# Patient Record
Sex: MALE | Race: WHITE | Employment: UNEMPLOYED | URBAN - METROPOLITAN AREA
[De-identification: names, ages, dates, MRNs, and addresses within clinical notes are randomized per-mention and may not be internally consistent; named-entity substitution may affect disease eponyms.]

---

## 2024-03-29 ENCOUNTER — APPOINTMENT (OUTPATIENT)
Dept: CT IMAGING | Age: 49
End: 2024-03-29
Payer: COMMERCIAL

## 2024-03-29 ENCOUNTER — HOSPITAL ENCOUNTER (EMERGENCY)
Age: 49
Discharge: HOME | End: 2024-03-29
Payer: COMMERCIAL

## 2024-03-29 VITALS
SYSTOLIC BLOOD PRESSURE: 148 MMHG | RESPIRATION RATE: 19 BRPM | TEMPERATURE: 98.6 F | OXYGEN SATURATION: 99 % | HEIGHT: 75 IN | DIASTOLIC BLOOD PRESSURE: 87 MMHG | WEIGHT: 205 LBS | HEART RATE: 97 BPM | BODY MASS INDEX: 25.49 KG/M2

## 2024-03-29 DIAGNOSIS — R82.90 ABNORMAL URINE FINDINGS: ICD-10-CM

## 2024-03-29 DIAGNOSIS — K52.9 COLITIS: Primary | ICD-10-CM

## 2024-03-29 LAB
ALBUMIN SERPL-MCNC: 4 G/DL (ref 3.5–5)
ALBUMIN/GLOB SERPL: 1.1 (ref 0.4–1.6)
ALP SERPL-CCNC: 87 U/L (ref 45–117)
ALT SERPL-CCNC: 10 U/L (ref 13–61)
ANION GAP SERPL CALC-SCNC: 13 MMOL/L (ref 2–11)
APPEARANCE UR: CLEAR
AST SERPL-CCNC: 14 U/L (ref 15–37)
BACTERIA URNS QL MICRO: ABNORMAL /HPF
BASOPHILS # BLD: 0 K/UL (ref 0–0.2)
BASOPHILS NFR BLD: 0 % (ref 0–2)
BILIRUB SERPL-MCNC: 0.7 MG/DL (ref 0.2–1.1)
BILIRUB UR QL: ABNORMAL
BUN SERPL-MCNC: 10 MG/DL (ref 6–23)
CALCIUM SERPL-MCNC: 9.4 MG/DL (ref 8.3–10.4)
CASTS URNS QL MICRO: 0 /LPF
CHLORIDE SERPL-SCNC: 100 MMOL/L (ref 98–107)
CO2 SERPL-SCNC: 26 MMOL/L (ref 21–32)
COLOR UR: ABNORMAL
CREAT SERPL-MCNC: 1.12 MG/DL (ref 0.8–1.5)
CRYSTALS URNS QL MICRO: 0 /LPF
DIFFERENTIAL METHOD BLD: ABNORMAL
EOSINOPHIL # BLD: 0 K/UL (ref 0–0.8)
EOSINOPHIL NFR BLD: 0 % (ref 0.5–7.8)
EPI CELLS #/AREA URNS HPF: ABNORMAL /HPF
ERYTHROCYTE [DISTWIDTH] IN BLOOD BY AUTOMATED COUNT: 14.4 % (ref 11.9–14.6)
GLOBULIN SER CALC-MCNC: 3.6 G/DL (ref 2.8–4.5)
GLUCOSE SERPL-MCNC: 168 MG/DL (ref 65–100)
GLUCOSE UR STRIP.AUTO-MCNC: NEGATIVE MG/DL
HCT VFR BLD AUTO: 39.8 % (ref 41.1–50.3)
HGB BLD-MCNC: 12.6 G/DL (ref 13.6–17.2)
HGB UR QL STRIP: ABNORMAL
IMM GRANULOCYTES # BLD AUTO: 0 K/UL (ref 0–0.5)
IMM GRANULOCYTES NFR BLD AUTO: 0 % (ref 0–5)
KETONES UR QL STRIP.AUTO: ABNORMAL MG/DL
LEUKOCYTE ESTERASE UR QL STRIP.AUTO: NEGATIVE
LIPASE SERPL-CCNC: 26 U/L (ref 13–60)
LYMPHOCYTES # BLD: 0.8 K/UL (ref 0.5–4.6)
LYMPHOCYTES NFR BLD: 8 % (ref 13–44)
MCH RBC QN AUTO: 27.9 PG (ref 26.1–32.9)
MCHC RBC AUTO-ENTMCNC: 31.7 G/DL (ref 31.4–35)
MCV RBC AUTO: 88.1 FL (ref 82–102)
MONOCYTES # BLD: 0.5 K/UL (ref 0.1–1.3)
MONOCYTES NFR BLD: 5 % (ref 4–12)
MUCOUS THREADS URNS QL MICRO: ABNORMAL /LPF
NEUTS SEG # BLD: 8.4 K/UL (ref 1.7–8.2)
NEUTS SEG NFR BLD: 86 % (ref 43–78)
NITRITE UR QL STRIP.AUTO: NEGATIVE
NRBC # BLD: 0 K/UL (ref 0–0.2)
OTHER OBSERVATIONS: ABNORMAL
PH UR STRIP: 5.5 (ref 5–9)
PLATELET # BLD AUTO: 171 K/UL (ref 150–450)
PMV BLD AUTO: 10.3 FL (ref 9.4–12.3)
POTASSIUM SERPL-SCNC: 3.6 MMOL/L (ref 3.5–5.1)
PROT SERPL-MCNC: 7.6 G/DL (ref 6.4–8.2)
PROT UR STRIP-MCNC: 100 MG/DL
RBC # BLD AUTO: 4.52 M/UL (ref 4.23–5.6)
RBC #/AREA URNS HPF: ABNORMAL /HPF
SODIUM SERPL-SCNC: 139 MMOL/L (ref 133–143)
SP GR UR REFRACTOMETRY: 1.02 (ref 1–1.02)
UROBILINOGEN UR QL STRIP.AUTO: 0.2 EU/DL (ref 0.2–1)
WBC # BLD AUTO: 9.8 K/UL (ref 4.3–11.1)
WBC URNS QL MICRO: ABNORMAL /HPF

## 2024-03-29 PROCEDURE — 87086 URINE CULTURE/COLONY COUNT: CPT

## 2024-03-29 PROCEDURE — 85025 COMPLETE CBC W/AUTO DIFF WBC: CPT

## 2024-03-29 PROCEDURE — 74177 CT ABD & PELVIS W/CONTRAST: CPT

## 2024-03-29 PROCEDURE — 99285 EMERGENCY DEPT VISIT HI MDM: CPT

## 2024-03-29 PROCEDURE — 83690 ASSAY OF LIPASE: CPT

## 2024-03-29 PROCEDURE — 80053 COMPREHEN METABOLIC PANEL: CPT

## 2024-03-29 PROCEDURE — 81001 URINALYSIS AUTO W/SCOPE: CPT

## 2024-03-29 PROCEDURE — 6360000004 HC RX CONTRAST MEDICATION: Performed by: PHYSICIAN ASSISTANT

## 2024-03-29 RX ORDER — CIPROFLOXACIN 500 MG/1
500 TABLET, FILM COATED ORAL 2 TIMES DAILY
Qty: 14 TABLET | Refills: 0 | Status: SHIPPED | OUTPATIENT
Start: 2024-03-29 | End: 2024-04-05

## 2024-03-29 RX ORDER — HYOSCYAMINE SULFATE 0.12 MG/1
1 TABLET SUBLINGUAL 3 TIMES DAILY PRN
Qty: 120 EACH | Refills: 0 | Status: SHIPPED | OUTPATIENT
Start: 2024-03-29 | End: 2024-04-01

## 2024-03-29 RX ORDER — METRONIDAZOLE 500 MG/1
500 TABLET ORAL 3 TIMES DAILY
Qty: 21 TABLET | Refills: 0 | Status: SHIPPED | OUTPATIENT
Start: 2024-03-29 | End: 2024-04-05

## 2024-03-29 RX ADMIN — IOPAMIDOL 100 ML: 755 INJECTION, SOLUTION INTRAVENOUS at 16:25

## 2024-03-29 ASSESSMENT — PAIN - FUNCTIONAL ASSESSMENT: PAIN_FUNCTIONAL_ASSESSMENT: 0-10

## 2024-03-29 ASSESSMENT — PAIN DESCRIPTION - DESCRIPTORS: DESCRIPTORS: ACHING

## 2024-03-29 ASSESSMENT — PAIN SCALES - GENERAL: PAINLEVEL_OUTOF10: 7

## 2024-03-29 ASSESSMENT — PAIN DESCRIPTION - LOCATION: LOCATION: ABDOMEN

## 2024-03-29 NOTE — DISCHARGE INSTRUCTIONS
Take antibiotics as prescribed for full course of treatment, as discussed I recommend following a clear liquid diet and slowly advance as tolerated to a soft bland diet until symptoms have improved.  I have referred you to GI for follow-up as you will need a screening colonoscopy once your symptoms have resolved.  You should be receiving a phone call from them to schedule this appointment    Follow-up with your PCP in 1 to 2 days if no improvement.  Return to the ER for any new or worsening symptoms.

## 2024-03-29 NOTE — ED NOTES
I have reviewed discharge instructions with the patient.  The patient verbalized understanding.    Patient left ED via Discharge Method: ambulatory to Home with self.    Opportunity for questions and clarification provided.       Patient given 3 scripts.         To continue your aftercare when you leave the hospital, you may receive an automated call from our care team to check in on how you are doing.  This is a free service and part of our promise to provide the best care and service to meet your aftercare needs.” If you have questions, or wish to unsubscribe from this service please call [phone].  Thank you for Choosing our [facility] Emergency Department.

## 2024-03-29 NOTE — ED PROVIDER NOTES
Emergency Department Provider Note       PCP: No primary care provider on file.   Age: 48 y.o.   Sex: male     DISPOSITION Decision To Discharge 03/29/2024 05:30:05 PM       ICD-10-CM    1. Colitis  K52.9 [facility]      2. Abnormal urine findings  R82.90           Medical Decision Making     48-year-old male who presented to the emergency department with abdominal pain, right-sided for the last week.  No nausea, vomiting or diarrhea.  Patient afebrile here, well-appearing.  Some mild tenderness to the right lower quadrant on exam, no rebound or guarding noted.  His labs today are reassuring, his urine did show 2+ bacteria but he is not having any UTI symptoms.  I will send his urine for culture.  CT scan ordered for further evaluation shows ascending colitis without perforation or abscess.  Will start patient on antibiotics outpatient, clear liquid diet and then slowly advance as tolerated.  Referral to GI for follow-up colonoscopy return to the ER for any new or worsening of symptoms.  ED Course as of 03/29/24 1935   Fri Mar 29, 2024   1518 Bacteria, UA(!): 2+ [KE]      ED Course User Index  [KE] Veronica Conner PA     1 acute, uncomplicated illness or injury.  Prescription drug management performed.  Shared medical decision making was utilized in creating the patients health plan today.    I independently ordered and reviewed each unique test.  I reviewed external records: ED visit note from an outside group.  I reviewed external records: provider visit note from PCP.  I reviewed external records: provider visit note from outside specialist.     I interpreted the CT Scan I reviewed imaging and radiology report, no pneumoperitoneum.              History     48-year-old male presents emergency department today for evaluation of right-sided abdominal pain.  States that symptoms began 1 week ago, symptoms have been coming and going.  Triage note noted that patient has had

## 2024-03-31 LAB
BACTERIA SPEC CULT: NORMAL
SERVICE CMNT-IMP: NORMAL

## 2024-04-01 LAB
BACTERIA SPEC CULT: NORMAL
BACTERIA SPEC CULT: NORMAL
SERVICE CMNT-IMP: NORMAL

## 2024-05-06 ENCOUNTER — HOSPITAL ENCOUNTER (INPATIENT)
Age: 49
LOS: 165 days | Discharge: HOME | End: 2024-10-18
Attending: EMERGENCY MEDICINE | Admitting: HOSPITALIST
Payer: COMMERCIAL

## 2024-05-06 ENCOUNTER — APPOINTMENT (OUTPATIENT)
Dept: GENERAL RADIOLOGY | Age: 49
End: 2024-05-06
Payer: COMMERCIAL

## 2024-05-06 ENCOUNTER — APPOINTMENT (OUTPATIENT)
Dept: CT IMAGING | Age: 49
End: 2024-05-06
Payer: COMMERCIAL

## 2024-05-06 DIAGNOSIS — C18.9 ADENOCARCINOMA OF COLON (HCC): ICD-10-CM

## 2024-05-06 DIAGNOSIS — R22.42 LOCALIZED SWELLING OF LEFT LOWER LEG: ICD-10-CM

## 2024-05-06 DIAGNOSIS — R65.21 SEPTIC SHOCK (HCC): Primary | ICD-10-CM

## 2024-05-06 DIAGNOSIS — R78.81 BACTEREMIA: ICD-10-CM

## 2024-05-06 DIAGNOSIS — N17.9 ACUTE KIDNEY INJURY (HCC): ICD-10-CM

## 2024-05-06 DIAGNOSIS — E86.9 VOLUME DEPLETION: ICD-10-CM

## 2024-05-06 DIAGNOSIS — A41.9 SEPTIC SHOCK (HCC): Primary | ICD-10-CM

## 2024-05-06 DIAGNOSIS — E87.0 ACUTE HYPERNATREMIA: ICD-10-CM

## 2024-05-06 DIAGNOSIS — K63.89 COLONIC MASS: ICD-10-CM

## 2024-05-06 PROBLEM — G93.40 ACUTE ENCEPHALOPATHY: Status: ACTIVE | Noted: 2024-05-06

## 2024-05-06 LAB
ALBUMIN SERPL-MCNC: 2.9 G/DL (ref 3.5–5)
ALBUMIN/GLOB SERPL: 0.6 (ref 1–1.9)
ALP SERPL-CCNC: 123 U/L (ref 40–129)
ALT SERPL-CCNC: 35 U/L (ref 12–65)
ANION GAP SERPL CALC-SCNC: 16 MMOL/L (ref 9–18)
AST SERPL-CCNC: 45 U/L (ref 15–37)
BASOPHILS # BLD: 0 K/UL (ref 0–0.2)
BASOPHILS NFR BLD: 0 % (ref 0–2)
BILIRUB SERPL-MCNC: 1.1 MG/DL (ref 0–1.2)
BUN SERPL-MCNC: 49 MG/DL (ref 6–23)
CALCIUM SERPL-MCNC: 9.2 MG/DL (ref 8.8–10.2)
CHLORIDE SERPL-SCNC: 111 MMOL/L (ref 98–107)
CK SERPL-CCNC: 171 U/L (ref 21–215)
CO2 SERPL-SCNC: 29 MMOL/L (ref 20–28)
CREAT SERPL-MCNC: 1.75 MG/DL (ref 0.8–1.3)
DIFFERENTIAL METHOD BLD: ABNORMAL
EOSINOPHIL # BLD: 0 K/UL (ref 0–0.8)
EOSINOPHIL NFR BLD: 0 % (ref 0.5–7.8)
ERYTHROCYTE [DISTWIDTH] IN BLOOD BY AUTOMATED COUNT: 16.2 % (ref 11.9–14.6)
EST. AVERAGE GLUCOSE BLD GHB EST-MCNC: 136 MG/DL
GLOBULIN SER CALC-MCNC: 4.9 G/DL (ref 2.3–3.5)
GLUCOSE BLD STRIP.AUTO-MCNC: 92 MG/DL (ref 65–100)
GLUCOSE SERPL-MCNC: 162 MG/DL (ref 70–99)
HBA1C MFR BLD: 6.4 % (ref 0–5.6)
HCT VFR BLD AUTO: 38.8 % (ref 41.1–50.3)
HGB BLD-MCNC: 11.5 G/DL (ref 13.6–17.2)
IMM GRANULOCYTES # BLD AUTO: 0.1 K/UL (ref 0–0.5)
IMM GRANULOCYTES NFR BLD AUTO: 1 % (ref 0–5)
LACTATE SERPL-SCNC: 3.5 MMOL/L (ref 0.5–2)
LACTATE SERPL-SCNC: 4.1 MMOL/L (ref 0.5–2)
LACTATE SERPL-SCNC: 6 MMOL/L (ref 0.5–2)
LYMPHOCYTES # BLD: 1 K/UL (ref 0.5–4.6)
LYMPHOCYTES NFR BLD: 7 % (ref 13–44)
MCH RBC QN AUTO: 27.4 PG (ref 26.1–32.9)
MCHC RBC AUTO-ENTMCNC: 29.6 G/DL (ref 31.4–35)
MCV RBC AUTO: 92.4 FL (ref 82–102)
MONOCYTES # BLD: 0.8 K/UL (ref 0.1–1.3)
MONOCYTES NFR BLD: 5 % (ref 4–12)
NEUTS SEG # BLD: 12.6 K/UL (ref 1.7–8.2)
NEUTS SEG NFR BLD: 87 % (ref 43–78)
NRBC # BLD: 0 K/UL (ref 0–0.2)
PLATELET # BLD AUTO: 360 K/UL (ref 150–450)
PMV BLD AUTO: 10.1 FL (ref 9.4–12.3)
POTASSIUM SERPL-SCNC: 4.4 MMOL/L (ref 3.5–5.1)
PROCALCITONIN SERPL-MCNC: 0.14 NG/ML (ref 0–0.1)
PROT SERPL-MCNC: 7.8 G/DL (ref 6.3–8.2)
RBC # BLD AUTO: 4.2 M/UL (ref 4.23–5.6)
SERVICE CMNT-IMP: NORMAL
SODIUM SERPL-SCNC: 155 MMOL/L (ref 136–145)
TSH W FREE THYROID IF ABNORMAL: 1.7 UIU/ML (ref 0.27–4.2)
WBC # BLD AUTO: 14.4 K/UL (ref 4.3–11.1)

## 2024-05-06 PROCEDURE — 84145 PROCALCITONIN (PCT): CPT

## 2024-05-06 PROCEDURE — 71045 X-RAY EXAM CHEST 1 VIEW: CPT

## 2024-05-06 PROCEDURE — 87040 BLOOD CULTURE FOR BACTERIA: CPT

## 2024-05-06 PROCEDURE — 96365 THER/PROPH/DIAG IV INF INIT: CPT

## 2024-05-06 PROCEDURE — 70450 CT HEAD/BRAIN W/O DYE: CPT

## 2024-05-06 PROCEDURE — 96374 THER/PROPH/DIAG INJ IV PUSH: CPT

## 2024-05-06 PROCEDURE — 82550 ASSAY OF CK (CPK): CPT

## 2024-05-06 PROCEDURE — 6360000002 HC RX W HCPCS: Performed by: EMERGENCY MEDICINE

## 2024-05-06 PROCEDURE — 85025 COMPLETE CBC W/AUTO DIFF WBC: CPT

## 2024-05-06 PROCEDURE — 80053 COMPREHEN METABOLIC PANEL: CPT

## 2024-05-06 PROCEDURE — 82962 GLUCOSE BLOOD TEST: CPT

## 2024-05-06 PROCEDURE — 2580000003 HC RX 258: Performed by: EMERGENCY MEDICINE

## 2024-05-06 PROCEDURE — 1100000000 HC RM PRIVATE

## 2024-05-06 PROCEDURE — 87205 SMEAR GRAM STAIN: CPT

## 2024-05-06 PROCEDURE — 87154 CUL TYP ID BLD PTHGN 6+ TRGT: CPT

## 2024-05-06 PROCEDURE — 83605 ASSAY OF LACTIC ACID: CPT

## 2024-05-06 PROCEDURE — 93005 ELECTROCARDIOGRAM TRACING: CPT | Performed by: EMERGENCY MEDICINE

## 2024-05-06 PROCEDURE — 1100000003 HC PRIVATE W/ TELEMETRY

## 2024-05-06 PROCEDURE — 84443 ASSAY THYROID STIM HORMONE: CPT

## 2024-05-06 PROCEDURE — 83036 HEMOGLOBIN GLYCOSYLATED A1C: CPT

## 2024-05-06 PROCEDURE — 74176 CT ABD & PELVIS W/O CONTRAST: CPT

## 2024-05-06 PROCEDURE — 99285 EMERGENCY DEPT VISIT HI MDM: CPT

## 2024-05-06 RX ORDER — ACETAMINOPHEN 650 MG/1
650 SUPPOSITORY RECTAL EVERY 6 HOURS PRN
Status: DISCONTINUED | OUTPATIENT
Start: 2024-05-06 | End: 2024-05-19 | Stop reason: SDUPTHER

## 2024-05-06 RX ORDER — ONDANSETRON 4 MG/1
4 TABLET, ORALLY DISINTEGRATING ORAL EVERY 8 HOURS PRN
Status: DISCONTINUED | OUTPATIENT
Start: 2024-05-06 | End: 2024-05-19 | Stop reason: SDUPTHER

## 2024-05-06 RX ORDER — SODIUM CHLORIDE 0.9 % (FLUSH) 0.9 %
5-40 SYRINGE (ML) INJECTION PRN
Status: DISCONTINUED | OUTPATIENT
Start: 2024-05-06 | End: 2024-05-19 | Stop reason: SDUPTHER

## 2024-05-06 RX ORDER — SODIUM CHLORIDE 9 MG/ML
INJECTION, SOLUTION INTRAVENOUS PRN
Status: DISCONTINUED | OUTPATIENT
Start: 2024-05-06 | End: 2024-05-19 | Stop reason: SDUPTHER

## 2024-05-06 RX ORDER — POTASSIUM CHLORIDE 7.45 MG/ML
10 INJECTION INTRAVENOUS PRN
Status: DISCONTINUED | OUTPATIENT
Start: 2024-05-06 | End: 2024-05-19 | Stop reason: SDUPTHER

## 2024-05-06 RX ORDER — POTASSIUM CHLORIDE 1500 MG/1
40 TABLET, EXTENDED RELEASE ORAL PRN
Status: DISCONTINUED | OUTPATIENT
Start: 2024-05-06 | End: 2024-05-22

## 2024-05-06 RX ORDER — 0.9 % SODIUM CHLORIDE 0.9 %
30 INTRAVENOUS SOLUTION INTRAVENOUS ONCE
Status: COMPLETED | OUTPATIENT
Start: 2024-05-06 | End: 2024-05-06

## 2024-05-06 RX ORDER — ACETAMINOPHEN 325 MG/1
650 TABLET ORAL EVERY 6 HOURS PRN
Status: DISCONTINUED | OUTPATIENT
Start: 2024-05-06 | End: 2024-05-19 | Stop reason: SDUPTHER

## 2024-05-06 RX ORDER — SODIUM CHLORIDE 0.9 % (FLUSH) 0.9 %
5-40 SYRINGE (ML) INJECTION EVERY 12 HOURS SCHEDULED
Status: DISCONTINUED | OUTPATIENT
Start: 2024-05-06 | End: 2024-05-19 | Stop reason: SDUPTHER

## 2024-05-06 RX ORDER — INSULIN LISPRO 100 [IU]/ML
0-4 INJECTION, SOLUTION INTRAVENOUS; SUBCUTANEOUS NIGHTLY
Status: DISCONTINUED | OUTPATIENT
Start: 2024-05-06 | End: 2024-06-06

## 2024-05-06 RX ORDER — ENOXAPARIN SODIUM 100 MG/ML
40 INJECTION SUBCUTANEOUS DAILY
Status: DISCONTINUED | OUTPATIENT
Start: 2024-05-06 | End: 2024-05-06

## 2024-05-06 RX ORDER — INSULIN LISPRO 100 [IU]/ML
0-8 INJECTION, SOLUTION INTRAVENOUS; SUBCUTANEOUS
Status: DISCONTINUED | OUTPATIENT
Start: 2024-05-07 | End: 2024-06-06

## 2024-05-06 RX ORDER — ONDANSETRON 2 MG/ML
4 INJECTION INTRAMUSCULAR; INTRAVENOUS EVERY 6 HOURS PRN
Status: DISCONTINUED | OUTPATIENT
Start: 2024-05-06 | End: 2024-05-19 | Stop reason: SDUPTHER

## 2024-05-06 RX ORDER — DEXTROSE MONOHYDRATE 50 MG/ML
INJECTION, SOLUTION INTRAVENOUS CONTINUOUS
Status: ACTIVE | OUTPATIENT
Start: 2024-05-06 | End: 2024-05-07

## 2024-05-06 RX ORDER — MAGNESIUM SULFATE IN WATER 40 MG/ML
2000 INJECTION, SOLUTION INTRAVENOUS PRN
Status: DISCONTINUED | OUTPATIENT
Start: 2024-05-06 | End: 2024-05-18

## 2024-05-06 RX ORDER — POLYETHYLENE GLYCOL 3350 17 G/17G
17 POWDER, FOR SOLUTION ORAL DAILY PRN
Status: DISCONTINUED | OUTPATIENT
Start: 2024-05-06 | End: 2024-05-30

## 2024-05-06 RX ORDER — LEVETIRACETAM 500 MG/1
500 TABLET ORAL 2 TIMES DAILY
Status: DISCONTINUED | OUTPATIENT
Start: 2024-05-06 | End: 2024-05-14

## 2024-05-06 RX ADMIN — PIPERACILLIN AND TAZOBACTAM 4500 MG: 4; .5 INJECTION, POWDER, LYOPHILIZED, FOR SOLUTION INTRAVENOUS at 17:18

## 2024-05-06 RX ADMIN — SODIUM CHLORIDE 2535 ML: 9 INJECTION, SOLUTION INTRAVENOUS at 17:20

## 2024-05-06 RX ADMIN — Medication 2500 MG: at 19:14

## 2024-05-07 ENCOUNTER — PREP FOR PROCEDURE (OUTPATIENT)
Dept: SURGERY | Age: 49
End: 2024-05-07

## 2024-05-07 ENCOUNTER — ANESTHESIA EVENT (OUTPATIENT)
Dept: SURGERY | Age: 49
End: 2024-05-07
Payer: COMMERCIAL

## 2024-05-07 PROBLEM — S06.5XAA SUBDURAL HEMATOMA (HCC): Status: ACTIVE | Noted: 2024-01-31

## 2024-05-07 PROBLEM — E87.0 HYPERNATREMIA: Status: ACTIVE | Noted: 2024-05-07

## 2024-05-07 PROBLEM — R78.81 BACTEREMIA DUE TO STAPHYLOCOCCUS: Status: ACTIVE | Noted: 2024-05-07

## 2024-05-07 PROBLEM — B95.8 BACTEREMIA DUE TO STAPHYLOCOCCUS: Status: ACTIVE | Noted: 2024-05-07

## 2024-05-07 PROBLEM — N39.0 UTI (URINARY TRACT INFECTION): Status: ACTIVE | Noted: 2024-05-07

## 2024-05-07 PROBLEM — A41.9 SEPSIS (HCC): Status: ACTIVE | Noted: 2024-05-07

## 2024-05-07 PROBLEM — N17.9 AKI (ACUTE KIDNEY INJURY) (HCC): Status: ACTIVE | Noted: 2024-05-07

## 2024-05-07 PROBLEM — G91.0 COMMUNICATING HYDROCEPHALUS (HCC): Status: ACTIVE | Noted: 2021-01-27

## 2024-05-07 PROBLEM — Z98.2 S/P VP SHUNT: Status: ACTIVE | Noted: 2024-01-31

## 2024-05-07 PROBLEM — D64.9 ANEMIA: Status: ACTIVE | Noted: 2024-05-07

## 2024-05-07 PROBLEM — L89.90 DECUBITUS ULCER: Status: ACTIVE | Noted: 2024-05-07

## 2024-05-07 PROBLEM — S31.000A SACRAL WOUND, INITIAL ENCOUNTER: Status: ACTIVE | Noted: 2024-05-07

## 2024-05-07 LAB
ACCESSION NUMBER, LLC1M: ABNORMAL
ACINETOBACTER CALCOAC BAUMANNII COMPLEX BY PCR: NOT DETECTED
ANION GAP SERPL CALC-SCNC: 11 MMOL/L (ref 9–18)
ANION GAP SERPL CALC-SCNC: 11 MMOL/L (ref 9–18)
ANION GAP SERPL CALC-SCNC: 12 MMOL/L (ref 9–18)
APPEARANCE UR: ABNORMAL
B FRAGILIS DNA BLD POS QL NAA+NON-PROBE: NOT DETECTED
BACTERIA URNS QL MICRO: ABNORMAL /HPF
BILIRUB UR QL: NEGATIVE
BIOFIRE TEST COMMENT: ABNORMAL
BUN SERPL-MCNC: 31 MG/DL (ref 6–23)
BUN SERPL-MCNC: 33 MG/DL (ref 6–23)
BUN SERPL-MCNC: 41 MG/DL (ref 6–23)
C ALBICANS DNA BLD POS QL NAA+NON-PROBE: NOT DETECTED
C AURIS DNA BLD POS QL NAA+NON-PROBE: NOT DETECTED
C GATTII+NEOFOR DNA BLD POS QL NAA+N-PRB: NOT DETECTED
C GLABRATA DNA BLD POS QL NAA+NON-PROBE: NOT DETECTED
C KRUSEI DNA BLD POS QL NAA+NON-PROBE: NOT DETECTED
C PARAP DNA BLD POS QL NAA+NON-PROBE: NOT DETECTED
C TROPICLS DNA BLD POS QL NAA+NON-PROBE: NOT DETECTED
CALCIUM SERPL-MCNC: 8.4 MG/DL (ref 8.8–10.2)
CALCIUM SERPL-MCNC: 8.6 MG/DL (ref 8.8–10.2)
CALCIUM SERPL-MCNC: 8.6 MG/DL (ref 8.8–10.2)
CHLORIDE SERPL-SCNC: 117 MMOL/L (ref 98–107)
CHLORIDE SERPL-SCNC: 118 MMOL/L (ref 98–107)
CHLORIDE SERPL-SCNC: 118 MMOL/L (ref 98–107)
CO2 SERPL-SCNC: 27 MMOL/L (ref 20–28)
CO2 SERPL-SCNC: 27 MMOL/L (ref 20–28)
CO2 SERPL-SCNC: 28 MMOL/L (ref 20–28)
COLOR UR: ABNORMAL
CREAT SERPL-MCNC: 1.41 MG/DL (ref 0.8–1.3)
CREAT SERPL-MCNC: 1.53 MG/DL (ref 0.8–1.3)
CREAT SERPL-MCNC: 1.53 MG/DL (ref 0.8–1.3)
CRYSTALS URNS QL MICRO: ABNORMAL /LPF
E CLOAC COMP DNA BLD POS NAA+NON-PROBE: NOT DETECTED
E COLI DNA BLD POS QL NAA+NON-PROBE: NOT DETECTED
E FAECALIS DNA BLD POS QL NAA+NON-PROBE: NOT DETECTED
E FAECIUM DNA BLD POS QL NAA+NON-PROBE: NOT DETECTED
EKG ATRIAL RATE: 122 BPM
EKG DIAGNOSIS: NORMAL
EKG P AXIS: 77 DEGREES
EKG P-R INTERVAL: 142 MS
EKG Q-T INTERVAL: 337 MS
EKG QRS DURATION: 87 MS
EKG QTC CALCULATION (BAZETT): 481 MS
EKG R AXIS: 60 DEGREES
EKG T AXIS: -27 DEGREES
EKG VENTRICULAR RATE: 122 BPM
ENTEROBACTERALES DNA BLD POS NAA+N-PRB: NOT DETECTED
EPI CELLS #/AREA URNS HPF: ABNORMAL /HPF
ERYTHROCYTE [DISTWIDTH] IN BLOOD BY AUTOMATED COUNT: 16.5 % (ref 11.9–14.6)
GLUCOSE BLD STRIP.AUTO-MCNC: 112 MG/DL (ref 65–100)
GLUCOSE BLD STRIP.AUTO-MCNC: 115 MG/DL (ref 65–100)
GLUCOSE BLD STRIP.AUTO-MCNC: 115 MG/DL (ref 65–100)
GLUCOSE BLD STRIP.AUTO-MCNC: 134 MG/DL (ref 65–100)
GLUCOSE SERPL-MCNC: 114 MG/DL (ref 70–99)
GLUCOSE SERPL-MCNC: 116 MG/DL (ref 70–99)
GLUCOSE SERPL-MCNC: 138 MG/DL (ref 70–99)
GLUCOSE UR STRIP.AUTO-MCNC: NEGATIVE MG/DL
GP B STREP DNA BLD POS QL NAA+NON-PROBE: NOT DETECTED
HAEM INFLU DNA BLD POS QL NAA+NON-PROBE: NOT DETECTED
HCT VFR BLD AUTO: 36 % (ref 41.1–50.3)
HGB BLD-MCNC: 10.5 G/DL (ref 13.6–17.2)
HGB UR QL STRIP: ABNORMAL
K OXYTOCA DNA BLD POS QL NAA+NON-PROBE: NOT DETECTED
KETONES UR QL STRIP.AUTO: ABNORMAL MG/DL
KLEBSIELLA SP DNA BLD POS QL NAA+NON-PRB: NOT DETECTED
KLEBSIELLA SP DNA BLD POS QL NAA+NON-PRB: NOT DETECTED
L MONOCYTOG DNA BLD POS QL NAA+NON-PROBE: NOT DETECTED
LACTATE SERPL-SCNC: 1.6 MMOL/L (ref 0.5–2)
LACTATE SERPL-SCNC: 1.8 MMOL/L (ref 0.5–2)
LACTATE SERPL-SCNC: 1.9 MMOL/L (ref 0.5–2)
LACTATE SERPL-SCNC: 2 MMOL/L (ref 0.5–2)
LACTATE SERPL-SCNC: 2.1 MMOL/L (ref 0.5–2)
LACTATE SERPL-SCNC: 2.7 MMOL/L (ref 0.5–2)
LEUKOCYTE ESTERASE UR QL STRIP.AUTO: NEGATIVE
MCH RBC QN AUTO: 27.1 PG (ref 26.1–32.9)
MCHC RBC AUTO-ENTMCNC: 29.2 G/DL (ref 31.4–35)
MCV RBC AUTO: 92.8 FL (ref 82–102)
MRSA DNA SPEC QL NAA+PROBE: NOT DETECTED
N MEN DNA BLD POS QL NAA+NON-PROBE: NOT DETECTED
NITRITE UR QL STRIP.AUTO: NEGATIVE
NRBC # BLD: 0 K/UL (ref 0–0.2)
OTHER OBSERVATIONS: ABNORMAL
P AERUGINOSA DNA BLD POS NAA+NON-PROBE: NOT DETECTED
PH UR STRIP: 5.5 (ref 5–9)
PLATELET # BLD AUTO: 285 K/UL (ref 150–450)
PMV BLD AUTO: 10.1 FL (ref 9.4–12.3)
POTASSIUM SERPL-SCNC: 3.8 MMOL/L (ref 3.5–5.1)
POTASSIUM SERPL-SCNC: 4 MMOL/L (ref 3.5–5.1)
POTASSIUM SERPL-SCNC: 4.1 MMOL/L (ref 3.5–5.1)
PROT UR STRIP-MCNC: 30 MG/DL
PROTEUS SP DNA BLD POS QL NAA+NON-PROBE: NOT DETECTED
RBC # BLD AUTO: 3.88 M/UL (ref 4.23–5.6)
RBC #/AREA URNS HPF: ABNORMAL /HPF
RESISTANT GENE TARGETS: ABNORMAL
S AUREUS CPE NOSE QL NAA+PROBE: DETECTED
S AUREUS DNA BLD POS QL NAA+NON-PROBE: NOT DETECTED
S AUREUS+CONS DNA BLD POS NAA+NON-PROBE: DETECTED
S EPIDERMIDIS DNA BLD POS QL NAA+NON-PRB: NOT DETECTED
S LUGDUNENSIS DNA BLD POS QL NAA+NON-PRB: NOT DETECTED
S MALTOPHILIA DNA BLD POS QL NAA+NON-PRB: NOT DETECTED
S MARCESCENS DNA BLD POS NAA+NON-PROBE: NOT DETECTED
S PNEUM DNA BLD POS QL NAA+NON-PROBE: NOT DETECTED
S PYO DNA BLD POS QL NAA+NON-PROBE: NOT DETECTED
SALMONELLA DNA BLD POS QL NAA+NON-PROBE: NOT DETECTED
SERVICE CMNT-IMP: ABNORMAL
SODIUM SERPL-SCNC: 155 MMOL/L (ref 136–145)
SODIUM SERPL-SCNC: 155 MMOL/L (ref 136–145)
SODIUM SERPL-SCNC: 157 MMOL/L (ref 136–145)
SP GR UR REFRACTOMETRY: 1.03 (ref 1–1.02)
STREPTOCOCCUS DNA BLD POS NAA+NON-PROBE: NOT DETECTED
UROBILINOGEN UR QL STRIP.AUTO: 1 EU/DL (ref 0.2–1)
WBC # BLD AUTO: 14.6 K/UL (ref 4.3–11.1)
WBC URNS QL MICRO: ABNORMAL /HPF

## 2024-05-07 PROCEDURE — 6360000002 HC RX W HCPCS: Performed by: HOSPITALIST

## 2024-05-07 PROCEDURE — 6360000002 HC RX W HCPCS: Performed by: INTERNAL MEDICINE

## 2024-05-07 PROCEDURE — 82962 GLUCOSE BLOOD TEST: CPT

## 2024-05-07 PROCEDURE — 93010 ELECTROCARDIOGRAM REPORT: CPT | Performed by: INTERNAL MEDICINE

## 2024-05-07 PROCEDURE — 87086 URINE CULTURE/COLONY COUNT: CPT

## 2024-05-07 PROCEDURE — 97112 NEUROMUSCULAR REEDUCATION: CPT

## 2024-05-07 PROCEDURE — 92610 EVALUATE SWALLOWING FUNCTION: CPT

## 2024-05-07 PROCEDURE — 92523 SPEECH SOUND LANG COMPREHEN: CPT

## 2024-05-07 PROCEDURE — 6370000000 HC RX 637 (ALT 250 FOR IP): Performed by: HOSPITALIST

## 2024-05-07 PROCEDURE — 2580000003 HC RX 258: Performed by: INTERNAL MEDICINE

## 2024-05-07 PROCEDURE — 87641 MR-STAPH DNA AMP PROBE: CPT

## 2024-05-07 PROCEDURE — 83605 ASSAY OF LACTIC ACID: CPT

## 2024-05-07 PROCEDURE — 81001 URINALYSIS AUTO W/SCOPE: CPT

## 2024-05-07 PROCEDURE — 85027 COMPLETE CBC AUTOMATED: CPT

## 2024-05-07 PROCEDURE — 97161 PT EVAL LOW COMPLEX 20 MIN: CPT

## 2024-05-07 PROCEDURE — 1100000003 HC PRIVATE W/ TELEMETRY

## 2024-05-07 PROCEDURE — 97530 THERAPEUTIC ACTIVITIES: CPT

## 2024-05-07 PROCEDURE — 97535 SELF CARE MNGMENT TRAINING: CPT

## 2024-05-07 PROCEDURE — 36415 COLL VENOUS BLD VENIPUNCTURE: CPT

## 2024-05-07 PROCEDURE — 2580000003 HC RX 258: Performed by: HOSPITALIST

## 2024-05-07 PROCEDURE — 80048 BASIC METABOLIC PNL TOTAL CA: CPT

## 2024-05-07 PROCEDURE — 97166 OT EVAL MOD COMPLEX 45 MIN: CPT

## 2024-05-07 PROCEDURE — 2500000003 HC RX 250 WO HCPCS: Performed by: HOSPITALIST

## 2024-05-07 PROCEDURE — 99222 1ST HOSP IP/OBS MODERATE 55: CPT | Performed by: SURGERY

## 2024-05-07 RX ADMIN — PIPERACILLIN AND TAZOBACTAM 3375 MG: 3; .375 INJECTION, POWDER, LYOPHILIZED, FOR SOLUTION INTRAVENOUS at 15:22

## 2024-05-07 RX ADMIN — LEVETIRACETAM 500 MG: 500 TABLET, FILM COATED ORAL at 00:13

## 2024-05-07 RX ADMIN — SODIUM CHLORIDE, PRESERVATIVE FREE 10 ML: 5 INJECTION INTRAVENOUS at 21:18

## 2024-05-07 RX ADMIN — SODIUM CHLORIDE, PRESERVATIVE FREE 10 ML: 5 INJECTION INTRAVENOUS at 00:14

## 2024-05-07 RX ADMIN — TUBERCULIN PURIFIED PROTEIN DERIVATIVE 5 UNITS: 5 INJECTION, SOLUTION INTRADERMAL at 01:12

## 2024-05-07 RX ADMIN — SODIUM CHLORIDE: 900 INJECTION INTRAVENOUS at 21:16

## 2024-05-07 RX ADMIN — LEVETIRACETAM 500 MG: 500 TABLET, FILM COATED ORAL at 08:07

## 2024-05-07 RX ADMIN — LEVETIRACETAM 500 MG: 500 TABLET, FILM COATED ORAL at 21:18

## 2024-05-07 RX ADMIN — PIPERACILLIN AND TAZOBACTAM 3375 MG: 3; .375 INJECTION, POWDER, LYOPHILIZED, FOR SOLUTION INTRAVENOUS at 00:12

## 2024-05-07 RX ADMIN — DEXTROSE MONOHYDRATE: 50 INJECTION, SOLUTION INTRAVENOUS at 00:11

## 2024-05-07 RX ADMIN — VANCOMYCIN HYDROCHLORIDE 1250 MG: 10 INJECTION, POWDER, LYOPHILIZED, FOR SOLUTION INTRAVENOUS at 21:17

## 2024-05-07 RX ADMIN — PIPERACILLIN AND TAZOBACTAM 3375 MG: 3; .375 INJECTION, POWDER, LYOPHILIZED, FOR SOLUTION INTRAVENOUS at 08:12

## 2024-05-07 NOTE — ANESTHESIA PRE PROCEDURE
(36.5 °C) 99 °F (37.2 °C)   TempSrc:   Oral Axillary   SpO2: 100% 95% 95% 99%   Weight:   63.9 kg (140 lb 14.4 oz)    Height:                                                  BP Readings from Last 3 Encounters:   05/07/24 107/78   03/29/24 (!) 148/87       NPO Status:                                                                                 BMI:   Wt Readings from Last 3 Encounters:   05/06/24 63.9 kg (140 lb 14.4 oz)   03/29/24 93 kg (205 lb)     Body mass index is 17.61 kg/m².    CBC:   Lab Results   Component Value Date/Time    WBC 14.6 05/07/2024 02:39 AM    RBC 3.88 05/07/2024 02:39 AM    HGB 10.5 05/07/2024 02:39 AM    HCT 36.0 05/07/2024 02:39 AM    MCV 92.8 05/07/2024 02:39 AM    RDW 16.5 05/07/2024 02:39 AM     05/07/2024 02:39 AM       CMP:   Lab Results   Component Value Date/Time     05/07/2024 01:53 PM    K 3.8 05/07/2024 01:53 PM     05/07/2024 01:53 PM    CO2 28 05/07/2024 01:53 PM    BUN 33 05/07/2024 01:53 PM    CREATININE 1.53 05/07/2024 01:53 PM    LABGLOM 56 05/07/2024 01:53 PM    LABGLOM 81 03/29/2024 02:17 PM    GLUCOSE 138 05/07/2024 01:53 PM    CALCIUM 8.6 05/07/2024 01:53 PM    BILITOT 1.1 05/06/2024 05:21 PM    ALKPHOS 123 05/06/2024 05:21 PM    AST 45 05/06/2024 05:21 PM    ALT 35 05/06/2024 05:21 PM       POC Tests:   Recent Labs     05/07/24  1535   POCGLU 115*       Coags: No results found for: \"PROTIME\", \"INR\", \"APTT\"    HCG (If Applicable): No results found for: \"PREGTESTUR\", \"PREGSERUM\", \"HCG\", \"HCGQUANT\"     ABGs: No results found for: \"PHART\", \"PO2ART\", \"UVM3EGQ\", \"TJW7JVE\", \"BEART\", \"E6KSFXVS\"     Type & Screen (If Applicable):  No results found for: \"LABABO\"    Drug/Infectious Status (If Applicable):  No results found for: \"HIV\", \"HEPCAB\"    COVID-19 Screening (If Applicable): No results found for: \"COVID19\"        Anesthesia Evaluation    Airway: Mallampati: II  TM distance: >3 FB   Neck ROM: full  Mouth opening: > = 3 FB   Dental:    (+) poor

## 2024-05-08 ENCOUNTER — ANESTHESIA (OUTPATIENT)
Dept: SURGERY | Age: 49
End: 2024-05-08
Payer: COMMERCIAL

## 2024-05-08 ENCOUNTER — APPOINTMENT (OUTPATIENT)
Dept: NON INVASIVE DIAGNOSTICS | Age: 49
End: 2024-05-08
Attending: INTERNAL MEDICINE
Payer: COMMERCIAL

## 2024-05-08 LAB
ANION GAP SERPL CALC-SCNC: 10 MMOL/L (ref 9–18)
ANION GAP SERPL CALC-SCNC: 10 MMOL/L (ref 9–18)
ANION GAP SERPL CALC-SCNC: 11 MMOL/L (ref 9–18)
ANION GAP SERPL CALC-SCNC: 11 MMOL/L (ref 9–18)
BACTERIA SPEC CULT: ABNORMAL
BACTERIA SPEC CULT: ABNORMAL
BASOPHILS # BLD: 0 K/UL (ref 0–0.2)
BASOPHILS NFR BLD: 0 % (ref 0–2)
BUN SERPL-MCNC: 23 MG/DL (ref 6–23)
BUN SERPL-MCNC: 24 MG/DL (ref 6–23)
BUN SERPL-MCNC: 25 MG/DL (ref 6–23)
BUN SERPL-MCNC: 26 MG/DL (ref 6–23)
CALCIUM SERPL-MCNC: 8.3 MG/DL (ref 8.8–10.2)
CALCIUM SERPL-MCNC: 8.5 MG/DL (ref 8.8–10.2)
CALCIUM SERPL-MCNC: 8.5 MG/DL (ref 8.8–10.2)
CALCIUM SERPL-MCNC: 8.8 MG/DL (ref 8.8–10.2)
CHLORIDE SERPL-SCNC: 115 MMOL/L (ref 98–107)
CHLORIDE SERPL-SCNC: 116 MMOL/L (ref 98–107)
CHLORIDE SERPL-SCNC: 117 MMOL/L (ref 98–107)
CHLORIDE SERPL-SCNC: 118 MMOL/L (ref 98–107)
CO2 SERPL-SCNC: 26 MMOL/L (ref 20–28)
CO2 SERPL-SCNC: 27 MMOL/L (ref 20–28)
CO2 SERPL-SCNC: 28 MMOL/L (ref 20–28)
CO2 SERPL-SCNC: 29 MMOL/L (ref 20–28)
CREAT SERPL-MCNC: 1.18 MG/DL (ref 0.8–1.3)
CREAT SERPL-MCNC: 1.23 MG/DL (ref 0.8–1.3)
CREAT SERPL-MCNC: 1.25 MG/DL (ref 0.8–1.3)
CREAT SERPL-MCNC: 1.35 MG/DL (ref 0.8–1.3)
DIFFERENTIAL METHOD BLD: ABNORMAL
ECHO AO ASC DIAM: 3.6 CM
ECHO AO ASCENDING AORTA INDEX: 1.9 CM/M2
ECHO AO ROOT DIAM: 4 CM
ECHO AO ROOT INDEX: 2.12 CM/M2
ECHO AV AREA PEAK VELOCITY: 4.3 CM2
ECHO AV AREA VTI: 4.3 CM2
ECHO AV AREA/BSA PEAK VELOCITY: 2.3 CM2/M2
ECHO AV AREA/BSA VTI: 2.3 CM2/M2
ECHO AV MEAN GRADIENT: 2 MMHG
ECHO AV MEAN GRADIENT: 2 MMHG
ECHO AV MEAN VELOCITY: 0.7 M/S
ECHO AV PEAK GRADIENT: 4 MMHG
ECHO AV PEAK VELOCITY: 1 M/S
ECHO AV VELOCITY RATIO: 0.9
ECHO AV VTI: 15.4 CM
ECHO LA DIAMETER INDEX: 1.16 CM/M2
ECHO LA DIAMETER: 2.2 CM
ECHO LA TO AORTIC ROOT RATIO: 0.55
ECHO LV E' LATERAL VELOCITY: 11 CM/S
ECHO LV E' SEPTAL VELOCITY: 7 CM/S
ECHO LV EDV A4C: 112 ML
ECHO LV EDV INDEX A4C: 59 ML/M2
ECHO LV EJECTION FRACTION A4C: 53 %
ECHO LV ESV A4C: 53 ML
ECHO LV ESV INDEX A4C: 28 ML/M2
ECHO LV FRACTIONAL SHORTENING: 27 % (ref 28–44)
ECHO LV INTERNAL DIMENSION DIASTOLE INDEX: 1.96 CM/M2
ECHO LV INTERNAL DIMENSION DIASTOLIC: 3.7 CM (ref 4.2–5.9)
ECHO LV INTERNAL DIMENSION SYSTOLIC INDEX: 1.43 CM/M2
ECHO LV INTERNAL DIMENSION SYSTOLIC: 2.7 CM
ECHO LV IVSD: 1 CM (ref 0.6–1)
ECHO LV MASS 2D: 120.8 G (ref 88–224)
ECHO LV MASS INDEX 2D: 63.9 G/M2 (ref 49–115)
ECHO LV POSTERIOR WALL DIASTOLIC: 1.1 CM (ref 0.6–1)
ECHO LV RELATIVE WALL THICKNESS RATIO: 0.59
ECHO LVOT AREA: 4.9 CM2
ECHO LVOT AV VTI INDEX: 0.87
ECHO LVOT DIAM: 2.5 CM
ECHO LVOT MEAN GRADIENT: 2 MMHG
ECHO LVOT PEAK GRADIENT: 3 MMHG
ECHO LVOT PEAK VELOCITY: 0.9 M/S
ECHO LVOT STROKE VOLUME INDEX: 34.8 ML/M2
ECHO LVOT SV: 65.7 ML
ECHO LVOT VTI: 13.4 CM
ECHO MV A VELOCITY: 0.81 M/S
ECHO MV AREA VTI: 3.9 CM2
ECHO MV E VELOCITY: 0.69 M/S
ECHO MV E/A RATIO: 0.85
ECHO MV E/E' LATERAL: 6.27
ECHO MV E/E' RATIO (AVERAGED): 8.06
ECHO MV LVOT VTI INDEX: 1.27
ECHO MV MAX VELOCITY: 0.9 M/S
ECHO MV MEAN GRADIENT: 2 MMHG
ECHO MV MEAN VELOCITY: 0.7 M/S
ECHO MV PEAK GRADIENT: 3 MMHG
ECHO MV VTI: 17 CM
ECHO PV MAX VELOCITY: 0.7 M/S
ECHO PV PEAK GRADIENT: 2 MMHG
ECHO RV BASAL DIMENSION: 2.7 CM
ECHO RV FREE WALL PEAK S': 12 CM/S
ECHO RV INTERNAL DIMENSION: 2.9 CM
ECHO RV TAPSE: 1.7 CM (ref 1.7–?)
EOSINOPHIL # BLD: 0 K/UL (ref 0–0.8)
EOSINOPHIL NFR BLD: 0 % (ref 0.5–7.8)
ERYTHROCYTE [DISTWIDTH] IN BLOOD BY AUTOMATED COUNT: 16.4 % (ref 11.9–14.6)
GLUCOSE BLD STRIP.AUTO-MCNC: 137 MG/DL (ref 65–100)
GLUCOSE BLD STRIP.AUTO-MCNC: 83 MG/DL (ref 65–100)
GLUCOSE SERPL-MCNC: 89 MG/DL (ref 70–99)
GLUCOSE SERPL-MCNC: 95 MG/DL (ref 70–99)
GLUCOSE SERPL-MCNC: 97 MG/DL (ref 70–99)
GLUCOSE SERPL-MCNC: 98 MG/DL (ref 70–99)
GRAM STN SPEC: ABNORMAL
HCT VFR BLD AUTO: 33.5 % (ref 41.1–50.3)
HGB BLD-MCNC: 9.9 G/DL (ref 13.6–17.2)
IMM GRANULOCYTES # BLD AUTO: 0.1 K/UL (ref 0–0.5)
IMM GRANULOCYTES NFR BLD AUTO: 1 % (ref 0–5)
IRON SATN MFR SERPL: 46 % (ref 20–50)
IRON SERPL-MCNC: 77 UG/DL (ref 35–100)
LYMPHOCYTES # BLD: 1.3 K/UL (ref 0.5–4.6)
LYMPHOCYTES NFR BLD: 13 % (ref 13–44)
MCH RBC QN AUTO: 27.4 PG (ref 26.1–32.9)
MCHC RBC AUTO-ENTMCNC: 29.6 G/DL (ref 31.4–35)
MCV RBC AUTO: 92.8 FL (ref 82–102)
MM INDURATION, POC: 0 MM (ref 0–5)
MONOCYTES # BLD: 0.6 K/UL (ref 0.1–1.3)
MONOCYTES NFR BLD: 6 % (ref 4–12)
NEUTS SEG # BLD: 8.4 K/UL (ref 1.7–8.2)
NEUTS SEG NFR BLD: 81 % (ref 43–78)
NRBC # BLD: 0 K/UL (ref 0–0.2)
PLATELET # BLD AUTO: 231 K/UL (ref 150–450)
PMV BLD AUTO: 10.4 FL (ref 9.4–12.3)
POTASSIUM SERPL-SCNC: 3.8 MMOL/L (ref 3.5–5.1)
POTASSIUM SERPL-SCNC: 3.9 MMOL/L (ref 3.5–5.1)
POTASSIUM SERPL-SCNC: 3.9 MMOL/L (ref 3.5–5.1)
POTASSIUM SERPL-SCNC: 4.2 MMOL/L (ref 3.5–5.1)
POTASSIUM SERPL-SCNC: 4.3 MMOL/L (ref 3.5–5.1)
PPD, POC: NEGATIVE
RBC # BLD AUTO: 3.61 M/UL (ref 4.23–5.6)
SERVICE CMNT-IMP: ABNORMAL
SERVICE CMNT-IMP: ABNORMAL
SERVICE CMNT-IMP: NORMAL
SODIUM SERPL-SCNC: 154 MMOL/L (ref 136–145)
SODIUM SERPL-SCNC: 154 MMOL/L (ref 136–145)
SODIUM SERPL-SCNC: 155 MMOL/L (ref 136–145)
SODIUM SERPL-SCNC: 155 MMOL/L (ref 136–145)
TIBC SERPL-MCNC: 168 UG/DL (ref 240–450)
UIBC SERPL-MCNC: 90.7 UG/DL (ref 112–347)
WBC # BLD AUTO: 10.4 K/UL (ref 4.3–11.1)

## 2024-05-08 PROCEDURE — 6360000002 HC RX W HCPCS: Performed by: NURSE ANESTHETIST, CERTIFIED REGISTERED

## 2024-05-08 PROCEDURE — 6370000000 HC RX 637 (ALT 250 FOR IP): Performed by: HOSPITALIST

## 2024-05-08 PROCEDURE — 2580000003 HC RX 258: Performed by: ANESTHESIOLOGY

## 2024-05-08 PROCEDURE — 11046 DBRDMT MUSC&/FSCA EA ADDL: CPT | Performed by: SURGERY

## 2024-05-08 PROCEDURE — 3700000001 HC ADD 15 MINUTES (ANESTHESIA): Performed by: SURGERY

## 2024-05-08 PROCEDURE — 6360000004 HC RX CONTRAST MEDICATION: Performed by: INTERNAL MEDICINE

## 2024-05-08 PROCEDURE — 2709999900 HC NON-CHARGEABLE SUPPLY: Performed by: SURGERY

## 2024-05-08 PROCEDURE — C8929 TTE W OR WO FOL WCON,DOPPLER: HCPCS

## 2024-05-08 PROCEDURE — 2500000003 HC RX 250 WO HCPCS: Performed by: NURSE ANESTHETIST, CERTIFIED REGISTERED

## 2024-05-08 PROCEDURE — 0JB70ZZ EXCISION OF BACK SUBCUTANEOUS TISSUE AND FASCIA, OPEN APPROACH: ICD-10-PCS | Performed by: SURGERY

## 2024-05-08 PROCEDURE — 93306 TTE W/DOPPLER COMPLETE: CPT | Performed by: INTERNAL MEDICINE

## 2024-05-08 PROCEDURE — 84132 ASSAY OF SERUM POTASSIUM: CPT

## 2024-05-08 PROCEDURE — 7100000000 HC PACU RECOVERY - FIRST 15 MIN: Performed by: SURGERY

## 2024-05-08 PROCEDURE — 83550 IRON BINDING TEST: CPT

## 2024-05-08 PROCEDURE — 36415 COLL VENOUS BLD VENIPUNCTURE: CPT

## 2024-05-08 PROCEDURE — 80048 BASIC METABOLIC PNL TOTAL CA: CPT

## 2024-05-08 PROCEDURE — 6360000002 HC RX W HCPCS: Performed by: HOSPITALIST

## 2024-05-08 PROCEDURE — 1100000000 HC RM PRIVATE

## 2024-05-08 PROCEDURE — 85025 COMPLETE CBC W/AUTO DIFF WBC: CPT

## 2024-05-08 PROCEDURE — 3700000000 HC ANESTHESIA ATTENDED CARE: Performed by: SURGERY

## 2024-05-08 PROCEDURE — 11043 DBRDMT MUSC&/FSCA 1ST 20/<: CPT | Performed by: SURGERY

## 2024-05-08 PROCEDURE — 2580000003 HC RX 258: Performed by: PHYSICIAN ASSISTANT

## 2024-05-08 PROCEDURE — 3600000012 HC SURGERY LEVEL 2 ADDTL 15MIN: Performed by: SURGERY

## 2024-05-08 PROCEDURE — 2580000003 HC RX 258: Performed by: HOSPITALIST

## 2024-05-08 PROCEDURE — 2580000003 HC RX 258: Performed by: INTERNAL MEDICINE

## 2024-05-08 PROCEDURE — 6370000000 HC RX 637 (ALT 250 FOR IP): Performed by: PHYSICIAN ASSISTANT

## 2024-05-08 PROCEDURE — 82962 GLUCOSE BLOOD TEST: CPT

## 2024-05-08 PROCEDURE — 97530 THERAPEUTIC ACTIVITIES: CPT

## 2024-05-08 PROCEDURE — 3600000002 HC SURGERY LEVEL 2 BASE: Performed by: SURGERY

## 2024-05-08 PROCEDURE — 7100000001 HC PACU RECOVERY - ADDTL 15 MIN: Performed by: SURGERY

## 2024-05-08 PROCEDURE — 83540 ASSAY OF IRON: CPT

## 2024-05-08 PROCEDURE — 88304 TISSUE EXAM BY PATHOLOGIST: CPT

## 2024-05-08 RX ORDER — OXYCODONE HYDROCHLORIDE 5 MG/1
10 TABLET ORAL EVERY 4 HOURS PRN
Status: DISCONTINUED | OUTPATIENT
Start: 2024-05-08 | End: 2024-05-19 | Stop reason: SDUPTHER

## 2024-05-08 RX ORDER — ONDANSETRON 2 MG/ML
4 INJECTION INTRAMUSCULAR; INTRAVENOUS
Status: DISCONTINUED | OUTPATIENT
Start: 2024-05-08 | End: 2024-05-08 | Stop reason: HOSPADM

## 2024-05-08 RX ORDER — SODIUM CHLORIDE 0.9 % (FLUSH) 0.9 %
5-40 SYRINGE (ML) INJECTION PRN
Status: DISCONTINUED | OUTPATIENT
Start: 2024-05-08 | End: 2024-05-08 | Stop reason: HOSPADM

## 2024-05-08 RX ORDER — LIDOCAINE HYDROCHLORIDE 20 MG/ML
INJECTION, SOLUTION EPIDURAL; INFILTRATION; INTRACAUDAL; PERINEURAL PRN
Status: DISCONTINUED | OUTPATIENT
Start: 2024-05-08 | End: 2024-05-08 | Stop reason: SDUPTHER

## 2024-05-08 RX ORDER — LIDOCAINE HYDROCHLORIDE 10 MG/ML
1 INJECTION, SOLUTION INFILTRATION; PERINEURAL
Status: DISCONTINUED | OUTPATIENT
Start: 2024-05-08 | End: 2024-05-08 | Stop reason: HOSPADM

## 2024-05-08 RX ORDER — HYDRALAZINE HYDROCHLORIDE 20 MG/ML
10 INJECTION INTRAMUSCULAR; INTRAVENOUS
Status: DISCONTINUED | OUTPATIENT
Start: 2024-05-08 | End: 2024-05-08 | Stop reason: HOSPADM

## 2024-05-08 RX ORDER — MIDAZOLAM HYDROCHLORIDE 2 MG/2ML
2 INJECTION, SOLUTION INTRAMUSCULAR; INTRAVENOUS
Status: DISCONTINUED | OUTPATIENT
Start: 2024-05-08 | End: 2024-05-08 | Stop reason: HOSPADM

## 2024-05-08 RX ORDER — FENTANYL CITRATE 50 UG/ML
INJECTION, SOLUTION INTRAMUSCULAR; INTRAVENOUS PRN
Status: DISCONTINUED | OUTPATIENT
Start: 2024-05-08 | End: 2024-05-08 | Stop reason: SDUPTHER

## 2024-05-08 RX ORDER — PROCHLORPERAZINE EDISYLATE 5 MG/ML
5 INJECTION INTRAMUSCULAR; INTRAVENOUS
Status: DISCONTINUED | OUTPATIENT
Start: 2024-05-08 | End: 2024-05-08 | Stop reason: HOSPADM

## 2024-05-08 RX ORDER — SODIUM CHLORIDE 9 MG/ML
INJECTION, SOLUTION INTRAVENOUS PRN
Status: DISCONTINUED | OUTPATIENT
Start: 2024-05-08 | End: 2024-05-08 | Stop reason: HOSPADM

## 2024-05-08 RX ORDER — FENTANYL CITRATE 50 UG/ML
100 INJECTION, SOLUTION INTRAMUSCULAR; INTRAVENOUS
Status: DISCONTINUED | OUTPATIENT
Start: 2024-05-08 | End: 2024-05-08 | Stop reason: HOSPADM

## 2024-05-08 RX ORDER — ONDANSETRON 2 MG/ML
INJECTION INTRAMUSCULAR; INTRAVENOUS PRN
Status: DISCONTINUED | OUTPATIENT
Start: 2024-05-08 | End: 2024-05-08 | Stop reason: SDUPTHER

## 2024-05-08 RX ORDER — SODIUM CHLORIDE 0.9 % (FLUSH) 0.9 %
5-40 SYRINGE (ML) INJECTION EVERY 12 HOURS SCHEDULED
Status: DISCONTINUED | OUTPATIENT
Start: 2024-05-08 | End: 2024-05-08 | Stop reason: HOSPADM

## 2024-05-08 RX ORDER — SODIUM CHLORIDE, SODIUM LACTATE, POTASSIUM CHLORIDE, CALCIUM CHLORIDE 600; 310; 30; 20 MG/100ML; MG/100ML; MG/100ML; MG/100ML
INJECTION, SOLUTION INTRAVENOUS CONTINUOUS
Status: DISCONTINUED | OUTPATIENT
Start: 2024-05-08 | End: 2024-05-08 | Stop reason: HOSPADM

## 2024-05-08 RX ORDER — LABETALOL HYDROCHLORIDE 5 MG/ML
10 INJECTION, SOLUTION INTRAVENOUS
Status: DISCONTINUED | OUTPATIENT
Start: 2024-05-08 | End: 2024-05-08 | Stop reason: HOSPADM

## 2024-05-08 RX ORDER — DEXAMETHASONE SODIUM PHOSPHATE 4 MG/ML
INJECTION, SOLUTION INTRA-ARTICULAR; INTRALESIONAL; INTRAMUSCULAR; INTRAVENOUS; SOFT TISSUE PRN
Status: DISCONTINUED | OUTPATIENT
Start: 2024-05-08 | End: 2024-05-08 | Stop reason: SDUPTHER

## 2024-05-08 RX ORDER — NALOXONE HYDROCHLORIDE 0.4 MG/ML
INJECTION, SOLUTION INTRAMUSCULAR; INTRAVENOUS; SUBCUTANEOUS PRN
Status: DISCONTINUED | OUTPATIENT
Start: 2024-05-08 | End: 2024-05-08 | Stop reason: HOSPADM

## 2024-05-08 RX ORDER — OXYCODONE HYDROCHLORIDE 5 MG/1
5 TABLET ORAL EVERY 4 HOURS PRN
Status: DISCONTINUED | OUTPATIENT
Start: 2024-05-08 | End: 2024-05-19 | Stop reason: SDUPTHER

## 2024-05-08 RX ORDER — ROCURONIUM BROMIDE 10 MG/ML
INJECTION, SOLUTION INTRAVENOUS PRN
Status: DISCONTINUED | OUTPATIENT
Start: 2024-05-08 | End: 2024-05-08 | Stop reason: SDUPTHER

## 2024-05-08 RX ORDER — HYDROMORPHONE HYDROCHLORIDE 1 MG/ML
1 INJECTION, SOLUTION INTRAMUSCULAR; INTRAVENOUS; SUBCUTANEOUS EVERY 4 HOURS PRN
Status: DISCONTINUED | OUTPATIENT
Start: 2024-05-08 | End: 2024-05-19 | Stop reason: SDUPTHER

## 2024-05-08 RX ORDER — OXYCODONE HYDROCHLORIDE 5 MG/1
5 TABLET ORAL
Status: DISCONTINUED | OUTPATIENT
Start: 2024-05-08 | End: 2024-05-08 | Stop reason: HOSPADM

## 2024-05-08 RX ORDER — HYDROMORPHONE HYDROCHLORIDE 2 MG/ML
0.25 INJECTION, SOLUTION INTRAMUSCULAR; INTRAVENOUS; SUBCUTANEOUS EVERY 5 MIN PRN
Status: DISCONTINUED | OUTPATIENT
Start: 2024-05-08 | End: 2024-05-08 | Stop reason: HOSPADM

## 2024-05-08 RX ORDER — PROPOFOL 10 MG/ML
INJECTION, EMULSION INTRAVENOUS PRN
Status: DISCONTINUED | OUTPATIENT
Start: 2024-05-08 | End: 2024-05-08 | Stop reason: SDUPTHER

## 2024-05-08 RX ORDER — HYDROMORPHONE HYDROCHLORIDE 2 MG/ML
0.5 INJECTION, SOLUTION INTRAMUSCULAR; INTRAVENOUS; SUBCUTANEOUS EVERY 5 MIN PRN
Status: DISCONTINUED | OUTPATIENT
Start: 2024-05-08 | End: 2024-05-08 | Stop reason: HOSPADM

## 2024-05-08 RX ADMIN — SUGAMMADEX 200 MG: 100 INJECTION, SOLUTION INTRAVENOUS at 15:46

## 2024-05-08 RX ADMIN — SODIUM CHLORIDE, PRESERVATIVE FREE 0.3 ML: 5 INJECTION INTRAVENOUS at 10:55

## 2024-05-08 RX ADMIN — FENTANYL CITRATE 50 MCG: 50 INJECTION, SOLUTION INTRAMUSCULAR; INTRAVENOUS at 15:15

## 2024-05-08 RX ADMIN — FENTANYL CITRATE 50 MCG: 50 INJECTION, SOLUTION INTRAMUSCULAR; INTRAVENOUS at 15:33

## 2024-05-08 RX ADMIN — SODIUM HYPOCHLORITE: 2.5 SOLUTION TOPICAL at 22:11

## 2024-05-08 RX ADMIN — PIPERACILLIN AND TAZOBACTAM 3375 MG: 3; .375 INJECTION, POWDER, LYOPHILIZED, FOR SOLUTION INTRAVENOUS at 00:12

## 2024-05-08 RX ADMIN — ONDANSETRON 4 MG: 2 INJECTION INTRAMUSCULAR; INTRAVENOUS at 15:34

## 2024-05-08 RX ADMIN — ROCURONIUM BROMIDE 40 MG: 10 INJECTION, SOLUTION INTRAVENOUS at 15:15

## 2024-05-08 RX ADMIN — SODIUM CHLORIDE, SODIUM LACTATE, POTASSIUM CHLORIDE, AND CALCIUM CHLORIDE: 600; 310; 30; 20 INJECTION, SOLUTION INTRAVENOUS at 14:59

## 2024-05-08 RX ADMIN — DEXAMETHASONE SODIUM PHOSPHATE 8 MG: 4 INJECTION, SOLUTION INTRAMUSCULAR; INTRAVENOUS at 15:35

## 2024-05-08 RX ADMIN — SODIUM CHLORIDE, PRESERVATIVE FREE 10 ML: 5 INJECTION INTRAVENOUS at 22:14

## 2024-05-08 RX ADMIN — LEVETIRACETAM 500 MG: 500 TABLET, FILM COATED ORAL at 09:00

## 2024-05-08 RX ADMIN — LIDOCAINE HYDROCHLORIDE 80 MG: 20 INJECTION, SOLUTION EPIDURAL; INFILTRATION; INTRACAUDAL; PERINEURAL at 15:15

## 2024-05-08 RX ADMIN — LEVETIRACETAM 500 MG: 500 TABLET, FILM COATED ORAL at 22:11

## 2024-05-08 RX ADMIN — PROPOFOL 150 MG: 10 INJECTION, EMULSION INTRAVENOUS at 15:15

## 2024-05-08 RX ADMIN — PIPERACILLIN AND TAZOBACTAM 3375 MG: 3; .375 INJECTION, POWDER, LYOPHILIZED, FOR SOLUTION INTRAVENOUS at 10:29

## 2024-05-08 ASSESSMENT — PAIN SCALES - GENERAL: PAINLEVEL_OUTOF10: 0

## 2024-05-08 ASSESSMENT — PAIN - FUNCTIONAL ASSESSMENT
PAIN_FUNCTIONAL_ASSESSMENT: NONE - DENIES PAIN
PAIN_FUNCTIONAL_ASSESSMENT: 0-10

## 2024-05-08 NOTE — ANESTHESIA POSTPROCEDURE EVALUATION
Department of Anesthesiology  Postprocedure Note    Patient: Brendan Saleh  MRN: [id number]  YOB: 1975  Date of evaluation: 5/8/2024    Procedure Summary       Date: 05/08/24 Room / Location: [facility] MAIN OR 02 / [facility] MAIN OR    Anesthesia Start: 1505 Anesthesia Stop: 1555    Procedure: IRRIGATION AND DEBRIDEMENT OF SACRAL DECUBITIUS ULCER (Sacrum) Diagnosis:       Decubitus ulcer      (Decubitus ulcer [L89.90])    Providers: Lou Zapien MD Responsible Provider: Kade Kline DO    Anesthesia Type: general ASA Status: 3            Anesthesia Type: No value filed.    Adriana Phase I: Adriana Score: 8    Adriana Phase II:      Anesthesia Post Evaluation    Patient location during evaluation: PACU  Level of consciousness: awake and alert  Airway patency: patent  Nausea & Vomiting: no nausea  Cardiovascular status: hemodynamically stable  Respiratory status: acceptable  Hydration status: euvolemic  Pain management: satisfactory to patient    No notable events documented.

## 2024-05-09 LAB
ANION GAP SERPL CALC-SCNC: 10 MMOL/L (ref 9–18)
ANION GAP SERPL CALC-SCNC: 10 MMOL/L (ref 9–18)
ANION GAP SERPL CALC-SCNC: 13 MMOL/L (ref 9–18)
BACTERIA SPEC CULT: NORMAL
BASOPHILS # BLD: 0 K/UL (ref 0–0.2)
BASOPHILS NFR BLD: 0 % (ref 0–2)
BUN SERPL-MCNC: 22 MG/DL (ref 6–23)
BUN SERPL-MCNC: 24 MG/DL (ref 6–23)
BUN SERPL-MCNC: 27 MG/DL (ref 6–23)
CALCIUM SERPL-MCNC: 8.2 MG/DL (ref 8.8–10.2)
CALCIUM SERPL-MCNC: 8.3 MG/DL (ref 8.8–10.2)
CALCIUM SERPL-MCNC: 8.3 MG/DL (ref 8.8–10.2)
CHLORIDE SERPL-SCNC: 111 MMOL/L (ref 98–107)
CHLORIDE SERPL-SCNC: 113 MMOL/L (ref 98–107)
CHLORIDE SERPL-SCNC: 115 MMOL/L (ref 98–107)
CO2 SERPL-SCNC: 26 MMOL/L (ref 20–28)
CO2 SERPL-SCNC: 26 MMOL/L (ref 20–28)
CO2 SERPL-SCNC: 27 MMOL/L (ref 20–28)
CREAT SERPL-MCNC: 1.06 MG/DL (ref 0.8–1.3)
CREAT SERPL-MCNC: 1.1 MG/DL (ref 0.8–1.3)
CREAT SERPL-MCNC: 1.11 MG/DL (ref 0.8–1.3)
DIFFERENTIAL METHOD BLD: ABNORMAL
EOSINOPHIL # BLD: 0 K/UL (ref 0–0.8)
EOSINOPHIL NFR BLD: 0 % (ref 0.5–7.8)
ERYTHROCYTE [DISTWIDTH] IN BLOOD BY AUTOMATED COUNT: 15.9 % (ref 11.9–14.6)
FERRITIN SERPL-MCNC: 376 NG/ML (ref 8–388)
FOLATE SERPL-MCNC: 11.4 NG/ML (ref 3.1–17.5)
GLUCOSE BLD STRIP.AUTO-MCNC: 100 MG/DL (ref 65–100)
GLUCOSE BLD STRIP.AUTO-MCNC: 139 MG/DL (ref 65–100)
GLUCOSE BLD STRIP.AUTO-MCNC: 149 MG/DL (ref 65–100)
GLUCOSE SERPL-MCNC: 195 MG/DL (ref 70–99)
GLUCOSE SERPL-MCNC: 93 MG/DL (ref 70–99)
GLUCOSE SERPL-MCNC: 98 MG/DL (ref 70–99)
HCT VFR BLD AUTO: 31.3 % (ref 41.1–50.3)
HGB BLD-MCNC: 9.3 G/DL (ref 13.6–17.2)
IMM GRANULOCYTES # BLD AUTO: 0.1 K/UL (ref 0–0.5)
IMM GRANULOCYTES NFR BLD AUTO: 1 % (ref 0–5)
LYMPHOCYTES # BLD: 1.3 K/UL (ref 0.5–4.6)
LYMPHOCYTES NFR BLD: 17 % (ref 13–44)
MCH RBC QN AUTO: 27.6 PG (ref 26.1–32.9)
MCHC RBC AUTO-ENTMCNC: 29.7 G/DL (ref 31.4–35)
MCV RBC AUTO: 92.9 FL (ref 82–102)
MM INDURATION, POC: 0 MM (ref 0–5)
MONOCYTES # BLD: 0.4 K/UL (ref 0.1–1.3)
MONOCYTES NFR BLD: 6 % (ref 4–12)
NEUTS SEG # BLD: 5.5 K/UL (ref 1.7–8.2)
NEUTS SEG NFR BLD: 76 % (ref 43–78)
NRBC # BLD: 0 K/UL (ref 0–0.2)
PLATELET # BLD AUTO: 231 K/UL (ref 150–450)
PMV BLD AUTO: 11.1 FL (ref 9.4–12.3)
POTASSIUM SERPL-SCNC: 3.8 MMOL/L (ref 3.5–5.1)
POTASSIUM SERPL-SCNC: 3.8 MMOL/L (ref 3.5–5.1)
POTASSIUM SERPL-SCNC: 3.9 MMOL/L (ref 3.5–5.1)
PPD, POC: NEGATIVE
RBC # BLD AUTO: 3.37 M/UL (ref 4.23–5.6)
SERVICE CMNT-IMP: ABNORMAL
SERVICE CMNT-IMP: ABNORMAL
SERVICE CMNT-IMP: NORMAL
SERVICE CMNT-IMP: NORMAL
SODIUM SERPL-SCNC: 147 MMOL/L (ref 136–145)
SODIUM SERPL-SCNC: 151 MMOL/L (ref 136–145)
SODIUM SERPL-SCNC: 152 MMOL/L (ref 136–145)
VIT B12 SERPL-MCNC: 1067 PG/ML (ref 193–986)
WBC # BLD AUTO: 7.2 K/UL (ref 4.3–11.1)

## 2024-05-09 PROCEDURE — 2580000003 HC RX 258: Performed by: PHYSICIAN ASSISTANT

## 2024-05-09 PROCEDURE — 82607 VITAMIN B-12: CPT

## 2024-05-09 PROCEDURE — 83735 ASSAY OF MAGNESIUM: CPT

## 2024-05-09 PROCEDURE — 1100000000 HC RM PRIVATE

## 2024-05-09 PROCEDURE — 36415 COLL VENOUS BLD VENIPUNCTURE: CPT

## 2024-05-09 PROCEDURE — 80048 BASIC METABOLIC PNL TOTAL CA: CPT

## 2024-05-09 PROCEDURE — 92507 TX SP LANG VOICE COMM INDIV: CPT

## 2024-05-09 PROCEDURE — 82728 ASSAY OF FERRITIN: CPT

## 2024-05-09 PROCEDURE — 6370000000 HC RX 637 (ALT 250 FOR IP): Performed by: PHYSICIAN ASSISTANT

## 2024-05-09 PROCEDURE — 6370000000 HC RX 637 (ALT 250 FOR IP): Performed by: NURSE PRACTITIONER

## 2024-05-09 PROCEDURE — 97530 THERAPEUTIC ACTIVITIES: CPT

## 2024-05-09 PROCEDURE — 82962 GLUCOSE BLOOD TEST: CPT

## 2024-05-09 PROCEDURE — 6360000002 HC RX W HCPCS: Performed by: PHYSICIAN ASSISTANT

## 2024-05-09 PROCEDURE — 82746 ASSAY OF FOLIC ACID SERUM: CPT

## 2024-05-09 PROCEDURE — 97535 SELF CARE MNGMENT TRAINING: CPT

## 2024-05-09 PROCEDURE — 97112 NEUROMUSCULAR REEDUCATION: CPT

## 2024-05-09 PROCEDURE — 85025 COMPLETE CBC W/AUTO DIFF WBC: CPT

## 2024-05-09 PROCEDURE — 92526 ORAL FUNCTION THERAPY: CPT

## 2024-05-09 PROCEDURE — 2580000003 HC RX 258: Performed by: INTERNAL MEDICINE

## 2024-05-09 RX ORDER — DEXTROSE MONOHYDRATE 50 MG/ML
INJECTION, SOLUTION INTRAVENOUS EVERY 6 HOURS SCHEDULED
Status: DISCONTINUED | OUTPATIENT
Start: 2024-05-09 | End: 2024-05-10

## 2024-05-09 RX ORDER — METRONIDAZOLE 500 MG/1
500 TABLET ORAL EVERY 12 HOURS SCHEDULED
Status: DISCONTINUED | OUTPATIENT
Start: 2024-05-09 | End: 2024-05-13

## 2024-05-09 RX ORDER — CEPHALEXIN 500 MG/1
500 CAPSULE ORAL EVERY 6 HOURS SCHEDULED
Status: DISCONTINUED | OUTPATIENT
Start: 2024-05-09 | End: 2024-05-13

## 2024-05-09 RX ADMIN — CEPHALEXIN 500 MG: 250 CAPSULE ORAL at 18:08

## 2024-05-09 RX ADMIN — SODIUM HYPOCHLORITE: 2.5 SOLUTION TOPICAL at 12:53

## 2024-05-09 RX ADMIN — CEPHALEXIN 500 MG: 250 CAPSULE ORAL at 23:58

## 2024-05-09 RX ADMIN — DEXTROSE MONOHYDRATE: 50 INJECTION, SOLUTION INTRAVENOUS at 13:49

## 2024-05-09 RX ADMIN — DEXTROSE MONOHYDRATE: 50 INJECTION, SOLUTION INTRAVENOUS at 18:10

## 2024-05-09 RX ADMIN — METRONIDAZOLE 500 MG: 500 TABLET ORAL at 21:28

## 2024-05-09 RX ADMIN — SODIUM CHLORIDE: 900 INJECTION INTRAVENOUS at 00:12

## 2024-05-09 RX ADMIN — LEVETIRACETAM 500 MG: 500 TABLET, FILM COATED ORAL at 07:47

## 2024-05-09 RX ADMIN — LEVETIRACETAM 500 MG: 500 TABLET, FILM COATED ORAL at 21:28

## 2024-05-09 RX ADMIN — PIPERACILLIN AND TAZOBACTAM 3375 MG: 3; .375 INJECTION, POWDER, LYOPHILIZED, FOR SOLUTION INTRAVENOUS at 07:46

## 2024-05-09 RX ADMIN — PIPERACILLIN AND TAZOBACTAM 3375 MG: 3; .375 INJECTION, POWDER, LYOPHILIZED, FOR SOLUTION INTRAVENOUS at 00:12

## 2024-05-09 ASSESSMENT — PAIN SCALES - GENERAL: PAINLEVEL_OUTOF10: 0

## 2024-05-10 LAB
ANION GAP SERPL CALC-SCNC: 8 MMOL/L (ref 9–18)
ANION GAP SERPL CALC-SCNC: 9 MMOL/L (ref 9–18)
ANION GAP SERPL CALC-SCNC: 9 MMOL/L (ref 9–18)
BASOPHILS # BLD: 0 K/UL (ref 0–0.2)
BASOPHILS NFR BLD: 0 % (ref 0–2)
BUN SERPL-MCNC: 17 MG/DL (ref 6–23)
BUN SERPL-MCNC: 22 MG/DL (ref 6–23)
BUN SERPL-MCNC: 25 MG/DL (ref 6–23)
CALCIUM SERPL-MCNC: 7.9 MG/DL (ref 8.8–10.2)
CALCIUM SERPL-MCNC: 8.1 MG/DL (ref 8.8–10.2)
CALCIUM SERPL-MCNC: 8.1 MG/DL (ref 8.8–10.2)
CHLORIDE SERPL-SCNC: 108 MMOL/L (ref 98–107)
CHLORIDE SERPL-SCNC: 110 MMOL/L (ref 98–107)
CHLORIDE SERPL-SCNC: 111 MMOL/L (ref 98–107)
CO2 SERPL-SCNC: 24 MMOL/L (ref 20–28)
CO2 SERPL-SCNC: 24 MMOL/L (ref 20–28)
CO2 SERPL-SCNC: 26 MMOL/L (ref 20–28)
CREAT SERPL-MCNC: 0.77 MG/DL (ref 0.8–1.3)
CREAT SERPL-MCNC: 0.88 MG/DL (ref 0.8–1.3)
CREAT SERPL-MCNC: 0.92 MG/DL (ref 0.8–1.3)
DIFFERENTIAL METHOD BLD: ABNORMAL
EOSINOPHIL # BLD: 0 K/UL (ref 0–0.8)
EOSINOPHIL NFR BLD: 0 % (ref 0.5–7.8)
ERYTHROCYTE [DISTWIDTH] IN BLOOD BY AUTOMATED COUNT: 15.5 % (ref 11.9–14.6)
GLUCOSE BLD STRIP.AUTO-MCNC: 109 MG/DL (ref 65–100)
GLUCOSE BLD STRIP.AUTO-MCNC: 119 MG/DL (ref 65–100)
GLUCOSE BLD STRIP.AUTO-MCNC: 167 MG/DL (ref 65–100)
GLUCOSE BLD STRIP.AUTO-MCNC: 205 MG/DL (ref 65–100)
GLUCOSE SERPL-MCNC: 123 MG/DL (ref 70–99)
GLUCOSE SERPL-MCNC: 128 MG/DL (ref 70–99)
GLUCOSE SERPL-MCNC: 195 MG/DL (ref 70–99)
HCT VFR BLD AUTO: 31.4 % (ref 41.1–50.3)
HGB BLD-MCNC: 9.5 G/DL (ref 13.6–17.2)
IMM GRANULOCYTES # BLD AUTO: 0.1 K/UL (ref 0–0.5)
IMM GRANULOCYTES NFR BLD AUTO: 1 % (ref 0–5)
LYMPHOCYTES # BLD: 1.3 K/UL (ref 0.5–4.6)
LYMPHOCYTES NFR BLD: 22 % (ref 13–44)
MAGNESIUM SERPL-MCNC: 2.2 MG/DL (ref 1.8–2.4)
MCH RBC QN AUTO: 27.6 PG (ref 26.1–32.9)
MCHC RBC AUTO-ENTMCNC: 30.3 G/DL (ref 31.4–35)
MCV RBC AUTO: 91.3 FL (ref 82–102)
MONOCYTES # BLD: 0.4 K/UL (ref 0.1–1.3)
MONOCYTES NFR BLD: 6 % (ref 4–12)
NEUTS SEG # BLD: 4.1 K/UL (ref 1.7–8.2)
NEUTS SEG NFR BLD: 71 % (ref 43–78)
NRBC # BLD: 0 K/UL (ref 0–0.2)
PLATELET # BLD AUTO: 145 K/UL (ref 150–450)
PMV BLD AUTO: 11 FL (ref 9.4–12.3)
POTASSIUM SERPL-SCNC: 3.5 MMOL/L (ref 3.5–5.1)
POTASSIUM SERPL-SCNC: 3.5 MMOL/L (ref 3.5–5.1)
POTASSIUM SERPL-SCNC: 4.1 MMOL/L (ref 3.5–5.1)
RBC # BLD AUTO: 3.44 M/UL (ref 4.23–5.6)
SERVICE CMNT-IMP: ABNORMAL
SODIUM SERPL-SCNC: 143 MMOL/L (ref 136–145)
SODIUM SERPL-SCNC: 143 MMOL/L (ref 136–145)
SODIUM SERPL-SCNC: 144 MMOL/L (ref 136–145)
WBC # BLD AUTO: 5.8 K/UL (ref 4.3–11.1)

## 2024-05-10 PROCEDURE — 2580000003 HC RX 258: Performed by: INTERNAL MEDICINE

## 2024-05-10 PROCEDURE — 80048 BASIC METABOLIC PNL TOTAL CA: CPT

## 2024-05-10 PROCEDURE — 2580000003 HC RX 258: Performed by: PHYSICIAN ASSISTANT

## 2024-05-10 PROCEDURE — 6370000000 HC RX 637 (ALT 250 FOR IP): Performed by: PHYSICIAN ASSISTANT

## 2024-05-10 PROCEDURE — 92507 TX SP LANG VOICE COMM INDIV: CPT

## 2024-05-10 PROCEDURE — 1100000000 HC RM PRIVATE

## 2024-05-10 PROCEDURE — 85025 COMPLETE CBC W/AUTO DIFF WBC: CPT

## 2024-05-10 PROCEDURE — 82962 GLUCOSE BLOOD TEST: CPT

## 2024-05-10 PROCEDURE — 2500000003 HC RX 250 WO HCPCS: Performed by: PHYSICIAN ASSISTANT

## 2024-05-10 PROCEDURE — 92526 ORAL FUNCTION THERAPY: CPT

## 2024-05-10 PROCEDURE — 36415 COLL VENOUS BLD VENIPUNCTURE: CPT

## 2024-05-10 PROCEDURE — 6370000000 HC RX 637 (ALT 250 FOR IP): Performed by: NURSE PRACTITIONER

## 2024-05-10 RX ADMIN — CEPHALEXIN 500 MG: 250 CAPSULE ORAL at 23:51

## 2024-05-10 RX ADMIN — LEVETIRACETAM 500 MG: 500 TABLET, FILM COATED ORAL at 20:37

## 2024-05-10 RX ADMIN — LEVETIRACETAM 500 MG: 500 TABLET, FILM COATED ORAL at 08:12

## 2024-05-10 RX ADMIN — CEPHALEXIN 500 MG: 250 CAPSULE ORAL at 12:23

## 2024-05-10 RX ADMIN — HYDROMORPHONE HYDROCHLORIDE 1 MG: 1 INJECTION, SOLUTION INTRAMUSCULAR; INTRAVENOUS; SUBCUTANEOUS at 05:08

## 2024-05-10 RX ADMIN — METRONIDAZOLE 500 MG: 500 TABLET ORAL at 20:37

## 2024-05-10 RX ADMIN — SODIUM CHLORIDE, PRESERVATIVE FREE 5 ML: 5 INJECTION INTRAVENOUS at 00:18

## 2024-05-10 RX ADMIN — SODIUM HYPOCHLORITE: 2.5 SOLUTION TOPICAL at 20:42

## 2024-05-10 RX ADMIN — DEXTROSE MONOHYDRATE: 50 INJECTION, SOLUTION INTRAVENOUS at 06:40

## 2024-05-10 RX ADMIN — METRONIDAZOLE 500 MG: 500 TABLET ORAL at 08:12

## 2024-05-10 RX ADMIN — SODIUM HYPOCHLORITE: 2.5 SOLUTION TOPICAL at 08:13

## 2024-05-10 RX ADMIN — CEPHALEXIN 500 MG: 250 CAPSULE ORAL at 06:14

## 2024-05-10 RX ADMIN — SODIUM HYPOCHLORITE: 2.5 SOLUTION TOPICAL at 00:18

## 2024-05-10 RX ADMIN — DEXTROSE MONOHYDRATE: 50 INJECTION, SOLUTION INTRAVENOUS at 02:48

## 2024-05-10 RX ADMIN — SODIUM CHLORIDE, PRESERVATIVE FREE 10 ML: 5 INJECTION INTRAVENOUS at 20:37

## 2024-05-10 RX ADMIN — CEPHALEXIN 500 MG: 250 CAPSULE ORAL at 17:10

## 2024-05-10 ASSESSMENT — PAIN SCALES - GENERAL: PAINLEVEL_OUTOF10: 8

## 2024-05-11 LAB
ANION GAP SERPL CALC-SCNC: 10 MMOL/L (ref 9–18)
BACTERIA SPEC CULT: NORMAL
BUN SERPL-MCNC: 15 MG/DL (ref 6–23)
CALCIUM SERPL-MCNC: 8.1 MG/DL (ref 8.8–10.2)
CHLORIDE SERPL-SCNC: 107 MMOL/L (ref 98–107)
CO2 SERPL-SCNC: 24 MMOL/L (ref 20–28)
CREAT SERPL-MCNC: 0.64 MG/DL (ref 0.8–1.3)
GLUCOSE BLD STRIP.AUTO-MCNC: 117 MG/DL (ref 65–100)
GLUCOSE BLD STRIP.AUTO-MCNC: 154 MG/DL (ref 65–100)
GLUCOSE BLD STRIP.AUTO-MCNC: 95 MG/DL (ref 65–100)
GLUCOSE BLD STRIP.AUTO-MCNC: 97 MG/DL (ref 65–100)
GLUCOSE SERPL-MCNC: 108 MG/DL (ref 70–99)
MAGNESIUM SERPL-MCNC: 1.9 MG/DL (ref 1.8–2.4)
MM INDURATION, POC: 0 MM (ref 0–5)
POTASSIUM SERPL-SCNC: 3.2 MMOL/L (ref 3.5–5.1)
PPD, POC: NEGATIVE
SERVICE CMNT-IMP: ABNORMAL
SERVICE CMNT-IMP: ABNORMAL
SERVICE CMNT-IMP: NORMAL
SODIUM SERPL-SCNC: 140 MMOL/L (ref 136–145)

## 2024-05-11 PROCEDURE — 82962 GLUCOSE BLOOD TEST: CPT

## 2024-05-11 PROCEDURE — 1100000000 HC RM PRIVATE

## 2024-05-11 PROCEDURE — 36415 COLL VENOUS BLD VENIPUNCTURE: CPT

## 2024-05-11 PROCEDURE — 6370000000 HC RX 637 (ALT 250 FOR IP): Performed by: NURSE PRACTITIONER

## 2024-05-11 PROCEDURE — 2580000003 HC RX 258: Performed by: PHYSICIAN ASSISTANT

## 2024-05-11 PROCEDURE — 6370000000 HC RX 637 (ALT 250 FOR IP): Performed by: PHYSICIAN ASSISTANT

## 2024-05-11 PROCEDURE — 80048 BASIC METABOLIC PNL TOTAL CA: CPT

## 2024-05-11 PROCEDURE — 83735 ASSAY OF MAGNESIUM: CPT

## 2024-05-11 RX ADMIN — SODIUM CHLORIDE, PRESERVATIVE FREE 10 ML: 5 INJECTION INTRAVENOUS at 08:38

## 2024-05-11 RX ADMIN — SODIUM HYPOCHLORITE: 2.5 SOLUTION TOPICAL at 08:38

## 2024-05-11 RX ADMIN — LEVETIRACETAM 500 MG: 500 TABLET, FILM COATED ORAL at 08:38

## 2024-05-11 RX ADMIN — SODIUM CHLORIDE, PRESERVATIVE FREE 5 ML: 5 INJECTION INTRAVENOUS at 21:55

## 2024-05-11 RX ADMIN — POTASSIUM BICARBONATE 40 MEQ: 782 TABLET, EFFERVESCENT ORAL at 12:53

## 2024-05-11 RX ADMIN — CEPHALEXIN 500 MG: 250 CAPSULE ORAL at 05:18

## 2024-05-11 RX ADMIN — METRONIDAZOLE 500 MG: 500 TABLET ORAL at 08:38

## 2024-05-11 RX ADMIN — METRONIDAZOLE 500 MG: 500 TABLET ORAL at 21:01

## 2024-05-11 RX ADMIN — CEPHALEXIN 500 MG: 250 CAPSULE ORAL at 12:53

## 2024-05-11 RX ADMIN — LEVETIRACETAM 500 MG: 500 TABLET, FILM COATED ORAL at 21:01

## 2024-05-11 RX ADMIN — SODIUM HYPOCHLORITE: 2.5 SOLUTION TOPICAL at 21:06

## 2024-05-11 RX ADMIN — CEPHALEXIN 500 MG: 250 CAPSULE ORAL at 17:31

## 2024-05-12 ENCOUNTER — APPOINTMENT (OUTPATIENT)
Dept: GENERAL RADIOLOGY | Age: 49
End: 2024-05-12
Payer: COMMERCIAL

## 2024-05-12 LAB
ANION GAP SERPL CALC-SCNC: 10 MMOL/L (ref 9–18)
APPEARANCE UR: CLEAR
BACTERIA URNS QL MICRO: 0 /HPF
BASOPHILS # BLD: 0 K/UL (ref 0–0.2)
BASOPHILS NFR BLD: 0 % (ref 0–2)
BILIRUB UR QL: NEGATIVE
BUN SERPL-MCNC: 15 MG/DL (ref 6–23)
CALCIUM SERPL-MCNC: 8.1 MG/DL (ref 8.8–10.2)
CASTS URNS QL MICRO: 0 /LPF
CHLORIDE SERPL-SCNC: 105 MMOL/L (ref 98–107)
CO2 SERPL-SCNC: 25 MMOL/L (ref 20–28)
COLOR UR: ABNORMAL
CREAT SERPL-MCNC: 0.8 MG/DL (ref 0.8–1.3)
CRYSTALS URNS QL MICRO: 0 /LPF
DIFFERENTIAL METHOD BLD: ABNORMAL
EOSINOPHIL # BLD: 0 K/UL (ref 0–0.8)
EOSINOPHIL NFR BLD: 0 % (ref 0.5–7.8)
EPI CELLS #/AREA URNS HPF: NORMAL /HPF
ERYTHROCYTE [DISTWIDTH] IN BLOOD BY AUTOMATED COUNT: 15.1 % (ref 11.9–14.6)
GLUCOSE BLD STRIP.AUTO-MCNC: 110 MG/DL (ref 65–100)
GLUCOSE BLD STRIP.AUTO-MCNC: 120 MG/DL (ref 65–100)
GLUCOSE BLD STRIP.AUTO-MCNC: 121 MG/DL (ref 65–100)
GLUCOSE BLD STRIP.AUTO-MCNC: 133 MG/DL (ref 65–100)
GLUCOSE SERPL-MCNC: 132 MG/DL (ref 70–99)
GLUCOSE UR STRIP.AUTO-MCNC: NEGATIVE MG/DL
HCT VFR BLD AUTO: 31.7 % (ref 41.1–50.3)
HGB BLD-MCNC: 9.7 G/DL (ref 13.6–17.2)
HGB UR QL STRIP: NEGATIVE
IMM GRANULOCYTES # BLD AUTO: 0.1 K/UL (ref 0–0.5)
IMM GRANULOCYTES NFR BLD AUTO: 1 % (ref 0–5)
KETONES UR QL STRIP.AUTO: NEGATIVE MG/DL
LEUKOCYTE ESTERASE UR QL STRIP.AUTO: ABNORMAL
LYMPHOCYTES # BLD: 1.3 K/UL (ref 0.5–4.6)
LYMPHOCYTES NFR BLD: 12 % (ref 13–44)
MAGNESIUM SERPL-MCNC: 1.9 MG/DL (ref 1.8–2.4)
MCH RBC QN AUTO: 27 PG (ref 26.1–32.9)
MCHC RBC AUTO-ENTMCNC: 30.6 G/DL (ref 31.4–35)
MCV RBC AUTO: 88.3 FL (ref 82–102)
MONOCYTES # BLD: 0.4 K/UL (ref 0.1–1.3)
MONOCYTES NFR BLD: 4 % (ref 4–12)
MUCOUS THREADS URNS QL MICRO: 0 /LPF
NEUTS SEG # BLD: 8.8 K/UL (ref 1.7–8.2)
NEUTS SEG NFR BLD: 83 % (ref 43–78)
NITRITE UR QL STRIP.AUTO: NEGATIVE
NRBC # BLD: 0 K/UL (ref 0–0.2)
PH UR STRIP: 7 (ref 5–9)
PLATELET # BLD AUTO: 156 K/UL (ref 150–450)
PMV BLD AUTO: 11.3 FL (ref 9.4–12.3)
POTASSIUM SERPL-SCNC: 3.2 MMOL/L (ref 3.5–5.1)
PROT UR STRIP-MCNC: 100 MG/DL
RBC # BLD AUTO: 3.59 M/UL (ref 4.23–5.6)
RBC #/AREA URNS HPF: NORMAL /HPF
SERVICE CMNT-IMP: ABNORMAL
SODIUM SERPL-SCNC: 139 MMOL/L (ref 136–145)
SP GR UR REFRACTOMETRY: 1.03 (ref 1–1.02)
UROBILINOGEN UR QL STRIP.AUTO: 0.2 EU/DL (ref 0.2–1)
WBC # BLD AUTO: 10.5 K/UL (ref 4.3–11.1)
WBC URNS QL MICRO: NORMAL /HPF

## 2024-05-12 PROCEDURE — 80048 BASIC METABOLIC PNL TOTAL CA: CPT

## 2024-05-12 PROCEDURE — 83735 ASSAY OF MAGNESIUM: CPT

## 2024-05-12 PROCEDURE — 6370000000 HC RX 637 (ALT 250 FOR IP): Performed by: NURSE PRACTITIONER

## 2024-05-12 PROCEDURE — 87040 BLOOD CULTURE FOR BACTERIA: CPT

## 2024-05-12 PROCEDURE — 82962 GLUCOSE BLOOD TEST: CPT

## 2024-05-12 PROCEDURE — 87086 URINE CULTURE/COLONY COUNT: CPT

## 2024-05-12 PROCEDURE — 6370000000 HC RX 637 (ALT 250 FOR IP): Performed by: PHYSICIAN ASSISTANT

## 2024-05-12 PROCEDURE — 36415 COLL VENOUS BLD VENIPUNCTURE: CPT

## 2024-05-12 PROCEDURE — 71045 X-RAY EXAM CHEST 1 VIEW: CPT

## 2024-05-12 PROCEDURE — 1100000000 HC RM PRIVATE

## 2024-05-12 PROCEDURE — 87088 URINE BACTERIA CULTURE: CPT

## 2024-05-12 PROCEDURE — 81001 URINALYSIS AUTO W/SCOPE: CPT

## 2024-05-12 PROCEDURE — 81015 MICROSCOPIC EXAM OF URINE: CPT

## 2024-05-12 PROCEDURE — 85025 COMPLETE CBC W/AUTO DIFF WBC: CPT

## 2024-05-12 PROCEDURE — 2580000003 HC RX 258: Performed by: PHYSICIAN ASSISTANT

## 2024-05-12 RX ORDER — GUAIFENESIN/DEXTROMETHORPHAN 100-10MG/5
5 SYRUP ORAL EVERY 4 HOURS PRN
Status: DISCONTINUED | OUTPATIENT
Start: 2024-05-12 | End: 2024-08-22

## 2024-05-12 RX ORDER — BENZONATATE 100 MG/1
100 CAPSULE ORAL 3 TIMES DAILY PRN
Status: DISCONTINUED | OUTPATIENT
Start: 2024-05-12 | End: 2024-05-12 | Stop reason: SDUPTHER

## 2024-05-12 RX ADMIN — LEVETIRACETAM 500 MG: 500 TABLET, FILM COATED ORAL at 10:13

## 2024-05-12 RX ADMIN — SODIUM HYPOCHLORITE: 2.5 SOLUTION TOPICAL at 21:36

## 2024-05-12 RX ADMIN — SODIUM CHLORIDE, PRESERVATIVE FREE 5 ML: 5 INJECTION INTRAVENOUS at 21:32

## 2024-05-12 RX ADMIN — SODIUM CHLORIDE, PRESERVATIVE FREE 10 ML: 5 INJECTION INTRAVENOUS at 10:13

## 2024-05-12 RX ADMIN — CEPHALEXIN 500 MG: 250 CAPSULE ORAL at 17:55

## 2024-05-12 RX ADMIN — LEVETIRACETAM 500 MG: 500 TABLET, FILM COATED ORAL at 21:32

## 2024-05-12 RX ADMIN — CEPHALEXIN 500 MG: 250 CAPSULE ORAL at 00:03

## 2024-05-12 RX ADMIN — METRONIDAZOLE 500 MG: 500 TABLET ORAL at 10:13

## 2024-05-12 RX ADMIN — METRONIDAZOLE 500 MG: 500 TABLET ORAL at 21:32

## 2024-05-12 RX ADMIN — POTASSIUM CHLORIDE 40 MEQ: 1500 TABLET, EXTENDED RELEASE ORAL at 05:38

## 2024-05-12 RX ADMIN — CEPHALEXIN 500 MG: 250 CAPSULE ORAL at 14:06

## 2024-05-12 RX ADMIN — SODIUM HYPOCHLORITE: 2.5 SOLUTION TOPICAL at 14:07

## 2024-05-12 RX ADMIN — CEPHALEXIN 500 MG: 250 CAPSULE ORAL at 05:39

## 2024-05-12 RX ADMIN — ACETAMINOPHEN 650 MG: 325 TABLET ORAL at 00:39

## 2024-05-12 ASSESSMENT — PAIN SCALES - GENERAL: PAINLEVEL_OUTOF10: 0

## 2024-05-13 LAB
ALBUMIN SERPL-MCNC: 2.5 G/DL (ref 3.5–5)
ALBUMIN/GLOB SERPL: 0.5 (ref 1–1.9)
ALP SERPL-CCNC: 69 U/L (ref 40–129)
ALT SERPL-CCNC: 12 U/L (ref 12–65)
ANION GAP SERPL CALC-SCNC: 11 MMOL/L (ref 9–18)
AST SERPL-CCNC: 26 U/L (ref 15–37)
BILIRUB SERPL-MCNC: 0.6 MG/DL (ref 0–1.2)
BUN SERPL-MCNC: 21 MG/DL (ref 6–23)
CALCIUM SERPL-MCNC: 8.8 MG/DL (ref 8.8–10.2)
CHLORIDE SERPL-SCNC: 106 MMOL/L (ref 98–107)
CO2 SERPL-SCNC: 24 MMOL/L (ref 20–28)
CREAT SERPL-MCNC: 0.96 MG/DL (ref 0.8–1.3)
ERYTHROCYTE [DISTWIDTH] IN BLOOD BY AUTOMATED COUNT: 15.7 % (ref 11.9–14.6)
GLOBULIN SER CALC-MCNC: 4.6 G/DL (ref 2.3–3.5)
GLUCOSE BLD STRIP.AUTO-MCNC: 108 MG/DL (ref 65–100)
GLUCOSE BLD STRIP.AUTO-MCNC: 122 MG/DL (ref 65–100)
GLUCOSE BLD STRIP.AUTO-MCNC: 123 MG/DL (ref 65–100)
GLUCOSE BLD STRIP.AUTO-MCNC: 156 MG/DL (ref 65–100)
GLUCOSE SERPL-MCNC: 132 MG/DL (ref 70–99)
HCT VFR BLD AUTO: 35.1 % (ref 41.1–50.3)
HGB BLD-MCNC: 10.9 G/DL (ref 13.6–17.2)
MCH RBC QN AUTO: 27.5 PG (ref 26.1–32.9)
MCHC RBC AUTO-ENTMCNC: 31.1 G/DL (ref 31.4–35)
MCV RBC AUTO: 88.4 FL (ref 82–102)
NRBC # BLD: 0 K/UL (ref 0–0.2)
PLATELET # BLD AUTO: 200 K/UL (ref 150–450)
PMV BLD AUTO: 11.8 FL (ref 9.4–12.3)
POTASSIUM SERPL-SCNC: 4.4 MMOL/L (ref 3.5–5.1)
PROT SERPL-MCNC: 7.1 G/DL (ref 6.3–8.2)
RBC # BLD AUTO: 3.97 M/UL (ref 4.23–5.6)
SERVICE CMNT-IMP: ABNORMAL
SODIUM SERPL-SCNC: 141 MMOL/L (ref 136–145)
WBC # BLD AUTO: 16.1 K/UL (ref 4.3–11.1)

## 2024-05-13 PROCEDURE — 6360000002 HC RX W HCPCS: Performed by: INTERNAL MEDICINE

## 2024-05-13 PROCEDURE — 6370000000 HC RX 637 (ALT 250 FOR IP): Performed by: PHYSICIAN ASSISTANT

## 2024-05-13 PROCEDURE — 97535 SELF CARE MNGMENT TRAINING: CPT

## 2024-05-13 PROCEDURE — 97112 NEUROMUSCULAR REEDUCATION: CPT

## 2024-05-13 PROCEDURE — 82962 GLUCOSE BLOOD TEST: CPT

## 2024-05-13 PROCEDURE — 2580000003 HC RX 258: Performed by: INTERNAL MEDICINE

## 2024-05-13 PROCEDURE — 2580000003 HC RX 258: Performed by: PHYSICIAN ASSISTANT

## 2024-05-13 PROCEDURE — 6370000000 HC RX 637 (ALT 250 FOR IP): Performed by: NURSE PRACTITIONER

## 2024-05-13 PROCEDURE — 80053 COMPREHEN METABOLIC PANEL: CPT

## 2024-05-13 PROCEDURE — 6360000002 HC RX W HCPCS: Performed by: PHYSICIAN ASSISTANT

## 2024-05-13 PROCEDURE — 97530 THERAPEUTIC ACTIVITIES: CPT

## 2024-05-13 PROCEDURE — 36415 COLL VENOUS BLD VENIPUNCTURE: CPT

## 2024-05-13 PROCEDURE — 85027 COMPLETE CBC AUTOMATED: CPT

## 2024-05-13 PROCEDURE — 1100000000 HC RM PRIVATE

## 2024-05-13 RX ORDER — SODIUM CHLORIDE 9 MG/ML
INJECTION, SOLUTION INTRAVENOUS CONTINUOUS
Status: DISCONTINUED | OUTPATIENT
Start: 2024-05-13 | End: 2024-05-17

## 2024-05-13 RX ADMIN — METRONIDAZOLE 500 MG: 500 TABLET ORAL at 08:21

## 2024-05-13 RX ADMIN — HYDROMORPHONE HYDROCHLORIDE 1 MG: 1 INJECTION, SOLUTION INTRAMUSCULAR; INTRAVENOUS; SUBCUTANEOUS at 11:50

## 2024-05-13 RX ADMIN — SODIUM CHLORIDE: 9 INJECTION, SOLUTION INTRAVENOUS at 13:28

## 2024-05-13 RX ADMIN — SODIUM HYPOCHLORITE: 2.5 SOLUTION TOPICAL at 08:36

## 2024-05-13 RX ADMIN — CEPHALEXIN 500 MG: 250 CAPSULE ORAL at 08:21

## 2024-05-13 RX ADMIN — PIPERACILLIN AND TAZOBACTAM 4500 MG: 4; .5 INJECTION, POWDER, LYOPHILIZED, FOR SOLUTION INTRAVENOUS at 13:30

## 2024-05-13 RX ADMIN — SODIUM HYPOCHLORITE: 2.5 SOLUTION TOPICAL at 21:20

## 2024-05-13 RX ADMIN — LEVETIRACETAM 500 MG: 500 TABLET, FILM COATED ORAL at 08:21

## 2024-05-13 RX ADMIN — LEVETIRACETAM 500 MG: 500 TABLET, FILM COATED ORAL at 21:19

## 2024-05-13 RX ADMIN — CEPHALEXIN 500 MG: 250 CAPSULE ORAL at 02:04

## 2024-05-13 RX ADMIN — PIPERACILLIN AND TAZOBACTAM 3375 MG: 3; .375 INJECTION, POWDER, LYOPHILIZED, FOR SOLUTION INTRAVENOUS at 21:18

## 2024-05-13 RX ADMIN — SODIUM CHLORIDE, PRESERVATIVE FREE 10 ML: 5 INJECTION INTRAVENOUS at 08:21

## 2024-05-14 LAB
ANION GAP SERPL CALC-SCNC: 9 MMOL/L (ref 9–18)
BUN SERPL-MCNC: 20 MG/DL (ref 6–23)
CALCIUM SERPL-MCNC: 8.4 MG/DL (ref 8.8–10.2)
CHLORIDE SERPL-SCNC: 107 MMOL/L (ref 98–107)
CO2 SERPL-SCNC: 27 MMOL/L (ref 20–28)
CREAT SERPL-MCNC: 1.18 MG/DL (ref 0.8–1.3)
ERYTHROCYTE [DISTWIDTH] IN BLOOD BY AUTOMATED COUNT: 16.4 % (ref 11.9–14.6)
GLUCOSE BLD STRIP.AUTO-MCNC: 130 MG/DL (ref 65–100)
GLUCOSE BLD STRIP.AUTO-MCNC: 131 MG/DL (ref 65–100)
GLUCOSE BLD STRIP.AUTO-MCNC: 133 MG/DL (ref 65–100)
GLUCOSE BLD STRIP.AUTO-MCNC: 87 MG/DL (ref 65–100)
GLUCOSE SERPL-MCNC: 112 MG/DL (ref 70–99)
HCT VFR BLD AUTO: 31.4 % (ref 41.1–50.3)
HGB BLD-MCNC: 9.8 G/DL (ref 13.6–17.2)
MCH RBC QN AUTO: 27.9 PG (ref 26.1–32.9)
MCHC RBC AUTO-ENTMCNC: 31.2 G/DL (ref 31.4–35)
MCV RBC AUTO: 89.5 FL (ref 82–102)
NRBC # BLD: 0 K/UL (ref 0–0.2)
PLATELET # BLD AUTO: 259 K/UL (ref 150–450)
PMV BLD AUTO: 11.6 FL (ref 9.4–12.3)
POTASSIUM SERPL-SCNC: 4.3 MMOL/L (ref 3.5–5.1)
RBC # BLD AUTO: 3.51 M/UL (ref 4.23–5.6)
SERVICE CMNT-IMP: ABNORMAL
SERVICE CMNT-IMP: NORMAL
SODIUM SERPL-SCNC: 143 MMOL/L (ref 136–145)
WBC # BLD AUTO: 10.6 K/UL (ref 4.3–11.1)

## 2024-05-14 PROCEDURE — 92507 TX SP LANG VOICE COMM INDIV: CPT

## 2024-05-14 PROCEDURE — 36415 COLL VENOUS BLD VENIPUNCTURE: CPT

## 2024-05-14 PROCEDURE — 85027 COMPLETE CBC AUTOMATED: CPT

## 2024-05-14 PROCEDURE — 6370000000 HC RX 637 (ALT 250 FOR IP): Performed by: INTERNAL MEDICINE

## 2024-05-14 PROCEDURE — 2580000003 HC RX 258: Performed by: PHYSICIAN ASSISTANT

## 2024-05-14 PROCEDURE — 97116 GAIT TRAINING THERAPY: CPT

## 2024-05-14 PROCEDURE — 2580000003 HC RX 258: Performed by: INTERNAL MEDICINE

## 2024-05-14 PROCEDURE — 82962 GLUCOSE BLOOD TEST: CPT

## 2024-05-14 PROCEDURE — 97530 THERAPEUTIC ACTIVITIES: CPT

## 2024-05-14 PROCEDURE — 6360000002 HC RX W HCPCS: Performed by: INTERNAL MEDICINE

## 2024-05-14 PROCEDURE — 6370000000 HC RX 637 (ALT 250 FOR IP): Performed by: PHYSICIAN ASSISTANT

## 2024-05-14 PROCEDURE — 92526 ORAL FUNCTION THERAPY: CPT

## 2024-05-14 PROCEDURE — 1100000000 HC RM PRIVATE

## 2024-05-14 PROCEDURE — 97112 NEUROMUSCULAR REEDUCATION: CPT

## 2024-05-14 PROCEDURE — 80048 BASIC METABOLIC PNL TOTAL CA: CPT

## 2024-05-14 PROCEDURE — 97535 SELF CARE MNGMENT TRAINING: CPT

## 2024-05-14 RX ORDER — LEVETIRACETAM 100 MG/ML
500 SOLUTION ORAL 2 TIMES DAILY
Status: DISCONTINUED | OUTPATIENT
Start: 2024-05-14 | End: 2024-05-28

## 2024-05-14 RX ORDER — CALCIUM CARBONATE 500 MG/1
500 TABLET, CHEWABLE ORAL 3 TIMES DAILY PRN
Status: DISCONTINUED | OUTPATIENT
Start: 2024-05-14 | End: 2024-10-18 | Stop reason: HOSPADM

## 2024-05-14 RX ADMIN — SODIUM CHLORIDE, PRESERVATIVE FREE 10 ML: 5 INJECTION INTRAVENOUS at 08:31

## 2024-05-14 RX ADMIN — SODIUM CHLORIDE: 9 INJECTION, SOLUTION INTRAVENOUS at 04:58

## 2024-05-14 RX ADMIN — SODIUM CHLORIDE, PRESERVATIVE FREE 5 ML: 5 INJECTION INTRAVENOUS at 21:26

## 2024-05-14 RX ADMIN — ACETAMINOPHEN 650 MG: 325 TABLET ORAL at 00:16

## 2024-05-14 RX ADMIN — PIPERACILLIN AND TAZOBACTAM 3375 MG: 3; .375 INJECTION, POWDER, LYOPHILIZED, FOR SOLUTION INTRAVENOUS at 13:20

## 2024-05-14 RX ADMIN — LEVETIRACETAM 500 MG: 500 TABLET, FILM COATED ORAL at 08:30

## 2024-05-14 RX ADMIN — LEVETIRACETAM 500 MG: 100 SOLUTION ORAL at 21:25

## 2024-05-14 RX ADMIN — SODIUM HYPOCHLORITE: 2.5 SOLUTION TOPICAL at 08:31

## 2024-05-14 RX ADMIN — PIPERACILLIN AND TAZOBACTAM 3375 MG: 3; .375 INJECTION, POWDER, LYOPHILIZED, FOR SOLUTION INTRAVENOUS at 05:16

## 2024-05-14 RX ADMIN — PIPERACILLIN AND TAZOBACTAM 3375 MG: 3; .375 INJECTION, POWDER, LYOPHILIZED, FOR SOLUTION INTRAVENOUS at 22:32

## 2024-05-14 RX ADMIN — SODIUM HYPOCHLORITE: 2.5 SOLUTION TOPICAL at 22:33

## 2024-05-15 LAB
ANION GAP SERPL CALC-SCNC: 8 MMOL/L (ref 9–18)
BACTERIA SPEC CULT: NORMAL
BUN SERPL-MCNC: 19 MG/DL (ref 6–23)
CALCIUM SERPL-MCNC: 8 MG/DL (ref 8.8–10.2)
CHLORIDE SERPL-SCNC: 109 MMOL/L (ref 98–107)
CO2 SERPL-SCNC: 25 MMOL/L (ref 20–28)
CREAT SERPL-MCNC: 0.87 MG/DL (ref 0.8–1.3)
ERYTHROCYTE [DISTWIDTH] IN BLOOD BY AUTOMATED COUNT: 16.2 % (ref 11.9–14.6)
GLUCOSE BLD STRIP.AUTO-MCNC: 116 MG/DL (ref 65–100)
GLUCOSE BLD STRIP.AUTO-MCNC: 123 MG/DL (ref 65–100)
GLUCOSE BLD STRIP.AUTO-MCNC: 172 MG/DL (ref 65–100)
GLUCOSE BLD STRIP.AUTO-MCNC: 99 MG/DL (ref 65–100)
GLUCOSE SERPL-MCNC: 96 MG/DL (ref 70–99)
HCT VFR BLD AUTO: 29.5 % (ref 41.1–50.3)
HGB BLD-MCNC: 9.3 G/DL (ref 13.6–17.2)
MAGNESIUM SERPL-MCNC: 1.9 MG/DL (ref 1.8–2.4)
MCH RBC QN AUTO: 27.8 PG (ref 26.1–32.9)
MCHC RBC AUTO-ENTMCNC: 31.5 G/DL (ref 31.4–35)
MCV RBC AUTO: 88.1 FL (ref 82–102)
NRBC # BLD: 0 K/UL (ref 0–0.2)
PLATELET # BLD AUTO: 204 K/UL (ref 150–450)
PMV BLD AUTO: 10.7 FL (ref 9.4–12.3)
POTASSIUM SERPL-SCNC: 3.2 MMOL/L (ref 3.5–5.1)
RBC # BLD AUTO: 3.35 M/UL (ref 4.23–5.6)
SERVICE CMNT-IMP: ABNORMAL
SERVICE CMNT-IMP: NORMAL
SERVICE CMNT-IMP: NORMAL
SODIUM SERPL-SCNC: 142 MMOL/L (ref 136–145)
WBC # BLD AUTO: 7.6 K/UL (ref 4.3–11.1)

## 2024-05-15 PROCEDURE — 2580000003 HC RX 258: Performed by: INTERNAL MEDICINE

## 2024-05-15 PROCEDURE — 36415 COLL VENOUS BLD VENIPUNCTURE: CPT

## 2024-05-15 PROCEDURE — 2580000003 HC RX 258: Performed by: PHYSICIAN ASSISTANT

## 2024-05-15 PROCEDURE — 83735 ASSAY OF MAGNESIUM: CPT

## 2024-05-15 PROCEDURE — 97530 THERAPEUTIC ACTIVITIES: CPT

## 2024-05-15 PROCEDURE — 1100000000 HC RM PRIVATE

## 2024-05-15 PROCEDURE — 97112 NEUROMUSCULAR REEDUCATION: CPT

## 2024-05-15 PROCEDURE — 97535 SELF CARE MNGMENT TRAINING: CPT

## 2024-05-15 PROCEDURE — 6360000002 HC RX W HCPCS: Performed by: INTERNAL MEDICINE

## 2024-05-15 PROCEDURE — 85027 COMPLETE CBC AUTOMATED: CPT

## 2024-05-15 PROCEDURE — 6370000000 HC RX 637 (ALT 250 FOR IP): Performed by: PHYSICIAN ASSISTANT

## 2024-05-15 PROCEDURE — 6370000000 HC RX 637 (ALT 250 FOR IP): Performed by: INTERNAL MEDICINE

## 2024-05-15 PROCEDURE — 82962 GLUCOSE BLOOD TEST: CPT

## 2024-05-15 PROCEDURE — 80048 BASIC METABOLIC PNL TOTAL CA: CPT

## 2024-05-15 RX ORDER — LEVETIRACETAM 500 MG/1
500 TABLET ORAL 2 TIMES DAILY
Status: ON HOLD | COMMUNITY
Start: 2024-02-09 | End: 2024-10-18 | Stop reason: HOSPADM

## 2024-05-15 RX ADMIN — SODIUM HYPOCHLORITE: 2.5 SOLUTION TOPICAL at 21:59

## 2024-05-15 RX ADMIN — PIPERACILLIN AND TAZOBACTAM 3375 MG: 3; .375 INJECTION, POWDER, LYOPHILIZED, FOR SOLUTION INTRAVENOUS at 05:07

## 2024-05-15 RX ADMIN — PIPERACILLIN AND TAZOBACTAM 3375 MG: 3; .375 INJECTION, POWDER, LYOPHILIZED, FOR SOLUTION INTRAVENOUS at 14:04

## 2024-05-15 RX ADMIN — LEVETIRACETAM 500 MG: 100 SOLUTION ORAL at 09:15

## 2024-05-15 RX ADMIN — SODIUM CHLORIDE, PRESERVATIVE FREE 10 ML: 5 INJECTION INTRAVENOUS at 09:15

## 2024-05-15 RX ADMIN — SODIUM HYPOCHLORITE: 2.5 SOLUTION TOPICAL at 09:19

## 2024-05-15 RX ADMIN — PIPERACILLIN AND TAZOBACTAM 3375 MG: 3; .375 INJECTION, POWDER, LYOPHILIZED, FOR SOLUTION INTRAVENOUS at 22:05

## 2024-05-15 RX ADMIN — LEVETIRACETAM 500 MG: 100 SOLUTION ORAL at 21:58

## 2024-05-15 ASSESSMENT — PAIN SCALES - GENERAL
PAINLEVEL_OUTOF10: 0
PAINLEVEL_OUTOF10: 0

## 2024-05-16 ENCOUNTER — APPOINTMENT (OUTPATIENT)
Dept: CT IMAGING | Age: 49
End: 2024-05-16
Payer: COMMERCIAL

## 2024-05-16 ENCOUNTER — APPOINTMENT (OUTPATIENT)
Dept: GENERAL RADIOLOGY | Age: 49
End: 2024-05-16
Payer: COMMERCIAL

## 2024-05-16 LAB
ALBUMIN SERPL-MCNC: 2.3 G/DL (ref 3.5–5)
ALBUMIN/GLOB SERPL: 0.6 (ref 1–1.9)
ALP SERPL-CCNC: 57 U/L (ref 40–129)
ALT SERPL-CCNC: 15 U/L (ref 12–65)
AST SERPL-CCNC: 50 U/L (ref 15–37)
BASOPHILS # BLD: 0 K/UL (ref 0–0.2)
BASOPHILS NFR BLD: 0 % (ref 0–2)
BILIRUB DIRECT SERPL-MCNC: <0.2 MG/DL (ref 0–0.4)
BILIRUB SERPL-MCNC: 0.4 MG/DL (ref 0–1.2)
DIFFERENTIAL METHOD BLD: ABNORMAL
EOSINOPHIL # BLD: 0 K/UL (ref 0–0.8)
EOSINOPHIL NFR BLD: 0 % (ref 0.5–7.8)
ERYTHROCYTE [DISTWIDTH] IN BLOOD BY AUTOMATED COUNT: 16.6 % (ref 11.9–14.6)
GLOBULIN SER CALC-MCNC: 4 G/DL (ref 2.3–3.5)
GLUCOSE BLD STRIP.AUTO-MCNC: 110 MG/DL (ref 65–100)
GLUCOSE BLD STRIP.AUTO-MCNC: 112 MG/DL (ref 65–100)
GLUCOSE BLD STRIP.AUTO-MCNC: 124 MG/DL (ref 65–100)
GLUCOSE BLD STRIP.AUTO-MCNC: 96 MG/DL (ref 65–100)
HCT VFR BLD AUTO: 33.9 % (ref 41.1–50.3)
HGB BLD-MCNC: 10.1 G/DL (ref 13.6–17.2)
IMM GRANULOCYTES # BLD AUTO: 0.1 K/UL (ref 0–0.5)
IMM GRANULOCYTES NFR BLD AUTO: 1 % (ref 0–5)
LYMPHOCYTES # BLD: 1.2 K/UL (ref 0.5–4.6)
LYMPHOCYTES NFR BLD: 12 % (ref 13–44)
MAGNESIUM SERPL-MCNC: 1.8 MG/DL (ref 1.8–2.4)
MCH RBC QN AUTO: 27.4 PG (ref 26.1–32.9)
MCHC RBC AUTO-ENTMCNC: 29.8 G/DL (ref 31.4–35)
MCV RBC AUTO: 91.9 FL (ref 82–102)
MONOCYTES # BLD: 0.6 K/UL (ref 0.1–1.3)
MONOCYTES NFR BLD: 6 % (ref 4–12)
NEUTS SEG # BLD: 8.3 K/UL (ref 1.7–8.2)
NEUTS SEG NFR BLD: 81 % (ref 43–78)
NRBC # BLD: 0 K/UL (ref 0–0.2)
PLATELET # BLD AUTO: 260 K/UL (ref 150–450)
PMV BLD AUTO: 10.4 FL (ref 9.4–12.3)
PROT SERPL-MCNC: 6.3 G/DL (ref 6.3–8.2)
RBC # BLD AUTO: 3.69 M/UL (ref 4.23–5.6)
SERVICE CMNT-IMP: ABNORMAL
SERVICE CMNT-IMP: NORMAL
WBC # BLD AUTO: 10.2 K/UL (ref 4.3–11.1)

## 2024-05-16 PROCEDURE — 92526 ORAL FUNCTION THERAPY: CPT

## 2024-05-16 PROCEDURE — 2580000003 HC RX 258: Performed by: INTERNAL MEDICINE

## 2024-05-16 PROCEDURE — 97530 THERAPEUTIC ACTIVITIES: CPT

## 2024-05-16 PROCEDURE — 74018 RADEX ABDOMEN 1 VIEW: CPT

## 2024-05-16 PROCEDURE — 82962 GLUCOSE BLOOD TEST: CPT

## 2024-05-16 PROCEDURE — 80076 HEPATIC FUNCTION PANEL: CPT

## 2024-05-16 PROCEDURE — 83735 ASSAY OF MAGNESIUM: CPT

## 2024-05-16 PROCEDURE — 6370000000 HC RX 637 (ALT 250 FOR IP): Performed by: INTERNAL MEDICINE

## 2024-05-16 PROCEDURE — 6360000002 HC RX W HCPCS: Performed by: INTERNAL MEDICINE

## 2024-05-16 PROCEDURE — 70450 CT HEAD/BRAIN W/O DYE: CPT

## 2024-05-16 PROCEDURE — 1100000000 HC RM PRIVATE

## 2024-05-16 PROCEDURE — 97164 PT RE-EVAL EST PLAN CARE: CPT

## 2024-05-16 PROCEDURE — 99222 1ST HOSP IP/OBS MODERATE 55: CPT | Performed by: NURSE PRACTITIONER

## 2024-05-16 PROCEDURE — 2580000003 HC RX 258: Performed by: PHYSICIAN ASSISTANT

## 2024-05-16 PROCEDURE — 92507 TX SP LANG VOICE COMM INDIV: CPT

## 2024-05-16 PROCEDURE — 36415 COLL VENOUS BLD VENIPUNCTURE: CPT

## 2024-05-16 PROCEDURE — 85025 COMPLETE CBC W/AUTO DIFF WBC: CPT

## 2024-05-16 RX ADMIN — LEVETIRACETAM 500 MG: 100 SOLUTION ORAL at 08:50

## 2024-05-16 RX ADMIN — LEVETIRACETAM 500 MG: 100 SOLUTION ORAL at 21:38

## 2024-05-16 RX ADMIN — PIPERACILLIN AND TAZOBACTAM 3375 MG: 3; .375 INJECTION, POWDER, LYOPHILIZED, FOR SOLUTION INTRAVENOUS at 05:03

## 2024-05-16 RX ADMIN — PIPERACILLIN AND TAZOBACTAM 3375 MG: 3; .375 INJECTION, POWDER, LYOPHILIZED, FOR SOLUTION INTRAVENOUS at 22:19

## 2024-05-16 RX ADMIN — SODIUM CHLORIDE: 900 INJECTION INTRAVENOUS at 14:26

## 2024-05-16 RX ADMIN — PIPERACILLIN AND TAZOBACTAM 3375 MG: 3; .375 INJECTION, POWDER, LYOPHILIZED, FOR SOLUTION INTRAVENOUS at 14:27

## 2024-05-16 RX ADMIN — SODIUM CHLORIDE, PRESERVATIVE FREE 5 ML: 5 INJECTION INTRAVENOUS at 21:39

## 2024-05-16 RX ADMIN — SODIUM CHLORIDE, PRESERVATIVE FREE 10 ML: 5 INJECTION INTRAVENOUS at 08:50

## 2024-05-16 RX ADMIN — SODIUM HYPOCHLORITE: 2.5 SOLUTION TOPICAL at 14:09

## 2024-05-16 ASSESSMENT — PAIN SCALES - GENERAL: PAINLEVEL_OUTOF10: 0

## 2024-05-17 ENCOUNTER — ANESTHESIA EVENT (OUTPATIENT)
Dept: SURGERY | Age: 49
End: 2024-05-17
Payer: COMMERCIAL

## 2024-05-17 ENCOUNTER — ANESTHESIA (OUTPATIENT)
Dept: SURGERY | Age: 49
End: 2024-05-17
Payer: COMMERCIAL

## 2024-05-17 PROBLEM — T85.618A SHUNT MALFUNCTION: Status: ACTIVE | Noted: 2024-05-17

## 2024-05-17 PROBLEM — T81.30XA WOUND DISRUPTION: Status: ACTIVE | Noted: 2024-05-17

## 2024-05-17 PROBLEM — R00.0 SINUS TACHYCARDIA: Status: ACTIVE | Noted: 2024-05-17

## 2024-05-17 PROBLEM — G91.2 NPH (NORMAL PRESSURE HYDROCEPHALUS) (HCC): Status: ACTIVE | Noted: 2024-05-17

## 2024-05-17 LAB
ANION GAP SERPL CALC-SCNC: 10 MMOL/L (ref 9–18)
APPEARANCE FLD: NORMAL
BACTERIA SPEC CULT: NORMAL
BACTERIA SPEC CULT: NORMAL
BASOPHILS # BLD: 0 K/UL (ref 0–0.2)
BASOPHILS NFR BLD: 0 % (ref 0–2)
BUN SERPL-MCNC: 13 MG/DL (ref 6–23)
CALCIUM SERPL-MCNC: 8.4 MG/DL (ref 8.8–10.2)
CHLORIDE SERPL-SCNC: 103 MMOL/L (ref 98–107)
CO2 SERPL-SCNC: 27 MMOL/L (ref 20–28)
COLOR FLD: COLORLESS
CREAT SERPL-MCNC: 0.69 MG/DL (ref 0.8–1.3)
DIFFERENTIAL METHOD BLD: ABNORMAL
EOSINOPHIL # BLD: 0 K/UL (ref 0–0.8)
EOSINOPHIL NFR BLD: 0 % (ref 0.5–7.8)
ERYTHROCYTE [DISTWIDTH] IN BLOOD BY AUTOMATED COUNT: 17 % (ref 11.9–14.6)
GLUCOSE BLD STRIP.AUTO-MCNC: 108 MG/DL (ref 65–100)
GLUCOSE BLD STRIP.AUTO-MCNC: 118 MG/DL (ref 65–100)
GLUCOSE BLD STRIP.AUTO-MCNC: 150 MG/DL (ref 65–100)
GLUCOSE BLD STRIP.AUTO-MCNC: 85 MG/DL (ref 65–100)
GLUCOSE CSF-MCNC: 35 MG/DL (ref 50–70)
GLUCOSE SERPL-MCNC: 95 MG/DL (ref 70–99)
HCT VFR BLD AUTO: 34 % (ref 41.1–50.3)
HGB BLD-MCNC: 10.4 G/DL (ref 13.6–17.2)
IMM GRANULOCYTES # BLD AUTO: 0.1 K/UL (ref 0–0.5)
IMM GRANULOCYTES NFR BLD AUTO: 1 % (ref 0–5)
LYMPHOCYTES # BLD: 1.3 K/UL (ref 0.5–4.6)
LYMPHOCYTES NFR BLD: 13 % (ref 13–44)
LYMPHOCYTES NFR BRONCH MANUAL: 78 %
MACROPHAGES NFR BRONCH MANUAL: 11 %
MAGNESIUM SERPL-MCNC: 1.9 MG/DL (ref 1.8–2.4)
MCH RBC QN AUTO: 28 PG (ref 26.1–32.9)
MCHC RBC AUTO-ENTMCNC: 30.6 G/DL (ref 31.4–35)
MCV RBC AUTO: 91.4 FL (ref 82–102)
MONOCYTES # BLD: 0.5 K/UL (ref 0.1–1.3)
MONOCYTES NFR BLD: 5 % (ref 4–12)
NEUTROPHILS NFR BRONCH MANUAL: 11 %
NEUTS SEG # BLD: 7.8 K/UL (ref 1.7–8.2)
NEUTS SEG NFR BLD: 81 % (ref 43–78)
NRBC # BLD: 0 K/UL (ref 0–0.2)
NUC CELL # FLD: 14 /CU MM
PLATELET # BLD AUTO: 278 K/UL (ref 150–450)
PMV BLD AUTO: 10.4 FL (ref 9.4–12.3)
POTASSIUM SERPL-SCNC: 3.3 MMOL/L (ref 3.5–5.1)
PROT CSF-MCNC: 40 MG/DL (ref 15–45)
RBC # BLD AUTO: 3.72 M/UL (ref 4.23–5.6)
RBC # FLD: 220 /CU MM
SERVICE CMNT-IMP: ABNORMAL
SERVICE CMNT-IMP: NORMAL
SODIUM SERPL-SCNC: 140 MMOL/L (ref 136–145)
SPECIMEN SOURCE FLD: NORMAL
TUBE # CSF: ABNORMAL
TUBE # CSF: NORMAL
WBC # BLD AUTO: 9.7 K/UL (ref 4.3–11.1)

## 2024-05-17 PROCEDURE — 80048 BASIC METABOLIC PNL TOTAL CA: CPT

## 2024-05-17 PROCEDURE — 6360000002 HC RX W HCPCS: Performed by: NEUROLOGICAL SURGERY

## 2024-05-17 PROCEDURE — 00P60JZ REMOVAL OF SYNTHETIC SUBSTITUTE FROM CEREBRAL VENTRICLE, OPEN APPROACH: ICD-10-PCS | Performed by: NEUROLOGICAL SURGERY

## 2024-05-17 PROCEDURE — 87077 CULTURE AEROBIC IDENTIFY: CPT

## 2024-05-17 PROCEDURE — 2500000003 HC RX 250 WO HCPCS: Performed by: NEUROLOGICAL SURGERY

## 2024-05-17 PROCEDURE — 36415 COLL VENOUS BLD VENIPUNCTURE: CPT

## 2024-05-17 PROCEDURE — 6360000002 HC RX W HCPCS: Performed by: INTERNAL MEDICINE

## 2024-05-17 PROCEDURE — 1100000000 HC RM PRIVATE

## 2024-05-17 PROCEDURE — 87102 FUNGUS ISOLATION CULTURE: CPT

## 2024-05-17 PROCEDURE — 6370000000 HC RX 637 (ALT 250 FOR IP): Performed by: INTERNAL MEDICINE

## 2024-05-17 PROCEDURE — 84157 ASSAY OF PROTEIN OTHER: CPT

## 2024-05-17 PROCEDURE — 87186 SC STD MICRODIL/AGAR DIL: CPT

## 2024-05-17 PROCEDURE — 3700000000 HC ANESTHESIA ATTENDED CARE: Performed by: NEUROLOGICAL SURGERY

## 2024-05-17 PROCEDURE — 82962 GLUCOSE BLOOD TEST: CPT

## 2024-05-17 PROCEDURE — A4217 STERILE WATER/SALINE, 500 ML: HCPCS | Performed by: NEUROLOGICAL SURGERY

## 2024-05-17 PROCEDURE — 2709999900 HC NON-CHARGEABLE SUPPLY: Performed by: NEUROLOGICAL SURGERY

## 2024-05-17 PROCEDURE — 2580000003 HC RX 258: Performed by: PHYSICIAN ASSISTANT

## 2024-05-17 PROCEDURE — 3600000004 HC SURGERY LEVEL 4 BASE: Performed by: NEUROLOGICAL SURGERY

## 2024-05-17 PROCEDURE — 7100000001 HC PACU RECOVERY - ADDTL 15 MIN: Performed by: NEUROLOGICAL SURGERY

## 2024-05-17 PROCEDURE — 2580000003 HC RX 258

## 2024-05-17 PROCEDURE — 89050 BODY FLUID CELL COUNT: CPT

## 2024-05-17 PROCEDURE — 3700000001 HC ADD 15 MINUTES (ANESTHESIA): Performed by: NEUROLOGICAL SURGERY

## 2024-05-17 PROCEDURE — 87206 SMEAR FLUORESCENT/ACID STAI: CPT

## 2024-05-17 PROCEDURE — 2580000003 HC RX 258: Performed by: INTERNAL MEDICINE

## 2024-05-17 PROCEDURE — 2720000010 HC SURG SUPPLY STERILE: Performed by: NEUROLOGICAL SURGERY

## 2024-05-17 PROCEDURE — 62256 REMOVE BRAIN CAVITY SHUNT: CPT | Performed by: NEUROLOGICAL SURGERY

## 2024-05-17 PROCEDURE — 6360000002 HC RX W HCPCS

## 2024-05-17 PROCEDURE — 3600000014 HC SURGERY LEVEL 4 ADDTL 15MIN: Performed by: NEUROLOGICAL SURGERY

## 2024-05-17 PROCEDURE — 7100000000 HC PACU RECOVERY - FIRST 15 MIN: Performed by: NEUROLOGICAL SURGERY

## 2024-05-17 PROCEDURE — 6370000000 HC RX 637 (ALT 250 FOR IP): Performed by: PHYSICIAN ASSISTANT

## 2024-05-17 PROCEDURE — 2580000003 HC RX 258: Performed by: NEUROLOGICAL SURGERY

## 2024-05-17 PROCEDURE — 82945 GLUCOSE OTHER FLUID: CPT

## 2024-05-17 PROCEDURE — 99232 SBSQ HOSP IP/OBS MODERATE 35: CPT | Performed by: NURSE PRACTITIONER

## 2024-05-17 PROCEDURE — 85025 COMPLETE CBC W/AUTO DIFF WBC: CPT

## 2024-05-17 PROCEDURE — 87205 SMEAR GRAM STAIN: CPT

## 2024-05-17 PROCEDURE — 87070 CULTURE OTHR SPECIMN AEROBIC: CPT

## 2024-05-17 PROCEDURE — 83735 ASSAY OF MAGNESIUM: CPT

## 2024-05-17 PROCEDURE — 2580000003 HC RX 258: Performed by: FAMILY MEDICINE

## 2024-05-17 RX ORDER — PROPOFOL 10 MG/ML
INJECTION, EMULSION INTRAVENOUS PRN
Status: DISCONTINUED | OUTPATIENT
Start: 2024-05-17 | End: 2024-05-17 | Stop reason: SDUPTHER

## 2024-05-17 RX ORDER — SODIUM CHLORIDE, SODIUM LACTATE, POTASSIUM CHLORIDE, CALCIUM CHLORIDE 600; 310; 30; 20 MG/100ML; MG/100ML; MG/100ML; MG/100ML
INJECTION, SOLUTION INTRAVENOUS CONTINUOUS PRN
Status: DISCONTINUED | OUTPATIENT
Start: 2024-05-17 | End: 2024-05-17 | Stop reason: SDUPTHER

## 2024-05-17 RX ORDER — SODIUM CHLORIDE 9 MG/ML
INJECTION, SOLUTION INTRAVENOUS CONTINUOUS
Status: ACTIVE | OUTPATIENT
Start: 2024-05-17 | End: 2024-05-18

## 2024-05-17 RX ORDER — FENTANYL CITRATE 50 UG/ML
INJECTION, SOLUTION INTRAMUSCULAR; INTRAVENOUS PRN
Status: DISCONTINUED | OUTPATIENT
Start: 2024-05-17 | End: 2024-05-17 | Stop reason: SDUPTHER

## 2024-05-17 RX ADMIN — SODIUM CHLORIDE: 900 INJECTION INTRAVENOUS at 13:12

## 2024-05-17 RX ADMIN — SODIUM CHLORIDE: 9 INJECTION, SOLUTION INTRAVENOUS at 18:58

## 2024-05-17 RX ADMIN — SODIUM HYPOCHLORITE: 2.5 SOLUTION TOPICAL at 21:05

## 2024-05-17 RX ADMIN — POTASSIUM BICARBONATE 40 MEQ: 782 TABLET, EFFERVESCENT ORAL at 09:36

## 2024-05-17 RX ADMIN — SODIUM CHLORIDE, PRESERVATIVE FREE 10 ML: 5 INJECTION INTRAVENOUS at 09:37

## 2024-05-17 RX ADMIN — PIPERACILLIN AND TAZOBACTAM 3375 MG: 3; .375 INJECTION, POWDER, LYOPHILIZED, FOR SOLUTION INTRAVENOUS at 13:13

## 2024-05-17 RX ADMIN — PROPOFOL 100 MCG/KG/MIN: 10 INJECTION, EMULSION INTRAVENOUS at 14:50

## 2024-05-17 RX ADMIN — LEVETIRACETAM 500 MG: 100 SOLUTION ORAL at 21:05

## 2024-05-17 RX ADMIN — PIPERACILLIN AND TAZOBACTAM 3375 MG: 3; .375 INJECTION, POWDER, LYOPHILIZED, FOR SOLUTION INTRAVENOUS at 05:02

## 2024-05-17 RX ADMIN — FENTANYL CITRATE 50 MCG: 50 INJECTION, SOLUTION INTRAMUSCULAR; INTRAVENOUS at 14:30

## 2024-05-17 RX ADMIN — LEVETIRACETAM 500 MG: 100 SOLUTION ORAL at 09:36

## 2024-05-17 RX ADMIN — PROPOFOL 30 MG: 10 INJECTION, EMULSION INTRAVENOUS at 14:36

## 2024-05-17 RX ADMIN — PIPERACILLIN AND TAZOBACTAM 3375 MG: 3; .375 INJECTION, POWDER, LYOPHILIZED, FOR SOLUTION INTRAVENOUS at 21:09

## 2024-05-17 RX ADMIN — SODIUM CHLORIDE, SODIUM LACTATE, POTASSIUM CHLORIDE, AND CALCIUM CHLORIDE: 600; 310; 30; 20 INJECTION, SOLUTION INTRAVENOUS at 14:20

## 2024-05-17 ASSESSMENT — PAIN - FUNCTIONAL ASSESSMENT
PAIN_FUNCTIONAL_ASSESSMENT: NONE - DENIES PAIN
PAIN_FUNCTIONAL_ASSESSMENT: NONE - DENIES PAIN
PAIN_FUNCTIONAL_ASSESSMENT: 0-10
PAIN_FUNCTIONAL_ASSESSMENT: NONE - DENIES PAIN

## 2024-05-17 ASSESSMENT — PAIN SCALES - GENERAL
PAINLEVEL_OUTOF10: 0
PAINLEVEL_OUTOF10: 0

## 2024-05-17 NOTE — ANESTHESIA PRE PROCEDURE
Department of Anesthesiology  Preprocedure Note       Name:  Brendan Saleh   Age:  48 y.o.  :  1975                                          MRN:  [id number]         Date:  2024      Surgeon: Surgeon(s):  Bin Ortiz MD    Procedure: Procedure(s):  RIGHT VENTRICULAR PERITONEAL SHUNT REMOVAL    Medications prior to admission:   Prior to Admission medications    Medication Sig Start Date End Date Taking? Authorizing Provider   levETIRAcetam (KEPPRA) 500 MG tablet Take 1 tablet by mouth 2 times daily 24 Yes Provider, MD Arianna   Hyoscyamine Sulfate SL (LEVSIN/SL) 0.125 MG SUBL Place 1 tablet under the tongue 3 times daily as needed (cramping) 3/29/24 4/1/24  Veronica Conner PA       Current medications:    Current Facility-Administered Medications   Medication Dose Route Frequency Provider Last Rate Last Admin   • lidocaine-EPINEPHrine 1 percent-1:[id number] injection    PRN Bin Ortiz MD   1.5 mL at 24 1508   • ceFAZolin (ANCEF) 1,000 mg in sod chloride IRR soln 0.9 % 1,000 mL    PRN Bin Ortiz MD   1,000 mL at 24 1515   • levETIRAcetam (KEPPRA) 100 MG/ML oral solution 500 mg  500 mg Oral BID Jonathan Naranjo MD   500 mg at 24 0936   • calcium carbonate (TUMS) chewable tablet 500 mg  500 mg Oral TID PRN Jonathan Naranjo MD       • 0.9 % sodium chloride infusion   IntraVENous Continuous Jonathan Naranjo  mL/hr at 05/15/24 0354 Rate Verify at 05/15/24 0354   • piperacillin-tazobactam (ZOSYN) 3,375 mg in sodium chloride 0.9 % 50 mL IVPB (mini-bag)  3,375 mg IntraVENous Q8H Jonathan Naranjo MD 12.5 mL/hr at 24 1313 3,375 mg at 24 1313   • guaiFENesin-dextromethorphan (ROBITUSSIN DM) 100-10 MG/5ML syrup 5 mL  5 mL Oral Q4H PRN Jonathan Naranjo MD       • HYDROmorphone HCl PF (DILAUDID) injection 1 mg  1 mg IntraVENous Q4H PRN Nacho Tinsley PA-C   1 mg at 24 1150   • oxyCODONE

## 2024-05-17 NOTE — ANESTHESIA POSTPROCEDURE EVALUATION
Department of Anesthesiology  Postprocedure Note    Patient: Brendan Saleh  MRN: [id number]  YOB: 1975  Date of evaluation: 5/17/2024    Procedure Summary       Date: 05/17/24 Room / Location: [facility] MAIN OR  / [facility] MAIN OR    Anesthesia Start: 1416 Anesthesia Stop: 1546    Procedure: RIGHT VENTRICULAR PERITONEAL SHUNT REMOVAL (Right: Head) Diagnosis:       Pressure in head      (Pressure in head [R51.9])    Providers: Bin Ortiz MD Responsible Provider: Dhaval Blandon MD    Anesthesia Type: TIVA ASA Status: 3            Anesthesia Type: No value filed.    Adriana Phase I: Adriana Score: 9    Adriana Phase II:      Anesthesia Post Evaluation    Patient location during evaluation: PACU  Patient participation: complete - patient participated  Level of consciousness: awake and alert  Airway patency: patent  Nausea & Vomiting: no nausea and no vomiting  Cardiovascular status: hemodynamically stable  Respiratory status: acceptable, nonlabored ventilation and spontaneous ventilation  Hydration status: euvolemic  Comments: /78   Pulse 97   Temp 97.9 °F (36.6 °C) (Temporal)   Resp 16   Ht 1.905 m (6' 3\")   Wt 66.2 kg (146 lb)   SpO2 98%   BMI 18.25 kg/m²     Multimodal analgesia pain management approach  Pain management: adequate and satisfactory to patient    No notable events documented.

## 2024-05-18 LAB
ALBUMIN SERPL-MCNC: 2.3 G/DL (ref 3.5–5)
ALBUMIN/GLOB SERPL: 0.6 (ref 1–1.9)
ALP SERPL-CCNC: 58 U/L (ref 40–129)
ALT SERPL-CCNC: 13 U/L (ref 12–65)
ANION GAP SERPL CALC-SCNC: 10 MMOL/L (ref 9–18)
APPEARANCE UR: CLEAR
AST SERPL-CCNC: 33 U/L (ref 15–37)
BACTERIA URNS QL MICRO: NEGATIVE /HPF
BASOPHILS # BLD: 0 K/UL (ref 0–0.2)
BASOPHILS NFR BLD: 0 % (ref 0–2)
BILIRUB DIRECT SERPL-MCNC: <0.2 MG/DL (ref 0–0.4)
BILIRUB SERPL-MCNC: 0.5 MG/DL (ref 0–1.2)
BILIRUB UR QL: NEGATIVE
BUN SERPL-MCNC: 10 MG/DL (ref 6–23)
CALCIUM SERPL-MCNC: 8 MG/DL (ref 8.8–10.2)
CASTS URNS QL MICRO: ABNORMAL /LPF
CHLORIDE SERPL-SCNC: 103 MMOL/L (ref 98–107)
CO2 SERPL-SCNC: 25 MMOL/L (ref 20–28)
COLOR UR: ABNORMAL
CREAT SERPL-MCNC: 0.79 MG/DL (ref 0.8–1.3)
DIFFERENTIAL METHOD BLD: ABNORMAL
EOSINOPHIL # BLD: 0 K/UL (ref 0–0.8)
EOSINOPHIL NFR BLD: 0 % (ref 0.5–7.8)
EPI CELLS #/AREA URNS HPF: ABNORMAL /HPF
ERYTHROCYTE [DISTWIDTH] IN BLOOD BY AUTOMATED COUNT: 17.9 % (ref 11.9–14.6)
GLOBULIN SER CALC-MCNC: 3.7 G/DL (ref 2.3–3.5)
GLUCOSE BLD STRIP.AUTO-MCNC: 108 MG/DL (ref 65–100)
GLUCOSE BLD STRIP.AUTO-MCNC: 119 MG/DL (ref 65–100)
GLUCOSE BLD STRIP.AUTO-MCNC: 124 MG/DL (ref 65–100)
GLUCOSE BLD STRIP.AUTO-MCNC: 86 MG/DL (ref 65–100)
GLUCOSE BLD STRIP.AUTO-MCNC: 88 MG/DL (ref 65–100)
GLUCOSE SERPL-MCNC: 94 MG/DL (ref 70–99)
GLUCOSE UR STRIP.AUTO-MCNC: NEGATIVE MG/DL
HCT VFR BLD AUTO: 31.1 % (ref 41.1–50.3)
HGB BLD-MCNC: 9.9 G/DL (ref 13.6–17.2)
HGB UR QL STRIP: NEGATIVE
IMM GRANULOCYTES # BLD AUTO: 0.1 K/UL (ref 0–0.5)
IMM GRANULOCYTES NFR BLD AUTO: 1 % (ref 0–5)
KETONES UR QL STRIP.AUTO: ABNORMAL MG/DL
LEUKOCYTE ESTERASE UR QL STRIP.AUTO: NEGATIVE
LYMPHOCYTES # BLD: 1 K/UL (ref 0.5–4.6)
LYMPHOCYTES NFR BLD: 7 % (ref 13–44)
MAGNESIUM SERPL-MCNC: 1.7 MG/DL (ref 1.8–2.4)
MCH RBC QN AUTO: 28.4 PG (ref 26.1–32.9)
MCHC RBC AUTO-ENTMCNC: 31.8 G/DL (ref 31.4–35)
MCV RBC AUTO: 89.4 FL (ref 82–102)
MONOCYTES # BLD: 0.6 K/UL (ref 0.1–1.3)
MONOCYTES NFR BLD: 4 % (ref 4–12)
MUCOUS THREADS URNS QL MICRO: 0 /LPF
NEUTS SEG # BLD: 12.6 K/UL (ref 1.7–8.2)
NEUTS SEG NFR BLD: 88 % (ref 43–78)
NITRITE UR QL STRIP.AUTO: NEGATIVE
NRBC # BLD: 0 K/UL (ref 0–0.2)
PH UR STRIP: 5.5 (ref 5–9)
PLATELET # BLD AUTO: 322 K/UL (ref 150–450)
PMV BLD AUTO: 9.8 FL (ref 9.4–12.3)
POTASSIUM SERPL-SCNC: 3.6 MMOL/L (ref 3.5–5.1)
PROT SERPL-MCNC: 6 G/DL (ref 6.3–8.2)
PROT UR STRIP-MCNC: 30 MG/DL
RBC # BLD AUTO: 3.48 M/UL (ref 4.23–5.6)
RBC #/AREA URNS HPF: ABNORMAL /HPF
SERVICE CMNT-IMP: ABNORMAL
SERVICE CMNT-IMP: NORMAL
SERVICE CMNT-IMP: NORMAL
SODIUM SERPL-SCNC: 138 MMOL/L (ref 136–145)
SP GR UR REFRACTOMETRY: 1.03 (ref 1–1.02)
URINE CULTURE IF INDICATED: ABNORMAL
UROBILINOGEN UR QL STRIP.AUTO: 0.2 EU/DL (ref 0.2–1)
WBC # BLD AUTO: 14.2 K/UL (ref 4.3–11.1)
WBC URNS QL MICRO: ABNORMAL /HPF

## 2024-05-18 PROCEDURE — 80048 BASIC METABOLIC PNL TOTAL CA: CPT

## 2024-05-18 PROCEDURE — 6360000002 HC RX W HCPCS: Performed by: INTERNAL MEDICINE

## 2024-05-18 PROCEDURE — 99024 POSTOP FOLLOW-UP VISIT: CPT | Performed by: NEUROLOGICAL SURGERY

## 2024-05-18 PROCEDURE — 36415 COLL VENOUS BLD VENIPUNCTURE: CPT

## 2024-05-18 PROCEDURE — 85025 COMPLETE CBC W/AUTO DIFF WBC: CPT

## 2024-05-18 PROCEDURE — 2580000003 HC RX 258: Performed by: PHYSICIAN ASSISTANT

## 2024-05-18 PROCEDURE — 82962 GLUCOSE BLOOD TEST: CPT

## 2024-05-18 PROCEDURE — 2580000003 HC RX 258: Performed by: INTERNAL MEDICINE

## 2024-05-18 PROCEDURE — 6360000002 HC RX W HCPCS: Performed by: NURSE PRACTITIONER

## 2024-05-18 PROCEDURE — 83735 ASSAY OF MAGNESIUM: CPT

## 2024-05-18 PROCEDURE — 81001 URINALYSIS AUTO W/SCOPE: CPT

## 2024-05-18 PROCEDURE — 6370000000 HC RX 637 (ALT 250 FOR IP): Performed by: INTERNAL MEDICINE

## 2024-05-18 PROCEDURE — 6370000000 HC RX 637 (ALT 250 FOR IP): Performed by: PHYSICIAN ASSISTANT

## 2024-05-18 PROCEDURE — 80076 HEPATIC FUNCTION PANEL: CPT

## 2024-05-18 PROCEDURE — 1100000000 HC RM PRIVATE

## 2024-05-18 PROCEDURE — 2580000003 HC RX 258: Performed by: FAMILY MEDICINE

## 2024-05-18 RX ORDER — MAGNESIUM SULFATE 1 G/100ML
1000 INJECTION INTRAVENOUS ONCE
Status: COMPLETED | OUTPATIENT
Start: 2024-05-18 | End: 2024-05-18

## 2024-05-18 RX ADMIN — ACETAMINOPHEN 650 MG: 325 TABLET ORAL at 08:43

## 2024-05-18 RX ADMIN — MAGNESIUM SULFATE HEPTAHYDRATE 1000 MG: 1 INJECTION, SOLUTION INTRAVENOUS at 17:50

## 2024-05-18 RX ADMIN — POLYETHYLENE GLYCOL 3350 17 G: 17 POWDER, FOR SOLUTION ORAL at 08:46

## 2024-05-18 RX ADMIN — ACETAMINOPHEN 650 MG: 325 TABLET ORAL at 18:28

## 2024-05-18 RX ADMIN — PIPERACILLIN AND TAZOBACTAM 3375 MG: 3; .375 INJECTION, POWDER, LYOPHILIZED, FOR SOLUTION INTRAVENOUS at 20:57

## 2024-05-18 RX ADMIN — SODIUM CHLORIDE, PRESERVATIVE FREE 5 ML: 5 INJECTION INTRAVENOUS at 21:00

## 2024-05-18 RX ADMIN — SODIUM CHLORIDE: 9 INJECTION, SOLUTION INTRAVENOUS at 13:27

## 2024-05-18 RX ADMIN — LEVETIRACETAM 500 MG: 100 SOLUTION ORAL at 20:59

## 2024-05-18 RX ADMIN — PIPERACILLIN AND TAZOBACTAM 3375 MG: 3; .375 INJECTION, POWDER, LYOPHILIZED, FOR SOLUTION INTRAVENOUS at 05:30

## 2024-05-18 RX ADMIN — SODIUM CHLORIDE, PRESERVATIVE FREE 10 ML: 5 INJECTION INTRAVENOUS at 08:47

## 2024-05-18 RX ADMIN — PIPERACILLIN AND TAZOBACTAM 3375 MG: 3; .375 INJECTION, POWDER, LYOPHILIZED, FOR SOLUTION INTRAVENOUS at 13:29

## 2024-05-18 RX ADMIN — LEVETIRACETAM 500 MG: 100 SOLUTION ORAL at 08:43

## 2024-05-18 ASSESSMENT — PAIN SCALES - GENERAL: PAINLEVEL_OUTOF10: 0

## 2024-05-19 ENCOUNTER — ANESTHESIA (OUTPATIENT)
Dept: SURGERY | Age: 49
End: 2024-05-19
Payer: COMMERCIAL

## 2024-05-19 ENCOUNTER — APPOINTMENT (OUTPATIENT)
Dept: GENERAL RADIOLOGY | Age: 49
End: 2024-05-19
Payer: COMMERCIAL

## 2024-05-19 ENCOUNTER — APPOINTMENT (OUTPATIENT)
Dept: CT IMAGING | Age: 49
End: 2024-05-19
Payer: COMMERCIAL

## 2024-05-19 ENCOUNTER — ANESTHESIA EVENT (OUTPATIENT)
Dept: SURGERY | Age: 49
End: 2024-05-19
Payer: COMMERCIAL

## 2024-05-19 PROBLEM — K56.609 COLON OBSTRUCTION (HCC): Status: ACTIVE | Noted: 2024-05-19

## 2024-05-19 LAB
ANION GAP SERPL CALC-SCNC: 11 MMOL/L (ref 9–18)
BASOPHILS # BLD: 0 K/UL (ref 0–0.2)
BASOPHILS NFR BLD: 0 % (ref 0–2)
BUN SERPL-MCNC: 8 MG/DL (ref 6–23)
CALCIUM SERPL-MCNC: 7.9 MG/DL (ref 8.8–10.2)
CHLORIDE SERPL-SCNC: 104 MMOL/L (ref 98–107)
CO2 SERPL-SCNC: 26 MMOL/L (ref 20–28)
CREAT SERPL-MCNC: 0.76 MG/DL (ref 0.8–1.3)
DIFFERENTIAL METHOD BLD: ABNORMAL
EOSINOPHIL # BLD: 0 K/UL (ref 0–0.8)
EOSINOPHIL NFR BLD: 0 % (ref 0.5–7.8)
ERYTHROCYTE [DISTWIDTH] IN BLOOD BY AUTOMATED COUNT: 18.5 % (ref 11.9–14.6)
GLUCOSE BLD STRIP.AUTO-MCNC: 102 MG/DL (ref 65–100)
GLUCOSE BLD STRIP.AUTO-MCNC: 110 MG/DL (ref 65–100)
GLUCOSE BLD STRIP.AUTO-MCNC: 111 MG/DL (ref 65–100)
GLUCOSE BLD STRIP.AUTO-MCNC: 126 MG/DL (ref 65–100)
GLUCOSE BLD STRIP.AUTO-MCNC: 144 MG/DL (ref 65–100)
GLUCOSE SERPL-MCNC: 119 MG/DL (ref 70–99)
HCT VFR BLD AUTO: 30.6 % (ref 41.1–50.3)
HGB BLD-MCNC: 9.5 G/DL (ref 13.6–17.2)
IMM GRANULOCYTES # BLD AUTO: 0.1 K/UL (ref 0–0.5)
IMM GRANULOCYTES NFR BLD AUTO: 1 % (ref 0–5)
LYMPHOCYTES # BLD: 1 K/UL (ref 0.5–4.6)
LYMPHOCYTES NFR BLD: 10 % (ref 13–44)
MCH RBC QN AUTO: 28 PG (ref 26.1–32.9)
MCHC RBC AUTO-ENTMCNC: 31 G/DL (ref 31.4–35)
MCV RBC AUTO: 90.3 FL (ref 82–102)
MONOCYTES # BLD: 0.6 K/UL (ref 0.1–1.3)
MONOCYTES NFR BLD: 6 % (ref 4–12)
NEUTS SEG # BLD: 8.5 K/UL (ref 1.7–8.2)
NEUTS SEG NFR BLD: 83 % (ref 43–78)
NRBC # BLD: 0 K/UL (ref 0–0.2)
PLATELET # BLD AUTO: 330 K/UL (ref 150–450)
PMV BLD AUTO: 9.5 FL (ref 9.4–12.3)
POTASSIUM SERPL-SCNC: 3.7 MMOL/L (ref 3.5–5.1)
RBC # BLD AUTO: 3.39 M/UL (ref 4.23–5.6)
SERVICE CMNT-IMP: ABNORMAL
SODIUM SERPL-SCNC: 140 MMOL/L (ref 136–145)
WBC # BLD AUTO: 10.3 K/UL (ref 4.3–11.1)

## 2024-05-19 PROCEDURE — 2580000003 HC RX 258: Performed by: INTERNAL MEDICINE

## 2024-05-19 PROCEDURE — 3700000001 HC ADD 15 MINUTES (ANESTHESIA): Performed by: SURGERY

## 2024-05-19 PROCEDURE — 99223 1ST HOSP IP/OBS HIGH 75: CPT | Performed by: SURGERY

## 2024-05-19 PROCEDURE — 7100000000 HC PACU RECOVERY - FIRST 15 MIN: Performed by: SURGERY

## 2024-05-19 PROCEDURE — 2700000000 HC OXYGEN THERAPY PER DAY

## 2024-05-19 PROCEDURE — 6370000000 HC RX 637 (ALT 250 FOR IP): Performed by: INTERNAL MEDICINE

## 2024-05-19 PROCEDURE — 6360000002 HC RX W HCPCS: Performed by: INTERNAL MEDICINE

## 2024-05-19 PROCEDURE — 2580000003 HC RX 258: Performed by: SURGERY

## 2024-05-19 PROCEDURE — 36415 COLL VENOUS BLD VENIPUNCTURE: CPT

## 2024-05-19 PROCEDURE — 6360000002 HC RX W HCPCS: Performed by: PHYSICIAN ASSISTANT

## 2024-05-19 PROCEDURE — 44160 REMOVAL OF COLON: CPT | Performed by: SURGERY

## 2024-05-19 PROCEDURE — 6370000000 HC RX 637 (ALT 250 FOR IP): Performed by: PHYSICIAN ASSISTANT

## 2024-05-19 PROCEDURE — 85025 COMPLETE CBC W/AUTO DIFF WBC: CPT

## 2024-05-19 PROCEDURE — 6360000002 HC RX W HCPCS: Performed by: NURSE ANESTHETIST, CERTIFIED REGISTERED

## 2024-05-19 PROCEDURE — 6360000002 HC RX W HCPCS: Performed by: FAMILY MEDICINE

## 2024-05-19 PROCEDURE — 6360000004 HC RX CONTRAST MEDICATION: Performed by: FAMILY MEDICINE

## 2024-05-19 PROCEDURE — 80048 BASIC METABOLIC PNL TOTAL CA: CPT

## 2024-05-19 PROCEDURE — 6360000002 HC RX W HCPCS: Performed by: NEUROLOGICAL SURGERY

## 2024-05-19 PROCEDURE — 88309 TISSUE EXAM BY PATHOLOGIST: CPT

## 2024-05-19 PROCEDURE — 2720000010 HC SURG SUPPLY STERILE: Performed by: SURGERY

## 2024-05-19 PROCEDURE — 2580000003 HC RX 258: Performed by: NURSE ANESTHETIST, CERTIFIED REGISTERED

## 2024-05-19 PROCEDURE — 6360000002 HC RX W HCPCS: Performed by: ANESTHESIOLOGY

## 2024-05-19 PROCEDURE — 3600000014 HC SURGERY LEVEL 4 ADDTL 15MIN: Performed by: SURGERY

## 2024-05-19 PROCEDURE — 3700000000 HC ANESTHESIA ATTENDED CARE: Performed by: SURGERY

## 2024-05-19 PROCEDURE — 2580000003 HC RX 258: Performed by: PHYSICIAN ASSISTANT

## 2024-05-19 PROCEDURE — 2500000003 HC RX 250 WO HCPCS: Performed by: NURSE PRACTITIONER

## 2024-05-19 PROCEDURE — 0DTF0ZZ RESECTION OF RIGHT LARGE INTESTINE, OPEN APPROACH: ICD-10-PCS | Performed by: SURGERY

## 2024-05-19 PROCEDURE — 82962 GLUCOSE BLOOD TEST: CPT

## 2024-05-19 PROCEDURE — 74018 RADEX ABDOMEN 1 VIEW: CPT

## 2024-05-19 PROCEDURE — 88307 TISSUE EXAM BY PATHOLOGIST: CPT

## 2024-05-19 PROCEDURE — 2709999900 HC NON-CHARGEABLE SUPPLY: Performed by: SURGERY

## 2024-05-19 PROCEDURE — 1100000000 HC RM PRIVATE

## 2024-05-19 PROCEDURE — 7100000001 HC PACU RECOVERY - ADDTL 15 MIN: Performed by: SURGERY

## 2024-05-19 PROCEDURE — 3600000004 HC SURGERY LEVEL 4 BASE: Performed by: SURGERY

## 2024-05-19 PROCEDURE — 74177 CT ABD & PELVIS W/CONTRAST: CPT

## 2024-05-19 PROCEDURE — 2580000003 HC RX 258: Performed by: NEUROLOGICAL SURGERY

## 2024-05-19 PROCEDURE — 2500000003 HC RX 250 WO HCPCS: Performed by: NURSE ANESTHETIST, CERTIFIED REGISTERED

## 2024-05-19 RX ORDER — ONDANSETRON 2 MG/ML
4 INJECTION INTRAMUSCULAR; INTRAVENOUS
Status: DISCONTINUED | OUTPATIENT
Start: 2024-05-19 | End: 2024-05-19 | Stop reason: HOSPADM

## 2024-05-19 RX ORDER — SUCCINYLCHOLINE CHLORIDE 20 MG/ML
INJECTION INTRAMUSCULAR; INTRAVENOUS PRN
Status: DISCONTINUED | OUTPATIENT
Start: 2024-05-19 | End: 2024-05-19 | Stop reason: SDUPTHER

## 2024-05-19 RX ORDER — SODIUM CHLORIDE 0.9 % (FLUSH) 0.9 %
5-40 SYRINGE (ML) INJECTION PRN
Status: DISCONTINUED | OUTPATIENT
Start: 2024-05-19 | End: 2024-05-28

## 2024-05-19 RX ORDER — LEVETIRACETAM 500 MG/5ML
500 INJECTION, SOLUTION, CONCENTRATE INTRAVENOUS EVERY 12 HOURS
Status: DISCONTINUED | OUTPATIENT
Start: 2024-05-19 | End: 2024-06-11

## 2024-05-19 RX ORDER — FENTANYL CITRATE 50 UG/ML
INJECTION, SOLUTION INTRAMUSCULAR; INTRAVENOUS PRN
Status: DISCONTINUED | OUTPATIENT
Start: 2024-05-19 | End: 2024-05-19 | Stop reason: SDUPTHER

## 2024-05-19 RX ORDER — POTASSIUM CHLORIDE 7.45 MG/ML
10 INJECTION INTRAVENOUS PRN
Status: DISCONTINUED | OUTPATIENT
Start: 2024-05-19 | End: 2024-05-22

## 2024-05-19 RX ORDER — OXYCODONE HYDROCHLORIDE 5 MG/1
5 TABLET ORAL
Status: DISCONTINUED | OUTPATIENT
Start: 2024-05-19 | End: 2024-05-19 | Stop reason: HOSPADM

## 2024-05-19 RX ORDER — ONDANSETRON 2 MG/ML
4 INJECTION INTRAMUSCULAR; INTRAVENOUS EVERY 6 HOURS PRN
Status: DISCONTINUED | OUTPATIENT
Start: 2024-05-19 | End: 2024-10-18 | Stop reason: HOSPADM

## 2024-05-19 RX ORDER — OXYCODONE HYDROCHLORIDE 5 MG/1
5 TABLET ORAL EVERY 4 HOURS PRN
Status: DISCONTINUED | OUTPATIENT
Start: 2024-05-19 | End: 2024-06-11

## 2024-05-19 RX ORDER — SODIUM CHLORIDE 0.9 % (FLUSH) 0.9 %
5-40 SYRINGE (ML) INJECTION EVERY 12 HOURS SCHEDULED
Status: DISCONTINUED | OUTPATIENT
Start: 2024-05-19 | End: 2024-05-28

## 2024-05-19 RX ORDER — LIDOCAINE HYDROCHLORIDE 20 MG/ML
INJECTION, SOLUTION EPIDURAL; INFILTRATION; INTRACAUDAL; PERINEURAL PRN
Status: DISCONTINUED | OUTPATIENT
Start: 2024-05-19 | End: 2024-05-19 | Stop reason: SDUPTHER

## 2024-05-19 RX ORDER — IOPAMIDOL 755 MG/ML
100 INJECTION, SOLUTION INTRAVASCULAR
Status: COMPLETED | OUTPATIENT
Start: 2024-05-19 | End: 2024-05-19

## 2024-05-19 RX ORDER — HYDROMORPHONE HYDROCHLORIDE 1 MG/ML
0.5 INJECTION, SOLUTION INTRAMUSCULAR; INTRAVENOUS; SUBCUTANEOUS
Status: DISCONTINUED | OUTPATIENT
Start: 2024-05-19 | End: 2024-06-11

## 2024-05-19 RX ORDER — PROPOFOL 10 MG/ML
INJECTION, EMULSION INTRAVENOUS PRN
Status: DISCONTINUED | OUTPATIENT
Start: 2024-05-19 | End: 2024-05-19 | Stop reason: SDUPTHER

## 2024-05-19 RX ORDER — HYDROMORPHONE HYDROCHLORIDE 2 MG/ML
0.25 INJECTION, SOLUTION INTRAMUSCULAR; INTRAVENOUS; SUBCUTANEOUS EVERY 5 MIN PRN
Status: DISCONTINUED | OUTPATIENT
Start: 2024-05-19 | End: 2024-05-19 | Stop reason: HOSPADM

## 2024-05-19 RX ORDER — SODIUM CHLORIDE, SODIUM LACTATE, POTASSIUM CHLORIDE, CALCIUM CHLORIDE 600; 310; 30; 20 MG/100ML; MG/100ML; MG/100ML; MG/100ML
INJECTION, SOLUTION INTRAVENOUS CONTINUOUS
Status: DISCONTINUED | OUTPATIENT
Start: 2024-05-19 | End: 2024-05-24

## 2024-05-19 RX ORDER — NEOSTIGMINE METHYLSULFATE 1 MG/ML
INJECTION, SOLUTION INTRAVENOUS PRN
Status: DISCONTINUED | OUTPATIENT
Start: 2024-05-19 | End: 2024-05-19 | Stop reason: SDUPTHER

## 2024-05-19 RX ORDER — SODIUM CHLORIDE 9 MG/ML
INJECTION, SOLUTION INTRAVENOUS PRN
Status: DISCONTINUED | OUTPATIENT
Start: 2024-05-19 | End: 2024-05-28

## 2024-05-19 RX ORDER — ONDANSETRON 4 MG/1
4 TABLET, ORALLY DISINTEGRATING ORAL EVERY 8 HOURS PRN
Status: DISCONTINUED | OUTPATIENT
Start: 2024-05-19 | End: 2024-10-18 | Stop reason: HOSPADM

## 2024-05-19 RX ORDER — GLYCOPYRROLATE 0.2 MG/ML
INJECTION INTRAMUSCULAR; INTRAVENOUS PRN
Status: DISCONTINUED | OUTPATIENT
Start: 2024-05-19 | End: 2024-05-19 | Stop reason: SDUPTHER

## 2024-05-19 RX ORDER — ROCURONIUM BROMIDE 10 MG/ML
INJECTION, SOLUTION INTRAVENOUS PRN
Status: DISCONTINUED | OUTPATIENT
Start: 2024-05-19 | End: 2024-05-19 | Stop reason: SDUPTHER

## 2024-05-19 RX ORDER — NALOXONE HYDROCHLORIDE 0.4 MG/ML
INJECTION, SOLUTION INTRAMUSCULAR; INTRAVENOUS; SUBCUTANEOUS PRN
Status: DISCONTINUED | OUTPATIENT
Start: 2024-05-19 | End: 2024-05-19 | Stop reason: HOSPADM

## 2024-05-19 RX ORDER — DIATRIZOATE MEGLUMINE AND DIATRIZOATE SODIUM 660; 100 MG/ML; MG/ML
15 SOLUTION ORAL; RECTAL
Status: DISCONTINUED | OUTPATIENT
Start: 2024-05-19 | End: 2024-05-28

## 2024-05-19 RX ORDER — SODIUM CHLORIDE, SODIUM LACTATE, POTASSIUM CHLORIDE, CALCIUM CHLORIDE 600; 310; 30; 20 MG/100ML; MG/100ML; MG/100ML; MG/100ML
INJECTION, SOLUTION INTRAVENOUS CONTINUOUS PRN
Status: DISCONTINUED | OUTPATIENT
Start: 2024-05-19 | End: 2024-05-19 | Stop reason: SDUPTHER

## 2024-05-19 RX ORDER — ACETAMINOPHEN 325 MG/1
650 TABLET ORAL EVERY 6 HOURS
Status: DISCONTINUED | OUTPATIENT
Start: 2024-05-20 | End: 2024-06-11

## 2024-05-19 RX ORDER — DEXTROSE MONOHYDRATE, SODIUM CHLORIDE, AND POTASSIUM CHLORIDE 50; 1.49; 4.5 G/1000ML; G/1000ML; G/1000ML
INJECTION, SOLUTION INTRAVENOUS CONTINUOUS
Status: DISCONTINUED | OUTPATIENT
Start: 2024-05-19 | End: 2024-05-19 | Stop reason: SDUPTHER

## 2024-05-19 RX ORDER — ONDANSETRON 2 MG/ML
INJECTION INTRAMUSCULAR; INTRAVENOUS PRN
Status: DISCONTINUED | OUTPATIENT
Start: 2024-05-19 | End: 2024-05-19 | Stop reason: SDUPTHER

## 2024-05-19 RX ORDER — ENOXAPARIN SODIUM 100 MG/ML
40 INJECTION SUBCUTANEOUS DAILY
Status: DISCONTINUED | OUTPATIENT
Start: 2024-05-20 | End: 2024-10-18 | Stop reason: HOSPADM

## 2024-05-19 RX ADMIN — HYDROMORPHONE HYDROCHLORIDE 0.25 MG: 2 INJECTION INTRAMUSCULAR; INTRAVENOUS; SUBCUTANEOUS at 20:09

## 2024-05-19 RX ADMIN — ONDANSETRON 4 MG: 2 INJECTION INTRAMUSCULAR; INTRAVENOUS at 19:29

## 2024-05-19 RX ADMIN — FENTANYL CITRATE 50 MCG: 50 INJECTION, SOLUTION INTRAMUSCULAR; INTRAVENOUS at 18:05

## 2024-05-19 RX ADMIN — SODIUM CHLORIDE, SODIUM LACTATE, POTASSIUM CHLORIDE, AND CALCIUM CHLORIDE: 600; 310; 30; 20 INJECTION, SOLUTION INTRAVENOUS at 17:47

## 2024-05-19 RX ADMIN — PIPERACILLIN AND TAZOBACTAM 3375 MG: 3; .375 INJECTION, POWDER, LYOPHILIZED, FOR SOLUTION INTRAVENOUS at 22:07

## 2024-05-19 RX ADMIN — PHENYLEPHRINE HYDROCHLORIDE 100 MCG: 0.1 INJECTION, SOLUTION INTRAVENOUS at 18:48

## 2024-05-19 RX ADMIN — Medication 3 MG: at 19:32

## 2024-05-19 RX ADMIN — ROCURONIUM BROMIDE 10 MG: 10 INJECTION, SOLUTION INTRAVENOUS at 19:02

## 2024-05-19 RX ADMIN — SODIUM HYPOCHLORITE: 2.5 SOLUTION TOPICAL at 09:08

## 2024-05-19 RX ADMIN — PROPOFOL 70 MG: 10 INJECTION, EMULSION INTRAVENOUS at 17:58

## 2024-05-19 RX ADMIN — PHENYLEPHRINE HYDROCHLORIDE 100 MCG: 0.1 INJECTION, SOLUTION INTRAVENOUS at 17:58

## 2024-05-19 RX ADMIN — ONDANSETRON 4 MG: 2 INJECTION INTRAMUSCULAR; INTRAVENOUS at 04:49

## 2024-05-19 RX ADMIN — SODIUM CHLORIDE: 900 INJECTION INTRAVENOUS at 14:13

## 2024-05-19 RX ADMIN — GLYCOPYRROLATE 0.4 MG: 0.2 INJECTION INTRAMUSCULAR; INTRAVENOUS at 19:32

## 2024-05-19 RX ADMIN — PHENYLEPHRINE HYDROCHLORIDE 100 MCG: 0.1 INJECTION, SOLUTION INTRAVENOUS at 18:11

## 2024-05-19 RX ADMIN — PHENYLEPHRINE HYDROCHLORIDE 100 MCG: 0.1 INJECTION, SOLUTION INTRAVENOUS at 18:38

## 2024-05-19 RX ADMIN — LEVETIRACETAM 500 MG: 100 SOLUTION ORAL at 09:10

## 2024-05-19 RX ADMIN — IOPAMIDOL 100 ML: 755 INJECTION, SOLUTION INTRAVENOUS at 11:21

## 2024-05-19 RX ADMIN — SODIUM CHLORIDE, PRESERVATIVE FREE 10 ML: 5 INJECTION INTRAVENOUS at 21:54

## 2024-05-19 RX ADMIN — ROCURONIUM BROMIDE 35 MG: 10 INJECTION, SOLUTION INTRAVENOUS at 18:09

## 2024-05-19 RX ADMIN — SODIUM CHLORIDE, POTASSIUM CHLORIDE, SODIUM LACTATE AND CALCIUM CHLORIDE: 600; 310; 30; 20 INJECTION, SOLUTION INTRAVENOUS at 22:06

## 2024-05-19 RX ADMIN — DIATRIZOATE MEGLUMINE AND DIATRIZOATE SODIUM 15 ML: 660; 100 LIQUID ORAL; RECTAL at 09:09

## 2024-05-19 RX ADMIN — ROCURONIUM BROMIDE 5 MG: 10 INJECTION, SOLUTION INTRAVENOUS at 17:58

## 2024-05-19 RX ADMIN — DEXTROSE MONOHYDRATE, SODIUM CHLORIDE, AND POTASSIUM CHLORIDE: 50; 4.5; 1.49 INJECTION, SOLUTION INTRAVENOUS at 11:50

## 2024-05-19 RX ADMIN — Medication 140 MG: at 17:58

## 2024-05-19 RX ADMIN — LIDOCAINE HYDROCHLORIDE 40 MG: 20 INJECTION, SOLUTION EPIDURAL; INFILTRATION; INTRACAUDAL; PERINEURAL at 17:58

## 2024-05-19 RX ADMIN — PIPERACILLIN AND TAZOBACTAM 3375 MG: 3; .375 INJECTION, POWDER, LYOPHILIZED, FOR SOLUTION INTRAVENOUS at 07:16

## 2024-05-19 RX ADMIN — PHENYLEPHRINE HYDROCHLORIDE 100 MCG: 0.1 INJECTION, SOLUTION INTRAVENOUS at 18:46

## 2024-05-19 RX ADMIN — LEVETIRACETAM 500 MG: 100 INJECTION, SOLUTION INTRAVENOUS at 21:54

## 2024-05-19 RX ADMIN — SODIUM CHLORIDE: 900 INJECTION INTRAVENOUS at 07:15

## 2024-05-19 RX ADMIN — SODIUM CHLORIDE, PRESERVATIVE FREE 10 ML: 5 INJECTION INTRAVENOUS at 09:10

## 2024-05-19 RX ADMIN — PIPERACILLIN AND TAZOBACTAM 3375 MG: 3; .375 INJECTION, POWDER, LYOPHILIZED, FOR SOLUTION INTRAVENOUS at 14:14

## 2024-05-19 RX ADMIN — FENTANYL CITRATE 50 MCG: 50 INJECTION, SOLUTION INTRAMUSCULAR; INTRAVENOUS at 17:58

## 2024-05-19 RX ADMIN — SODIUM HYPOCHLORITE: 2.5 SOLUTION TOPICAL at 21:54

## 2024-05-19 RX ADMIN — SODIUM CHLORIDE, SODIUM LACTATE, POTASSIUM CHLORIDE, AND CALCIUM CHLORIDE: 600; 310; 30; 20 INJECTION, SOLUTION INTRAVENOUS at 19:05

## 2024-05-19 RX ADMIN — PHENYLEPHRINE HYDROCHLORIDE 100 MCG: 0.1 INJECTION, SOLUTION INTRAVENOUS at 18:33

## 2024-05-19 ASSESSMENT — PAIN SCALES - GENERAL
PAINLEVEL_OUTOF10: 0
PAINLEVEL_OUTOF10: 0
PAINLEVEL_OUTOF10: 6

## 2024-05-19 ASSESSMENT — PAIN - FUNCTIONAL ASSESSMENT
PAIN_FUNCTIONAL_ASSESSMENT: 0-10
PAIN_FUNCTIONAL_ASSESSMENT: 0-10
PAIN_FUNCTIONAL_ASSESSMENT: NONE - DENIES PAIN

## 2024-05-19 ASSESSMENT — PAIN DESCRIPTION - LOCATION: LOCATION: ABDOMEN

## 2024-05-19 ASSESSMENT — PAIN DESCRIPTION - ORIENTATION: ORIENTATION: ANTERIOR;MID

## 2024-05-19 NOTE — ANESTHESIA PRE PROCEDURE
Department of Anesthesiology  Preprocedure Note       Name:  Brendan Saleh   Age:  48 y.o.  :  1975                                          MRN:  [id number]         Date:  2024      Surgeon: Surgeon(s):  Delroy Wright MD    Procedure: Procedure(s):  BOWEL RESECTION, OPEN RIGHT COLECTOMY    Medications prior to admission:   Prior to Admission medications    Medication Sig Start Date End Date Taking? Authorizing Provider   levETIRAcetam (KEPPRA) 500 MG tablet Take 1 tablet by mouth 2 times daily 24 Yes Provider, MD Arianna   Hyoscyamine Sulfate SL (LEVSIN/SL) 0.125 MG SUBL Place 1 tablet under the tongue 3 times daily as needed (cramping) 3/29/24 4/1/24  Veronica Conner PA       Current medications:    Current Facility-Administered Medications   Medication Dose Route Frequency Provider Last Rate Last Admin   • diatrizoate meglumine-sodium (GASTROGRAFIN) 66-10 % solution 15 mL  15 mL Oral ONCE PRN Mu Philippe DO   15 mL at 24 0909   • dextrose 5 % and 0.45 % NaCl with KCl 20 mEq infusion   IntraVENous Continuous Blair, YOSELIN Marr - CNP 75 mL/hr at 24 1150 New Bag at 24 1150   • LORazepam (ATIVAN) 2 mg in sodium chloride (PF) 0.9 % 10 mL injection  2 mg IntraVENous Q4H PRN Sarai Barnett PA       • levETIRAcetam (KEPPRA) 100 MG/ML oral solution 500 mg  500 mg Oral BID Jonathan Naranjo MD   500 mg at 24 0910   • calcium carbonate (TUMS) chewable tablet 500 mg  500 mg Oral TID PRN Jonathan Naranjo MD       • piperacillin-tazobactam (ZOSYN) 3,375 mg in sodium chloride 0.9 % 50 mL IVPB (mini-bag)  3,375 mg IntraVENous Q8H Jonathan Naranjo MD 12.5 mL/hr at 24 1414 3,375 mg at 24 1414   • guaiFENesin-dextromethorphan (ROBITUSSIN DM) 100-10 MG/5ML syrup 5 mL  5 mL Oral Q4H PRN Jonathan Naranjo MD       • HYDROmorphone HCl PF (DILAUDID) injection 1 mg  1 mg IntraVENous Q4H PRN Nacho Tinsley PA-C   1 mg at 24

## 2024-05-20 DIAGNOSIS — K56.609 COLON OBSTRUCTION (HCC): Primary | ICD-10-CM

## 2024-05-20 PROBLEM — T85.730A INFECTION OF VP (VENTRICULOPERITONEAL) SHUNT (HCC): Status: ACTIVE | Noted: 2024-05-20

## 2024-05-20 PROBLEM — R65.21 SEPTIC SHOCK (HCC): Status: ACTIVE | Noted: 2024-05-07

## 2024-05-20 PROBLEM — K63.89 COLONIC MASS: Status: ACTIVE | Noted: 2024-05-20

## 2024-05-20 LAB
ANION GAP SERPL CALC-SCNC: 10 MMOL/L (ref 9–18)
BASOPHILS # BLD: 0 K/UL (ref 0–0.2)
BASOPHILS NFR BLD: 0 % (ref 0–2)
BUN SERPL-MCNC: 14 MG/DL (ref 6–23)
CALCIUM SERPL-MCNC: 7.5 MG/DL (ref 8.8–10.2)
CEA SERPL-MCNC: 1.2 NG/ML (ref 0–3.8)
CHLORIDE SERPL-SCNC: 108 MMOL/L (ref 98–107)
CO2 SERPL-SCNC: 23 MMOL/L (ref 20–28)
CREAT SERPL-MCNC: 1 MG/DL (ref 0.8–1.3)
DIFFERENTIAL METHOD BLD: ABNORMAL
EOSINOPHIL # BLD: 0 K/UL (ref 0–0.8)
EOSINOPHIL NFR BLD: 0 % (ref 0.5–7.8)
ERYTHROCYTE [DISTWIDTH] IN BLOOD BY AUTOMATED COUNT: 19.6 % (ref 11.9–14.6)
GLUCOSE BLD STRIP.AUTO-MCNC: 143 MG/DL (ref 65–100)
GLUCOSE BLD STRIP.AUTO-MCNC: 148 MG/DL (ref 65–100)
GLUCOSE BLD STRIP.AUTO-MCNC: 152 MG/DL (ref 65–100)
GLUCOSE BLD STRIP.AUTO-MCNC: 166 MG/DL (ref 65–100)
GLUCOSE SERPL-MCNC: 159 MG/DL (ref 70–99)
HCT VFR BLD AUTO: 33.8 % (ref 41.1–50.3)
HGB BLD-MCNC: 10.3 G/DL (ref 13.6–17.2)
IMM GRANULOCYTES # BLD AUTO: 0.1 K/UL (ref 0–0.5)
IMM GRANULOCYTES NFR BLD AUTO: 1 % (ref 0–5)
LYMPHOCYTES # BLD: 1 K/UL (ref 0.5–4.6)
LYMPHOCYTES NFR BLD: 7 % (ref 13–44)
MCH RBC QN AUTO: 28.1 PG (ref 26.1–32.9)
MCHC RBC AUTO-ENTMCNC: 30.5 G/DL (ref 31.4–35)
MCV RBC AUTO: 92.1 FL (ref 82–102)
MONOCYTES # BLD: 0.8 K/UL (ref 0.1–1.3)
MONOCYTES NFR BLD: 5 % (ref 4–12)
NEUTS SEG # BLD: 12.5 K/UL (ref 1.7–8.2)
NEUTS SEG NFR BLD: 87 % (ref 43–78)
NRBC # BLD: 0 K/UL (ref 0–0.2)
PLATELET # BLD AUTO: 383 K/UL (ref 150–450)
PMV BLD AUTO: 9.4 FL (ref 9.4–12.3)
POTASSIUM SERPL-SCNC: 3.8 MMOL/L (ref 3.5–5.1)
RBC # BLD AUTO: 3.67 M/UL (ref 4.23–5.6)
SERVICE CMNT-IMP: ABNORMAL
SODIUM SERPL-SCNC: 141 MMOL/L (ref 136–145)
WBC # BLD AUTO: 14.3 K/UL (ref 4.3–11.1)

## 2024-05-20 PROCEDURE — 51702 INSERT TEMP BLADDER CATH: CPT

## 2024-05-20 PROCEDURE — 6360000002 HC RX W HCPCS: Performed by: HOSPITALIST

## 2024-05-20 PROCEDURE — 97168 OT RE-EVAL EST PLAN CARE: CPT

## 2024-05-20 PROCEDURE — 6360000002 HC RX W HCPCS: Performed by: SURGERY

## 2024-05-20 PROCEDURE — 6360000002 HC RX W HCPCS: Performed by: FAMILY MEDICINE

## 2024-05-20 PROCEDURE — 97164 PT RE-EVAL EST PLAN CARE: CPT

## 2024-05-20 PROCEDURE — 2580000003 HC RX 258: Performed by: NEUROLOGICAL SURGERY

## 2024-05-20 PROCEDURE — 97112 NEUROMUSCULAR REEDUCATION: CPT

## 2024-05-20 PROCEDURE — 92526 ORAL FUNCTION THERAPY: CPT

## 2024-05-20 PROCEDURE — 1100000000 HC RM PRIVATE

## 2024-05-20 PROCEDURE — 36415 COLL VENOUS BLD VENIPUNCTURE: CPT

## 2024-05-20 PROCEDURE — 85025 COMPLETE CBC W/AUTO DIFF WBC: CPT

## 2024-05-20 PROCEDURE — 2580000003 HC RX 258

## 2024-05-20 PROCEDURE — 2580000003 HC RX 258: Performed by: SURGERY

## 2024-05-20 PROCEDURE — 80048 BASIC METABOLIC PNL TOTAL CA: CPT

## 2024-05-20 PROCEDURE — 2580000003 HC RX 258: Performed by: PHYSICIAN ASSISTANT

## 2024-05-20 PROCEDURE — APPSS45 APP SPLIT SHARED TIME 31-45 MINUTES: Performed by: NURSE PRACTITIONER

## 2024-05-20 PROCEDURE — 97530 THERAPEUTIC ACTIVITIES: CPT

## 2024-05-20 PROCEDURE — 6370000000 HC RX 637 (ALT 250 FOR IP): Performed by: SURGERY

## 2024-05-20 PROCEDURE — 99223 1ST HOSP IP/OBS HIGH 75: CPT | Performed by: INTERNAL MEDICINE

## 2024-05-20 PROCEDURE — 6360000002 HC RX W HCPCS: Performed by: NEUROLOGICAL SURGERY

## 2024-05-20 PROCEDURE — 82378 CARCINOEMBRYONIC ANTIGEN: CPT

## 2024-05-20 PROCEDURE — 82962 GLUCOSE BLOOD TEST: CPT

## 2024-05-20 RX ORDER — CIPROFLOXACIN 2 MG/ML
400 INJECTION, SOLUTION INTRAVENOUS EVERY 12 HOURS
Status: DISCONTINUED | OUTPATIENT
Start: 2024-05-20 | End: 2024-05-22

## 2024-05-20 RX ORDER — SODIUM CHLORIDE, SODIUM LACTATE, POTASSIUM CHLORIDE, AND CALCIUM CHLORIDE .6; .31; .03; .02 G/100ML; G/100ML; G/100ML; G/100ML
1000 INJECTION, SOLUTION INTRAVENOUS ONCE
Status: COMPLETED | OUTPATIENT
Start: 2024-05-20 | End: 2024-05-20

## 2024-05-20 RX ADMIN — ACETAMINOPHEN 650 MG: 325 TABLET ORAL at 11:12

## 2024-05-20 RX ADMIN — PIPERACILLIN AND TAZOBACTAM 3375 MG: 3; .375 INJECTION, POWDER, LYOPHILIZED, FOR SOLUTION INTRAVENOUS at 05:06

## 2024-05-20 RX ADMIN — CIPROFLOXACIN 400 MG: 2 INJECTION, SOLUTION INTRAVENOUS at 22:27

## 2024-05-20 RX ADMIN — SODIUM CHLORIDE, PRESERVATIVE FREE 10 ML: 5 INJECTION INTRAVENOUS at 21:10

## 2024-05-20 RX ADMIN — HYDROMORPHONE HYDROCHLORIDE 0.5 MG: 1 INJECTION, SOLUTION INTRAMUSCULAR; INTRAVENOUS; SUBCUTANEOUS at 18:08

## 2024-05-20 RX ADMIN — ENOXAPARIN SODIUM 40 MG: 100 INJECTION SUBCUTANEOUS at 09:11

## 2024-05-20 RX ADMIN — SODIUM HYPOCHLORITE: 2.5 SOLUTION TOPICAL at 09:12

## 2024-05-20 RX ADMIN — SODIUM HYPOCHLORITE: 2.5 SOLUTION TOPICAL at 21:10

## 2024-05-20 RX ADMIN — SODIUM CHLORIDE, POTASSIUM CHLORIDE, SODIUM LACTATE AND CALCIUM CHLORIDE 1000 ML: 600; 310; 30; 20 INJECTION, SOLUTION INTRAVENOUS at 11:23

## 2024-05-20 RX ADMIN — CIPROFLOXACIN 400 MG: 2 INJECTION, SOLUTION INTRAVENOUS at 11:21

## 2024-05-20 RX ADMIN — LEVETIRACETAM 500 MG: 100 INJECTION, SOLUTION INTRAVENOUS at 09:11

## 2024-05-20 RX ADMIN — LEVETIRACETAM 500 MG: 100 INJECTION, SOLUTION INTRAVENOUS at 21:10

## 2024-05-20 RX ADMIN — SODIUM CHLORIDE, PRESERVATIVE FREE 10 ML: 5 INJECTION INTRAVENOUS at 09:12

## 2024-05-20 RX ADMIN — HYDROMORPHONE HYDROCHLORIDE 0.5 MG: 1 INJECTION, SOLUTION INTRAMUSCULAR; INTRAVENOUS; SUBCUTANEOUS at 05:21

## 2024-05-20 RX ADMIN — SODIUM CHLORIDE, POTASSIUM CHLORIDE, SODIUM LACTATE AND CALCIUM CHLORIDE: 600; 310; 30; 20 INJECTION, SOLUTION INTRAVENOUS at 22:24

## 2024-05-20 ASSESSMENT — PAIN SCALES - GENERAL
PAINLEVEL_OUTOF10: 0
PAINLEVEL_OUTOF10: 0
PAINLEVEL_OUTOF10: 8
PAINLEVEL_OUTOF10: 10
PAINLEVEL_OUTOF10: 8
PAINLEVEL_OUTOF10: 0

## 2024-05-20 ASSESSMENT — PAIN DESCRIPTION - DESCRIPTORS
DESCRIPTORS: DISCOMFORT
DESCRIPTORS: ACHING
DESCRIPTORS: ACHING

## 2024-05-20 ASSESSMENT — PAIN DESCRIPTION - FREQUENCY
FREQUENCY: INTERMITTENT
FREQUENCY: INTERMITTENT

## 2024-05-20 ASSESSMENT — PAIN DESCRIPTION - ONSET
ONSET: GRADUAL
ONSET: GRADUAL

## 2024-05-20 ASSESSMENT — PAIN DESCRIPTION - LOCATION
LOCATION: ABDOMEN

## 2024-05-20 ASSESSMENT — PAIN DESCRIPTION - ORIENTATION
ORIENTATION: LOWER
ORIENTATION: LOWER
ORIENTATION: MID

## 2024-05-20 ASSESSMENT — PAIN - FUNCTIONAL ASSESSMENT: PAIN_FUNCTIONAL_ASSESSMENT: ACTIVITIES ARE NOT PREVENTED

## 2024-05-20 ASSESSMENT — PAIN DESCRIPTION - PAIN TYPE
TYPE: SURGICAL PAIN
TYPE: SURGICAL PAIN

## 2024-05-20 NOTE — ANESTHESIA POSTPROCEDURE EVALUATION
Department of Anesthesiology  Postprocedure Note    Patient: Brendan Saleh  MRN: [id number]  YOB: 1975  Date of evaluation: 5/20/2024    Procedure Summary       Date: 05/19/24 Room / Location: [facility] MAIN OR  / [facility] MAIN OR    Anesthesia Start: 1747 Anesthesia Stop: 1958    Procedure: BOWEL RESECTION, OPEN RIGHT COLECTOMY, POSSIBLE OSTOMY (Right) Diagnosis:       Small bowel obstruction (HCC)      (Small bowel obstruction (HCC) [K56.609])    Providers: Delroy Wright MD Responsible Provider: Delroy Hernandez MD    Anesthesia Type: general ASA Status: 4 - Emergent            Anesthesia Type: No value filed.    Adriana Phase I: Adriana Score: 9    Adriana Phase II:      Anesthesia Post Evaluation    Patient location during evaluation: PACU  Patient participation: complete - patient participated  Level of consciousness: awake  Airway patency: patent  Nausea & Vomiting: no nausea and no vomiting  Cardiovascular status: tachycardic  Respiratory status: acceptable, nonlabored ventilation and spontaneous ventilation  Hydration status: hypovolemic  Comments: BP (!) 104/92   Pulse (!) 133   Temp 97.5 °F (36.4 °C) (Oral)   Resp 18   Ht 1.905 m (6' 3\")   Wt 71.5 kg (157 lb 11.2 oz)   SpO2 98%   BMI 19.71 kg/m²     Multimodal analgesia pain management approach  Pain management: adequate and satisfactory to patient    No notable events documented.

## 2024-05-21 ENCOUNTER — APPOINTMENT (OUTPATIENT)
Dept: CT IMAGING | Age: 49
End: 2024-05-21
Payer: COMMERCIAL

## 2024-05-21 LAB
ANION GAP SERPL CALC-SCNC: 6 MMOL/L (ref 9–18)
BASOPHILS # BLD: 0 K/UL (ref 0–0.2)
BASOPHILS NFR BLD: 0 % (ref 0–2)
BUN SERPL-MCNC: 16 MG/DL (ref 6–23)
CALCIUM SERPL-MCNC: 7.5 MG/DL (ref 8.8–10.2)
CHLORIDE SERPL-SCNC: 109 MMOL/L (ref 98–107)
CO2 SERPL-SCNC: 26 MMOL/L (ref 20–28)
CREAT SERPL-MCNC: 0.82 MG/DL (ref 0.8–1.3)
DIFFERENTIAL METHOD BLD: ABNORMAL
EOSINOPHIL # BLD: 0 K/UL (ref 0–0.8)
EOSINOPHIL NFR BLD: 0 % (ref 0.5–7.8)
ERYTHROCYTE [DISTWIDTH] IN BLOOD BY AUTOMATED COUNT: 20 % (ref 11.9–14.6)
GLUCOSE BLD STRIP.AUTO-MCNC: 136 MG/DL (ref 65–100)
GLUCOSE BLD STRIP.AUTO-MCNC: 141 MG/DL (ref 65–100)
GLUCOSE BLD STRIP.AUTO-MCNC: 84 MG/DL (ref 65–100)
GLUCOSE BLD STRIP.AUTO-MCNC: 92 MG/DL (ref 65–100)
GLUCOSE SERPL-MCNC: 125 MG/DL (ref 70–99)
HCT VFR BLD AUTO: 22.3 % (ref 41.1–50.3)
HCT VFR BLD AUTO: 23.3 % (ref 41.1–50.3)
HGB BLD-MCNC: 6.8 G/DL (ref 13.6–17.2)
HGB BLD-MCNC: 7.3 G/DL (ref 13.6–17.2)
HISTORY CHECK: NORMAL
IMM GRANULOCYTES # BLD AUTO: 0.1 K/UL (ref 0–0.5)
IMM GRANULOCYTES NFR BLD AUTO: 1 % (ref 0–5)
LYMPHOCYTES # BLD: 0.9 K/UL (ref 0.5–4.6)
LYMPHOCYTES NFR BLD: 9 % (ref 13–44)
MCH RBC QN AUTO: 28.6 PG (ref 26.1–32.9)
MCHC RBC AUTO-ENTMCNC: 30.5 G/DL (ref 31.4–35)
MCV RBC AUTO: 93.7 FL (ref 82–102)
MONOCYTES # BLD: 0.6 K/UL (ref 0.1–1.3)
MONOCYTES NFR BLD: 6 % (ref 4–12)
NEUTS SEG # BLD: 8.5 K/UL (ref 1.7–8.2)
NEUTS SEG NFR BLD: 85 % (ref 43–78)
NRBC # BLD: 0 K/UL (ref 0–0.2)
PLATELET # BLD AUTO: 245 K/UL (ref 150–450)
PMV BLD AUTO: 9.4 FL (ref 9.4–12.3)
POTASSIUM SERPL-SCNC: 3.6 MMOL/L (ref 3.5–5.1)
RBC # BLD AUTO: 2.38 M/UL (ref 4.23–5.6)
SERVICE CMNT-IMP: ABNORMAL
SERVICE CMNT-IMP: ABNORMAL
SERVICE CMNT-IMP: NORMAL
SERVICE CMNT-IMP: NORMAL
SODIUM SERPL-SCNC: 142 MMOL/L (ref 136–145)
WBC # BLD AUTO: 10 K/UL (ref 4.3–11.1)

## 2024-05-21 PROCEDURE — 85025 COMPLETE CBC W/AUTO DIFF WBC: CPT

## 2024-05-21 PROCEDURE — 51702 INSERT TEMP BLADDER CATH: CPT

## 2024-05-21 PROCEDURE — 30233N1 TRANSFUSION OF NONAUTOLOGOUS RED BLOOD CELLS INTO PERIPHERAL VEIN, PERCUTANEOUS APPROACH: ICD-10-PCS | Performed by: INTERNAL MEDICINE

## 2024-05-21 PROCEDURE — 80048 BASIC METABOLIC PNL TOTAL CA: CPT

## 2024-05-21 PROCEDURE — 71260 CT THORAX DX C+: CPT

## 2024-05-21 PROCEDURE — 6360000002 HC RX W HCPCS: Performed by: HOSPITALIST

## 2024-05-21 PROCEDURE — 6360000002 HC RX W HCPCS: Performed by: SURGERY

## 2024-05-21 PROCEDURE — 6360000002 HC RX W HCPCS: Performed by: FAMILY MEDICINE

## 2024-05-21 PROCEDURE — 85014 HEMATOCRIT: CPT

## 2024-05-21 PROCEDURE — 2580000003 HC RX 258: Performed by: PHYSICIAN ASSISTANT

## 2024-05-21 PROCEDURE — 86923 COMPATIBILITY TEST ELECTRIC: CPT

## 2024-05-21 PROCEDURE — 86901 BLOOD TYPING SEROLOGIC RH(D): CPT

## 2024-05-21 PROCEDURE — 36430 TRANSFUSION BLD/BLD COMPNT: CPT

## 2024-05-21 PROCEDURE — 2580000003 HC RX 258: Performed by: SURGERY

## 2024-05-21 PROCEDURE — 36415 COLL VENOUS BLD VENIPUNCTURE: CPT

## 2024-05-21 PROCEDURE — 6360000004 HC RX CONTRAST MEDICATION: Performed by: FAMILY MEDICINE

## 2024-05-21 PROCEDURE — 82962 GLUCOSE BLOOD TEST: CPT

## 2024-05-21 PROCEDURE — 85018 HEMOGLOBIN: CPT

## 2024-05-21 PROCEDURE — P9016 RBC LEUKOCYTES REDUCED: HCPCS

## 2024-05-21 PROCEDURE — 1100000000 HC RM PRIVATE

## 2024-05-21 PROCEDURE — 86850 RBC ANTIBODY SCREEN: CPT

## 2024-05-21 PROCEDURE — 86900 BLOOD TYPING SEROLOGIC ABO: CPT

## 2024-05-21 RX ORDER — SODIUM CHLORIDE 9 MG/ML
INJECTION, SOLUTION INTRAVENOUS PRN
Status: DISCONTINUED | OUTPATIENT
Start: 2024-05-21 | End: 2024-06-29

## 2024-05-21 RX ORDER — IOPAMIDOL 755 MG/ML
80 INJECTION, SOLUTION INTRAVASCULAR
Status: COMPLETED | OUTPATIENT
Start: 2024-05-21 | End: 2024-05-21

## 2024-05-21 RX ADMIN — CIPROFLOXACIN 400 MG: 2 INJECTION, SOLUTION INTRAVENOUS at 10:16

## 2024-05-21 RX ADMIN — LEVETIRACETAM 500 MG: 100 INJECTION, SOLUTION INTRAVENOUS at 10:06

## 2024-05-21 RX ADMIN — HYDROMORPHONE HYDROCHLORIDE 0.5 MG: 1 INJECTION, SOLUTION INTRAMUSCULAR; INTRAVENOUS; SUBCUTANEOUS at 03:16

## 2024-05-21 RX ADMIN — SODIUM CHLORIDE, PRESERVATIVE FREE 10 ML: 5 INJECTION INTRAVENOUS at 21:26

## 2024-05-21 RX ADMIN — SODIUM CHLORIDE, POTASSIUM CHLORIDE, SODIUM LACTATE AND CALCIUM CHLORIDE: 600; 310; 30; 20 INJECTION, SOLUTION INTRAVENOUS at 21:31

## 2024-05-21 RX ADMIN — SODIUM CHLORIDE, PRESERVATIVE FREE 10 ML: 5 INJECTION INTRAVENOUS at 10:10

## 2024-05-21 RX ADMIN — IOPAMIDOL 80 ML: 755 INJECTION, SOLUTION INTRAVENOUS at 08:36

## 2024-05-21 RX ADMIN — CIPROFLOXACIN 400 MG: 2 INJECTION, SOLUTION INTRAVENOUS at 23:21

## 2024-05-21 RX ADMIN — SODIUM HYPOCHLORITE: 2.5 SOLUTION TOPICAL at 22:24

## 2024-05-21 RX ADMIN — LEVETIRACETAM 500 MG: 100 INJECTION, SOLUTION INTRAVENOUS at 21:24

## 2024-05-21 RX ADMIN — SODIUM HYPOCHLORITE: 2.5 SOLUTION TOPICAL at 10:10

## 2024-05-21 RX ADMIN — ENOXAPARIN SODIUM 40 MG: 100 INJECTION SUBCUTANEOUS at 10:05

## 2024-05-21 ASSESSMENT — PAIN SCALES - GENERAL
PAINLEVEL_OUTOF10: 0
PAINLEVEL_OUTOF10: 9
PAINLEVEL_OUTOF10: 0
PAINLEVEL_OUTOF10: 0

## 2024-05-21 ASSESSMENT — PAIN DESCRIPTION - LOCATION: LOCATION: ABDOMEN

## 2024-05-21 ASSESSMENT — PAIN DESCRIPTION - DESCRIPTORS: DESCRIPTORS: ACHING

## 2024-05-21 ASSESSMENT — PAIN DESCRIPTION - ORIENTATION: ORIENTATION: LOWER

## 2024-05-21 ASSESSMENT — PAIN - FUNCTIONAL ASSESSMENT: PAIN_FUNCTIONAL_ASSESSMENT: ACTIVITIES ARE NOT PREVENTED

## 2024-05-22 DIAGNOSIS — C18.9 ADENOCARCINOMA OF COLON (HCC): ICD-10-CM

## 2024-05-22 DIAGNOSIS — K56.609 COLON OBSTRUCTION (HCC): Primary | ICD-10-CM

## 2024-05-22 LAB
ABO + RH BLD: NORMAL
ABO + RH BLD: NORMAL
ANION GAP SERPL CALC-SCNC: 6 MMOL/L (ref 9–18)
BASOPHILS # BLD: 0 K/UL (ref 0–0.2)
BASOPHILS NFR BLD: 0 % (ref 0–2)
BLD PROD TYP BPU: NORMAL
BLOOD BANK BLOOD PRODUCT EXPIRATION DATE: NORMAL
BLOOD BANK DISPENSE STATUS: NORMAL
BLOOD BANK ISBT PRODUCT BLOOD TYPE: 6200
BLOOD BANK PRODUCT CODE: NORMAL
BLOOD BANK UNIT TYPE AND RH: NORMAL
BLOOD GROUP ANTIBODIES SERPL: NORMAL
BLOOD GROUP ANTIBODIES SERPL: NORMAL
BPU ID: NORMAL
BUN SERPL-MCNC: 10 MG/DL (ref 6–23)
CALCIUM SERPL-MCNC: 7.5 MG/DL (ref 8.8–10.2)
CHLORIDE SERPL-SCNC: 110 MMOL/L (ref 98–107)
CO2 SERPL-SCNC: 28 MMOL/L (ref 20–28)
CREAT SERPL-MCNC: 0.56 MG/DL (ref 0.8–1.3)
CROSSMATCH RESULT: NORMAL
DIFFERENTIAL METHOD BLD: ABNORMAL
EOSINOPHIL # BLD: 0 K/UL (ref 0–0.8)
EOSINOPHIL NFR BLD: 0 % (ref 0.5–7.8)
ERYTHROCYTE [DISTWIDTH] IN BLOOD BY AUTOMATED COUNT: 19 % (ref 11.9–14.6)
GLUCOSE BLD STRIP.AUTO-MCNC: 75 MG/DL (ref 65–100)
GLUCOSE BLD STRIP.AUTO-MCNC: 80 MG/DL (ref 65–100)
GLUCOSE BLD STRIP.AUTO-MCNC: 91 MG/DL (ref 65–100)
GLUCOSE BLD STRIP.AUTO-MCNC: 98 MG/DL (ref 65–100)
GLUCOSE SERPL-MCNC: 85 MG/DL (ref 70–99)
HCT VFR BLD AUTO: 24.6 % (ref 41.1–50.3)
HCT VFR BLD AUTO: 24.6 % (ref 41.1–50.3)
HCT VFR BLD AUTO: 27.2 % (ref 41.1–50.3)
HGB BLD-MCNC: 7.6 G/DL (ref 13.6–17.2)
HGB BLD-MCNC: 7.7 G/DL (ref 13.6–17.2)
HGB BLD-MCNC: 8.5 G/DL (ref 13.6–17.2)
IMM GRANULOCYTES # BLD AUTO: 0.1 K/UL (ref 0–0.5)
IMM GRANULOCYTES NFR BLD AUTO: 1 % (ref 0–5)
LYMPHOCYTES # BLD: 0.6 K/UL (ref 0.5–4.6)
LYMPHOCYTES NFR BLD: 9 % (ref 13–44)
MAGNESIUM SERPL-MCNC: 2 MG/DL (ref 1.8–2.4)
MCH RBC QN AUTO: 29.5 PG (ref 26.1–32.9)
MCHC RBC AUTO-ENTMCNC: 31.3 G/DL (ref 31.4–35)
MCV RBC AUTO: 94.3 FL (ref 82–102)
MONOCYTES # BLD: 0.5 K/UL (ref 0.1–1.3)
MONOCYTES NFR BLD: 6 % (ref 4–12)
NEUTS SEG # BLD: 5.9 K/UL (ref 1.7–8.2)
NEUTS SEG NFR BLD: 84 % (ref 43–78)
NRBC # BLD: 0 K/UL (ref 0–0.2)
PLATELET # BLD AUTO: 214 K/UL (ref 150–450)
PMV BLD AUTO: 9.6 FL (ref 9.4–12.3)
POTASSIUM SERPL-SCNC: 3 MMOL/L (ref 3.5–5.1)
RBC # BLD AUTO: 2.61 M/UL (ref 4.23–5.6)
SERVICE CMNT-IMP: NORMAL
SODIUM SERPL-SCNC: 143 MMOL/L (ref 136–145)
SPECIMEN EXP DATE BLD: NORMAL
SPECIMEN EXP DATE BLD: NORMAL
UNIT DIVISION: 0
UNIT ISSUE DATE/TIME: NORMAL
WBC # BLD AUTO: 7 K/UL (ref 4.3–11.1)

## 2024-05-22 PROCEDURE — 97530 THERAPEUTIC ACTIVITIES: CPT

## 2024-05-22 PROCEDURE — 86850 RBC ANTIBODY SCREEN: CPT

## 2024-05-22 PROCEDURE — 2580000003 HC RX 258: Performed by: PHYSICIAN ASSISTANT

## 2024-05-22 PROCEDURE — APPSS30 APP SPLIT SHARED TIME 16-30 MINUTES: Performed by: NURSE PRACTITIONER

## 2024-05-22 PROCEDURE — 99232 SBSQ HOSP IP/OBS MODERATE 35: CPT | Performed by: INTERNAL MEDICINE

## 2024-05-22 PROCEDURE — 86900 BLOOD TYPING SEROLOGIC ABO: CPT

## 2024-05-22 PROCEDURE — 76937 US GUIDE VASCULAR ACCESS: CPT

## 2024-05-22 PROCEDURE — 1100000003 HC PRIVATE W/ TELEMETRY

## 2024-05-22 PROCEDURE — 92507 TX SP LANG VOICE COMM INDIV: CPT

## 2024-05-22 PROCEDURE — 86901 BLOOD TYPING SEROLOGIC RH(D): CPT

## 2024-05-22 PROCEDURE — A4216 STERILE WATER/SALINE, 10 ML: HCPCS | Performed by: PHYSICIAN ASSISTANT

## 2024-05-22 PROCEDURE — 97535 SELF CARE MNGMENT TRAINING: CPT

## 2024-05-22 PROCEDURE — 6360000002 HC RX W HCPCS: Performed by: HOSPITALIST

## 2024-05-22 PROCEDURE — 51702 INSERT TEMP BLADDER CATH: CPT

## 2024-05-22 PROCEDURE — 2580000003 HC RX 258: Performed by: SURGERY

## 2024-05-22 PROCEDURE — C9113 INJ PANTOPRAZOLE SODIUM, VIA: HCPCS | Performed by: PHYSICIAN ASSISTANT

## 2024-05-22 PROCEDURE — 36415 COLL VENOUS BLD VENIPUNCTURE: CPT

## 2024-05-22 PROCEDURE — 85018 HEMOGLOBIN: CPT

## 2024-05-22 PROCEDURE — 85025 COMPLETE CBC W/AUTO DIFF WBC: CPT

## 2024-05-22 PROCEDURE — 6360000002 HC RX W HCPCS: Performed by: FAMILY MEDICINE

## 2024-05-22 PROCEDURE — 85014 HEMATOCRIT: CPT

## 2024-05-22 PROCEDURE — 6360000002 HC RX W HCPCS: Performed by: SURGERY

## 2024-05-22 PROCEDURE — 83735 ASSAY OF MAGNESIUM: CPT

## 2024-05-22 PROCEDURE — 82962 GLUCOSE BLOOD TEST: CPT

## 2024-05-22 PROCEDURE — 6360000002 HC RX W HCPCS: Performed by: PHYSICIAN ASSISTANT

## 2024-05-22 PROCEDURE — 80048 BASIC METABOLIC PNL TOTAL CA: CPT

## 2024-05-22 RX ORDER — CIPROFLOXACIN 500 MG/1
750 TABLET, FILM COATED ORAL EVERY 12 HOURS SCHEDULED
Status: DISCONTINUED | OUTPATIENT
Start: 2024-05-22 | End: 2024-05-23

## 2024-05-22 RX ORDER — POTASSIUM CHLORIDE 7.45 MG/ML
10 INJECTION INTRAVENOUS ONCE
Status: COMPLETED | OUTPATIENT
Start: 2024-05-22 | End: 2024-05-22

## 2024-05-22 RX ORDER — POTASSIUM CHLORIDE 7.45 MG/ML
10 INJECTION INTRAVENOUS
Status: DISPENSED | OUTPATIENT
Start: 2024-05-22 | End: 2024-05-22

## 2024-05-22 RX ORDER — CIPROFLOXACIN 2 MG/ML
400 INJECTION, SOLUTION INTRAVENOUS ONCE
Status: COMPLETED | OUTPATIENT
Start: 2024-05-22 | End: 2024-05-22

## 2024-05-22 RX ADMIN — POTASSIUM CHLORIDE 10 MEQ: 7.46 INJECTION, SOLUTION INTRAVENOUS at 14:54

## 2024-05-22 RX ADMIN — CIPROFLOXACIN 400 MG: 2 INJECTION, SOLUTION INTRAVENOUS at 10:46

## 2024-05-22 RX ADMIN — CIPROFLOXACIN 400 MG: 2 INJECTION, SOLUTION INTRAVENOUS at 21:48

## 2024-05-22 RX ADMIN — SODIUM HYPOCHLORITE: 2.5 SOLUTION TOPICAL at 21:48

## 2024-05-22 RX ADMIN — SODIUM CHLORIDE: 9 INJECTION, SOLUTION INTRAVENOUS at 21:47

## 2024-05-22 RX ADMIN — POTASSIUM CHLORIDE 10 MEQ: 7.46 INJECTION, SOLUTION INTRAVENOUS at 10:45

## 2024-05-22 RX ADMIN — POTASSIUM CHLORIDE 10 MEQ: 7.46 INJECTION, SOLUTION INTRAVENOUS at 08:41

## 2024-05-22 RX ADMIN — PANTOPRAZOLE SODIUM 40 MG: 40 INJECTION, POWDER, FOR SOLUTION INTRAVENOUS at 14:36

## 2024-05-22 RX ADMIN — POTASSIUM CHLORIDE 10 MEQ: 7.46 INJECTION, SOLUTION INTRAVENOUS at 09:35

## 2024-05-22 RX ADMIN — SODIUM HYPOCHLORITE: 2.5 SOLUTION TOPICAL at 08:21

## 2024-05-22 RX ADMIN — ENOXAPARIN SODIUM 40 MG: 100 INJECTION SUBCUTANEOUS at 08:20

## 2024-05-22 RX ADMIN — SODIUM CHLORIDE, PRESERVATIVE FREE 10 ML: 5 INJECTION INTRAVENOUS at 08:21

## 2024-05-22 RX ADMIN — LEVETIRACETAM 500 MG: 100 INJECTION, SOLUTION INTRAVENOUS at 21:30

## 2024-05-22 RX ADMIN — SODIUM CHLORIDE, PRESERVATIVE FREE 10 ML: 5 INJECTION INTRAVENOUS at 21:44

## 2024-05-22 RX ADMIN — POTASSIUM CHLORIDE 10 MEQ: 7.46 INJECTION, SOLUTION INTRAVENOUS at 13:28

## 2024-05-22 RX ADMIN — LEVETIRACETAM 500 MG: 100 INJECTION, SOLUTION INTRAVENOUS at 08:21

## 2024-05-22 RX ADMIN — POTASSIUM CHLORIDE 10 MEQ: 7.46 INJECTION, SOLUTION INTRAVENOUS at 12:22

## 2024-05-22 ASSESSMENT — PAIN SCALES - GENERAL: PAINLEVEL_OUTOF10: 0

## 2024-05-23 PROBLEM — E44.0 MODERATE PROTEIN-CALORIE MALNUTRITION (HCC): Status: ACTIVE | Noted: 2024-05-23

## 2024-05-23 LAB
ALBUMIN SERPL-MCNC: 1.7 G/DL (ref 3.5–5)
ALBUMIN/GLOB SERPL: 0.5 (ref 1–1.9)
ALP SERPL-CCNC: 46 U/L (ref 40–129)
ALT SERPL-CCNC: 20 U/L (ref 12–65)
ANION GAP SERPL CALC-SCNC: 8 MMOL/L (ref 9–18)
AST SERPL-CCNC: 49 U/L (ref 15–37)
BASOPHILS # BLD: 0 K/UL (ref 0–0.2)
BASOPHILS NFR BLD: 0 % (ref 0–2)
BILIRUB DIRECT SERPL-MCNC: <0.2 MG/DL (ref 0–0.4)
BILIRUB SERPL-MCNC: 0.2 MG/DL (ref 0–1.2)
BUN SERPL-MCNC: 7 MG/DL (ref 6–23)
CALCIUM SERPL-MCNC: 7.3 MG/DL (ref 8.8–10.2)
CHLORIDE SERPL-SCNC: 109 MMOL/L (ref 98–107)
CO2 SERPL-SCNC: 25 MMOL/L (ref 20–28)
CREAT SERPL-MCNC: 0.47 MG/DL (ref 0.8–1.3)
DIFFERENTIAL METHOD BLD: ABNORMAL
EOSINOPHIL # BLD: 0 K/UL (ref 0–0.8)
EOSINOPHIL NFR BLD: 0 % (ref 0.5–7.8)
ERYTHROCYTE [DISTWIDTH] IN BLOOD BY AUTOMATED COUNT: 19.1 % (ref 11.9–14.6)
GLOBULIN SER CALC-MCNC: 3.5 G/DL (ref 2.3–3.5)
GLUCOSE BLD STRIP.AUTO-MCNC: 138 MG/DL (ref 65–100)
GLUCOSE BLD STRIP.AUTO-MCNC: 80 MG/DL (ref 65–100)
GLUCOSE BLD STRIP.AUTO-MCNC: 80 MG/DL (ref 65–100)
GLUCOSE BLD STRIP.AUTO-MCNC: 86 MG/DL (ref 65–100)
GLUCOSE SERPL-MCNC: 75 MG/DL (ref 70–99)
HCT VFR BLD AUTO: 23.6 % (ref 41.1–50.3)
HCT VFR BLD AUTO: 25.2 % (ref 41.1–50.3)
HCT VFR BLD AUTO: 26.7 % (ref 41.1–50.3)
HCT VFR BLD AUTO: 27.2 % (ref 41.1–50.3)
HGB BLD-MCNC: 7.4 G/DL (ref 13.6–17.2)
HGB BLD-MCNC: 7.6 G/DL (ref 13.6–17.2)
HGB BLD-MCNC: 7.8 G/DL (ref 13.6–17.2)
HGB BLD-MCNC: 8.3 G/DL (ref 13.6–17.2)
IMM GRANULOCYTES # BLD AUTO: 0 K/UL (ref 0–0.5)
IMM GRANULOCYTES NFR BLD AUTO: 1 % (ref 0–5)
LYMPHOCYTES # BLD: 0.9 K/UL (ref 0.5–4.6)
LYMPHOCYTES NFR BLD: 17 % (ref 13–44)
MAGNESIUM SERPL-MCNC: 1.8 MG/DL (ref 1.8–2.4)
MCH RBC QN AUTO: 29.6 PG (ref 26.1–32.9)
MCHC RBC AUTO-ENTMCNC: 31.4 G/DL (ref 31.4–35)
MCV RBC AUTO: 94.4 FL (ref 82–102)
MONOCYTES # BLD: 0.3 K/UL (ref 0.1–1.3)
MONOCYTES NFR BLD: 6 % (ref 4–12)
NEUTS SEG # BLD: 4.1 K/UL (ref 1.7–8.2)
NEUTS SEG NFR BLD: 76 % (ref 43–78)
NRBC # BLD: 0 K/UL (ref 0–0.2)
PLATELET # BLD AUTO: 205 K/UL (ref 150–450)
PMV BLD AUTO: 9.4 FL (ref 9.4–12.3)
POTASSIUM SERPL-SCNC: 2.9 MMOL/L (ref 3.5–5.1)
POTASSIUM SERPL-SCNC: 4 MMOL/L (ref 3.5–5.1)
PROT SERPL-MCNC: 5.1 G/DL (ref 6.3–8.2)
RBC # BLD AUTO: 2.5 M/UL (ref 4.23–5.6)
SERVICE CMNT-IMP: ABNORMAL
SERVICE CMNT-IMP: NORMAL
SODIUM SERPL-SCNC: 142 MMOL/L (ref 136–145)
WBC # BLD AUTO: 5.4 K/UL (ref 4.3–11.1)

## 2024-05-23 PROCEDURE — 6370000000 HC RX 637 (ALT 250 FOR IP): Performed by: STUDENT IN AN ORGANIZED HEALTH CARE EDUCATION/TRAINING PROGRAM

## 2024-05-23 PROCEDURE — 85025 COMPLETE CBC W/AUTO DIFF WBC: CPT

## 2024-05-23 PROCEDURE — 99254 IP/OBS CNSLTJ NEW/EST MOD 60: CPT | Performed by: STUDENT IN AN ORGANIZED HEALTH CARE EDUCATION/TRAINING PROGRAM

## 2024-05-23 PROCEDURE — 82962 GLUCOSE BLOOD TEST: CPT

## 2024-05-23 PROCEDURE — 2580000003 HC RX 258: Performed by: PHYSICIAN ASSISTANT

## 2024-05-23 PROCEDURE — 6360000002 HC RX W HCPCS: Performed by: INTERNAL MEDICINE

## 2024-05-23 PROCEDURE — 84132 ASSAY OF SERUM POTASSIUM: CPT

## 2024-05-23 PROCEDURE — 85014 HEMATOCRIT: CPT

## 2024-05-23 PROCEDURE — 6360000002 HC RX W HCPCS: Performed by: STUDENT IN AN ORGANIZED HEALTH CARE EDUCATION/TRAINING PROGRAM

## 2024-05-23 PROCEDURE — 6360000002 HC RX W HCPCS: Performed by: FAMILY MEDICINE

## 2024-05-23 PROCEDURE — A4216 STERILE WATER/SALINE, 10 ML: HCPCS | Performed by: FAMILY MEDICINE

## 2024-05-23 PROCEDURE — 80076 HEPATIC FUNCTION PANEL: CPT

## 2024-05-23 PROCEDURE — 51702 INSERT TEMP BLADDER CATH: CPT

## 2024-05-23 PROCEDURE — C9113 INJ PANTOPRAZOLE SODIUM, VIA: HCPCS | Performed by: FAMILY MEDICINE

## 2024-05-23 PROCEDURE — 2580000003 HC RX 258: Performed by: SURGERY

## 2024-05-23 PROCEDURE — 2580000003 HC RX 258: Performed by: FAMILY MEDICINE

## 2024-05-23 PROCEDURE — 1100000003 HC PRIVATE W/ TELEMETRY

## 2024-05-23 PROCEDURE — 83735 ASSAY OF MAGNESIUM: CPT

## 2024-05-23 PROCEDURE — 6370000000 HC RX 637 (ALT 250 FOR IP): Performed by: SURGERY

## 2024-05-23 PROCEDURE — 80048 BASIC METABOLIC PNL TOTAL CA: CPT

## 2024-05-23 PROCEDURE — 85018 HEMOGLOBIN: CPT

## 2024-05-23 PROCEDURE — 36415 COLL VENOUS BLD VENIPUNCTURE: CPT

## 2024-05-23 RX ORDER — CIPROFLOXACIN 500 MG/1
750 TABLET, FILM COATED ORAL EVERY 12 HOURS SCHEDULED
Status: DISCONTINUED | OUTPATIENT
Start: 2024-05-23 | End: 2024-06-02

## 2024-05-23 RX ORDER — CIPROFLOXACIN 2 MG/ML
400 INJECTION, SOLUTION INTRAVENOUS ONCE
Status: COMPLETED | OUTPATIENT
Start: 2024-05-23 | End: 2024-05-23

## 2024-05-23 RX ORDER — POTASSIUM CHLORIDE 7.45 MG/ML
10 INJECTION INTRAVENOUS
Status: COMPLETED | OUTPATIENT
Start: 2024-05-23 | End: 2024-05-23

## 2024-05-23 RX ADMIN — SODIUM CHLORIDE, POTASSIUM CHLORIDE, SODIUM LACTATE AND CALCIUM CHLORIDE: 600; 310; 30; 20 INJECTION, SOLUTION INTRAVENOUS at 01:38

## 2024-05-23 RX ADMIN — SODIUM CHLORIDE, PRESERVATIVE FREE 10 ML: 5 INJECTION INTRAVENOUS at 21:30

## 2024-05-23 RX ADMIN — PANTOPRAZOLE SODIUM 40 MG: 40 INJECTION, POWDER, FOR SOLUTION INTRAVENOUS at 17:05

## 2024-05-23 RX ADMIN — POTASSIUM CHLORIDE 10 MEQ: 7.46 INJECTION, SOLUTION INTRAVENOUS at 06:04

## 2024-05-23 RX ADMIN — POTASSIUM CHLORIDE 10 MEQ: 7.46 INJECTION, SOLUTION INTRAVENOUS at 04:04

## 2024-05-23 RX ADMIN — SODIUM CHLORIDE, PRESERVATIVE FREE 10 ML: 5 INJECTION INTRAVENOUS at 10:01

## 2024-05-23 RX ADMIN — SODIUM HYPOCHLORITE: 2.5 SOLUTION TOPICAL at 21:33

## 2024-05-23 RX ADMIN — ACETAMINOPHEN 650 MG: 325 TABLET ORAL at 17:05

## 2024-05-23 RX ADMIN — SODIUM CHLORIDE, POTASSIUM CHLORIDE, SODIUM LACTATE AND CALCIUM CHLORIDE: 600; 310; 30; 20 INJECTION, SOLUTION INTRAVENOUS at 10:00

## 2024-05-23 RX ADMIN — SODIUM HYPOCHLORITE: 2.5 SOLUTION TOPICAL at 09:00

## 2024-05-23 RX ADMIN — LEVETIRACETAM 500 MG: 100 INJECTION, SOLUTION INTRAVENOUS at 10:01

## 2024-05-23 RX ADMIN — PANTOPRAZOLE SODIUM 40 MG: 40 INJECTION, POWDER, FOR SOLUTION INTRAVENOUS at 01:36

## 2024-05-23 RX ADMIN — POTASSIUM CHLORIDE 10 MEQ: 7.46 INJECTION, SOLUTION INTRAVENOUS at 05:01

## 2024-05-23 RX ADMIN — CIPROFLOXACIN 400 MG: 2 INJECTION, SOLUTION INTRAVENOUS at 10:01

## 2024-05-23 RX ADMIN — POTASSIUM CHLORIDE 10 MEQ: 7.46 INJECTION, SOLUTION INTRAVENOUS at 07:27

## 2024-05-23 RX ADMIN — LEVETIRACETAM 500 MG: 100 INJECTION, SOLUTION INTRAVENOUS at 21:26

## 2024-05-23 RX ADMIN — CIPROFLOXACIN 750 MG: 500 TABLET, FILM COATED ORAL at 21:26

## 2024-05-23 RX ADMIN — SODIUM CHLORIDE, POTASSIUM CHLORIDE, SODIUM LACTATE AND CALCIUM CHLORIDE: 600; 310; 30; 20 INJECTION, SOLUTION INTRAVENOUS at 21:35

## 2024-05-23 ASSESSMENT — PAIN SCALES - GENERAL
PAINLEVEL_OUTOF10: 4
PAINLEVEL_OUTOF10: 0

## 2024-05-23 ASSESSMENT — PAIN DESCRIPTION - LOCATION: LOCATION: HEAD

## 2024-05-24 ENCOUNTER — APPOINTMENT (OUTPATIENT)
Dept: GENERAL RADIOLOGY | Age: 49
End: 2024-05-24
Payer: COMMERCIAL

## 2024-05-24 PROBLEM — Z16.12 ESBL (EXTENDED SPECTRUM BETA-LACTAMASE) PRODUCING BACTERIA INFECTION: Status: ACTIVE | Noted: 2024-05-24

## 2024-05-24 PROBLEM — D62 ACUTE BLOOD LOSS AS CAUSE OF POSTOPERATIVE ANEMIA: Status: ACTIVE | Noted: 2024-05-24

## 2024-05-24 PROBLEM — A41.9 SEPTIC SHOCK (HCC): Status: ACTIVE | Noted: 2024-05-24

## 2024-05-24 PROBLEM — L89.154 SACRAL DECUBITUS ULCER, STAGE IV (HCC): Status: ACTIVE | Noted: 2024-05-24

## 2024-05-24 PROBLEM — R65.20 SEVERE SEPSIS WITH ACUTE ORGAN DYSFUNCTION (HCC): Status: ACTIVE | Noted: 2024-05-07

## 2024-05-24 PROBLEM — R65.21 SEPTIC SHOCK (HCC): Status: ACTIVE | Noted: 2024-05-24

## 2024-05-24 PROBLEM — A49.9 ESBL (EXTENDED SPECTRUM BETA-LACTAMASE) PRODUCING BACTERIA INFECTION: Status: ACTIVE | Noted: 2024-05-24

## 2024-05-24 LAB
ALBUMIN SERPL-MCNC: 1.5 G/DL (ref 3.5–5)
ALBUMIN/GLOB SERPL: 0.5 (ref 1–1.9)
ALP SERPL-CCNC: 51 U/L (ref 40–129)
ALT SERPL-CCNC: 26 U/L (ref 12–65)
ANION GAP SERPL CALC-SCNC: 7 MMOL/L (ref 9–18)
AST SERPL-CCNC: 53 U/L (ref 15–37)
BILIRUB SERPL-MCNC: 0.3 MG/DL (ref 0–1.2)
BUN SERPL-MCNC: 5 MG/DL (ref 6–23)
CALCIUM SERPL-MCNC: 7.3 MG/DL (ref 8.8–10.2)
CHLORIDE SERPL-SCNC: 107 MMOL/L (ref 98–107)
CO2 SERPL-SCNC: 25 MMOL/L (ref 20–28)
CREAT SERPL-MCNC: 0.43 MG/DL (ref 0.8–1.3)
CRYPTOCOCCAL ANTIGEN CSF: NEGATIVE
GLOBULIN SER CALC-MCNC: 3.2 G/DL (ref 2.3–3.5)
GLUCOSE BLD STRIP.AUTO-MCNC: 115 MG/DL (ref 65–100)
GLUCOSE BLD STRIP.AUTO-MCNC: 128 MG/DL (ref 65–100)
GLUCOSE BLD STRIP.AUTO-MCNC: 156 MG/DL (ref 65–100)
GLUCOSE BLD STRIP.AUTO-MCNC: 90 MG/DL (ref 65–100)
GLUCOSE SERPL-MCNC: 87 MG/DL (ref 70–99)
HCT VFR BLD AUTO: 23.9 % (ref 41.1–50.3)
HGB BLD-MCNC: 7.5 G/DL (ref 13.6–17.2)
MAGNESIUM SERPL-MCNC: 1.6 MG/DL (ref 1.8–2.4)
POTASSIUM SERPL-SCNC: 2.6 MMOL/L (ref 3.5–5.1)
POTASSIUM SERPL-SCNC: 3.6 MMOL/L (ref 3.5–5.1)
PROT SERPL-MCNC: 4.7 G/DL (ref 6.3–8.2)
SERVICE CMNT-IMP: ABNORMAL
SERVICE CMNT-IMP: NORMAL
SODIUM SERPL-SCNC: 139 MMOL/L (ref 136–145)
SPECIMEN SOURCE: NORMAL

## 2024-05-24 PROCEDURE — 83735 ASSAY OF MAGNESIUM: CPT

## 2024-05-24 PROCEDURE — 2580000003 HC RX 258: Performed by: PHYSICIAN ASSISTANT

## 2024-05-24 PROCEDURE — 80053 COMPREHEN METABOLIC PANEL: CPT

## 2024-05-24 PROCEDURE — C9113 INJ PANTOPRAZOLE SODIUM, VIA: HCPCS | Performed by: FAMILY MEDICINE

## 2024-05-24 PROCEDURE — 2580000003 HC RX 258: Performed by: SURGERY

## 2024-05-24 PROCEDURE — 74018 RADEX ABDOMEN 1 VIEW: CPT

## 2024-05-24 PROCEDURE — 85018 HEMOGLOBIN: CPT

## 2024-05-24 PROCEDURE — 6370000000 HC RX 637 (ALT 250 FOR IP): Performed by: SURGERY

## 2024-05-24 PROCEDURE — A4216 STERILE WATER/SALINE, 10 ML: HCPCS | Performed by: FAMILY MEDICINE

## 2024-05-24 PROCEDURE — 99232 SBSQ HOSP IP/OBS MODERATE 35: CPT | Performed by: STUDENT IN AN ORGANIZED HEALTH CARE EDUCATION/TRAINING PROGRAM

## 2024-05-24 PROCEDURE — 97530 THERAPEUTIC ACTIVITIES: CPT

## 2024-05-24 PROCEDURE — 92526 ORAL FUNCTION THERAPY: CPT

## 2024-05-24 PROCEDURE — 51702 INSERT TEMP BLADDER CATH: CPT

## 2024-05-24 PROCEDURE — 82962 GLUCOSE BLOOD TEST: CPT

## 2024-05-24 PROCEDURE — 36415 COLL VENOUS BLD VENIPUNCTURE: CPT

## 2024-05-24 PROCEDURE — 6370000000 HC RX 637 (ALT 250 FOR IP): Performed by: STUDENT IN AN ORGANIZED HEALTH CARE EDUCATION/TRAINING PROGRAM

## 2024-05-24 PROCEDURE — 84132 ASSAY OF SERUM POTASSIUM: CPT

## 2024-05-24 PROCEDURE — 2500000003 HC RX 250 WO HCPCS: Performed by: SURGERY

## 2024-05-24 PROCEDURE — 85014 HEMATOCRIT: CPT

## 2024-05-24 PROCEDURE — 6360000002 HC RX W HCPCS: Performed by: FAMILY MEDICINE

## 2024-05-24 PROCEDURE — 92507 TX SP LANG VOICE COMM INDIV: CPT

## 2024-05-24 PROCEDURE — 2580000003 HC RX 258: Performed by: FAMILY MEDICINE

## 2024-05-24 PROCEDURE — 2500000003 HC RX 250 WO HCPCS: Performed by: PHYSICIAN ASSISTANT

## 2024-05-24 PROCEDURE — 1100000003 HC PRIVATE W/ TELEMETRY

## 2024-05-24 PROCEDURE — 6370000000 HC RX 637 (ALT 250 FOR IP): Performed by: INTERNAL MEDICINE

## 2024-05-24 RX ORDER — SODIUM CHLORIDE, SODIUM LACTATE, POTASSIUM CHLORIDE, CALCIUM CHLORIDE 600; 310; 30; 20 MG/100ML; MG/100ML; MG/100ML; MG/100ML
INJECTION, SOLUTION INTRAVENOUS CONTINUOUS
Status: DISCONTINUED | OUTPATIENT
Start: 2024-05-24 | End: 2024-05-26

## 2024-05-24 RX ORDER — POTASSIUM CHLORIDE 1500 MG/1
40 TABLET, EXTENDED RELEASE ORAL ONCE
Status: COMPLETED | OUTPATIENT
Start: 2024-05-24 | End: 2024-05-24

## 2024-05-24 RX ORDER — METOPROLOL TARTRATE 1 MG/ML
5 INJECTION, SOLUTION INTRAVENOUS ONCE
Status: COMPLETED | OUTPATIENT
Start: 2024-05-24 | End: 2024-05-24

## 2024-05-24 RX ORDER — POTASSIUM CHLORIDE 1500 MG/1
40 TABLET, EXTENDED RELEASE ORAL PRN
Status: DISCONTINUED | OUTPATIENT
Start: 2024-05-24 | End: 2024-05-30

## 2024-05-24 RX ORDER — POTASSIUM CHLORIDE 7.45 MG/ML
10 INJECTION INTRAVENOUS PRN
Status: DISCONTINUED | OUTPATIENT
Start: 2024-05-24 | End: 2024-05-30

## 2024-05-24 RX ORDER — LANOLIN ALCOHOL/MO/W.PET/CERES
400 CREAM (GRAM) TOPICAL 2 TIMES DAILY
Status: COMPLETED | OUTPATIENT
Start: 2024-05-24 | End: 2024-05-26

## 2024-05-24 RX ORDER — MAGNESIUM SULFATE IN WATER 40 MG/ML
2000 INJECTION, SOLUTION INTRAVENOUS PRN
Status: DISCONTINUED | OUTPATIENT
Start: 2024-05-24 | End: 2024-05-30

## 2024-05-24 RX ADMIN — MAGNESIUM GLUCONATE 500 MG ORAL TABLET 400 MG: 500 TABLET ORAL at 09:57

## 2024-05-24 RX ADMIN — SODIUM CHLORIDE, PRESERVATIVE FREE 10 ML: 5 INJECTION INTRAVENOUS at 09:58

## 2024-05-24 RX ADMIN — POTASSIUM CHLORIDE 40 MEQ: 1500 TABLET, EXTENDED RELEASE ORAL at 06:14

## 2024-05-24 RX ADMIN — POTASSIUM CHLORIDE 40 MEQ: 1500 TABLET, EXTENDED RELEASE ORAL at 09:58

## 2024-05-24 RX ADMIN — CIPROFLOXACIN 750 MG: 500 TABLET, FILM COATED ORAL at 21:45

## 2024-05-24 RX ADMIN — PANTOPRAZOLE SODIUM 40 MG: 40 INJECTION, POWDER, FOR SOLUTION INTRAVENOUS at 14:34

## 2024-05-24 RX ADMIN — SODIUM CHLORIDE, POTASSIUM CHLORIDE, SODIUM LACTATE AND CALCIUM CHLORIDE 1000 ML: 600; 310; 30; 20 INJECTION, SOLUTION INTRAVENOUS at 18:09

## 2024-05-24 RX ADMIN — PANTOPRAZOLE SODIUM 40 MG: 40 INJECTION, POWDER, FOR SOLUTION INTRAVENOUS at 02:30

## 2024-05-24 RX ADMIN — LEVETIRACETAM 500 MG: 100 INJECTION, SOLUTION INTRAVENOUS at 09:56

## 2024-05-24 RX ADMIN — SODIUM CHLORIDE, POTASSIUM CHLORIDE, SODIUM LACTATE AND CALCIUM CHLORIDE 1000 ML: 600; 310; 30; 20 INJECTION, SOLUTION INTRAVENOUS at 21:57

## 2024-05-24 RX ADMIN — HYDROMORPHONE HYDROCHLORIDE 0.5 MG: 1 INJECTION, SOLUTION INTRAMUSCULAR; INTRAVENOUS; SUBCUTANEOUS at 18:01

## 2024-05-24 RX ADMIN — LEVETIRACETAM 500 MG: 100 INJECTION, SOLUTION INTRAVENOUS at 21:45

## 2024-05-24 RX ADMIN — SODIUM CHLORIDE, PRESERVATIVE FREE 5 ML: 5 INJECTION INTRAVENOUS at 22:17

## 2024-05-24 RX ADMIN — METOROPROLOL TARTRATE 5 MG: 5 INJECTION, SOLUTION INTRAVENOUS at 18:04

## 2024-05-24 RX ADMIN — MAGNESIUM GLUCONATE 500 MG ORAL TABLET 400 MG: 500 TABLET ORAL at 21:58

## 2024-05-24 RX ADMIN — ACETAMINOPHEN 650 MG: 325 TABLET ORAL at 21:58

## 2024-05-24 RX ADMIN — ACETAMINOPHEN 650 MG: 325 TABLET ORAL at 14:32

## 2024-05-24 RX ADMIN — CIPROFLOXACIN 750 MG: 500 TABLET, FILM COATED ORAL at 09:55

## 2024-05-24 RX ADMIN — SODIUM CHLORIDE, POTASSIUM CHLORIDE, SODIUM LACTATE AND CALCIUM CHLORIDE: 600; 310; 30; 20 INJECTION, SOLUTION INTRAVENOUS at 06:22

## 2024-05-24 ASSESSMENT — PAIN DESCRIPTION - FREQUENCY: FREQUENCY: INTERMITTENT

## 2024-05-24 ASSESSMENT — PAIN DESCRIPTION - DESCRIPTORS: DESCRIPTORS: DISCOMFORT

## 2024-05-24 ASSESSMENT — PAIN SCALES - GENERAL
PAINLEVEL_OUTOF10: 0
PAINLEVEL_OUTOF10: 0
PAINLEVEL_OUTOF10: 7
PAINLEVEL_OUTOF10: 0
PAINLEVEL_OUTOF10: 0

## 2024-05-24 ASSESSMENT — PAIN DESCRIPTION - LOCATION: LOCATION: BUTTOCKS;ABDOMEN

## 2024-05-24 ASSESSMENT — PAIN DESCRIPTION - ORIENTATION: ORIENTATION: ANTERIOR;POSTERIOR

## 2024-05-24 ASSESSMENT — PAIN DESCRIPTION - ONSET: ONSET: GRADUAL

## 2024-05-24 ASSESSMENT — PAIN - FUNCTIONAL ASSESSMENT: PAIN_FUNCTIONAL_ASSESSMENT: PREVENTS OR INTERFERES SOME ACTIVE ACTIVITIES AND ADLS

## 2024-05-24 ASSESSMENT — PAIN DESCRIPTION - PAIN TYPE: TYPE: SURGICAL PAIN

## 2024-05-25 LAB
ALBUMIN SERPL-MCNC: 1.6 G/DL (ref 3.5–5)
ALBUMIN/GLOB SERPL: 0.4 (ref 1–1.9)
ALP SERPL-CCNC: 59 U/L (ref 40–129)
ALT SERPL-CCNC: 19 U/L (ref 12–65)
ANION GAP SERPL CALC-SCNC: 9 MMOL/L (ref 9–18)
ANTIMICROBIAL SUSCEPTIBILITY: ABNORMAL
AST SERPL-CCNC: 29 U/L (ref 15–37)
BACTERIA ISLT: ABNORMAL
BACTERIA SPEC CULT: ABNORMAL
BILIRUB SERPL-MCNC: 0.4 MG/DL (ref 0–1.2)
BUN SERPL-MCNC: 16 MG/DL (ref 6–23)
CALCIUM SERPL-MCNC: 7.8 MG/DL (ref 8.8–10.2)
CHLORIDE SERPL-SCNC: 104 MMOL/L (ref 98–107)
CO2 SERPL-SCNC: 25 MMOL/L (ref 20–28)
CREAT SERPL-MCNC: 0.76 MG/DL (ref 0.8–1.3)
ERYTHROCYTE [DISTWIDTH] IN BLOOD BY AUTOMATED COUNT: 20.8 % (ref 11.9–14.6)
GLOBULIN SER CALC-MCNC: 3.6 G/DL (ref 2.3–3.5)
GLUCOSE BLD STRIP.AUTO-MCNC: 108 MG/DL (ref 65–100)
GLUCOSE BLD STRIP.AUTO-MCNC: 133 MG/DL (ref 65–100)
GLUCOSE BLD STRIP.AUTO-MCNC: 145 MG/DL (ref 65–100)
GLUCOSE BLD STRIP.AUTO-MCNC: 99 MG/DL (ref 65–100)
GLUCOSE SERPL-MCNC: 133 MG/DL (ref 70–99)
GRAM STN SPEC: ABNORMAL
HCT VFR BLD AUTO: 33.3 % (ref 41.1–50.3)
HGB BLD-MCNC: 10.2 G/DL (ref 13.6–17.2)
MAGNESIUM SERPL-MCNC: 1.7 MG/DL (ref 1.8–2.4)
MCH RBC QN AUTO: 29 PG (ref 26.1–32.9)
MCHC RBC AUTO-ENTMCNC: 30.6 G/DL (ref 31.4–35)
MCV RBC AUTO: 94.6 FL (ref 82–102)
NRBC # BLD: 0 K/UL (ref 0–0.2)
PHOSPHATE SERPL-MCNC: 2.8 MG/DL (ref 2.5–4.5)
PLATELET # BLD AUTO: 261 K/UL (ref 150–450)
PMV BLD AUTO: 9.6 FL (ref 9.4–12.3)
POTASSIUM SERPL-SCNC: 3.6 MMOL/L (ref 3.5–5.1)
PROT SERPL-MCNC: 5.2 G/DL (ref 6.3–8.2)
RBC # BLD AUTO: 3.52 M/UL (ref 4.23–5.6)
SERVICE CMNT-IMP: ABNORMAL
SERVICE CMNT-IMP: NORMAL
SODIUM SERPL-SCNC: 138 MMOL/L (ref 136–145)
SPECIMEN SOURCE: ABNORMAL
WBC # BLD AUTO: 16.7 K/UL (ref 4.3–11.1)

## 2024-05-25 PROCEDURE — 6370000000 HC RX 637 (ALT 250 FOR IP): Performed by: SURGERY

## 2024-05-25 PROCEDURE — 2580000003 HC RX 258: Performed by: PHYSICIAN ASSISTANT

## 2024-05-25 PROCEDURE — 6360000002 HC RX W HCPCS: Performed by: FAMILY MEDICINE

## 2024-05-25 PROCEDURE — C9113 INJ PANTOPRAZOLE SODIUM, VIA: HCPCS | Performed by: FAMILY MEDICINE

## 2024-05-25 PROCEDURE — 6370000000 HC RX 637 (ALT 250 FOR IP): Performed by: INTERNAL MEDICINE

## 2024-05-25 PROCEDURE — 1100000003 HC PRIVATE W/ TELEMETRY

## 2024-05-25 PROCEDURE — A4216 STERILE WATER/SALINE, 10 ML: HCPCS | Performed by: FAMILY MEDICINE

## 2024-05-25 PROCEDURE — 2580000003 HC RX 258: Performed by: FAMILY MEDICINE

## 2024-05-25 PROCEDURE — 2580000003 HC RX 258: Performed by: NURSE PRACTITIONER

## 2024-05-25 PROCEDURE — 85027 COMPLETE CBC AUTOMATED: CPT

## 2024-05-25 PROCEDURE — 51702 INSERT TEMP BLADDER CATH: CPT

## 2024-05-25 PROCEDURE — 82962 GLUCOSE BLOOD TEST: CPT

## 2024-05-25 PROCEDURE — 80053 COMPREHEN METABOLIC PANEL: CPT

## 2024-05-25 PROCEDURE — 83735 ASSAY OF MAGNESIUM: CPT

## 2024-05-25 PROCEDURE — 6370000000 HC RX 637 (ALT 250 FOR IP): Performed by: STUDENT IN AN ORGANIZED HEALTH CARE EDUCATION/TRAINING PROGRAM

## 2024-05-25 PROCEDURE — 2580000003 HC RX 258: Performed by: SURGERY

## 2024-05-25 PROCEDURE — 84100 ASSAY OF PHOSPHORUS: CPT

## 2024-05-25 RX ORDER — METOPROLOL TARTRATE 1 MG/ML
5 INJECTION, SOLUTION INTRAVENOUS EVERY 6 HOURS PRN
Status: DISCONTINUED | OUTPATIENT
Start: 2024-05-25 | End: 2024-06-11

## 2024-05-25 RX ADMIN — LEVETIRACETAM 500 MG: 100 INJECTION, SOLUTION INTRAVENOUS at 08:33

## 2024-05-25 RX ADMIN — CIPROFLOXACIN 750 MG: 500 TABLET, FILM COATED ORAL at 20:35

## 2024-05-25 RX ADMIN — MAGNESIUM GLUCONATE 500 MG ORAL TABLET 400 MG: 500 TABLET ORAL at 20:37

## 2024-05-25 RX ADMIN — SODIUM CHLORIDE, POTASSIUM CHLORIDE, SODIUM LACTATE AND CALCIUM CHLORIDE: 600; 310; 30; 20 INJECTION, SOLUTION INTRAVENOUS at 15:55

## 2024-05-25 RX ADMIN — ACETAMINOPHEN 650 MG: 325 TABLET ORAL at 20:36

## 2024-05-25 RX ADMIN — CIPROFLOXACIN 750 MG: 500 TABLET, FILM COATED ORAL at 08:32

## 2024-05-25 RX ADMIN — MAGNESIUM GLUCONATE 500 MG ORAL TABLET 400 MG: 500 TABLET ORAL at 08:34

## 2024-05-25 RX ADMIN — ACETAMINOPHEN 650 MG: 325 TABLET ORAL at 02:45

## 2024-05-25 RX ADMIN — SODIUM CHLORIDE, PRESERVATIVE FREE 10 ML: 5 INJECTION INTRAVENOUS at 08:34

## 2024-05-25 RX ADMIN — PANTOPRAZOLE SODIUM 40 MG: 40 INJECTION, POWDER, FOR SOLUTION INTRAVENOUS at 14:31

## 2024-05-25 RX ADMIN — LEVETIRACETAM 500 MG: 100 INJECTION, SOLUTION INTRAVENOUS at 20:37

## 2024-05-25 RX ADMIN — ACETAMINOPHEN 650 MG: 325 TABLET ORAL at 08:32

## 2024-05-25 RX ADMIN — SODIUM CHLORIDE, POTASSIUM CHLORIDE, SODIUM LACTATE AND CALCIUM CHLORIDE 1000 ML: 600; 310; 30; 20 INJECTION, SOLUTION INTRAVENOUS at 06:08

## 2024-05-25 RX ADMIN — PANTOPRAZOLE SODIUM 40 MG: 40 INJECTION, POWDER, FOR SOLUTION INTRAVENOUS at 02:44

## 2024-05-25 RX ADMIN — SODIUM CHLORIDE, PRESERVATIVE FREE 10 ML: 5 INJECTION INTRAVENOUS at 20:37

## 2024-05-25 ASSESSMENT — PAIN SCALES - GENERAL
PAINLEVEL_OUTOF10: 0
PAINLEVEL_OUTOF10: 0

## 2024-05-26 ENCOUNTER — APPOINTMENT (OUTPATIENT)
Dept: GENERAL RADIOLOGY | Age: 49
End: 2024-05-26
Payer: COMMERCIAL

## 2024-05-26 PROBLEM — G93.40 ACUTE ENCEPHALOPATHY: Status: RESOLVED | Noted: 2024-05-06 | Resolved: 2024-05-26

## 2024-05-26 PROBLEM — E87.0 HYPERNATREMIA: Status: RESOLVED | Noted: 2024-05-07 | Resolved: 2024-05-26

## 2024-05-26 PROBLEM — R65.20 SEVERE SEPSIS WITH ACUTE ORGAN DYSFUNCTION (HCC): Status: RESOLVED | Noted: 2024-05-07 | Resolved: 2024-05-26

## 2024-05-26 PROBLEM — N39.0 UTI (URINARY TRACT INFECTION): Status: RESOLVED | Noted: 2024-05-07 | Resolved: 2024-05-26

## 2024-05-26 PROBLEM — R65.21 SEPTIC SHOCK (HCC): Status: RESOLVED | Noted: 2024-05-24 | Resolved: 2024-05-26

## 2024-05-26 PROBLEM — A41.9 SEPTIC SHOCK (HCC): Status: RESOLVED | Noted: 2024-05-24 | Resolved: 2024-05-26

## 2024-05-26 PROBLEM — A41.9 SEVERE SEPSIS WITH ACUTE ORGAN DYSFUNCTION (HCC): Status: RESOLVED | Noted: 2024-05-07 | Resolved: 2024-05-26

## 2024-05-26 PROBLEM — N17.9 AKI (ACUTE KIDNEY INJURY) (HCC): Status: RESOLVED | Noted: 2024-05-07 | Resolved: 2024-05-26

## 2024-05-26 LAB
GLUCOSE BLD STRIP.AUTO-MCNC: 116 MG/DL (ref 65–100)
GLUCOSE BLD STRIP.AUTO-MCNC: 86 MG/DL (ref 65–100)
GLUCOSE BLD STRIP.AUTO-MCNC: 94 MG/DL (ref 65–100)
GLUCOSE BLD STRIP.AUTO-MCNC: 97 MG/DL (ref 65–100)
HCT VFR BLD AUTO: 28.2 % (ref 41.1–50.3)
HGB BLD-MCNC: 9 G/DL (ref 13.6–17.2)
SERVICE CMNT-IMP: ABNORMAL
SERVICE CMNT-IMP: NORMAL

## 2024-05-26 PROCEDURE — 85014 HEMATOCRIT: CPT

## 2024-05-26 PROCEDURE — 2580000003 HC RX 258: Performed by: NURSE PRACTITIONER

## 2024-05-26 PROCEDURE — C9113 INJ PANTOPRAZOLE SODIUM, VIA: HCPCS | Performed by: FAMILY MEDICINE

## 2024-05-26 PROCEDURE — 6370000000 HC RX 637 (ALT 250 FOR IP): Performed by: STUDENT IN AN ORGANIZED HEALTH CARE EDUCATION/TRAINING PROGRAM

## 2024-05-26 PROCEDURE — 2580000003 HC RX 258: Performed by: PHYSICIAN ASSISTANT

## 2024-05-26 PROCEDURE — 1100000003 HC PRIVATE W/ TELEMETRY

## 2024-05-26 PROCEDURE — 6370000000 HC RX 637 (ALT 250 FOR IP): Performed by: SURGERY

## 2024-05-26 PROCEDURE — A4216 STERILE WATER/SALINE, 10 ML: HCPCS | Performed by: FAMILY MEDICINE

## 2024-05-26 PROCEDURE — 2580000003 HC RX 258: Performed by: FAMILY MEDICINE

## 2024-05-26 PROCEDURE — 36415 COLL VENOUS BLD VENIPUNCTURE: CPT

## 2024-05-26 PROCEDURE — 85018 HEMOGLOBIN: CPT

## 2024-05-26 PROCEDURE — 6360000002 HC RX W HCPCS: Performed by: PHYSICIAN ASSISTANT

## 2024-05-26 PROCEDURE — 2580000003 HC RX 258: Performed by: SURGERY

## 2024-05-26 PROCEDURE — 74018 RADEX ABDOMEN 1 VIEW: CPT

## 2024-05-26 PROCEDURE — 6360000002 HC RX W HCPCS: Performed by: INTERNAL MEDICINE

## 2024-05-26 PROCEDURE — 82962 GLUCOSE BLOOD TEST: CPT

## 2024-05-26 PROCEDURE — 51702 INSERT TEMP BLADDER CATH: CPT

## 2024-05-26 PROCEDURE — 6370000000 HC RX 637 (ALT 250 FOR IP): Performed by: INTERNAL MEDICINE

## 2024-05-26 PROCEDURE — A4216 STERILE WATER/SALINE, 10 ML: HCPCS | Performed by: PHYSICIAN ASSISTANT

## 2024-05-26 PROCEDURE — 6360000002 HC RX W HCPCS: Performed by: FAMILY MEDICINE

## 2024-05-26 RX ORDER — DEXTROSE MONOHYDRATE AND SODIUM CHLORIDE 5; .45 G/100ML; G/100ML
INJECTION, SOLUTION INTRAVENOUS CONTINUOUS
Status: DISCONTINUED | OUTPATIENT
Start: 2024-05-26 | End: 2024-05-29

## 2024-05-26 RX ORDER — HALOPERIDOL 5 MG/ML
1 INJECTION INTRAMUSCULAR EVERY 6 HOURS PRN
Status: DISCONTINUED | OUTPATIENT
Start: 2024-05-26 | End: 2024-07-23

## 2024-05-26 RX ADMIN — LEVETIRACETAM 500 MG: 100 INJECTION, SOLUTION INTRAVENOUS at 09:05

## 2024-05-26 RX ADMIN — ACETAMINOPHEN 650 MG: 325 TABLET ORAL at 21:26

## 2024-05-26 RX ADMIN — SODIUM CHLORIDE 0.5 MG: 9 INJECTION INTRAMUSCULAR; INTRAVENOUS; SUBCUTANEOUS at 17:11

## 2024-05-26 RX ADMIN — HALOPERIDOL LACTATE 1 MG: 5 INJECTION, SOLUTION INTRAMUSCULAR at 23:50

## 2024-05-26 RX ADMIN — ACETAMINOPHEN 650 MG: 325 TABLET ORAL at 02:32

## 2024-05-26 RX ADMIN — ACETAMINOPHEN 650 MG: 325 TABLET ORAL at 15:00

## 2024-05-26 RX ADMIN — SODIUM CHLORIDE, POTASSIUM CHLORIDE, SODIUM LACTATE AND CALCIUM CHLORIDE: 600; 310; 30; 20 INJECTION, SOLUTION INTRAVENOUS at 01:10

## 2024-05-26 RX ADMIN — ACETAMINOPHEN 650 MG: 325 TABLET ORAL at 08:59

## 2024-05-26 RX ADMIN — DEXTROSE AND SODIUM CHLORIDE: 5; 450 INJECTION, SOLUTION INTRAVENOUS at 12:52

## 2024-05-26 RX ADMIN — MAGNESIUM GLUCONATE 500 MG ORAL TABLET 400 MG: 500 TABLET ORAL at 21:26

## 2024-05-26 RX ADMIN — MAGNESIUM GLUCONATE 500 MG ORAL TABLET 400 MG: 500 TABLET ORAL at 09:01

## 2024-05-26 RX ADMIN — SODIUM CHLORIDE, PRESERVATIVE FREE 10 ML: 5 INJECTION INTRAVENOUS at 21:22

## 2024-05-26 RX ADMIN — LEVETIRACETAM 500 MG: 100 INJECTION, SOLUTION INTRAVENOUS at 21:21

## 2024-05-26 RX ADMIN — CIPROFLOXACIN 750 MG: 500 TABLET, FILM COATED ORAL at 09:00

## 2024-05-26 RX ADMIN — PANTOPRAZOLE SODIUM 40 MG: 40 INJECTION, POWDER, FOR SOLUTION INTRAVENOUS at 15:01

## 2024-05-26 RX ADMIN — PANTOPRAZOLE SODIUM 40 MG: 40 INJECTION, POWDER, FOR SOLUTION INTRAVENOUS at 02:32

## 2024-05-26 RX ADMIN — SODIUM CHLORIDE, PRESERVATIVE FREE 10 ML: 5 INJECTION INTRAVENOUS at 09:02

## 2024-05-26 RX ADMIN — CIPROFLOXACIN 750 MG: 500 TABLET, FILM COATED ORAL at 21:26

## 2024-05-26 ASSESSMENT — PAIN SCALES - GENERAL
PAINLEVEL_OUTOF10: 0
PAINLEVEL_OUTOF10: 0

## 2024-05-27 ENCOUNTER — APPOINTMENT (OUTPATIENT)
Dept: GENERAL RADIOLOGY | Age: 49
End: 2024-05-27
Payer: COMMERCIAL

## 2024-05-27 LAB
ALBUMIN SERPL-MCNC: 1.3 G/DL (ref 3.5–5)
ALBUMIN/GLOB SERPL: 0.4 (ref 1–1.9)
ALP SERPL-CCNC: 68 U/L (ref 40–129)
ALT SERPL-CCNC: 12 U/L (ref 12–65)
ANION GAP SERPL CALC-SCNC: 10 MMOL/L (ref 9–18)
AST SERPL-CCNC: 27 U/L (ref 15–37)
BILIRUB SERPL-MCNC: 0.3 MG/DL (ref 0–1.2)
BUN SERPL-MCNC: 8 MG/DL (ref 6–23)
CALCIUM SERPL-MCNC: 7.3 MG/DL (ref 8.8–10.2)
CHLORIDE SERPL-SCNC: 106 MMOL/L (ref 98–107)
CO2 SERPL-SCNC: 23 MMOL/L (ref 20–28)
CREAT SERPL-MCNC: 0.53 MG/DL (ref 0.8–1.3)
ERYTHROCYTE [DISTWIDTH] IN BLOOD BY AUTOMATED COUNT: 20 % (ref 11.9–14.6)
GLOBULIN SER CALC-MCNC: 3.3 G/DL (ref 2.3–3.5)
GLUCOSE BLD STRIP.AUTO-MCNC: 110 MG/DL (ref 65–100)
GLUCOSE BLD STRIP.AUTO-MCNC: 131 MG/DL (ref 65–100)
GLUCOSE BLD STRIP.AUTO-MCNC: 58 MG/DL (ref 65–100)
GLUCOSE BLD STRIP.AUTO-MCNC: 84 MG/DL (ref 65–100)
GLUCOSE BLD STRIP.AUTO-MCNC: 84 MG/DL (ref 65–100)
GLUCOSE SERPL-MCNC: 76 MG/DL (ref 70–99)
HCT VFR BLD AUTO: 30.2 % (ref 41.1–50.3)
HGB BLD-MCNC: 9.2 G/DL (ref 13.6–17.2)
MCH RBC QN AUTO: 29.4 PG (ref 26.1–32.9)
MCHC RBC AUTO-ENTMCNC: 30.5 G/DL (ref 31.4–35)
MCV RBC AUTO: 96.5 FL (ref 82–102)
NRBC # BLD: 0.02 K/UL (ref 0–0.2)
PLATELET # BLD AUTO: 261 K/UL (ref 150–450)
PMV BLD AUTO: 10.1 FL (ref 9.4–12.3)
POTASSIUM SERPL-SCNC: 3.8 MMOL/L (ref 3.5–5.1)
PROT SERPL-MCNC: 4.6 G/DL (ref 6.3–8.2)
RBC # BLD AUTO: 3.13 M/UL (ref 4.23–5.6)
SERVICE CMNT-IMP: ABNORMAL
SERVICE CMNT-IMP: NORMAL
SERVICE CMNT-IMP: NORMAL
SODIUM SERPL-SCNC: 138 MMOL/L (ref 136–145)
WBC # BLD AUTO: 13.8 K/UL (ref 4.3–11.1)

## 2024-05-27 PROCEDURE — 6370000000 HC RX 637 (ALT 250 FOR IP): Performed by: SURGERY

## 2024-05-27 PROCEDURE — 6370000000 HC RX 637 (ALT 250 FOR IP): Performed by: STUDENT IN AN ORGANIZED HEALTH CARE EDUCATION/TRAINING PROGRAM

## 2024-05-27 PROCEDURE — A4216 STERILE WATER/SALINE, 10 ML: HCPCS | Performed by: FAMILY MEDICINE

## 2024-05-27 PROCEDURE — 85027 COMPLETE CBC AUTOMATED: CPT

## 2024-05-27 PROCEDURE — 2580000003 HC RX 258: Performed by: SURGERY

## 2024-05-27 PROCEDURE — 2580000003 HC RX 258: Performed by: INTERNAL MEDICINE

## 2024-05-27 PROCEDURE — 6360000002 HC RX W HCPCS: Performed by: FAMILY MEDICINE

## 2024-05-27 PROCEDURE — 82962 GLUCOSE BLOOD TEST: CPT

## 2024-05-27 PROCEDURE — 80053 COMPREHEN METABOLIC PANEL: CPT

## 2024-05-27 PROCEDURE — A4216 STERILE WATER/SALINE, 10 ML: HCPCS | Performed by: INTERNAL MEDICINE

## 2024-05-27 PROCEDURE — 6360000002 HC RX W HCPCS: Performed by: INTERNAL MEDICINE

## 2024-05-27 PROCEDURE — 36415 COLL VENOUS BLD VENIPUNCTURE: CPT

## 2024-05-27 PROCEDURE — 74018 RADEX ABDOMEN 1 VIEW: CPT

## 2024-05-27 PROCEDURE — C9113 INJ PANTOPRAZOLE SODIUM, VIA: HCPCS | Performed by: FAMILY MEDICINE

## 2024-05-27 PROCEDURE — 2580000003 HC RX 258: Performed by: PHYSICIAN ASSISTANT

## 2024-05-27 PROCEDURE — 2580000003 HC RX 258: Performed by: FAMILY MEDICINE

## 2024-05-27 PROCEDURE — 51702 INSERT TEMP BLADDER CATH: CPT

## 2024-05-27 PROCEDURE — 1100000003 HC PRIVATE W/ TELEMETRY

## 2024-05-27 RX ORDER — IBUPROFEN 600 MG/1
1 TABLET ORAL PRN
Status: DISCONTINUED | OUTPATIENT
Start: 2024-05-27 | End: 2024-10-18 | Stop reason: HOSPADM

## 2024-05-27 RX ORDER — DEXTROSE MONOHYDRATE 100 MG/ML
INJECTION, SOLUTION INTRAVENOUS CONTINUOUS PRN
Status: DISCONTINUED | OUTPATIENT
Start: 2024-05-27 | End: 2024-10-18 | Stop reason: HOSPADM

## 2024-05-27 RX ADMIN — ACETAMINOPHEN 650 MG: 325 TABLET ORAL at 16:00

## 2024-05-27 RX ADMIN — SODIUM CHLORIDE, PRESERVATIVE FREE 10 ML: 5 INJECTION INTRAVENOUS at 09:00

## 2024-05-27 RX ADMIN — PANTOPRAZOLE SODIUM 40 MG: 40 INJECTION, POWDER, FOR SOLUTION INTRAVENOUS at 13:34

## 2024-05-27 RX ADMIN — CIPROFLOXACIN 750 MG: 500 TABLET, FILM COATED ORAL at 09:00

## 2024-05-27 RX ADMIN — Medication: at 21:22

## 2024-05-27 RX ADMIN — SODIUM CHLORIDE 1 MG: 9 INJECTION INTRAMUSCULAR; INTRAVENOUS; SUBCUTANEOUS at 04:06

## 2024-05-27 RX ADMIN — LEVETIRACETAM 500 MG: 100 INJECTION, SOLUTION INTRAVENOUS at 09:00

## 2024-05-27 RX ADMIN — ACETAMINOPHEN 650 MG: 325 TABLET ORAL at 21:18

## 2024-05-27 RX ADMIN — DEXTROSE AND SODIUM CHLORIDE 1000 ML: 5; 450 INJECTION, SOLUTION INTRAVENOUS at 00:01

## 2024-05-27 RX ADMIN — PANTOPRAZOLE SODIUM 40 MG: 40 INJECTION, POWDER, FOR SOLUTION INTRAVENOUS at 02:24

## 2024-05-27 RX ADMIN — SODIUM CHLORIDE, PRESERVATIVE FREE 10 ML: 5 INJECTION INTRAVENOUS at 21:22

## 2024-05-27 RX ADMIN — DEXTROSE AND SODIUM CHLORIDE: 5; 450 INJECTION, SOLUTION INTRAVENOUS at 21:25

## 2024-05-27 RX ADMIN — CIPROFLOXACIN 750 MG: 500 TABLET, FILM COATED ORAL at 21:17

## 2024-05-27 RX ADMIN — LEVETIRACETAM 500 MG: 100 INJECTION, SOLUTION INTRAVENOUS at 21:15

## 2024-05-27 RX ADMIN — ACETAMINOPHEN 650 MG: 325 TABLET ORAL at 08:00

## 2024-05-27 ASSESSMENT — PAIN SCALES - GENERAL
PAINLEVEL_OUTOF10: 0
PAINLEVEL_OUTOF10: 0

## 2024-05-28 ENCOUNTER — APPOINTMENT (OUTPATIENT)
Dept: GENERAL RADIOLOGY | Age: 49
End: 2024-05-28
Payer: COMMERCIAL

## 2024-05-28 LAB
GLUCOSE BLD STRIP.AUTO-MCNC: 102 MG/DL (ref 65–100)
GLUCOSE BLD STRIP.AUTO-MCNC: 108 MG/DL (ref 65–100)
GLUCOSE BLD STRIP.AUTO-MCNC: 117 MG/DL (ref 65–100)
GLUCOSE BLD STRIP.AUTO-MCNC: 131 MG/DL (ref 65–100)
GLUCOSE BLD STRIP.AUTO-MCNC: 92 MG/DL (ref 65–100)
SERVICE CMNT-IMP: ABNORMAL
SERVICE CMNT-IMP: NORMAL

## 2024-05-28 PROCEDURE — 2580000003 HC RX 258: Performed by: FAMILY MEDICINE

## 2024-05-28 PROCEDURE — 05HY33Z INSERTION OF INFUSION DEVICE INTO UPPER VEIN, PERCUTANEOUS APPROACH: ICD-10-PCS | Performed by: INTERNAL MEDICINE

## 2024-05-28 PROCEDURE — 6360000002 HC RX W HCPCS: Performed by: FAMILY MEDICINE

## 2024-05-28 PROCEDURE — 74018 RADEX ABDOMEN 1 VIEW: CPT

## 2024-05-28 PROCEDURE — A4216 STERILE WATER/SALINE, 10 ML: HCPCS | Performed by: FAMILY MEDICINE

## 2024-05-28 PROCEDURE — 2580000003 HC RX 258: Performed by: STUDENT IN AN ORGANIZED HEALTH CARE EDUCATION/TRAINING PROGRAM

## 2024-05-28 PROCEDURE — C1751 CATH, INF, PER/CENT/MIDLINE: HCPCS

## 2024-05-28 PROCEDURE — 1100000003 HC PRIVATE W/ TELEMETRY

## 2024-05-28 PROCEDURE — 6360000002 HC RX W HCPCS: Performed by: INTERNAL MEDICINE

## 2024-05-28 PROCEDURE — 6370000000 HC RX 637 (ALT 250 FOR IP): Performed by: SURGERY

## 2024-05-28 PROCEDURE — 92526 ORAL FUNCTION THERAPY: CPT

## 2024-05-28 PROCEDURE — 82962 GLUCOSE BLOOD TEST: CPT

## 2024-05-28 PROCEDURE — C9113 INJ PANTOPRAZOLE SODIUM, VIA: HCPCS | Performed by: FAMILY MEDICINE

## 2024-05-28 PROCEDURE — 36410 VNPNXR 3YR/> PHY/QHP DX/THER: CPT

## 2024-05-28 PROCEDURE — 6370000000 HC RX 637 (ALT 250 FOR IP): Performed by: STUDENT IN AN ORGANIZED HEALTH CARE EDUCATION/TRAINING PROGRAM

## 2024-05-28 PROCEDURE — 2580000003 HC RX 258: Performed by: SURGERY

## 2024-05-28 PROCEDURE — 92507 TX SP LANG VOICE COMM INDIV: CPT

## 2024-05-28 RX ORDER — SODIUM HYPOCHLORITE 1.25 MG/ML
SOLUTION TOPICAL DAILY
Status: DISPENSED | OUTPATIENT
Start: 2024-05-28 | End: 2024-06-04

## 2024-05-28 RX ORDER — SODIUM CHLORIDE 0.9 % (FLUSH) 0.9 %
5-40 SYRINGE (ML) INJECTION EVERY 12 HOURS SCHEDULED
Status: DISCONTINUED | OUTPATIENT
Start: 2024-05-28 | End: 2024-08-06

## 2024-05-28 RX ORDER — SODIUM CHLORIDE 0.9 % (FLUSH) 0.9 %
5-40 SYRINGE (ML) INJECTION PRN
Status: DISCONTINUED | OUTPATIENT
Start: 2024-05-28 | End: 2024-10-18 | Stop reason: HOSPADM

## 2024-05-28 RX ORDER — SODIUM CHLORIDE 9 MG/ML
INJECTION, SOLUTION INTRAVENOUS PRN
Status: DISCONTINUED | OUTPATIENT
Start: 2024-05-28 | End: 2024-10-18 | Stop reason: HOSPADM

## 2024-05-28 RX ORDER — SODIUM HYPOCHLORITE 1.25 MG/ML
SOLUTION TOPICAL DAILY
Status: DISCONTINUED | OUTPATIENT
Start: 2024-05-28 | End: 2024-05-28

## 2024-05-28 RX ADMIN — ACETAMINOPHEN 650 MG: 325 TABLET ORAL at 09:36

## 2024-05-28 RX ADMIN — LEVETIRACETAM 500 MG: 100 INJECTION, SOLUTION INTRAVENOUS at 21:35

## 2024-05-28 RX ADMIN — CIPROFLOXACIN 750 MG: 500 TABLET, FILM COATED ORAL at 09:36

## 2024-05-28 RX ADMIN — ACETAMINOPHEN 650 MG: 325 TABLET ORAL at 21:35

## 2024-05-28 RX ADMIN — CIPROFLOXACIN 750 MG: 500 TABLET, FILM COATED ORAL at 21:35

## 2024-05-28 RX ADMIN — SODIUM CHLORIDE, PRESERVATIVE FREE 5 ML: 5 INJECTION INTRAVENOUS at 08:00

## 2024-05-28 RX ADMIN — HALOPERIDOL LACTATE 1 MG: 5 INJECTION, SOLUTION INTRAMUSCULAR at 02:04

## 2024-05-28 RX ADMIN — SODIUM CHLORIDE, PRESERVATIVE FREE 10 ML: 5 INJECTION INTRAVENOUS at 21:36

## 2024-05-28 RX ADMIN — SODIUM CHLORIDE, PRESERVATIVE FREE 10 ML: 5 INJECTION INTRAVENOUS at 11:02

## 2024-05-28 RX ADMIN — LEVETIRACETAM 500 MG: 100 INJECTION, SOLUTION INTRAVENOUS at 09:35

## 2024-05-28 RX ADMIN — PANTOPRAZOLE SODIUM 40 MG: 40 INJECTION, POWDER, FOR SOLUTION INTRAVENOUS at 01:57

## 2024-05-28 RX ADMIN — PANTOPRAZOLE SODIUM 40 MG: 40 INJECTION, POWDER, FOR SOLUTION INTRAVENOUS at 16:00

## 2024-05-28 ASSESSMENT — PAIN SCALES - GENERAL: PAINLEVEL_OUTOF10: 0

## 2024-05-29 ENCOUNTER — APPOINTMENT (OUTPATIENT)
Dept: GENERAL RADIOLOGY | Age: 49
End: 2024-05-29
Payer: COMMERCIAL

## 2024-05-29 LAB
ANION GAP SERPL CALC-SCNC: 8 MMOL/L (ref 9–18)
BUN SERPL-MCNC: 3 MG/DL (ref 6–23)
CALCIUM SERPL-MCNC: 7.2 MG/DL (ref 8.8–10.2)
CHLORIDE SERPL-SCNC: 102 MMOL/L (ref 98–107)
CO2 SERPL-SCNC: 25 MMOL/L (ref 20–28)
CREAT SERPL-MCNC: 0.38 MG/DL (ref 0.8–1.3)
ERYTHROCYTE [DISTWIDTH] IN BLOOD BY AUTOMATED COUNT: 19.5 % (ref 11.9–14.6)
GLUCOSE BLD STRIP.AUTO-MCNC: 102 MG/DL (ref 65–100)
GLUCOSE BLD STRIP.AUTO-MCNC: 114 MG/DL (ref 65–100)
GLUCOSE BLD STRIP.AUTO-MCNC: 121 MG/DL (ref 65–100)
GLUCOSE BLD STRIP.AUTO-MCNC: 98 MG/DL (ref 65–100)
GLUCOSE SERPL-MCNC: 103 MG/DL (ref 70–99)
HCT VFR BLD AUTO: 25.4 % (ref 41.1–50.3)
HGB BLD-MCNC: 7.8 G/DL (ref 13.6–17.2)
MCH RBC QN AUTO: 28.9 PG (ref 26.1–32.9)
MCHC RBC AUTO-ENTMCNC: 30.7 G/DL (ref 31.4–35)
MCV RBC AUTO: 94.1 FL (ref 82–102)
NRBC # BLD: 0 K/UL (ref 0–0.2)
PLATELET # BLD AUTO: 260 K/UL (ref 150–450)
PMV BLD AUTO: 9.4 FL (ref 9.4–12.3)
POTASSIUM SERPL-SCNC: 2.8 MMOL/L (ref 3.5–5.1)
RBC # BLD AUTO: 2.7 M/UL (ref 4.23–5.6)
SERVICE CMNT-IMP: ABNORMAL
SERVICE CMNT-IMP: NORMAL
SODIUM SERPL-SCNC: 136 MMOL/L (ref 136–145)
WBC # BLD AUTO: 11.5 K/UL (ref 4.3–11.1)

## 2024-05-29 PROCEDURE — 1100000003 HC PRIVATE W/ TELEMETRY

## 2024-05-29 PROCEDURE — C9113 INJ PANTOPRAZOLE SODIUM, VIA: HCPCS | Performed by: FAMILY MEDICINE

## 2024-05-29 PROCEDURE — 6370000000 HC RX 637 (ALT 250 FOR IP): Performed by: SURGERY

## 2024-05-29 PROCEDURE — 74018 RADEX ABDOMEN 1 VIEW: CPT

## 2024-05-29 PROCEDURE — A4216 STERILE WATER/SALINE, 10 ML: HCPCS | Performed by: FAMILY MEDICINE

## 2024-05-29 PROCEDURE — 36415 COLL VENOUS BLD VENIPUNCTURE: CPT

## 2024-05-29 PROCEDURE — 80048 BASIC METABOLIC PNL TOTAL CA: CPT

## 2024-05-29 PROCEDURE — 6370000000 HC RX 637 (ALT 250 FOR IP): Performed by: STUDENT IN AN ORGANIZED HEALTH CARE EDUCATION/TRAINING PROGRAM

## 2024-05-29 PROCEDURE — 6360000002 HC RX W HCPCS: Performed by: FAMILY MEDICINE

## 2024-05-29 PROCEDURE — 82962 GLUCOSE BLOOD TEST: CPT

## 2024-05-29 PROCEDURE — 6360000002 HC RX W HCPCS: Performed by: STUDENT IN AN ORGANIZED HEALTH CARE EDUCATION/TRAINING PROGRAM

## 2024-05-29 PROCEDURE — 92526 ORAL FUNCTION THERAPY: CPT

## 2024-05-29 PROCEDURE — 97530 THERAPEUTIC ACTIVITIES: CPT

## 2024-05-29 PROCEDURE — 2580000003 HC RX 258: Performed by: STUDENT IN AN ORGANIZED HEALTH CARE EDUCATION/TRAINING PROGRAM

## 2024-05-29 PROCEDURE — 2580000003 HC RX 258: Performed by: FAMILY MEDICINE

## 2024-05-29 PROCEDURE — 2500000003 HC RX 250 WO HCPCS: Performed by: NURSE PRACTITIONER

## 2024-05-29 PROCEDURE — 85027 COMPLETE CBC AUTOMATED: CPT

## 2024-05-29 PROCEDURE — 92507 TX SP LANG VOICE COMM INDIV: CPT

## 2024-05-29 PROCEDURE — 97112 NEUROMUSCULAR REEDUCATION: CPT

## 2024-05-29 RX ORDER — DEXTROSE MONOHYDRATE, SODIUM CHLORIDE, AND POTASSIUM CHLORIDE 50; 1.49; 4.5 G/1000ML; G/1000ML; G/1000ML
INJECTION, SOLUTION INTRAVENOUS CONTINUOUS
Status: DISCONTINUED | OUTPATIENT
Start: 2024-05-29 | End: 2024-06-02

## 2024-05-29 RX ADMIN — POTASSIUM CHLORIDE 10 MEQ: 7.46 INJECTION, SOLUTION INTRAVENOUS at 13:18

## 2024-05-29 RX ADMIN — ACETAMINOPHEN 650 MG: 325 TABLET ORAL at 20:54

## 2024-05-29 RX ADMIN — LEVETIRACETAM 500 MG: 100 INJECTION, SOLUTION INTRAVENOUS at 09:38

## 2024-05-29 RX ADMIN — SODIUM CHLORIDE, PRESERVATIVE FREE 10 ML: 5 INJECTION INTRAVENOUS at 09:00

## 2024-05-29 RX ADMIN — SODIUM CHLORIDE, PRESERVATIVE FREE 10 ML: 5 INJECTION INTRAVENOUS at 20:55

## 2024-05-29 RX ADMIN — POTASSIUM CHLORIDE 10 MEQ: 7.46 INJECTION, SOLUTION INTRAVENOUS at 11:00

## 2024-05-29 RX ADMIN — POTASSIUM CHLORIDE 10 MEQ: 7.46 INJECTION, SOLUTION INTRAVENOUS at 10:05

## 2024-05-29 RX ADMIN — SODIUM CHLORIDE: 9 INJECTION, SOLUTION INTRAVENOUS at 06:28

## 2024-05-29 RX ADMIN — LEVETIRACETAM 500 MG: 100 INJECTION, SOLUTION INTRAVENOUS at 20:55

## 2024-05-29 RX ADMIN — PANTOPRAZOLE SODIUM 40 MG: 40 INJECTION, POWDER, FOR SOLUTION INTRAVENOUS at 01:59

## 2024-05-29 RX ADMIN — PANTOPRAZOLE SODIUM 40 MG: 40 INJECTION, POWDER, FOR SOLUTION INTRAVENOUS at 13:18

## 2024-05-29 RX ADMIN — CIPROFLOXACIN 750 MG: 500 TABLET, FILM COATED ORAL at 20:55

## 2024-05-29 RX ADMIN — ACETAMINOPHEN 650 MG: 325 TABLET ORAL at 14:00

## 2024-05-29 RX ADMIN — POTASSIUM CHLORIDE 10 MEQ: 7.46 INJECTION, SOLUTION INTRAVENOUS at 06:29

## 2024-05-29 RX ADMIN — ACETAMINOPHEN 650 MG: 325 TABLET ORAL at 09:37

## 2024-05-29 RX ADMIN — CIPROFLOXACIN 750 MG: 500 TABLET, FILM COATED ORAL at 09:37

## 2024-05-29 RX ADMIN — ACETAMINOPHEN 650 MG: 325 TABLET ORAL at 01:59

## 2024-05-29 RX ADMIN — DEXTROSE MONOHYDRATE, SODIUM CHLORIDE, AND POTASSIUM CHLORIDE: 50; 4.5; 1.49 INJECTION, SOLUTION INTRAVENOUS at 18:32

## 2024-05-29 ASSESSMENT — PAIN SCALES - GENERAL
PAINLEVEL_OUTOF10: 0

## 2024-05-30 ENCOUNTER — APPOINTMENT (OUTPATIENT)
Dept: GENERAL RADIOLOGY | Age: 49
End: 2024-05-30
Payer: COMMERCIAL

## 2024-05-30 LAB
GLUCOSE BLD STRIP.AUTO-MCNC: 112 MG/DL (ref 65–100)
GLUCOSE BLD STRIP.AUTO-MCNC: 89 MG/DL (ref 65–100)
GLUCOSE BLD STRIP.AUTO-MCNC: 90 MG/DL (ref 65–100)
GLUCOSE BLD STRIP.AUTO-MCNC: 92 MG/DL (ref 65–100)
MAGNESIUM SERPL-MCNC: 1.6 MG/DL (ref 1.8–2.4)
POTASSIUM SERPL-SCNC: 3.1 MMOL/L (ref 3.5–5.1)
SERVICE CMNT-IMP: ABNORMAL
SERVICE CMNT-IMP: NORMAL

## 2024-05-30 PROCEDURE — 6360000004 HC RX CONTRAST MEDICATION: Performed by: NURSE PRACTITIONER

## 2024-05-30 PROCEDURE — 6370000000 HC RX 637 (ALT 250 FOR IP): Performed by: STUDENT IN AN ORGANIZED HEALTH CARE EDUCATION/TRAINING PROGRAM

## 2024-05-30 PROCEDURE — 1100000003 HC PRIVATE W/ TELEMETRY

## 2024-05-30 PROCEDURE — 2500000003 HC RX 250 WO HCPCS: Performed by: NURSE PRACTITIONER

## 2024-05-30 PROCEDURE — 84132 ASSAY OF SERUM POTASSIUM: CPT

## 2024-05-30 PROCEDURE — 6370000000 HC RX 637 (ALT 250 FOR IP): Performed by: NURSE PRACTITIONER

## 2024-05-30 PROCEDURE — A4216 STERILE WATER/SALINE, 10 ML: HCPCS | Performed by: FAMILY MEDICINE

## 2024-05-30 PROCEDURE — 83735 ASSAY OF MAGNESIUM: CPT

## 2024-05-30 PROCEDURE — 6360000002 HC RX W HCPCS: Performed by: FAMILY MEDICINE

## 2024-05-30 PROCEDURE — 6360000002 HC RX W HCPCS: Performed by: NURSE PRACTITIONER

## 2024-05-30 PROCEDURE — 74250 X-RAY XM SM INT 1CNTRST STD: CPT

## 2024-05-30 PROCEDURE — 92507 TX SP LANG VOICE COMM INDIV: CPT

## 2024-05-30 PROCEDURE — 6370000000 HC RX 637 (ALT 250 FOR IP): Performed by: SURGERY

## 2024-05-30 PROCEDURE — 82962 GLUCOSE BLOOD TEST: CPT

## 2024-05-30 PROCEDURE — C9113 INJ PANTOPRAZOLE SODIUM, VIA: HCPCS | Performed by: FAMILY MEDICINE

## 2024-05-30 PROCEDURE — 2580000003 HC RX 258: Performed by: FAMILY MEDICINE

## 2024-05-30 PROCEDURE — 36415 COLL VENOUS BLD VENIPUNCTURE: CPT

## 2024-05-30 PROCEDURE — 2580000003 HC RX 258: Performed by: STUDENT IN AN ORGANIZED HEALTH CARE EDUCATION/TRAINING PROGRAM

## 2024-05-30 RX ORDER — METOCLOPRAMIDE HYDROCHLORIDE 5 MG/ML
10 INJECTION INTRAMUSCULAR; INTRAVENOUS EVERY 6 HOURS
Status: DISCONTINUED | OUTPATIENT
Start: 2024-05-30 | End: 2024-05-30

## 2024-05-30 RX ORDER — POTASSIUM CHLORIDE 7.45 MG/ML
10 INJECTION INTRAVENOUS
Status: DISPENSED | OUTPATIENT
Start: 2024-05-30 | End: 2024-05-30

## 2024-05-30 RX ORDER — MAGNESIUM SULFATE IN WATER 40 MG/ML
2000 INJECTION, SOLUTION INTRAVENOUS ONCE
Status: COMPLETED | OUTPATIENT
Start: 2024-05-30 | End: 2024-05-30

## 2024-05-30 RX ORDER — POTASSIUM CHLORIDE 7.45 MG/ML
10 INJECTION INTRAVENOUS
Status: COMPLETED | OUTPATIENT
Start: 2024-05-30 | End: 2024-05-30

## 2024-05-30 RX ORDER — DIATRIZOATE MEGLUMINE AND DIATRIZOATE SODIUM 660; 100 MG/ML; MG/ML
180 SOLUTION ORAL; RECTAL
Status: DISCONTINUED | OUTPATIENT
Start: 2024-05-30 | End: 2024-06-11

## 2024-05-30 RX ADMIN — LEVETIRACETAM 500 MG: 100 INJECTION, SOLUTION INTRAVENOUS at 10:12

## 2024-05-30 RX ADMIN — SODIUM CHLORIDE, PRESERVATIVE FREE 5 ML: 5 INJECTION INTRAVENOUS at 12:20

## 2024-05-30 RX ADMIN — DIATRIZOATE MEGLUMINE AND DIATRIZOATE SODIUM 180 ML: 660; 100 LIQUID ORAL; RECTAL at 10:20

## 2024-05-30 RX ADMIN — MAGNESIUM SULFATE HEPTAHYDRATE 2000 MG: 40 INJECTION, SOLUTION INTRAVENOUS at 10:11

## 2024-05-30 RX ADMIN — ACETAMINOPHEN 650 MG: 325 TABLET ORAL at 01:03

## 2024-05-30 RX ADMIN — POTASSIUM CHLORIDE 10 MEQ: 7.46 INJECTION, SOLUTION INTRAVENOUS at 12:39

## 2024-05-30 RX ADMIN — PANTOPRAZOLE SODIUM 40 MG: 40 INJECTION, POWDER, FOR SOLUTION INTRAVENOUS at 01:03

## 2024-05-30 RX ADMIN — ACETAMINOPHEN 650 MG: 325 TABLET ORAL at 13:56

## 2024-05-30 RX ADMIN — LEVETIRACETAM 500 MG: 100 INJECTION, SOLUTION INTRAVENOUS at 21:23

## 2024-05-30 RX ADMIN — CIPROFLOXACIN 750 MG: 500 TABLET, FILM COATED ORAL at 10:13

## 2024-05-30 RX ADMIN — PANTOPRAZOLE SODIUM 40 MG: 40 INJECTION, POWDER, FOR SOLUTION INTRAVENOUS at 13:57

## 2024-05-30 RX ADMIN — POTASSIUM CHLORIDE 10 MEQ: 7.46 INJECTION, SOLUTION INTRAVENOUS at 14:01

## 2024-05-30 RX ADMIN — POTASSIUM CHLORIDE 10 MEQ: 7.46 INJECTION, SOLUTION INTRAVENOUS at 10:12

## 2024-05-30 RX ADMIN — SODIUM CHLORIDE, PRESERVATIVE FREE 5 ML: 5 INJECTION INTRAVENOUS at 21:24

## 2024-05-30 RX ADMIN — ACETAMINOPHEN 650 MG: 325 TABLET ORAL at 21:23

## 2024-05-30 RX ADMIN — ACETAMINOPHEN 650 MG: 325 TABLET ORAL at 10:13

## 2024-05-30 RX ADMIN — DAKIN'S SOLUTION 0.125% (QUARTER STRENGTH): 0.12 SOLUTION at 05:02

## 2024-05-30 RX ADMIN — DEXTROSE MONOHYDRATE, SODIUM CHLORIDE, AND POTASSIUM CHLORIDE: 50; 4.5; 1.49 INJECTION, SOLUTION INTRAVENOUS at 10:06

## 2024-05-30 RX ADMIN — POTASSIUM CHLORIDE 10 MEQ: 7.46 INJECTION, SOLUTION INTRAVENOUS at 11:14

## 2024-05-30 RX ADMIN — CIPROFLOXACIN 750 MG: 500 TABLET, FILM COATED ORAL at 21:23

## 2024-05-31 LAB
ANION GAP SERPL CALC-SCNC: 9 MMOL/L (ref 9–18)
BUN SERPL-MCNC: 3 MG/DL (ref 6–23)
CALCIUM SERPL-MCNC: 7.3 MG/DL (ref 8.8–10.2)
CHLORIDE SERPL-SCNC: 102 MMOL/L (ref 98–107)
CO2 SERPL-SCNC: 24 MMOL/L (ref 20–28)
CREAT SERPL-MCNC: 0.42 MG/DL (ref 0.8–1.3)
ERYTHROCYTE [DISTWIDTH] IN BLOOD BY AUTOMATED COUNT: 19.7 % (ref 11.9–14.6)
GLUCOSE BLD STRIP.AUTO-MCNC: 128 MG/DL (ref 65–100)
GLUCOSE BLD STRIP.AUTO-MCNC: 134 MG/DL (ref 65–100)
GLUCOSE BLD STRIP.AUTO-MCNC: 138 MG/DL (ref 65–100)
GLUCOSE BLD STRIP.AUTO-MCNC: 97 MG/DL (ref 65–100)
GLUCOSE SERPL-MCNC: 99 MG/DL (ref 70–99)
HCT VFR BLD AUTO: 34.2 % (ref 41.1–50.3)
HGB BLD-MCNC: 10.5 G/DL (ref 13.6–17.2)
MAGNESIUM SERPL-MCNC: 1.9 MG/DL (ref 1.8–2.4)
MCH RBC QN AUTO: 29.5 PG (ref 26.1–32.9)
MCHC RBC AUTO-ENTMCNC: 30.7 G/DL (ref 31.4–35)
MCV RBC AUTO: 96.1 FL (ref 82–102)
NRBC # BLD: 0.02 K/UL (ref 0–0.2)
PLATELET # BLD AUTO: 415 K/UL (ref 150–450)
PMV BLD AUTO: 9.7 FL (ref 9.4–12.3)
POTASSIUM SERPL-SCNC: 4.1 MMOL/L (ref 3.5–5.1)
RBC # BLD AUTO: 3.56 M/UL (ref 4.23–5.6)
SERVICE CMNT-IMP: ABNORMAL
SERVICE CMNT-IMP: NORMAL
SODIUM SERPL-SCNC: 134 MMOL/L (ref 136–145)
WBC # BLD AUTO: 16.6 K/UL (ref 4.3–11.1)

## 2024-05-31 PROCEDURE — 83735 ASSAY OF MAGNESIUM: CPT

## 2024-05-31 PROCEDURE — A4216 STERILE WATER/SALINE, 10 ML: HCPCS | Performed by: FAMILY MEDICINE

## 2024-05-31 PROCEDURE — 85027 COMPLETE CBC AUTOMATED: CPT

## 2024-05-31 PROCEDURE — 80048 BASIC METABOLIC PNL TOTAL CA: CPT

## 2024-05-31 PROCEDURE — 6370000000 HC RX 637 (ALT 250 FOR IP): Performed by: SURGERY

## 2024-05-31 PROCEDURE — 2400000000

## 2024-05-31 PROCEDURE — 6360000002 HC RX W HCPCS: Performed by: FAMILY MEDICINE

## 2024-05-31 PROCEDURE — 6370000000 HC RX 637 (ALT 250 FOR IP): Performed by: STUDENT IN AN ORGANIZED HEALTH CARE EDUCATION/TRAINING PROGRAM

## 2024-05-31 PROCEDURE — 2580000003 HC RX 258: Performed by: STUDENT IN AN ORGANIZED HEALTH CARE EDUCATION/TRAINING PROGRAM

## 2024-05-31 PROCEDURE — 2500000003 HC RX 250 WO HCPCS: Performed by: NURSE PRACTITIONER

## 2024-05-31 PROCEDURE — 82962 GLUCOSE BLOOD TEST: CPT

## 2024-05-31 PROCEDURE — 36415 COLL VENOUS BLD VENIPUNCTURE: CPT

## 2024-05-31 PROCEDURE — 2580000003 HC RX 258: Performed by: FAMILY MEDICINE

## 2024-05-31 PROCEDURE — C9113 INJ PANTOPRAZOLE SODIUM, VIA: HCPCS | Performed by: FAMILY MEDICINE

## 2024-05-31 PROCEDURE — 1100000003 HC PRIVATE W/ TELEMETRY

## 2024-05-31 PROCEDURE — 92526 ORAL FUNCTION THERAPY: CPT

## 2024-05-31 RX ORDER — ERYTHROMYCIN ETHYLSUCCINATE 400 MG/1
400 TABLET ORAL
Status: DISCONTINUED | OUTPATIENT
Start: 2024-05-31 | End: 2024-06-17

## 2024-05-31 RX ADMIN — ERYTHROMYCIN ETHYLSUCCINATE 400 MG: 400 TABLET ORAL at 15:59

## 2024-05-31 RX ADMIN — DEXTROSE MONOHYDRATE, SODIUM CHLORIDE, AND POTASSIUM CHLORIDE: 50; 4.5; 1.49 INJECTION, SOLUTION INTRAVENOUS at 00:01

## 2024-05-31 RX ADMIN — PANTOPRAZOLE SODIUM 40 MG: 40 INJECTION, POWDER, FOR SOLUTION INTRAVENOUS at 14:04

## 2024-05-31 RX ADMIN — ACETAMINOPHEN 650 MG: 325 TABLET ORAL at 14:02

## 2024-05-31 RX ADMIN — DAKIN'S SOLUTION 0.125% (QUARTER STRENGTH): 0.12 SOLUTION at 08:38

## 2024-05-31 RX ADMIN — ACETAMINOPHEN 650 MG: 325 TABLET ORAL at 20:22

## 2024-05-31 RX ADMIN — ERYTHROMYCIN ETHYLSUCCINATE 400 MG: 400 TABLET ORAL at 11:58

## 2024-05-31 RX ADMIN — PANTOPRAZOLE SODIUM 40 MG: 40 INJECTION, POWDER, FOR SOLUTION INTRAVENOUS at 02:14

## 2024-05-31 RX ADMIN — DEXTROSE MONOHYDRATE, SODIUM CHLORIDE, AND POTASSIUM CHLORIDE: 50; 4.5; 1.49 INJECTION, SOLUTION INTRAVENOUS at 15:56

## 2024-05-31 RX ADMIN — ACETAMINOPHEN 650 MG: 325 TABLET ORAL at 08:38

## 2024-05-31 RX ADMIN — OXYCODONE 5 MG: 5 TABLET ORAL at 08:37

## 2024-05-31 RX ADMIN — CIPROFLOXACIN 750 MG: 500 TABLET, FILM COATED ORAL at 20:22

## 2024-05-31 RX ADMIN — LEVETIRACETAM 500 MG: 100 INJECTION, SOLUTION INTRAVENOUS at 08:38

## 2024-05-31 RX ADMIN — SODIUM CHLORIDE, PRESERVATIVE FREE 10 ML: 5 INJECTION INTRAVENOUS at 08:38

## 2024-05-31 RX ADMIN — ACETAMINOPHEN 650 MG: 325 TABLET ORAL at 02:14

## 2024-05-31 RX ADMIN — LEVETIRACETAM 500 MG: 100 INJECTION, SOLUTION INTRAVENOUS at 20:23

## 2024-05-31 RX ADMIN — CIPROFLOXACIN 750 MG: 500 TABLET, FILM COATED ORAL at 08:38

## 2024-05-31 ASSESSMENT — PAIN DESCRIPTION - FREQUENCY: FREQUENCY: INTERMITTENT

## 2024-05-31 ASSESSMENT — PAIN DESCRIPTION - LOCATION: LOCATION: ABDOMEN

## 2024-05-31 ASSESSMENT — PAIN DESCRIPTION - ONSET: ONSET: GRADUAL

## 2024-05-31 ASSESSMENT — PAIN SCALES - GENERAL
PAINLEVEL_OUTOF10: 0
PAINLEVEL_OUTOF10: 6

## 2024-05-31 ASSESSMENT — PAIN DESCRIPTION - PAIN TYPE: TYPE: ACUTE PAIN

## 2024-05-31 ASSESSMENT — PAIN DESCRIPTION - ORIENTATION: ORIENTATION: MID;ANTERIOR

## 2024-05-31 ASSESSMENT — PAIN DESCRIPTION - DESCRIPTORS: DESCRIPTORS: ACHING;DISCOMFORT

## 2024-06-01 ENCOUNTER — APPOINTMENT (OUTPATIENT)
Dept: CT IMAGING | Age: 49
End: 2024-06-01
Payer: COMMERCIAL

## 2024-06-01 ENCOUNTER — APPOINTMENT (OUTPATIENT)
Dept: GENERAL RADIOLOGY | Age: 49
End: 2024-06-01
Payer: COMMERCIAL

## 2024-06-01 ENCOUNTER — ANESTHESIA (OUTPATIENT)
Dept: SURGERY | Age: 49
End: 2024-06-01
Payer: COMMERCIAL

## 2024-06-01 ENCOUNTER — ANESTHESIA EVENT (OUTPATIENT)
Dept: SURGERY | Age: 49
End: 2024-06-01
Payer: COMMERCIAL

## 2024-06-01 LAB
GLUCOSE BLD STRIP.AUTO-MCNC: 121 MG/DL (ref 65–100)
GLUCOSE BLD STRIP.AUTO-MCNC: 123 MG/DL (ref 65–100)
GLUCOSE BLD STRIP.AUTO-MCNC: 146 MG/DL (ref 65–100)
GLUCOSE BLD STRIP.AUTO-MCNC: 167 MG/DL (ref 65–100)
SERVICE CMNT-IMP: ABNORMAL

## 2024-06-01 PROCEDURE — 6370000000 HC RX 637 (ALT 250 FOR IP): Performed by: SURGERY

## 2024-06-01 PROCEDURE — 2720000010 HC SURG SUPPLY STERILE: Performed by: SURGERY

## 2024-06-01 PROCEDURE — 3600000012 HC SURGERY LEVEL 2 ADDTL 15MIN: Performed by: SURGERY

## 2024-06-01 PROCEDURE — 93005 ELECTROCARDIOGRAM TRACING: CPT | Performed by: PHYSICIAN ASSISTANT

## 2024-06-01 PROCEDURE — 6360000002 HC RX W HCPCS: Performed by: INTERNAL MEDICINE

## 2024-06-01 PROCEDURE — 74176 CT ABD & PELVIS W/O CONTRAST: CPT

## 2024-06-01 PROCEDURE — 99222 1ST HOSP IP/OBS MODERATE 55: CPT | Performed by: SURGERY

## 2024-06-01 PROCEDURE — 7100000000 HC PACU RECOVERY - FIRST 15 MIN: Performed by: SURGERY

## 2024-06-01 PROCEDURE — A4216 STERILE WATER/SALINE, 10 ML: HCPCS | Performed by: FAMILY MEDICINE

## 2024-06-01 PROCEDURE — 2709999900 HC NON-CHARGEABLE SUPPLY: Performed by: SURGERY

## 2024-06-01 PROCEDURE — 74018 RADEX ABDOMEN 1 VIEW: CPT

## 2024-06-01 PROCEDURE — 3600000002 HC SURGERY LEVEL 2 BASE: Performed by: SURGERY

## 2024-06-01 PROCEDURE — 6370000000 HC RX 637 (ALT 250 FOR IP): Performed by: STUDENT IN AN ORGANIZED HEALTH CARE EDUCATION/TRAINING PROGRAM

## 2024-06-01 PROCEDURE — 0DQ80ZZ REPAIR SMALL INTESTINE, OPEN APPROACH: ICD-10-PCS | Performed by: SURGERY

## 2024-06-01 PROCEDURE — 6360000002 HC RX W HCPCS: Performed by: FAMILY MEDICINE

## 2024-06-01 PROCEDURE — 2580000003 HC RX 258: Performed by: FAMILY MEDICINE

## 2024-06-01 PROCEDURE — 2580000003 HC RX 258: Performed by: STUDENT IN AN ORGANIZED HEALTH CARE EDUCATION/TRAINING PROGRAM

## 2024-06-01 PROCEDURE — C9113 INJ PANTOPRAZOLE SODIUM, VIA: HCPCS | Performed by: FAMILY MEDICINE

## 2024-06-01 PROCEDURE — 2500000003 HC RX 250 WO HCPCS: Performed by: STUDENT IN AN ORGANIZED HEALTH CARE EDUCATION/TRAINING PROGRAM

## 2024-06-01 PROCEDURE — 3700000000 HC ANESTHESIA ATTENDED CARE: Performed by: SURGERY

## 2024-06-01 PROCEDURE — 82962 GLUCOSE BLOOD TEST: CPT

## 2024-06-01 PROCEDURE — 2500000003 HC RX 250 WO HCPCS: Performed by: ANESTHESIOLOGY

## 2024-06-01 PROCEDURE — 6360000002 HC RX W HCPCS: Performed by: ANESTHESIOLOGY

## 2024-06-01 PROCEDURE — 7100000001 HC PACU RECOVERY - ADDTL 15 MIN: Performed by: SURGERY

## 2024-06-01 PROCEDURE — 1100000003 HC PRIVATE W/ TELEMETRY

## 2024-06-01 PROCEDURE — 2580000003 HC RX 258: Performed by: ANESTHESIOLOGY

## 2024-06-01 PROCEDURE — 3700000001 HC ADD 15 MINUTES (ANESTHESIA): Performed by: SURGERY

## 2024-06-01 PROCEDURE — 2500000003 HC RX 250 WO HCPCS: Performed by: NURSE PRACTITIONER

## 2024-06-01 RX ORDER — SODIUM CHLORIDE 0.9 % (FLUSH) 0.9 %
5-40 SYRINGE (ML) INJECTION EVERY 12 HOURS SCHEDULED
Status: CANCELLED | OUTPATIENT
Start: 2024-06-01

## 2024-06-01 RX ORDER — SODIUM CHLORIDE, SODIUM LACTATE, POTASSIUM CHLORIDE, CALCIUM CHLORIDE 600; 310; 30; 20 MG/100ML; MG/100ML; MG/100ML; MG/100ML
INJECTION, SOLUTION INTRAVENOUS CONTINUOUS
Status: CANCELLED | OUTPATIENT
Start: 2024-06-01

## 2024-06-01 RX ORDER — ROCURONIUM BROMIDE 10 MG/ML
INJECTION, SOLUTION INTRAVENOUS PRN
Status: DISCONTINUED | OUTPATIENT
Start: 2024-06-01 | End: 2024-06-02 | Stop reason: SDUPTHER

## 2024-06-01 RX ORDER — SODIUM CHLORIDE, SODIUM LACTATE, POTASSIUM CHLORIDE, CALCIUM CHLORIDE 600; 310; 30; 20 MG/100ML; MG/100ML; MG/100ML; MG/100ML
INJECTION, SOLUTION INTRAVENOUS CONTINUOUS PRN
Status: DISCONTINUED | OUTPATIENT
Start: 2024-06-01 | End: 2024-06-02 | Stop reason: SDUPTHER

## 2024-06-01 RX ORDER — MIDAZOLAM HYDROCHLORIDE 2 MG/2ML
2 INJECTION, SOLUTION INTRAMUSCULAR; INTRAVENOUS
Status: CANCELLED | OUTPATIENT
Start: 2024-06-01 | End: 2024-06-02

## 2024-06-01 RX ORDER — HYDROMORPHONE HYDROCHLORIDE 2 MG/ML
INJECTION, SOLUTION INTRAMUSCULAR; INTRAVENOUS; SUBCUTANEOUS PRN
Status: DISCONTINUED | OUTPATIENT
Start: 2024-06-01 | End: 2024-06-02 | Stop reason: SDUPTHER

## 2024-06-01 RX ORDER — DEXAMETHASONE SODIUM PHOSPHATE 4 MG/ML
INJECTION, SOLUTION INTRA-ARTICULAR; INTRALESIONAL; INTRAMUSCULAR; INTRAVENOUS; SOFT TISSUE PRN
Status: DISCONTINUED | OUTPATIENT
Start: 2024-06-01 | End: 2024-06-02 | Stop reason: SDUPTHER

## 2024-06-01 RX ORDER — ONDANSETRON 2 MG/ML
INJECTION INTRAMUSCULAR; INTRAVENOUS PRN
Status: DISCONTINUED | OUTPATIENT
Start: 2024-06-01 | End: 2024-06-02 | Stop reason: SDUPTHER

## 2024-06-01 RX ORDER — CEFOXITIN 2 G/1
INJECTION, POWDER, FOR SOLUTION INTRAVENOUS PRN
Status: DISCONTINUED | OUTPATIENT
Start: 2024-06-01 | End: 2024-06-02 | Stop reason: SDUPTHER

## 2024-06-01 RX ORDER — FENTANYL CITRATE 50 UG/ML
100 INJECTION, SOLUTION INTRAMUSCULAR; INTRAVENOUS
Status: CANCELLED | OUTPATIENT
Start: 2024-06-01 | End: 2024-06-02

## 2024-06-01 RX ORDER — SCOLOPAMINE TRANSDERMAL SYSTEM 1 MG/1
1 PATCH, EXTENDED RELEASE TRANSDERMAL
Status: CANCELLED | OUTPATIENT
Start: 2024-06-01 | End: 2024-06-04

## 2024-06-01 RX ORDER — PROPOFOL 10 MG/ML
INJECTION, EMULSION INTRAVENOUS PRN
Status: DISCONTINUED | OUTPATIENT
Start: 2024-06-01 | End: 2024-06-02 | Stop reason: SDUPTHER

## 2024-06-01 RX ADMIN — CIPROFLOXACIN 750 MG: 500 TABLET, FILM COATED ORAL at 10:29

## 2024-06-01 RX ADMIN — HALOPERIDOL LACTATE 1 MG: 5 INJECTION, SOLUTION INTRAMUSCULAR at 15:08

## 2024-06-01 RX ADMIN — METOPROLOL TARTRATE 5 MG: 5 INJECTION INTRAVENOUS at 20:36

## 2024-06-01 RX ADMIN — ERYTHROMYCIN ETHYLSUCCINATE 400 MG: 400 TABLET ORAL at 10:29

## 2024-06-01 RX ADMIN — ROCURONIUM BROMIDE 5 MG: 10 INJECTION, SOLUTION INTRAVENOUS at 23:20

## 2024-06-01 RX ADMIN — CEFOXITIN 2000 MG: 2 INJECTION, POWDER, FOR SOLUTION INTRAVENOUS at 23:39

## 2024-06-01 RX ADMIN — ERYTHROMYCIN ETHYLSUCCINATE 400 MG: 400 TABLET ORAL at 06:39

## 2024-06-01 RX ADMIN — METOPROLOL TARTRATE 5 MG: 5 INJECTION INTRAVENOUS at 15:54

## 2024-06-01 RX ADMIN — SODIUM CHLORIDE, PRESERVATIVE FREE 5 ML: 5 INJECTION INTRAVENOUS at 10:35

## 2024-06-01 RX ADMIN — ONDANSETRON 4 MG: 2 INJECTION INTRAMUSCULAR; INTRAVENOUS at 23:47

## 2024-06-01 RX ADMIN — DEXAMETHASONE SODIUM PHOSPHATE 4 MG: 4 INJECTION, SOLUTION INTRAMUSCULAR; INTRAVENOUS at 23:47

## 2024-06-01 RX ADMIN — ACETAMINOPHEN 650 MG: 325 TABLET ORAL at 02:29

## 2024-06-01 RX ADMIN — DEXTROSE MONOHYDRATE, SODIUM CHLORIDE, AND POTASSIUM CHLORIDE: 50; 4.5; 1.49 INJECTION, SOLUTION INTRAVENOUS at 19:53

## 2024-06-01 RX ADMIN — ERYTHROMYCIN ETHYLSUCCINATE 400 MG: 400 TABLET ORAL at 15:08

## 2024-06-01 RX ADMIN — ACETAMINOPHEN 650 MG: 325 TABLET ORAL at 13:04

## 2024-06-01 RX ADMIN — DEXTROSE MONOHYDRATE, SODIUM CHLORIDE, AND POTASSIUM CHLORIDE: 50; 4.5; 1.49 INJECTION, SOLUTION INTRAVENOUS at 05:48

## 2024-06-01 RX ADMIN — PANTOPRAZOLE SODIUM 40 MG: 40 INJECTION, POWDER, FOR SOLUTION INTRAVENOUS at 13:04

## 2024-06-01 RX ADMIN — PANTOPRAZOLE SODIUM 40 MG: 40 INJECTION, POWDER, FOR SOLUTION INTRAVENOUS at 02:29

## 2024-06-01 RX ADMIN — SODIUM CHLORIDE, SODIUM LACTATE, POTASSIUM CHLORIDE, AND CALCIUM CHLORIDE: 600; 310; 30; 20 INJECTION, SOLUTION INTRAVENOUS at 23:17

## 2024-06-01 RX ADMIN — PROPOFOL 100 MG: 10 INJECTION, EMULSION INTRAVENOUS at 23:20

## 2024-06-01 RX ADMIN — DAKIN'S SOLUTION 0.125% (QUARTER STRENGTH): 0.12 SOLUTION at 10:35

## 2024-06-01 RX ADMIN — SODIUM CHLORIDE, PRESERVATIVE FREE 5 ML: 5 INJECTION INTRAVENOUS at 20:40

## 2024-06-01 RX ADMIN — LEVETIRACETAM 500 MG: 100 INJECTION, SOLUTION INTRAVENOUS at 20:40

## 2024-06-01 RX ADMIN — ROCURONIUM BROMIDE 20 MG: 10 INJECTION, SOLUTION INTRAVENOUS at 23:27

## 2024-06-01 RX ADMIN — HYDROMORPHONE HYDROCHLORIDE 1 MG: 2 INJECTION INTRAMUSCULAR; INTRAVENOUS; SUBCUTANEOUS at 23:41

## 2024-06-01 RX ADMIN — HYDROMORPHONE HYDROCHLORIDE 0.5 MG: 2 INJECTION INTRAMUSCULAR; INTRAVENOUS; SUBCUTANEOUS at 23:20

## 2024-06-01 RX ADMIN — ACETAMINOPHEN 650 MG: 325 TABLET ORAL at 10:29

## 2024-06-01 RX ADMIN — HYDROMORPHONE HYDROCHLORIDE 0.5 MG: 2 INJECTION INTRAMUSCULAR; INTRAVENOUS; SUBCUTANEOUS at 23:29

## 2024-06-01 RX ADMIN — LEVETIRACETAM 500 MG: 100 INJECTION, SOLUTION INTRAVENOUS at 10:36

## 2024-06-01 ASSESSMENT — PAIN SCALES - GENERAL: PAINLEVEL_OUTOF10: 0

## 2024-06-02 ENCOUNTER — APPOINTMENT (OUTPATIENT)
Dept: GENERAL RADIOLOGY | Age: 49
End: 2024-06-02
Payer: COMMERCIAL

## 2024-06-02 PROBLEM — E43 SEVERE PROTEIN-CALORIE MALNUTRITION (HCC): Status: ACTIVE | Noted: 2024-05-23

## 2024-06-02 LAB
ALBUMIN SERPL-MCNC: 1.2 G/DL (ref 3.5–5)
ALBUMIN/GLOB SERPL: 0.3 (ref 1–1.9)
ALP SERPL-CCNC: 112 U/L (ref 40–129)
ALT SERPL-CCNC: 10 U/L (ref 12–65)
ANION GAP SERPL CALC-SCNC: 12 MMOL/L (ref 9–18)
ANION GAP SERPL CALC-SCNC: 13 MMOL/L (ref 9–18)
AST SERPL-CCNC: 20 U/L (ref 15–37)
BASOPHILS # BLD: 0.1 K/UL (ref 0–0.2)
BASOPHILS NFR BLD: 0 % (ref 0–2)
BILIRUB SERPL-MCNC: 0.5 MG/DL (ref 0–1.2)
BUN SERPL-MCNC: 11 MG/DL (ref 6–23)
BUN SERPL-MCNC: 11 MG/DL (ref 6–23)
CALCIUM SERPL-MCNC: 7.7 MG/DL (ref 8.8–10.2)
CALCIUM SERPL-MCNC: 7.7 MG/DL (ref 8.8–10.2)
CHLORIDE SERPL-SCNC: 98 MMOL/L (ref 98–107)
CHLORIDE SERPL-SCNC: 99 MMOL/L (ref 98–107)
CO2 SERPL-SCNC: 20 MMOL/L (ref 20–28)
CO2 SERPL-SCNC: 21 MMOL/L (ref 20–28)
CREAT SERPL-MCNC: 0.66 MG/DL (ref 0.8–1.3)
CREAT SERPL-MCNC: 0.7 MG/DL (ref 0.8–1.3)
DIFFERENTIAL METHOD BLD: ABNORMAL
EOSINOPHIL # BLD: 0 K/UL (ref 0–0.8)
EOSINOPHIL NFR BLD: 0 % (ref 0.5–7.8)
ERYTHROCYTE [DISTWIDTH] IN BLOOD BY AUTOMATED COUNT: 19.8 % (ref 11.9–14.6)
GLOBULIN SER CALC-MCNC: 3.8 G/DL (ref 2.3–3.5)
GLUCOSE BLD STRIP.AUTO-MCNC: 126 MG/DL (ref 65–100)
GLUCOSE BLD STRIP.AUTO-MCNC: 128 MG/DL (ref 65–100)
GLUCOSE BLD STRIP.AUTO-MCNC: 147 MG/DL (ref 65–100)
GLUCOSE BLD STRIP.AUTO-MCNC: 157 MG/DL (ref 65–100)
GLUCOSE SERPL-MCNC: 153 MG/DL (ref 70–99)
GLUCOSE SERPL-MCNC: 158 MG/DL (ref 70–99)
HCT VFR BLD AUTO: 31.9 % (ref 41.1–50.3)
HGB BLD-MCNC: 9.8 G/DL (ref 13.6–17.2)
IMM GRANULOCYTES # BLD AUTO: 0.4 K/UL (ref 0–0.5)
IMM GRANULOCYTES NFR BLD AUTO: 1 % (ref 0–5)
LYMPHOCYTES # BLD: 1.5 K/UL (ref 0.5–4.6)
LYMPHOCYTES NFR BLD: 5 % (ref 13–44)
MAGNESIUM SERPL-MCNC: 1.8 MG/DL (ref 1.8–2.4)
MCH RBC QN AUTO: 29.2 PG (ref 26.1–32.9)
MCHC RBC AUTO-ENTMCNC: 30.7 G/DL (ref 31.4–35)
MCV RBC AUTO: 94.9 FL (ref 82–102)
MONOCYTES # BLD: 1.1 K/UL (ref 0.1–1.3)
MONOCYTES NFR BLD: 4 % (ref 4–12)
NEUTS SEG # BLD: 27.2 K/UL (ref 1.7–8.2)
NEUTS SEG NFR BLD: 90 % (ref 43–78)
NRBC # BLD: 0.03 K/UL (ref 0–0.2)
PHOSPHATE SERPL-MCNC: 2.9 MG/DL (ref 2.5–4.5)
PLATELET # BLD AUTO: 459 K/UL (ref 150–450)
PMV BLD AUTO: 9.6 FL (ref 9.4–12.3)
POTASSIUM SERPL-SCNC: 5.5 MMOL/L (ref 3.5–5.1)
POTASSIUM SERPL-SCNC: 5.9 MMOL/L (ref 3.5–5.1)
PROT SERPL-MCNC: 5 G/DL (ref 6.3–8.2)
RBC # BLD AUTO: 3.36 M/UL (ref 4.23–5.6)
SERVICE CMNT-IMP: ABNORMAL
SODIUM SERPL-SCNC: 131 MMOL/L (ref 136–145)
SODIUM SERPL-SCNC: 132 MMOL/L (ref 136–145)
WBC # BLD AUTO: 30.2 K/UL (ref 4.3–11.1)

## 2024-06-02 PROCEDURE — 83735 ASSAY OF MAGNESIUM: CPT

## 2024-06-02 PROCEDURE — 85025 COMPLETE CBC W/AUTO DIFF WBC: CPT

## 2024-06-02 PROCEDURE — 2700000000 HC OXYGEN THERAPY PER DAY

## 2024-06-02 PROCEDURE — 2580000003 HC RX 258: Performed by: FAMILY MEDICINE

## 2024-06-02 PROCEDURE — 2500000003 HC RX 250 WO HCPCS: Performed by: PHYSICIAN ASSISTANT

## 2024-06-02 PROCEDURE — 36415 COLL VENOUS BLD VENIPUNCTURE: CPT

## 2024-06-02 PROCEDURE — C9113 INJ PANTOPRAZOLE SODIUM, VIA: HCPCS | Performed by: FAMILY MEDICINE

## 2024-06-02 PROCEDURE — 3E0336Z INTRODUCTION OF NUTRITIONAL SUBSTANCE INTO PERIPHERAL VEIN, PERCUTANEOUS APPROACH: ICD-10-PCS | Performed by: INTERNAL MEDICINE

## 2024-06-02 PROCEDURE — 74018 RADEX ABDOMEN 1 VIEW: CPT

## 2024-06-02 PROCEDURE — 84100 ASSAY OF PHOSPHORUS: CPT

## 2024-06-02 PROCEDURE — 6370000000 HC RX 637 (ALT 250 FOR IP): Performed by: PHYSICIAN ASSISTANT

## 2024-06-02 PROCEDURE — 2500000003 HC RX 250 WO HCPCS: Performed by: NURSE PRACTITIONER

## 2024-06-02 PROCEDURE — 6360000002 HC RX W HCPCS: Performed by: FAMILY MEDICINE

## 2024-06-02 PROCEDURE — 2580000003 HC RX 258: Performed by: PHYSICIAN ASSISTANT

## 2024-06-02 PROCEDURE — 82962 GLUCOSE BLOOD TEST: CPT

## 2024-06-02 PROCEDURE — 1100000003 HC PRIVATE W/ TELEMETRY

## 2024-06-02 PROCEDURE — A4216 STERILE WATER/SALINE, 10 ML: HCPCS | Performed by: FAMILY MEDICINE

## 2024-06-02 PROCEDURE — 6370000000 HC RX 637 (ALT 250 FOR IP): Performed by: SURGERY

## 2024-06-02 PROCEDURE — 2580000003 HC RX 258: Performed by: STUDENT IN AN ORGANIZED HEALTH CARE EDUCATION/TRAINING PROGRAM

## 2024-06-02 PROCEDURE — 6360000002 HC RX W HCPCS: Performed by: PHYSICIAN ASSISTANT

## 2024-06-02 PROCEDURE — 2500000003 HC RX 250 WO HCPCS: Performed by: STUDENT IN AN ORGANIZED HEALTH CARE EDUCATION/TRAINING PROGRAM

## 2024-06-02 PROCEDURE — 0DH67UZ INSERTION OF FEEDING DEVICE INTO STOMACH, VIA NATURAL OR ARTIFICIAL OPENING: ICD-10-PCS | Performed by: INTERNAL MEDICINE

## 2024-06-02 PROCEDURE — 2580000003 HC RX 258: Performed by: ANESTHESIOLOGY

## 2024-06-02 PROCEDURE — 51702 INSERT TEMP BLADDER CATH: CPT

## 2024-06-02 PROCEDURE — 80053 COMPREHEN METABOLIC PANEL: CPT

## 2024-06-02 RX ORDER — OXYCODONE HYDROCHLORIDE 5 MG/1
5 TABLET ORAL
Status: DISCONTINUED | OUTPATIENT
Start: 2024-06-02 | End: 2024-06-02 | Stop reason: HOSPADM

## 2024-06-02 RX ORDER — FENTANYL CITRATE 50 UG/ML
25 INJECTION, SOLUTION INTRAMUSCULAR; INTRAVENOUS EVERY 5 MIN PRN
Status: DISCONTINUED | OUTPATIENT
Start: 2024-06-02 | End: 2024-06-02 | Stop reason: HOSPADM

## 2024-06-02 RX ORDER — POTASSIUM CHLORIDE 29.8 MG/ML
20 INJECTION INTRAVENOUS PRN
Status: DISCONTINUED | OUTPATIENT
Start: 2024-06-02 | End: 2024-06-19

## 2024-06-02 RX ORDER — NALOXONE HYDROCHLORIDE 0.4 MG/ML
INJECTION, SOLUTION INTRAMUSCULAR; INTRAVENOUS; SUBCUTANEOUS PRN
Status: DISCONTINUED | OUTPATIENT
Start: 2024-06-02 | End: 2024-06-02 | Stop reason: HOSPADM

## 2024-06-02 RX ORDER — POTASSIUM CHLORIDE 7.45 MG/ML
10 INJECTION INTRAVENOUS PRN
Status: DISCONTINUED | OUTPATIENT
Start: 2024-06-02 | End: 2024-07-19

## 2024-06-02 RX ORDER — 0.9 % SODIUM CHLORIDE 0.9 %
500 INTRAVENOUS SOLUTION INTRAVENOUS ONCE
Status: COMPLETED | OUTPATIENT
Start: 2024-06-02 | End: 2024-06-02

## 2024-06-02 RX ORDER — METOCLOPRAMIDE HYDROCHLORIDE 5 MG/ML
10 INJECTION INTRAMUSCULAR; INTRAVENOUS
Status: DISCONTINUED | OUTPATIENT
Start: 2024-06-02 | End: 2024-06-02 | Stop reason: HOSPADM

## 2024-06-02 RX ORDER — DEXTROSE MONOHYDRATE AND SODIUM CHLORIDE 5; .9 G/100ML; G/100ML
INJECTION, SOLUTION INTRAVENOUS CONTINUOUS
Status: ACTIVE | OUTPATIENT
Start: 2024-06-02 | End: 2024-06-02

## 2024-06-02 RX ORDER — ONDANSETRON 2 MG/ML
4 INJECTION INTRAMUSCULAR; INTRAVENOUS
Status: DISCONTINUED | OUTPATIENT
Start: 2024-06-02 | End: 2024-06-02 | Stop reason: HOSPADM

## 2024-06-02 RX ORDER — SODIUM CHLORIDE, SODIUM LACTATE, POTASSIUM CHLORIDE, CALCIUM CHLORIDE 600; 310; 30; 20 MG/100ML; MG/100ML; MG/100ML; MG/100ML
INJECTION, SOLUTION INTRAVENOUS CONTINUOUS
Status: DISCONTINUED | OUTPATIENT
Start: 2024-06-02 | End: 2024-06-02 | Stop reason: HOSPADM

## 2024-06-02 RX ORDER — METRONIDAZOLE 500 MG/100ML
500 INJECTION, SOLUTION INTRAVENOUS EVERY 8 HOURS
Status: DISCONTINUED | OUTPATIENT
Start: 2024-06-02 | End: 2024-06-05

## 2024-06-02 RX ORDER — HYDROMORPHONE HYDROCHLORIDE 2 MG/ML
0.5 INJECTION, SOLUTION INTRAMUSCULAR; INTRAVENOUS; SUBCUTANEOUS EVERY 10 MIN PRN
Status: DISCONTINUED | OUTPATIENT
Start: 2024-06-02 | End: 2024-06-02 | Stop reason: HOSPADM

## 2024-06-02 RX ORDER — DIPHENHYDRAMINE HYDROCHLORIDE 50 MG/ML
12.5 INJECTION INTRAMUSCULAR; INTRAVENOUS
Status: DISCONTINUED | OUTPATIENT
Start: 2024-06-02 | End: 2024-06-02 | Stop reason: HOSPADM

## 2024-06-02 RX ORDER — MAGNESIUM SULFATE IN WATER 40 MG/ML
2000 INJECTION, SOLUTION INTRAVENOUS PRN
Status: DISCONTINUED | OUTPATIENT
Start: 2024-06-02 | End: 2024-07-19

## 2024-06-02 RX ORDER — CIPROFLOXACIN 2 MG/ML
400 INJECTION, SOLUTION INTRAVENOUS EVERY 12 HOURS
Status: DISCONTINUED | OUTPATIENT
Start: 2024-06-02 | End: 2024-06-05

## 2024-06-02 RX ADMIN — SODIUM CHLORIDE, POTASSIUM CHLORIDE, SODIUM LACTATE AND CALCIUM CHLORIDE: 600; 310; 30; 20 INJECTION, SOLUTION INTRAVENOUS at 01:54

## 2024-06-02 RX ADMIN — SODIUM CHLORIDE: 9 INJECTION, SOLUTION INTRAVENOUS at 10:05

## 2024-06-02 RX ADMIN — ACETAMINOPHEN 650 MG: 325 TABLET ORAL at 02:42

## 2024-06-02 RX ADMIN — I.V. FAT EMULSION 250 ML: 20 EMULSION INTRAVENOUS at 18:12

## 2024-06-02 RX ADMIN — SODIUM CHLORIDE 500 ML: 9 INJECTION, SOLUTION INTRAVENOUS at 12:30

## 2024-06-02 RX ADMIN — ERYTHROMYCIN ETHYLSUCCINATE 400 MG: 400 TABLET ORAL at 06:28

## 2024-06-02 RX ADMIN — SODIUM CHLORIDE, PRESERVATIVE FREE 5 ML: 5 INJECTION INTRAVENOUS at 21:15

## 2024-06-02 RX ADMIN — OLANZAPINE 5 MG: 10 INJECTION, POWDER, FOR SOLUTION INTRAMUSCULAR at 04:03

## 2024-06-02 RX ADMIN — DEXTROSE AND SODIUM CHLORIDE: 5; 900 INJECTION, SOLUTION INTRAVENOUS at 09:56

## 2024-06-02 RX ADMIN — CIPROFLOXACIN 400 MG: 2 INJECTION, SOLUTION INTRAVENOUS at 10:05

## 2024-06-02 RX ADMIN — METOPROLOL TARTRATE 5 MG: 5 INJECTION INTRAVENOUS at 05:00

## 2024-06-02 RX ADMIN — DEXTROSE MONOHYDRATE, SODIUM CHLORIDE, AND POTASSIUM CHLORIDE: 50; 4.5; 1.49 INJECTION, SOLUTION INTRAVENOUS at 02:47

## 2024-06-02 RX ADMIN — METRONIDAZOLE 500 MG: 500 INJECTION, SOLUTION INTRAVENOUS at 21:05

## 2024-06-02 RX ADMIN — SODIUM CHLORIDE, PRESERVATIVE FREE 10 ML: 5 INJECTION INTRAVENOUS at 09:13

## 2024-06-02 RX ADMIN — CIPROFLOXACIN 400 MG: 2 INJECTION, SOLUTION INTRAVENOUS at 22:26

## 2024-06-02 RX ADMIN — LEVETIRACETAM 500 MG: 100 INJECTION, SOLUTION INTRAVENOUS at 21:15

## 2024-06-02 RX ADMIN — LEVETIRACETAM 500 MG: 100 INJECTION, SOLUTION INTRAVENOUS at 09:10

## 2024-06-02 RX ADMIN — PANTOPRAZOLE SODIUM 40 MG: 40 INJECTION, POWDER, FOR SOLUTION INTRAVENOUS at 14:45

## 2024-06-02 RX ADMIN — DEXTROSE AND SODIUM CHLORIDE: 5; 900 INJECTION, SOLUTION INTRAVENOUS at 17:58

## 2024-06-02 RX ADMIN — CALCIUM GLUCONATE: 98 INJECTION, SOLUTION INTRAVENOUS at 18:05

## 2024-06-02 RX ADMIN — PANTOPRAZOLE SODIUM 40 MG: 40 INJECTION, POWDER, FOR SOLUTION INTRAVENOUS at 02:32

## 2024-06-02 RX ADMIN — METRONIDAZOLE 500 MG: 500 INJECTION, SOLUTION INTRAVENOUS at 12:39

## 2024-06-02 NOTE — ANESTHESIA POSTPROCEDURE EVALUATION
Department of Anesthesiology  Postprocedure Note    Patient: Brendan Saleh  MRN: [id number]  YOB: 1975  Date of evaluation: 6/2/2024    Procedure Summary       Date: 06/01/24 Room / Location: [facility] MAIN OR [address] [place] [address] MAIN OR    Anesthesia Start: 2309 Anesthesia Stop:     Procedure: LAPAROTOMY EXPLORATORY, PRIMARY CLOSURE SMALL BOWEL PERFORATION (Abdomen) Diagnosis:       Duodenal ulcer perforation (HCC)      (Duodenal ulcer perforation (HCC) [K26.5])    Providers: Misael Carcamo MD Responsible Provider: Garry Morrow MD    Anesthesia Type: General ASA Status: 4 - Emergent            Anesthesia Type: General    Adriana Phase I: Adriana Score: 8    Adriana Phase II:      Anesthesia Post Evaluation    Patient location during evaluation: PACU  Patient participation: complete - patient participated  Level of consciousness: awake and awake and alert  Airway patency: patent  Nausea & Vomiting: no nausea  Cardiovascular status: hemodynamically stable  Respiratory status: acceptable  Hydration status: euvolemic  Multimodal analgesia pain management approach  Pain management: adequate    No notable events documented.

## 2024-06-02 NOTE — ANESTHESIA PRE PROCEDURE
Department of Anesthesiology  Preprocedure Note       Name:  Brendan Saleh   Age:  48 y.o.  :  1975                                          MRN:  [id number]         Date:  2024      Surgeon: Surgeon(s):  Misael Carcamo MD    Procedure: Procedure(s):  LAPAROTOMY EXPLORATORY, Perf. Viscous    Medications prior to admission:   Prior to Admission medications    Medication Sig Start Date End Date Taking? Authorizing Provider   levETIRAcetam (KEPPRA) 500 MG tablet Take 1 tablet by mouth 2 times daily 24 Yes Provider, MD Arianna   Hyoscyamine Sulfate SL (LEVSIN/SL) 0.125 MG SUBL Place 1 tablet under the tongue 3 times daily as needed (cramping) 3/29/24 4/1/24  Veronica Conner PA       Current medications:    Current Facility-Administered Medications   Medication Dose Route Frequency Provider Last Rate Last Admin    erythromycin ethylsuccinate (EES) tablet 400 mg  400 mg Oral TID AC Delroy Wright MD   400 mg at 24 1508    diatrizoate meglumine-sodium (GASTROGRAFIN) 66-10 % solution 180 mL  180 mL Oral ONCE PRN Vannesa Balderas APRN - CNP   180 mL at 24 1020    dextrose 5 % and 0.45 % NaCl with KCl 20 mEq infusion   IntraVENous Continuous Tejinder Pinto APRN - CNP 75 mL/hr at 24 New Bag at 24    sodium chloride flush 0.9 % injection 5-40 mL  5-40 mL IntraVENous 2 times per day Giovanni Acosta MD   5 mL at 24    sodium chloride flush 0.9 % injection 5-40 mL  5-40 mL IntraVENous PRN Giovanni Acosta MD        0.9 % sodium chloride infusion   IntraVENous PRN Giovanni Acosta MD 5 mL/hr at 24 New Bag at 24 0628    sodium hypochlorite (DAKINS) 0.125 % external solution   Irrigation Daily Tejinder Pinto APRN - CNP   Given at 24 1035    glucose chewable tablet 16 g  4 tablet Oral PRN Blair, Tejinder, APRN - CNP        dextrose bolus 10% 125 mL  125 mL IntraVENous PRN Tejinder Pinto, YOSELIN - AUDREY        Or

## 2024-06-03 LAB
ALBUMIN SERPL-MCNC: 1.1 G/DL (ref 3.5–5)
ALBUMIN/GLOB SERPL: 0.3 (ref 1–1.9)
ALP SERPL-CCNC: 104 U/L (ref 40–129)
ALT SERPL-CCNC: 8 U/L (ref 12–65)
ANION GAP SERPL CALC-SCNC: 10 MMOL/L (ref 9–18)
AST SERPL-CCNC: 26 U/L (ref 15–37)
BASOPHILS # BLD: 0 K/UL (ref 0–0.2)
BASOPHILS NFR BLD: 0 % (ref 0–2)
BILIRUB DIRECT SERPL-MCNC: <0.2 MG/DL (ref 0–0.4)
BILIRUB SERPL-MCNC: 0.3 MG/DL (ref 0–1.2)
BUN SERPL-MCNC: 10 MG/DL (ref 6–23)
CALCIUM SERPL-MCNC: 7.5 MG/DL (ref 8.8–10.2)
CHLORIDE SERPL-SCNC: 100 MMOL/L (ref 98–107)
CO2 SERPL-SCNC: 24 MMOL/L (ref 20–28)
CREAT SERPL-MCNC: 0.37 MG/DL (ref 0.8–1.3)
DIFFERENTIAL METHOD BLD: ABNORMAL
EOSINOPHIL # BLD: 0 K/UL (ref 0–0.8)
EOSINOPHIL NFR BLD: 0 % (ref 0.5–7.8)
ERYTHROCYTE [DISTWIDTH] IN BLOOD BY AUTOMATED COUNT: 19.7 % (ref 11.9–14.6)
GLOBULIN SER CALC-MCNC: 4 G/DL (ref 2.3–3.5)
GLUCOSE BLD STRIP.AUTO-MCNC: 104 MG/DL (ref 65–100)
GLUCOSE BLD STRIP.AUTO-MCNC: 107 MG/DL (ref 65–100)
GLUCOSE BLD STRIP.AUTO-MCNC: 126 MG/DL (ref 65–100)
GLUCOSE BLD STRIP.AUTO-MCNC: 93 MG/DL (ref 65–100)
GLUCOSE SERPL-MCNC: 104 MG/DL (ref 70–99)
HCT VFR BLD AUTO: 26.4 % (ref 41.1–50.3)
HGB BLD-MCNC: 8 G/DL (ref 13.6–17.2)
IMM GRANULOCYTES # BLD AUTO: 0.3 K/UL (ref 0–0.5)
IMM GRANULOCYTES NFR BLD AUTO: 1 % (ref 0–5)
LYMPHOCYTES # BLD: 1.2 K/UL (ref 0.5–4.6)
LYMPHOCYTES NFR BLD: 5 % (ref 13–44)
MAGNESIUM SERPL-MCNC: 1.8 MG/DL (ref 1.8–2.4)
MCH RBC QN AUTO: 29.3 PG (ref 26.1–32.9)
MCHC RBC AUTO-ENTMCNC: 30.3 G/DL (ref 31.4–35)
MCV RBC AUTO: 96.7 FL (ref 82–102)
MONOCYTES # BLD: 1 K/UL (ref 0.1–1.3)
MONOCYTES NFR BLD: 4 % (ref 4–12)
NEUTS SEG # BLD: 20.4 K/UL (ref 1.7–8.2)
NEUTS SEG NFR BLD: 90 % (ref 43–78)
NRBC # BLD: 0.04 K/UL (ref 0–0.2)
PHOSPHATE SERPL-MCNC: 2.3 MG/DL (ref 2.5–4.5)
PLATELET # BLD AUTO: 363 K/UL (ref 150–450)
PMV BLD AUTO: 10 FL (ref 9.4–12.3)
POTASSIUM SERPL-SCNC: 4.3 MMOL/L (ref 3.5–5.1)
PROT SERPL-MCNC: 5 G/DL (ref 6.3–8.2)
RBC # BLD AUTO: 2.73 M/UL (ref 4.23–5.6)
SERVICE CMNT-IMP: ABNORMAL
SERVICE CMNT-IMP: NORMAL
SODIUM SERPL-SCNC: 133 MMOL/L (ref 136–145)
TRIGL SERPL-MCNC: 190 MG/DL (ref 0–150)
WBC # BLD AUTO: 22.9 K/UL (ref 4.3–11.1)

## 2024-06-03 PROCEDURE — 6360000002 HC RX W HCPCS: Performed by: PHYSICIAN ASSISTANT

## 2024-06-03 PROCEDURE — 2500000003 HC RX 250 WO HCPCS: Performed by: PHYSICIAN ASSISTANT

## 2024-06-03 PROCEDURE — 83735 ASSAY OF MAGNESIUM: CPT

## 2024-06-03 PROCEDURE — 80076 HEPATIC FUNCTION PANEL: CPT

## 2024-06-03 PROCEDURE — 6370000000 HC RX 637 (ALT 250 FOR IP): Performed by: SURGERY

## 2024-06-03 PROCEDURE — 2700000000 HC OXYGEN THERAPY PER DAY

## 2024-06-03 PROCEDURE — 84478 ASSAY OF TRIGLYCERIDES: CPT

## 2024-06-03 PROCEDURE — 6360000002 HC RX W HCPCS: Performed by: FAMILY MEDICINE

## 2024-06-03 PROCEDURE — 99223 1ST HOSP IP/OBS HIGH 75: CPT | Performed by: NURSE PRACTITIONER

## 2024-06-03 PROCEDURE — 2580000003 HC RX 258: Performed by: FAMILY MEDICINE

## 2024-06-03 PROCEDURE — 2580000003 HC RX 258: Performed by: PHYSICIAN ASSISTANT

## 2024-06-03 PROCEDURE — C9113 INJ PANTOPRAZOLE SODIUM, VIA: HCPCS | Performed by: FAMILY MEDICINE

## 2024-06-03 PROCEDURE — 51702 INSERT TEMP BLADDER CATH: CPT

## 2024-06-03 PROCEDURE — 36415 COLL VENOUS BLD VENIPUNCTURE: CPT

## 2024-06-03 PROCEDURE — 92507 TX SP LANG VOICE COMM INDIV: CPT

## 2024-06-03 PROCEDURE — 80048 BASIC METABOLIC PNL TOTAL CA: CPT

## 2024-06-03 PROCEDURE — 2580000003 HC RX 258: Performed by: STUDENT IN AN ORGANIZED HEALTH CARE EDUCATION/TRAINING PROGRAM

## 2024-06-03 PROCEDURE — 82962 GLUCOSE BLOOD TEST: CPT

## 2024-06-03 PROCEDURE — 85025 COMPLETE CBC W/AUTO DIFF WBC: CPT

## 2024-06-03 PROCEDURE — 84100 ASSAY OF PHOSPHORUS: CPT

## 2024-06-03 PROCEDURE — 1100000003 HC PRIVATE W/ TELEMETRY

## 2024-06-03 PROCEDURE — A4216 STERILE WATER/SALINE, 10 ML: HCPCS | Performed by: FAMILY MEDICINE

## 2024-06-03 RX ADMIN — ACETAMINOPHEN 650 MG: 325 TABLET ORAL at 09:17

## 2024-06-03 RX ADMIN — METRONIDAZOLE 500 MG: 500 INJECTION, SOLUTION INTRAVENOUS at 12:15

## 2024-06-03 RX ADMIN — PANTOPRAZOLE SODIUM 40 MG: 40 INJECTION, POWDER, FOR SOLUTION INTRAVENOUS at 14:32

## 2024-06-03 RX ADMIN — CALCIUM GLUCONATE: 98 INJECTION, SOLUTION INTRAVENOUS at 18:36

## 2024-06-03 RX ADMIN — ACETAMINOPHEN 650 MG: 325 TABLET ORAL at 14:39

## 2024-06-03 RX ADMIN — PANTOPRAZOLE SODIUM 40 MG: 40 INJECTION, POWDER, FOR SOLUTION INTRAVENOUS at 02:08

## 2024-06-03 RX ADMIN — SODIUM PHOSPHATE, MONOBASIC, MONOHYDRATE AND SODIUM PHOSPHATE, DIBASIC, ANHYDROUS 15 MMOL: 276; 142 INJECTION, SOLUTION INTRAVENOUS at 12:41

## 2024-06-03 RX ADMIN — CIPROFLOXACIN 400 MG: 2 INJECTION, SOLUTION INTRAVENOUS at 09:31

## 2024-06-03 RX ADMIN — CIPROFLOXACIN 400 MG: 2 INJECTION, SOLUTION INTRAVENOUS at 23:22

## 2024-06-03 RX ADMIN — METRONIDAZOLE 500 MG: 500 INJECTION, SOLUTION INTRAVENOUS at 04:52

## 2024-06-03 RX ADMIN — OXYCODONE 5 MG: 5 TABLET ORAL at 06:03

## 2024-06-03 RX ADMIN — LEVETIRACETAM 500 MG: 100 INJECTION, SOLUTION INTRAVENOUS at 09:17

## 2024-06-03 RX ADMIN — SODIUM CHLORIDE, PRESERVATIVE FREE 10 ML: 5 INJECTION INTRAVENOUS at 09:18

## 2024-06-03 RX ADMIN — SODIUM CHLORIDE, PRESERVATIVE FREE 10 ML: 5 INJECTION INTRAVENOUS at 21:07

## 2024-06-03 RX ADMIN — I.V. FAT EMULSION 250 ML: 20 EMULSION INTRAVENOUS at 18:27

## 2024-06-03 RX ADMIN — METRONIDAZOLE 500 MG: 500 INJECTION, SOLUTION INTRAVENOUS at 21:06

## 2024-06-03 RX ADMIN — LEVETIRACETAM 500 MG: 100 INJECTION, SOLUTION INTRAVENOUS at 21:06

## 2024-06-03 ASSESSMENT — PAIN DESCRIPTION - LOCATION: LOCATION: ABDOMEN

## 2024-06-03 ASSESSMENT — PAIN SCALES - GENERAL
PAINLEVEL_OUTOF10: 0
PAINLEVEL_OUTOF10: 6
PAINLEVEL_OUTOF10: 0

## 2024-06-03 ASSESSMENT — PAIN DESCRIPTION - DESCRIPTORS: DESCRIPTORS: ACHING;DISCOMFORT

## 2024-06-03 ASSESSMENT — PAIN DESCRIPTION - ORIENTATION: ORIENTATION: UPPER

## 2024-06-04 ENCOUNTER — APPOINTMENT (OUTPATIENT)
Dept: GENERAL RADIOLOGY | Age: 49
End: 2024-06-04
Payer: COMMERCIAL

## 2024-06-04 PROBLEM — G72.81 CRITICAL ILLNESS MYOPATHY: Status: ACTIVE | Noted: 2024-06-04

## 2024-06-04 PROBLEM — T81.44XA SEPSIS FOLLOWING INTRA-ABDOMINAL SURGERY (HCC): Status: ACTIVE | Noted: 2024-05-07

## 2024-06-04 PROBLEM — R63.4 UNINTENTIONAL WEIGHT LOSS: Status: ACTIVE | Noted: 2024-06-04

## 2024-06-04 PROBLEM — G04.90 ENCEPHALITIS: Status: ACTIVE | Noted: 2024-05-06

## 2024-06-04 PROBLEM — R64 CACHEXIA (HCC): Status: ACTIVE | Noted: 2024-06-04

## 2024-06-04 PROBLEM — Z51.5 ENCOUNTER FOR PALLIATIVE CARE: Status: ACTIVE | Noted: 2024-06-04

## 2024-06-04 PROBLEM — K63.1 BOWEL PERFORATION (HCC): Status: ACTIVE | Noted: 2024-06-04

## 2024-06-04 PROBLEM — R62.7 ADULT FAILURE TO THRIVE: Status: ACTIVE | Noted: 2024-06-04

## 2024-06-04 LAB
ANION GAP SERPL CALC-SCNC: 8 MMOL/L (ref 9–18)
BASOPHILS # BLD: 0 K/UL (ref 0–0.2)
BASOPHILS NFR BLD: 0 % (ref 0–2)
BUN SERPL-MCNC: 8 MG/DL (ref 6–23)
CALCIUM SERPL-MCNC: 7.3 MG/DL (ref 8.8–10.2)
CHLORIDE SERPL-SCNC: 102 MMOL/L (ref 98–107)
CO2 SERPL-SCNC: 23 MMOL/L (ref 20–28)
CREAT SERPL-MCNC: 0.28 MG/DL (ref 0.8–1.3)
DIFFERENTIAL METHOD BLD: ABNORMAL
EKG ATRIAL RATE: 140 BPM
EKG DIAGNOSIS: NORMAL
EKG P AXIS: 41 DEGREES
EKG P-R INTERVAL: 120 MS
EKG Q-T INTERVAL: 288 MS
EKG QRS DURATION: 72 MS
EKG QTC CALCULATION (BAZETT): 439 MS
EKG R AXIS: -71 DEGREES
EKG T AXIS: 53 DEGREES
EKG VENTRICULAR RATE: 140 BPM
EOSINOPHIL # BLD: 0 K/UL (ref 0–0.8)
EOSINOPHIL NFR BLD: 0 % (ref 0.5–7.8)
ERYTHROCYTE [DISTWIDTH] IN BLOOD BY AUTOMATED COUNT: 19.7 % (ref 11.9–14.6)
GLUCOSE BLD STRIP.AUTO-MCNC: 100 MG/DL (ref 65–100)
GLUCOSE BLD STRIP.AUTO-MCNC: 100 MG/DL (ref 65–100)
GLUCOSE BLD STRIP.AUTO-MCNC: 108 MG/DL (ref 65–100)
GLUCOSE BLD STRIP.AUTO-MCNC: 89 MG/DL (ref 65–100)
GLUCOSE SERPL-MCNC: 99 MG/DL (ref 70–99)
HCT VFR BLD AUTO: 25.2 % (ref 41.1–50.3)
HGB BLD-MCNC: 7.6 G/DL (ref 13.6–17.2)
IMM GRANULOCYTES # BLD AUTO: 0.2 K/UL (ref 0–0.5)
IMM GRANULOCYTES NFR BLD AUTO: 1 % (ref 0–5)
LYMPHOCYTES # BLD: 1 K/UL (ref 0.5–4.6)
LYMPHOCYTES NFR BLD: 6 % (ref 13–44)
MAGNESIUM SERPL-MCNC: 1.7 MG/DL (ref 1.8–2.4)
MCH RBC QN AUTO: 29.6 PG (ref 26.1–32.9)
MCHC RBC AUTO-ENTMCNC: 30.2 G/DL (ref 31.4–35)
MCV RBC AUTO: 98.1 FL (ref 82–102)
MONOCYTES # BLD: 0.7 K/UL (ref 0.1–1.3)
MONOCYTES NFR BLD: 4 % (ref 4–12)
NEUTS SEG # BLD: 15.5 K/UL (ref 1.7–8.2)
NEUTS SEG NFR BLD: 89 % (ref 43–78)
NRBC # BLD: 0.02 K/UL (ref 0–0.2)
PHOSPHATE SERPL-MCNC: 3 MG/DL (ref 2.5–4.5)
PLATELET # BLD AUTO: 309 K/UL (ref 150–450)
PMV BLD AUTO: 10.1 FL (ref 9.4–12.3)
POTASSIUM SERPL-SCNC: 3.7 MMOL/L (ref 3.5–5.1)
RBC # BLD AUTO: 2.57 M/UL (ref 4.23–5.6)
SERVICE CMNT-IMP: ABNORMAL
SERVICE CMNT-IMP: NORMAL
SODIUM SERPL-SCNC: 133 MMOL/L (ref 136–145)
WBC # BLD AUTO: 17.5 K/UL (ref 4.3–11.1)

## 2024-06-04 PROCEDURE — 6360000002 HC RX W HCPCS: Performed by: FAMILY MEDICINE

## 2024-06-04 PROCEDURE — 2580000003 HC RX 258: Performed by: PHYSICIAN ASSISTANT

## 2024-06-04 PROCEDURE — 36415 COLL VENOUS BLD VENIPUNCTURE: CPT

## 2024-06-04 PROCEDURE — 6360000002 HC RX W HCPCS: Performed by: PHYSICIAN ASSISTANT

## 2024-06-04 PROCEDURE — 99231 SBSQ HOSP IP/OBS SF/LOW 25: CPT | Performed by: NURSE PRACTITIONER

## 2024-06-04 PROCEDURE — 2500000003 HC RX 250 WO HCPCS: Performed by: PHYSICIAN ASSISTANT

## 2024-06-04 PROCEDURE — 97530 THERAPEUTIC ACTIVITIES: CPT

## 2024-06-04 PROCEDURE — C9113 INJ PANTOPRAZOLE SODIUM, VIA: HCPCS | Performed by: FAMILY MEDICINE

## 2024-06-04 PROCEDURE — 97112 NEUROMUSCULAR REEDUCATION: CPT

## 2024-06-04 PROCEDURE — 93010 ELECTROCARDIOGRAM REPORT: CPT | Performed by: INTERNAL MEDICINE

## 2024-06-04 PROCEDURE — 82962 GLUCOSE BLOOD TEST: CPT

## 2024-06-04 PROCEDURE — A4216 STERILE WATER/SALINE, 10 ML: HCPCS | Performed by: FAMILY MEDICINE

## 2024-06-04 PROCEDURE — 1100000003 HC PRIVATE W/ TELEMETRY

## 2024-06-04 PROCEDURE — 6370000000 HC RX 637 (ALT 250 FOR IP): Performed by: SURGERY

## 2024-06-04 PROCEDURE — 2580000003 HC RX 258: Performed by: STUDENT IN AN ORGANIZED HEALTH CARE EDUCATION/TRAINING PROGRAM

## 2024-06-04 PROCEDURE — 74018 RADEX ABDOMEN 1 VIEW: CPT

## 2024-06-04 PROCEDURE — 84100 ASSAY OF PHOSPHORUS: CPT

## 2024-06-04 PROCEDURE — 94760 N-INVAS EAR/PLS OXIMETRY 1: CPT

## 2024-06-04 PROCEDURE — 85025 COMPLETE CBC W/AUTO DIFF WBC: CPT

## 2024-06-04 PROCEDURE — 2580000003 HC RX 258: Performed by: FAMILY MEDICINE

## 2024-06-04 PROCEDURE — 2700000000 HC OXYGEN THERAPY PER DAY

## 2024-06-04 PROCEDURE — 80048 BASIC METABOLIC PNL TOTAL CA: CPT

## 2024-06-04 PROCEDURE — 83735 ASSAY OF MAGNESIUM: CPT

## 2024-06-04 RX ADMIN — METRONIDAZOLE 500 MG: 500 INJECTION, SOLUTION INTRAVENOUS at 21:46

## 2024-06-04 RX ADMIN — METRONIDAZOLE 500 MG: 500 INJECTION, SOLUTION INTRAVENOUS at 04:58

## 2024-06-04 RX ADMIN — CIPROFLOXACIN 400 MG: 2 INJECTION, SOLUTION INTRAVENOUS at 21:55

## 2024-06-04 RX ADMIN — CALCIUM GLUCONATE: 98 INJECTION, SOLUTION INTRAVENOUS at 18:16

## 2024-06-04 RX ADMIN — ACETAMINOPHEN 650 MG: 325 TABLET ORAL at 08:47

## 2024-06-04 RX ADMIN — ACETAMINOPHEN 650 MG: 325 TABLET ORAL at 21:38

## 2024-06-04 RX ADMIN — CIPROFLOXACIN 400 MG: 2 INJECTION, SOLUTION INTRAVENOUS at 08:56

## 2024-06-04 RX ADMIN — PANTOPRAZOLE SODIUM 40 MG: 40 INJECTION, POWDER, FOR SOLUTION INTRAVENOUS at 14:18

## 2024-06-04 RX ADMIN — LEVETIRACETAM 500 MG: 100 INJECTION, SOLUTION INTRAVENOUS at 21:38

## 2024-06-04 RX ADMIN — ACETAMINOPHEN 650 MG: 325 TABLET ORAL at 14:24

## 2024-06-04 RX ADMIN — I.V. FAT EMULSION 250 ML: 20 EMULSION INTRAVENOUS at 18:17

## 2024-06-04 RX ADMIN — SODIUM CHLORIDE: 9 INJECTION, SOLUTION INTRAVENOUS at 21:54

## 2024-06-04 RX ADMIN — LEVETIRACETAM 500 MG: 100 INJECTION, SOLUTION INTRAVENOUS at 08:47

## 2024-06-04 RX ADMIN — SODIUM CHLORIDE, PRESERVATIVE FREE 10 ML: 5 INJECTION INTRAVENOUS at 08:47

## 2024-06-04 RX ADMIN — PANTOPRAZOLE SODIUM 40 MG: 40 INJECTION, POWDER, FOR SOLUTION INTRAVENOUS at 02:22

## 2024-06-04 RX ADMIN — METRONIDAZOLE 500 MG: 500 INJECTION, SOLUTION INTRAVENOUS at 12:19

## 2024-06-04 ASSESSMENT — PAIN SCALES - GENERAL: PAINLEVEL_OUTOF10: 0

## 2024-06-05 LAB
ANION GAP SERPL CALC-SCNC: 8 MMOL/L (ref 9–18)
BUN SERPL-MCNC: 8 MG/DL (ref 6–23)
CA-I BLD-MCNC: 4.25 MG/DL (ref 4–5.2)
CALCIUM SERPL-MCNC: 7.1 MG/DL (ref 8.8–10.2)
CHLORIDE SERPL-SCNC: 101 MMOL/L (ref 98–107)
CO2 SERPL-SCNC: 25 MMOL/L (ref 20–28)
CREAT SERPL-MCNC: 0.24 MG/DL (ref 0.8–1.3)
ERYTHROCYTE [DISTWIDTH] IN BLOOD BY AUTOMATED COUNT: 19.8 % (ref 11.9–14.6)
GLUCOSE BLD STRIP.AUTO-MCNC: 103 MG/DL (ref 65–100)
GLUCOSE BLD STRIP.AUTO-MCNC: 103 MG/DL (ref 65–100)
GLUCOSE SERPL-MCNC: 101 MG/DL (ref 70–99)
HCT VFR BLD AUTO: 24.3 % (ref 41.1–50.3)
HGB BLD-MCNC: 7.3 G/DL (ref 13.6–17.2)
MAGNESIUM SERPL-MCNC: 1.8 MG/DL (ref 1.8–2.4)
MCH RBC QN AUTO: 29.2 PG (ref 26.1–32.9)
MCHC RBC AUTO-ENTMCNC: 30 G/DL (ref 31.4–35)
MCV RBC AUTO: 97.2 FL (ref 82–102)
NRBC # BLD: 0.02 K/UL (ref 0–0.2)
PHOSPHATE SERPL-MCNC: 2.9 MG/DL (ref 2.5–4.5)
PLATELET # BLD AUTO: 322 K/UL (ref 150–450)
PMV BLD AUTO: 9.9 FL (ref 9.4–12.3)
POTASSIUM SERPL-SCNC: 3.7 MMOL/L (ref 3.5–5.1)
RBC # BLD AUTO: 2.5 M/UL (ref 4.23–5.6)
SERVICE CMNT-IMP: ABNORMAL
SERVICE CMNT-IMP: ABNORMAL
SODIUM SERPL-SCNC: 134 MMOL/L (ref 136–145)
WBC # BLD AUTO: 16.5 K/UL (ref 4.3–11.1)

## 2024-06-05 PROCEDURE — 36573 INSJ PICC RS&I 5 YR+: CPT

## 2024-06-05 PROCEDURE — 2500000003 HC RX 250 WO HCPCS: Performed by: PHYSICIAN ASSISTANT

## 2024-06-05 PROCEDURE — C1751 CATH, INF, PER/CENT/MIDLINE: HCPCS

## 2024-06-05 PROCEDURE — 6360000002 HC RX W HCPCS: Performed by: PHYSICIAN ASSISTANT

## 2024-06-05 PROCEDURE — 83735 ASSAY OF MAGNESIUM: CPT

## 2024-06-05 PROCEDURE — 6360000002 HC RX W HCPCS: Performed by: FAMILY MEDICINE

## 2024-06-05 PROCEDURE — 36415 COLL VENOUS BLD VENIPUNCTURE: CPT

## 2024-06-05 PROCEDURE — C9113 INJ PANTOPRAZOLE SODIUM, VIA: HCPCS | Performed by: FAMILY MEDICINE

## 2024-06-05 PROCEDURE — 80048 BASIC METABOLIC PNL TOTAL CA: CPT

## 2024-06-05 PROCEDURE — 02HV33Z INSERTION OF INFUSION DEVICE INTO SUPERIOR VENA CAVA, PERCUTANEOUS APPROACH: ICD-10-PCS | Performed by: INTERNAL MEDICINE

## 2024-06-05 PROCEDURE — 85027 COMPLETE CBC AUTOMATED: CPT

## 2024-06-05 PROCEDURE — A4216 STERILE WATER/SALINE, 10 ML: HCPCS | Performed by: FAMILY MEDICINE

## 2024-06-05 PROCEDURE — 6370000000 HC RX 637 (ALT 250 FOR IP): Performed by: SURGERY

## 2024-06-05 PROCEDURE — 2580000003 HC RX 258: Performed by: NURSE PRACTITIONER

## 2024-06-05 PROCEDURE — 2580000003 HC RX 258: Performed by: STUDENT IN AN ORGANIZED HEALTH CARE EDUCATION/TRAINING PROGRAM

## 2024-06-05 PROCEDURE — 2580000003 HC RX 258: Performed by: PHYSICIAN ASSISTANT

## 2024-06-05 PROCEDURE — 97530 THERAPEUTIC ACTIVITIES: CPT

## 2024-06-05 PROCEDURE — 94760 N-INVAS EAR/PLS OXIMETRY 1: CPT

## 2024-06-05 PROCEDURE — 97535 SELF CARE MNGMENT TRAINING: CPT

## 2024-06-05 PROCEDURE — 2580000003 HC RX 258: Performed by: FAMILY MEDICINE

## 2024-06-05 PROCEDURE — 84100 ASSAY OF PHOSPHORUS: CPT

## 2024-06-05 PROCEDURE — 2700000000 HC OXYGEN THERAPY PER DAY

## 2024-06-05 PROCEDURE — 82330 ASSAY OF CALCIUM: CPT

## 2024-06-05 PROCEDURE — 92507 TX SP LANG VOICE COMM INDIV: CPT

## 2024-06-05 PROCEDURE — 6370000000 HC RX 637 (ALT 250 FOR IP): Performed by: PHYSICIAN ASSISTANT

## 2024-06-05 PROCEDURE — 82962 GLUCOSE BLOOD TEST: CPT

## 2024-06-05 PROCEDURE — 1100000003 HC PRIVATE W/ TELEMETRY

## 2024-06-05 RX ORDER — LIDOCAINE HYDROCHLORIDE 10 MG/ML
5 INJECTION, SOLUTION EPIDURAL; INFILTRATION; INTRACAUDAL; PERINEURAL ONCE
Status: DISCONTINUED | OUTPATIENT
Start: 2024-06-05 | End: 2024-06-09

## 2024-06-05 RX ORDER — SODIUM CHLORIDE 0.9 % (FLUSH) 0.9 %
5-40 SYRINGE (ML) INJECTION EVERY 12 HOURS SCHEDULED
Status: DISCONTINUED | OUTPATIENT
Start: 2024-06-05 | End: 2024-06-11

## 2024-06-05 RX ORDER — CETIRIZINE HYDROCHLORIDE 10 MG/1
10 TABLET ORAL DAILY
Status: DISCONTINUED | OUTPATIENT
Start: 2024-06-05 | End: 2024-07-26

## 2024-06-05 RX ORDER — SODIUM CHLORIDE 0.9 % (FLUSH) 0.9 %
5-40 SYRINGE (ML) INJECTION PRN
Status: DISCONTINUED | OUTPATIENT
Start: 2024-06-05 | End: 2024-06-11

## 2024-06-05 RX ORDER — SODIUM CHLORIDE 9 MG/ML
25 INJECTION, SOLUTION INTRAVENOUS PRN
Status: DISCONTINUED | OUTPATIENT
Start: 2024-06-05 | End: 2024-10-18 | Stop reason: HOSPADM

## 2024-06-05 RX ORDER — TRIAMCINOLONE ACETONIDE 1 MG/G
CREAM TOPICAL 2 TIMES DAILY
Status: DISCONTINUED | OUTPATIENT
Start: 2024-06-05 | End: 2024-07-22

## 2024-06-05 RX ORDER — DIPHENHYDRAMINE HYDROCHLORIDE 50 MG/ML
25 INJECTION INTRAMUSCULAR; INTRAVENOUS ONCE
Status: COMPLETED | OUTPATIENT
Start: 2024-06-05 | End: 2024-06-05

## 2024-06-05 RX ORDER — DIPHENHYDRAMINE HYDROCHLORIDE 50 MG/ML
25 INJECTION INTRAMUSCULAR; INTRAVENOUS EVERY 6 HOURS PRN
Status: DISCONTINUED | OUTPATIENT
Start: 2024-06-05 | End: 2024-07-23

## 2024-06-05 RX ADMIN — DIPHENHYDRAMINE HYDROCHLORIDE 25 MG: 50 INJECTION INTRAMUSCULAR; INTRAVENOUS at 15:30

## 2024-06-05 RX ADMIN — ACETAMINOPHEN 650 MG: 325 TABLET ORAL at 21:28

## 2024-06-05 RX ADMIN — METRONIDAZOLE 500 MG: 500 INJECTION, SOLUTION INTRAVENOUS at 04:51

## 2024-06-05 RX ADMIN — CALCIUM GLUCONATE: 98 INJECTION, SOLUTION INTRAVENOUS at 18:33

## 2024-06-05 RX ADMIN — ACETAMINOPHEN 650 MG: 325 TABLET ORAL at 14:22

## 2024-06-05 RX ADMIN — ACETAMINOPHEN 650 MG: 325 TABLET ORAL at 02:02

## 2024-06-05 RX ADMIN — PIPERACILLIN AND TAZOBACTAM 3375 MG: 3; .375 INJECTION, POWDER, LYOPHILIZED, FOR SOLUTION INTRAVENOUS at 21:26

## 2024-06-05 RX ADMIN — SODIUM CHLORIDE, PRESERVATIVE FREE 5 ML: 5 INJECTION INTRAVENOUS at 21:30

## 2024-06-05 RX ADMIN — LEVETIRACETAM 500 MG: 100 INJECTION, SOLUTION INTRAVENOUS at 09:39

## 2024-06-05 RX ADMIN — CIPROFLOXACIN 400 MG: 2 INJECTION, SOLUTION INTRAVENOUS at 09:47

## 2024-06-05 RX ADMIN — LEVETIRACETAM 500 MG: 100 INJECTION, SOLUTION INTRAVENOUS at 21:29

## 2024-06-05 RX ADMIN — CETIRIZINE HYDROCHLORIDE 10 MG: 10 TABLET ORAL at 15:30

## 2024-06-05 RX ADMIN — SODIUM CHLORIDE: 9 INJECTION, SOLUTION INTRAVENOUS at 12:37

## 2024-06-05 RX ADMIN — PANTOPRAZOLE SODIUM 40 MG: 40 INJECTION, POWDER, FOR SOLUTION INTRAVENOUS at 14:22

## 2024-06-05 RX ADMIN — PIPERACILLIN AND TAZOBACTAM 4500 MG: 4; .5 INJECTION, POWDER, LYOPHILIZED, FOR SOLUTION INTRAVENOUS at 15:06

## 2024-06-05 RX ADMIN — TRIAMCINOLONE ACETONIDE: 1 CREAM TOPICAL at 15:30

## 2024-06-05 RX ADMIN — ACETAMINOPHEN 650 MG: 325 TABLET ORAL at 09:39

## 2024-06-05 RX ADMIN — I.V. FAT EMULSION 250 ML: 20 EMULSION INTRAVENOUS at 18:35

## 2024-06-05 RX ADMIN — TRIAMCINOLONE ACETONIDE: 1 CREAM TOPICAL at 21:28

## 2024-06-05 RX ADMIN — PANTOPRAZOLE SODIUM 40 MG: 40 INJECTION, POWDER, FOR SOLUTION INTRAVENOUS at 02:02

## 2024-06-05 RX ADMIN — METRONIDAZOLE 500 MG: 500 INJECTION, SOLUTION INTRAVENOUS at 12:37

## 2024-06-05 ASSESSMENT — PAIN SCALES - GENERAL: PAINLEVEL_OUTOF10: 0

## 2024-06-06 LAB
ALBUMIN SERPL-MCNC: 1 G/DL (ref 3.5–5)
ALBUMIN/GLOB SERPL: 0.3 (ref 1–1.9)
ALP SERPL-CCNC: 117 U/L (ref 40–129)
ALT SERPL-CCNC: 8 U/L (ref 12–65)
ANION GAP SERPL CALC-SCNC: 6 MMOL/L (ref 9–18)
AST SERPL-CCNC: 26 U/L (ref 15–37)
BASOPHILS # BLD: 0 K/UL (ref 0–0.2)
BASOPHILS NFR BLD: 0 % (ref 0–2)
BILIRUB DIRECT SERPL-MCNC: <0.2 MG/DL (ref 0–0.4)
BILIRUB SERPL-MCNC: <0.2 MG/DL (ref 0–1.2)
BUN SERPL-MCNC: 8 MG/DL (ref 6–23)
CALCIUM SERPL-MCNC: 7 MG/DL (ref 8.8–10.2)
CHLORIDE SERPL-SCNC: 103 MMOL/L (ref 98–107)
CO2 SERPL-SCNC: 27 MMOL/L (ref 20–28)
CREAT SERPL-MCNC: 0.28 MG/DL (ref 0.8–1.3)
DIFFERENTIAL METHOD BLD: ABNORMAL
EOSINOPHIL # BLD: 0 K/UL (ref 0–0.8)
EOSINOPHIL NFR BLD: 0 % (ref 0.5–7.8)
ERYTHROCYTE [DISTWIDTH] IN BLOOD BY AUTOMATED COUNT: 19.6 % (ref 11.9–14.6)
GLOBULIN SER CALC-MCNC: 3.3 G/DL (ref 2.3–3.5)
GLUCOSE BLD STRIP.AUTO-MCNC: 116 MG/DL (ref 65–100)
GLUCOSE BLD STRIP.AUTO-MCNC: 122 MG/DL (ref 65–100)
GLUCOSE SERPL-MCNC: 104 MG/DL (ref 70–99)
HCT VFR BLD AUTO: 21.9 % (ref 41.1–50.3)
HCT VFR BLD AUTO: 26.4 % (ref 41.1–50.3)
HGB BLD-MCNC: 6.9 G/DL (ref 13.6–17.2)
HGB BLD-MCNC: 8.1 G/DL (ref 13.6–17.2)
HISTORY CHECK: NORMAL
IMM GRANULOCYTES # BLD AUTO: 0.2 K/UL (ref 0–0.5)
IMM GRANULOCYTES NFR BLD AUTO: 1 % (ref 0–5)
LYMPHOCYTES # BLD: 1.3 K/UL (ref 0.5–4.6)
LYMPHOCYTES NFR BLD: 9 % (ref 13–44)
MAGNESIUM SERPL-MCNC: 1.9 MG/DL (ref 1.8–2.4)
MCH RBC QN AUTO: 29.6 PG (ref 26.1–32.9)
MCHC RBC AUTO-ENTMCNC: 31.5 G/DL (ref 31.4–35)
MCV RBC AUTO: 94 FL (ref 82–102)
MONOCYTES # BLD: 0.7 K/UL (ref 0.1–1.3)
MONOCYTES NFR BLD: 5 % (ref 4–12)
NEUTS SEG # BLD: 12.3 K/UL (ref 1.7–8.2)
NEUTS SEG NFR BLD: 85 % (ref 43–78)
NRBC # BLD: 0.03 K/UL (ref 0–0.2)
PHOSPHATE SERPL-MCNC: 2.8 MG/DL (ref 2.5–4.5)
PLATELET # BLD AUTO: 304 K/UL (ref 150–450)
PMV BLD AUTO: 9.9 FL (ref 9.4–12.3)
POTASSIUM SERPL-SCNC: 3.8 MMOL/L (ref 3.5–5.1)
PROT SERPL-MCNC: 4.3 G/DL (ref 6.3–8.2)
RBC # BLD AUTO: 2.33 M/UL (ref 4.23–5.6)
SERVICE CMNT-IMP: ABNORMAL
SERVICE CMNT-IMP: ABNORMAL
SODIUM SERPL-SCNC: 136 MMOL/L (ref 136–145)
TRIGL SERPL-MCNC: 169 MG/DL (ref 0–150)
WBC # BLD AUTO: 14.4 K/UL (ref 4.3–11.1)

## 2024-06-06 PROCEDURE — 6370000000 HC RX 637 (ALT 250 FOR IP): Performed by: PHYSICIAN ASSISTANT

## 2024-06-06 PROCEDURE — 2500000003 HC RX 250 WO HCPCS: Performed by: PHYSICIAN ASSISTANT

## 2024-06-06 PROCEDURE — 2580000003 HC RX 258: Performed by: STUDENT IN AN ORGANIZED HEALTH CARE EDUCATION/TRAINING PROGRAM

## 2024-06-06 PROCEDURE — C9113 INJ PANTOPRAZOLE SODIUM, VIA: HCPCS | Performed by: FAMILY MEDICINE

## 2024-06-06 PROCEDURE — 84478 ASSAY OF TRIGLYCERIDES: CPT

## 2024-06-06 PROCEDURE — 6360000002 HC RX W HCPCS: Performed by: FAMILY MEDICINE

## 2024-06-06 PROCEDURE — 2700000000 HC OXYGEN THERAPY PER DAY

## 2024-06-06 PROCEDURE — 94760 N-INVAS EAR/PLS OXIMETRY 1: CPT

## 2024-06-06 PROCEDURE — 84100 ASSAY OF PHOSPHORUS: CPT

## 2024-06-06 PROCEDURE — 86923 COMPATIBILITY TEST ELECTRIC: CPT

## 2024-06-06 PROCEDURE — 2580000003 HC RX 258: Performed by: FAMILY MEDICINE

## 2024-06-06 PROCEDURE — A4216 STERILE WATER/SALINE, 10 ML: HCPCS | Performed by: FAMILY MEDICINE

## 2024-06-06 PROCEDURE — 6370000000 HC RX 637 (ALT 250 FOR IP): Performed by: SURGERY

## 2024-06-06 PROCEDURE — 1100000003 HC PRIVATE W/ TELEMETRY

## 2024-06-06 PROCEDURE — 2580000003 HC RX 258: Performed by: PHYSICIAN ASSISTANT

## 2024-06-06 PROCEDURE — 85025 COMPLETE CBC W/AUTO DIFF WBC: CPT

## 2024-06-06 PROCEDURE — 6360000002 HC RX W HCPCS: Performed by: PHYSICIAN ASSISTANT

## 2024-06-06 PROCEDURE — 85018 HEMOGLOBIN: CPT

## 2024-06-06 PROCEDURE — 80048 BASIC METABOLIC PNL TOTAL CA: CPT

## 2024-06-06 PROCEDURE — 86901 BLOOD TYPING SEROLOGIC RH(D): CPT

## 2024-06-06 PROCEDURE — 36415 COLL VENOUS BLD VENIPUNCTURE: CPT

## 2024-06-06 PROCEDURE — 86850 RBC ANTIBODY SCREEN: CPT

## 2024-06-06 PROCEDURE — 82962 GLUCOSE BLOOD TEST: CPT

## 2024-06-06 PROCEDURE — 2580000003 HC RX 258: Performed by: NURSE PRACTITIONER

## 2024-06-06 PROCEDURE — 51702 INSERT TEMP BLADDER CATH: CPT

## 2024-06-06 PROCEDURE — 83735 ASSAY OF MAGNESIUM: CPT

## 2024-06-06 PROCEDURE — 80076 HEPATIC FUNCTION PANEL: CPT

## 2024-06-06 PROCEDURE — 6360000002 HC RX W HCPCS: Performed by: SURGERY

## 2024-06-06 PROCEDURE — 86900 BLOOD TYPING SEROLOGIC ABO: CPT

## 2024-06-06 PROCEDURE — 85014 HEMATOCRIT: CPT

## 2024-06-06 PROCEDURE — P9016 RBC LEUKOCYTES REDUCED: HCPCS

## 2024-06-06 PROCEDURE — 36430 TRANSFUSION BLD/BLD COMPNT: CPT

## 2024-06-06 RX ORDER — SODIUM CHLORIDE 9 MG/ML
INJECTION, SOLUTION INTRAVENOUS PRN
Status: DISCONTINUED | OUTPATIENT
Start: 2024-06-06 | End: 2024-06-29

## 2024-06-06 RX ORDER — METOPROLOL TARTRATE 25 MG/1
12.5 TABLET, FILM COATED ORAL 2 TIMES DAILY
Status: DISCONTINUED | OUTPATIENT
Start: 2024-06-06 | End: 2024-06-08

## 2024-06-06 RX ADMIN — METOPROLOL TARTRATE 12.5 MG: 25 TABLET, FILM COATED ORAL at 12:43

## 2024-06-06 RX ADMIN — ENOXAPARIN SODIUM 40 MG: 100 INJECTION SUBCUTANEOUS at 08:29

## 2024-06-06 RX ADMIN — PANTOPRAZOLE SODIUM 40 MG: 40 INJECTION, POWDER, FOR SOLUTION INTRAVENOUS at 18:05

## 2024-06-06 RX ADMIN — SODIUM CHLORIDE, PRESERVATIVE FREE 10 ML: 5 INJECTION INTRAVENOUS at 20:47

## 2024-06-06 RX ADMIN — SODIUM CHLORIDE, PRESERVATIVE FREE 5 ML: 5 INJECTION INTRAVENOUS at 08:28

## 2024-06-06 RX ADMIN — ACETAMINOPHEN 650 MG: 325 TABLET ORAL at 20:47

## 2024-06-06 RX ADMIN — ACETAMINOPHEN 650 MG: 325 TABLET ORAL at 02:05

## 2024-06-06 RX ADMIN — LEVETIRACETAM 500 MG: 100 INJECTION, SOLUTION INTRAVENOUS at 08:30

## 2024-06-06 RX ADMIN — POTASSIUM CHLORIDE: 2 INJECTION, SOLUTION, CONCENTRATE INTRAVENOUS at 18:03

## 2024-06-06 RX ADMIN — TRIAMCINOLONE ACETONIDE: 1 CREAM TOPICAL at 08:34

## 2024-06-06 RX ADMIN — LEVETIRACETAM 500 MG: 100 INJECTION, SOLUTION INTRAVENOUS at 20:43

## 2024-06-06 RX ADMIN — PANTOPRAZOLE SODIUM 40 MG: 40 INJECTION, POWDER, FOR SOLUTION INTRAVENOUS at 02:05

## 2024-06-06 RX ADMIN — PIPERACILLIN AND TAZOBACTAM 3375 MG: 3; .375 INJECTION, POWDER, LYOPHILIZED, FOR SOLUTION INTRAVENOUS at 20:57

## 2024-06-06 RX ADMIN — ACETAMINOPHEN 650 MG: 325 TABLET ORAL at 08:30

## 2024-06-06 RX ADMIN — ACETAMINOPHEN 650 MG: 325 TABLET ORAL at 12:43

## 2024-06-06 RX ADMIN — PIPERACILLIN AND TAZOBACTAM 3375 MG: 3; .375 INJECTION, POWDER, LYOPHILIZED, FOR SOLUTION INTRAVENOUS at 04:39

## 2024-06-06 RX ADMIN — I.V. FAT EMULSION 250 ML: 20 EMULSION INTRAVENOUS at 18:02

## 2024-06-06 RX ADMIN — SODIUM CHLORIDE, PRESERVATIVE FREE 5 ML: 5 INJECTION INTRAVENOUS at 08:29

## 2024-06-06 RX ADMIN — PIPERACILLIN AND TAZOBACTAM 3375 MG: 3; .375 INJECTION, POWDER, LYOPHILIZED, FOR SOLUTION INTRAVENOUS at 12:29

## 2024-06-06 RX ADMIN — METOPROLOL TARTRATE 12.5 MG: 25 TABLET, FILM COATED ORAL at 20:47

## 2024-06-06 RX ADMIN — TRIAMCINOLONE ACETONIDE: 1 CREAM TOPICAL at 20:58

## 2024-06-06 RX ADMIN — CETIRIZINE HYDROCHLORIDE 10 MG: 10 TABLET ORAL at 08:30

## 2024-06-06 ASSESSMENT — PAIN SCALES - GENERAL
PAINLEVEL_OUTOF10: 0
PAINLEVEL_OUTOF10: 0

## 2024-06-07 LAB
ABO + RH BLD: NORMAL
ANION GAP SERPL CALC-SCNC: 6 MMOL/L (ref 9–18)
BLD PROD TYP BPU: NORMAL
BLOOD BANK BLOOD PRODUCT EXPIRATION DATE: NORMAL
BLOOD BANK DISPENSE STATUS: NORMAL
BLOOD BANK ISBT PRODUCT BLOOD TYPE: 6200
BLOOD BANK PRODUCT CODE: NORMAL
BLOOD BANK UNIT TYPE AND RH: NORMAL
BLOOD GROUP ANTIBODIES SERPL: NORMAL
BPU ID: NORMAL
BUN SERPL-MCNC: 8 MG/DL (ref 6–23)
CALCIUM SERPL-MCNC: 7.2 MG/DL (ref 8.8–10.2)
CHLORIDE SERPL-SCNC: 104 MMOL/L (ref 98–107)
CO2 SERPL-SCNC: 27 MMOL/L (ref 20–28)
CREAT SERPL-MCNC: 0.26 MG/DL (ref 0.8–1.3)
CROSSMATCH RESULT: NORMAL
ERYTHROCYTE [DISTWIDTH] IN BLOOD BY AUTOMATED COUNT: 18.8 % (ref 11.9–14.6)
GLUCOSE SERPL-MCNC: 128 MG/DL (ref 70–99)
HCT VFR BLD AUTO: 27.8 % (ref 41.1–50.3)
HGB BLD-MCNC: 8.5 G/DL (ref 13.6–17.2)
MAGNESIUM SERPL-MCNC: 2 MG/DL (ref 1.8–2.4)
MCH RBC QN AUTO: 29.2 PG (ref 26.1–32.9)
MCHC RBC AUTO-ENTMCNC: 30.6 G/DL (ref 31.4–35)
MCV RBC AUTO: 95.5 FL (ref 82–102)
NRBC # BLD: 0.02 K/UL (ref 0–0.2)
PHOSPHATE SERPL-MCNC: 2.7 MG/DL (ref 2.5–4.5)
PLATELET # BLD AUTO: 271 K/UL (ref 150–450)
PMV BLD AUTO: 10.1 FL (ref 9.4–12.3)
POTASSIUM SERPL-SCNC: 3.6 MMOL/L (ref 3.5–5.1)
RBC # BLD AUTO: 2.91 M/UL (ref 4.23–5.6)
SODIUM SERPL-SCNC: 137 MMOL/L (ref 136–145)
SPECIMEN EXP DATE BLD: NORMAL
UNIT DIVISION: 0
UNIT ISSUE DATE/TIME: NORMAL
WBC # BLD AUTO: 12.2 K/UL (ref 4.3–11.1)

## 2024-06-07 PROCEDURE — 6360000002 HC RX W HCPCS: Performed by: FAMILY MEDICINE

## 2024-06-07 PROCEDURE — 6370000000 HC RX 637 (ALT 250 FOR IP): Performed by: SURGERY

## 2024-06-07 PROCEDURE — 2700000000 HC OXYGEN THERAPY PER DAY

## 2024-06-07 PROCEDURE — 97112 NEUROMUSCULAR REEDUCATION: CPT

## 2024-06-07 PROCEDURE — 83735 ASSAY OF MAGNESIUM: CPT

## 2024-06-07 PROCEDURE — 2580000003 HC RX 258: Performed by: STUDENT IN AN ORGANIZED HEALTH CARE EDUCATION/TRAINING PROGRAM

## 2024-06-07 PROCEDURE — A4216 STERILE WATER/SALINE, 10 ML: HCPCS | Performed by: FAMILY MEDICINE

## 2024-06-07 PROCEDURE — C9113 INJ PANTOPRAZOLE SODIUM, VIA: HCPCS | Performed by: FAMILY MEDICINE

## 2024-06-07 PROCEDURE — 2500000003 HC RX 250 WO HCPCS: Performed by: PHYSICIAN ASSISTANT

## 2024-06-07 PROCEDURE — 2580000003 HC RX 258: Performed by: PHYSICIAN ASSISTANT

## 2024-06-07 PROCEDURE — 80048 BASIC METABOLIC PNL TOTAL CA: CPT

## 2024-06-07 PROCEDURE — 97530 THERAPEUTIC ACTIVITIES: CPT

## 2024-06-07 PROCEDURE — 84100 ASSAY OF PHOSPHORUS: CPT

## 2024-06-07 PROCEDURE — 6370000000 HC RX 637 (ALT 250 FOR IP): Performed by: PHYSICIAN ASSISTANT

## 2024-06-07 PROCEDURE — 6360000002 HC RX W HCPCS: Performed by: PHYSICIAN ASSISTANT

## 2024-06-07 PROCEDURE — 6360000002 HC RX W HCPCS: Performed by: SURGERY

## 2024-06-07 PROCEDURE — 2580000003 HC RX 258: Performed by: FAMILY MEDICINE

## 2024-06-07 PROCEDURE — 36415 COLL VENOUS BLD VENIPUNCTURE: CPT

## 2024-06-07 PROCEDURE — 51702 INSERT TEMP BLADDER CATH: CPT

## 2024-06-07 PROCEDURE — 85027 COMPLETE CBC AUTOMATED: CPT

## 2024-06-07 PROCEDURE — 1100000003 HC PRIVATE W/ TELEMETRY

## 2024-06-07 PROCEDURE — 2580000003 HC RX 258: Performed by: NURSE PRACTITIONER

## 2024-06-07 RX ADMIN — PANTOPRAZOLE SODIUM 40 MG: 40 INJECTION, POWDER, FOR SOLUTION INTRAVENOUS at 01:43

## 2024-06-07 RX ADMIN — METOPROLOL TARTRATE 12.5 MG: 25 TABLET, FILM COATED ORAL at 08:13

## 2024-06-07 RX ADMIN — CETIRIZINE HYDROCHLORIDE 10 MG: 10 TABLET ORAL at 08:13

## 2024-06-07 RX ADMIN — PIPERACILLIN AND TAZOBACTAM 3375 MG: 3; .375 INJECTION, POWDER, LYOPHILIZED, FOR SOLUTION INTRAVENOUS at 04:47

## 2024-06-07 RX ADMIN — ACETAMINOPHEN 650 MG: 325 TABLET ORAL at 20:43

## 2024-06-07 RX ADMIN — SODIUM CHLORIDE, PRESERVATIVE FREE 5 ML: 5 INJECTION INTRAVENOUS at 08:12

## 2024-06-07 RX ADMIN — POTASSIUM CHLORIDE: 2 INJECTION, SOLUTION, CONCENTRATE INTRAVENOUS at 18:01

## 2024-06-07 RX ADMIN — ACETAMINOPHEN 650 MG: 325 TABLET ORAL at 08:13

## 2024-06-07 RX ADMIN — SODIUM CHLORIDE, PRESERVATIVE FREE 10 ML: 5 INJECTION INTRAVENOUS at 20:43

## 2024-06-07 RX ADMIN — TRIAMCINOLONE ACETONIDE: 1 CREAM TOPICAL at 08:12

## 2024-06-07 RX ADMIN — PIPERACILLIN AND TAZOBACTAM 3375 MG: 3; .375 INJECTION, POWDER, LYOPHILIZED, FOR SOLUTION INTRAVENOUS at 12:07

## 2024-06-07 RX ADMIN — SODIUM CHLORIDE: 9 INJECTION, SOLUTION INTRAVENOUS at 04:44

## 2024-06-07 RX ADMIN — METOPROLOL TARTRATE 12.5 MG: 25 TABLET, FILM COATED ORAL at 20:44

## 2024-06-07 RX ADMIN — PIPERACILLIN AND TAZOBACTAM 3375 MG: 3; .375 INJECTION, POWDER, LYOPHILIZED, FOR SOLUTION INTRAVENOUS at 20:51

## 2024-06-07 RX ADMIN — PANTOPRAZOLE SODIUM 40 MG: 40 INJECTION, POWDER, FOR SOLUTION INTRAVENOUS at 17:54

## 2024-06-07 RX ADMIN — ENOXAPARIN SODIUM 40 MG: 100 INJECTION SUBCUTANEOUS at 08:12

## 2024-06-07 RX ADMIN — SODIUM CHLORIDE, PRESERVATIVE FREE 10 ML: 5 INJECTION INTRAVENOUS at 20:42

## 2024-06-07 RX ADMIN — LEVETIRACETAM 500 MG: 100 INJECTION, SOLUTION INTRAVENOUS at 08:13

## 2024-06-07 RX ADMIN — TRIAMCINOLONE ACETONIDE: 1 CREAM TOPICAL at 20:58

## 2024-06-07 RX ADMIN — ACETAMINOPHEN 650 MG: 325 TABLET ORAL at 12:03

## 2024-06-07 RX ADMIN — I.V. FAT EMULSION 250 ML: 20 EMULSION INTRAVENOUS at 18:02

## 2024-06-07 RX ADMIN — LEVETIRACETAM 500 MG: 100 INJECTION, SOLUTION INTRAVENOUS at 20:43

## 2024-06-07 ASSESSMENT — PAIN SCALES - GENERAL
PAINLEVEL_OUTOF10: 0
PAINLEVEL_OUTOF10: 0

## 2024-06-08 LAB
ANION GAP SERPL CALC-SCNC: 5 MMOL/L (ref 9–18)
BUN SERPL-MCNC: 8 MG/DL (ref 6–23)
CALCIUM SERPL-MCNC: 7.3 MG/DL (ref 8.8–10.2)
CHLORIDE SERPL-SCNC: 104 MMOL/L (ref 98–107)
CO2 SERPL-SCNC: 27 MMOL/L (ref 20–28)
CREAT SERPL-MCNC: 0.23 MG/DL (ref 0.8–1.3)
ERYTHROCYTE [DISTWIDTH] IN BLOOD BY AUTOMATED COUNT: 18.9 % (ref 11.9–14.6)
GLUCOSE SERPL-MCNC: 120 MG/DL (ref 70–99)
HCT VFR BLD AUTO: 29.1 % (ref 41.1–50.3)
HGB BLD-MCNC: 8.8 G/DL (ref 13.6–17.2)
MAGNESIUM SERPL-MCNC: 1.9 MG/DL (ref 1.8–2.4)
MCH RBC QN AUTO: 28.9 PG (ref 26.1–32.9)
MCHC RBC AUTO-ENTMCNC: 30.2 G/DL (ref 31.4–35)
MCV RBC AUTO: 95.7 FL (ref 82–102)
NRBC # BLD: 0.02 K/UL (ref 0–0.2)
PHOSPHATE SERPL-MCNC: 2.3 MG/DL (ref 2.5–4.5)
PLATELET # BLD AUTO: 291 K/UL (ref 150–450)
PMV BLD AUTO: 10.2 FL (ref 9.4–12.3)
POTASSIUM SERPL-SCNC: 4 MMOL/L (ref 3.5–5.1)
RBC # BLD AUTO: 3.04 M/UL (ref 4.23–5.6)
SODIUM SERPL-SCNC: 136 MMOL/L (ref 136–145)
WBC # BLD AUTO: 11.5 K/UL (ref 4.3–11.1)

## 2024-06-08 PROCEDURE — 51702 INSERT TEMP BLADDER CATH: CPT

## 2024-06-08 PROCEDURE — 2580000003 HC RX 258: Performed by: PHYSICIAN ASSISTANT

## 2024-06-08 PROCEDURE — 6370000000 HC RX 637 (ALT 250 FOR IP): Performed by: SURGERY

## 2024-06-08 PROCEDURE — 6370000000 HC RX 637 (ALT 250 FOR IP): Performed by: PHYSICIAN ASSISTANT

## 2024-06-08 PROCEDURE — 84100 ASSAY OF PHOSPHORUS: CPT

## 2024-06-08 PROCEDURE — 83735 ASSAY OF MAGNESIUM: CPT

## 2024-06-08 PROCEDURE — 36415 COLL VENOUS BLD VENIPUNCTURE: CPT

## 2024-06-08 PROCEDURE — 1100000003 HC PRIVATE W/ TELEMETRY

## 2024-06-08 PROCEDURE — 6360000002 HC RX W HCPCS: Performed by: PHYSICIAN ASSISTANT

## 2024-06-08 PROCEDURE — A4216 STERILE WATER/SALINE, 10 ML: HCPCS | Performed by: FAMILY MEDICINE

## 2024-06-08 PROCEDURE — 6360000002 HC RX W HCPCS: Performed by: SURGERY

## 2024-06-08 PROCEDURE — C9113 INJ PANTOPRAZOLE SODIUM, VIA: HCPCS | Performed by: FAMILY MEDICINE

## 2024-06-08 PROCEDURE — 94760 N-INVAS EAR/PLS OXIMETRY 1: CPT

## 2024-06-08 PROCEDURE — 6360000002 HC RX W HCPCS: Performed by: FAMILY MEDICINE

## 2024-06-08 PROCEDURE — 2500000003 HC RX 250 WO HCPCS: Performed by: PHYSICIAN ASSISTANT

## 2024-06-08 PROCEDURE — 2580000003 HC RX 258: Performed by: STUDENT IN AN ORGANIZED HEALTH CARE EDUCATION/TRAINING PROGRAM

## 2024-06-08 PROCEDURE — 80048 BASIC METABOLIC PNL TOTAL CA: CPT

## 2024-06-08 PROCEDURE — 2700000000 HC OXYGEN THERAPY PER DAY

## 2024-06-08 PROCEDURE — 2580000003 HC RX 258: Performed by: FAMILY MEDICINE

## 2024-06-08 PROCEDURE — 85027 COMPLETE CBC AUTOMATED: CPT

## 2024-06-08 PROCEDURE — 2580000003 HC RX 258: Performed by: NURSE PRACTITIONER

## 2024-06-08 RX ORDER — METOPROLOL TARTRATE 25 MG/1
25 TABLET, FILM COATED ORAL 2 TIMES DAILY
Status: DISCONTINUED | OUTPATIENT
Start: 2024-06-08 | End: 2024-09-26

## 2024-06-08 RX ADMIN — PIPERACILLIN AND TAZOBACTAM 3375 MG: 3; .375 INJECTION, POWDER, LYOPHILIZED, FOR SOLUTION INTRAVENOUS at 04:40

## 2024-06-08 RX ADMIN — METOPROLOL TARTRATE 12.5 MG: 25 TABLET, FILM COATED ORAL at 09:21

## 2024-06-08 RX ADMIN — TRIAMCINOLONE ACETONIDE: 1 CREAM TOPICAL at 21:22

## 2024-06-08 RX ADMIN — PANTOPRAZOLE SODIUM 40 MG: 40 INJECTION, POWDER, FOR SOLUTION INTRAVENOUS at 14:43

## 2024-06-08 RX ADMIN — SODIUM CHLORIDE, PRESERVATIVE FREE 5 ML: 5 INJECTION INTRAVENOUS at 21:17

## 2024-06-08 RX ADMIN — METOPROLOL TARTRATE 25 MG: 25 TABLET, FILM COATED ORAL at 21:16

## 2024-06-08 RX ADMIN — SODIUM CHLORIDE, PRESERVATIVE FREE 10 ML: 5 INJECTION INTRAVENOUS at 09:24

## 2024-06-08 RX ADMIN — LEVETIRACETAM 500 MG: 100 INJECTION, SOLUTION INTRAVENOUS at 09:20

## 2024-06-08 RX ADMIN — SODIUM PHOSPHATE, MONOBASIC, MONOHYDRATE AND SODIUM PHOSPHATE, DIBASIC, ANHYDROUS 15 MMOL: 276; 142 INJECTION, SOLUTION INTRAVENOUS at 20:07

## 2024-06-08 RX ADMIN — SODIUM CHLORIDE, PRESERVATIVE FREE 5 ML: 5 INJECTION INTRAVENOUS at 21:18

## 2024-06-08 RX ADMIN — TRIAMCINOLONE ACETONIDE: 1 CREAM TOPICAL at 09:25

## 2024-06-08 RX ADMIN — ACETAMINOPHEN 650 MG: 325 TABLET ORAL at 21:17

## 2024-06-08 RX ADMIN — CETIRIZINE HYDROCHLORIDE 10 MG: 10 TABLET ORAL at 09:19

## 2024-06-08 RX ADMIN — I.V. FAT EMULSION 250 ML: 20 EMULSION INTRAVENOUS at 18:11

## 2024-06-08 RX ADMIN — ACETAMINOPHEN 650 MG: 325 TABLET ORAL at 14:42

## 2024-06-08 RX ADMIN — PANTOPRAZOLE SODIUM 40 MG: 40 INJECTION, POWDER, FOR SOLUTION INTRAVENOUS at 02:51

## 2024-06-08 RX ADMIN — ENOXAPARIN SODIUM 40 MG: 100 INJECTION SUBCUTANEOUS at 09:24

## 2024-06-08 RX ADMIN — ACETAMINOPHEN 650 MG: 325 TABLET ORAL at 09:19

## 2024-06-08 RX ADMIN — POTASSIUM CHLORIDE: 2 INJECTION, SOLUTION, CONCENTRATE INTRAVENOUS at 18:11

## 2024-06-08 RX ADMIN — LEVETIRACETAM 500 MG: 100 INJECTION, SOLUTION INTRAVENOUS at 21:16

## 2024-06-08 RX ADMIN — ACETAMINOPHEN 650 MG: 325 TABLET ORAL at 02:50

## 2024-06-08 ASSESSMENT — PAIN SCALES - GENERAL
PAINLEVEL_OUTOF10: 0
PAINLEVEL_OUTOF10: 0

## 2024-06-09 LAB
ANION GAP SERPL CALC-SCNC: 6 MMOL/L (ref 9–18)
BUN SERPL-MCNC: 10 MG/DL (ref 6–23)
CALCIUM SERPL-MCNC: 7.2 MG/DL (ref 8.8–10.2)
CHLORIDE SERPL-SCNC: 103 MMOL/L (ref 98–107)
CO2 SERPL-SCNC: 25 MMOL/L (ref 20–28)
CREAT SERPL-MCNC: 0.26 MG/DL (ref 0.8–1.3)
ERYTHROCYTE [DISTWIDTH] IN BLOOD BY AUTOMATED COUNT: 18.6 % (ref 11.9–14.6)
GLUCOSE SERPL-MCNC: 116 MG/DL (ref 70–99)
HCT VFR BLD AUTO: 27.4 % (ref 41.1–50.3)
HGB BLD-MCNC: 8.3 G/DL (ref 13.6–17.2)
MAGNESIUM SERPL-MCNC: 1.9 MG/DL (ref 1.8–2.4)
MCH RBC QN AUTO: 29.2 PG (ref 26.1–32.9)
MCHC RBC AUTO-ENTMCNC: 30.3 G/DL (ref 31.4–35)
MCV RBC AUTO: 96.5 FL (ref 82–102)
NRBC # BLD: 0.03 K/UL (ref 0–0.2)
PHOSPHATE SERPL-MCNC: 2.8 MG/DL (ref 2.5–4.5)
PLATELET # BLD AUTO: 264 K/UL (ref 150–450)
PMV BLD AUTO: 9.8 FL (ref 9.4–12.3)
POTASSIUM SERPL-SCNC: 4.1 MMOL/L (ref 3.5–5.1)
RBC # BLD AUTO: 2.84 M/UL (ref 4.23–5.6)
SODIUM SERPL-SCNC: 135 MMOL/L (ref 136–145)
WBC # BLD AUTO: 11.9 K/UL (ref 4.3–11.1)

## 2024-06-09 PROCEDURE — 6370000000 HC RX 637 (ALT 250 FOR IP): Performed by: PHYSICIAN ASSISTANT

## 2024-06-09 PROCEDURE — 2580000003 HC RX 258: Performed by: NURSE PRACTITIONER

## 2024-06-09 PROCEDURE — 36415 COLL VENOUS BLD VENIPUNCTURE: CPT

## 2024-06-09 PROCEDURE — 80048 BASIC METABOLIC PNL TOTAL CA: CPT

## 2024-06-09 PROCEDURE — 2580000003 HC RX 258: Performed by: FAMILY MEDICINE

## 2024-06-09 PROCEDURE — 1100000003 HC PRIVATE W/ TELEMETRY

## 2024-06-09 PROCEDURE — 2580000003 HC RX 258: Performed by: STUDENT IN AN ORGANIZED HEALTH CARE EDUCATION/TRAINING PROGRAM

## 2024-06-09 PROCEDURE — A4216 STERILE WATER/SALINE, 10 ML: HCPCS | Performed by: FAMILY MEDICINE

## 2024-06-09 PROCEDURE — 6360000002 HC RX W HCPCS: Performed by: FAMILY MEDICINE

## 2024-06-09 PROCEDURE — 83735 ASSAY OF MAGNESIUM: CPT

## 2024-06-09 PROCEDURE — C9113 INJ PANTOPRAZOLE SODIUM, VIA: HCPCS | Performed by: FAMILY MEDICINE

## 2024-06-09 PROCEDURE — 2500000003 HC RX 250 WO HCPCS: Performed by: NURSE PRACTITIONER

## 2024-06-09 PROCEDURE — 6360000002 HC RX W HCPCS: Performed by: NURSE PRACTITIONER

## 2024-06-09 PROCEDURE — 6370000000 HC RX 637 (ALT 250 FOR IP): Performed by: SURGERY

## 2024-06-09 PROCEDURE — 6360000002 HC RX W HCPCS: Performed by: SURGERY

## 2024-06-09 PROCEDURE — 6370000000 HC RX 637 (ALT 250 FOR IP): Performed by: NURSE PRACTITIONER

## 2024-06-09 PROCEDURE — 85027 COMPLETE CBC AUTOMATED: CPT

## 2024-06-09 PROCEDURE — 51702 INSERT TEMP BLADDER CATH: CPT

## 2024-06-09 PROCEDURE — 2700000000 HC OXYGEN THERAPY PER DAY

## 2024-06-09 PROCEDURE — 84100 ASSAY OF PHOSPHORUS: CPT

## 2024-06-09 PROCEDURE — 2500000003 HC RX 250 WO HCPCS: Performed by: PHYSICIAN ASSISTANT

## 2024-06-09 PROCEDURE — 94760 N-INVAS EAR/PLS OXIMETRY 1: CPT

## 2024-06-09 RX ORDER — PANTOPRAZOLE SODIUM 40 MG/1
40 TABLET, DELAYED RELEASE ORAL
Status: DISCONTINUED | OUTPATIENT
Start: 2024-06-09 | End: 2024-07-20

## 2024-06-09 RX ADMIN — PANTOPRAZOLE SODIUM 40 MG: 40 INJECTION, POWDER, FOR SOLUTION INTRAVENOUS at 01:48

## 2024-06-09 RX ADMIN — TRIAMCINOLONE ACETONIDE: 1 CREAM TOPICAL at 09:33

## 2024-06-09 RX ADMIN — ACETAMINOPHEN 650 MG: 325 TABLET ORAL at 09:28

## 2024-06-09 RX ADMIN — METOPROLOL TARTRATE 25 MG: 25 TABLET, FILM COATED ORAL at 20:56

## 2024-06-09 RX ADMIN — ACETAMINOPHEN 650 MG: 325 TABLET ORAL at 20:56

## 2024-06-09 RX ADMIN — I.V. FAT EMULSION 250 ML: 20 EMULSION INTRAVENOUS at 18:11

## 2024-06-09 RX ADMIN — ENOXAPARIN SODIUM 40 MG: 100 INJECTION SUBCUTANEOUS at 09:29

## 2024-06-09 RX ADMIN — ACETAMINOPHEN 650 MG: 325 TABLET ORAL at 01:48

## 2024-06-09 RX ADMIN — TRIAMCINOLONE ACETONIDE: 1 CREAM TOPICAL at 20:57

## 2024-06-09 RX ADMIN — LEVETIRACETAM 500 MG: 100 INJECTION, SOLUTION INTRAVENOUS at 09:30

## 2024-06-09 RX ADMIN — CETIRIZINE HYDROCHLORIDE 10 MG: 10 TABLET ORAL at 09:29

## 2024-06-09 RX ADMIN — POTASSIUM CHLORIDE: 2 INJECTION, SOLUTION, CONCENTRATE INTRAVENOUS at 18:10

## 2024-06-09 RX ADMIN — SODIUM CHLORIDE, PRESERVATIVE FREE 10 ML: 5 INJECTION INTRAVENOUS at 09:43

## 2024-06-09 RX ADMIN — PANTOPRAZOLE SODIUM 40 MG: 40 TABLET, DELAYED RELEASE ORAL at 15:42

## 2024-06-09 RX ADMIN — LEVETIRACETAM 500 MG: 100 INJECTION, SOLUTION INTRAVENOUS at 20:56

## 2024-06-09 RX ADMIN — SODIUM CHLORIDE, PRESERVATIVE FREE 10 ML: 5 INJECTION INTRAVENOUS at 20:56

## 2024-06-09 RX ADMIN — METOPROLOL TARTRATE 25 MG: 25 TABLET, FILM COATED ORAL at 09:30

## 2024-06-09 RX ADMIN — ACETAMINOPHEN 650 MG: 325 TABLET ORAL at 14:36

## 2024-06-09 ASSESSMENT — PAIN SCALES - GENERAL
PAINLEVEL_OUTOF10: 0
PAINLEVEL_OUTOF10: 0

## 2024-06-10 LAB
ANION GAP SERPL CALC-SCNC: 6 MMOL/L (ref 9–18)
BUN SERPL-MCNC: 10 MG/DL (ref 6–23)
CALCIUM SERPL-MCNC: 7.5 MG/DL (ref 8.8–10.2)
CHLORIDE SERPL-SCNC: 103 MMOL/L (ref 98–107)
CO2 SERPL-SCNC: 26 MMOL/L (ref 20–28)
CREAT SERPL-MCNC: 0.24 MG/DL (ref 0.8–1.3)
GLUCOSE SERPL-MCNC: 117 MG/DL (ref 70–99)
MAGNESIUM SERPL-MCNC: 1.9 MG/DL (ref 1.8–2.4)
PHOSPHATE SERPL-MCNC: 3 MG/DL (ref 2.5–4.5)
POTASSIUM SERPL-SCNC: 4 MMOL/L (ref 3.5–5.1)
SODIUM SERPL-SCNC: 136 MMOL/L (ref 136–145)

## 2024-06-10 PROCEDURE — 6370000000 HC RX 637 (ALT 250 FOR IP): Performed by: PHYSICIAN ASSISTANT

## 2024-06-10 PROCEDURE — 6360000002 HC RX W HCPCS: Performed by: FAMILY MEDICINE

## 2024-06-10 PROCEDURE — 2580000003 HC RX 258: Performed by: STUDENT IN AN ORGANIZED HEALTH CARE EDUCATION/TRAINING PROGRAM

## 2024-06-10 PROCEDURE — 36415 COLL VENOUS BLD VENIPUNCTURE: CPT

## 2024-06-10 PROCEDURE — 2580000003 HC RX 258: Performed by: NURSE PRACTITIONER

## 2024-06-10 PROCEDURE — 92526 ORAL FUNCTION THERAPY: CPT

## 2024-06-10 PROCEDURE — 97112 NEUROMUSCULAR REEDUCATION: CPT

## 2024-06-10 PROCEDURE — 97168 OT RE-EVAL EST PLAN CARE: CPT

## 2024-06-10 PROCEDURE — 6360000002 HC RX W HCPCS: Performed by: SURGERY

## 2024-06-10 PROCEDURE — 1100000000 HC RM PRIVATE

## 2024-06-10 PROCEDURE — 97535 SELF CARE MNGMENT TRAINING: CPT

## 2024-06-10 PROCEDURE — 83735 ASSAY OF MAGNESIUM: CPT

## 2024-06-10 PROCEDURE — 6370000000 HC RX 637 (ALT 250 FOR IP): Performed by: SURGERY

## 2024-06-10 PROCEDURE — 92507 TX SP LANG VOICE COMM INDIV: CPT

## 2024-06-10 PROCEDURE — 2500000003 HC RX 250 WO HCPCS: Performed by: NURSE PRACTITIONER

## 2024-06-10 PROCEDURE — 80048 BASIC METABOLIC PNL TOTAL CA: CPT

## 2024-06-10 PROCEDURE — 51702 INSERT TEMP BLADDER CATH: CPT

## 2024-06-10 PROCEDURE — 6360000002 HC RX W HCPCS: Performed by: NURSE PRACTITIONER

## 2024-06-10 PROCEDURE — 6370000000 HC RX 637 (ALT 250 FOR IP): Performed by: NURSE PRACTITIONER

## 2024-06-10 PROCEDURE — 84100 ASSAY OF PHOSPHORUS: CPT

## 2024-06-10 RX ORDER — SENNA AND DOCUSATE SODIUM 50; 8.6 MG/1; MG/1
2 TABLET, FILM COATED ORAL DAILY
Status: DISCONTINUED | OUTPATIENT
Start: 2024-06-10 | End: 2024-08-28

## 2024-06-10 RX ADMIN — ACETAMINOPHEN 650 MG: 325 TABLET ORAL at 16:55

## 2024-06-10 RX ADMIN — TRIAMCINOLONE ACETONIDE: 1 CREAM TOPICAL at 09:27

## 2024-06-10 RX ADMIN — METOPROLOL TARTRATE 25 MG: 25 TABLET, FILM COATED ORAL at 21:27

## 2024-06-10 RX ADMIN — SODIUM CHLORIDE, PRESERVATIVE FREE 5 ML: 5 INJECTION INTRAVENOUS at 21:35

## 2024-06-10 RX ADMIN — METOPROLOL TARTRATE 25 MG: 25 TABLET, FILM COATED ORAL at 09:25

## 2024-06-10 RX ADMIN — LEVETIRACETAM 500 MG: 100 INJECTION, SOLUTION INTRAVENOUS at 09:25

## 2024-06-10 RX ADMIN — ACETAMINOPHEN 650 MG: 325 TABLET ORAL at 09:25

## 2024-06-10 RX ADMIN — TRIAMCINOLONE ACETONIDE: 1 CREAM TOPICAL at 21:31

## 2024-06-10 RX ADMIN — PANTOPRAZOLE SODIUM 40 MG: 40 TABLET, DELAYED RELEASE ORAL at 05:29

## 2024-06-10 RX ADMIN — POTASSIUM PHOSPHATE, MONOBASIC POTASSIUM PHOSPHATE, DIBASIC: 224; 236 INJECTION, SOLUTION, CONCENTRATE INTRAVENOUS at 18:45

## 2024-06-10 RX ADMIN — LEVETIRACETAM 500 MG: 100 INJECTION, SOLUTION INTRAVENOUS at 21:28

## 2024-06-10 RX ADMIN — CETIRIZINE HYDROCHLORIDE 10 MG: 10 TABLET ORAL at 09:25

## 2024-06-10 RX ADMIN — SODIUM CHLORIDE, PRESERVATIVE FREE 5 ML: 5 INJECTION INTRAVENOUS at 09:26

## 2024-06-10 RX ADMIN — ACETAMINOPHEN 650 MG: 325 TABLET ORAL at 21:27

## 2024-06-10 RX ADMIN — ACETAMINOPHEN 650 MG: 325 TABLET ORAL at 02:07

## 2024-06-10 RX ADMIN — ENOXAPARIN SODIUM 40 MG: 100 INJECTION SUBCUTANEOUS at 09:25

## 2024-06-10 RX ADMIN — PANTOPRAZOLE SODIUM 40 MG: 40 TABLET, DELAYED RELEASE ORAL at 16:55

## 2024-06-10 ASSESSMENT — PAIN SCALES - GENERAL
PAINLEVEL_OUTOF10: 0
PAINLEVEL_OUTOF10: 0

## 2024-06-11 LAB
ANION GAP SERPL CALC-SCNC: 6 MMOL/L (ref 9–18)
BUN SERPL-MCNC: 12 MG/DL (ref 6–23)
CALCIUM SERPL-MCNC: 7.5 MG/DL (ref 8.8–10.2)
CHLORIDE SERPL-SCNC: 104 MMOL/L (ref 98–107)
CO2 SERPL-SCNC: 26 MMOL/L (ref 20–28)
CREAT SERPL-MCNC: 0.25 MG/DL (ref 0.8–1.3)
GLUCOSE SERPL-MCNC: 117 MG/DL (ref 70–99)
POTASSIUM SERPL-SCNC: 4.2 MMOL/L (ref 3.5–5.1)
SODIUM SERPL-SCNC: 136 MMOL/L (ref 136–145)

## 2024-06-11 PROCEDURE — 6370000000 HC RX 637 (ALT 250 FOR IP): Performed by: PHYSICIAN ASSISTANT

## 2024-06-11 PROCEDURE — 1100000000 HC RM PRIVATE

## 2024-06-11 PROCEDURE — 6360000002 HC RX W HCPCS: Performed by: SURGERY

## 2024-06-11 PROCEDURE — 6360000002 HC RX W HCPCS: Performed by: FAMILY MEDICINE

## 2024-06-11 PROCEDURE — 2580000003 HC RX 258: Performed by: NURSE PRACTITIONER

## 2024-06-11 PROCEDURE — 6370000000 HC RX 637 (ALT 250 FOR IP): Performed by: NURSE PRACTITIONER

## 2024-06-11 PROCEDURE — 2500000003 HC RX 250 WO HCPCS: Performed by: NURSE PRACTITIONER

## 2024-06-11 PROCEDURE — 6370000000 HC RX 637 (ALT 250 FOR IP)

## 2024-06-11 PROCEDURE — 97110 THERAPEUTIC EXERCISES: CPT

## 2024-06-11 PROCEDURE — 6360000002 HC RX W HCPCS: Performed by: NURSE PRACTITIONER

## 2024-06-11 PROCEDURE — 80048 BASIC METABOLIC PNL TOTAL CA: CPT

## 2024-06-11 PROCEDURE — 2580000003 HC RX 258: Performed by: STUDENT IN AN ORGANIZED HEALTH CARE EDUCATION/TRAINING PROGRAM

## 2024-06-11 PROCEDURE — 36415 COLL VENOUS BLD VENIPUNCTURE: CPT

## 2024-06-11 PROCEDURE — 97530 THERAPEUTIC ACTIVITIES: CPT

## 2024-06-11 PROCEDURE — 6370000000 HC RX 637 (ALT 250 FOR IP): Performed by: SURGERY

## 2024-06-11 RX ORDER — ACETAMINOPHEN 325 MG/1
650 TABLET ORAL EVERY 4 HOURS PRN
Status: DISCONTINUED | OUTPATIENT
Start: 2024-06-11 | End: 2024-10-18 | Stop reason: HOSPADM

## 2024-06-11 RX ORDER — LEVETIRACETAM 500 MG/1
500 TABLET ORAL 2 TIMES DAILY
Status: DISCONTINUED | OUTPATIENT
Start: 2024-06-11 | End: 2024-07-26

## 2024-06-11 RX ORDER — OXYCODONE HYDROCHLORIDE 5 MG/1
5 TABLET ORAL EVERY 6 HOURS PRN
Status: DISCONTINUED | OUTPATIENT
Start: 2024-06-11 | End: 2024-07-03

## 2024-06-11 RX ADMIN — METOPROLOL TARTRATE 25 MG: 25 TABLET, FILM COATED ORAL at 10:12

## 2024-06-11 RX ADMIN — METOPROLOL TARTRATE 25 MG: 25 TABLET, FILM COATED ORAL at 21:16

## 2024-06-11 RX ADMIN — ACETAMINOPHEN 650 MG: 325 TABLET ORAL at 10:12

## 2024-06-11 RX ADMIN — TRIAMCINOLONE ACETONIDE: 1 CREAM TOPICAL at 21:18

## 2024-06-11 RX ADMIN — DOCUSATE SODIUM 50 MG AND SENNOSIDES 8.6 MG 2 TABLET: 8.6; 5 TABLET, FILM COATED ORAL at 10:12

## 2024-06-11 RX ADMIN — LEVETIRACETAM 500 MG: 500 TABLET, FILM COATED ORAL at 14:30

## 2024-06-11 RX ADMIN — SODIUM CHLORIDE, PRESERVATIVE FREE 10 ML: 5 INJECTION INTRAVENOUS at 21:20

## 2024-06-11 RX ADMIN — CALCIUM GLUCONATE: 98 INJECTION, SOLUTION INTRAVENOUS at 17:49

## 2024-06-11 RX ADMIN — SODIUM CHLORIDE, PRESERVATIVE FREE 5 ML: 5 INJECTION INTRAVENOUS at 10:13

## 2024-06-11 RX ADMIN — ACETAMINOPHEN 650 MG: 325 TABLET ORAL at 03:49

## 2024-06-11 RX ADMIN — LEVETIRACETAM 500 MG: 500 TABLET, FILM COATED ORAL at 21:16

## 2024-06-11 RX ADMIN — PANTOPRAZOLE SODIUM 40 MG: 40 TABLET, DELAYED RELEASE ORAL at 17:42

## 2024-06-11 RX ADMIN — TRIAMCINOLONE ACETONIDE: 1 CREAM TOPICAL at 10:14

## 2024-06-11 RX ADMIN — OXYCODONE 5 MG: 5 TABLET ORAL at 10:12

## 2024-06-11 RX ADMIN — CETIRIZINE HYDROCHLORIDE 10 MG: 10 TABLET ORAL at 10:12

## 2024-06-11 RX ADMIN — LEVETIRACETAM 500 MG: 100 INJECTION, SOLUTION INTRAVENOUS at 10:11

## 2024-06-11 RX ADMIN — PANTOPRAZOLE SODIUM 40 MG: 40 TABLET, DELAYED RELEASE ORAL at 05:29

## 2024-06-11 RX ADMIN — ENOXAPARIN SODIUM 40 MG: 100 INJECTION SUBCUTANEOUS at 10:12

## 2024-06-11 ASSESSMENT — PAIN SCALES - GENERAL
PAINLEVEL_OUTOF10: 6
PAINLEVEL_OUTOF10: 0
PAINLEVEL_OUTOF10: 0

## 2024-06-11 ASSESSMENT — PAIN DESCRIPTION - LOCATION: LOCATION: SACRUM

## 2024-06-11 ASSESSMENT — PAIN DESCRIPTION - DESCRIPTORS: DESCRIPTORS: ACHING

## 2024-06-11 ASSESSMENT — PAIN DESCRIPTION - ORIENTATION: ORIENTATION: MID

## 2024-06-12 LAB
ANION GAP SERPL CALC-SCNC: 7 MMOL/L (ref 9–18)
BUN SERPL-MCNC: 11 MG/DL (ref 6–23)
CALCIUM SERPL-MCNC: 7.4 MG/DL (ref 8.8–10.2)
CHLORIDE SERPL-SCNC: 102 MMOL/L (ref 98–107)
CO2 SERPL-SCNC: 27 MMOL/L (ref 20–28)
CREAT SERPL-MCNC: 0.29 MG/DL (ref 0.8–1.3)
GLUCOSE BLD STRIP.AUTO-MCNC: 189 MG/DL (ref 65–100)
GLUCOSE SERPL-MCNC: 96 MG/DL (ref 70–99)
MAGNESIUM SERPL-MCNC: 1.8 MG/DL (ref 1.8–2.4)
PHOSPHATE SERPL-MCNC: 3.1 MG/DL (ref 2.5–4.5)
POTASSIUM SERPL-SCNC: 4.2 MMOL/L (ref 3.5–5.1)
SERVICE CMNT-IMP: ABNORMAL
SODIUM SERPL-SCNC: 135 MMOL/L (ref 136–145)

## 2024-06-12 PROCEDURE — 6360000002 HC RX W HCPCS: Performed by: SURGERY

## 2024-06-12 PROCEDURE — 6370000000 HC RX 637 (ALT 250 FOR IP): Performed by: PHYSICIAN ASSISTANT

## 2024-06-12 PROCEDURE — 1100000000 HC RM PRIVATE

## 2024-06-12 PROCEDURE — 6370000000 HC RX 637 (ALT 250 FOR IP)

## 2024-06-12 PROCEDURE — 92526 ORAL FUNCTION THERAPY: CPT

## 2024-06-12 PROCEDURE — 2580000003 HC RX 258: Performed by: STUDENT IN AN ORGANIZED HEALTH CARE EDUCATION/TRAINING PROGRAM

## 2024-06-12 PROCEDURE — 2700000000 HC OXYGEN THERAPY PER DAY

## 2024-06-12 PROCEDURE — 84100 ASSAY OF PHOSPHORUS: CPT

## 2024-06-12 PROCEDURE — 82962 GLUCOSE BLOOD TEST: CPT

## 2024-06-12 PROCEDURE — 94760 N-INVAS EAR/PLS OXIMETRY 1: CPT

## 2024-06-12 PROCEDURE — 80048 BASIC METABOLIC PNL TOTAL CA: CPT

## 2024-06-12 PROCEDURE — 6370000000 HC RX 637 (ALT 250 FOR IP): Performed by: NURSE PRACTITIONER

## 2024-06-12 PROCEDURE — 83735 ASSAY OF MAGNESIUM: CPT

## 2024-06-12 PROCEDURE — 36415 COLL VENOUS BLD VENIPUNCTURE: CPT

## 2024-06-12 RX ADMIN — TRIAMCINOLONE ACETONIDE: 1 CREAM TOPICAL at 20:31

## 2024-06-12 RX ADMIN — LEVETIRACETAM 500 MG: 500 TABLET, FILM COATED ORAL at 20:28

## 2024-06-12 RX ADMIN — METOPROLOL TARTRATE 25 MG: 25 TABLET, FILM COATED ORAL at 08:41

## 2024-06-12 RX ADMIN — TRIAMCINOLONE ACETONIDE: 1 CREAM TOPICAL at 08:42

## 2024-06-12 RX ADMIN — SODIUM CHLORIDE, PRESERVATIVE FREE 10 ML: 5 INJECTION INTRAVENOUS at 20:35

## 2024-06-12 RX ADMIN — DOCUSATE SODIUM 50 MG AND SENNOSIDES 8.6 MG 2 TABLET: 8.6; 5 TABLET, FILM COATED ORAL at 08:42

## 2024-06-12 RX ADMIN — ENOXAPARIN SODIUM 40 MG: 100 INJECTION SUBCUTANEOUS at 08:41

## 2024-06-12 RX ADMIN — PANTOPRAZOLE SODIUM 40 MG: 40 TABLET, DELAYED RELEASE ORAL at 05:55

## 2024-06-12 RX ADMIN — PANTOPRAZOLE SODIUM 40 MG: 40 TABLET, DELAYED RELEASE ORAL at 16:42

## 2024-06-12 RX ADMIN — METOPROLOL TARTRATE 25 MG: 25 TABLET, FILM COATED ORAL at 20:28

## 2024-06-12 RX ADMIN — LEVETIRACETAM 500 MG: 500 TABLET, FILM COATED ORAL at 08:42

## 2024-06-12 RX ADMIN — SODIUM CHLORIDE, PRESERVATIVE FREE 5 ML: 5 INJECTION INTRAVENOUS at 08:42

## 2024-06-12 RX ADMIN — CETIRIZINE HYDROCHLORIDE 10 MG: 10 TABLET ORAL at 08:42

## 2024-06-13 LAB
ANION GAP SERPL CALC-SCNC: 7 MMOL/L (ref 9–18)
BUN SERPL-MCNC: 10 MG/DL (ref 6–23)
CALCIUM SERPL-MCNC: 7.7 MG/DL (ref 8.8–10.2)
CHLORIDE SERPL-SCNC: 101 MMOL/L (ref 98–107)
CO2 SERPL-SCNC: 28 MMOL/L (ref 20–28)
CREAT SERPL-MCNC: 0.28 MG/DL (ref 0.8–1.3)
ERYTHROCYTE [DISTWIDTH] IN BLOOD BY AUTOMATED COUNT: 18.6 % (ref 11.9–14.6)
GLUCOSE SERPL-MCNC: 95 MG/DL (ref 70–99)
HCT VFR BLD AUTO: 28.1 % (ref 41.1–50.3)
HGB BLD-MCNC: 8.5 G/DL (ref 13.6–17.2)
MCH RBC QN AUTO: 29.9 PG (ref 26.1–32.9)
MCHC RBC AUTO-ENTMCNC: 30.2 G/DL (ref 31.4–35)
MCV RBC AUTO: 98.9 FL (ref 82–102)
NRBC # BLD: 0.03 K/UL (ref 0–0.2)
PLATELET # BLD AUTO: 317 K/UL (ref 150–450)
PMV BLD AUTO: 9.9 FL (ref 9.4–12.3)
POTASSIUM SERPL-SCNC: 4.2 MMOL/L (ref 3.5–5.1)
RBC # BLD AUTO: 2.84 M/UL (ref 4.23–5.6)
SODIUM SERPL-SCNC: 135 MMOL/L (ref 136–145)
WBC # BLD AUTO: 13 K/UL (ref 4.3–11.1)

## 2024-06-13 PROCEDURE — 6370000000 HC RX 637 (ALT 250 FOR IP)

## 2024-06-13 PROCEDURE — 94760 N-INVAS EAR/PLS OXIMETRY 1: CPT

## 2024-06-13 PROCEDURE — 6370000000 HC RX 637 (ALT 250 FOR IP): Performed by: PHYSICIAN ASSISTANT

## 2024-06-13 PROCEDURE — 97530 THERAPEUTIC ACTIVITIES: CPT

## 2024-06-13 PROCEDURE — 1100000000 HC RM PRIVATE

## 2024-06-13 PROCEDURE — 80048 BASIC METABOLIC PNL TOTAL CA: CPT

## 2024-06-13 PROCEDURE — 97535 SELF CARE MNGMENT TRAINING: CPT

## 2024-06-13 PROCEDURE — 2700000000 HC OXYGEN THERAPY PER DAY

## 2024-06-13 PROCEDURE — 6370000000 HC RX 637 (ALT 250 FOR IP): Performed by: NURSE PRACTITIONER

## 2024-06-13 PROCEDURE — 85027 COMPLETE CBC AUTOMATED: CPT

## 2024-06-13 PROCEDURE — 2580000003 HC RX 258: Performed by: STUDENT IN AN ORGANIZED HEALTH CARE EDUCATION/TRAINING PROGRAM

## 2024-06-13 PROCEDURE — 6360000002 HC RX W HCPCS: Performed by: SURGERY

## 2024-06-13 PROCEDURE — 97112 NEUROMUSCULAR REEDUCATION: CPT

## 2024-06-13 PROCEDURE — 36415 COLL VENOUS BLD VENIPUNCTURE: CPT

## 2024-06-13 RX ADMIN — PANTOPRAZOLE SODIUM 40 MG: 40 TABLET, DELAYED RELEASE ORAL at 17:08

## 2024-06-13 RX ADMIN — PANTOPRAZOLE SODIUM 40 MG: 40 TABLET, DELAYED RELEASE ORAL at 05:38

## 2024-06-13 RX ADMIN — TRIAMCINOLONE ACETONIDE: 1 CREAM TOPICAL at 21:22

## 2024-06-13 RX ADMIN — LEVETIRACETAM 500 MG: 500 TABLET, FILM COATED ORAL at 09:28

## 2024-06-13 RX ADMIN — METOPROLOL TARTRATE 25 MG: 25 TABLET, FILM COATED ORAL at 09:28

## 2024-06-13 RX ADMIN — TRIAMCINOLONE ACETONIDE: 1 CREAM TOPICAL at 09:31

## 2024-06-13 RX ADMIN — DOCUSATE SODIUM 50 MG AND SENNOSIDES 8.6 MG 2 TABLET: 8.6; 5 TABLET, FILM COATED ORAL at 09:28

## 2024-06-13 RX ADMIN — METOPROLOL TARTRATE 25 MG: 25 TABLET, FILM COATED ORAL at 21:23

## 2024-06-13 RX ADMIN — CETIRIZINE HYDROCHLORIDE 10 MG: 10 TABLET ORAL at 09:28

## 2024-06-13 RX ADMIN — SODIUM CHLORIDE, PRESERVATIVE FREE 10 ML: 5 INJECTION INTRAVENOUS at 21:23

## 2024-06-13 RX ADMIN — ENOXAPARIN SODIUM 40 MG: 100 INJECTION SUBCUTANEOUS at 09:28

## 2024-06-13 RX ADMIN — SODIUM CHLORIDE, PRESERVATIVE FREE 5 ML: 5 INJECTION INTRAVENOUS at 09:30

## 2024-06-13 RX ADMIN — LEVETIRACETAM 500 MG: 500 TABLET, FILM COATED ORAL at 21:23

## 2024-06-14 ENCOUNTER — APPOINTMENT (OUTPATIENT)
Dept: GENERAL RADIOLOGY | Age: 49
End: 2024-06-14
Payer: COMMERCIAL

## 2024-06-14 LAB
AMORPH CRY URNS QL MICRO: ABNORMAL
ANION GAP SERPL CALC-SCNC: 10 MMOL/L (ref 9–18)
APPEARANCE UR: ABNORMAL
BACTERIA URNS QL MICRO: ABNORMAL /HPF
BILIRUB UR QL: NEGATIVE
BUN SERPL-MCNC: 12 MG/DL (ref 6–23)
CALCIUM SERPL-MCNC: 8.1 MG/DL (ref 8.8–10.2)
CASTS URNS QL MICRO: 0 /LPF
CHLORIDE SERPL-SCNC: 98 MMOL/L (ref 98–107)
CO2 SERPL-SCNC: 27 MMOL/L (ref 20–28)
COLOR UR: ABNORMAL
CREAT SERPL-MCNC: 0.38 MG/DL (ref 0.8–1.3)
CRYSTALS URNS QL MICRO: 0 /LPF
EPI CELLS #/AREA URNS HPF: ABNORMAL /HPF
GLUCOSE BLD STRIP.AUTO-MCNC: 100 MG/DL (ref 65–100)
GLUCOSE SERPL-MCNC: 100 MG/DL (ref 70–99)
GLUCOSE UR STRIP.AUTO-MCNC: NEGATIVE MG/DL
HGB UR QL STRIP: ABNORMAL
KETONES UR QL STRIP.AUTO: NEGATIVE MG/DL
LEUKOCYTE ESTERASE UR QL STRIP.AUTO: ABNORMAL
MUCOUS THREADS URNS QL MICRO: 0 /LPF
NITRITE UR QL STRIP.AUTO: NEGATIVE
OTHER OBSERVATIONS: ABNORMAL
PH UR STRIP: 7 (ref 5–9)
POTASSIUM SERPL-SCNC: 3.9 MMOL/L (ref 3.5–5.1)
PROT UR STRIP-MCNC: 30 MG/DL
RBC #/AREA URNS HPF: ABNORMAL /HPF
SERVICE CMNT-IMP: NORMAL
SODIUM SERPL-SCNC: 134 MMOL/L (ref 136–145)
SP GR UR REFRACTOMETRY: 1.02 (ref 1–1.02)
UROBILINOGEN UR QL STRIP.AUTO: 1 EU/DL (ref 0.2–1)
WBC URNS QL MICRO: ABNORMAL /HPF
YEAST URNS QL MICRO: ABNORMAL

## 2024-06-14 PROCEDURE — 6370000000 HC RX 637 (ALT 250 FOR IP): Performed by: PHYSICIAN ASSISTANT

## 2024-06-14 PROCEDURE — 6370000000 HC RX 637 (ALT 250 FOR IP): Performed by: NURSE PRACTITIONER

## 2024-06-14 PROCEDURE — 71045 X-RAY EXAM CHEST 1 VIEW: CPT

## 2024-06-14 PROCEDURE — 1100000000 HC RM PRIVATE

## 2024-06-14 PROCEDURE — 2580000003 HC RX 258: Performed by: STUDENT IN AN ORGANIZED HEALTH CARE EDUCATION/TRAINING PROGRAM

## 2024-06-14 PROCEDURE — 80048 BASIC METABOLIC PNL TOTAL CA: CPT

## 2024-06-14 PROCEDURE — 81001 URINALYSIS AUTO W/SCOPE: CPT

## 2024-06-14 PROCEDURE — 6360000002 HC RX W HCPCS: Performed by: SURGERY

## 2024-06-14 PROCEDURE — 92507 TX SP LANG VOICE COMM INDIV: CPT

## 2024-06-14 PROCEDURE — 81015 MICROSCOPIC EXAM OF URINE: CPT

## 2024-06-14 PROCEDURE — 97530 THERAPEUTIC ACTIVITIES: CPT

## 2024-06-14 PROCEDURE — 97110 THERAPEUTIC EXERCISES: CPT

## 2024-06-14 PROCEDURE — 36415 COLL VENOUS BLD VENIPUNCTURE: CPT

## 2024-06-14 PROCEDURE — 82962 GLUCOSE BLOOD TEST: CPT

## 2024-06-14 PROCEDURE — 6370000000 HC RX 637 (ALT 250 FOR IP)

## 2024-06-14 RX ADMIN — TRIAMCINOLONE ACETONIDE: 1 CREAM TOPICAL at 21:31

## 2024-06-14 RX ADMIN — METOPROLOL TARTRATE 25 MG: 25 TABLET, FILM COATED ORAL at 08:01

## 2024-06-14 RX ADMIN — SODIUM CHLORIDE, PRESERVATIVE FREE 10 ML: 5 INJECTION INTRAVENOUS at 21:37

## 2024-06-14 RX ADMIN — TRIAMCINOLONE ACETONIDE: 1 CREAM TOPICAL at 07:55

## 2024-06-14 RX ADMIN — PANTOPRAZOLE SODIUM 40 MG: 40 TABLET, DELAYED RELEASE ORAL at 14:24

## 2024-06-14 RX ADMIN — DOCUSATE SODIUM 50 MG AND SENNOSIDES 8.6 MG 2 TABLET: 8.6; 5 TABLET, FILM COATED ORAL at 08:01

## 2024-06-14 RX ADMIN — CETIRIZINE HYDROCHLORIDE 10 MG: 10 TABLET ORAL at 08:02

## 2024-06-14 RX ADMIN — METOPROLOL TARTRATE 25 MG: 25 TABLET, FILM COATED ORAL at 21:28

## 2024-06-14 RX ADMIN — PANTOPRAZOLE SODIUM 40 MG: 40 TABLET, DELAYED RELEASE ORAL at 05:40

## 2024-06-14 RX ADMIN — LEVETIRACETAM 500 MG: 500 TABLET, FILM COATED ORAL at 21:28

## 2024-06-14 RX ADMIN — ENOXAPARIN SODIUM 40 MG: 100 INJECTION SUBCUTANEOUS at 08:01

## 2024-06-14 RX ADMIN — SODIUM CHLORIDE, PRESERVATIVE FREE 5 ML: 5 INJECTION INTRAVENOUS at 08:01

## 2024-06-14 RX ADMIN — LEVETIRACETAM 500 MG: 500 TABLET, FILM COATED ORAL at 08:02

## 2024-06-14 ASSESSMENT — PAIN SCALES - GENERAL
PAINLEVEL_OUTOF10: 0
PAINLEVEL_OUTOF10: 0

## 2024-06-15 LAB
ANION GAP SERPL CALC-SCNC: 11 MMOL/L (ref 9–18)
BUN SERPL-MCNC: 12 MG/DL (ref 6–23)
CALCIUM SERPL-MCNC: 7.9 MG/DL (ref 8.8–10.2)
CHLORIDE SERPL-SCNC: 98 MMOL/L (ref 98–107)
CO2 SERPL-SCNC: 25 MMOL/L (ref 20–28)
CREAT SERPL-MCNC: 0.35 MG/DL (ref 0.8–1.3)
ERYTHROCYTE [DISTWIDTH] IN BLOOD BY AUTOMATED COUNT: 18.4 % (ref 11.9–14.6)
GLUCOSE SERPL-MCNC: 103 MG/DL (ref 70–99)
HCT VFR BLD AUTO: 28.1 % (ref 41.1–50.3)
HCT VFR BLD AUTO: 30.6 % (ref 41.1–50.3)
HGB BLD-MCNC: 8.6 G/DL (ref 13.6–17.2)
HGB BLD-MCNC: 9.4 G/DL (ref 13.6–17.2)
LACTATE SERPL-SCNC: 1.4 MMOL/L (ref 0.5–2)
MCH RBC QN AUTO: 29.5 PG (ref 26.1–32.9)
MCHC RBC AUTO-ENTMCNC: 30.6 G/DL (ref 31.4–35)
MCV RBC AUTO: 96.2 FL (ref 82–102)
NRBC # BLD: 0 K/UL (ref 0–0.2)
PLATELET # BLD AUTO: 379 K/UL (ref 150–450)
PMV BLD AUTO: 9.6 FL (ref 9.4–12.3)
POTASSIUM SERPL-SCNC: 3.9 MMOL/L (ref 3.5–5.1)
PROCALCITONIN SERPL-MCNC: 0.36 NG/ML (ref 0–0.1)
RBC # BLD AUTO: 2.92 M/UL (ref 4.23–5.6)
SODIUM SERPL-SCNC: 134 MMOL/L (ref 136–145)
WBC # BLD AUTO: 18.6 K/UL (ref 4.3–11.1)

## 2024-06-15 PROCEDURE — 6360000002 HC RX W HCPCS: Performed by: PHYSICIAN ASSISTANT

## 2024-06-15 PROCEDURE — 2700000000 HC OXYGEN THERAPY PER DAY

## 2024-06-15 PROCEDURE — 36415 COLL VENOUS BLD VENIPUNCTURE: CPT

## 2024-06-15 PROCEDURE — 87040 BLOOD CULTURE FOR BACTERIA: CPT

## 2024-06-15 PROCEDURE — 84145 PROCALCITONIN (PCT): CPT

## 2024-06-15 PROCEDURE — 85018 HEMOGLOBIN: CPT

## 2024-06-15 PROCEDURE — 6370000000 HC RX 637 (ALT 250 FOR IP): Performed by: NURSE PRACTITIONER

## 2024-06-15 PROCEDURE — 83605 ASSAY OF LACTIC ACID: CPT

## 2024-06-15 PROCEDURE — 85014 HEMATOCRIT: CPT

## 2024-06-15 PROCEDURE — 80048 BASIC METABOLIC PNL TOTAL CA: CPT

## 2024-06-15 PROCEDURE — 6370000000 HC RX 637 (ALT 250 FOR IP): Performed by: PHYSICIAN ASSISTANT

## 2024-06-15 PROCEDURE — 2580000003 HC RX 258: Performed by: FAMILY MEDICINE

## 2024-06-15 PROCEDURE — 6370000000 HC RX 637 (ALT 250 FOR IP)

## 2024-06-15 PROCEDURE — 87641 MR-STAPH DNA AMP PROBE: CPT

## 2024-06-15 PROCEDURE — 2580000003 HC RX 258: Performed by: PHYSICIAN ASSISTANT

## 2024-06-15 PROCEDURE — 87086 URINE CULTURE/COLONY COUNT: CPT

## 2024-06-15 PROCEDURE — 87088 URINE BACTERIA CULTURE: CPT

## 2024-06-15 PROCEDURE — 6360000002 HC RX W HCPCS: Performed by: SURGERY

## 2024-06-15 PROCEDURE — 94760 N-INVAS EAR/PLS OXIMETRY 1: CPT

## 2024-06-15 PROCEDURE — 85027 COMPLETE CBC AUTOMATED: CPT

## 2024-06-15 PROCEDURE — 2580000003 HC RX 258: Performed by: STUDENT IN AN ORGANIZED HEALTH CARE EDUCATION/TRAINING PROGRAM

## 2024-06-15 PROCEDURE — 1100000000 HC RM PRIVATE

## 2024-06-15 RX ORDER — SODIUM CHLORIDE 9 MG/ML
INJECTION, SOLUTION INTRAVENOUS CONTINUOUS
Status: ACTIVE | OUTPATIENT
Start: 2024-06-15 | End: 2024-06-15

## 2024-06-15 RX ADMIN — VANCOMYCIN HYDROCHLORIDE 2000 MG: 10 INJECTION, POWDER, LYOPHILIZED, FOR SOLUTION INTRAVENOUS at 13:20

## 2024-06-15 RX ADMIN — SODIUM CHLORIDE, PRESERVATIVE FREE 10 ML: 5 INJECTION INTRAVENOUS at 08:31

## 2024-06-15 RX ADMIN — TRIAMCINOLONE ACETONIDE: 1 CREAM TOPICAL at 20:47

## 2024-06-15 RX ADMIN — CETIRIZINE HYDROCHLORIDE 10 MG: 10 TABLET ORAL at 08:31

## 2024-06-15 RX ADMIN — SODIUM CHLORIDE: 9 INJECTION, SOLUTION INTRAVENOUS at 01:54

## 2024-06-15 RX ADMIN — ENOXAPARIN SODIUM 40 MG: 100 INJECTION SUBCUTANEOUS at 08:31

## 2024-06-15 RX ADMIN — METOPROLOL TARTRATE 25 MG: 25 TABLET, FILM COATED ORAL at 20:46

## 2024-06-15 RX ADMIN — TRIAMCINOLONE ACETONIDE: 1 CREAM TOPICAL at 08:32

## 2024-06-15 RX ADMIN — METOPROLOL TARTRATE 25 MG: 25 TABLET, FILM COATED ORAL at 08:30

## 2024-06-15 RX ADMIN — DOCUSATE SODIUM 50 MG AND SENNOSIDES 8.6 MG 2 TABLET: 8.6; 5 TABLET, FILM COATED ORAL at 08:31

## 2024-06-15 RX ADMIN — SODIUM CHLORIDE: 9 INJECTION, SOLUTION INTRAVENOUS at 22:04

## 2024-06-15 RX ADMIN — LEVETIRACETAM 500 MG: 500 TABLET, FILM COATED ORAL at 20:46

## 2024-06-15 RX ADMIN — SODIUM CHLORIDE, PRESERVATIVE FREE 5 ML: 5 INJECTION INTRAVENOUS at 20:47

## 2024-06-15 RX ADMIN — VANCOMYCIN HYDROCHLORIDE 1500 MG: 10 INJECTION, POWDER, LYOPHILIZED, FOR SOLUTION INTRAVENOUS at 22:05

## 2024-06-15 RX ADMIN — PIPERACILLIN AND TAZOBACTAM 3375 MG: 3; .375 INJECTION, POWDER, LYOPHILIZED, FOR SOLUTION INTRAVENOUS at 17:29

## 2024-06-15 RX ADMIN — LEVETIRACETAM 500 MG: 500 TABLET, FILM COATED ORAL at 08:30

## 2024-06-15 RX ADMIN — PANTOPRAZOLE SODIUM 40 MG: 40 TABLET, DELAYED RELEASE ORAL at 17:27

## 2024-06-15 RX ADMIN — PANTOPRAZOLE SODIUM 40 MG: 40 TABLET, DELAYED RELEASE ORAL at 07:09

## 2024-06-15 RX ADMIN — PIPERACILLIN AND TAZOBACTAM 4500 MG: 4; .5 INJECTION, POWDER, LYOPHILIZED, FOR SOLUTION INTRAVENOUS at 13:21

## 2024-06-15 ASSESSMENT — PAIN SCALES - GENERAL
PAINLEVEL_OUTOF10: 0
PAINLEVEL_OUTOF10: 0

## 2024-06-16 LAB
ANION GAP SERPL CALC-SCNC: 8 MMOL/L (ref 9–18)
BUN SERPL-MCNC: 7 MG/DL (ref 6–23)
CALCIUM SERPL-MCNC: 8 MG/DL (ref 8.8–10.2)
CHLORIDE SERPL-SCNC: 101 MMOL/L (ref 98–107)
CO2 SERPL-SCNC: 28 MMOL/L (ref 20–28)
CREAT SERPL-MCNC: 0.32 MG/DL (ref 0.8–1.3)
ERYTHROCYTE [DISTWIDTH] IN BLOOD BY AUTOMATED COUNT: 17.9 % (ref 11.9–14.6)
GLUCOSE SERPL-MCNC: 87 MG/DL (ref 70–99)
HCT VFR BLD AUTO: 24.2 % (ref 41.1–50.3)
HGB BLD-MCNC: 7.4 G/DL (ref 13.6–17.2)
MCH RBC QN AUTO: 29.6 PG (ref 26.1–32.9)
MCHC RBC AUTO-ENTMCNC: 30.6 G/DL (ref 31.4–35)
MCV RBC AUTO: 96.8 FL (ref 82–102)
MRSA DNA SPEC QL NAA+PROBE: NOT DETECTED
NRBC # BLD: 0 K/UL (ref 0–0.2)
PLATELET # BLD AUTO: 286 K/UL (ref 150–450)
PMV BLD AUTO: 9.6 FL (ref 9.4–12.3)
POTASSIUM SERPL-SCNC: 3.3 MMOL/L (ref 3.5–5.1)
RBC # BLD AUTO: 2.5 M/UL (ref 4.23–5.6)
S AUREUS CPE NOSE QL NAA+PROBE: NOT DETECTED
SODIUM SERPL-SCNC: 137 MMOL/L (ref 136–145)
WBC # BLD AUTO: 9.5 K/UL (ref 4.3–11.1)

## 2024-06-16 PROCEDURE — 6370000000 HC RX 637 (ALT 250 FOR IP): Performed by: PHYSICIAN ASSISTANT

## 2024-06-16 PROCEDURE — 94760 N-INVAS EAR/PLS OXIMETRY 1: CPT

## 2024-06-16 PROCEDURE — 6370000000 HC RX 637 (ALT 250 FOR IP): Performed by: NURSE PRACTITIONER

## 2024-06-16 PROCEDURE — 36415 COLL VENOUS BLD VENIPUNCTURE: CPT

## 2024-06-16 PROCEDURE — 6360000002 HC RX W HCPCS: Performed by: PHYSICIAN ASSISTANT

## 2024-06-16 PROCEDURE — 2580000003 HC RX 258: Performed by: NURSE PRACTITIONER

## 2024-06-16 PROCEDURE — 2700000000 HC OXYGEN THERAPY PER DAY

## 2024-06-16 PROCEDURE — 85027 COMPLETE CBC AUTOMATED: CPT

## 2024-06-16 PROCEDURE — 2580000003 HC RX 258: Performed by: PHYSICIAN ASSISTANT

## 2024-06-16 PROCEDURE — 80048 BASIC METABOLIC PNL TOTAL CA: CPT

## 2024-06-16 PROCEDURE — 1100000000 HC RM PRIVATE

## 2024-06-16 PROCEDURE — 2580000003 HC RX 258: Performed by: STUDENT IN AN ORGANIZED HEALTH CARE EDUCATION/TRAINING PROGRAM

## 2024-06-16 PROCEDURE — 6360000002 HC RX W HCPCS: Performed by: SURGERY

## 2024-06-16 RX ORDER — FLUCONAZOLE 100 MG/1
200 TABLET ORAL DAILY
Status: COMPLETED | OUTPATIENT
Start: 2024-06-16 | End: 2024-06-18

## 2024-06-16 RX ORDER — MAGNESIUM SULFATE 1 G/100ML
1000 INJECTION INTRAVENOUS ONCE
Status: COMPLETED | OUTPATIENT
Start: 2024-06-16 | End: 2024-06-16

## 2024-06-16 RX ORDER — POTASSIUM CHLORIDE 1500 MG/1
40 TABLET, EXTENDED RELEASE ORAL ONCE
Status: DISCONTINUED | OUTPATIENT
Start: 2024-06-16 | End: 2024-06-16

## 2024-06-16 RX ADMIN — LEVETIRACETAM 500 MG: 500 TABLET, FILM COATED ORAL at 09:29

## 2024-06-16 RX ADMIN — SODIUM CHLORIDE, PRESERVATIVE FREE 10 ML: 5 INJECTION INTRAVENOUS at 09:29

## 2024-06-16 RX ADMIN — PANTOPRAZOLE SODIUM 40 MG: 40 TABLET, DELAYED RELEASE ORAL at 06:25

## 2024-06-16 RX ADMIN — POTASSIUM CHLORIDE 20 MEQ: 400 INJECTION, SOLUTION INTRAVENOUS at 10:44

## 2024-06-16 RX ADMIN — SODIUM CHLORIDE, PRESERVATIVE FREE 5 ML: 5 INJECTION INTRAVENOUS at 20:15

## 2024-06-16 RX ADMIN — OXYCODONE 5 MG: 5 TABLET ORAL at 20:14

## 2024-06-16 RX ADMIN — PIPERACILLIN AND TAZOBACTAM 3375 MG: 3; .375 INJECTION, POWDER, LYOPHILIZED, FOR SOLUTION INTRAVENOUS at 02:25

## 2024-06-16 RX ADMIN — PIPERACILLIN AND TAZOBACTAM 3375 MG: 3; .375 INJECTION, POWDER, LYOPHILIZED, FOR SOLUTION INTRAVENOUS at 10:41

## 2024-06-16 RX ADMIN — ENOXAPARIN SODIUM 40 MG: 100 INJECTION SUBCUTANEOUS at 09:29

## 2024-06-16 RX ADMIN — METOPROLOL TARTRATE 25 MG: 25 TABLET, FILM COATED ORAL at 09:29

## 2024-06-16 RX ADMIN — VANCOMYCIN HYDROCHLORIDE 1500 MG: 10 INJECTION, POWDER, LYOPHILIZED, FOR SOLUTION INTRAVENOUS at 09:39

## 2024-06-16 RX ADMIN — POTASSIUM CHLORIDE 20 MEQ: 400 INJECTION, SOLUTION INTRAVENOUS at 09:47

## 2024-06-16 RX ADMIN — SODIUM CHLORIDE: 9 INJECTION, SOLUTION INTRAVENOUS at 09:46

## 2024-06-16 RX ADMIN — LEVETIRACETAM 500 MG: 500 TABLET, FILM COATED ORAL at 20:13

## 2024-06-16 RX ADMIN — MAGNESIUM SULFATE HEPTAHYDRATE 1000 MG: 1 INJECTION, SOLUTION INTRAVENOUS at 12:22

## 2024-06-16 RX ADMIN — FLUCONAZOLE 200 MG: 100 TABLET ORAL at 14:08

## 2024-06-16 RX ADMIN — VANCOMYCIN HYDROCHLORIDE 1500 MG: 10 INJECTION, POWDER, LYOPHILIZED, FOR SOLUTION INTRAVENOUS at 23:08

## 2024-06-16 RX ADMIN — TRIAMCINOLONE ACETONIDE: 1 CREAM TOPICAL at 20:16

## 2024-06-16 RX ADMIN — METOPROLOL TARTRATE 25 MG: 25 TABLET, FILM COATED ORAL at 20:13

## 2024-06-16 RX ADMIN — CETIRIZINE HYDROCHLORIDE 10 MG: 10 TABLET ORAL at 09:29

## 2024-06-16 RX ADMIN — PIPERACILLIN AND TAZOBACTAM 3375 MG: 3; .375 INJECTION, POWDER, LYOPHILIZED, FOR SOLUTION INTRAVENOUS at 17:41

## 2024-06-16 RX ADMIN — PANTOPRAZOLE SODIUM 40 MG: 40 TABLET, DELAYED RELEASE ORAL at 17:29

## 2024-06-16 ASSESSMENT — PAIN SCALES - GENERAL
PAINLEVEL_OUTOF10: 0
PAINLEVEL_OUTOF10: 5

## 2024-06-17 LAB
ANION GAP SERPL CALC-SCNC: 7 MMOL/L (ref 9–18)
BUN SERPL-MCNC: 6 MG/DL (ref 6–23)
CALCIUM SERPL-MCNC: 7.8 MG/DL (ref 8.8–10.2)
CHLORIDE SERPL-SCNC: 103 MMOL/L (ref 98–107)
CO2 SERPL-SCNC: 30 MMOL/L (ref 20–28)
CREAT SERPL-MCNC: 0.48 MG/DL (ref 0.8–1.3)
ERYTHROCYTE [DISTWIDTH] IN BLOOD BY AUTOMATED COUNT: 17.7 % (ref 11.9–14.6)
GLUCOSE SERPL-MCNC: 87 MG/DL (ref 70–99)
HCT VFR BLD AUTO: 26.4 % (ref 41.1–50.3)
HGB BLD-MCNC: 7.8 G/DL (ref 13.6–17.2)
MCH RBC QN AUTO: 29.1 PG (ref 26.1–32.9)
MCHC RBC AUTO-ENTMCNC: 29.5 G/DL (ref 31.4–35)
MCV RBC AUTO: 98.5 FL (ref 82–102)
NRBC # BLD: 0 K/UL (ref 0–0.2)
PLATELET # BLD AUTO: 274 K/UL (ref 150–450)
PMV BLD AUTO: 9.7 FL (ref 9.4–12.3)
POTASSIUM SERPL-SCNC: 3.5 MMOL/L (ref 3.5–5.1)
RBC # BLD AUTO: 2.68 M/UL (ref 4.23–5.6)
SODIUM SERPL-SCNC: 139 MMOL/L (ref 136–145)
VANCOMYCIN SERPL-MCNC: 21.4 UG/ML
WBC # BLD AUTO: 7.7 K/UL (ref 4.3–11.1)

## 2024-06-17 PROCEDURE — 80048 BASIC METABOLIC PNL TOTAL CA: CPT

## 2024-06-17 PROCEDURE — 97530 THERAPEUTIC ACTIVITIES: CPT

## 2024-06-17 PROCEDURE — 6370000000 HC RX 637 (ALT 250 FOR IP): Performed by: PHYSICIAN ASSISTANT

## 2024-06-17 PROCEDURE — 6360000002 HC RX W HCPCS: Performed by: PHYSICIAN ASSISTANT

## 2024-06-17 PROCEDURE — 6370000000 HC RX 637 (ALT 250 FOR IP): Performed by: NURSE PRACTITIONER

## 2024-06-17 PROCEDURE — 36415 COLL VENOUS BLD VENIPUNCTURE: CPT

## 2024-06-17 PROCEDURE — 2580000003 HC RX 258: Performed by: STUDENT IN AN ORGANIZED HEALTH CARE EDUCATION/TRAINING PROGRAM

## 2024-06-17 PROCEDURE — 2580000003 HC RX 258: Performed by: PHYSICIAN ASSISTANT

## 2024-06-17 PROCEDURE — 80202 ASSAY OF VANCOMYCIN: CPT

## 2024-06-17 PROCEDURE — 85027 COMPLETE CBC AUTOMATED: CPT

## 2024-06-17 PROCEDURE — 1100000000 HC RM PRIVATE

## 2024-06-17 PROCEDURE — 6370000000 HC RX 637 (ALT 250 FOR IP)

## 2024-06-17 PROCEDURE — 97112 NEUROMUSCULAR REEDUCATION: CPT

## 2024-06-17 PROCEDURE — 97535 SELF CARE MNGMENT TRAINING: CPT

## 2024-06-17 PROCEDURE — 6360000002 HC RX W HCPCS: Performed by: SURGERY

## 2024-06-17 RX ADMIN — TRIAMCINOLONE ACETONIDE: 1 CREAM TOPICAL at 08:00

## 2024-06-17 RX ADMIN — LEVETIRACETAM 500 MG: 500 TABLET, FILM COATED ORAL at 07:50

## 2024-06-17 RX ADMIN — METOPROLOL TARTRATE 25 MG: 25 TABLET, FILM COATED ORAL at 20:39

## 2024-06-17 RX ADMIN — TRIAMCINOLONE ACETONIDE: 1 CREAM TOPICAL at 20:45

## 2024-06-17 RX ADMIN — CETIRIZINE HYDROCHLORIDE 10 MG: 10 TABLET ORAL at 07:51

## 2024-06-17 RX ADMIN — ENOXAPARIN SODIUM 40 MG: 100 INJECTION SUBCUTANEOUS at 07:51

## 2024-06-17 RX ADMIN — PANTOPRAZOLE SODIUM 40 MG: 40 TABLET, DELAYED RELEASE ORAL at 15:31

## 2024-06-17 RX ADMIN — LEVETIRACETAM 500 MG: 500 TABLET, FILM COATED ORAL at 20:39

## 2024-06-17 RX ADMIN — FLUCONAZOLE 200 MG: 100 TABLET ORAL at 07:50

## 2024-06-17 RX ADMIN — PANTOPRAZOLE SODIUM 40 MG: 40 TABLET, DELAYED RELEASE ORAL at 06:21

## 2024-06-17 RX ADMIN — SODIUM CHLORIDE, PRESERVATIVE FREE 10 ML: 5 INJECTION INTRAVENOUS at 20:39

## 2024-06-17 RX ADMIN — VANCOMYCIN HYDROCHLORIDE 1500 MG: 10 INJECTION, POWDER, LYOPHILIZED, FOR SOLUTION INTRAVENOUS at 23:21

## 2024-06-17 RX ADMIN — PIPERACILLIN AND TAZOBACTAM 3375 MG: 3; .375 INJECTION, POWDER, LYOPHILIZED, FOR SOLUTION INTRAVENOUS at 03:36

## 2024-06-17 RX ADMIN — PIPERACILLIN AND TAZOBACTAM 3375 MG: 3; .375 INJECTION, POWDER, LYOPHILIZED, FOR SOLUTION INTRAVENOUS at 09:51

## 2024-06-17 RX ADMIN — SODIUM CHLORIDE, PRESERVATIVE FREE 10 ML: 5 INJECTION INTRAVENOUS at 07:52

## 2024-06-17 RX ADMIN — METOPROLOL TARTRATE 25 MG: 25 TABLET, FILM COATED ORAL at 07:51

## 2024-06-17 RX ADMIN — DOCUSATE SODIUM 50 MG AND SENNOSIDES 8.6 MG 2 TABLET: 8.6; 5 TABLET, FILM COATED ORAL at 07:51

## 2024-06-17 RX ADMIN — VANCOMYCIN HYDROCHLORIDE 1500 MG: 10 INJECTION, POWDER, LYOPHILIZED, FOR SOLUTION INTRAVENOUS at 09:52

## 2024-06-17 RX ADMIN — PIPERACILLIN AND TAZOBACTAM 3375 MG: 3; .375 INJECTION, POWDER, LYOPHILIZED, FOR SOLUTION INTRAVENOUS at 17:52

## 2024-06-17 ASSESSMENT — PAIN SCALES - GENERAL: PAINLEVEL_OUTOF10: 0

## 2024-06-18 ENCOUNTER — APPOINTMENT (OUTPATIENT)
Dept: GENERAL RADIOLOGY | Age: 49
End: 2024-06-18
Payer: COMMERCIAL

## 2024-06-18 LAB
BACTERIA SPEC CULT: ABNORMAL
FUNGAL CULT/SMEAR: NORMAL
FUNGUS (MYCOLOGY) CULTURE: NORMAL
FUNGUS SMEAR: NORMAL
REFLEX TO ID: NORMAL
SERVICE CMNT-IMP: ABNORMAL
SPECIMEN PROCESSING: NORMAL
SPECIMEN SOURCE: NORMAL
SPECIMEN SOURCE: NORMAL

## 2024-06-18 PROCEDURE — 2580000003 HC RX 258: Performed by: PHYSICIAN ASSISTANT

## 2024-06-18 PROCEDURE — 6370000000 HC RX 637 (ALT 250 FOR IP): Performed by: PHYSICIAN ASSISTANT

## 2024-06-18 PROCEDURE — 76937 US GUIDE VASCULAR ACCESS: CPT

## 2024-06-18 PROCEDURE — 6360000002 HC RX W HCPCS: Performed by: PHYSICIAN ASSISTANT

## 2024-06-18 PROCEDURE — 6360000002 HC RX W HCPCS: Performed by: SURGERY

## 2024-06-18 PROCEDURE — 1100000000 HC RM PRIVATE

## 2024-06-18 PROCEDURE — 6370000000 HC RX 637 (ALT 250 FOR IP): Performed by: NURSE PRACTITIONER

## 2024-06-18 PROCEDURE — 2580000003 HC RX 258: Performed by: STUDENT IN AN ORGANIZED HEALTH CARE EDUCATION/TRAINING PROGRAM

## 2024-06-18 PROCEDURE — 6370000000 HC RX 637 (ALT 250 FOR IP)

## 2024-06-18 PROCEDURE — 71045 X-RAY EXAM CHEST 1 VIEW: CPT

## 2024-06-18 PROCEDURE — 51702 INSERT TEMP BLADDER CATH: CPT

## 2024-06-18 RX ADMIN — FLUCONAZOLE 200 MG: 100 TABLET ORAL at 08:54

## 2024-06-18 RX ADMIN — VANCOMYCIN HYDROCHLORIDE 1500 MG: 10 INJECTION, POWDER, LYOPHILIZED, FOR SOLUTION INTRAVENOUS at 09:54

## 2024-06-18 RX ADMIN — PANTOPRAZOLE SODIUM 40 MG: 40 TABLET, DELAYED RELEASE ORAL at 16:25

## 2024-06-18 RX ADMIN — SODIUM CHLORIDE 25 ML: 9 INJECTION, SOLUTION INTRAVENOUS at 09:53

## 2024-06-18 RX ADMIN — SODIUM CHLORIDE: 9 INJECTION, SOLUTION INTRAVENOUS at 01:26

## 2024-06-18 RX ADMIN — TRIAMCINOLONE ACETONIDE: 1 CREAM TOPICAL at 23:31

## 2024-06-18 RX ADMIN — PIPERACILLIN AND TAZOBACTAM 3375 MG: 3; .375 INJECTION, POWDER, LYOPHILIZED, FOR SOLUTION INTRAVENOUS at 17:39

## 2024-06-18 RX ADMIN — CETIRIZINE HYDROCHLORIDE 10 MG: 10 TABLET ORAL at 08:54

## 2024-06-18 RX ADMIN — LEVETIRACETAM 500 MG: 500 TABLET, FILM COATED ORAL at 23:29

## 2024-06-18 RX ADMIN — LEVETIRACETAM 500 MG: 500 TABLET, FILM COATED ORAL at 08:54

## 2024-06-18 RX ADMIN — METOPROLOL TARTRATE 25 MG: 25 TABLET, FILM COATED ORAL at 08:54

## 2024-06-18 RX ADMIN — PANTOPRAZOLE SODIUM 40 MG: 40 TABLET, DELAYED RELEASE ORAL at 05:42

## 2024-06-18 RX ADMIN — OXYCODONE 5 MG: 5 TABLET ORAL at 05:42

## 2024-06-18 RX ADMIN — ENOXAPARIN SODIUM 40 MG: 100 INJECTION SUBCUTANEOUS at 08:54

## 2024-06-18 RX ADMIN — METOPROLOL TARTRATE 25 MG: 25 TABLET, FILM COATED ORAL at 23:29

## 2024-06-18 RX ADMIN — PIPERACILLIN AND TAZOBACTAM 3375 MG: 3; .375 INJECTION, POWDER, LYOPHILIZED, FOR SOLUTION INTRAVENOUS at 09:53

## 2024-06-18 RX ADMIN — SODIUM CHLORIDE, PRESERVATIVE FREE 10 ML: 5 INJECTION INTRAVENOUS at 23:11

## 2024-06-18 RX ADMIN — SODIUM CHLORIDE: 9 INJECTION, SOLUTION INTRAVENOUS at 17:39

## 2024-06-18 RX ADMIN — PIPERACILLIN AND TAZOBACTAM 3375 MG: 3; .375 INJECTION, POWDER, LYOPHILIZED, FOR SOLUTION INTRAVENOUS at 01:26

## 2024-06-18 RX ADMIN — TRIAMCINOLONE ACETONIDE: 1 CREAM TOPICAL at 08:56

## 2024-06-18 ASSESSMENT — PAIN SCALES - GENERAL
PAINLEVEL_OUTOF10: 7
PAINLEVEL_OUTOF10: 2

## 2024-06-18 ASSESSMENT — PAIN SCALES - WONG BAKER: WONGBAKER_NUMERICALRESPONSE: HURTS A LITTLE BIT

## 2024-06-18 ASSESSMENT — PAIN DESCRIPTION - LOCATION: LOCATION: SACRUM

## 2024-06-18 ASSESSMENT — PAIN DESCRIPTION - DESCRIPTORS: DESCRIPTORS: ACHING;DISCOMFORT;SORE

## 2024-06-18 ASSESSMENT — PAIN - FUNCTIONAL ASSESSMENT: PAIN_FUNCTIONAL_ASSESSMENT: PREVENTS OR INTERFERES WITH MANY ACTIVE NOT PASSIVE ACTIVITIES

## 2024-06-19 ENCOUNTER — APPOINTMENT (OUTPATIENT)
Dept: MRI IMAGING | Age: 49
End: 2024-06-19
Payer: COMMERCIAL

## 2024-06-19 LAB
ANION GAP SERPL CALC-SCNC: 9 MMOL/L (ref 9–18)
BASOPHILS # BLD: 0 K/UL (ref 0–0.2)
BASOPHILS NFR BLD: 0 % (ref 0–2)
BUN SERPL-MCNC: 7 MG/DL (ref 6–23)
CALCIUM SERPL-MCNC: 7.9 MG/DL (ref 8.8–10.2)
CHLORIDE SERPL-SCNC: 103 MMOL/L (ref 98–107)
CO2 SERPL-SCNC: 30 MMOL/L (ref 20–28)
CREAT SERPL-MCNC: 0.64 MG/DL (ref 0.8–1.3)
DIFFERENTIAL METHOD BLD: ABNORMAL
EOSINOPHIL # BLD: 0 K/UL (ref 0–0.8)
EOSINOPHIL NFR BLD: 0 % (ref 0.5–7.8)
ERYTHROCYTE [DISTWIDTH] IN BLOOD BY AUTOMATED COUNT: 17.2 % (ref 11.9–14.6)
FUNGAL CULT/SMEAR: NORMAL
GLUCOSE SERPL-MCNC: 110 MG/DL (ref 70–99)
HCT VFR BLD AUTO: 27 % (ref 41.1–50.3)
HGB BLD-MCNC: 8.1 G/DL (ref 13.6–17.2)
IMM GRANULOCYTES # BLD AUTO: 0.1 K/UL (ref 0–0.5)
IMM GRANULOCYTES NFR BLD AUTO: 1 % (ref 0–5)
LYMPHOCYTES # BLD: 1.2 K/UL (ref 0.5–4.6)
LYMPHOCYTES NFR BLD: 11 % (ref 13–44)
MAGNESIUM SERPL-MCNC: 1.6 MG/DL (ref 1.8–2.4)
MCH RBC QN AUTO: 29.3 PG (ref 26.1–32.9)
MCHC RBC AUTO-ENTMCNC: 30 G/DL (ref 31.4–35)
MCV RBC AUTO: 97.8 FL (ref 82–102)
MONOCYTES # BLD: 0.8 K/UL (ref 0.1–1.3)
MONOCYTES NFR BLD: 8 % (ref 4–12)
NEUTS SEG # BLD: 8.4 K/UL (ref 1.7–8.2)
NEUTS SEG NFR BLD: 80 % (ref 43–78)
NRBC # BLD: 0 K/UL (ref 0–0.2)
PLATELET # BLD AUTO: 270 K/UL (ref 150–450)
PMV BLD AUTO: 9.1 FL (ref 9.4–12.3)
POTASSIUM SERPL-SCNC: 3 MMOL/L (ref 3.5–5.1)
RBC # BLD AUTO: 2.76 M/UL (ref 4.23–5.6)
SODIUM SERPL-SCNC: 143 MMOL/L (ref 136–145)
SPECIMEN PROCESSING: NORMAL
SPECIMEN SOURCE: NORMAL
WBC # BLD AUTO: 10.6 K/UL (ref 4.3–11.1)

## 2024-06-19 PROCEDURE — 6360000002 HC RX W HCPCS: Performed by: INTERNAL MEDICINE

## 2024-06-19 PROCEDURE — 2580000003 HC RX 258: Performed by: PHYSICIAN ASSISTANT

## 2024-06-19 PROCEDURE — 94760 N-INVAS EAR/PLS OXIMETRY 1: CPT

## 2024-06-19 PROCEDURE — 2700000000 HC OXYGEN THERAPY PER DAY

## 2024-06-19 PROCEDURE — 6360000002 HC RX W HCPCS: Performed by: SURGERY

## 2024-06-19 PROCEDURE — 2580000003 HC RX 258: Performed by: STUDENT IN AN ORGANIZED HEALTH CARE EDUCATION/TRAINING PROGRAM

## 2024-06-19 PROCEDURE — 2580000003 HC RX 258: Performed by: INTERNAL MEDICINE

## 2024-06-19 PROCEDURE — 36415 COLL VENOUS BLD VENIPUNCTURE: CPT

## 2024-06-19 PROCEDURE — 6370000000 HC RX 637 (ALT 250 FOR IP): Performed by: PHYSICIAN ASSISTANT

## 2024-06-19 PROCEDURE — 85025 COMPLETE CBC W/AUTO DIFF WBC: CPT

## 2024-06-19 PROCEDURE — 80048 BASIC METABOLIC PNL TOTAL CA: CPT

## 2024-06-19 PROCEDURE — 6360000002 HC RX W HCPCS: Performed by: PHYSICIAN ASSISTANT

## 2024-06-19 PROCEDURE — 97530 THERAPEUTIC ACTIVITIES: CPT

## 2024-06-19 PROCEDURE — A9579 GAD-BASE MR CONTRAST NOS,1ML: HCPCS | Performed by: INTERNAL MEDICINE

## 2024-06-19 PROCEDURE — 70553 MRI BRAIN STEM W/O & W/DYE: CPT

## 2024-06-19 PROCEDURE — 6370000000 HC RX 637 (ALT 250 FOR IP): Performed by: NURSE PRACTITIONER

## 2024-06-19 PROCEDURE — 97112 NEUROMUSCULAR REEDUCATION: CPT

## 2024-06-19 PROCEDURE — 1100000000 HC RM PRIVATE

## 2024-06-19 PROCEDURE — 6360000004 HC RX CONTRAST MEDICATION: Performed by: INTERNAL MEDICINE

## 2024-06-19 PROCEDURE — 83735 ASSAY OF MAGNESIUM: CPT

## 2024-06-19 PROCEDURE — 51702 INSERT TEMP BLADDER CATH: CPT

## 2024-06-19 PROCEDURE — 6370000000 HC RX 637 (ALT 250 FOR IP): Performed by: INTERNAL MEDICINE

## 2024-06-19 PROCEDURE — 97535 SELF CARE MNGMENT TRAINING: CPT

## 2024-06-19 RX ORDER — MAGNESIUM SULFATE IN WATER 40 MG/ML
2000 INJECTION, SOLUTION INTRAVENOUS ONCE
Status: COMPLETED | OUTPATIENT
Start: 2024-06-19 | End: 2024-06-19

## 2024-06-19 RX ORDER — POTASSIUM CHLORIDE 1500 MG/1
40 TABLET, EXTENDED RELEASE ORAL EVERY 4 HOURS
Status: COMPLETED | OUTPATIENT
Start: 2024-06-19 | End: 2024-06-19

## 2024-06-19 RX ADMIN — PANTOPRAZOLE SODIUM 40 MG: 40 TABLET, DELAYED RELEASE ORAL at 15:56

## 2024-06-19 RX ADMIN — PIPERACILLIN AND TAZOBACTAM 3375 MG: 3; .375 INJECTION, POWDER, LYOPHILIZED, FOR SOLUTION INTRAVENOUS at 02:19

## 2024-06-19 RX ADMIN — MAGNESIUM SULFATE HEPTAHYDRATE 2000 MG: 40 INJECTION, SOLUTION INTRAVENOUS at 08:22

## 2024-06-19 RX ADMIN — METOPROLOL TARTRATE 25 MG: 25 TABLET, FILM COATED ORAL at 20:30

## 2024-06-19 RX ADMIN — LEVETIRACETAM 500 MG: 500 TABLET, FILM COATED ORAL at 08:22

## 2024-06-19 RX ADMIN — CETIRIZINE HYDROCHLORIDE 10 MG: 10 TABLET ORAL at 08:23

## 2024-06-19 RX ADMIN — PIPERACILLIN AND TAZOBACTAM 3375 MG: 3; .375 INJECTION, POWDER, LYOPHILIZED, FOR SOLUTION INTRAVENOUS at 10:31

## 2024-06-19 RX ADMIN — GADOTERIDOL 15 ML: 279.3 INJECTION, SOLUTION INTRAVENOUS at 11:22

## 2024-06-19 RX ADMIN — OXYCODONE 5 MG: 5 TABLET ORAL at 20:30

## 2024-06-19 RX ADMIN — POTASSIUM CHLORIDE 40 MEQ: 1500 TABLET, EXTENDED RELEASE ORAL at 11:46

## 2024-06-19 RX ADMIN — PIPERACILLIN AND TAZOBACTAM 3375 MG: 3; .375 INJECTION, POWDER, LYOPHILIZED, FOR SOLUTION INTRAVENOUS at 17:30

## 2024-06-19 RX ADMIN — TRIAMCINOLONE ACETONIDE: 1 CREAM TOPICAL at 08:26

## 2024-06-19 RX ADMIN — METOPROLOL TARTRATE 25 MG: 25 TABLET, FILM COATED ORAL at 08:23

## 2024-06-19 RX ADMIN — POTASSIUM CHLORIDE 10 MEQ: 7.46 INJECTION, SOLUTION INTRAVENOUS at 06:27

## 2024-06-19 RX ADMIN — LEVETIRACETAM 500 MG: 500 TABLET, FILM COATED ORAL at 20:30

## 2024-06-19 RX ADMIN — SODIUM CHLORIDE, PRESERVATIVE FREE 10 ML: 5 INJECTION INTRAVENOUS at 20:37

## 2024-06-19 RX ADMIN — ENOXAPARIN SODIUM 40 MG: 100 INJECTION SUBCUTANEOUS at 08:24

## 2024-06-19 RX ADMIN — PANTOPRAZOLE SODIUM 40 MG: 40 TABLET, DELAYED RELEASE ORAL at 06:21

## 2024-06-19 RX ADMIN — SODIUM CHLORIDE 25 ML: 9 INJECTION, SOLUTION INTRAVENOUS at 17:25

## 2024-06-19 RX ADMIN — SODIUM CHLORIDE: 9 INJECTION, SOLUTION INTRAVENOUS at 06:27

## 2024-06-19 RX ADMIN — SODIUM CHLORIDE 25 ML: 9 INJECTION, SOLUTION INTRAVENOUS at 10:31

## 2024-06-19 RX ADMIN — POTASSIUM CHLORIDE 40 MEQ: 1500 TABLET, EXTENDED RELEASE ORAL at 08:22

## 2024-06-19 RX ADMIN — TRIAMCINOLONE ACETONIDE: 1 CREAM TOPICAL at 20:38

## 2024-06-19 RX ADMIN — OXYCODONE 5 MG: 5 TABLET ORAL at 06:21

## 2024-06-19 ASSESSMENT — PAIN SCALES - GENERAL
PAINLEVEL_OUTOF10: 6
PAINLEVEL_OUTOF10: 7

## 2024-06-19 ASSESSMENT — PAIN DESCRIPTION - DESCRIPTORS
DESCRIPTORS: ACHING;DISCOMFORT;SHARP;SORE
DESCRIPTORS: ACHING;DISCOMFORT

## 2024-06-19 ASSESSMENT — PAIN - FUNCTIONAL ASSESSMENT
PAIN_FUNCTIONAL_ASSESSMENT: PREVENTS OR INTERFERES WITH ALL ACTIVE AND SOME PASSIVE ACTIVITIES
PAIN_FUNCTIONAL_ASSESSMENT: PREVENTS OR INTERFERES WITH ALL ACTIVE AND SOME PASSIVE ACTIVITIES

## 2024-06-19 ASSESSMENT — PAIN DESCRIPTION - LOCATION
LOCATION: BACK;SACRUM
LOCATION: SACRUM

## 2024-06-20 LAB
ANION GAP SERPL CALC-SCNC: 10 MMOL/L (ref 9–18)
BACTERIA SPEC CULT: NORMAL
BACTERIA SPEC CULT: NORMAL
BASOPHILS # BLD: 0 K/UL (ref 0–0.2)
BASOPHILS NFR BLD: 0 % (ref 0–2)
BUN SERPL-MCNC: 5 MG/DL (ref 6–23)
CALCIUM SERPL-MCNC: 7.9 MG/DL (ref 8.8–10.2)
CHLORIDE SERPL-SCNC: 101 MMOL/L (ref 98–107)
CO2 SERPL-SCNC: 27 MMOL/L (ref 20–28)
CREAT SERPL-MCNC: 0.67 MG/DL (ref 0.8–1.3)
DIFFERENTIAL METHOD BLD: ABNORMAL
EOSINOPHIL # BLD: 0.1 K/UL (ref 0–0.8)
EOSINOPHIL NFR BLD: 1 % (ref 0.5–7.8)
ERYTHROCYTE [DISTWIDTH] IN BLOOD BY AUTOMATED COUNT: 17.5 % (ref 11.9–14.6)
GLUCOSE SERPL-MCNC: 185 MG/DL (ref 70–99)
HCT VFR BLD AUTO: 33.1 % (ref 41.1–50.3)
HGB BLD-MCNC: 10.1 G/DL (ref 13.6–17.2)
IMM GRANULOCYTES # BLD AUTO: 0.1 K/UL (ref 0–0.5)
IMM GRANULOCYTES NFR BLD AUTO: 1 % (ref 0–5)
LYMPHOCYTES # BLD: 1.4 K/UL (ref 0.5–4.6)
LYMPHOCYTES NFR BLD: 11 % (ref 13–44)
MCH RBC QN AUTO: 29.9 PG (ref 26.1–32.9)
MCHC RBC AUTO-ENTMCNC: 30.5 G/DL (ref 31.4–35)
MCV RBC AUTO: 97.9 FL (ref 82–102)
MONOCYTES # BLD: 0.9 K/UL (ref 0.1–1.3)
MONOCYTES NFR BLD: 7 % (ref 4–12)
NEUTS SEG # BLD: 10.3 K/UL (ref 1.7–8.2)
NEUTS SEG NFR BLD: 80 % (ref 43–78)
NRBC # BLD: 0 K/UL (ref 0–0.2)
PLATELET # BLD AUTO: 219 K/UL (ref 150–450)
PMV BLD AUTO: 10.1 FL (ref 9.4–12.3)
POTASSIUM SERPL-SCNC: 4.2 MMOL/L (ref 3.5–5.1)
RBC # BLD AUTO: 3.38 M/UL (ref 4.23–5.6)
SERVICE CMNT-IMP: NORMAL
SERVICE CMNT-IMP: NORMAL
SODIUM SERPL-SCNC: 138 MMOL/L (ref 136–145)
WBC # BLD AUTO: 12.8 K/UL (ref 4.3–11.1)

## 2024-06-20 PROCEDURE — 6370000000 HC RX 637 (ALT 250 FOR IP): Performed by: NURSE PRACTITIONER

## 2024-06-20 PROCEDURE — 2580000003 HC RX 258: Performed by: INTERNAL MEDICINE

## 2024-06-20 PROCEDURE — 85025 COMPLETE CBC W/AUTO DIFF WBC: CPT

## 2024-06-20 PROCEDURE — 36415 COLL VENOUS BLD VENIPUNCTURE: CPT

## 2024-06-20 PROCEDURE — 80048 BASIC METABOLIC PNL TOTAL CA: CPT

## 2024-06-20 PROCEDURE — 6370000000 HC RX 637 (ALT 250 FOR IP)

## 2024-06-20 PROCEDURE — 2580000003 HC RX 258: Performed by: STUDENT IN AN ORGANIZED HEALTH CARE EDUCATION/TRAINING PROGRAM

## 2024-06-20 PROCEDURE — 6370000000 HC RX 637 (ALT 250 FOR IP): Performed by: PHYSICIAN ASSISTANT

## 2024-06-20 PROCEDURE — 6360000002 HC RX W HCPCS: Performed by: INTERNAL MEDICINE

## 2024-06-20 PROCEDURE — 1100000000 HC RM PRIVATE

## 2024-06-20 PROCEDURE — 6360000002 HC RX W HCPCS: Performed by: SURGERY

## 2024-06-20 RX ADMIN — SODIUM CHLORIDE, PRESERVATIVE FREE 10 ML: 5 INJECTION INTRAVENOUS at 21:57

## 2024-06-20 RX ADMIN — PIPERACILLIN AND TAZOBACTAM 3375 MG: 3; .375 INJECTION, POWDER, LYOPHILIZED, FOR SOLUTION INTRAVENOUS at 04:19

## 2024-06-20 RX ADMIN — SODIUM CHLORIDE: 9 INJECTION, SOLUTION INTRAVENOUS at 04:19

## 2024-06-20 RX ADMIN — ENOXAPARIN SODIUM 40 MG: 100 INJECTION SUBCUTANEOUS at 07:41

## 2024-06-20 RX ADMIN — PANTOPRAZOLE SODIUM 40 MG: 40 TABLET, DELAYED RELEASE ORAL at 04:52

## 2024-06-20 RX ADMIN — CETIRIZINE HYDROCHLORIDE 10 MG: 10 TABLET ORAL at 07:41

## 2024-06-20 RX ADMIN — DOCUSATE SODIUM 50 MG AND SENNOSIDES 8.6 MG 2 TABLET: 8.6; 5 TABLET, FILM COATED ORAL at 07:41

## 2024-06-20 RX ADMIN — OXYCODONE 5 MG: 5 TABLET ORAL at 04:52

## 2024-06-20 RX ADMIN — METOPROLOL TARTRATE 25 MG: 25 TABLET, FILM COATED ORAL at 21:56

## 2024-06-20 RX ADMIN — LEVETIRACETAM 500 MG: 500 TABLET, FILM COATED ORAL at 21:56

## 2024-06-20 RX ADMIN — METOPROLOL TARTRATE 25 MG: 25 TABLET, FILM COATED ORAL at 07:42

## 2024-06-20 RX ADMIN — LEVETIRACETAM 500 MG: 500 TABLET, FILM COATED ORAL at 07:42

## 2024-06-20 ASSESSMENT — PAIN - FUNCTIONAL ASSESSMENT: PAIN_FUNCTIONAL_ASSESSMENT: PREVENTS OR INTERFERES SOME ACTIVE ACTIVITIES AND ADLS

## 2024-06-20 ASSESSMENT — PAIN SCALES - GENERAL
PAINLEVEL_OUTOF10: 3
PAINLEVEL_OUTOF10: 7

## 2024-06-20 ASSESSMENT — PAIN DESCRIPTION - ORIENTATION: ORIENTATION: LEFT;MID

## 2024-06-20 ASSESSMENT — PAIN DESCRIPTION - DESCRIPTORS: DESCRIPTORS: ACHING;DISCOMFORT;SORE

## 2024-06-20 ASSESSMENT — PAIN DESCRIPTION - LOCATION: LOCATION: SHOULDER;SACRUM

## 2024-06-21 LAB
ANION GAP SERPL CALC-SCNC: 8 MMOL/L (ref 9–18)
BASOPHILS # BLD: 0 K/UL (ref 0–0.2)
BASOPHILS NFR BLD: 0 % (ref 0–2)
BUN SERPL-MCNC: 5 MG/DL (ref 6–23)
CALCIUM SERPL-MCNC: 7.9 MG/DL (ref 8.8–10.2)
CHLORIDE SERPL-SCNC: 103 MMOL/L (ref 98–107)
CO2 SERPL-SCNC: 31 MMOL/L (ref 20–28)
CREAT SERPL-MCNC: 0.64 MG/DL (ref 0.8–1.3)
DIFFERENTIAL METHOD BLD: ABNORMAL
EOSINOPHIL # BLD: 0.1 K/UL (ref 0–0.8)
EOSINOPHIL NFR BLD: 1 % (ref 0.5–7.8)
ERYTHROCYTE [DISTWIDTH] IN BLOOD BY AUTOMATED COUNT: 17.2 % (ref 11.9–14.6)
GLUCOSE SERPL-MCNC: 91 MG/DL (ref 70–99)
HCT VFR BLD AUTO: 27.8 % (ref 41.1–50.3)
HGB BLD-MCNC: 8.4 G/DL (ref 13.6–17.2)
IMM GRANULOCYTES # BLD AUTO: 0.1 K/UL (ref 0–0.5)
IMM GRANULOCYTES NFR BLD AUTO: 1 % (ref 0–5)
LYMPHOCYTES # BLD: 1.6 K/UL (ref 0.5–4.6)
LYMPHOCYTES NFR BLD: 15 % (ref 13–44)
MCH RBC QN AUTO: 30.3 PG (ref 26.1–32.9)
MCHC RBC AUTO-ENTMCNC: 30.2 G/DL (ref 31.4–35)
MCV RBC AUTO: 100.4 FL (ref 82–102)
MONOCYTES # BLD: 0.9 K/UL (ref 0.1–1.3)
MONOCYTES NFR BLD: 9 % (ref 4–12)
NEUTS SEG # BLD: 8 K/UL (ref 1.7–8.2)
NEUTS SEG NFR BLD: 74 % (ref 43–78)
NRBC # BLD: 0 K/UL (ref 0–0.2)
PLATELET # BLD AUTO: 268 K/UL (ref 150–450)
PMV BLD AUTO: 9.9 FL (ref 9.4–12.3)
POTASSIUM SERPL-SCNC: 3.6 MMOL/L (ref 3.5–5.1)
RBC # BLD AUTO: 2.77 M/UL (ref 4.23–5.6)
SODIUM SERPL-SCNC: 142 MMOL/L (ref 136–145)
WBC # BLD AUTO: 10.6 K/UL (ref 4.3–11.1)

## 2024-06-21 PROCEDURE — 36415 COLL VENOUS BLD VENIPUNCTURE: CPT

## 2024-06-21 PROCEDURE — 2580000003 HC RX 258: Performed by: STUDENT IN AN ORGANIZED HEALTH CARE EDUCATION/TRAINING PROGRAM

## 2024-06-21 PROCEDURE — 6370000000 HC RX 637 (ALT 250 FOR IP): Performed by: PHYSICIAN ASSISTANT

## 2024-06-21 PROCEDURE — 6370000000 HC RX 637 (ALT 250 FOR IP)

## 2024-06-21 PROCEDURE — 6360000002 HC RX W HCPCS: Performed by: SURGERY

## 2024-06-21 PROCEDURE — 80048 BASIC METABOLIC PNL TOTAL CA: CPT

## 2024-06-21 PROCEDURE — 97530 THERAPEUTIC ACTIVITIES: CPT

## 2024-06-21 PROCEDURE — 6370000000 HC RX 637 (ALT 250 FOR IP): Performed by: NURSE PRACTITIONER

## 2024-06-21 PROCEDURE — 97164 PT RE-EVAL EST PLAN CARE: CPT

## 2024-06-21 PROCEDURE — 97112 NEUROMUSCULAR REEDUCATION: CPT

## 2024-06-21 PROCEDURE — 1100000000 HC RM PRIVATE

## 2024-06-21 PROCEDURE — 85025 COMPLETE CBC W/AUTO DIFF WBC: CPT

## 2024-06-21 RX ADMIN — SODIUM CHLORIDE, PRESERVATIVE FREE 10 ML: 5 INJECTION INTRAVENOUS at 10:01

## 2024-06-21 RX ADMIN — CETIRIZINE HYDROCHLORIDE 10 MG: 10 TABLET ORAL at 10:15

## 2024-06-21 RX ADMIN — ACETAMINOPHEN 650 MG: 325 TABLET ORAL at 10:41

## 2024-06-21 RX ADMIN — LEVETIRACETAM 500 MG: 500 TABLET, FILM COATED ORAL at 21:05

## 2024-06-21 RX ADMIN — LEVETIRACETAM 500 MG: 500 TABLET, FILM COATED ORAL at 10:08

## 2024-06-21 RX ADMIN — OXYCODONE 5 MG: 5 TABLET ORAL at 14:51

## 2024-06-21 RX ADMIN — PANTOPRAZOLE SODIUM 40 MG: 40 TABLET, DELAYED RELEASE ORAL at 05:32

## 2024-06-21 RX ADMIN — TRIAMCINOLONE ACETONIDE: 1 CREAM TOPICAL at 21:04

## 2024-06-21 RX ADMIN — METOPROLOL TARTRATE 25 MG: 25 TABLET, FILM COATED ORAL at 10:08

## 2024-06-21 RX ADMIN — METOPROLOL TARTRATE 25 MG: 25 TABLET, FILM COATED ORAL at 21:05

## 2024-06-21 RX ADMIN — SODIUM CHLORIDE, PRESERVATIVE FREE 10 ML: 5 INJECTION INTRAVENOUS at 21:05

## 2024-06-21 RX ADMIN — ENOXAPARIN SODIUM 40 MG: 100 INJECTION SUBCUTANEOUS at 10:08

## 2024-06-21 RX ADMIN — TRIAMCINOLONE ACETONIDE: 1 CREAM TOPICAL at 10:09

## 2024-06-21 RX ADMIN — DOCUSATE SODIUM 50 MG AND SENNOSIDES 8.6 MG 2 TABLET: 8.6; 5 TABLET, FILM COATED ORAL at 10:08

## 2024-06-21 RX ADMIN — PANTOPRAZOLE SODIUM 40 MG: 40 TABLET, DELAYED RELEASE ORAL at 14:51

## 2024-06-21 ASSESSMENT — PAIN SCALES - GENERAL
PAINLEVEL_OUTOF10: 3
PAINLEVEL_OUTOF10: 4
PAINLEVEL_OUTOF10: 8
PAINLEVEL_OUTOF10: 4

## 2024-06-21 ASSESSMENT — PAIN DESCRIPTION - LOCATION: LOCATION: BUTTOCKS;SACRUM

## 2024-06-22 LAB
ANION GAP SERPL CALC-SCNC: 9 MMOL/L (ref 9–18)
BASOPHILS # BLD: 0 K/UL (ref 0–0.2)
BASOPHILS NFR BLD: 0 % (ref 0–2)
BUN SERPL-MCNC: 6 MG/DL (ref 6–23)
CALCIUM SERPL-MCNC: 8.3 MG/DL (ref 8.8–10.2)
CHLORIDE SERPL-SCNC: 102 MMOL/L (ref 98–107)
CO2 SERPL-SCNC: 30 MMOL/L (ref 20–28)
CREAT SERPL-MCNC: 0.67 MG/DL (ref 0.8–1.3)
DIFFERENTIAL METHOD BLD: ABNORMAL
EOSINOPHIL # BLD: 0.1 K/UL (ref 0–0.8)
EOSINOPHIL NFR BLD: 1 % (ref 0.5–7.8)
ERYTHROCYTE [DISTWIDTH] IN BLOOD BY AUTOMATED COUNT: 16.8 % (ref 11.9–14.6)
GLUCOSE SERPL-MCNC: 83 MG/DL (ref 70–99)
HCT VFR BLD AUTO: 29.2 % (ref 41.1–50.3)
HGB BLD-MCNC: 8.7 G/DL (ref 13.6–17.2)
IMM GRANULOCYTES # BLD AUTO: 0.1 K/UL (ref 0–0.5)
IMM GRANULOCYTES NFR BLD AUTO: 1 % (ref 0–5)
LYMPHOCYTES # BLD: 1.8 K/UL (ref 0.5–4.6)
LYMPHOCYTES NFR BLD: 19 % (ref 13–44)
MCH RBC QN AUTO: 29.5 PG (ref 26.1–32.9)
MCHC RBC AUTO-ENTMCNC: 29.8 G/DL (ref 31.4–35)
MCV RBC AUTO: 99 FL (ref 82–102)
MONOCYTES # BLD: 0.8 K/UL (ref 0.1–1.3)
MONOCYTES NFR BLD: 8 % (ref 4–12)
NEUTS SEG # BLD: 6.8 K/UL (ref 1.7–8.2)
NEUTS SEG NFR BLD: 72 % (ref 43–78)
NRBC # BLD: 0 K/UL (ref 0–0.2)
PLATELET # BLD AUTO: 290 K/UL (ref 150–450)
PMV BLD AUTO: 9.9 FL (ref 9.4–12.3)
POTASSIUM SERPL-SCNC: 3.8 MMOL/L (ref 3.5–5.1)
RBC # BLD AUTO: 2.95 M/UL (ref 4.23–5.6)
SODIUM SERPL-SCNC: 141 MMOL/L (ref 136–145)
WBC # BLD AUTO: 9.5 K/UL (ref 4.3–11.1)

## 2024-06-22 PROCEDURE — 80048 BASIC METABOLIC PNL TOTAL CA: CPT

## 2024-06-22 PROCEDURE — 36415 COLL VENOUS BLD VENIPUNCTURE: CPT

## 2024-06-22 PROCEDURE — 94760 N-INVAS EAR/PLS OXIMETRY 1: CPT

## 2024-06-22 PROCEDURE — 6370000000 HC RX 637 (ALT 250 FOR IP): Performed by: PHYSICIAN ASSISTANT

## 2024-06-22 PROCEDURE — 85025 COMPLETE CBC W/AUTO DIFF WBC: CPT

## 2024-06-22 PROCEDURE — 2700000000 HC OXYGEN THERAPY PER DAY

## 2024-06-22 PROCEDURE — 1100000000 HC RM PRIVATE

## 2024-06-22 PROCEDURE — 2580000003 HC RX 258: Performed by: STUDENT IN AN ORGANIZED HEALTH CARE EDUCATION/TRAINING PROGRAM

## 2024-06-22 PROCEDURE — 6370000000 HC RX 637 (ALT 250 FOR IP)

## 2024-06-22 PROCEDURE — 6360000002 HC RX W HCPCS: Performed by: SURGERY

## 2024-06-22 PROCEDURE — 6370000000 HC RX 637 (ALT 250 FOR IP): Performed by: NURSE PRACTITIONER

## 2024-06-22 RX ADMIN — TRIAMCINOLONE ACETONIDE: 1 CREAM TOPICAL at 20:59

## 2024-06-22 RX ADMIN — PANTOPRAZOLE SODIUM 40 MG: 40 TABLET, DELAYED RELEASE ORAL at 16:26

## 2024-06-22 RX ADMIN — METOPROLOL TARTRATE 25 MG: 25 TABLET, FILM COATED ORAL at 08:38

## 2024-06-22 RX ADMIN — LEVETIRACETAM 500 MG: 500 TABLET, FILM COATED ORAL at 08:38

## 2024-06-22 RX ADMIN — PANTOPRAZOLE SODIUM 40 MG: 40 TABLET, DELAYED RELEASE ORAL at 05:32

## 2024-06-22 RX ADMIN — ACETAMINOPHEN 650 MG: 325 TABLET ORAL at 16:30

## 2024-06-22 RX ADMIN — LEVETIRACETAM 500 MG: 500 TABLET, FILM COATED ORAL at 20:55

## 2024-06-22 RX ADMIN — CETIRIZINE HYDROCHLORIDE 10 MG: 10 TABLET ORAL at 08:38

## 2024-06-22 RX ADMIN — ENOXAPARIN SODIUM 40 MG: 100 INJECTION SUBCUTANEOUS at 08:38

## 2024-06-22 RX ADMIN — DOCUSATE SODIUM 50 MG AND SENNOSIDES 8.6 MG 2 TABLET: 8.6; 5 TABLET, FILM COATED ORAL at 08:38

## 2024-06-22 RX ADMIN — TRIAMCINOLONE ACETONIDE: 1 CREAM TOPICAL at 08:39

## 2024-06-22 RX ADMIN — OXYCODONE 5 MG: 5 TABLET ORAL at 16:29

## 2024-06-22 RX ADMIN — OXYCODONE 5 MG: 5 TABLET ORAL at 05:13

## 2024-06-22 RX ADMIN — METOPROLOL TARTRATE 25 MG: 25 TABLET, FILM COATED ORAL at 20:55

## 2024-06-22 RX ADMIN — SODIUM CHLORIDE, PRESERVATIVE FREE 10 ML: 5 INJECTION INTRAVENOUS at 08:39

## 2024-06-22 ASSESSMENT — PAIN SCALES - WONG BAKER
WONGBAKER_NUMERICALRESPONSE: NO HURT

## 2024-06-22 ASSESSMENT — PAIN DESCRIPTION - LOCATION
LOCATION: LEG
LOCATION: BACK;GENERALIZED

## 2024-06-22 ASSESSMENT — PAIN SCALES - GENERAL
PAINLEVEL_OUTOF10: 6
PAINLEVEL_OUTOF10: 8
PAINLEVEL_OUTOF10: 0
PAINLEVEL_OUTOF10: 0

## 2024-06-22 ASSESSMENT — PAIN - FUNCTIONAL ASSESSMENT: PAIN_FUNCTIONAL_ASSESSMENT: PREVENTS OR INTERFERES SOME ACTIVE ACTIVITIES AND ADLS

## 2024-06-22 ASSESSMENT — PAIN DESCRIPTION - ORIENTATION
ORIENTATION: RIGHT;LEFT
ORIENTATION: MID

## 2024-06-22 ASSESSMENT — PAIN DESCRIPTION - DESCRIPTORS
DESCRIPTORS: ACHING
DESCRIPTORS: ACHING;DISCOMFORT

## 2024-06-23 PROCEDURE — 1100000000 HC RM PRIVATE

## 2024-06-23 PROCEDURE — 6370000000 HC RX 637 (ALT 250 FOR IP): Performed by: NURSE PRACTITIONER

## 2024-06-23 PROCEDURE — 6360000002 HC RX W HCPCS: Performed by: SURGERY

## 2024-06-23 PROCEDURE — 2580000003 HC RX 258: Performed by: STUDENT IN AN ORGANIZED HEALTH CARE EDUCATION/TRAINING PROGRAM

## 2024-06-23 PROCEDURE — 6370000000 HC RX 637 (ALT 250 FOR IP): Performed by: INTERNAL MEDICINE

## 2024-06-23 PROCEDURE — 6370000000 HC RX 637 (ALT 250 FOR IP): Performed by: PHYSICIAN ASSISTANT

## 2024-06-23 RX ORDER — OXYCODONE HYDROCHLORIDE 5 MG/1
5 TABLET ORAL ONCE
Status: COMPLETED | OUTPATIENT
Start: 2024-06-23 | End: 2024-06-23

## 2024-06-23 RX ADMIN — METOPROLOL TARTRATE 25 MG: 25 TABLET, FILM COATED ORAL at 09:22

## 2024-06-23 RX ADMIN — PANTOPRAZOLE SODIUM 40 MG: 40 TABLET, DELAYED RELEASE ORAL at 06:05

## 2024-06-23 RX ADMIN — LEVETIRACETAM 500 MG: 500 TABLET, FILM COATED ORAL at 20:58

## 2024-06-23 RX ADMIN — LEVETIRACETAM 500 MG: 500 TABLET, FILM COATED ORAL at 09:22

## 2024-06-23 RX ADMIN — CETIRIZINE HYDROCHLORIDE 10 MG: 10 TABLET ORAL at 09:22

## 2024-06-23 RX ADMIN — SODIUM CHLORIDE, PRESERVATIVE FREE 10 ML: 5 INJECTION INTRAVENOUS at 20:59

## 2024-06-23 RX ADMIN — OXYCODONE 5 MG: 5 TABLET ORAL at 09:23

## 2024-06-23 RX ADMIN — METOPROLOL TARTRATE 25 MG: 25 TABLET, FILM COATED ORAL at 20:58

## 2024-06-23 RX ADMIN — OXYCODONE 5 MG: 5 TABLET ORAL at 15:23

## 2024-06-23 RX ADMIN — SODIUM CHLORIDE, PRESERVATIVE FREE 10 ML: 5 INJECTION INTRAVENOUS at 09:33

## 2024-06-23 RX ADMIN — PANTOPRAZOLE SODIUM 40 MG: 40 TABLET, DELAYED RELEASE ORAL at 15:23

## 2024-06-23 RX ADMIN — TRIAMCINOLONE ACETONIDE: 1 CREAM TOPICAL at 20:58

## 2024-06-23 RX ADMIN — TRIAMCINOLONE ACETONIDE: 1 CREAM TOPICAL at 09:32

## 2024-06-23 RX ADMIN — ENOXAPARIN SODIUM 40 MG: 100 INJECTION SUBCUTANEOUS at 09:22

## 2024-06-23 ASSESSMENT — PAIN - FUNCTIONAL ASSESSMENT: PAIN_FUNCTIONAL_ASSESSMENT: PREVENTS OR INTERFERES SOME ACTIVE ACTIVITIES AND ADLS

## 2024-06-23 ASSESSMENT — PAIN DESCRIPTION - ORIENTATION: ORIENTATION: MID

## 2024-06-23 ASSESSMENT — PAIN DESCRIPTION - DESCRIPTORS: DESCRIPTORS: ACHING;DISCOMFORT

## 2024-06-23 ASSESSMENT — PAIN SCALES - GENERAL
PAINLEVEL_OUTOF10: 0
PAINLEVEL_OUTOF10: 8

## 2024-06-23 ASSESSMENT — PAIN DESCRIPTION - LOCATION: LOCATION: COCCYX

## 2024-06-23 NOTE — PROGRESS NOTES
Hospitalist Progress Note   Admit Date:  2024  4:30 PM   Name:  Brendan Saleh   Age:  49 y.o.  Sex:  male  :  1975   MRN:  [id number]   Room:  [facility]/    Presenting/Chief Complaint: Altered Mental Status     Reason(s) for Admission: Acute hypernatremia [E87.0]  Volume depletion [E86.9]  Acute kidney injury (HCC) [N17.9]  Acute encephalopathy [G93.40]  Septic shock (HCC) [A41.9, R65.21]     Hospital Course:   Brendan Saleh is a 48 y.o. male with a PMH of SDH s/p poonam hole 2024, hydrocephalus s/p  shunt, sacral decubitus stage IV, who originally presented to the ER  with chief complaint of increased confusion.     He was diagnosed with UTI with hematuria and started on course of vancomycin, Zosyn.  Urine culture finalized negative.   blood cultures with CoNS, determined contaminant.  However, he met sepsis criteria with WBC 14.6, HR 120s.  ID consulted to assist.  ECHO showed preserved EF.  Underwent sacral wound debridement on  with General Surgery, Dr. Zapien.     CT head with ongoing hydrocephalus and a  shunt.  NSGY consulted.  Patient underwent right ventricular peritoneal shunt removal on  due to non-functioning shunt.   Per Dr. Ortiz, patient needs a stepwise approach, and stated shunt replacement may be in his future though would need to wait at least 4 weeks.  He may be able to do temporizing CSF removal however he stated this would only be temporary until more definitive treatment as above.  Patient will require continued neurosurgical care outpatient.     ID returned to the case for management of CSF infection as CSF culture and  shunt tip culture grew ESBL Klebsiella and Serratia marcescens.  Antibiotics changed to IV cipro and furthermore to oral cipro for 14 days post shunt removal EOT 24.  ID signed off on .     His hospital stay was further complicated by a bowel obstruction on May 19, noted on CT after patient vomited fecal material.  He

## 2024-06-23 NOTE — PROGRESS NOTES
All of pt's wound care done this shift per orders. He had pain this shift see MAR for tx. Pt is resting in bed without any complaints. Hourly rounds completed Q2 turns done and all needs met. Bed is low, locked, bed alarm on, call light is in reach and pt is encouraged to call for assistance.

## 2024-06-24 PROCEDURE — 97112 NEUROMUSCULAR REEDUCATION: CPT

## 2024-06-24 PROCEDURE — 97535 SELF CARE MNGMENT TRAINING: CPT

## 2024-06-24 PROCEDURE — 6370000000 HC RX 637 (ALT 250 FOR IP): Performed by: PHYSICIAN ASSISTANT

## 2024-06-24 PROCEDURE — 1100000000 HC RM PRIVATE

## 2024-06-24 PROCEDURE — 97530 THERAPEUTIC ACTIVITIES: CPT

## 2024-06-24 PROCEDURE — 2700000000 HC OXYGEN THERAPY PER DAY

## 2024-06-24 PROCEDURE — 6360000002 HC RX W HCPCS: Performed by: SURGERY

## 2024-06-24 PROCEDURE — 2580000003 HC RX 258: Performed by: STUDENT IN AN ORGANIZED HEALTH CARE EDUCATION/TRAINING PROGRAM

## 2024-06-24 PROCEDURE — 94760 N-INVAS EAR/PLS OXIMETRY 1: CPT

## 2024-06-24 PROCEDURE — 6370000000 HC RX 637 (ALT 250 FOR IP): Performed by: NURSE PRACTITIONER

## 2024-06-24 PROCEDURE — 6370000000 HC RX 637 (ALT 250 FOR IP)

## 2024-06-24 RX ORDER — TRAZODONE HYDROCHLORIDE 50 MG/1
50 TABLET, FILM COATED ORAL NIGHTLY PRN
Status: DISCONTINUED | OUTPATIENT
Start: 2024-06-24 | End: 2024-10-18 | Stop reason: HOSPADM

## 2024-06-24 RX ADMIN — OXYCODONE 5 MG: 5 TABLET ORAL at 21:04

## 2024-06-24 RX ADMIN — PANTOPRAZOLE SODIUM 40 MG: 40 TABLET, DELAYED RELEASE ORAL at 04:51

## 2024-06-24 RX ADMIN — SODIUM CHLORIDE, PRESERVATIVE FREE 10 ML: 5 INJECTION INTRAVENOUS at 21:09

## 2024-06-24 RX ADMIN — LEVETIRACETAM 500 MG: 500 TABLET, FILM COATED ORAL at 09:22

## 2024-06-24 RX ADMIN — DOCUSATE SODIUM 50 MG AND SENNOSIDES 8.6 MG 2 TABLET: 8.6; 5 TABLET, FILM COATED ORAL at 09:22

## 2024-06-24 RX ADMIN — SODIUM CHLORIDE, PRESERVATIVE FREE 10 ML: 5 INJECTION INTRAVENOUS at 09:23

## 2024-06-24 RX ADMIN — LEVETIRACETAM 500 MG: 500 TABLET, FILM COATED ORAL at 21:04

## 2024-06-24 RX ADMIN — METOPROLOL TARTRATE 25 MG: 25 TABLET, FILM COATED ORAL at 09:24

## 2024-06-24 RX ADMIN — CETIRIZINE HYDROCHLORIDE 10 MG: 10 TABLET ORAL at 09:22

## 2024-06-24 RX ADMIN — PANTOPRAZOLE SODIUM 40 MG: 40 TABLET, DELAYED RELEASE ORAL at 15:04

## 2024-06-24 RX ADMIN — ENOXAPARIN SODIUM 40 MG: 100 INJECTION SUBCUTANEOUS at 09:23

## 2024-06-24 RX ADMIN — METOPROLOL TARTRATE 25 MG: 25 TABLET, FILM COATED ORAL at 21:04

## 2024-06-24 ASSESSMENT — PAIN SCALES - GENERAL: PAINLEVEL_OUTOF10: 6

## 2024-06-24 ASSESSMENT — PAIN DESCRIPTION - DESCRIPTORS: DESCRIPTORS: ACHING

## 2024-06-24 NOTE — PROGRESS NOTES
[facility]  [address], [address]  [place], [place] [id number] [phone]      Brendan Saleh is follow-up but still inpatient after Right hemicolectomy and primary anastamosis (obstructing colon cancer - Pathology: \"RIGHT COLON\":  MODERATELY DIFFERENTIATED ADENOCARCINOMA WITH   MUCINOUS FEATURES ARISING FROM A TUBULOVILLOUS ADENOMA, 11.4 CM IN   GREATEST DIMENSION, EXTENDING THROUGH THE MUSCULARIS PROPRIA INTO   PERICOLONIC ADIPOSE, MARGINS UNINVOLVED, METASTATIC CARCINOMA INVOLVING 3   OF 33 LYMPH NODES WITHOUT EXTRACAPSULAR EXTENSION   by Dr Wright 5/19/24 and LAPAROTOMY EXPLORATORY, PRIMARY CLOSURE SMALL BOWEL PERFORATION  6/1/24 Dr Carcamo   The Hospitalist team are Primary care management and asked us to remove staples from ML incision if appropriate.    He would have had a post op visit by this date but unfortunately he remains in patient.      EXAM:  He  is in no distress  There is mild residual tenderness in the abdomen   Incision: healing well, no seroma, no cellulitis. Staples removed with steri strips placed.  Some crusting along incision line.     Pathology: as above          ASSESSMENT/PLAN:  He does not require additional follow up with General Surgery unless acute issues with abdominal incision arises.  IM notes state:  \"Per Dr. Wong (Oncology) last note on 5/22/2024, pt's insurance is not accepted by their office and will require external referral to oncology. External referral placed and will fax over referral to [facility]. Plan is outpatient follow-up in 4-6 weeks to discuss treatment plan allowing for recovery of active medical issues.\"  Diet as tolerated         YARELY TSANG, APRN - CNP

## 2024-06-24 NOTE — PROGRESS NOTES
ACUTE PHYSICAL THERAPY GOALS:   (Developed with and agreed upon by patient and/or caregiver.)  STG: (Goals added 6/21/24)  (1.)Mr. Saleh will move from supine to sit and sit to supine  in bed with MODERATE ASSIST within 3 treatment day(s).    (2.)Mr. Saleh will scoot up and down in bed with MODERATE ASSIST within 3 treatment day(s).    (3.)Mr. Saleh will roll side to side in bed with MINIMAL ASSIST within 3 treatment day(s).    (4.)Mr. Saleh will tolerate sitting up in recliner chair for 2 hours with stable vital signs to increase upright tolerance within 3 treatment day(s).    (5.)Mr. Saleh will maintain static/dynamic sitting x 25 minutes with MINIMAL ASSIST for improved balance within 3 treatment days.  (6.)Mr. Saleh will follow 75% of commands for increased participation in therapeutic activity/exercise within 3 treatment days.     LTG:  (1.) Brendan Saleh  will move from supine to sit and sit to supine and scoot up and down with MINIMAL ASSIST within 7 treatment day(s).    (2.)Mr. Saleh will roll side to side in bed with CONTACT GUARD ASSIST within 7 treatment day(s).    (3.) Brendan Saleh will transfer from bed to chair and chair to bed with MODERATE ASSIST using the least restrictive device within 7 treatment day(s).      Goal DC 6/21/24   Goal DC 6/21/24  (6.) Brendan Saleh will perform therapeutic exercises x 20 min for HEP with CONTACT GUARD ASSIST to improve strength, endurance, and functional mobility within 7 treatment day(s).   (7.) Brendan Saleh will perform seated static and dynamic balance activities x25 minutes with CONTACT GUARD ASSIST to improve safety within 7 treatment day(s).  (8.) Brendan Saleh will perform standing static and dynamic balance activities x 5 minutes with MODERATE ASSIST to improve safety and mobility within 7 treatment day(s).     PHYSICAL THERAPY: Daily Note AM   (Link to Caseload Tracking: PT Visit Days : 2  Time In/Out PT Charge Capture

## 2024-06-24 NOTE — PROGRESS NOTES
Hospitalist Progress Note   Admit Date:  2024  4:30 PM   Name:  Brendan Saleh   Age:  49 y.o.  Sex:  male  :  1975   MRN:  [id number]   Room:  [facility]/    Presenting/Chief Complaint: Altered Mental Status     Reason(s) for Admission: Acute hypernatremia [E87.0]  Volume depletion [E86.9]  Acute kidney injury (HCC) [N17.9]  Acute encephalopathy [G93.40]  Septic shock (HCC) [A41.9, R65.21]     Hospital Course:   Brendan Saleh is a 48 y.o. male with a PMH of SDH s/p poonam hole 2024, hydrocephalus s/p  shunt, sacral decubitus stage IV, who originally presented to the ER  with chief complaint of increased confusion.     He was diagnosed with UTI with hematuria and started on course of vancomycin, Zosyn.  Urine culture finalized negative.   blood cultures with CoNS, determined contaminant.  However, he met sepsis criteria with WBC 14.6, HR 120s.  ID consulted to assist.  ECHO showed preserved EF.  Underwent sacral wound debridement on  with General Surgery, Dr. Zapien.     CT head with ongoing hydrocephalus and a  shunt.  NSGY consulted.  Patient underwent right ventricular peritoneal shunt removal on  due to non-functioning shunt.   Per Dr. Ortiz, patient needs a stepwise approach, and stated shunt replacement may be in his future though would need to wait at least 4 weeks.  He may be able to do temporizing CSF removal however he stated this would only be temporary until more definitive treatment as above.  Patient will require continued neurosurgical care outpatient.     ID returned to the case for management of CSF infection as CSF culture and  shunt tip culture grew ESBL Klebsiella and Serratia marcescens.  Antibiotics changed to IV cipro and furthermore to oral cipro for 14 days post shunt removal EOT 24.  ID signed off on .     His hospital stay was further complicated by a bowel obstruction on May 19, noted on CT after patient vomited fecal material.  He

## 2024-06-24 NOTE — CARE COORDINATION
RICARDO contacted Adele again with [employer] for [place] Medicaid application status, whether it has been completed or not. She will check on it and follow up with RICARDO.

## 2024-06-24 NOTE — PROGRESS NOTES
Comprehensive Nutrition Assessment    Type and Reason for Visit: Reassess  Malnutrition Screening Tool: Malnutrition Screen  Have you recently lost weight without trying?: 2 to 13 pounds (1 point)  Have you been eating poorly because of a decreased appetite?: Yes (1 point)  Malnutrition Screening Tool Score: 2 and Best Practice Alert for BMI <18.5  5/18: Poor intake/appetite (Hospitalist)  5/24: General nutrition management (Hospitalist)  Parenteral Nutrition Management (Hospitalists) 6/2    Nutrition Recommendations/Plan:   Meals and Snacks:  Diet: Continue current order Give double portions to assist with meeting needs.    Nutrition Supplement Therapy:  Medical food supplement therapy:  Discontinue pt refusing all offers     Malnutrition Assessment:6/2  Malnutrition Status: Severe malnutrition  Context: Acute Illness  Findings of clinical characteristics of malnutrition:   Energy Intake:  50% or less of estimated energy requirements for 5 or more days  Weight Loss:  Unable to assess (wide weight vrainces, [facility] c/w severe weight loss at time of admission)     Body Fat Loss:  Moderate body fat loss (visual) Fat Overlying Ribs   Muscle Mass Loss:  Moderate muscle mass loss (visual) Temples (temporalis), Clavicles (pectoralis & deltoids)         Nutrition Assessment:  Nutrition History: 5/7: Pt reports decreased intake for \"far too long\". He is unable to provide any other details.         Weight History: Pt reports wt loss however he is unsure how much. Per EMR wt hx review of neurosurgery office visits 11/7 180lb, 1/26 198lb, 3/27 190lb.   Unable to validate wt loss given wide weight variances with overall trend of weight gain since admission.     Nutrition Background:       PMH significant for hydrocephalus s/p  shunt 2/2024, CHF, seizures, and decubitus ulcer. Pt admitted with acute encephalopathy, sepsis, MIRNA, and hypernatremia from hypovolemia. New diagnosis of colon cancer. Pt with a wound, see wound care

## 2024-06-24 NOTE — PLAN OF CARE
Problem: Discharge Planning  Goal: Discharge to home or other facility with appropriate resources  6/23/2024 2138 by John Oglesby RN  Outcome: Progressing  6/23/2024 1144 by Paemla Harkins RN  Outcome: Progressing     Problem: Safety - Adult  Goal: Free from fall injury  6/23/2024 2138 by John Oglesby RN  Outcome: Progressing  6/23/2024 1144 by Pamela Harkins RN  Outcome: Progressing     Problem: Neurosensory - Adult  Goal: Achieves stable or improved neurological status  6/23/2024 2138 by John Oglesby RN  Outcome: Progressing  6/23/2024 1144 by Pamela Harkins RN  Outcome: Progressing     Problem: Musculoskeletal - Adult  Goal: Return mobility to safest level of function  6/23/2024 2138 by John Oglesby RN  Outcome: Progressing  6/23/2024 1144 by Pamela Harkins RN  Outcome: Progressing     Problem: Skin/Tissue Integrity  Goal: Absence of new skin breakdown  Description: 1.  Monitor for areas of redness and/or skin breakdown  2.  Assess vascular access sites hourly  3.  Every 4-6 hours minimum:  Change oxygen saturation probe site  4.  Every 4-6 hours:  If on nasal continuous positive airway pressure, respiratory therapy assess nares and determine need for appliance change or resting period.  6/23/2024 2138 by John Oglesby RN  Outcome: Progressing  6/23/2024 1144 by Pamela Harkins RN  Outcome: Progressing     Problem: Pain  Goal: Verbalizes/displays adequate comfort level or baseline comfort level  6/23/2024 2138 by John Oglesby RN  Outcome: Progressing  6/23/2024 1144 by Pamela Harkins RN  Outcome: Progressing     Problem: Respiratory - Adult  Goal: Achieves optimal ventilation and oxygenation  6/23/2024 2138 by John Oglesby RN  Outcome: Progressing  6/23/2024 1144 by Pamela Harkins RN  Outcome: Progressing     Problem: Skin/Tissue Integrity - Adult  Goal: Skin integrity remains intact  6/23/2024 2138 by John Oglesby RN  Outcome: Progressing  6/23/2024 1144 by Pamela Harkins

## 2024-06-24 NOTE — PROGRESS NOTES
ACUTE OCCUPATIONAL THERAPY GOALS:   (Developed with and agreed upon by patient and/or caregiver.)  1. Patient will complete lower body bathing and dressing with MODERATE ASSIST and adaptive equipment as needed.     2. Patient will complete upper body bathing and dressing with MINIMAL ASSIST and adaptive equipment as needed.   3. Patient will complete grooming ADL in unsupported sitting with MINIMAL ASSIST.  4. Patient will tolerate 25 minutes of OT treatment with  3-5 rest breaks to increase activity tolerance for ADLs.   5. Patient will complete functional transfers with MODERATE ASSIST and adaptive equipment as needed.   6. Patient will tolerate 10 minutes BUE exercises to increase strength for safe, functional transfers.   7. Patient will perform self feeding with STAND BY ASSIST and adaptive equipment as needed. GOAL MET 6/19/24    Timeframe: 7 visits     OCCUPATIONAL THERAPY: Daily Note PM   OT Visit Days: 6   Time In/Out  OT Charge Capture  Rehab Caseload Tracker  OT Orders    Brendan Saleh is a 49 y.o. male   PRIMARY DIAGNOSIS: Encephalitis  Acute hypernatremia [E87.0]  Volume depletion [E86.9]  Acute kidney injury (HCC) [N17.9]  Acute encephalopathy [G93.40]  Septic shock (HCC) [A41.9, R65.21]  Procedure(s) (LRB):  LAPAROTOMY EXPLORATORY, PRIMARY CLOSURE SMALL BOWEL PERFORATION (N/A)  23 Days Post-Op  Inpatient: Payor: [employer] / Plan: [employer] / Product Type: *No Product type* /     ASSESSMENT:     REHAB RECOMMENDATIONS:   Recommendation to date pending progress:  Setting:  [facility] Facility  vs [facility]    Equipment:    To Be Determined     ASSESSMENT:  Mr. Saleh continues to present with decreased activity tolerance, balance, strength, mobility, coordination and cognition impacting ADLs. Pt required max a x 2 with supine to sit. Once sitting edge of bed, his sitting balance was fair to fair+. He was able to self feed entire meal while seated edge of bed with CGA/min A for sitting balance.

## 2024-06-25 LAB
ANION GAP SERPL CALC-SCNC: 9 MMOL/L (ref 9–18)
BASOPHILS # BLD: 0 K/UL (ref 0–0.2)
BASOPHILS NFR BLD: 0 % (ref 0–2)
BUN SERPL-MCNC: 7 MG/DL (ref 6–23)
CALCIUM SERPL-MCNC: 8.2 MG/DL (ref 8.8–10.2)
CHLORIDE SERPL-SCNC: 101 MMOL/L (ref 98–107)
CO2 SERPL-SCNC: 29 MMOL/L (ref 20–28)
CREAT SERPL-MCNC: 0.73 MG/DL (ref 0.8–1.3)
DIFFERENTIAL METHOD BLD: ABNORMAL
EOSINOPHIL # BLD: 0.1 K/UL (ref 0–0.8)
EOSINOPHIL NFR BLD: 1 % (ref 0.5–7.8)
ERYTHROCYTE [DISTWIDTH] IN BLOOD BY AUTOMATED COUNT: 15.9 % (ref 11.9–14.6)
GLUCOSE SERPL-MCNC: 104 MG/DL (ref 70–99)
HCT VFR BLD AUTO: 28 % (ref 41.1–50.3)
HGB BLD-MCNC: 8.4 G/DL (ref 13.6–17.2)
IMM GRANULOCYTES # BLD AUTO: 0 K/UL (ref 0–0.5)
IMM GRANULOCYTES NFR BLD AUTO: 0 % (ref 0–5)
LYMPHOCYTES # BLD: 1.5 K/UL (ref 0.5–4.6)
LYMPHOCYTES NFR BLD: 22 % (ref 13–44)
MCH RBC QN AUTO: 29.2 PG (ref 26.1–32.9)
MCHC RBC AUTO-ENTMCNC: 30 G/DL (ref 31.4–35)
MCV RBC AUTO: 97.2 FL (ref 82–102)
MONOCYTES # BLD: 0.7 K/UL (ref 0.1–1.3)
MONOCYTES NFR BLD: 9 % (ref 4–12)
NEUTS SEG # BLD: 4.7 K/UL (ref 1.7–8.2)
NEUTS SEG NFR BLD: 67 % (ref 43–78)
NRBC # BLD: 0 K/UL (ref 0–0.2)
PLATELET # BLD AUTO: 309 K/UL (ref 150–450)
PMV BLD AUTO: 10.3 FL (ref 9.4–12.3)
POTASSIUM SERPL-SCNC: 3.6 MMOL/L (ref 3.5–5.1)
RBC # BLD AUTO: 2.88 M/UL (ref 4.23–5.6)
SODIUM SERPL-SCNC: 139 MMOL/L (ref 136–145)
WBC # BLD AUTO: 7 K/UL (ref 4.3–11.1)

## 2024-06-25 PROCEDURE — 6370000000 HC RX 637 (ALT 250 FOR IP): Performed by: PHYSICIAN ASSISTANT

## 2024-06-25 PROCEDURE — 2580000003 HC RX 258: Performed by: STUDENT IN AN ORGANIZED HEALTH CARE EDUCATION/TRAINING PROGRAM

## 2024-06-25 PROCEDURE — 80048 BASIC METABOLIC PNL TOTAL CA: CPT

## 2024-06-25 PROCEDURE — 6370000000 HC RX 637 (ALT 250 FOR IP): Performed by: NURSE PRACTITIONER

## 2024-06-25 PROCEDURE — 1100000000 HC RM PRIVATE

## 2024-06-25 PROCEDURE — 85025 COMPLETE CBC W/AUTO DIFF WBC: CPT

## 2024-06-25 PROCEDURE — 36415 COLL VENOUS BLD VENIPUNCTURE: CPT

## 2024-06-25 PROCEDURE — 6360000002 HC RX W HCPCS: Performed by: SURGERY

## 2024-06-25 RX ADMIN — ENOXAPARIN SODIUM 40 MG: 100 INJECTION SUBCUTANEOUS at 09:54

## 2024-06-25 RX ADMIN — SODIUM CHLORIDE, PRESERVATIVE FREE 10 ML: 5 INJECTION INTRAVENOUS at 20:30

## 2024-06-25 RX ADMIN — LEVETIRACETAM 500 MG: 500 TABLET, FILM COATED ORAL at 20:29

## 2024-06-25 RX ADMIN — PANTOPRAZOLE SODIUM 40 MG: 40 TABLET, DELAYED RELEASE ORAL at 16:50

## 2024-06-25 RX ADMIN — CETIRIZINE HYDROCHLORIDE 10 MG: 10 TABLET ORAL at 09:54

## 2024-06-25 RX ADMIN — PANTOPRAZOLE SODIUM 40 MG: 40 TABLET, DELAYED RELEASE ORAL at 06:30

## 2024-06-25 RX ADMIN — METOPROLOL TARTRATE 25 MG: 25 TABLET, FILM COATED ORAL at 09:54

## 2024-06-25 RX ADMIN — LEVETIRACETAM 500 MG: 500 TABLET, FILM COATED ORAL at 09:54

## 2024-06-25 RX ADMIN — SODIUM CHLORIDE, PRESERVATIVE FREE 10 ML: 5 INJECTION INTRAVENOUS at 09:55

## 2024-06-25 RX ADMIN — METOPROLOL TARTRATE 25 MG: 25 TABLET, FILM COATED ORAL at 20:29

## 2024-06-25 RX ADMIN — OXYCODONE 5 MG: 5 TABLET ORAL at 20:29

## 2024-06-25 ASSESSMENT — PAIN SCALES - GENERAL: PAINLEVEL_OUTOF10: 8

## 2024-06-25 ASSESSMENT — PAIN - FUNCTIONAL ASSESSMENT: PAIN_FUNCTIONAL_ASSESSMENT: PREVENTS OR INTERFERES SOME ACTIVE ACTIVITIES AND ADLS

## 2024-06-25 ASSESSMENT — PAIN DESCRIPTION - LOCATION: LOCATION: NECK

## 2024-06-25 ASSESSMENT — PAIN DESCRIPTION - DESCRIPTORS: DESCRIPTORS: ACHING;DISCOMFORT;CRAMPING

## 2024-06-25 NOTE — PROGRESS NOTES
Hourly rounds complete this shift, no new complaints at this time, patient c/o: bed in low, locked position, call light and bedside table within reach,  all needs met. Report to day shift nurse.

## 2024-06-25 NOTE — PROGRESS NOTES
Pt suture removed & wound care per orders. Pt is resting in bed without any complaints. Hourly rounds completed, Q2 turns done and all needs met. Bed is low, locked,call light is in reach and pt is encouraged to call for assistance.

## 2024-06-25 NOTE — WOUND CARE
Reconsulted to re-eval wounds.    Sacrum 7x4x0.3cm, full thickness, pink/red, granular, small sanguinous drainage, no odor. Continue NS wet-dry daily, ABD.    (Previous 5/28...)      New of 6/11 L hip pressure injurys stg2 1.5x1.5x0.2cm, indurated/raised 0.5cm, pink/red, slough vs dry eschar, skin hyperpigmented/blanchable, undefined/flat edges, small serosanguinous drainage, no odor. Recommend large pink silicone foam border every other day/PRN.      R posterior head sutures present s/p 5/17 R ventricular peritoneal shunt removal d/t shunt being exposed by NeuroSurg MD Ortiz. Incision has healed. Wound team unaware sutures were present until today. MD Strong aware, will order for sutures to be removed by Primary RN.

## 2024-06-25 NOTE — PROGRESS NOTES
Hospitalist Progress Note   Admit Date:  2024  4:30 PM   Name:  Brendan Saleh   Age:  49 y.o.  Sex:  male  :  1975   MRN:  [id number]   Room:  [facility]/    Presenting/Chief Complaint: Altered Mental Status     Reason(s) for Admission: Acute hypernatremia [E87.0]  Volume depletion [E86.9]  Acute kidney injury (HCC) [N17.9]  Acute encephalopathy [G93.40]  Septic shock (HCC) [A41.9, R65.21]     Hospital Course:   Brendan Saleh is a 48 y.o. male with a PMH of SDH s/p poonam hole 2024, hydrocephalus s/p  shunt, sacral decubitus stage IV, who originally presented to the ER  with chief complaint of increased confusion.     He was diagnosed with UTI with hematuria and started on course of vancomycin, Zosyn.  Urine culture finalized negative.   blood cultures with CoNS, determined contaminant.  However, he met sepsis criteria with WBC 14.6, HR 120s.  ID consulted to assist.  ECHO showed preserved EF.  Underwent sacral wound debridement on  with General Surgery, Dr. Zapien.     CT head with ongoing hydrocephalus and a  shunt.  NSGY consulted.  Patient underwent right ventricular peritoneal shunt removal on  due to non-functioning shunt.   Per Dr. Ortiz, patient needs a stepwise approach, and stated shunt replacement may be in his future though would need to wait at least 4 weeks.  He may be able to do temporizing CSF removal however he stated this would only be temporary until more definitive treatment as above.  Patient will require continued neurosurgical care outpatient.     ID returned to the case for management of CSF infection as CSF culture and  shunt tip culture grew ESBL Klebsiella and Serratia marcescens.  Antibiotics changed to IV cipro and furthermore to oral cipro for 14 days post shunt removal EOT 24.  ID signed off on .     His hospital stay was further complicated by a bowel obstruction on May 19, noted on CT after patient vomited fecal material.  He

## 2024-06-26 PROCEDURE — 6370000000 HC RX 637 (ALT 250 FOR IP): Performed by: PHYSICIAN ASSISTANT

## 2024-06-26 PROCEDURE — 2580000003 HC RX 258: Performed by: STUDENT IN AN ORGANIZED HEALTH CARE EDUCATION/TRAINING PROGRAM

## 2024-06-26 PROCEDURE — 6370000000 HC RX 637 (ALT 250 FOR IP)

## 2024-06-26 PROCEDURE — 1100000000 HC RM PRIVATE

## 2024-06-26 PROCEDURE — 6360000002 HC RX W HCPCS: Performed by: SURGERY

## 2024-06-26 PROCEDURE — 2500000003 HC RX 250 WO HCPCS: Performed by: NURSE PRACTITIONER

## 2024-06-26 PROCEDURE — 97168 OT RE-EVAL EST PLAN CARE: CPT

## 2024-06-26 PROCEDURE — 97112 NEUROMUSCULAR REEDUCATION: CPT

## 2024-06-26 PROCEDURE — 6370000000 HC RX 637 (ALT 250 FOR IP): Performed by: NURSE PRACTITIONER

## 2024-06-26 RX ORDER — MORPHINE SULFATE 2 MG/ML
2 INJECTION, SOLUTION INTRAMUSCULAR; INTRAVENOUS ONCE
Status: COMPLETED | OUTPATIENT
Start: 2024-06-26 | End: 2024-06-26

## 2024-06-26 RX ADMIN — SODIUM CHLORIDE, PRESERVATIVE FREE 10 ML: 5 INJECTION INTRAVENOUS at 09:58

## 2024-06-26 RX ADMIN — METOPROLOL TARTRATE 25 MG: 25 TABLET, FILM COATED ORAL at 20:36

## 2024-06-26 RX ADMIN — OXYCODONE 5 MG: 5 TABLET ORAL at 10:52

## 2024-06-26 RX ADMIN — PANTOPRAZOLE SODIUM 40 MG: 40 TABLET, DELAYED RELEASE ORAL at 15:32

## 2024-06-26 RX ADMIN — METOPROLOL TARTRATE 25 MG: 25 TABLET, FILM COATED ORAL at 09:57

## 2024-06-26 RX ADMIN — PANTOPRAZOLE SODIUM 40 MG: 40 TABLET, DELAYED RELEASE ORAL at 05:23

## 2024-06-26 RX ADMIN — OXYCODONE 5 MG: 5 TABLET ORAL at 22:18

## 2024-06-26 RX ADMIN — DOCUSATE SODIUM 50 MG AND SENNOSIDES 8.6 MG 2 TABLET: 8.6; 5 TABLET, FILM COATED ORAL at 09:58

## 2024-06-26 RX ADMIN — LEVETIRACETAM 500 MG: 500 TABLET, FILM COATED ORAL at 20:36

## 2024-06-26 RX ADMIN — MORPHINE SULFATE 2 MG: 2 INJECTION, SOLUTION INTRAMUSCULAR; INTRAVENOUS at 01:22

## 2024-06-26 RX ADMIN — TRIAMCINOLONE ACETONIDE: 1 CREAM TOPICAL at 10:01

## 2024-06-26 RX ADMIN — CETIRIZINE HYDROCHLORIDE 10 MG: 10 TABLET ORAL at 09:57

## 2024-06-26 RX ADMIN — SODIUM CHLORIDE, PRESERVATIVE FREE 10 ML: 5 INJECTION INTRAVENOUS at 20:37

## 2024-06-26 RX ADMIN — ENOXAPARIN SODIUM 40 MG: 100 INJECTION SUBCUTANEOUS at 09:57

## 2024-06-26 RX ADMIN — LEVETIRACETAM 500 MG: 500 TABLET, FILM COATED ORAL at 09:57

## 2024-06-26 ASSESSMENT — PAIN DESCRIPTION - ORIENTATION
ORIENTATION: MID;LOWER
ORIENTATION: LEFT;RIGHT
ORIENTATION: LEFT

## 2024-06-26 ASSESSMENT — PAIN DESCRIPTION - LOCATION
LOCATION: BACK
LOCATION: LEG
LOCATION: LEG

## 2024-06-26 ASSESSMENT — PAIN - FUNCTIONAL ASSESSMENT
PAIN_FUNCTIONAL_ASSESSMENT: PREVENTS OR INTERFERES SOME ACTIVE ACTIVITIES AND ADLS
PAIN_FUNCTIONAL_ASSESSMENT: PREVENTS OR INTERFERES SOME ACTIVE ACTIVITIES AND ADLS

## 2024-06-26 ASSESSMENT — PAIN SCALES - GENERAL
PAINLEVEL_OUTOF10: 7
PAINLEVEL_OUTOF10: 10
PAINLEVEL_OUTOF10: 2
PAINLEVEL_OUTOF10: 10

## 2024-06-26 ASSESSMENT — PAIN DESCRIPTION - DESCRIPTORS
DESCRIPTORS: ACHING;DISCOMFORT
DESCRIPTORS: SHARP
DESCRIPTORS: ACHING;DISCOMFORT

## 2024-06-26 NOTE — PROGRESS NOTES
Pt is in bed without complaints. Hourly rounds completed and all needs met. Wound care completed. Pt turned q2. Pain managed per MAR. Bed is low, locked, and call light is in reach. Pt encouraged to call for assistance.

## 2024-06-26 NOTE — PROGRESS NOTES
ACUTE OCCUPATIONAL THERAPY GOALS: GOALS REVIEWED AND UPDATED AT RE-EVALUATION ON 6/26/24  (Developed with and agreed upon by patient and/or caregiver.)  1. Patient will complete lower body bathing and dressing with MODERATE ASSIST and adaptive equipment as needed.     3. Patient will complete grooming ADL in unsupported sitting with MINIMAL ASSIST.  4. Patient will tolerate 25 minutes of OT treatment with 1-2 rest breaks to increase activity tolerance for ADLs.   5. Patient will complete functional transfers with MODERATE ASSIST and adaptive equipment as needed.   6. Patient will tolerate 10 minutes BUE exercises to increase strength for safe, functional transfers.   2. Patient will complete upper body bathing and dressing with MINIMAL ASSIST and adaptive equipment as needed.  7. Patient will perform self feeding with STAND BY ASSIST and adaptive equipment as needed.     Timeframe: 7 visits     NEW GOALS ADDED AT RE-EVALUATION 6/26/24  Patient will tolerate 10 minutes unsupported sitting balance edge of bed with CONTACT GUARD ASSIST in preparation for ADL performance.   Patient will complete functional activity while seated edge of bed with CONTACT GUARD ASSIST and adaptive equipment as needed.       OCCUPATIONAL THERAPY Daily Note and Re-evaluation       OT Visit Days: 1  Acknowledge Orders  Time  OT Charge Capture  Rehab Caseload Tracker      Brendan Saleh is a 49 y.o. male   PRIMARY DIAGNOSIS: Encephalitis  Acute hypernatremia [E87.0]  Volume depletion [E86.9]  Acute kidney injury (HCC) [N17.9]  Acute encephalopathy [G93.40]  Septic shock (HCC) [A41.9, R65.21]  Procedure(s) (LRB):  LAPAROTOMY EXPLORATORY, PRIMARY CLOSURE SMALL BOWEL PERFORATION (N/A)  25 Days Post-Op  Reason for Referral: Generalized Muscle Weakness (M62.81)  Difficulty in walking, Not elsewhere classified (R26.2)  Other abnormalities of gait and mobility (R26.89)  Inpatient: Payor: [employer] / Plan: [employer]TER / Product Type: *No Product

## 2024-06-26 NOTE — PROGRESS NOTES
tablet 50 mg  50 mg Oral Nightly PRN    oxyCODONE (ROXICODONE) immediate release tablet 5 mg  5 mg Oral Q6H PRN    acetaminophen (TYLENOL) tablet 650 mg  650 mg Oral Q4H PRN    levETIRAcetam (KEPPRA) tablet 500 mg  500 mg Oral BID    sennosides-docusate sodium (SENOKOT-S) 8.6-50 MG tablet 2 tablet  2 tablet Oral Daily    pantoprazole (PROTONIX) tablet 40 mg  40 mg Oral BID AC    metoprolol tartrate (LOPRESSOR) tablet 25 mg  25 mg Oral BID    0.9 % sodium chloride infusion   IntraVENous PRN    triamcinolone (KENALOG) 0.1 % cream   Topical BID    diphenhydrAMINE (BENADRYL) injection 25 mg  25 mg IntraVENous Q6H PRN    cetirizine (ZYRTEC) tablet 10 mg  10 mg Oral Daily    0.9 % sodium chloride infusion  25 mL IntraVENous PRN    potassium chloride 10 mEq/100 mL IVPB (Peripheral Line)  10 mEq IntraVENous PRN    magnesium sulfate 2000 mg in 50 mL IVPB premix  2,000 mg IntraVENous PRN    sodium phosphate 15 mmol in sodium chloride 0.9 % 250 mL IVPB  15 mmol IntraVENous PRN    sodium chloride flush 0.9 % injection 5-40 mL  5-40 mL IntraVENous 2 times per day    sodium chloride flush 0.9 % injection 5-40 mL  5-40 mL IntraVENous PRN    0.9 % sodium chloride infusion   IntraVENous PRN    glucose chewable tablet 16 g  4 tablet Oral PRN    dextrose bolus 10% 125 mL  125 mL IntraVENous PRN    Or    dextrose bolus 10% 250 mL  250 mL IntraVENous PRN    Glucagon Emergency KIT 1 mg  1 mg SubCUTAneous PRN    dextrose 10 % infusion   IntraVENous Continuous PRN    haloperidol lactate (HALDOL) injection 1 mg  1 mg IntraVENous Q6H PRN    medicated lip ointment (BLISTEX)   Topical PRN    0.9 % sodium chloride infusion   IntraVENous PRN    ondansetron (ZOFRAN-ODT) disintegrating tablet 4 mg  4 mg Oral Q8H PRN    Or    ondansetron (ZOFRAN) injection 4 mg  4 mg IntraVENous Q6H PRN    enoxaparin (LOVENOX) injection 40 mg  40 mg SubCUTAneous Daily    LORazepam (ATIVAN) 2 mg in sodium chloride (PF) 0.9 % 10 mL injection  2 mg IntraVENous Q4H PRN

## 2024-06-26 NOTE — CARE COORDINATION
[facility] re-evaluated pt and declined. CM will continue to follow. CM has still not received update regarding Medicaid application status from Adele Muhammad with [employer].

## 2024-06-26 NOTE — PROGRESS NOTES
Physical Therapy Note:    Attempted to see patient this PM for physical therapy treatment  session. Patient just finishing up treatment with OT. Will follow and re-attempt as schedule permits/patient available. Thank you,    Fe Aburto PTA     Rehab Caseload Tracker

## 2024-06-27 PROCEDURE — 6360000002 HC RX W HCPCS: Performed by: SURGERY

## 2024-06-27 PROCEDURE — 6370000000 HC RX 637 (ALT 250 FOR IP)

## 2024-06-27 PROCEDURE — 2580000003 HC RX 258: Performed by: STUDENT IN AN ORGANIZED HEALTH CARE EDUCATION/TRAINING PROGRAM

## 2024-06-27 PROCEDURE — 6370000000 HC RX 637 (ALT 250 FOR IP): Performed by: PHYSICIAN ASSISTANT

## 2024-06-27 PROCEDURE — 6370000000 HC RX 637 (ALT 250 FOR IP): Performed by: NURSE PRACTITIONER

## 2024-06-27 PROCEDURE — 1100000000 HC RM PRIVATE

## 2024-06-27 RX ADMIN — PANTOPRAZOLE SODIUM 40 MG: 40 TABLET, DELAYED RELEASE ORAL at 05:06

## 2024-06-27 RX ADMIN — SODIUM CHLORIDE, PRESERVATIVE FREE 10 ML: 5 INJECTION INTRAVENOUS at 08:42

## 2024-06-27 RX ADMIN — METOPROLOL TARTRATE 25 MG: 25 TABLET, FILM COATED ORAL at 20:39

## 2024-06-27 RX ADMIN — ENOXAPARIN SODIUM 40 MG: 100 INJECTION SUBCUTANEOUS at 08:41

## 2024-06-27 RX ADMIN — DOCUSATE SODIUM 50 MG AND SENNOSIDES 8.6 MG 2 TABLET: 8.6; 5 TABLET, FILM COATED ORAL at 08:41

## 2024-06-27 RX ADMIN — TRIAMCINOLONE ACETONIDE: 1 CREAM TOPICAL at 08:42

## 2024-06-27 RX ADMIN — SODIUM CHLORIDE, PRESERVATIVE FREE 10 ML: 5 INJECTION INTRAVENOUS at 20:39

## 2024-06-27 RX ADMIN — METOPROLOL TARTRATE 25 MG: 25 TABLET, FILM COATED ORAL at 08:41

## 2024-06-27 RX ADMIN — LEVETIRACETAM 500 MG: 500 TABLET, FILM COATED ORAL at 20:39

## 2024-06-27 RX ADMIN — LEVETIRACETAM 500 MG: 500 TABLET, FILM COATED ORAL at 08:41

## 2024-06-27 RX ADMIN — OXYCODONE 5 MG: 5 TABLET ORAL at 14:57

## 2024-06-27 RX ADMIN — CETIRIZINE HYDROCHLORIDE 10 MG: 10 TABLET ORAL at 08:41

## 2024-06-27 RX ADMIN — PANTOPRAZOLE SODIUM 40 MG: 40 TABLET, DELAYED RELEASE ORAL at 14:57

## 2024-06-27 RX ADMIN — TRIAMCINOLONE ACETONIDE: 1 CREAM TOPICAL at 20:40

## 2024-06-27 ASSESSMENT — PAIN SCALES - GENERAL
PAINLEVEL_OUTOF10: 2
PAINLEVEL_OUTOF10: 5
PAINLEVEL_OUTOF10: 0

## 2024-06-27 ASSESSMENT — PAIN DESCRIPTION - DESCRIPTORS: DESCRIPTORS: CRAMPING

## 2024-06-27 ASSESSMENT — PAIN DESCRIPTION - ORIENTATION: ORIENTATION: LEFT;RIGHT

## 2024-06-27 ASSESSMENT — PAIN DESCRIPTION - LOCATION: LOCATION: LEG

## 2024-06-27 NOTE — PROGRESS NOTES
Hourly rounds performed this shift. Bed rails up x2, bed set in lowest position, locked, and call bell within reach.  Pain managed per MAR.  All needs met at this time.

## 2024-06-27 NOTE — PROGRESS NOTES
Pt is in bed without complaints. Hourly rounds completed and all needs met. Wound care completed and pt turned q2. Pain managed per MAR. Wound care completed. Bed is low, locked, and call light is in reach. Pt encouraged to call for assistance.

## 2024-06-27 NOTE — PROGRESS NOTES
Hospitalist Progress Note   Admit Date:  2024  4:30 PM   Name:  Brendan Saleh   Age:  49 y.o.  Sex:  male  :  1975   MRN:  [id number]   Room:  [facility]/    Presenting/Chief Complaint: Altered Mental Status     Reason(s) for Admission: Acute hypernatremia [E87.0]  Volume depletion [E86.9]  Acute kidney injury (HCC) [N17.9]  Acute encephalopathy [G93.40]  Septic shock (HCC) [A41.9, R65.21]     Hospital Course:   Brendan Saleh is a 48 y.o. male with a PMH of SDH s/p poonam hole 2024, hydrocephalus s/p  shunt, sacral decubitus stage IV, who originally presented to the ER  with chief complaint of increased confusion.     He was diagnosed with UTI with hematuria and started on course of vancomycin, Zosyn.  Urine culture finalized negative.   blood cultures with CoNS, determined contaminant.  However, he met sepsis criteria with WBC 14.6, HR 120s.  ID consulted to assist.  ECHO showed preserved EF.  Underwent sacral wound debridement on  with General Surgery, Dr. Zapien.     CT head with ongoing hydrocephalus and a  shunt.  NSGY consulted.  Patient underwent right ventricular peritoneal shunt removal on  due to non-functioning shunt.   Per Dr. Ortiz, patient needs a stepwise approach, and stated shunt replacement may be in his future though would need to wait at least 4 weeks.  He may be able to do temporizing CSF removal however he stated this would only be temporary until more definitive treatment as above.  Patient will require continued neurosurgical care outpatient.     ID returned to the case for management of CSF infection as CSF culture and  shunt tip culture grew ESBL Klebsiella and Serratia marcescens.  Antibiotics changed to IV cipro and furthermore to oral cipro for 14 days post shunt removal EOT 24.  ID signed off on .     His hospital stay was further complicated by a bowel obstruction on May 19, noted on CT after patient vomited fecal material.  He

## 2024-06-27 NOTE — PROGRESS NOTES
Comprehensive Nutrition Assessment    Type and Reason for Visit: Reassess  Malnutrition Screening Tool: Malnutrition Screen  Have you recently lost weight without trying?: 2 to 13 pounds (1 point)  Have you been eating poorly because of a decreased appetite?: Yes (1 point)  Malnutrition Screening Tool Score: 2 and Best Practice Alert for BMI <18.5  5/18: Poor intake/appetite (Hospitalist)  5/24: General nutrition management (Hospitalist)  Parenteral Nutrition Management (Hospitalists) 6/2    Nutrition Recommendations/Plan:   Meals and Snacks:  Diet: Continue current order Continue double portions to assist with meeting needs.    Nutrition Supplement Therapy:  Medical food supplement therapy:  Discontinue pt refusing all offers     Malnutrition Assessment:6/2  Malnutrition Status: Severe malnutrition  Context: Acute Illness  Findings of clinical characteristics of malnutrition:   Energy Intake:  50% or less of estimated energy requirements for 5 or more days  Weight Loss:  Unable to assess (wide weight vrainces, [facility] c/w severe weight loss at time of admission)     Body Fat Loss:  Moderate body fat loss (visual) Fat Overlying Ribs   Muscle Mass Loss:  Moderate muscle mass loss (visual) Temples (temporalis), Clavicles (pectoralis & deltoids)         Nutrition Assessment:  Nutrition History: 5/7: Pt reports decreased intake for \"far too long\". He is unable to provide any other details.         Weight History: Pt reports wt loss however he is unsure how much. Per EMR wt hx review of neurosurgery office visits 11/7 180lb, 1/26 198lb, 3/27 190lb.   Unable to validate wt loss given wide weight variances with overall trend of weight gain since admission.     Nutrition Background:       PMH significant for hydrocephalus s/p  shunt 2/2024, CHF, seizures, and decubitus ulcer. Pt admitted with acute encephalopathy, sepsis, MIRNA, and hypernatremia from hypovolemia. New diagnosis of colon cancer. Pt with a wound, see wound

## 2024-06-28 LAB
ANION GAP SERPL CALC-SCNC: 9 MMOL/L (ref 9–18)
BASOPHILS # BLD: 0 K/UL (ref 0–0.2)
BASOPHILS NFR BLD: 0 % (ref 0–2)
BUN SERPL-MCNC: 7 MG/DL (ref 6–23)
CALCIUM SERPL-MCNC: 8.4 MG/DL (ref 8.8–10.2)
CHLORIDE SERPL-SCNC: 102 MMOL/L (ref 98–107)
CO2 SERPL-SCNC: 29 MMOL/L (ref 20–28)
CREAT SERPL-MCNC: 0.75 MG/DL (ref 0.8–1.3)
DIFFERENTIAL METHOD BLD: ABNORMAL
EOSINOPHIL # BLD: 0.1 K/UL (ref 0–0.8)
EOSINOPHIL NFR BLD: 1 % (ref 0.5–7.8)
ERYTHROCYTE [DISTWIDTH] IN BLOOD BY AUTOMATED COUNT: 15.9 % (ref 11.9–14.6)
GLUCOSE SERPL-MCNC: 102 MG/DL (ref 70–99)
HCT VFR BLD AUTO: 30.2 % (ref 41.1–50.3)
HGB BLD-MCNC: 9.2 G/DL (ref 13.6–17.2)
IMM GRANULOCYTES # BLD AUTO: 0 K/UL (ref 0–0.5)
IMM GRANULOCYTES NFR BLD AUTO: 1 % (ref 0–5)
LYMPHOCYTES # BLD: 1.6 K/UL (ref 0.5–4.6)
LYMPHOCYTES NFR BLD: 21 % (ref 13–44)
MCH RBC QN AUTO: 30 PG (ref 26.1–32.9)
MCHC RBC AUTO-ENTMCNC: 30.5 G/DL (ref 31.4–35)
MCV RBC AUTO: 98.4 FL (ref 82–102)
MONOCYTES # BLD: 0.6 K/UL (ref 0.1–1.3)
MONOCYTES NFR BLD: 9 % (ref 4–12)
NEUTS SEG # BLD: 5.2 K/UL (ref 1.7–8.2)
NEUTS SEG NFR BLD: 68 % (ref 43–78)
NRBC # BLD: 0 K/UL (ref 0–0.2)
PLATELET # BLD AUTO: 329 K/UL (ref 150–450)
PMV BLD AUTO: 10.2 FL (ref 9.4–12.3)
POTASSIUM SERPL-SCNC: 3.9 MMOL/L (ref 3.5–5.1)
RBC # BLD AUTO: 3.07 M/UL (ref 4.23–5.6)
SODIUM SERPL-SCNC: 141 MMOL/L (ref 136–145)
WBC # BLD AUTO: 7.5 K/UL (ref 4.3–11.1)

## 2024-06-28 PROCEDURE — 80048 BASIC METABOLIC PNL TOTAL CA: CPT

## 2024-06-28 PROCEDURE — 2580000003 HC RX 258: Performed by: STUDENT IN AN ORGANIZED HEALTH CARE EDUCATION/TRAINING PROGRAM

## 2024-06-28 PROCEDURE — 85025 COMPLETE CBC W/AUTO DIFF WBC: CPT

## 2024-06-28 PROCEDURE — 6370000000 HC RX 637 (ALT 250 FOR IP): Performed by: PHYSICIAN ASSISTANT

## 2024-06-28 PROCEDURE — 1100000000 HC RM PRIVATE

## 2024-06-28 PROCEDURE — 6360000002 HC RX W HCPCS: Performed by: SURGERY

## 2024-06-28 PROCEDURE — 36415 COLL VENOUS BLD VENIPUNCTURE: CPT

## 2024-06-28 PROCEDURE — 6370000000 HC RX 637 (ALT 250 FOR IP)

## 2024-06-28 PROCEDURE — 6370000000 HC RX 637 (ALT 250 FOR IP): Performed by: NURSE PRACTITIONER

## 2024-06-28 RX ADMIN — PANTOPRAZOLE SODIUM 40 MG: 40 TABLET, DELAYED RELEASE ORAL at 05:06

## 2024-06-28 RX ADMIN — PANTOPRAZOLE SODIUM 40 MG: 40 TABLET, DELAYED RELEASE ORAL at 14:52

## 2024-06-28 RX ADMIN — TRIAMCINOLONE ACETONIDE: 1 CREAM TOPICAL at 09:01

## 2024-06-28 RX ADMIN — ENOXAPARIN SODIUM 40 MG: 100 INJECTION SUBCUTANEOUS at 09:01

## 2024-06-28 RX ADMIN — OXYCODONE 5 MG: 5 TABLET ORAL at 17:52

## 2024-06-28 RX ADMIN — SODIUM CHLORIDE, PRESERVATIVE FREE 10 ML: 5 INJECTION INTRAVENOUS at 09:00

## 2024-06-28 RX ADMIN — CETIRIZINE HYDROCHLORIDE 10 MG: 10 TABLET ORAL at 09:00

## 2024-06-28 RX ADMIN — SODIUM CHLORIDE, PRESERVATIVE FREE 10 ML: 5 INJECTION INTRAVENOUS at 21:02

## 2024-06-28 RX ADMIN — METOPROLOL TARTRATE 25 MG: 25 TABLET, FILM COATED ORAL at 09:00

## 2024-06-28 RX ADMIN — TRIAMCINOLONE ACETONIDE: 1 CREAM TOPICAL at 21:15

## 2024-06-28 RX ADMIN — DOCUSATE SODIUM 50 MG AND SENNOSIDES 8.6 MG 2 TABLET: 8.6; 5 TABLET, FILM COATED ORAL at 09:00

## 2024-06-28 RX ADMIN — LEVETIRACETAM 500 MG: 500 TABLET, FILM COATED ORAL at 09:01

## 2024-06-28 RX ADMIN — LEVETIRACETAM 500 MG: 500 TABLET, FILM COATED ORAL at 21:02

## 2024-06-28 RX ADMIN — OXYCODONE 5 MG: 5 TABLET ORAL at 02:48

## 2024-06-28 RX ADMIN — METOPROLOL TARTRATE 25 MG: 25 TABLET, FILM COATED ORAL at 21:02

## 2024-06-28 ASSESSMENT — PAIN SCALES - GENERAL
PAINLEVEL_OUTOF10: 0
PAINLEVEL_OUTOF10: 10
PAINLEVEL_OUTOF10: 0
PAINLEVEL_OUTOF10: 8
PAINLEVEL_OUTOF10: 6

## 2024-06-28 ASSESSMENT — PAIN DESCRIPTION - DESCRIPTORS
DESCRIPTORS: SHARP
DESCRIPTORS: ACHING;BURNING

## 2024-06-28 ASSESSMENT — PAIN - FUNCTIONAL ASSESSMENT: PAIN_FUNCTIONAL_ASSESSMENT: ACTIVITIES ARE NOT PREVENTED

## 2024-06-28 ASSESSMENT — PAIN DESCRIPTION - LOCATION
LOCATION: LEG
LOCATION: GENERALIZED;HIP

## 2024-06-28 ASSESSMENT — PAIN DESCRIPTION - ORIENTATION
ORIENTATION: RIGHT;LEFT
ORIENTATION: LEFT

## 2024-06-28 NOTE — PLAN OF CARE
Problem: Discharge Planning  Goal: Discharge to home or other facility with appropriate resources  6/27/2024 2124 by Pam Fine RN  Outcome: Progressing  6/27/2024 0808 by Jose Baumann RN  Outcome: Progressing     Problem: Safety - Adult  Goal: Free from fall injury  6/27/2024 2124 by Pam Fine RN  Outcome: Progressing  6/27/2024 0808 by Jose Baumann RN  Outcome: Progressing     Problem: Neurosensory - Adult  Goal: Achieves stable or improved neurological status  6/27/2024 2124 by Pam Fine RN  Outcome: Progressing  6/27/2024 0808 by Jose Baumann RN  Outcome: Progressing     Problem: Musculoskeletal - Adult  Goal: Return mobility to safest level of function  6/27/2024 2124 by Pam Fine RN  Outcome: Progressing  6/27/2024 0808 by Jose Baumann RN  Outcome: Progressing     Problem: Skin/Tissue Integrity  Goal: Absence of new skin breakdown  Description: 1.  Monitor for areas of redness and/or skin breakdown  2.  Assess vascular access sites hourly  3.  Every 4-6 hours minimum:  Change oxygen saturation probe site  4.  Every 4-6 hours:  If on nasal continuous positive airway pressure, respiratory therapy assess nares and determine need for appliance change or resting period.  6/27/2024 2124 by Pam Fine RN  Outcome: Progressing  6/27/2024 0808 by Jose Baumann RN  Outcome: Progressing     Problem: Pain  Goal: Verbalizes/displays adequate comfort level or baseline comfort level  6/27/2024 2124 by Pam Fine RN  Outcome: Progressing  6/27/2024 0808 by Jose Baumann RN  Outcome: Progressing     Problem: Skin/Tissue Integrity - Adult  Goal: Skin integrity remains intact  6/27/2024 2124 by Pam Fine RN  Outcome: Progressing  6/27/2024 0808 by Jose Baumann RN  Outcome: Progressing  Goal: Incisions, wounds, or drain sites healing without S/S of infection  6/27/2024 2124 by Pam Fine RN  Outcome: Progressing  6/27/2024 0808 by

## 2024-06-28 NOTE — PROGRESS NOTES
Hourly rounds performed this shift, along with dressing changes. Patient resting with no complaints at this time. Bed locked in lowest position, call light, and all personal belongings in pt reach. VS stable, IV patent. Q2h turning throughout shift. will give report to oncoming nurse.

## 2024-06-28 NOTE — CARE COORDINATION
Dispo update:  RICARDO confirmed with Ms. Adele Muhammad for [employer] that they assisted Mr. Saleh with [place] Medicaid ([employer]) application.  She is to send CM a copy of the completed application as SNFs will likely need to verify that the [employer] application was started.    CM also messaged MsNeo Mouna Andre of [facility] with question:  Would you reconsider Mr. Saleh in light of the completed [employer] application?  CM re-sent a referral via [facility] as Mouna said she could not locate it.

## 2024-06-28 NOTE — PROGRESS NOTES
Hospitalist Progress Note   Admit Date:  2024  4:30 PM   Name:  Brendan Saleh   Age:  49 y.o.  Sex:  male  :  1975   MRN:  [id number]   Room:  [facility]/    Presenting/Chief Complaint: Altered Mental Status     Reason(s) for Admission: Acute hypernatremia [E87.0]  Volume depletion [E86.9]  Acute kidney injury (HCC) [N17.9]  Acute encephalopathy [G93.40]  Septic shock (HCC) [A41.9, R65.21]     Hospital Course:   decubitus stage IV, who originally presented to the ER  with chief complaint of increased confusion.     He was diagnosed with UTI with hematuria and started on course of vancomycin, Zosyn.  Urine culture finalized negative.   blood cultures with CoNS, determined contaminant.  However, he met sepsis criteria with WBC 14.6, HR 120s.  ID consulted to assist.  ECHO showed preserved EF.  Underwent sacral wound debridement on  with General Surgery, Dr. Zapien.     CT head with ongoing hydrocephalus and a  shunt.  NSGY consulted.  Patient underwent right ventricular peritoneal shunt removal on  due to non-functioning shunt.   Per Dr. Ortiz, patient needs a stepwise approach, and stated shunt replacement may be in his future though would need to wait at least 4 weeks.  He may be able to do temporizing CSF removal however he stated this would only be temporary until more definitive treatment as above.  Patient will require continued neurosurgical care outpatient.     ID returned to the case for management of CSF infection as CSF culture and  shunt tip culture grew ESBL Klebsiella and Serratia marcescens.  Antibiotics changed to IV cipro and furthermore to oral cipro for 14 days post shunt removal EOT 24.  ID signed off on .     His hospital stay was further complicated by a bowel obstruction on May 19, noted on CT after patient vomited fecal material.  He underwent hemicolectomy and primary anastomosis on  with Dr. Wright, General surgery.  Pathology returned

## 2024-06-28 NOTE — PROGRESS NOTES
Pt is in bed without complaints. Hourly rounds completed and all needs met. Pain managed per MAR. Pt turned q2. Bed is low, locked, and call light is in reach. Pt encouraged to call for assistance.

## 2024-06-29 PROCEDURE — 6370000000 HC RX 637 (ALT 250 FOR IP)

## 2024-06-29 PROCEDURE — 2580000003 HC RX 258: Performed by: STUDENT IN AN ORGANIZED HEALTH CARE EDUCATION/TRAINING PROGRAM

## 2024-06-29 PROCEDURE — 1100000000 HC RM PRIVATE

## 2024-06-29 PROCEDURE — 6370000000 HC RX 637 (ALT 250 FOR IP): Performed by: PHYSICIAN ASSISTANT

## 2024-06-29 PROCEDURE — 6370000000 HC RX 637 (ALT 250 FOR IP): Performed by: NURSE PRACTITIONER

## 2024-06-29 PROCEDURE — 6360000002 HC RX W HCPCS: Performed by: SURGERY

## 2024-06-29 RX ADMIN — OXYCODONE 5 MG: 5 TABLET ORAL at 03:00

## 2024-06-29 RX ADMIN — ENOXAPARIN SODIUM 40 MG: 100 INJECTION SUBCUTANEOUS at 08:53

## 2024-06-29 RX ADMIN — LEVETIRACETAM 500 MG: 500 TABLET, FILM COATED ORAL at 21:14

## 2024-06-29 RX ADMIN — METOPROLOL TARTRATE 25 MG: 25 TABLET, FILM COATED ORAL at 08:53

## 2024-06-29 RX ADMIN — DOCUSATE SODIUM 50 MG AND SENNOSIDES 8.6 MG 2 TABLET: 8.6; 5 TABLET, FILM COATED ORAL at 08:53

## 2024-06-29 RX ADMIN — SODIUM CHLORIDE, PRESERVATIVE FREE 10 ML: 5 INJECTION INTRAVENOUS at 08:52

## 2024-06-29 RX ADMIN — LEVETIRACETAM 500 MG: 500 TABLET, FILM COATED ORAL at 08:52

## 2024-06-29 RX ADMIN — CETIRIZINE HYDROCHLORIDE 10 MG: 10 TABLET ORAL at 08:53

## 2024-06-29 RX ADMIN — TRIAMCINOLONE ACETONIDE: 1 CREAM TOPICAL at 08:55

## 2024-06-29 RX ADMIN — PANTOPRAZOLE SODIUM 40 MG: 40 TABLET, DELAYED RELEASE ORAL at 17:29

## 2024-06-29 RX ADMIN — TRIAMCINOLONE ACETONIDE: 1 CREAM TOPICAL at 21:14

## 2024-06-29 RX ADMIN — PANTOPRAZOLE SODIUM 40 MG: 40 TABLET, DELAYED RELEASE ORAL at 05:01

## 2024-06-29 RX ADMIN — METOPROLOL TARTRATE 25 MG: 25 TABLET, FILM COATED ORAL at 21:14

## 2024-06-29 ASSESSMENT — PAIN - FUNCTIONAL ASSESSMENT: PAIN_FUNCTIONAL_ASSESSMENT: ACTIVITIES ARE NOT PREVENTED

## 2024-06-29 ASSESSMENT — PAIN SCALES - GENERAL
PAINLEVEL_OUTOF10: 0
PAINLEVEL_OUTOF10: 0
PAINLEVEL_OUTOF10: 9

## 2024-06-29 ASSESSMENT — PAIN DESCRIPTION - LOCATION: LOCATION: GENERALIZED

## 2024-06-29 ASSESSMENT — PAIN DESCRIPTION - DESCRIPTORS: DESCRIPTORS: ACHING;DISCOMFORT

## 2024-06-29 NOTE — PROGRESS NOTES
hemicolectomy and primary anastomosis on 5/19 with Dr. Wright, General surgery.  Pathology returned moderately diff adenocarcinoma.  Oncology consulted and recommended CEA (negative), CT chest (negative for nodules/mass) for staging, and possible MRI brain if any further mentation deterioration.  Per Dr. Wong (Oncology) last note on 5/22/2024, pt's insurance is not accepted by their office and will require external referral to oncology. External referral placed and will fax over referral to [facility].      Patient was noted to have progressive anemia on 5/21, with Hgb 10.3 to 6.8.  No black or bloody stool, hematemesis, hematuria, or increased pain at that time.  He received 1 unit PRBCs with improvement to 7.7.  He had a BM on 5/22 that was noted to be black with bright red blood as well.  Surgery notified and hemoglobin trended.  Lovenox held and PPI started.  Felt related to expected mucosal shedding after colon resection and would be self limiting.      On 6/1, pt noted to be tachycardic and distended. + emesis.  Imaging revealed free air, concerning for anastomotic leak. He was urgently taken to the OR for ex-lap with closure of small bowel perforation; continued on TPN. Diet has been advanced. Patient is now on minced/moist, low fiber diet and TPN weaned off.  Surgery signed off 6/11.     Palliative care consulted during hospitalization with GOC defined on 6/3/24 - he would want more neurosurgery if he is a candidate but would not want intra-abdominal surgery.     Therapy recommended rehab.   Patient is medically stable to discharge pending placement.     Subjective & 24hr Events:   Day 54 of hospitalization.   Patient remains hemodynamically stable.  He is off all antibiotics at this time.  He is stable on room air  He denies any acute concerns, no pain or discomfort. Tolerating oral diet.   Placement is pending.  Stable today with no acute concerns.  Weekly labs ordered.    Assessment & Plan:     Colon

## 2024-06-29 NOTE — PLAN OF CARE
Problem: Discharge Planning  Goal: Discharge to home or other facility with appropriate resources  Outcome: Progressing     Problem: Safety - Adult  Goal: Free from fall injury  Outcome: Progressing     Problem: Neurosensory - Adult  Goal: Achieves stable or improved neurological status  Outcome: Progressing     Problem: Musculoskeletal - Adult  Goal: Return mobility to safest level of function  Outcome: Progressing     Problem: Skin/Tissue Integrity  Goal: Absence of new skin breakdown  Description: 1.  Monitor for areas of redness and/or skin breakdown  2.  Assess vascular access sites hourly  3.  Every 4-6 hours minimum:  Change oxygen saturation probe site  4.  Every 4-6 hours:  If on nasal continuous positive airway pressure, respiratory therapy assess nares and determine need for appliance change or resting period.  Outcome: Progressing     Problem: Pain  Goal: Verbalizes/displays adequate comfort level or baseline comfort level  Outcome: Progressing     Problem: Skin/Tissue Integrity - Adult  Goal: Skin integrity remains intact  Outcome: Progressing  Goal: Incisions, wounds, or drain sites healing without S/S of infection  Outcome: Progressing     Problem: Gastrointestinal - Adult  Goal: Maintains adequate nutritional intake  Outcome: Progressing     Problem: Genitourinary - Adult  Goal: Absence of urinary retention  Outcome: Progressing     Problem: Infection - Adult  Goal: Absence of infection at discharge  Outcome: Progressing  Goal: Absence of infection during hospitalization  Outcome: Progressing     Problem: Metabolic/Fluid and Electrolytes - Adult  Goal: Electrolytes maintained within normal limits  Outcome: Progressing  Goal: Hemodynamic stability and optimal renal function maintained  Outcome: Progressing     Problem: Cardiovascular - Adult  Goal: Maintains optimal cardiac output and hemodynamic stability  Outcome: Progressing     Problem: Hematologic - Adult  Goal: Maintains hematologic

## 2024-06-29 NOTE — PLAN OF CARE
Problem: Discharge Planning  Goal: Discharge to home or other facility with appropriate resources  6/29/2024 1933 by John Oglesby RN  Outcome: Progressing  6/29/2024 0848 by Shadia Rodriguez RN  Outcome: Progressing     Problem: Safety - Adult  Goal: Free from fall injury  6/29/2024 1933 by John Oglesby RN  Outcome: Progressing  6/29/2024 0848 by Shadia Rodriguez RN  Outcome: Progressing  Flowsheets (Taken 6/29/2024 0724)  Free From Fall Injury: Instruct family/caregiver on patient safety     Problem: Neurosensory - Adult  Goal: Achieves stable or improved neurological status  6/29/2024 1933 by John Oglesby RN  Outcome: Progressing  6/29/2024 0848 by Shadia Rodriguez RN  Outcome: Progressing  Flowsheets (Taken 6/29/2024 0732)  Achieves stable or improved neurological status: Assess for and report changes in neurological status     Problem: Musculoskeletal - Adult  Goal: Return mobility to safest level of function  6/29/2024 1933 by John Oglesby RN  Outcome: Progressing  6/29/2024 0848 by Shadia Rodriguez RN  Outcome: Progressing  Flowsheets (Taken 6/29/2024 0732)  Return Mobility to Safest Level of Function: Assess patient stability and activity tolerance for standing, transferring and ambulating with or without assistive devices     Problem: Skin/Tissue Integrity  Goal: Absence of new skin breakdown  Description: 1.  Monitor for areas of redness and/or skin breakdown  2.  Assess vascular access sites hourly  3.  Every 4-6 hours minimum:  Change oxygen saturation probe site  4.  Every 4-6 hours:  If on nasal continuous positive airway pressure, respiratory therapy assess nares and determine need for appliance change or resting period.  6/29/2024 1933 by John Oglesby RN  Outcome: Progressing  6/29/2024 0848 by Shadia Rodriguez RN  Outcome: Progressing     Problem: Pain  Goal: Verbalizes/displays adequate comfort level or baseline comfort level  6/29/2024 1933 by John Oglesby RN  Outcome:

## 2024-06-30 PROCEDURE — 6370000000 HC RX 637 (ALT 250 FOR IP): Performed by: PHYSICIAN ASSISTANT

## 2024-06-30 PROCEDURE — 6360000002 HC RX W HCPCS: Performed by: SURGERY

## 2024-06-30 PROCEDURE — 2580000003 HC RX 258: Performed by: STUDENT IN AN ORGANIZED HEALTH CARE EDUCATION/TRAINING PROGRAM

## 2024-06-30 PROCEDURE — 1100000000 HC RM PRIVATE

## 2024-06-30 PROCEDURE — 6370000000 HC RX 637 (ALT 250 FOR IP): Performed by: NURSE PRACTITIONER

## 2024-06-30 RX ADMIN — LEVETIRACETAM 500 MG: 500 TABLET, FILM COATED ORAL at 21:01

## 2024-06-30 RX ADMIN — PANTOPRAZOLE SODIUM 40 MG: 40 TABLET, DELAYED RELEASE ORAL at 16:40

## 2024-06-30 RX ADMIN — PANTOPRAZOLE SODIUM 40 MG: 40 TABLET, DELAYED RELEASE ORAL at 05:01

## 2024-06-30 RX ADMIN — SODIUM CHLORIDE, PRESERVATIVE FREE 10 ML: 5 INJECTION INTRAVENOUS at 09:01

## 2024-06-30 RX ADMIN — OXYCODONE 5 MG: 5 TABLET ORAL at 08:59

## 2024-06-30 RX ADMIN — TRIAMCINOLONE ACETONIDE: 1 CREAM TOPICAL at 21:01

## 2024-06-30 RX ADMIN — METOPROLOL TARTRATE 25 MG: 25 TABLET, FILM COATED ORAL at 21:01

## 2024-06-30 RX ADMIN — LEVETIRACETAM 500 MG: 500 TABLET, FILM COATED ORAL at 08:59

## 2024-06-30 RX ADMIN — METOPROLOL TARTRATE 25 MG: 25 TABLET, FILM COATED ORAL at 08:59

## 2024-06-30 RX ADMIN — SODIUM CHLORIDE, PRESERVATIVE FREE 10 ML: 5 INJECTION INTRAVENOUS at 21:01

## 2024-06-30 RX ADMIN — CETIRIZINE HYDROCHLORIDE 10 MG: 10 TABLET ORAL at 08:59

## 2024-06-30 ASSESSMENT — PAIN DESCRIPTION - LOCATION: LOCATION: LEG

## 2024-06-30 ASSESSMENT — PAIN DESCRIPTION - DESCRIPTORS: DESCRIPTORS: CRAMPING

## 2024-06-30 ASSESSMENT — PAIN DESCRIPTION - ORIENTATION: ORIENTATION: RIGHT;UPPER

## 2024-06-30 ASSESSMENT — PAIN - FUNCTIONAL ASSESSMENT: PAIN_FUNCTIONAL_ASSESSMENT: ACTIVITIES ARE NOT PREVENTED

## 2024-06-30 ASSESSMENT — PAIN SCALES - GENERAL
PAINLEVEL_OUTOF10: 8
PAINLEVEL_OUTOF10: 4

## 2024-06-30 NOTE — PLAN OF CARE
Problem: Discharge Planning  Goal: Discharge to home or other facility with appropriate resources  6/30/2024 1935 by John Oglesby RN  Outcome: Progressing  6/30/2024 1502 by Pamela Harkins RN  Outcome: Progressing     Problem: Safety - Adult  Goal: Free from fall injury  6/30/2024 1935 by John Oglesby RN  Outcome: Progressing  6/30/2024 1502 by Pamela Harkins RN  Outcome: Progressing     Problem: Neurosensory - Adult  Goal: Achieves stable or improved neurological status  6/30/2024 1935 by John Oglesby RN  Outcome: Progressing  6/30/2024 1502 by Pamela Harkins RN  Outcome: Progressing     Problem: Musculoskeletal - Adult  Goal: Return mobility to safest level of function  6/30/2024 1935 by John Oglesby RN  Outcome: Progressing  6/30/2024 1502 by Pamela Harkins RN  Outcome: Progressing     Problem: Skin/Tissue Integrity  Goal: Absence of new skin breakdown  Description: 1.  Monitor for areas of redness and/or skin breakdown  2.  Assess vascular access sites hourly  3.  Every 4-6 hours minimum:  Change oxygen saturation probe site  4.  Every 4-6 hours:  If on nasal continuous positive airway pressure, respiratory therapy assess nares and determine need for appliance change or resting period.  6/30/2024 1935 by John Oglesby RN  Outcome: Progressing  6/30/2024 1502 by Pamela Harkins RN  Outcome: Progressing     Problem: Pain  Goal: Verbalizes/displays adequate comfort level or baseline comfort level  6/30/2024 1935 by John Oglesby RN  Outcome: Progressing  6/30/2024 1502 by Pamela Harkins RN  Outcome: Progressing     Problem: Skin/Tissue Integrity - Adult  Goal: Skin integrity remains intact  6/30/2024 1935 by John Oglesby RN  Outcome: Progressing  6/30/2024 1502 by Pamela Harkins RN  Outcome: Progressing  Goal: Incisions, wounds, or drain sites healing without S/S of infection  6/30/2024 1935 by John Oglesby RN  Outcome: Progressing  6/30/2024 1502 by Pamela Harkins RN  Outcome:

## 2024-06-30 NOTE — PROGRESS NOTES
Hospitalist Progress Note   Admit Date:  2024  4:30 PM   Name:  Brendan Saleh   Age:  49 y.o.  Sex:  male  :  1975   MRN:  [id number]   Room:  [facility]/    Presenting/Chief Complaint: Altered Mental Status     Reason(s) for Admission: Acute hypernatremia [E87.0]  Volume depletion [E86.9]  Acute kidney injury (HCC) [N17.9]  Acute encephalopathy [G93.40]  Septic shock (HCC) [A41.9, R65.21]     Hospital Course:   Brendan Saleh is a 48 y.o. male with a PMH of SDH s/p poonam hole 2024, hydrocephalus s/p  shunt, sacral decubitus stage IV, who originally presented to the ER  with chief complaint of increased confusion.     He was diagnosed with UTI with hematuria and started on course of vancomycin, Zosyn.  Urine culture finalized negative.   blood cultures with CoNS, determined contaminant.  However, he met sepsis criteria with WBC 14.6, HR 120s.  ID consulted to assist.  ECHO showed preserved EF.  Underwent sacral wound debridement on  with General Surgery, Dr. Zapien.     CT head with ongoing hydrocephalus and a  shunt.  NSGY consulted.  Patient underwent right ventricular peritoneal shunt removal on  due to non-functioning shunt.   Per Dr. Ortiz, patient needs a stepwise approach, and stated shunt replacement may be in his future though would need to wait at least 4 weeks.  He may be able to do temporizing CSF removal however he stated this would only be temporary until more definitive treatment as above.  Patient will require continued neurosurgical care.     ID returned to the case for management of CSF infection as CSF culture and  shunt tip culture grew ESBL Klebsiella and Serratia marcescens.  Antibiotics changed to IV cipro and furthermore to oral cipro for 14 days post shunt removal EOT 24.  ID signed off on .     His hospital stay was further complicated by a bowel obstruction on May 19, noted on CT after patient vomited fecal material.  He underwent

## 2024-06-30 NOTE — PROGRESS NOTES
Pt is resting in bed without any complaints. Hourly rounds completed, Q2 turns done and all needs met. Bed is low, locked,call light is in reach and pt is encouraged to call for assistance.

## 2024-07-01 PROCEDURE — 6360000002 HC RX W HCPCS: Performed by: SURGERY

## 2024-07-01 PROCEDURE — 6370000000 HC RX 637 (ALT 250 FOR IP): Performed by: PHYSICIAN ASSISTANT

## 2024-07-01 PROCEDURE — 6370000000 HC RX 637 (ALT 250 FOR IP): Performed by: NURSE PRACTITIONER

## 2024-07-01 PROCEDURE — 6370000000 HC RX 637 (ALT 250 FOR IP)

## 2024-07-01 PROCEDURE — 2580000003 HC RX 258: Performed by: STUDENT IN AN ORGANIZED HEALTH CARE EDUCATION/TRAINING PROGRAM

## 2024-07-01 PROCEDURE — 1100000000 HC RM PRIVATE

## 2024-07-01 RX ADMIN — LEVETIRACETAM 500 MG: 500 TABLET, FILM COATED ORAL at 08:57

## 2024-07-01 RX ADMIN — SODIUM CHLORIDE, PRESERVATIVE FREE 10 ML: 5 INJECTION INTRAVENOUS at 08:56

## 2024-07-01 RX ADMIN — METOPROLOL TARTRATE 25 MG: 25 TABLET, FILM COATED ORAL at 22:27

## 2024-07-01 RX ADMIN — TRIAMCINOLONE ACETONIDE: 1 CREAM TOPICAL at 09:01

## 2024-07-01 RX ADMIN — PANTOPRAZOLE SODIUM 40 MG: 40 TABLET, DELAYED RELEASE ORAL at 05:26

## 2024-07-01 RX ADMIN — SODIUM CHLORIDE, PRESERVATIVE FREE 10 ML: 5 INJECTION INTRAVENOUS at 22:29

## 2024-07-01 RX ADMIN — DOCUSATE SODIUM 50 MG AND SENNOSIDES 8.6 MG 2 TABLET: 8.6; 5 TABLET, FILM COATED ORAL at 08:57

## 2024-07-01 RX ADMIN — CETIRIZINE HYDROCHLORIDE 10 MG: 10 TABLET ORAL at 08:57

## 2024-07-01 RX ADMIN — PANTOPRAZOLE SODIUM 40 MG: 40 TABLET, DELAYED RELEASE ORAL at 15:49

## 2024-07-01 RX ADMIN — OXYCODONE 5 MG: 5 TABLET ORAL at 08:57

## 2024-07-01 RX ADMIN — TRIAMCINOLONE ACETONIDE: 1 CREAM TOPICAL at 22:29

## 2024-07-01 RX ADMIN — LEVETIRACETAM 500 MG: 500 TABLET, FILM COATED ORAL at 22:26

## 2024-07-01 RX ADMIN — ENOXAPARIN SODIUM 40 MG: 100 INJECTION SUBCUTANEOUS at 08:57

## 2024-07-01 RX ADMIN — OXYCODONE 5 MG: 5 TABLET ORAL at 17:13

## 2024-07-01 RX ADMIN — METOPROLOL TARTRATE 25 MG: 25 TABLET, FILM COATED ORAL at 08:57

## 2024-07-01 ASSESSMENT — PAIN DESCRIPTION - LOCATION
LOCATION: LEG
LOCATION: BACK

## 2024-07-01 ASSESSMENT — PAIN SCALES - GENERAL
PAINLEVEL_OUTOF10: 6
PAINLEVEL_OUTOF10: 10
PAINLEVEL_OUTOF10: 5
PAINLEVEL_OUTOF10: 8
PAINLEVEL_OUTOF10: 8

## 2024-07-01 ASSESSMENT — PAIN DESCRIPTION - DESCRIPTORS
DESCRIPTORS: SHARP
DESCRIPTORS: SPASM;SHARP

## 2024-07-01 ASSESSMENT — PAIN DESCRIPTION - ORIENTATION
ORIENTATION: OTHER (COMMENT)
ORIENTATION: LEFT

## 2024-07-01 NOTE — CARE COORDINATION
CM contacted Mouna at [facility] for update re: STR and she stated she has sent pt information for review. RICARDO will continue to follow.

## 2024-07-01 NOTE — PROGRESS NOTES
Hospitalist Progress Note   Admit Date:  2024  4:30 PM   Name:  Brendan Saleh   Age:  49 y.o.  Sex:  male  :  1975   MRN:  [id number]   Room:  [facility]/    Presenting/Chief Complaint: Altered Mental Status     Reason(s) for Admission: Acute hypernatremia [E87.0]  Volume depletion [E86.9]  Acute kidney injury (HCC) [N17.9]  Acute encephalopathy [G93.40]  Septic shock (HCC) [A41.9, R65.21]     Hospital Course:   Brendan Saleh is a 48 y.o. male with a PMH of SDH s/p poonam hole 2024, hydrocephalus s/p  shunt, sacral decubitus stage IV, who originally presented to the ER  with chief complaint of increased confusion.     He was diagnosed with UTI with hematuria and started on course of vancomycin, Zosyn.  Urine culture finalized negative.   blood cultures with CoNS, determined contaminant.  However, he met sepsis criteria with WBC 14.6, HR 120s.  ID consulted to assist.  ECHO showed preserved EF.  Underwent sacral wound debridement on  with General Surgery, Dr. Zapien.     CT head with ongoing hydrocephalus and a  shunt.  NSGY consulted.  Patient underwent right ventricular peritoneal shunt removal on  due to non-functioning shunt.   Per Dr. Ortiz, patient needs a stepwise approach, and stated shunt replacement may be in his future though would need to wait at least 4 weeks.  He may be able to do temporizing CSF removal however he stated this would only be temporary until more definitive treatment as above.  Patient will require continued neurosurgical care.     ID returned to the case for management of CSF infection as CSF culture and  shunt tip culture grew ESBL Klebsiella and Serratia marcescens.  Antibiotics changed to IV cipro and furthermore to oral cipro for 14 days post shunt removal EOT 24.  ID signed off on .     His hospital stay was further complicated by a bowel obstruction on May 19, noted on CT after patient vomited fecal material.  He underwent

## 2024-07-01 NOTE — PROGRESS NOTES
Comprehensive Nutrition Assessment    Type and Reason for Visit: Reassess  Malnutrition Screening Tool: Malnutrition Screen  Have you recently lost weight without trying?: 2 to 13 pounds (1 point)  Have you been eating poorly because of a decreased appetite?: Yes (1 point)  Malnutrition Screening Tool Score: 2 and Best Practice Alert for BMI <18.5  5/18: Poor intake/appetite (Hospitalist)  5/24: General nutrition management (Hospitalist)  Parenteral Nutrition Management (Hospitalists) 6/2    Nutrition Recommendations/Plan:   Meals and Snacks:  Diet: Continue current order Continue double portions to assist with meeting needs.    Nutrition Supplement Therapy:  Medical food supplement therapy:  None pt declines all due to product fatigue from prior use, now meeting needs with foods alone     Malnutrition Assessment:6/2  Malnutrition Status: Severe malnutrition  Context: Acute Illness  Findings of clinical characteristics of malnutrition:   Energy Intake:  50% or less of estimated energy requirements for 5 or more days  Weight Loss:  Unable to assess (wide weight vrainces, [facility] c/w severe weight loss at time of admission)     Body Fat Loss:  Moderate body fat loss (visual) Fat Overlying Ribs   Muscle Mass Loss:  Moderate muscle mass loss (visual) Temples (temporalis), Clavicles (pectoralis & deltoids)         Nutrition Assessment:  Nutrition History: 5/7: Pt reports decreased intake for \"far too long\". He is unable to provide any other details.         Weight History: Pt reports wt loss however he is unsure how much. Per EMR wt hx review of neurosurgery office visits 11/7 180lb, 1/26 198lb, 3/27 190lb.   Unable to validate wt loss given wide weight variances with overall trend of weight gain since admission.     Nutrition Background:       PMH significant for hydrocephalus s/p  shunt 2/2024, CHF, seizures, and decubitus ulcer. Pt admitted with acute encephalopathy, sepsis, MIRNA, and hypernatremia from hypovolemia.

## 2024-07-02 PROCEDURE — 6370000000 HC RX 637 (ALT 250 FOR IP): Performed by: NURSE PRACTITIONER

## 2024-07-02 PROCEDURE — 97112 NEUROMUSCULAR REEDUCATION: CPT

## 2024-07-02 PROCEDURE — 97535 SELF CARE MNGMENT TRAINING: CPT

## 2024-07-02 PROCEDURE — 6370000000 HC RX 637 (ALT 250 FOR IP): Performed by: PHYSICIAN ASSISTANT

## 2024-07-02 PROCEDURE — 1100000000 HC RM PRIVATE

## 2024-07-02 PROCEDURE — 2580000003 HC RX 258: Performed by: STUDENT IN AN ORGANIZED HEALTH CARE EDUCATION/TRAINING PROGRAM

## 2024-07-02 PROCEDURE — 6370000000 HC RX 637 (ALT 250 FOR IP)

## 2024-07-02 PROCEDURE — 97530 THERAPEUTIC ACTIVITIES: CPT

## 2024-07-02 PROCEDURE — 6360000002 HC RX W HCPCS: Performed by: SURGERY

## 2024-07-02 RX ADMIN — CETIRIZINE HYDROCHLORIDE 10 MG: 10 TABLET ORAL at 08:29

## 2024-07-02 RX ADMIN — PANTOPRAZOLE SODIUM 40 MG: 40 TABLET, DELAYED RELEASE ORAL at 05:40

## 2024-07-02 RX ADMIN — METOPROLOL TARTRATE 25 MG: 25 TABLET, FILM COATED ORAL at 08:29

## 2024-07-02 RX ADMIN — LEVETIRACETAM 500 MG: 500 TABLET, FILM COATED ORAL at 08:29

## 2024-07-02 RX ADMIN — PANTOPRAZOLE SODIUM 40 MG: 40 TABLET, DELAYED RELEASE ORAL at 14:46

## 2024-07-02 RX ADMIN — SODIUM CHLORIDE, PRESERVATIVE FREE 10 ML: 5 INJECTION INTRAVENOUS at 08:30

## 2024-07-02 RX ADMIN — METOPROLOL TARTRATE 25 MG: 25 TABLET, FILM COATED ORAL at 21:42

## 2024-07-02 RX ADMIN — LEVETIRACETAM 500 MG: 500 TABLET, FILM COATED ORAL at 21:42

## 2024-07-02 RX ADMIN — ENOXAPARIN SODIUM 40 MG: 100 INJECTION SUBCUTANEOUS at 08:29

## 2024-07-02 RX ADMIN — SODIUM CHLORIDE, PRESERVATIVE FREE 10 ML: 5 INJECTION INTRAVENOUS at 22:30

## 2024-07-02 RX ADMIN — TRIAMCINOLONE ACETONIDE: 1 CREAM TOPICAL at 08:29

## 2024-07-02 RX ADMIN — DOCUSATE SODIUM 50 MG AND SENNOSIDES 8.6 MG 2 TABLET: 8.6; 5 TABLET, FILM COATED ORAL at 08:29

## 2024-07-02 NOTE — PROGRESS NOTES
Pt is in bed without complaints. Hourly rounds completed and all needs met. Pt had multiple bms this shift. Wound care completed Pt turned q2. Bed is low, locked, and call light is in reach. Pt encouraged to call for assistance.

## 2024-07-02 NOTE — PROGRESS NOTES
ACUTE OCCUPATIONAL THERAPY GOALS:   (Developed with and agreed upon by patient and/or caregiver.)  GOALS REVIEWED AND UPDATED AT RE-EVALUATION ON 6/26/24  (Developed with and agreed upon by patient and/or caregiver.)  1. Patient will complete lower body bathing and dressing with MODERATE ASSIST and adaptive equipment as needed.     3. Patient will complete grooming ADL in unsupported sitting with MINIMAL ASSIST.  4. Patient will tolerate 25 minutes of OT treatment with 1-2 rest breaks to increase activity tolerance for ADLs.   5. Patient will complete functional transfers with MODERATE ASSIST and adaptive equipment as needed.   6. Patient will tolerate 10 minutes BUE exercises to increase strength for safe, functional transfers.   2. Patient will complete upper body bathing and dressing with MINIMAL ASSIST and adaptive equipment as needed.  7. Patient will perform self feeding with STAND BY ASSIST and adaptive equipment as needed.      Timeframe: 7 visits    OCCUPATIONAL THERAPY: Daily Note PM   OT Visit Days: 1   Time In/Out  OT Charge Capture  Rehab Caseload Tracker  OT Orders    Brendan Saleh is a 49 y.o. male   PRIMARY DIAGNOSIS: Encephalitis  Acute hypernatremia [E87.0]  Volume depletion [E86.9]  Acute kidney injury (HCC) [N17.9]  Acute encephalopathy [G93.40]  Septic shock (HCC) [A41.9, R65.21]  Procedure(s) (LRB):  LAPAROTOMY EXPLORATORY, PRIMARY CLOSURE SMALL BOWEL PERFORATION (N/A)  31 Days Post-Op  Inpatient: Payor: [employer] / Plan: [employer] / Product Type: *No Product type* /     ASSESSMENT:     REHAB RECOMMENDATIONS:   Recommendation to date pending progress:  Setting:  Short-term Rehab    Equipment:    To Be Determined     ASSESSMENT:  Mr. Saleh presents supine in the bed and agreeable to therapy. Pt needs maximal assistance x 2 for bed mobility. Pt demonstrates poor posture and core strength in sitting with posterior lean. Pt is flexed at the neck and rounded at the trunk. Pt worked on  sitting balance at midline and working on more anterior flexion at the trunk. Pt worked on propping through arms on the bed and on his legs. Pt did respond to facilitation at the lumbar spine and sternum to promote more upright posture. Pt fluctuated throughout session with sitting balance needing maximal assistance at times but then could maintain with SBA/CGA for brief periods of time. Pt also worked on some ADL in sitting including donning new gown and washing/combing hair in seated position. Pt returned back to supine at end of session and was offloaded and rolled to his L side. Pt demonstrated some progress toward goals this session. Pt to continue per plan of care.        SUBJECTIVE:     Mr. Saleh states, \"I'm from [place] originally\"     Social/Functional Lives With: Alone  Type of Home: Trailer  Home Layout: One level  Home Access: Stairs to enter with rails  Entrance Stairs - Number of Steps: 5  Entrance Stairs - Rails: Both    OBJECTIVE:     LINES / DRAINS / AIRWAY: IV    RESTRICTIONS/PRECAUTIONS:  Restrictions/Precautions  Restrictions/Precautions: Fall Risk, Bed Alarm        PAIN: VITALS / O2:   Pre Treatment: 0/10           Post Treatment: same  Vitals          Oxygen        MOBILITY: I Mod I S SBA CGA Min Mod Max Total  NT x2 Comments:   Bed Mobility    Rolling [] [] [] [] [] [] [] [x] [] [] [x]    Supine to Sit [] [] [] [] [] [] [] [x] [] [] [x]    Scooting [] [] [] [] [] [] [] [x] [] [] [x]    Sit to Supine [] [] [] [] [] [] [] [x] [] [] [x]    Transfers    Sit to Stand [] [] [] [] [] [] [] [] [] [x] []    Bed to Chair [] [] [] [] [] [] [] [] [] [x] []    Stand to Sit [] [] [] [] [] [] [] [] [] [x] []    Tub/Shower [] [] [] [] [] [] [] [] [] [x] []     Toilet [] [] [] [] [] [] [] [] [] [x] []      [] [] [] [] [] [] [] [] [] [] []    I=Independent, Mod I=Modified Independent, S=Supervision/Setup, SBA=Standby Assistance, CGA=Contact Guard Assistance, Min=Minimal Assistance, Mod=Moderate Assistance,

## 2024-07-02 NOTE — PROGRESS NOTES
ACUTE PHYSICAL THERAPY GOALS:   (Developed with and agreed upon by patient and/or caregiver.)  STG: (Goals added 6/21/24)  (1.)Mr. Saleh will move from supine to sit and sit to supine  in bed with MODERATE ASSIST within 3 treatment day(s).    (2.)Mr. Saleh will scoot up and down in bed with MODERATE ASSIST within 3 treatment day(s).    (3.)Mr. Saleh will roll side to side in bed with MINIMAL ASSIST within 3 treatment day(s).    (4.)Mr. Saleh will tolerate sitting up in recliner chair for 2 hours with stable vital signs to increase upright tolerance within 3 treatment day(s).    (5.)Mr. Saleh will maintain static/dynamic sitting x 25 minutes with MINIMAL ASSIST for improved balance within 3 treatment days.  (6.)Mr. Saleh will follow 75% of commands for increased participation in therapeutic activity/exercise within 3 treatment days.     LTG:  (1.) Brendan Saleh  will move from supine to sit and sit to supine and scoot up and down with MINIMAL ASSIST within 7 treatment day(s).    (2.)Mr. Saleh will roll side to side in bed with CONTACT GUARD ASSIST within 7 treatment day(s).    (3.) Brendan Saleh will transfer from bed to chair and chair to bed with MODERATE ASSIST using the least restrictive device within 7 treatment day(s).      Goal DC 6/21/24   Goal DC 6/21/24  (6.) Brendan Saleh will perform therapeutic exercises x 20 min for HEP with CONTACT GUARD ASSIST to improve strength, endurance, and functional mobility within 7 treatment day(s).   (7.) Brendan Saleh will perform seated static and dynamic balance activities x25 minutes with CONTACT GUARD ASSIST to improve safety within 7 treatment day(s).  (8.) Brendan Saleh will perform standing static and dynamic balance activities x 5 minutes with MODERATE ASSIST to improve safety and mobility within 7 treatment day(s).     PHYSICAL THERAPY: Daily Note PM   (Link to Caseload Tracking: PT Visit Days : 3  Time In/Out PT Charge Capture

## 2024-07-02 NOTE — PROGRESS NOTES
Hospitalist Progress Note   Admit Date:  2024  4:30 PM   Name:  Brendan Saleh   Age:  49 y.o.  Sex:  male  :  1975   MRN:  [id number]   Room:  [facility]/    Presenting/Chief Complaint: Altered Mental Status     Reason(s) for Admission: Acute hypernatremia [E87.0]  Volume depletion [E86.9]  Acute kidney injury (HCC) [N17.9]  Acute encephalopathy [G93.40]  Septic shock (HCC) [A41.9, R65.21]     Hospital Course:   Brendan Saleh is a 48 y.o. male with a PMH of SDH s/p poonam hole 2024, hydrocephalus s/p  shunt, sacral decubitus stage IV, who originally presented to the ER  with chief complaint of increased confusion.     He was diagnosed with UTI with hematuria and started on course of vancomycin, Zosyn.  Urine culture finalized negative.   blood cultures with CoNS, determined contaminant.  However, he met sepsis criteria with WBC 14.6, HR 120s.  ID consulted to assist.  ECHO showed preserved EF.  Underwent sacral wound debridement on  with General Surgery, Dr. Zapien.     CT head with ongoing hydrocephalus and a  shunt.  NSGY consulted.  Patient underwent right ventricular peritoneal shunt removal on  due to non-functioning shunt.   Per Dr. Ortiz, patient needs a stepwise approach, and stated shunt replacement may be in his future though would need to wait at least 4 weeks.  He may be able to do temporizing CSF removal however he stated this would only be temporary until more definitive treatment as above.  Patient will require continued neurosurgical care.     ID returned to the case for management of CSF infection as CSF culture and  shunt tip culture grew ESBL Klebsiella and Serratia marcescens.  Antibiotics changed to IV cipro and furthermore to oral cipro for 14 days post shunt removal EOT 24.  ID signed off on .     His hospital stay was further complicated by a bowel obstruction on May 19, noted on CT after patient vomited fecal material.  He underwent

## 2024-07-03 PROCEDURE — 6370000000 HC RX 637 (ALT 250 FOR IP): Performed by: PHYSICIAN ASSISTANT

## 2024-07-03 PROCEDURE — 6360000002 HC RX W HCPCS: Performed by: SURGERY

## 2024-07-03 PROCEDURE — 2580000003 HC RX 258: Performed by: STUDENT IN AN ORGANIZED HEALTH CARE EDUCATION/TRAINING PROGRAM

## 2024-07-03 PROCEDURE — 6370000000 HC RX 637 (ALT 250 FOR IP): Performed by: INTERNAL MEDICINE

## 2024-07-03 PROCEDURE — 1100000000 HC RM PRIVATE

## 2024-07-03 PROCEDURE — 6370000000 HC RX 637 (ALT 250 FOR IP): Performed by: NURSE PRACTITIONER

## 2024-07-03 RX ORDER — OXYCODONE HYDROCHLORIDE 5 MG/1
5 TABLET ORAL EVERY 4 HOURS PRN
Status: DISCONTINUED | OUTPATIENT
Start: 2024-07-03 | End: 2024-10-18 | Stop reason: HOSPADM

## 2024-07-03 RX ORDER — CYCLOBENZAPRINE HCL 10 MG
10 TABLET ORAL 3 TIMES DAILY PRN
Status: DISCONTINUED | OUTPATIENT
Start: 2024-07-03 | End: 2024-10-18 | Stop reason: HOSPADM

## 2024-07-03 RX ADMIN — SODIUM CHLORIDE, PRESERVATIVE FREE 10 ML: 5 INJECTION INTRAVENOUS at 09:58

## 2024-07-03 RX ADMIN — SODIUM CHLORIDE, PRESERVATIVE FREE 10 ML: 5 INJECTION INTRAVENOUS at 20:49

## 2024-07-03 RX ADMIN — PANTOPRAZOLE SODIUM 40 MG: 40 TABLET, DELAYED RELEASE ORAL at 06:26

## 2024-07-03 RX ADMIN — LEVETIRACETAM 500 MG: 500 TABLET, FILM COATED ORAL at 09:54

## 2024-07-03 RX ADMIN — PANTOPRAZOLE SODIUM 40 MG: 40 TABLET, DELAYED RELEASE ORAL at 16:57

## 2024-07-03 RX ADMIN — TRIAMCINOLONE ACETONIDE: 1 CREAM TOPICAL at 20:49

## 2024-07-03 RX ADMIN — OXYCODONE 5 MG: 5 TABLET ORAL at 15:03

## 2024-07-03 RX ADMIN — LEVETIRACETAM 500 MG: 500 TABLET, FILM COATED ORAL at 20:41

## 2024-07-03 RX ADMIN — OXYCODONE 5 MG: 5 TABLET ORAL at 06:46

## 2024-07-03 RX ADMIN — OXYCODONE 5 MG: 5 TABLET ORAL at 19:37

## 2024-07-03 RX ADMIN — ENOXAPARIN SODIUM 40 MG: 100 INJECTION SUBCUTANEOUS at 09:53

## 2024-07-03 RX ADMIN — TRIAMCINOLONE ACETONIDE: 1 CREAM TOPICAL at 04:25

## 2024-07-03 RX ADMIN — METOPROLOL TARTRATE 25 MG: 25 TABLET, FILM COATED ORAL at 09:54

## 2024-07-03 RX ADMIN — CETIRIZINE HYDROCHLORIDE 10 MG: 10 TABLET ORAL at 09:54

## 2024-07-03 RX ADMIN — CYCLOBENZAPRINE 10 MG: 10 TABLET, FILM COATED ORAL at 19:37

## 2024-07-03 RX ADMIN — METOPROLOL TARTRATE 25 MG: 25 TABLET, FILM COATED ORAL at 20:41

## 2024-07-03 RX ADMIN — OXYCODONE 5 MG: 5 TABLET ORAL at 10:15

## 2024-07-03 RX ADMIN — CYCLOBENZAPRINE 10 MG: 10 TABLET, FILM COATED ORAL at 11:07

## 2024-07-03 ASSESSMENT — PAIN DESCRIPTION - DESCRIPTORS
DESCRIPTORS: ACHING;SORE
DESCRIPTORS: ACHING;BURNING;CRUSHING;DISCOMFORT
DESCRIPTORS: SHARP;SORE
DESCRIPTORS: ACHING;SORE

## 2024-07-03 ASSESSMENT — PAIN SCALES - GENERAL
PAINLEVEL_OUTOF10: 0
PAINLEVEL_OUTOF10: 10
PAINLEVEL_OUTOF10: 7
PAINLEVEL_OUTOF10: 7
PAINLEVEL_OUTOF10: 0
PAINLEVEL_OUTOF10: 0
PAINLEVEL_OUTOF10: 7
PAINLEVEL_OUTOF10: 0

## 2024-07-03 ASSESSMENT — PAIN DESCRIPTION - ORIENTATION
ORIENTATION: LEFT;MID;POSTERIOR
ORIENTATION: POSTERIOR

## 2024-07-03 ASSESSMENT — PAIN DESCRIPTION - LOCATION
LOCATION: BACK;LEG;SACRUM
LOCATION: SACRUM

## 2024-07-03 ASSESSMENT — PAIN - FUNCTIONAL ASSESSMENT: PAIN_FUNCTIONAL_ASSESSMENT: PREVENTS OR INTERFERES SOME ACTIVE ACTIVITIES AND ADLS

## 2024-07-03 NOTE — PROGRESS NOTES
Patient had a good overnight; though, early this AM (approx. 0630) patient began c/o pain in his lower back and sacral areas. Pain medication was administered.    Dressing change had been made at 0300 on sacral wound. Peripheral skin to wound is red/evelyn.  Position changes were made throughout shift.  Though, patient is prone to shifting back to a supine position.  Was counseled to \"stick with\" the position changes. Pt was moderately agreeable.  Patient was moved/shifted, again, at shift change with assistance of oncoming PCT. Hopeful that he finds comfort/relief    Hourly rounds completed and all needs are met. Bed is locked, low and call light is within reach and patient is encouraged to call for any need(s).

## 2024-07-03 NOTE — PROGRESS NOTES
Hospitalist Progress Note   Admit Date:  2024  4:30 PM   Name:  Brendan Saleh   Age:  49 y.o.  Sex:  male  :  1975   MRN:  [id number]   Room:  [facility]/    Presenting/Chief Complaint: Altered Mental Status     Reason(s) for Admission: Acute hypernatremia [E87.0]  Volume depletion [E86.9]  Acute kidney injury (HCC) [N17.9]  Acute encephalopathy [G93.40]  Septic shock (HCC) [A41.9, R65.21]     Hospital Course:   Brendan Saleh is a 48 y.o. male with a PMH of SDH s/p poonam hole 2024, hydrocephalus s/p  shunt, sacral decubitus stage IV, who originally presented to the ER  with chief complaint of increased confusion.     He was diagnosed with UTI with hematuria and started on course of vancomycin, Zosyn.  Urine culture finalized negative.   blood cultures with CoNS, determined contaminant.  However, he met sepsis criteria with WBC 14.6, HR 120s.  ID consulted to assist.  ECHO showed preserved EF.  Underwent sacral wound debridement on  with General Surgery, Dr. Zapien.     CT head with ongoing hydrocephalus and a  shunt.  NSGY consulted.  Patient underwent right ventricular peritoneal shunt removal on  due to non-functioning shunt.   Per Dr. Ortiz, patient needs a stepwise approach, and stated shunt replacement may be in his future though would need to wait at least 4 weeks.  He may be able to do temporizing CSF removal however he stated this would only be temporary until more definitive treatment as above.  Patient will require continued neurosurgical care.     ID returned to the case for management of CSF infection as CSF culture and  shunt tip culture grew ESBL Klebsiella and Serratia marcescens.  Antibiotics changed to IV cipro and furthermore to oral cipro for 14 days post shunt removal EOT 24.  ID signed off on .     His hospital stay was further complicated by a bowel obstruction on May 19, noted on CT after patient vomited fecal material.  He underwent

## 2024-07-04 PROCEDURE — 1100000000 HC RM PRIVATE

## 2024-07-04 PROCEDURE — 6370000000 HC RX 637 (ALT 250 FOR IP)

## 2024-07-04 PROCEDURE — 97112 NEUROMUSCULAR REEDUCATION: CPT

## 2024-07-04 PROCEDURE — 6370000000 HC RX 637 (ALT 250 FOR IP): Performed by: PHYSICIAN ASSISTANT

## 2024-07-04 PROCEDURE — 2580000003 HC RX 258: Performed by: STUDENT IN AN ORGANIZED HEALTH CARE EDUCATION/TRAINING PROGRAM

## 2024-07-04 PROCEDURE — 97530 THERAPEUTIC ACTIVITIES: CPT

## 2024-07-04 PROCEDURE — 6370000000 HC RX 637 (ALT 250 FOR IP): Performed by: INTERNAL MEDICINE

## 2024-07-04 PROCEDURE — 6370000000 HC RX 637 (ALT 250 FOR IP): Performed by: NURSE PRACTITIONER

## 2024-07-04 PROCEDURE — 6360000002 HC RX W HCPCS: Performed by: SURGERY

## 2024-07-04 RX ADMIN — PANTOPRAZOLE SODIUM 40 MG: 40 TABLET, DELAYED RELEASE ORAL at 15:39

## 2024-07-04 RX ADMIN — LEVETIRACETAM 500 MG: 500 TABLET, FILM COATED ORAL at 08:54

## 2024-07-04 RX ADMIN — TRIAMCINOLONE ACETONIDE: 1 CREAM TOPICAL at 20:26

## 2024-07-04 RX ADMIN — SODIUM CHLORIDE, PRESERVATIVE FREE 10 ML: 5 INJECTION INTRAVENOUS at 08:54

## 2024-07-04 RX ADMIN — ENOXAPARIN SODIUM 40 MG: 100 INJECTION SUBCUTANEOUS at 08:54

## 2024-07-04 RX ADMIN — METOPROLOL TARTRATE 25 MG: 25 TABLET, FILM COATED ORAL at 20:25

## 2024-07-04 RX ADMIN — LEVETIRACETAM 500 MG: 500 TABLET, FILM COATED ORAL at 20:25

## 2024-07-04 RX ADMIN — DOCUSATE SODIUM 50 MG AND SENNOSIDES 8.6 MG 2 TABLET: 8.6; 5 TABLET, FILM COATED ORAL at 08:54

## 2024-07-04 RX ADMIN — METOPROLOL TARTRATE 25 MG: 25 TABLET, FILM COATED ORAL at 08:54

## 2024-07-04 RX ADMIN — PANTOPRAZOLE SODIUM 40 MG: 40 TABLET, DELAYED RELEASE ORAL at 05:39

## 2024-07-04 RX ADMIN — TRIAMCINOLONE ACETONIDE: 1 CREAM TOPICAL at 08:54

## 2024-07-04 RX ADMIN — SODIUM CHLORIDE, PRESERVATIVE FREE 10 ML: 5 INJECTION INTRAVENOUS at 20:25

## 2024-07-04 RX ADMIN — CYCLOBENZAPRINE 10 MG: 10 TABLET, FILM COATED ORAL at 10:23

## 2024-07-04 RX ADMIN — CETIRIZINE HYDROCHLORIDE 10 MG: 10 TABLET ORAL at 08:54

## 2024-07-04 ASSESSMENT — PAIN SCALES - GENERAL: PAINLEVEL_OUTOF10: 0

## 2024-07-04 NOTE — PROGRESS NOTES
ACUTE PHYSICAL THERAPY GOALS:   (Developed with and agreed upon by patient and/or caregiver.)  STG: (Goals added 6/21/24)  (1.)Mr. Saleh will move from supine to sit and sit to supine  in bed with MODERATE ASSIST within 3 treatment day(s).    (2.)Mr. Saleh will scoot up and down in bed with MODERATE ASSIST within 3 treatment day(s).    (3.)Mr. Saleh will roll side to side in bed with MINIMAL ASSIST within 3 treatment day(s).    (4.)Mr. Saleh will tolerate sitting up in recliner chair for 2 hours with stable vital signs to increase upright tolerance within 3 treatment day(s).    (5.)Mr. Saleh will maintain static/dynamic sitting x 25 minutes with MINIMAL ASSIST for improved balance within 3 treatment days.  (6.)Mr. Saleh will follow 75% of commands for increased participation in therapeutic activity/exercise within 3 treatment days.     LTG:  (1.) Brendan Saleh  will move from supine to sit and sit to supine and scoot up and down with MINIMAL ASSIST within 7 treatment day(s).    (2.)Mr. Saleh will roll side to side in bed with CONTACT GUARD ASSIST within 7 treatment day(s).    (3.) Brendan Saleh will transfer from bed to chair and chair to bed with MODERATE ASSIST using the least restrictive device within 7 treatment day(s).      Goal DC 6/21/24   Goal DC 6/21/24  (6.) Brendan Saleh will perform therapeutic exercises x 20 min for HEP with CONTACT GUARD ASSIST to improve strength, endurance, and functional mobility within 7 treatment day(s).   (7.) Brendan Saleh will perform seated static and dynamic balance activities x25 minutes with CONTACT GUARD ASSIST to improve safety within 7 treatment day(s).  (8.) Brendan Saleh will perform standing static and dynamic balance activities x 5 minutes with MODERATE ASSIST to improve safety and mobility within 7 treatment day(s).     PHYSICAL THERAPY: Daily Note PM   (Link to Caseload Tracking: PT Visit Days : 4  Time In/Out PT Charge Capture

## 2024-07-04 NOTE — PROGRESS NOTES
Pt is in bed without complaints. Hourly rounds completed and all needs met. Pt turned q2. Wound care completed. Flexiril given x1 for leg cramps with successful results. Bed is low, locked, and call light is in reach. Pt encouraged to call for assistance.

## 2024-07-04 NOTE — PROGRESS NOTES
ACUTE OCCUPATIONAL THERAPY GOALS:   (Developed with and agreed upon by patient and/or caregiver.)  GOALS REVIEWED AND UPDATED AT RE-EVALUATION ON 6/26/24  (Developed with and agreed upon by patient and/or caregiver.)  1. Patient will complete lower body bathing and dressing with MODERATE ASSIST and adaptive equipment as needed.     3. Patient will complete grooming ADL in unsupported sitting with MINIMAL ASSIST.  4. Patient will tolerate 25 minutes of OT treatment with 1-2 rest breaks to increase activity tolerance for ADLs.   5. Patient will complete functional transfers with MODERATE ASSIST and adaptive equipment as needed.   6. Patient will tolerate 10 minutes BUE exercises to increase strength for safe, functional transfers.   2. Patient will complete upper body bathing and dressing with MINIMAL ASSIST and adaptive equipment as needed.  7. Patient will perform self feeding with STAND BY ASSIST and adaptive equipment as needed.      Timeframe: 7 visits    OCCUPATIONAL THERAPY: Daily Note PM   OT Visit Days: 2   Time In/Out  OT Charge Capture  Rehab Caseload Tracker  OT Orders    Brendan Saleh is a 49 y.o. male   PRIMARY DIAGNOSIS: Encephalitis  Acute hypernatremia [E87.0]  Volume depletion [E86.9]  Acute kidney injury (HCC) [N17.9]  Acute encephalopathy [G93.40]  Septic shock (HCC) [A41.9, R65.21]  Procedure(s) (LRB):  LAPAROTOMY EXPLORATORY, PRIMARY CLOSURE SMALL BOWEL PERFORATION (N/A)  33 Days Post-Op  Inpatient: Payor: [employer] / Plan: [employer] / Product Type: *No Product type* /     ASSESSMENT:     REHAB RECOMMENDATIONS:   Recommendation to date pending progress:  Setting:  Short-term Rehab    Equipment:    To Be Determined     ASSESSMENT:  Mr. Saleh is supine in the bed and agreeable to working with therapy. Pt still needing mod-maximal assistance x 2 for bed mobility and continues to have posterior lean in sitting. Pt worked on dynamic reaching and was able do initiate some anterior trunk    Upper Body   Bathing [] [] [] [] [] [] [] [] [] [x]     Lower Body Bathing [] [] [] [] [] [] [] [] [] [x]     Toileting [] [] [] [] [] [] [] [] [] [x]    Upper Body Dressing [] [] [] [] [] [] [] [] [] [x]    Lower Body Dressing [] [] [] [] [] [] [] [] [] [x]    Feeding [] [] [] [] [] [] [] [] [] [x]    Grooming [] [] [] [] [] [] [] [] [] [x]    Personal Device Care [] [] [] [] [] [] [] [] [] [x]    Functional Mobility [] [] [] [] [] [] [x] [] [] [] X 2     I=Independent, Mod I=Modified Independent, S=Supervision/Setup, SBA=Standby Assistance, CGA=Contact Guard Assistance, Min=Minimal Assistance, Mod=Moderate Assistance, Max=Maximal Assistance, Total=Total Assistance, NT=Not Tested    BALANCE: Good Fair+ Fair Fair- Poor NT Comments   Sitting Static [] [] [x] [x] [] []    Sitting Dynamic [] [] [] [x] [x] []              Standing Static [] [] [] [x] [] [] Standing to the rolling walker   Standing Dynamic [] [] [] [] [] [x]        PLAN:     FREQUENCY/DURATION   OT Plan of Care: 3 times/week for duration of hospital stay or until stated goals are met, whichever comes first.    TREATMENT:     TREATMENT:   Co-Treatment PT/OT necessary due to patient's decreased overall endurance/tolerance levels, as well as need for high level skilled assistance to complete functional transfers/mobility and functional tasks  Neuromuscular Re-education (26 Minutes): Patient participated in neuromuscular re-education including functional reaching, weight shifting, postural training, midline training, standing tolerance activity , and sitting balance activity   with moderate and maximal assistance, verbal cues, tactile cues, visual cues, education, and adaptive equipment to improve sitting balance, standing balance, posture, coordination, static balance, and dynamic balance in order to prepare for functional task, prepare for seated ADLs, prepare for standing ADLs, prepare for functional transfer, increase safety awareness, and prepare for

## 2024-07-04 NOTE — PROGRESS NOTES
Hospitalist Progress Note   Admit Date:  2024  4:30 PM   Name:  Brendan Saleh   Age:  49 y.o.  Sex:  male  :  1975   MRN:  [id number]   Room:  [facility]/    Presenting/Chief Complaint: Altered Mental Status     Reason(s) for Admission: Acute hypernatremia [E87.0]  Volume depletion [E86.9]  Acute kidney injury (HCC) [N17.9]  Acute encephalopathy [G93.40]  Septic shock (HCC) [A41.9, R65.21]     Hospital Course:   Brendan Saleh is a 48 y.o. male with a PMH of SDH s/p poonam hole 2024, hydrocephalus s/p  shunt, sacral decubitus stage IV, who originally presented to the ER  with chief complaint of increased confusion.     He was diagnosed with UTI with hematuria and started on course of vancomycin, Zosyn.  Urine culture finalized negative.   blood cultures with CoNS, determined contaminant.  However, he met sepsis criteria with WBC 14.6, HR 120s.  ID consulted to assist.  ECHO showed preserved EF.  Underwent sacral wound debridement on  with General Surgery, Dr. Zapien.     CT head with ongoing hydrocephalus and a  shunt.  NSGY consulted.  Patient underwent right ventricular peritoneal shunt removal on  due to non-functioning shunt.   Per Dr. Ortiz, patient needs a stepwise approach, and stated shunt replacement may be in his future though would need to wait at least 4 weeks.  He may be able to do temporizing CSF removal however he stated this would only be temporary until more definitive treatment as above.  Patient will require continued neurosurgical care.     ID returned to the case for management of CSF infection as CSF culture and  shunt tip culture grew ESBL Klebsiella and Serratia marcescens.  Antibiotics changed to IV cipro and furthermore to oral cipro for 14 days post shunt removal EOT 24.  ID signed off on .     His hospital stay was further complicated by a bowel obstruction on May 19, noted on CT after patient vomited fecal material.  He underwent

## 2024-07-05 LAB
ANION GAP SERPL CALC-SCNC: 10 MMOL/L (ref 9–18)
BASOPHILS # BLD: 0 K/UL (ref 0–0.2)
BASOPHILS NFR BLD: 0 % (ref 0–2)
BUN SERPL-MCNC: 8 MG/DL (ref 6–23)
CALCIUM SERPL-MCNC: 8.8 MG/DL (ref 8.8–10.2)
CHLORIDE SERPL-SCNC: 101 MMOL/L (ref 98–107)
CO2 SERPL-SCNC: 28 MMOL/L (ref 20–28)
CREAT SERPL-MCNC: 0.68 MG/DL (ref 0.8–1.3)
DIFFERENTIAL METHOD BLD: ABNORMAL
EOSINOPHIL # BLD: 0.1 K/UL (ref 0–0.8)
EOSINOPHIL NFR BLD: 1 % (ref 0.5–7.8)
ERYTHROCYTE [DISTWIDTH] IN BLOOD BY AUTOMATED COUNT: 15.2 % (ref 11.9–14.6)
GLUCOSE SERPL-MCNC: 96 MG/DL (ref 70–99)
HCT VFR BLD AUTO: 33.3 % (ref 41.1–50.3)
HGB BLD-MCNC: 10 G/DL (ref 13.6–17.2)
IMM GRANULOCYTES # BLD AUTO: 0 K/UL (ref 0–0.5)
IMM GRANULOCYTES NFR BLD AUTO: 0 % (ref 0–5)
LYMPHOCYTES # BLD: 1.5 K/UL (ref 0.5–4.6)
LYMPHOCYTES NFR BLD: 25 % (ref 13–44)
MCH RBC QN AUTO: 29.5 PG (ref 26.1–32.9)
MCHC RBC AUTO-ENTMCNC: 30 G/DL (ref 31.4–35)
MCV RBC AUTO: 98.2 FL (ref 82–102)
MONOCYTES # BLD: 0.4 K/UL (ref 0.1–1.3)
MONOCYTES NFR BLD: 7 % (ref 4–12)
NEUTS SEG # BLD: 4 K/UL (ref 1.7–8.2)
NEUTS SEG NFR BLD: 67 % (ref 43–78)
NRBC # BLD: 0 K/UL (ref 0–0.2)
PLATELET # BLD AUTO: 244 K/UL (ref 150–450)
PMV BLD AUTO: 10 FL (ref 9.4–12.3)
POTASSIUM SERPL-SCNC: 3.6 MMOL/L (ref 3.5–5.1)
RBC # BLD AUTO: 3.39 M/UL (ref 4.23–5.6)
SODIUM SERPL-SCNC: 139 MMOL/L (ref 136–145)
WBC # BLD AUTO: 5.9 K/UL (ref 4.3–11.1)

## 2024-07-05 PROCEDURE — 1100000000 HC RM PRIVATE

## 2024-07-05 PROCEDURE — 80048 BASIC METABOLIC PNL TOTAL CA: CPT

## 2024-07-05 PROCEDURE — 6360000002 HC RX W HCPCS: Performed by: INTERNAL MEDICINE

## 2024-07-05 PROCEDURE — 36415 COLL VENOUS BLD VENIPUNCTURE: CPT

## 2024-07-05 PROCEDURE — 85025 COMPLETE CBC W/AUTO DIFF WBC: CPT

## 2024-07-05 PROCEDURE — 6360000002 HC RX W HCPCS: Performed by: SURGERY

## 2024-07-05 PROCEDURE — 6370000000 HC RX 637 (ALT 250 FOR IP)

## 2024-07-05 PROCEDURE — 6370000000 HC RX 637 (ALT 250 FOR IP): Performed by: PHYSICIAN ASSISTANT

## 2024-07-05 PROCEDURE — 2580000003 HC RX 258: Performed by: STUDENT IN AN ORGANIZED HEALTH CARE EDUCATION/TRAINING PROGRAM

## 2024-07-05 PROCEDURE — 6370000000 HC RX 637 (ALT 250 FOR IP): Performed by: NURSE PRACTITIONER

## 2024-07-05 RX ADMIN — SODIUM CHLORIDE, PRESERVATIVE FREE 10 ML: 5 INJECTION INTRAVENOUS at 09:53

## 2024-07-05 RX ADMIN — TRIAMCINOLONE ACETONIDE: 1 CREAM TOPICAL at 20:51

## 2024-07-05 RX ADMIN — PANTOPRAZOLE SODIUM 40 MG: 40 TABLET, DELAYED RELEASE ORAL at 05:46

## 2024-07-05 RX ADMIN — SODIUM CHLORIDE, PRESERVATIVE FREE 10 ML: 5 INJECTION INTRAVENOUS at 20:50

## 2024-07-05 RX ADMIN — ENOXAPARIN SODIUM 40 MG: 100 INJECTION SUBCUTANEOUS at 09:52

## 2024-07-05 RX ADMIN — HALOPERIDOL LACTATE 1 MG: 5 INJECTION, SOLUTION INTRAMUSCULAR at 17:14

## 2024-07-05 RX ADMIN — DOCUSATE SODIUM 50 MG AND SENNOSIDES 8.6 MG 2 TABLET: 8.6; 5 TABLET, FILM COATED ORAL at 09:53

## 2024-07-05 RX ADMIN — CETIRIZINE HYDROCHLORIDE 10 MG: 10 TABLET ORAL at 09:53

## 2024-07-05 RX ADMIN — METOPROLOL TARTRATE 25 MG: 25 TABLET, FILM COATED ORAL at 09:53

## 2024-07-05 RX ADMIN — LEVETIRACETAM 500 MG: 500 TABLET, FILM COATED ORAL at 09:53

## 2024-07-05 RX ADMIN — METOPROLOL TARTRATE 25 MG: 25 TABLET, FILM COATED ORAL at 20:50

## 2024-07-05 RX ADMIN — LEVETIRACETAM 500 MG: 500 TABLET, FILM COATED ORAL at 20:50

## 2024-07-05 RX ADMIN — TRIAMCINOLONE ACETONIDE: 1 CREAM TOPICAL at 09:56

## 2024-07-05 ASSESSMENT — PAIN SCALES - GENERAL: PAINLEVEL_OUTOF10: 0

## 2024-07-05 NOTE — PLAN OF CARE
Problem: Discharge Planning  Goal: Discharge to home or other facility with appropriate resources  Outcome: Progressing     Problem: Safety - Adult  Goal: Free from fall injury  Outcome: Progressing     Problem: Neurosensory - Adult  Goal: Achieves stable or improved neurological status  Outcome: Progressing  Flowsheets (Taken 7/4/2024 1901)  Achieves stable or improved neurological status:   Assess for and report changes in neurological status   Initiate measures to prevent increased intracranial pressure     Problem: Musculoskeletal - Adult  Goal: Return mobility to safest level of function  Outcome: Progressing  Flowsheets (Taken 7/4/2024 1901)  Return Mobility to Safest Level of Function: Assess patient stability and activity tolerance for standing, transferring and ambulating with or without assistive devices     Problem: Skin/Tissue Integrity  Goal: Absence of new skin breakdown  Description: 1.  Monitor for areas of redness and/or skin breakdown  2.  Assess vascular access sites hourly  3.  Every 4-6 hours minimum:  Change oxygen saturation probe site  4.  Every 4-6 hours:  If on nasal continuous positive airway pressure, respiratory therapy assess nares and determine need for appliance change or resting period.  Outcome: Progressing     Problem: Pain  Goal: Verbalizes/displays adequate comfort level or baseline comfort level  Outcome: Progressing  Flowsheets (Taken 7/4/2024 1901)  Verbalizes/displays adequate comfort level or baseline comfort level:   Encourage patient to monitor pain and request assistance   Assess pain using appropriate pain scale     Problem: Skin/Tissue Integrity - Adult  Goal: Skin integrity remains intact  Outcome: Progressing  Flowsheets (Taken 7/4/2024 1901)  Skin Integrity Remains Intact: Monitor for areas of redness and/or skin breakdown  Goal: Incisions, wounds, or drain sites healing without S/S of infection  Outcome: Progressing  Flowsheets (Taken 7/4/2024 1901)  Incisions,

## 2024-07-05 NOTE — PROGRESS NOTES
Hospitalist Progress Note   Admit Date:  2024  4:30 PM   Name:  Brendan Saleh   Age:  49 y.o.  Sex:  male  :  1975   MRN:  [id number]   Room:  [facility]/    Presenting/Chief Complaint: Altered Mental Status     Reason(s) for Admission: Acute hypernatremia [E87.0]  Volume depletion [E86.9]  Acute kidney injury (HCC) [N17.9]  Acute encephalopathy [G93.40]  Septic shock (HCC) [A41.9, R65.21]     Hospital Course:   Mr. Saleh is a 48 y.o. male with a PMH of SDH s/p poonam hole 2024, hydrocephalus s/p  shunt, sacral decubitus stage IV, who originally presented to the ER  with chief complaint of increased confusion.     He was diagnosed with UTI with hematuria and started on course of vancomycin, Zosyn.  Urine culture finalized negative.   blood cultures with CoNS, determined contaminant.  However, he met sepsis criteria with WBC 14.6, HR 120s.  ID consulted to assist.  ECHO showed preserved EF.  Underwent sacral wound debridement on  with General Surgery, Dr. Zapien.     CT head with ongoing hydrocephalus and a  shunt.  NSGY consulted.  Patient underwent right ventricular peritoneal shunt removal on  due to non-functioning shunt.   Per Dr. Ortiz, patient needs a stepwise approach, and stated shunt replacement may be in his future though would need to wait at least 4 weeks.  He may be able to do temporizing CSF removal however he stated this would only be temporary until more definitive treatment as above.  Patient will require continued neurosurgical care.     ID returned to the case for management of CSF infection as CSF culture and  shunt tip culture grew ESBL Klebsiella and Serratia marcescens.  Antibiotics changed to IV cipro and furthermore to oral cipro for 14 days post shunt removal EOT 24.  ID signed off on .     His hospital stay was further complicated by a bowel obstruction on May 19, noted on CT after patient vomited fecal material.  He underwent hemicolectomy and

## 2024-07-05 NOTE — CARE COORDINATION
Dispo update:  Mr. Saleh needs long-term SNF placement, but he is [place] pending (application submitted 6/28/2024 by [facility] [employer].  CM called Ms. Mouna Powell at [facility] to discuss whether they could accept him with \"Medicaid pending.\"      She said that he would need a Social Security disability letter.  She will ask their business office to call the patient/family to see if they have received that letter.  Without that letter, she said at this point it is not advisable for the [facility] to apply for the [place] Medicaid CLTC Level of Care (LOC for SNF placement on the [place] web portal).  She said that they will call the [facility] back after they find out about the Social Security disability letter.      Thus, CM will apply for [facility] LOC until we hear back from [facility] about the Social Security disability letter.

## 2024-07-05 NOTE — PROGRESS NOTES
Hourly rounding completed during shift. All needs met at this time. Pt had a moment of agitation and confusion this afternoon, medicated per MAR and was effective. Bed L/L with call bell in reach, bed alarm.  Report given to oncoming RN.

## 2024-07-06 PROCEDURE — 6370000000 HC RX 637 (ALT 250 FOR IP): Performed by: NURSE PRACTITIONER

## 2024-07-06 PROCEDURE — 2580000003 HC RX 258: Performed by: STUDENT IN AN ORGANIZED HEALTH CARE EDUCATION/TRAINING PROGRAM

## 2024-07-06 PROCEDURE — 6360000002 HC RX W HCPCS: Performed by: SURGERY

## 2024-07-06 PROCEDURE — 6370000000 HC RX 637 (ALT 250 FOR IP): Performed by: PHYSICIAN ASSISTANT

## 2024-07-06 PROCEDURE — 6370000000 HC RX 637 (ALT 250 FOR IP)

## 2024-07-06 PROCEDURE — 1100000000 HC RM PRIVATE

## 2024-07-06 RX ADMIN — ENOXAPARIN SODIUM 40 MG: 100 INJECTION SUBCUTANEOUS at 08:38

## 2024-07-06 RX ADMIN — PANTOPRAZOLE SODIUM 40 MG: 40 TABLET, DELAYED RELEASE ORAL at 16:45

## 2024-07-06 RX ADMIN — TRIAMCINOLONE ACETONIDE: 1 CREAM TOPICAL at 20:13

## 2024-07-06 RX ADMIN — PANTOPRAZOLE SODIUM 40 MG: 40 TABLET, DELAYED RELEASE ORAL at 05:17

## 2024-07-06 RX ADMIN — METOPROLOL TARTRATE 25 MG: 25 TABLET, FILM COATED ORAL at 20:11

## 2024-07-06 RX ADMIN — SODIUM CHLORIDE, PRESERVATIVE FREE 10 ML: 5 INJECTION INTRAVENOUS at 08:43

## 2024-07-06 RX ADMIN — LEVETIRACETAM 500 MG: 500 TABLET, FILM COATED ORAL at 20:11

## 2024-07-06 RX ADMIN — CETIRIZINE HYDROCHLORIDE 10 MG: 10 TABLET ORAL at 08:38

## 2024-07-06 RX ADMIN — METOPROLOL TARTRATE 25 MG: 25 TABLET, FILM COATED ORAL at 08:38

## 2024-07-06 RX ADMIN — LEVETIRACETAM 500 MG: 500 TABLET, FILM COATED ORAL at 08:38

## 2024-07-06 RX ADMIN — TRIAMCINOLONE ACETONIDE: 1 CREAM TOPICAL at 08:32

## 2024-07-06 RX ADMIN — DOCUSATE SODIUM 50 MG AND SENNOSIDES 8.6 MG 2 TABLET: 8.6; 5 TABLET, FILM COATED ORAL at 08:38

## 2024-07-06 RX ADMIN — SODIUM CHLORIDE, PRESERVATIVE FREE 10 ML: 5 INJECTION INTRAVENOUS at 20:11

## 2024-07-06 ASSESSMENT — PAIN SCALES - GENERAL: PAINLEVEL_OUTOF10: 0

## 2024-07-06 NOTE — PROGRESS NOTES
Hourly rounding completed during shift. All needs met at this time.  Turned per order.  Bed L/L with call bell in reach, bed alarm on/door open.  Report given to oncoming RN.

## 2024-07-06 NOTE — PROGRESS NOTES
Pt  bed in lowest position, wheels locked, and call light within reach. Hourly rounds completed. VSS. IV is patent and dressing is clean, dry, and intact. Pt confused during shift, easily reoriented. More alert and oriented this AM.

## 2024-07-06 NOTE — PLAN OF CARE
Problem: Discharge Planning  Goal: Discharge to home or other facility with appropriate resources  Outcome: Progressing     Problem: Safety - Adult  Goal: Free from fall injury  Outcome: Progressing     Problem: Neurosensory - Adult  Goal: Achieves stable or improved neurological status  Outcome: Progressing  Flowsheets (Taken 7/5/2024 1901)  Achieves stable or improved neurological status:   Assess for and report changes in neurological status   Initiate measures to prevent increased intracranial pressure     Problem: Musculoskeletal - Adult  Goal: Return mobility to safest level of function  Outcome: Progressing  Flowsheets (Taken 7/5/2024 1901)  Return Mobility to Safest Level of Function: Assess patient stability and activity tolerance for standing, transferring and ambulating with or without assistive devices     Problem: Skin/Tissue Integrity  Goal: Absence of new skin breakdown  Description: 1.  Monitor for areas of redness and/or skin breakdown  2.  Assess vascular access sites hourly  3.  Every 4-6 hours minimum:  Change oxygen saturation probe site  4.  Every 4-6 hours:  If on nasal continuous positive airway pressure, respiratory therapy assess nares and determine need for appliance change or resting period.  Outcome: Progressing     Problem: Pain  Goal: Verbalizes/displays adequate comfort level or baseline comfort level  Outcome: Progressing  Flowsheets (Taken 7/5/2024 1901)  Verbalizes/displays adequate comfort level or baseline comfort level:   Encourage patient to monitor pain and request assistance   Assess pain using appropriate pain scale     Problem: Skin/Tissue Integrity - Adult  Goal: Skin integrity remains intact  Outcome: Progressing  Flowsheets (Taken 7/5/2024 1901)  Skin Integrity Remains Intact: Monitor for areas of redness and/or skin breakdown  Goal: Incisions, wounds, or drain sites healing without S/S of infection  Outcome: Progressing  Flowsheets (Taken 7/5/2024 1901)  Incisions,

## 2024-07-06 NOTE — PROGRESS NOTES
Hospitalist Progress Note   Admit Date:  2024  4:30 PM   Name:  Brendan Saleh   Age:  49 y.o.  Sex:  male  :  1975   MRN:  [id number]   Room:  [facility]/    Presenting/Chief Complaint: Altered Mental Status     Reason(s) for Admission: Acute hypernatremia [E87.0]  Volume depletion [E86.9]  Acute kidney injury (HCC) [N17.9]  Acute encephalopathy [G93.40]  Septic shock (HCC) [A41.9, R65.21]     Hospital Course:   Mr. Saleh is a 48 y.o. male with a PMH of SDH s/p poonam hole 2024, hydrocephalus s/p  shunt, sacral decubitus stage IV, who originally presented to the ER  with chief complaint of increased confusion.     He was diagnosed with UTI with hematuria and started on course of vancomycin, Zosyn.  Urine culture finalized negative.   blood cultures with CoNS, determined contaminant.  However, he met sepsis criteria with WBC 14.6, HR 120s.  ID consulted to assist.  ECHO showed preserved EF.  Underwent sacral wound debridement on  with General Surgery, Dr. Zapien.     CT head with ongoing hydrocephalus and a  shunt.  NSGY consulted.  Patient underwent right ventricular peritoneal shunt removal on  due to non-functioning shunt.   Per Dr. Ortiz, patient needs a stepwise approach, and stated shunt replacement may be in his future though would need to wait at least 4 weeks.  He may be able to do temporizing CSF removal however he stated this would only be temporary until more definitive treatment as above.  Patient will require continued neurosurgical care.     ID returned to the case for management of CSF infection as CSF culture and  shunt tip culture grew ESBL Klebsiella and Serratia marcescens.  Antibiotics changed to IV cipro and furthermore to oral cipro for 14 days post shunt removal EOT 24.  ID signed off on .     His hospital stay was further complicated by a bowel obstruction on May 19, noted on CT after patient vomited fecal material.  He underwent hemicolectomy and

## 2024-07-07 PROCEDURE — 6370000000 HC RX 637 (ALT 250 FOR IP): Performed by: NURSE PRACTITIONER

## 2024-07-07 PROCEDURE — 2580000003 HC RX 258: Performed by: STUDENT IN AN ORGANIZED HEALTH CARE EDUCATION/TRAINING PROGRAM

## 2024-07-07 PROCEDURE — 6370000000 HC RX 637 (ALT 250 FOR IP): Performed by: PHYSICIAN ASSISTANT

## 2024-07-07 PROCEDURE — 1100000000 HC RM PRIVATE

## 2024-07-07 PROCEDURE — 6370000000 HC RX 637 (ALT 250 FOR IP)

## 2024-07-07 PROCEDURE — 6360000002 HC RX W HCPCS: Performed by: SURGERY

## 2024-07-07 RX ADMIN — PANTOPRAZOLE SODIUM 40 MG: 40 TABLET, DELAYED RELEASE ORAL at 05:47

## 2024-07-07 RX ADMIN — SODIUM CHLORIDE, PRESERVATIVE FREE 10 ML: 5 INJECTION INTRAVENOUS at 08:46

## 2024-07-07 RX ADMIN — DOCUSATE SODIUM 50 MG AND SENNOSIDES 8.6 MG 2 TABLET: 8.6; 5 TABLET, FILM COATED ORAL at 08:45

## 2024-07-07 RX ADMIN — METOPROLOL TARTRATE 25 MG: 25 TABLET, FILM COATED ORAL at 21:15

## 2024-07-07 RX ADMIN — PANTOPRAZOLE SODIUM 40 MG: 40 TABLET, DELAYED RELEASE ORAL at 15:17

## 2024-07-07 RX ADMIN — ENOXAPARIN SODIUM 40 MG: 100 INJECTION SUBCUTANEOUS at 08:45

## 2024-07-07 RX ADMIN — CETIRIZINE HYDROCHLORIDE 10 MG: 10 TABLET ORAL at 08:45

## 2024-07-07 RX ADMIN — TRIAMCINOLONE ACETONIDE: 1 CREAM TOPICAL at 08:47

## 2024-07-07 RX ADMIN — TRIAMCINOLONE ACETONIDE: 1 CREAM TOPICAL at 21:25

## 2024-07-07 RX ADMIN — SODIUM CHLORIDE, PRESERVATIVE FREE 10 ML: 5 INJECTION INTRAVENOUS at 21:22

## 2024-07-07 RX ADMIN — LEVETIRACETAM 500 MG: 500 TABLET, FILM COATED ORAL at 21:16

## 2024-07-07 RX ADMIN — LEVETIRACETAM 500 MG: 500 TABLET, FILM COATED ORAL at 08:45

## 2024-07-07 RX ADMIN — METOPROLOL TARTRATE 25 MG: 25 TABLET, FILM COATED ORAL at 08:45

## 2024-07-07 NOTE — PLAN OF CARE
Problem: Discharge Planning  Goal: Discharge to home or other facility with appropriate resources  Outcome: Progressing     Problem: Safety - Adult  Goal: Free from fall injury  Outcome: Progressing     Problem: Neurosensory - Adult  Goal: Achieves stable or improved neurological status  Outcome: Progressing  Flowsheets (Taken 7/6/2024 1901)  Achieves stable or improved neurological status:   Assess for and report changes in neurological status   Initiate measures to prevent increased intracranial pressure     Problem: Musculoskeletal - Adult  Goal: Return mobility to safest level of function  Outcome: Progressing  Flowsheets (Taken 7/6/2024 1901)  Return Mobility to Safest Level of Function: Assess patient stability and activity tolerance for standing, transferring and ambulating with or without assistive devices     Problem: Skin/Tissue Integrity  Goal: Absence of new skin breakdown  Description: 1.  Monitor for areas of redness and/or skin breakdown  2.  Assess vascular access sites hourly  3.  Every 4-6 hours minimum:  Change oxygen saturation probe site  4.  Every 4-6 hours:  If on nasal continuous positive airway pressure, respiratory therapy assess nares and determine need for appliance change or resting period.  Outcome: Progressing     Problem: Pain  Goal: Verbalizes/displays adequate comfort level or baseline comfort level  Outcome: Progressing  Flowsheets (Taken 7/6/2024 1901)  Verbalizes/displays adequate comfort level or baseline comfort level:   Encourage patient to monitor pain and request assistance   Assess pain using appropriate pain scale     Problem: Skin/Tissue Integrity - Adult  Goal: Skin integrity remains intact  Outcome: Progressing  Flowsheets (Taken 7/6/2024 1901)  Skin Integrity Remains Intact: Monitor for areas of redness and/or skin breakdown  Goal: Incisions, wounds, or drain sites healing without S/S of infection  Outcome: Progressing  Flowsheets (Taken 7/6/2024 1901)  Incisions,

## 2024-07-07 NOTE — PROGRESS NOTES
Hourly rounding completed during shift. All needs met at this time. Pt turned per order.  Bed L/L with call bell in reach, bed alarm on, door open.  Report given to oncoming RN.

## 2024-07-07 NOTE — PROGRESS NOTES
Hospitalist Progress Note   Admit Date:  2024  4:30 PM   Name:  Brendan Saleh   Age:  49 y.o.  Sex:  male  :  1975   MRN:  [id number]   Room:  [facility]/    Presenting/Chief Complaint: Altered Mental Status     Reason(s) for Admission: Acute hypernatremia [E87.0]  Volume depletion [E86.9]  Acute kidney injury (HCC) [N17.9]  Acute encephalopathy [G93.40]  Septic shock (HCC) [A41.9, R65.21]     Hospital Course:   Mr. Saleh is a 48 y.o. male with a PMH of SDH s/p poonam hole 2024, hydrocephalus s/p  shunt, sacral decubitus stage IV, who originally presented to the ER  with chief complaint of increased confusion.     He was diagnosed with UTI with hematuria and started on course of vancomycin, Zosyn.  Urine culture finalized negative.   blood cultures with CoNS, determined contaminant.  However, he met sepsis criteria with WBC 14.6, HR 120s.  ID consulted to assist.  ECHO showed preserved EF.  Underwent sacral wound debridement on  with General Surgery, Dr. Zapien.     CT head with ongoing hydrocephalus and a  shunt.  NSGY consulted.  Patient underwent right ventricular peritoneal shunt removal on  due to non-functioning shunt.   Per Dr. Ortiz, patient needs a stepwise approach, and stated shunt replacement may be in his future though would need to wait at least 4 weeks.  He may be able to do temporizing CSF removal however he stated this would only be temporary until more definitive treatment as above.  Patient will require continued neurosurgical care.     ID returned to the case for management of CSF infection as CSF culture and  shunt tip culture grew ESBL Klebsiella and Serratia marcescens.  Antibiotics changed to IV cipro and furthermore to oral cipro for 14 days post shunt removal EOT 24.  ID signed off on .     His hospital stay was further complicated by a bowel obstruction on May 19, noted on CT after patient vomited fecal material.  He underwent hemicolectomy and

## 2024-07-07 NOTE — PROGRESS NOTES
Pt  bed in lowest position, wheels locked, and call light within reach. Hourly rounds completed. VSS. IV is patent and dressing is clean, dry, and intact. Dressing changed done per wound care order. Pt turned throughout the shift.

## 2024-07-08 PROCEDURE — 2580000003 HC RX 258: Performed by: STUDENT IN AN ORGANIZED HEALTH CARE EDUCATION/TRAINING PROGRAM

## 2024-07-08 PROCEDURE — 6370000000 HC RX 637 (ALT 250 FOR IP): Performed by: INTERNAL MEDICINE

## 2024-07-08 PROCEDURE — 6370000000 HC RX 637 (ALT 250 FOR IP)

## 2024-07-08 PROCEDURE — 6370000000 HC RX 637 (ALT 250 FOR IP): Performed by: PHYSICIAN ASSISTANT

## 2024-07-08 PROCEDURE — 6370000000 HC RX 637 (ALT 250 FOR IP): Performed by: NURSE PRACTITIONER

## 2024-07-08 PROCEDURE — 6360000002 HC RX W HCPCS: Performed by: SURGERY

## 2024-07-08 PROCEDURE — 1100000000 HC RM PRIVATE

## 2024-07-08 RX ADMIN — OXYCODONE 5 MG: 5 TABLET ORAL at 21:41

## 2024-07-08 RX ADMIN — PANTOPRAZOLE SODIUM 40 MG: 40 TABLET, DELAYED RELEASE ORAL at 05:55

## 2024-07-08 RX ADMIN — TRIAMCINOLONE ACETONIDE: 1 CREAM TOPICAL at 10:28

## 2024-07-08 RX ADMIN — SODIUM CHLORIDE, PRESERVATIVE FREE 10 ML: 5 INJECTION INTRAVENOUS at 10:28

## 2024-07-08 RX ADMIN — SODIUM CHLORIDE, PRESERVATIVE FREE 10 ML: 5 INJECTION INTRAVENOUS at 21:51

## 2024-07-08 RX ADMIN — LEVETIRACETAM 500 MG: 500 TABLET, FILM COATED ORAL at 21:44

## 2024-07-08 RX ADMIN — LEVETIRACETAM 500 MG: 500 TABLET, FILM COATED ORAL at 10:13

## 2024-07-08 RX ADMIN — TRIAMCINOLONE ACETONIDE: 1 CREAM TOPICAL at 21:48

## 2024-07-08 RX ADMIN — METOPROLOL TARTRATE 25 MG: 25 TABLET, FILM COATED ORAL at 10:17

## 2024-07-08 RX ADMIN — METOPROLOL TARTRATE 25 MG: 25 TABLET, FILM COATED ORAL at 21:44

## 2024-07-08 RX ADMIN — PANTOPRAZOLE SODIUM 40 MG: 40 TABLET, DELAYED RELEASE ORAL at 15:12

## 2024-07-08 RX ADMIN — DOCUSATE SODIUM 50 MG AND SENNOSIDES 8.6 MG 2 TABLET: 8.6; 5 TABLET, FILM COATED ORAL at 10:17

## 2024-07-08 RX ADMIN — CETIRIZINE HYDROCHLORIDE 10 MG: 10 TABLET ORAL at 10:12

## 2024-07-08 RX ADMIN — ENOXAPARIN SODIUM 40 MG: 100 INJECTION SUBCUTANEOUS at 10:13

## 2024-07-08 ASSESSMENT — PAIN DESCRIPTION - ORIENTATION: ORIENTATION: RIGHT;MID

## 2024-07-08 ASSESSMENT — PAIN DESCRIPTION - DESCRIPTORS: DESCRIPTORS: ACHING;SHARP;DISCOMFORT

## 2024-07-08 ASSESSMENT — PAIN SCALES - GENERAL: PAINLEVEL_OUTOF10: 8

## 2024-07-08 ASSESSMENT — PAIN - FUNCTIONAL ASSESSMENT: PAIN_FUNCTIONAL_ASSESSMENT: PREVENTS OR INTERFERES SOME ACTIVE ACTIVITIES AND ADLS

## 2024-07-08 ASSESSMENT — PAIN DESCRIPTION - LOCATION: LOCATION: HIP

## 2024-07-08 NOTE — PROGRESS NOTES
Patient in bed alert and oriented, following commands. Voiding via Purewick, incontinent of bowel and bladder. No pain at this time.   1500  Hourly rounding, Patient has no needs at this time.  1740  Patient in bed, family at bedside. Patient is alert and oriented x4, currently eating. 22 Gauge right F/A patent, patient does endorse small discomfort upon flushing the line, no signs of swelling or infiltrate. One bowel movement today, incontinent of bowel and bladder.

## 2024-07-08 NOTE — PROGRESS NOTES
Patient has been turned and cleaned per existing protocols. Patient is unable to benefit from the use of External Catheter, as supplies throughout the hospital are low/non-existent.  Therefore, more regular brief changes have been done. Hopeful for return to male-wick, external catheter this AM    WOUND at SACRUM cleaned and changed--after urinary output in brief. Barrier cream liberally applied to potential breakdown at scrotum. Patient given urinal at bedside, as he indicated \"I can manage to use this, if you will place it close to me.\"  Urinal hanging at bedside and close to patient. Reinforced that this RN would assist, at any time.  Hopeful to decrease wetting of brief, if possible. Patient satisfied/understands.    Pt is resting comfortably in bed without complaints. Hourly rounds completed and all needs are met. Bed is locked, low and call light is within reach and patient is encouraged to call for any need(s).

## 2024-07-08 NOTE — PROGRESS NOTES
Comprehensive Nutrition Assessment    Type and Reason for Visit: Reassess  Malnutrition Screening Tool: Malnutrition Screen  Have you recently lost weight without trying?: 2 to 13 pounds (1 point)  Have you been eating poorly because of a decreased appetite?: Yes (1 point)  Malnutrition Screening Tool Score: 2 and Best Practice Alert for BMI <18.5  5/18: Poor intake/appetite (Hospitalist)  5/24: General nutrition management (Hospitalist)  Parenteral Nutrition Management (Hospitalists) 6/2    Nutrition Recommendations/Plan:   Meals and Snacks:  Diet: Continue current order Continue double portions to assist with meeting needs.       Malnutrition Assessment:6/2  Malnutrition Status: Severe malnutrition  Context: Acute Illness  Findings of clinical characteristics of malnutrition:   Energy Intake:  50% or less of estimated energy requirements for 5 or more days  Weight Loss:  Unable to assess (wide weight vrainces, [facility] c/w severe weight loss at time of admission)     Body Fat Loss:  Moderate body fat loss (visual) Fat Overlying Ribs   Muscle Mass Loss:  Moderate muscle mass loss (visual) Temples (temporalis), Clavicles (pectoralis & deltoids)         Nutrition Assessment:  Nutrition History: 5/7: Pt reports decreased intake for \"far too long\". He is unable to provide any other details.         Weight History: Pt reports wt loss however he is unsure how much. Per EMR wt hx review of neurosurgery office visits 11/7 180lb, 1/26 198lb, 3/27 190lb.   Unable to validate wt loss given wide weight variances with overall trend of weight gain since admission.     Nutrition Background:       PMH significant for hydrocephalus s/p  shunt 2/2024, CHF, seizures, and decubitus ulcer. Pt admitted with acute encephalopathy, sepsis, MIRNA, and hypernatremia from hypovolemia. New diagnosis of colon cancer. Pt with a wound, see wound care note 5/7. S/p debridement 5/8.    shunt removed 5/17.    S/p right hemicolectomy and anastomosis  5/19.   5/30: SBFT with post op hypomotility  6/1: pt noted to be tachycardic and distended. + emesis. Imaging revealed free air, concerning for anastomotic leak. He was urgently taken to the OR for ex-lap, s/p primary closure of small bowel perforation 6/2.   Nutrition Interval:  NPO/CLD since surgery (5/19) until 5/24 then briefly on minced and moist before NPO again same day  5/27- Diet advanced to CLD and patient self d/c NGT.  5/28 diet advanced to FLD  5/29: minced and moist, then NPO again for abdominal distention, KUB with post op ileus  6/2: NGT placed, PPN initiated, PIV: 20 g right cephalic, 22 g right forearm  6/5: Double lumen PICC established after PN order cut off time.   6/6: NG removed.    6/7: Concentrated to goal PN, needs reassessed to admission wt, + edema.    6/10: Lipids d/c as start of PN wean  6/11: Decreased PN to 1 L  6/12: PN discontinued      Pt reclined in bed.  Reports his intake is doing well and he is eating the double portions. He still is okay without supplement.     Abdominal Status (last documented):   Last BM (including prior to admit): 07/06/24, GI Symptoms: Reduction in appetite     Current Nutrition Therapies:  ADULT DIET; Dysphagia - Minced and Moist; Low Fiber; No Carbonated Beverages, Double Protein Portions    Current Intake:   Average Meal Intake: % Average Supplements Intake: None Ordered      Anthropometric Measures:  Height: 190.5 cm (6' 3\")  Current Body Wt: 66.7 kg (147 lb 0.8 oz) (7/3), Weight source: Bed Scale  BMI: 18.4, Underweight (BMI less than 18.5)  Admission Body Weight: 93 kg (205 lb) (5/6- estimated)  Ideal Body Weight (Kg) (Calculated): 89 kg (196 lbs), 75 %  BMI Category Underweight (BMI less than 18.5)  Estimated Daily Nutrient Needs:  Energy (kcal/day): 5188-1638 (30-35 kcal/kg) (Kcal/kg Weight used: 64 kg (bed scale 5/6 lower than IBW) Admission  Protein (g/day):  (1.5-2 g/kg) Weight Used: (Admission) 64 kg  Fluid (ml/day):   (1

## 2024-07-08 NOTE — PROGRESS NOTES
Hospitalist Progress Note   Admit Date:  2024  4:30 PM   Name:  Brendan Saleh   Age:  49 y.o.  Sex:  male  :  1975   MRN:  [id number]   Room:  [facility]/    Presenting/Chief Complaint: Altered Mental Status     Reason(s) for Admission: Acute hypernatremia [E87.0]  Volume depletion [E86.9]  Acute kidney injury (HCC) [N17.9]  Acute encephalopathy [G93.40]  Septic shock (HCC) [A41.9, R65.21]     Hospital Course:   Mr. Saleh is a 48 y.o. male with a PMH of SDH s/p poonam hole 2024, hydrocephalus s/p  shunt, sacral decubitus stage IV, who originally presented to the ER  with chief complaint of increased confusion.     He was diagnosed with UTI with hematuria and started on course of vancomycin, Zosyn.  Urine culture finalized negative.   blood cultures with CoNS, determined contaminant.  However, he met sepsis criteria with WBC 14.6, HR 120s.  ID consulted to assist.  ECHO showed preserved EF.  Underwent sacral wound debridement on  with General Surgery, Dr. Zapien.     CT head with ongoing hydrocephalus and a  shunt.  NSGY consulted.  Patient underwent right ventricular peritoneal shunt removal on  due to non-functioning shunt.   Per Dr. Ortiz, patient needs a stepwise approach, and stated shunt replacement may be in his future though would need to wait at least 4 weeks.  He may be able to do temporizing CSF removal however he stated this would only be temporary until more definitive treatment as above.  Patient will require continued neurosurgical care.     ID returned to the case for management of CSF infection as CSF culture and  shunt tip culture grew ESBL Klebsiella and Serratia marcescens.  Antibiotics changed to IV cipro and furthermore to oral cipro for 14 days post shunt removal EOT 24.  ID signed off on .     His hospital stay was further complicated by a bowel obstruction on May 19, noted on CT after patient vomited fecal material.  He underwent hemicolectomy and  650 mg Oral Q4H PRN    levETIRAcetam (KEPPRA) tablet 500 mg  500 mg Oral BID    sennosides-docusate sodium (SENOKOT-S) 8.6-50 MG tablet 2 tablet  2 tablet Oral Daily    pantoprazole (PROTONIX) tablet 40 mg  40 mg Oral BID AC    metoprolol tartrate (LOPRESSOR) tablet 25 mg  25 mg Oral BID    triamcinolone (KENALOG) 0.1 % cream   Topical BID    diphenhydrAMINE (BENADRYL) injection 25 mg  25 mg IntraVENous Q6H PRN    cetirizine (ZYRTEC) tablet 10 mg  10 mg Oral Daily    0.9 % sodium chloride infusion  25 mL IntraVENous PRN    potassium chloride 10 mEq/100 mL IVPB (Peripheral Line)  10 mEq IntraVENous PRN    magnesium sulfate 2000 mg in 50 mL IVPB premix  2,000 mg IntraVENous PRN    sodium phosphate 15 mmol in sodium chloride 0.9 % 250 mL IVPB  15 mmol IntraVENous PRN    sodium chloride flush 0.9 % injection 5-40 mL  5-40 mL IntraVENous 2 times per day    sodium chloride flush 0.9 % injection 5-40 mL  5-40 mL IntraVENous PRN    0.9 % sodium chloride infusion   IntraVENous PRN    glucose chewable tablet 16 g  4 tablet Oral PRN    dextrose bolus 10% 125 mL  125 mL IntraVENous PRN    Or    dextrose bolus 10% 250 mL  250 mL IntraVENous PRN    Glucagon Emergency KIT 1 mg  1 mg SubCUTAneous PRN    dextrose 10 % infusion   IntraVENous Continuous PRN    haloperidol lactate (HALDOL) injection 1 mg  1 mg IntraVENous Q6H PRN    medicated lip ointment (BLISTEX)   Topical PRN    ondansetron (ZOFRAN-ODT) disintegrating tablet 4 mg  4 mg Oral Q8H PRN    Or    ondansetron (ZOFRAN) injection 4 mg  4 mg IntraVENous Q6H PRN    enoxaparin (LOVENOX) injection 40 mg  40 mg SubCUTAneous Daily    LORazepam (ATIVAN) 2 mg in sodium chloride (PF) 0.9 % 10 mL injection  2 mg IntraVENous Q4H PRN    calcium carbonate (TUMS) chewable tablet 500 mg  500 mg Oral TID PRN    guaiFENesin-dextromethorphan (ROBITUSSIN DM) 100-10 MG/5ML syrup 5 mL  5 mL Oral Q4H PRN       Signed:  SANDHYA Blue    Part of this note may have been written by using a

## 2024-07-08 NOTE — WOUND CARE
Reconsulted for wound changes. Pt already placed on wound bed Friday 7/5. Continue current wound care regiment.    Sacrum 4x5.5x0.5cm, full thickness, pink/red, granular, defined/flat edges, small serosanguinous, no odor. Wound changes likely d/t more pressure on coccyx, continue frequent turning/repositioning.    (Previous 6/25...)      L hip 3x2x0.3cm, no longer indurated, slough, blanchable erythema, defined/flat edges, small serosanguinous drainage, mild odor. Wound changes likely d/t mix of pressure and expected moist eschar/skin bridge sloughing away; continue frequent turning/repositioning.    (Previous 6/25...)

## 2024-07-09 PROCEDURE — 6370000000 HC RX 637 (ALT 250 FOR IP)

## 2024-07-09 PROCEDURE — 97112 NEUROMUSCULAR REEDUCATION: CPT

## 2024-07-09 PROCEDURE — 6370000000 HC RX 637 (ALT 250 FOR IP): Performed by: PHYSICIAN ASSISTANT

## 2024-07-09 PROCEDURE — 6360000002 HC RX W HCPCS: Performed by: SURGERY

## 2024-07-09 PROCEDURE — 6370000000 HC RX 637 (ALT 250 FOR IP): Performed by: NURSE PRACTITIONER

## 2024-07-09 PROCEDURE — 2580000003 HC RX 258: Performed by: STUDENT IN AN ORGANIZED HEALTH CARE EDUCATION/TRAINING PROGRAM

## 2024-07-09 PROCEDURE — 97530 THERAPEUTIC ACTIVITIES: CPT

## 2024-07-09 PROCEDURE — 1100000000 HC RM PRIVATE

## 2024-07-09 PROCEDURE — 97535 SELF CARE MNGMENT TRAINING: CPT

## 2024-07-09 RX ADMIN — METOPROLOL TARTRATE 25 MG: 25 TABLET, FILM COATED ORAL at 21:36

## 2024-07-09 RX ADMIN — METOPROLOL TARTRATE 25 MG: 25 TABLET, FILM COATED ORAL at 09:31

## 2024-07-09 RX ADMIN — PANTOPRAZOLE SODIUM 40 MG: 40 TABLET, DELAYED RELEASE ORAL at 17:53

## 2024-07-09 RX ADMIN — TRIAMCINOLONE ACETONIDE: 1 CREAM TOPICAL at 21:40

## 2024-07-09 RX ADMIN — DOCUSATE SODIUM 50 MG AND SENNOSIDES 8.6 MG 2 TABLET: 8.6; 5 TABLET, FILM COATED ORAL at 09:31

## 2024-07-09 RX ADMIN — ENOXAPARIN SODIUM 40 MG: 100 INJECTION SUBCUTANEOUS at 09:30

## 2024-07-09 RX ADMIN — PANTOPRAZOLE SODIUM 40 MG: 40 TABLET, DELAYED RELEASE ORAL at 06:40

## 2024-07-09 RX ADMIN — CETIRIZINE HYDROCHLORIDE 10 MG: 10 TABLET ORAL at 09:30

## 2024-07-09 RX ADMIN — LEVETIRACETAM 500 MG: 500 TABLET, FILM COATED ORAL at 21:35

## 2024-07-09 RX ADMIN — TRIAMCINOLONE ACETONIDE: 1 CREAM TOPICAL at 09:31

## 2024-07-09 RX ADMIN — SODIUM CHLORIDE, PRESERVATIVE FREE 10 ML: 5 INJECTION INTRAVENOUS at 09:31

## 2024-07-09 RX ADMIN — SODIUM CHLORIDE, PRESERVATIVE FREE 10 ML: 5 INJECTION INTRAVENOUS at 21:42

## 2024-07-09 RX ADMIN — LEVETIRACETAM 500 MG: 500 TABLET, FILM COATED ORAL at 09:30

## 2024-07-09 ASSESSMENT — PAIN SCALES - GENERAL
PAINLEVEL_OUTOF10: 0
PAINLEVEL_OUTOF10: 0

## 2024-07-09 NOTE — CARE COORDINATION
CM met with pt at bedside, he was A&O x 3, CM inquired about the SS Disability letter and who checks his mail, he stated family. RICARDO was able to reach pt Aunt Lesley, and she said that a disability letter has not come yet, she will watch for it and bring a copy to CM. RICARDO also contacted [facility] and their liaison, Sisi, she will review pt. [facility] liaison, Marnie, is also checking if she can take a medicaid pending pt. RICARDO will continue to follow.

## 2024-07-09 NOTE — PLAN OF CARE
Problem: Discharge Planning  Goal: Discharge to home or other facility with appropriate resources  7/9/2024 1554 by Mirian Gil RN  Outcome: Progressing  7/9/2024 0237 by Frannie López RN  Outcome: Progressing     Problem: Safety - Adult  Goal: Free from fall injury  7/9/2024 1554 by Mirian Gil RN  Outcome: Progressing  7/9/2024 0237 by Frannie López RN  Outcome: Progressing     Problem: Neurosensory - Adult  Goal: Achieves stable or improved neurological status  7/9/2024 1554 by Mirian Gil RN  Outcome: Progressing  7/9/2024 0237 by Frannie López RN  Outcome: Progressing     Problem: Musculoskeletal - Adult  Goal: Return mobility to safest level of function  7/9/2024 1554 by Mirian Gil RN  Outcome: Progressing  7/9/2024 0237 by Frannie López RN  Outcome: Progressing     Problem: Skin/Tissue Integrity  Goal: Absence of new skin breakdown  Description: 1.  Monitor for areas of redness and/or skin breakdown  2.  Assess vascular access sites hourly  3.  Every 4-6 hours minimum:  Change oxygen saturation probe site  4.  Every 4-6 hours:  If on nasal continuous positive airway pressure, respiratory therapy assess nares and determine need for appliance change or resting period.  7/9/2024 1554 by Mirian Gil RN  Outcome: Progressing  7/9/2024 0237 by Frannie López RN  Outcome: Progressing     Problem: Pain  Goal: Verbalizes/displays adequate comfort level or baseline comfort level  7/9/2024 1554 by Mirian Gil RN  Outcome: Progressing  7/9/2024 0237 by Frannie López RN  Outcome: Progressing     Problem: Skin/Tissue Integrity - Adult  Goal: Skin integrity remains intact  7/9/2024 1554 by Mirian Gil RN  Outcome: Progressing  7/9/2024 0237 by Frannie López RN  Outcome: Progressing  Goal: Incisions, wounds, or drain sites healing without S/S of infection  7/9/2024 1554 by Mirian Gil RN  Outcome: Progressing  7/9/2024 0237 by Frannie López RN  Outcome: Progressing     Problem: Gastrointestinal -

## 2024-07-09 NOTE — PROGRESS NOTES
Patient in bed, alert and oriented. Purewick draining, stalin color urine. Patient did sit in chair for about two hours today, tolerated well, he is a three person assist. Denies pain. New orders for neuro checks Q-Shift. Blood pressure WNL. Patient has no needs at this time. Call bed place within patients reach.

## 2024-07-09 NOTE — PROGRESS NOTES
ACUTE PHYSICAL THERAPY GOALS:   (Developed with and agreed upon by patient and/or caregiver.)  STG: (Goals added 6/21/24)  (1.)Mr. Saleh will move from supine to sit and sit to supine  in bed with MODERATE ASSIST within 3 treatment day(s).    (2.)Mr. Saleh will scoot up and down in bed with MODERATE ASSIST within 3 treatment day(s).    (3.)Mr. Saleh will roll side to side in bed with MINIMAL ASSIST within 3 treatment day(s).    (4.)Mr. Saleh will tolerate sitting up in recliner chair for 2 hours with stable vital signs to increase upright tolerance within 3 treatment day(s).    (5.)Mr. Saleh will maintain static/dynamic sitting x 25 minutes with MINIMAL ASSIST for improved balance within 3 treatment days.  (6.)Mr. Saleh will follow 75% of commands for increased participation in therapeutic activity/exercise within 3 treatment days.     LTG:  (1.) Brendan Saleh  will move from supine to sit and sit to supine and scoot up and down with MINIMAL ASSIST within 7 treatment day(s).    (2.)Mr. Saleh will roll side to side in bed with CONTACT GUARD ASSIST within 7 treatment day(s).    (3.) Brendan Saleh will transfer from bed to chair and chair to bed with MODERATE ASSIST using the least restrictive device within 7 treatment day(s).      Goal DC 6/21/24   Goal DC 6/21/24  (6.) Brendan Saleh will perform therapeutic exercises x 20 min for HEP with CONTACT GUARD ASSIST to improve strength, endurance, and functional mobility within 7 treatment day(s).   (7.) Brendan Saleh will perform seated static and dynamic balance activities x25 minutes with CONTACT GUARD ASSIST to improve safety within 7 treatment day(s).  (8.) Brendan Saleh will perform standing static and dynamic balance activities x 5 minutes with MODERATE ASSIST to improve safety and mobility within 7 treatment day(s).     PHYSICAL THERAPY: Daily Note AM   (Link to Caseload Tracking: PT Visit Days : 5  Time In/Out PT Charge Capture   Rehab Caseload Tracker  Orders    Brendan Saleh is a 49 y.o. male   PRIMARY DIAGNOSIS: Encephalitis  Acute hypernatremia [E87.0]  Volume depletion [E86.9]  Acute kidney injury (HCC) [N17.9]  Acute encephalopathy [G93.40]  Septic shock (HCC) [A41.9, R65.21]  Procedure(s) (LRB):  LAPAROTOMY EXPLORATORY, PRIMARY CLOSURE SMALL BOWEL PERFORATION (N/A)  38 Days Post-Op  Inpatient: Payor: AMBETTER / Plan: AMBETTER / Product Type: *No Product type* /     ASSESSMENT:     REHAB RECOMMENDATIONS:   Recommendation to date pending progress:  Setting:  Short-term Rehab    Equipment:    To Be Determined     ASSESSMENT:  Mr. Saleh is in chair on arrival, agreeable to therapy. Pt with some delayed responses during activity, required verbal and tactile cues for weight shifting, transfers. Pt initially worked on sitting balance while in chair, fluctuations from CGA to mod A support. Performed stretching and midline orientation, pt with tendency to lean to R and posterior. Pt with max A x 2 for sit to stand attempts at chair, blocking B LE, supporting standing at buttocks/hips. Pt making progress toward goals, working hard therapy. Pt with hx of TBI, shunt; some delay in responses. PT to cont to follow for acute care needs, pt will need rehab at discharge.      SUBJECTIVE:   Mr. Saleh states, \"I have been up for awhile\"     Social/Functional Lives With: Alone  Type of Home: Trailer  Home Layout: One level  Home Access: Stairs to enter with rails  Entrance Stairs - Number of Steps: 5  Entrance Stairs - Rails: Both  OBJECTIVE:     PAIN: VITALS / O2: PRECAUTION / LINES / DRAINS:   Pre Treatment:   0/10      Post Treatment: 0/10 Vitals    WDL    Oxygen   RA External Catheter and IV    RESTRICTIONS/PRECAUTIONS:  Restrictions/Precautions  Restrictions/Precautions: Fall Risk, Bed Alarm  Restrictions/Precautions: Fall Risk, Bed Alarm     MOBILITY: I Mod I S SBA CGA Min Mod Max Total  NT x2 Comments:   Bed Mobility    Rolling [] [] []

## 2024-07-09 NOTE — PROGRESS NOTES
Hospitalist Progress Note   Admit Date:  2024  4:30 PM   Name:  Brendan Saleh   Age:  49 y.o.  Sex:  male  :  1975   MRN:  [id number]   Room:  [facility]/    Presenting/Chief Complaint: Altered Mental Status     Reason(s) for Admission: Acute hypernatremia [E87.0]  Volume depletion [E86.9]  Acute kidney injury (HCC) [N17.9]  Acute encephalopathy [G93.40]  Septic shock (HCC) [A41.9, R65.21]     Hospital Course:   Mr. Saleh is a 48 y.o. male with a PMH of SDH s/p poonam hole 2024, hydrocephalus s/p  shunt, sacral decubitus stage IV, who originally presented to the ER  with chief complaint of increased confusion.     He was diagnosed with UTI with hematuria and started on course of vancomycin, Zosyn.  Urine culture finalized negative.   blood cultures with CoNS, determined contaminant.  However, he met sepsis criteria with WBC 14.6, HR 120s.  ID consulted to assist.  ECHO showed preserved EF.  Underwent sacral wound debridement on  with General Surgery, Dr. Zapien.     CT head with ongoing hydrocephalus and a  shunt.  NSGY consulted.  Patient underwent right ventricular peritoneal shunt removal on  due to non-functioning shunt.   Per Dr. Ortiz, patient needs a stepwise approach, and stated shunt replacement may be in his future though would need to wait at least 4 weeks.  He may be able to do temporizing CSF removal however he stated this would only be temporary until more definitive treatment as above.  Patient will require continued neurosurgical care.     ID returned to the case for management of CSF infection as CSF culture and  shunt tip culture grew ESBL Klebsiella and Serratia marcescens.  Antibiotics changed to IV cipro and furthermore to oral cipro for 14 days post shunt removal EOT 24.  ID signed off on .     His hospital stay was further complicated by a bowel obstruction on May 19, noted on CT after patient vomited fecal material.  He underwent hemicolectomy and

## 2024-07-09 NOTE — CARE COORDINATION
CM messaged Mouna with [facility] for status of SS disability letter. CM also called pt Aunt Lesley, no answer and no voice mail. CM confirmed with pt that she is his contact and does not know other family members number. Pt stated family checks his mail. CM will continue to attempt to reach family regarding SS disability letter that is needed to help with SNF placement.

## 2024-07-09 NOTE — PROGRESS NOTES
ACUTE OCCUPATIONAL THERAPY GOALS:   (Developed with and agreed upon by patient and/or caregiver.)  GOALS REVIEWED AND UPDATED AT RE-EVALUATION ON 6/26/24  (Developed with and agreed upon by patient and/or caregiver.)  1. Patient will complete lower body bathing and dressing with MODERATE ASSIST and adaptive equipment as needed.     3. Patient will complete grooming ADL in unsupported sitting with MINIMAL ASSIST.  4. Patient will tolerate 25 minutes of OT treatment with 1-2 rest breaks to increase activity tolerance for ADLs.   5. Patient will complete functional transfers with MODERATE ASSIST and adaptive equipment as needed.   6. Patient will tolerate 10 minutes BUE exercises to increase strength for safe, functional transfers.   2. Patient will complete upper body bathing and dressing with MINIMAL ASSIST and adaptive equipment as needed.  7. Patient will perform self feeding with STAND BY ASSIST and adaptive equipment as needed.      Timeframe: 7 visits    OCCUPATIONAL THERAPY: Daily Note PM   OT Visit Days: 4   Time In/Out  OT Charge Capture  Rehab Caseload Tracker  OT Orders    Brendan Saleh is a 49 y.o. male   PRIMARY DIAGNOSIS: Encephalitis  Acute hypernatremia [E87.0]  Volume depletion [E86.9]  Acute kidney injury (HCC) [N17.9]  Acute encephalopathy [G93.40]  Septic shock (HCC) [A41.9, R65.21]  Procedure(s) (LRB):  LAPAROTOMY EXPLORATORY, PRIMARY CLOSURE SMALL BOWEL PERFORATION (N/A)  38 Days Post-Op  Inpatient: Payor: AMBETTER / Plan: AMBETTER / Product Type: *No Product type* /     ASSESSMENT:     REHAB RECOMMENDATIONS:   Recommendation to date pending progress:  Setting:  Short-term Rehab    Equipment:    To Be Determined     ASSESSMENT:  Mr. Saleh continues to function below baseline for functional mobility, ADLs, activity tolerance, and strength. Received upright in chair and resting on RA. Pt worked on dynamic reaching and was able to initiate some anterior trunk movement with reaching with

## 2024-07-09 NOTE — PROGRESS NOTES
Pt is resting comfortably in bed without complaints.     Patient did require narcotic pain relief (1x) overnight for 8/10 pain--stated as being at his (R) hip.    Wound changes took place, on yesterday afternoon, with the help of Wound Care RN. The sacral wound is scheduled to be changed today (1230) The (L) Hip wound is scheduled for change on 07/10 at 1230.    Patient continues on Minced/Moist diet--with carbonated beverages being held and an increase in protein (2x).    Hourly rounds completed and all needs are met. Bed is locked, low and call light is within reach and patient is encouraged to call for any need(s).

## 2024-07-10 PROCEDURE — 1100000000 HC RM PRIVATE

## 2024-07-10 PROCEDURE — 6360000002 HC RX W HCPCS: Performed by: SURGERY

## 2024-07-10 PROCEDURE — 6370000000 HC RX 637 (ALT 250 FOR IP): Performed by: PHYSICIAN ASSISTANT

## 2024-07-10 PROCEDURE — 97110 THERAPEUTIC EXERCISES: CPT

## 2024-07-10 PROCEDURE — 97530 THERAPEUTIC ACTIVITIES: CPT

## 2024-07-10 PROCEDURE — 6370000000 HC RX 637 (ALT 250 FOR IP): Performed by: INTERNAL MEDICINE

## 2024-07-10 PROCEDURE — 2580000003 HC RX 258: Performed by: STUDENT IN AN ORGANIZED HEALTH CARE EDUCATION/TRAINING PROGRAM

## 2024-07-10 PROCEDURE — 6370000000 HC RX 637 (ALT 250 FOR IP)

## 2024-07-10 PROCEDURE — 6370000000 HC RX 637 (ALT 250 FOR IP): Performed by: NURSE PRACTITIONER

## 2024-07-10 RX ADMIN — OXYCODONE 5 MG: 5 TABLET ORAL at 02:09

## 2024-07-10 RX ADMIN — METOPROLOL TARTRATE 25 MG: 25 TABLET, FILM COATED ORAL at 21:04

## 2024-07-10 RX ADMIN — CETIRIZINE HYDROCHLORIDE 10 MG: 10 TABLET ORAL at 08:09

## 2024-07-10 RX ADMIN — TRIAMCINOLONE ACETONIDE: 1 CREAM TOPICAL at 21:03

## 2024-07-10 RX ADMIN — DOCUSATE SODIUM 50 MG AND SENNOSIDES 8.6 MG 2 TABLET: 8.6; 5 TABLET, FILM COATED ORAL at 08:08

## 2024-07-10 RX ADMIN — PANTOPRAZOLE SODIUM 40 MG: 40 TABLET, DELAYED RELEASE ORAL at 06:39

## 2024-07-10 RX ADMIN — SODIUM CHLORIDE, PRESERVATIVE FREE 10 ML: 5 INJECTION INTRAVENOUS at 08:13

## 2024-07-10 RX ADMIN — ENOXAPARIN SODIUM 40 MG: 100 INJECTION SUBCUTANEOUS at 09:06

## 2024-07-10 RX ADMIN — METOPROLOL TARTRATE 25 MG: 25 TABLET, FILM COATED ORAL at 08:08

## 2024-07-10 RX ADMIN — SODIUM CHLORIDE, PRESERVATIVE FREE 10 ML: 5 INJECTION INTRAVENOUS at 21:04

## 2024-07-10 RX ADMIN — PANTOPRAZOLE SODIUM 40 MG: 40 TABLET, DELAYED RELEASE ORAL at 16:17

## 2024-07-10 RX ADMIN — TRIAMCINOLONE ACETONIDE: 1 CREAM TOPICAL at 08:09

## 2024-07-10 RX ADMIN — LEVETIRACETAM 500 MG: 500 TABLET, FILM COATED ORAL at 09:06

## 2024-07-10 RX ADMIN — LEVETIRACETAM 500 MG: 500 TABLET, FILM COATED ORAL at 21:04

## 2024-07-10 ASSESSMENT — PAIN DESCRIPTION - ORIENTATION: ORIENTATION: MID;POSTERIOR

## 2024-07-10 ASSESSMENT — PAIN DESCRIPTION - LOCATION: LOCATION: BACK

## 2024-07-10 ASSESSMENT — PAIN - FUNCTIONAL ASSESSMENT: PAIN_FUNCTIONAL_ASSESSMENT: PREVENTS OR INTERFERES SOME ACTIVE ACTIVITIES AND ADLS

## 2024-07-10 ASSESSMENT — PAIN SCALES - GENERAL: PAINLEVEL_OUTOF10: 8

## 2024-07-10 ASSESSMENT — PAIN DESCRIPTION - DESCRIPTORS: DESCRIPTORS: SHARP;ACHING

## 2024-07-10 NOTE — PROGRESS NOTES
Hospitalist Progress Note   Admit Date:  2024  4:30 PM   Name:  Brendan Saleh   Age:  49 y.o.  Sex:  male  :  1975   MRN:  [id number]   Room:  [facility]/    Presenting/Chief Complaint: Altered Mental Status     Reason(s) for Admission: Acute hypernatremia [E87.0]  Volume depletion [E86.9]  Acute kidney injury (HCC) [N17.9]  Acute encephalopathy [G93.40]  Septic shock (HCC) [A41.9, R65.21]     Hospital Course:   Mr. Saleh is a 48 y.o. male with a PMH of SDH s/p poonam hole 2024, hydrocephalus s/p  shunt, sacral decubitus stage IV, who originally presented to the ER  with chief complaint of increased confusion.     He was diagnosed with UTI with hematuria and started on course of vancomycin, Zosyn.  Urine culture finalized negative.   blood cultures with CoNS, determined contaminant.  However, he met sepsis criteria with WBC 14.6, HR 120s.  ID consulted to assist.  ECHO showed preserved EF.  Underwent sacral wound debridement on  with General Surgery, Dr. Zapien.     CT head with ongoing hydrocephalus and a  shunt.  NSGY consulted.  Patient underwent right ventricular peritoneal shunt removal on  due to non-functioning shunt.   Per Dr. Ortiz, patient needs a stepwise approach, and stated shunt replacement may be in his future though would need to wait at least 4 weeks.  He may be able to do temporizing CSF removal however he stated this would only be temporary until more definitive treatment as above.  Patient will require continued neurosurgical care.     ID returned to the case for management of CSF infection as CSF culture and  shunt tip culture grew ESBL Klebsiella and Serratia marcescens.  Antibiotics changed to IV cipro and furthermore to oral cipro for 14 days post shunt removal EOT 24.  ID signed off on .     His hospital stay was further complicated by a bowel obstruction on May 19, noted on CT after patient vomited fecal material.  He underwent hemicolectomy and  (FLEXERIL) tablet 10 mg  10 mg Oral TID PRN    traZODone (DESYREL) tablet 50 mg  50 mg Oral Nightly PRN    acetaminophen (TYLENOL) tablet 650 mg  650 mg Oral Q4H PRN    levETIRAcetam (KEPPRA) tablet 500 mg  500 mg Oral BID    sennosides-docusate sodium (SENOKOT-S) 8.6-50 MG tablet 2 tablet  2 tablet Oral Daily    pantoprazole (PROTONIX) tablet 40 mg  40 mg Oral BID AC    metoprolol tartrate (LOPRESSOR) tablet 25 mg  25 mg Oral BID    triamcinolone (KENALOG) 0.1 % cream   Topical BID    diphenhydrAMINE (BENADRYL) injection 25 mg  25 mg IntraVENous Q6H PRN    cetirizine (ZYRTEC) tablet 10 mg  10 mg Oral Daily    0.9 % sodium chloride infusion  25 mL IntraVENous PRN    potassium chloride 10 mEq/100 mL IVPB (Peripheral Line)  10 mEq IntraVENous PRN    magnesium sulfate 2000 mg in 50 mL IVPB premix  2,000 mg IntraVENous PRN    sodium phosphate 15 mmol in sodium chloride 0.9 % 250 mL IVPB  15 mmol IntraVENous PRN    sodium chloride flush 0.9 % injection 5-40 mL  5-40 mL IntraVENous 2 times per day    sodium chloride flush 0.9 % injection 5-40 mL  5-40 mL IntraVENous PRN    0.9 % sodium chloride infusion   IntraVENous PRN    glucose chewable tablet 16 g  4 tablet Oral PRN    dextrose bolus 10% 125 mL  125 mL IntraVENous PRN    Or    dextrose bolus 10% 250 mL  250 mL IntraVENous PRN    Glucagon Emergency KIT 1 mg  1 mg SubCUTAneous PRN    dextrose 10 % infusion   IntraVENous Continuous PRN    haloperidol lactate (HALDOL) injection 1 mg  1 mg IntraVENous Q6H PRN    medicated lip ointment (BLISTEX)   Topical PRN    ondansetron (ZOFRAN-ODT) disintegrating tablet 4 mg  4 mg Oral Q8H PRN    Or    ondansetron (ZOFRAN) injection 4 mg  4 mg IntraVENous Q6H PRN    enoxaparin (LOVENOX) injection 40 mg  40 mg SubCUTAneous Daily    LORazepam (ATIVAN) 2 mg in sodium chloride (PF) 0.9 % 10 mL injection  2 mg IntraVENous Q4H PRN    calcium carbonate (TUMS) chewable tablet 500 mg  500 mg Oral TID PRN    guaiFENesin-dextromethorphan

## 2024-07-10 NOTE — PROGRESS NOTES
ACUTE PHYSICAL THERAPY GOALS:   (Developed with and agreed upon by patient and/or caregiver.)  STG: (Goals added 6/21/24)  (1.)Mr. Saleh will move from supine to sit and sit to supine  in bed with MODERATE ASSIST within 3 treatment day(s).    (2.)Mr. Saleh will scoot up and down in bed with MODERATE ASSIST within 3 treatment day(s).    (3.)Mr. Saleh will roll side to side in bed with MINIMAL ASSIST within 3 treatment day(s).    (4.)Mr. Saleh will tolerate sitting up in recliner chair for 2 hours with stable vital signs to increase upright tolerance within 3 treatment day(s).    (5.)Mr. Saleh will maintain static/dynamic sitting x 25 minutes with MINIMAL ASSIST for improved balance within 3 treatment days.  (6.)Mr. Saleh will follow 75% of commands for increased participation in therapeutic activity/exercise within 3 treatment days.     LTG:  (1.) Brendan Saleh  will move from supine to sit and sit to supine and scoot up and down with MINIMAL ASSIST within 7 treatment day(s).    (2.)Mr. Saleh will roll side to side in bed with CONTACT GUARD ASSIST within 7 treatment day(s).    (3.) Brendan Saleh will transfer from bed to chair and chair to bed with MODERATE ASSIST using the least restrictive device within 7 treatment day(s).      Goal DC 6/21/24   Goal DC 6/21/24  (6.) Brendan Saleh will perform therapeutic exercises x 20 min for HEP with CONTACT GUARD ASSIST to improve strength, endurance, and functional mobility within 7 treatment day(s).   (7.) Brendan Saleh will perform seated static and dynamic balance activities x25 minutes with CONTACT GUARD ASSIST to improve safety within 7 treatment day(s).  (8.) Brendan Saleh will perform standing static and dynamic balance activities x 5 minutes with MODERATE ASSIST to improve safety and mobility within 7 treatment day(s).     PHYSICAL THERAPY: Daily Note AM   (Link to Caseload Tracking: PT Visit Days : 6  Time15 In/Out PT Charge

## 2024-07-10 NOTE — PROGRESS NOTES
Pt is resting comfortably in bed without complaints.     Patient Neuro Check show him neurologically intact (\"0\").  Q2 turns completed. Wound care will be completed today at approx 1230 for both L Hip and sacrum.    Hourly rounds completed and all needs are met. Bed is locked, low and call light is within reach and patient is encouraged to call for any need(s).

## 2024-07-10 NOTE — PROGRESS NOTES
Pt denies any pain during shift. Pt has been sitting in the recliner and q2 turns/repositioning completed. Dressings are c/d/I. Nurse was at the bedside assisting PT and noted pt bilateral lower extremity weakness and has a delayed response to commands. Hourly rounds performed this shift. Bed lowered and locked. Call light within reach. All needs met at this time. Bedside shift report will be given to oncoming nurse.

## 2024-07-11 PROCEDURE — 6370000000 HC RX 637 (ALT 250 FOR IP): Performed by: INTERNAL MEDICINE

## 2024-07-11 PROCEDURE — 1100000000 HC RM PRIVATE

## 2024-07-11 PROCEDURE — 2580000003 HC RX 258: Performed by: STUDENT IN AN ORGANIZED HEALTH CARE EDUCATION/TRAINING PROGRAM

## 2024-07-11 PROCEDURE — 6370000000 HC RX 637 (ALT 250 FOR IP): Performed by: NURSE PRACTITIONER

## 2024-07-11 PROCEDURE — 6370000000 HC RX 637 (ALT 250 FOR IP): Performed by: PHYSICIAN ASSISTANT

## 2024-07-11 PROCEDURE — 6360000002 HC RX W HCPCS: Performed by: SURGERY

## 2024-07-11 PROCEDURE — 97535 SELF CARE MNGMENT TRAINING: CPT

## 2024-07-11 PROCEDURE — 97530 THERAPEUTIC ACTIVITIES: CPT

## 2024-07-11 PROCEDURE — 97112 NEUROMUSCULAR REEDUCATION: CPT

## 2024-07-11 PROCEDURE — 6370000000 HC RX 637 (ALT 250 FOR IP)

## 2024-07-11 PROCEDURE — 97164 PT RE-EVAL EST PLAN CARE: CPT

## 2024-07-11 RX ADMIN — LEVETIRACETAM 500 MG: 500 TABLET, FILM COATED ORAL at 09:11

## 2024-07-11 RX ADMIN — ENOXAPARIN SODIUM 40 MG: 100 INJECTION SUBCUTANEOUS at 09:11

## 2024-07-11 RX ADMIN — TRIAMCINOLONE ACETONIDE: 1 CREAM TOPICAL at 09:17

## 2024-07-11 RX ADMIN — SODIUM CHLORIDE, PRESERVATIVE FREE 10 ML: 5 INJECTION INTRAVENOUS at 09:16

## 2024-07-11 RX ADMIN — CETIRIZINE HYDROCHLORIDE 10 MG: 10 TABLET ORAL at 09:11

## 2024-07-11 RX ADMIN — PANTOPRAZOLE SODIUM 40 MG: 40 TABLET, DELAYED RELEASE ORAL at 16:31

## 2024-07-11 RX ADMIN — PANTOPRAZOLE SODIUM 40 MG: 40 TABLET, DELAYED RELEASE ORAL at 05:22

## 2024-07-11 RX ADMIN — SODIUM CHLORIDE, PRESERVATIVE FREE 10 ML: 5 INJECTION INTRAVENOUS at 21:28

## 2024-07-11 RX ADMIN — METOPROLOL TARTRATE 25 MG: 25 TABLET, FILM COATED ORAL at 21:29

## 2024-07-11 RX ADMIN — CYCLOBENZAPRINE 10 MG: 10 TABLET, FILM COATED ORAL at 12:18

## 2024-07-11 RX ADMIN — METOPROLOL TARTRATE 25 MG: 25 TABLET, FILM COATED ORAL at 09:11

## 2024-07-11 RX ADMIN — LEVETIRACETAM 500 MG: 500 TABLET, FILM COATED ORAL at 21:29

## 2024-07-11 RX ADMIN — DOCUSATE SODIUM 50 MG AND SENNOSIDES 8.6 MG 2 TABLET: 8.6; 5 TABLET, FILM COATED ORAL at 09:11

## 2024-07-11 RX ADMIN — TRIAMCINOLONE ACETONIDE: 1 CREAM TOPICAL at 21:30

## 2024-07-11 ASSESSMENT — PAIN SCALES - GENERAL
PAINLEVEL_OUTOF10: 10
PAINLEVEL_OUTOF10: 0
PAINLEVEL_OUTOF10: 8
PAINLEVEL_OUTOF10: 0

## 2024-07-11 ASSESSMENT — PAIN DESCRIPTION - LOCATION
LOCATION: BACK
LOCATION: BACK

## 2024-07-11 ASSESSMENT — PAIN DESCRIPTION - PAIN TYPE: TYPE: ACUTE PAIN

## 2024-07-11 ASSESSMENT — PAIN DESCRIPTION - ORIENTATION: ORIENTATION: MID;POSTERIOR

## 2024-07-11 ASSESSMENT — PAIN DESCRIPTION - ONSET: ONSET: ON-GOING

## 2024-07-11 ASSESSMENT — PAIN DESCRIPTION - FREQUENCY: FREQUENCY: CONTINUOUS

## 2024-07-11 ASSESSMENT — PAIN - FUNCTIONAL ASSESSMENT: PAIN_FUNCTIONAL_ASSESSMENT: PREVENTS OR INTERFERES WITH MANY ACTIVE NOT PASSIVE ACTIVITIES

## 2024-07-11 ASSESSMENT — PAIN DESCRIPTION - DESCRIPTORS: DESCRIPTORS: ACHING;SHARP

## 2024-07-11 NOTE — PLAN OF CARE
Pt has a non blanchable spot on the right lower buttocks. Allevyn applied. Q2 turns continued.   Problem: Skin/Tissue Integrity  Goal: Absence of new skin breakdown  Description: 1.  Monitor for areas of redness and/or skin breakdown  2.  Assess vascular access sites hourly  3.  Every 4-6 hours minimum:  Change oxygen saturation probe site  4.  Every 4-6 hours:  If on nasal continuous positive airway pressure, respiratory therapy assess nares and determine need for appliance change or resting period.  Outcome: Not Progressing

## 2024-07-11 NOTE — PROGRESS NOTES
Hospitalist Progress Note   Admit Date:  2024  4:30 PM   Name:  Brendan Saleh   Age:  49 y.o.  Sex:  male  :  1975   MRN:  [id number]   Room:  [facility]/    Presenting/Chief Complaint: Altered Mental Status     Reason(s) for Admission: Acute hypernatremia [E87.0]  Volume depletion [E86.9]  Acute kidney injury (HCC) [N17.9]  Acute encephalopathy [G93.40]  Septic shock (HCC) [A41.9, R65.21]     Hospital Course:   Mr. Saleh is a 48 y.o. male with a PMH of SDH s/p poonam hole 2024, hydrocephalus s/p  shunt, sacral decubitus stage IV, who originally presented to the ER  with chief complaint of increased confusion.     He was diagnosed with UTI with hematuria and started on course of vancomycin, Zosyn.  Urine culture finalized negative.   blood cultures with CoNS, determined contaminant.  However, he met sepsis criteria with WBC 14.6, HR 120s.  ID consulted to assist.  ECHO showed preserved EF.  Underwent sacral wound debridement on  with General Surgery, Dr. Zapien.     CT head with ongoing hydrocephalus and a  shunt.  NSGY consulted.  Patient underwent right ventricular peritoneal shunt removal on  due to non-functioning shunt.   Per Dr. Ortiz, patient needs a stepwise approach, and stated shunt replacement may be in his future though would need to wait at least 4 weeks.  He may be able to do temporizing CSF removal however he stated this would only be temporary until more definitive treatment as above.  Patient will require continued neurosurgical care.     ID returned to the case for management of CSF infection as CSF culture and  shunt tip culture grew ESBL Klebsiella and Serratia marcescens.  Antibiotics changed to IV cipro and furthermore to oral cipro for 14 days post shunt removal EOT 24.  ID signed off on .     His hospital stay was further complicated by a bowel obstruction on May 19, noted on CT after patient vomited fecal material.  He underwent hemicolectomy and

## 2024-07-11 NOTE — PROGRESS NOTES
ACUTE OCCUPATIONAL THERAPY GOALS:   (Developed with and agreed upon by patient and/or caregiver.)  GOALS REVIEWED AND UPDATED AT RE-EVALUATION ON 6/26/24  (Developed with and agreed upon by patient and/or caregiver.)  1. Patient will complete lower body bathing and dressing with MODERATE ASSIST and adaptive equipment as needed.     3. Patient will complete grooming ADL in unsupported sitting with MINIMAL ASSIST.  4. Patient will tolerate 25 minutes of OT treatment with 1-2 rest breaks to increase activity tolerance for ADLs.   5. Patient will complete functional transfers with MODERATE ASSIST and adaptive equipment as needed.   6. Patient will tolerate 10 minutes BUE exercises to increase strength for safe, functional transfers.   2. Patient will complete upper body bathing and dressing with MINIMAL ASSIST and adaptive equipment as needed.  7. Patient will perform self feeding with STAND BY ASSIST and adaptive equipment as needed.      Timeframe: 7 visits    OCCUPATIONAL THERAPY: Daily Note PM   OT Visit Days: 5   Time In/Out  OT Charge Capture  Rehab Caseload Tracker  OT Orders    Brendan Saleh is a 49 y.o. male   PRIMARY DIAGNOSIS: Encephalitis  Acute hypernatremia [E87.0]  Volume depletion [E86.9]  Acute kidney injury (HCC) [N17.9]  Acute encephalopathy [G93.40]  Septic shock (HCC) [A41.9, R65.21]  Procedure(s) (LRB):  LAPAROTOMY EXPLORATORY, PRIMARY CLOSURE SMALL BOWEL PERFORATION (N/A)  40 Days Post-Op  Inpatient: Payor: [employer] / Plan: AMBETTER / Product Type: *No Product type* /     ASSESSMENT:     REHAB RECOMMENDATIONS:   Recommendation to date pending progress:  Setting:  Short-term Rehab    Equipment:    To Be Determined     ASSESSMENT:  Mr. Saleh continues to function below baseline for functional mobility, ADLs, activity tolerance, and strength. Pt found in supine just finished with RN changing dressing and bandages on his back. Reports pain 10/10. Cotx with PT. On room air, purewick in place.  IV running. Mod A to stacey socks in long sitting, has great LE hip flexion. Educated on log roll, max Ax2. Pt worked on sitting balance with dynamic reaching and was able to initiate some anterior trunk movement with reaching with B UEs onto standing frame. Pt also encouraged to work on his posture with maximal facilitation and cues throughout session. Fluctuated during sitting needing moderate assist to CGA, even close SBA briefly at times to maintain sitting balance and midline positioning while sitting on EOB. Pt able to clear bottom 1x during multiple attempts to stand but was never able to stand fully erect needing maximal assistance x 2 to 3, even 4 at times to maintain semi standing for ~5 seconds each time. Still has significant R lean when standing and sitting. Combed hair sitting upright on EOB with mod assistance x 2 for dynamic sitting balance. Returned back to supine and left in Left side lying with pillows and a wedge for comfort with needs met and in reach. OT educated patient  on log roll technique, adaptive equipment usage for lower body ADLs, energy conservation techniques to use during ADL/IADL, pursed lip breathing, relaxation techniques, home exercise program, fall prevention strategies , and proper body mechanics. Patient will need continued education at next session.     Pt making minimal progress with standing and functional reaching activities today, still needing lots of assistance for ADLs. Continue OT efforts and plan of care. Will need STR.       SUBJECTIVE:     Mr. Saleh states, \"My back is killing me.\"     Social/Functional Lives With: Alone  Type of Home: Trailer  Home Layout: One level  Home Access: Stairs to enter with rails  Entrance Stairs - Number of Steps: 5  Entrance Stairs - Rails: Both    OBJECTIVE:     LINES / DRAINS / AIRWAY: External Catheter and IV    RESTRICTIONS/PRECAUTIONS:  Restrictions/Precautions  Restrictions/Precautions: Fall Risk, Bed Alarm      PAIN: VITALS /

## 2024-07-11 NOTE — PROGRESS NOTES
Pt c/o left leg muscle cramps twice during shift. PRN meds administered for the first complaint with relief noted. Second complaint the cramp resolved on its own. Pt states he gets them quite often. Wound dressings changed per order. One new pressure injury site noted on the right lower buttocks. Q2 turns and repositioning continued. Pt refused bilateral heel boots and use of wedge for position stabilization. Pt used the bed pan and had a large formed BM. He states he is always hungry and has been asking for extra food. Hourly rounds performed this shift. Bed lowered and locked. Call light within reach. All needs met at this time. Bedside shift report will be given to oncoming nurse.

## 2024-07-11 NOTE — PLAN OF CARE
Problem: Safety - Adult  Goal: Free from fall injury  Outcome: Progressing     Problem: Skin/Tissue Integrity  Goal: Absence of new skin breakdown  Description: 1.  Monitor for areas of redness and/or skin breakdown  2.  Assess vascular access sites hourly  3.  Every 4-6 hours minimum:  Change oxygen saturation probe site  4.  Every 4-6 hours:  If on nasal continuous positive airway pressure, respiratory therapy assess nares and determine need for appliance change or resting period.  Outcome: Progressing     Problem: Gastrointestinal - Adult  Goal: Maintains adequate nutritional intake  Outcome: Progressing     Problem: Confusion  Goal: Confusion, delirium, dementia, or psychosis is improved or at baseline  Description: INTERVENTIONS:  1. Assess for possible contributors to thought disturbance, including medications, impaired vision or hearing, underlying metabolic abnormalities, dehydration, psychiatric diagnoses, and notify attending LIP  2. Golden high risk fall precautions, as indicated  3. Provide frequent short contacts to provide reality reorientation, refocusing and direction  4. Decrease environmental stimuli, including noise as appropriate  5. Monitor and intervene to maintain adequate nutrition, hydration, elimination, sleep and activity  6. If unable to ensure safety without constant attention obtain sitter and review sitter guidelines with assigned personnel  7. Initiate Psychosocial CNS and Spiritual Care consult, as indicated  Recent Flowsheet Documentation  Taken 7/10/2024 1920 by Magalie Elder, RN  Effect of thought disturbance (confusion, delirium, dementia, or psychosis) are managed with adequate functional status: Assess for contributors to thought disturbance, including medications, impaired vision or hearing, underlying metabolic abnormalities, dehydration, psychiatric diagnoses, notify LIP

## 2024-07-11 NOTE — PROGRESS NOTES
Time In/Out  PT Charge Capture  Rehab Caseload Tracker      Brendan Saleh is a 49 y.o. male   PRIMARY DIAGNOSIS: Encephalitis  Acute hypernatremia [E87.0]  Volume depletion [E86.9]  Acute kidney injury (HCC) [N17.9]  Acute encephalopathy [G93.40]  Septic shock (HCC) [A41.9, R65.21]  Procedure(s) (LRB):  LAPAROTOMY EXPLORATORY, PRIMARY CLOSURE SMALL BOWEL PERFORATION (N/A)  40 Days Post-Op  Reason for Referral: Other abnormalities of gait and mobility (R26.89)  Inpatient: Payor: [employer]TER / Plan: AMBETTER / Product Type: *No Product type* /     ASSESSMENT:     REHAB RECOMMENDATIONS:   Recommendation to date pending progress:  Setting:  Short-term Rehab    Equipment:    To Be Determined     ASSESSMENT:  Mr. Saleh presents resting in bed, alert but delayed responses/command following, agreeable to therapy. PT re-evaluation performed this date due to 7th visit. PT plan of care and goals have been updated to reflect pt's current level of function and mobility. Overall, pt has made slow progress with therapy; during past several sessions pt has been able to perform transfers and transfer to/from chair with increased success compared to since prior re-evaluation. He is still significantly below his baseline and a high fall risk.    This date pt performs mobility including bed mobility (rolling side to side, supine > sit) with Mod-Max Ax2 cues for BLE and BUE placement and sequencing to facilitate ease and safety of mobility. Pt with wound on buttocks so log roll utilized to prevent too much contact and shearing. PT facilitated therapeutic activities including scooting towards head of bed with emphasis on head-hip relationship and utilizing BUE and BLE to facilitate forward scooting. Worked in upright sitting balance and trunk control, pt with tendency to lean posterolaterally to L, facilitation of forward lean and preventing lateral loss of balance. Improved ability ot hold his head up and maintain upright sitting,  Mobility, Strength, and ROM.    TREATMENT GRID:  N/A    AFTER TREATMENT PRECAUTIONS: All liv prominences off-loaded, Alarm Activated, Bed, Bed/Chair Locked, Call light within reach, Needs within reach, and RN notified    INTERDISCIPLINARY COLLABORATION:  RN/ PCT, PT/ PTA, and OT/ POND    EDUCATION:      TIME IN/OUT:  Time In: 1042  Time Out: 1115  Minutes: 33    Etta Dennis, PT

## 2024-07-11 NOTE — CARE COORDINATION
CM spoke with Marnie  at [facility] regarding referral previously sent. Marnie states since patient did not receive Supplemental Security Income (SSI) or Social Security Disability (SSD) income it is unlikely he will qualify for Medicaid WITHOUT a letter from Social Security Disability stating patient would qualify for SSD. Marnie stated this CM could try to expedite this process by calling the social security office. This CM will reach out to social security in the AM. Any nursing facility WILL need a copy of patient Medicaid application.This CM will also work on obtaining this.    Bettina BENAVIDEZ, ACM  [place]

## 2024-07-12 LAB
ANION GAP SERPL CALC-SCNC: 13 MMOL/L (ref 9–18)
BASOPHILS # BLD: 0 K/UL (ref 0–0.2)
BASOPHILS NFR BLD: 0 % (ref 0–2)
BUN SERPL-MCNC: 7 MG/DL (ref 6–23)
CALCIUM SERPL-MCNC: 8.5 MG/DL (ref 8.8–10.2)
CHLORIDE SERPL-SCNC: 99 MMOL/L (ref 98–107)
CO2 SERPL-SCNC: 27 MMOL/L (ref 20–28)
CREAT SERPL-MCNC: 0.57 MG/DL (ref 0.8–1.3)
DIFFERENTIAL METHOD BLD: ABNORMAL
EOSINOPHIL # BLD: 0.1 K/UL (ref 0–0.8)
EOSINOPHIL NFR BLD: 1 % (ref 0.5–7.8)
ERYTHROCYTE [DISTWIDTH] IN BLOOD BY AUTOMATED COUNT: 14.4 % (ref 11.9–14.6)
GLUCOSE SERPL-MCNC: 88 MG/DL (ref 70–99)
HCT VFR BLD AUTO: 39 % (ref 41.1–50.3)
HGB BLD-MCNC: 12.3 G/DL (ref 13.6–17.2)
IMM GRANULOCYTES # BLD AUTO: 0 K/UL (ref 0–0.5)
IMM GRANULOCYTES NFR BLD AUTO: 0 % (ref 0–5)
LYMPHOCYTES # BLD: 2.4 K/UL (ref 0.5–4.6)
LYMPHOCYTES NFR BLD: 34 % (ref 13–44)
MCH RBC QN AUTO: 30.5 PG (ref 26.1–32.9)
MCHC RBC AUTO-ENTMCNC: 31.5 G/DL (ref 31.4–35)
MCV RBC AUTO: 96.8 FL (ref 82–102)
MONOCYTES # BLD: 0.7 K/UL (ref 0.1–1.3)
MONOCYTES NFR BLD: 10 % (ref 4–12)
NEUTS SEG # BLD: 3.8 K/UL (ref 1.7–8.2)
NEUTS SEG NFR BLD: 55 % (ref 43–78)
NRBC # BLD: 0 K/UL (ref 0–0.2)
PLATELET # BLD AUTO: 205 K/UL (ref 150–450)
PMV BLD AUTO: 10.3 FL (ref 9.4–12.3)
POTASSIUM SERPL-SCNC: 3.6 MMOL/L (ref 3.5–5.1)
RBC # BLD AUTO: 4.03 M/UL (ref 4.23–5.6)
SODIUM SERPL-SCNC: 138 MMOL/L (ref 136–145)
WBC # BLD AUTO: 6.9 K/UL (ref 4.3–11.1)

## 2024-07-12 PROCEDURE — 80048 BASIC METABOLIC PNL TOTAL CA: CPT

## 2024-07-12 PROCEDURE — 85025 COMPLETE CBC W/AUTO DIFF WBC: CPT

## 2024-07-12 PROCEDURE — 36415 COLL VENOUS BLD VENIPUNCTURE: CPT

## 2024-07-12 PROCEDURE — 6370000000 HC RX 637 (ALT 250 FOR IP): Performed by: INTERNAL MEDICINE

## 2024-07-12 PROCEDURE — 6370000000 HC RX 637 (ALT 250 FOR IP): Performed by: PHYSICIAN ASSISTANT

## 2024-07-12 PROCEDURE — 1100000000 HC RM PRIVATE

## 2024-07-12 PROCEDURE — 6370000000 HC RX 637 (ALT 250 FOR IP): Performed by: NURSE PRACTITIONER

## 2024-07-12 PROCEDURE — 6370000000 HC RX 637 (ALT 250 FOR IP)

## 2024-07-12 PROCEDURE — 6360000002 HC RX W HCPCS: Performed by: SURGERY

## 2024-07-12 PROCEDURE — 2580000003 HC RX 258: Performed by: STUDENT IN AN ORGANIZED HEALTH CARE EDUCATION/TRAINING PROGRAM

## 2024-07-12 RX ADMIN — LEVETIRACETAM 500 MG: 500 TABLET, FILM COATED ORAL at 08:10

## 2024-07-12 RX ADMIN — TRIAMCINOLONE ACETONIDE: 1 CREAM TOPICAL at 21:06

## 2024-07-12 RX ADMIN — SODIUM CHLORIDE, PRESERVATIVE FREE 10 ML: 5 INJECTION INTRAVENOUS at 08:15

## 2024-07-12 RX ADMIN — LEVETIRACETAM 500 MG: 500 TABLET, FILM COATED ORAL at 21:05

## 2024-07-12 RX ADMIN — TRIAMCINOLONE ACETONIDE: 1 CREAM TOPICAL at 08:18

## 2024-07-12 RX ADMIN — PANTOPRAZOLE SODIUM 40 MG: 40 TABLET, DELAYED RELEASE ORAL at 05:54

## 2024-07-12 RX ADMIN — SODIUM CHLORIDE, PRESERVATIVE FREE 10 ML: 5 INJECTION INTRAVENOUS at 21:05

## 2024-07-12 RX ADMIN — PANTOPRAZOLE SODIUM 40 MG: 40 TABLET, DELAYED RELEASE ORAL at 16:46

## 2024-07-12 RX ADMIN — CETIRIZINE HYDROCHLORIDE 10 MG: 10 TABLET ORAL at 08:10

## 2024-07-12 RX ADMIN — DOCUSATE SODIUM 50 MG AND SENNOSIDES 8.6 MG 2 TABLET: 8.6; 5 TABLET, FILM COATED ORAL at 08:10

## 2024-07-12 RX ADMIN — METOPROLOL TARTRATE 25 MG: 25 TABLET, FILM COATED ORAL at 21:05

## 2024-07-12 RX ADMIN — METOPROLOL TARTRATE 25 MG: 25 TABLET, FILM COATED ORAL at 08:11

## 2024-07-12 RX ADMIN — ENOXAPARIN SODIUM 40 MG: 100 INJECTION SUBCUTANEOUS at 08:10

## 2024-07-12 RX ADMIN — TRAZODONE HYDROCHLORIDE 50 MG: 50 TABLET ORAL at 21:05

## 2024-07-12 ASSESSMENT — PAIN SCALES - GENERAL
PAINLEVEL_OUTOF10: 0
PAINLEVEL_OUTOF10: 0

## 2024-07-12 ASSESSMENT — PAIN SCALES - WONG BAKER: WONGBAKER_NUMERICALRESPONSE: NO HURT

## 2024-07-12 NOTE — PROGRESS NOTES
Dressing changed per order to sacral wound. Area cleansed with wound cleanser then wet to dry dressing applied.

## 2024-07-12 NOTE — PLAN OF CARE
Problem: Discharge Planning  Goal: Discharge to home or other facility with appropriate resources  7/12/2024 1953 by Anni Cole RN  Outcome: Progressing  Flowsheets (Taken 7/12/2024 1906)  Discharge to home or other facility with appropriate resources:   Identify barriers to discharge with patient and caregiver   Arrange for needed discharge resources and transportation as appropriate   Identify discharge learning needs (meds, wound care, etc)   Refer to discharge planning if patient needs post-hospital services based on physician order or complex needs related to functional status, cognitive ability or social support system  7/12/2024 1640 by Yessica Jefferson RN  Outcome: Progressing  7/12/2024 1231 by Cira Jackson RN  Outcome: Progressing     Problem: Safety - Adult  Goal: Free from fall injury  7/12/2024 1953 by Anni Cole RN  Outcome: Progressing  Flowsheets (Taken 7/12/2024 1906)  Free From Fall Injury: Instruct family/caregiver on patient safety  7/12/2024 1640 by Yessica Jefferson RN  Outcome: Progressing  7/12/2024 1231 by Cira Jackson RN  Outcome: Progressing     Problem: Neurosensory - Adult  Goal: Achieves stable or improved neurological status  7/12/2024 1953 by Anni Cole RN  Outcome: Progressing  Flowsheets (Taken 7/12/2024 1906)  Achieves stable or improved neurological status: Assess for and report changes in neurological status  7/12/2024 1640 by Yessica Jefferson RN  Outcome: Progressing  7/12/2024 1231 by Cira Jackson RN  Outcome: Progressing     Problem: Musculoskeletal - Adult  Goal: Return mobility to safest level of function  7/12/2024 1953 by Anni Cole RN  Outcome: Progressing  Flowsheets (Taken 7/12/2024 1906)  Return Mobility to Safest Level of Function: Assess patient stability and activity tolerance for standing, transferring and ambulating with or without assistive devices  7/12/2024 1640 by Yessica Jefferson RN  Outcome: Progressing  7/12/2024 1231 by

## 2024-07-12 NOTE — CARE COORDINATION
CM called social security to try to expedite getting a letter stating patient would qualify for disability. CM hold time was 1 hour and 45 minutes  and had to discontinue the call. CM reached out to Helen ( Case management director) to see if [employer] could assist in this task. CM following.     Bettina BENAVIDEZ, ACM  [place]

## 2024-07-12 NOTE — PROGRESS NOTES
Hospitalist Progress Note   Admit Date:  2024  4:30 PM   Name:  Brendan Saleh   Age:  49 y.o.  Sex:  male  :  1975   MRN:  [id number]   Room:  [facility]/    Presenting/Chief Complaint: Altered Mental Status     Reason(s) for Admission: Acute hypernatremia [E87.0]  Volume depletion [E86.9]  Acute kidney injury (HCC) [N17.9]  Acute encephalopathy [G93.40]  Septic shock (HCC) [A41.9, R65.21]     Hospital Course:   Mr. Saleh is a 48 y.o. male with a PMH of SDH s/p poonam hole 2024, hydrocephalus s/p  shunt, sacral decubitus stage IV, who originally presented to the ER  with chief complaint of increased confusion.     He was diagnosed with UTI with hematuria and started on course of vancomycin, Zosyn.  Urine culture finalized negative.   blood cultures with CoNS, determined contaminant.  However, he met sepsis criteria with WBC 14.6, HR 120s.  ID consulted to assist.  ECHO showed preserved EF.  Underwent sacral wound debridement on  with General Surgery, Dr. Zapien.     CT head with ongoing hydrocephalus and a  shunt.  NSGY consulted.  Patient underwent right ventricular peritoneal shunt removal on  due to non-functioning shunt.   Per Dr. Ortiz, patient needs a stepwise approach, and stated shunt replacement may be in his future though would need to wait at least 4 weeks.  He may be able to do temporizing CSF removal however he stated this would only be temporary until more definitive treatment as above.  Patient will require continued neurosurgical care.     ID returned to the case for management of CSF infection as CSF culture and  shunt tip culture grew ESBL Klebsiella and Serratia marcescens.  Antibiotics changed to IV cipro and furthermore to oral cipro for 14 days post shunt removal EOT 24.  ID signed off on .     His hospital stay was further complicated by a bowel obstruction on May 19, noted on CT after patient vomited fecal material.  He underwent hemicolectomy and  feed. And no pruritus to surrounding rash - Needs podiatry at discharge     HAP, resolved  Leukocytosis, resolved  Candiduria, resolved  Sepsis, resolved  Lower extremity rash, resolved    Anticipated Discharge Arrangements: Medically stable pending Medicaid.    PT/OT evals ordered?  Therapy evals ordered  Diet:  ADULT DIET; Dysphagia - Minced and Moist; Low Fiber; No Carbonated Beverages, Double Protein Portions  VTE prophylaxis: Lovenox  Code status: DNR      Non-peripheral Lines and Tubes (if present):      External Urinary Catheter (Active)          Telemetry (if present):  Cardiac/Telemetry Monitor On: No        Hospital Problems:  Principal Problem:    Encephalitis  Active Problems:    S/P  shunt    Communicating hydrocephalus (HCC)    Anemia    Sepsis following intra-abdominal surgery (HCC)    Wound disruption    Sinus tachycardia    NPH (normal pressure hydrocephalus) (HCC)    Shunt malfunction    Colon obstruction (HCC)    Infection of  (ventriculoperitoneal) shunt (HCC)    Colonic mass    Adenocarcinoma of colon (HCC)    Severe protein-calorie malnutrition (HCC)    Sacral decubitus ulcer, stage IV (HCC)    ESBL (extended spectrum beta-lactamase) producing bacteria infection    Acute blood loss as cause of postoperative anemia    Bowel perforation (HCC)    Critical illness myopathy    Cachexia (HCC)    Unintentional weight loss    Adult failure to thrive    Encounter for palliative care  Resolved Problems:    Hypernatremia    MIRNA (acute kidney injury) (HCC)    UTI (urinary tract infection)    Septic shock (HCC)      Objective:   Patient Vitals for the past 24 hrs:   Temp Pulse Resp BP SpO2   07/11/24 2129 -- (!) 102 -- 111/83 --   07/11/24 2003 97.7 °F (36.5 °C) (!) 102 18 111/83 98 %   07/11/24 1853 97.7 °F (36.5 °C) (!) 102 18 111/83 100 %   07/11/24 1616 97.9 °F (36.6 °C) 84 16 110/83 91 %         Oxygen Therapy  SpO2: 98 %  Pulse Oximetry Type: Intermittent  Pulse via Oximetry: 128 beats per

## 2024-07-12 NOTE — PROGRESS NOTES
EOS:     Pt resting quietly, no changes in neuro, pt turned every 2 hours and prn  Dressing changes to R, hip and sacrum dressing. Pt tolerated well  1900: Report given to night shift, bed in L/L position, NAD.

## 2024-07-12 NOTE — PLAN OF CARE
Problem: Safety - Adult  Goal: Free from fall injury  7/12/2024 1640 by Yessica Jeffersno RN  Outcome: Progressing  7/12/2024 1231 by Cira Jackson RN  Outcome: Progressing     Problem: Neurosensory - Adult  Goal: Achieves stable or improved neurological status  7/12/2024 1640 by Yessica Jefferson RN  Outcome: Progressing  7/12/2024 1231 by Cira Jackson RN  Outcome: Progressing     Problem: Musculoskeletal - Adult  Goal: Return mobility to safest level of function  7/12/2024 1640 by Yessica Jefferson RN  Outcome: Progressing  7/12/2024 1231 by Cira Jackson RN  Outcome: Progressing     Problem: Skin/Tissue Integrity  Goal: Absence of new skin breakdown  Description: 1.  Monitor for areas of redness and/or skin breakdown  2.  Assess vascular access sites hourly  3.  Every 4-6 hours minimum:  Change oxygen saturation probe site  4.  Every 4-6 hours:  If on nasal continuous positive airway pressure, respiratory therapy assess nares and determine need for appliance change or resting period.  7/12/2024 1231 by Cira Jackson RN  Outcome: Progressing

## 2024-07-13 PROCEDURE — 1100000000 HC RM PRIVATE

## 2024-07-13 PROCEDURE — 6370000000 HC RX 637 (ALT 250 FOR IP)

## 2024-07-13 PROCEDURE — 6370000000 HC RX 637 (ALT 250 FOR IP): Performed by: INTERNAL MEDICINE

## 2024-07-13 PROCEDURE — 6370000000 HC RX 637 (ALT 250 FOR IP): Performed by: NURSE PRACTITIONER

## 2024-07-13 PROCEDURE — 6370000000 HC RX 637 (ALT 250 FOR IP): Performed by: PHYSICIAN ASSISTANT

## 2024-07-13 PROCEDURE — 6360000002 HC RX W HCPCS: Performed by: SURGERY

## 2024-07-13 PROCEDURE — 2580000003 HC RX 258: Performed by: STUDENT IN AN ORGANIZED HEALTH CARE EDUCATION/TRAINING PROGRAM

## 2024-07-13 RX ADMIN — CETIRIZINE HYDROCHLORIDE 10 MG: 10 TABLET ORAL at 08:43

## 2024-07-13 RX ADMIN — LEVETIRACETAM 500 MG: 500 TABLET, FILM COATED ORAL at 08:43

## 2024-07-13 RX ADMIN — TRIAMCINOLONE ACETONIDE: 1 CREAM TOPICAL at 08:47

## 2024-07-13 RX ADMIN — METOPROLOL TARTRATE 25 MG: 25 TABLET, FILM COATED ORAL at 21:01

## 2024-07-13 RX ADMIN — TRAZODONE HYDROCHLORIDE 50 MG: 50 TABLET ORAL at 21:01

## 2024-07-13 RX ADMIN — SODIUM CHLORIDE, PRESERVATIVE FREE 10 ML: 5 INJECTION INTRAVENOUS at 21:02

## 2024-07-13 RX ADMIN — PANTOPRAZOLE SODIUM 40 MG: 40 TABLET, DELAYED RELEASE ORAL at 06:02

## 2024-07-13 RX ADMIN — PANTOPRAZOLE SODIUM 40 MG: 40 TABLET, DELAYED RELEASE ORAL at 17:04

## 2024-07-13 RX ADMIN — LEVETIRACETAM 500 MG: 500 TABLET, FILM COATED ORAL at 21:01

## 2024-07-13 RX ADMIN — METOPROLOL TARTRATE 25 MG: 25 TABLET, FILM COATED ORAL at 08:43

## 2024-07-13 RX ADMIN — TRIAMCINOLONE ACETONIDE: 1 CREAM TOPICAL at 21:01

## 2024-07-13 RX ADMIN — DOCUSATE SODIUM 50 MG AND SENNOSIDES 8.6 MG 2 TABLET: 8.6; 5 TABLET, FILM COATED ORAL at 08:43

## 2024-07-13 RX ADMIN — SODIUM CHLORIDE, PRESERVATIVE FREE 10 ML: 5 INJECTION INTRAVENOUS at 08:48

## 2024-07-13 RX ADMIN — ENOXAPARIN SODIUM 40 MG: 100 INJECTION SUBCUTANEOUS at 08:49

## 2024-07-13 ASSESSMENT — PAIN SCALES - GENERAL: PAINLEVEL_OUTOF10: 0

## 2024-07-13 ASSESSMENT — PAIN SCALES - WONG BAKER: WONGBAKER_NUMERICALRESPONSE: NO HURT

## 2024-07-13 NOTE — PLAN OF CARE
Problem: Discharge Planning  Goal: Discharge to home or other facility with appropriate resources  7/13/2024 1946 by Anni Cole RN  Outcome: Progressing  Flowsheets (Taken 7/13/2024 1908)  Discharge to home or other facility with appropriate resources:   Identify barriers to discharge with patient and caregiver   Arrange for needed discharge resources and transportation as appropriate   Identify discharge learning needs (meds, wound care, etc)   Refer to discharge planning if patient needs post-hospital services based on physician order or complex needs related to functional status, cognitive ability or social support system  7/13/2024 1054 by Cira Jackson RN  Outcome: Progressing     Problem: Safety - Adult  Goal: Free from fall injury  7/13/2024 1946 by Anni Cole RN  Outcome: Progressing  Flowsheets (Taken 7/13/2024 1908)  Free From Fall Injury: Instruct family/caregiver on patient safety  7/13/2024 1054 by Cira Jackson RN  Outcome: Progressing     Problem: Neurosensory - Adult  Goal: Achieves stable or improved neurological status  7/13/2024 1946 by Anni Cole RN  Outcome: Progressing  Flowsheets (Taken 7/13/2024 1908)  Achieves stable or improved neurological status: Assess for and report changes in neurological status  7/13/2024 1054 by Cira Jackson RN  Outcome: Progressing     Problem: Musculoskeletal - Adult  Goal: Return mobility to safest level of function  7/13/2024 1946 by Anni Cole RN  Outcome: Progressing  Flowsheets (Taken 7/13/2024 1908)  Return Mobility to Safest Level of Function: Assess patient stability and activity tolerance for standing, transferring and ambulating with or without assistive devices  7/13/2024 1054 by Cira Jackson RN  Outcome: Progressing  Flowsheets (Taken 7/13/2024 0784)  Return Mobility to Safest Level of Function: Assess patient stability and activity tolerance for standing, transferring and ambulating with or without assistive  devices     Problem: Skin/Tissue Integrity  Goal: Absence of new skin breakdown  Description: 1.  Monitor for areas of redness and/or skin breakdown  2.  Assess vascular access sites hourly  3.  Every 4-6 hours minimum:  Change oxygen saturation probe site  4.  Every 4-6 hours:  If on nasal continuous positive airway pressure, respiratory therapy assess nares and determine need for appliance change or resting period.  7/13/2024 1946 by Anni Cole RN  Outcome: Progressing  7/13/2024 1054 by Cira Jackson RN  Outcome: Progressing     Problem: Pain  Goal: Verbalizes/displays adequate comfort level or baseline comfort level  7/13/2024 1946 by Anni Cole RN  Outcome: Progressing  Flowsheets (Taken 7/13/2024 1906)  Verbalizes/displays adequate comfort level or baseline comfort level: Encourage patient to monitor pain and request assistance  7/13/2024 1054 by Cira Jackson RN  Outcome: Progressing  Flowsheets (Taken 7/13/2024 0701)  Verbalizes/displays adequate comfort level or baseline comfort level: Encourage patient to monitor pain and request assistance     Problem: Skin/Tissue Integrity - Adult  Goal: Skin integrity remains intact  7/13/2024 1946 by Anni Cole RN  Outcome: Progressing  Flowsheets (Taken 7/13/2024 1908)  Skin Integrity Remains Intact: Monitor for areas of redness and/or skin breakdown  7/13/2024 1054 by Cira Jackson RN  Outcome: Progressing  Flowsheets (Taken 7/13/2024 0726)  Skin Integrity Remains Intact: Monitor for areas of redness and/or skin breakdown  Goal: Incisions, wounds, or drain sites healing without S/S of infection  7/13/2024 1946 by Anni Cole RN  Outcome: Progressing  Flowsheets (Taken 7/13/2024 1908)  Incisions, Wounds, or Drain Sites Healing Without Sign and Symptoms of Infection: TWICE DAILY: Assess and document skin integrity  7/13/2024 1054 by Cira Jackson RN  Outcome: Progressing     Problem: Gastrointestinal - Adult  Goal: Maintains adequate

## 2024-07-13 NOTE — PROGRESS NOTES
Hospitalist Progress Note   Admit Date:  2024  4:30 PM   Name:  Brendan Saleh   Age:  49 y.o.  Sex:  male  :  1975   MRN:  [id number]   Room:  [facility]/    Presenting/Chief Complaint: Altered Mental Status     Reason(s) for Admission: Acute hypernatremia [E87.0]  Volume depletion [E86.9]  Acute kidney injury (HCC) [N17.9]  Acute encephalopathy [G93.40]  Septic shock (HCC) [A41.9, R65.21]     Hospital Course:   Mr. Saleh is a 48 y.o. male with a PMH of SDH s/p poonam hole 2024, hydrocephalus s/p  shunt, sacral decubitus stage IV, who originally presented to the ER  with chief complaint of increased confusion.     He was diagnosed with UTI with hematuria and started on course of vancomycin, Zosyn.  Urine culture finalized negative.   blood cultures with CoNS, determined contaminant.  However, he met sepsis criteria with WBC 14.6, HR 120s.  ID consulted to assist.  ECHO showed preserved EF.  Underwent sacral wound debridement on  with General Surgery, Dr. Zapien.     CT head with ongoing hydrocephalus and a  shunt.  NSGY consulted.  Patient underwent right ventricular peritoneal shunt removal on  due to non-functioning shunt.   Per Dr. Ortiz, patient needs a stepwise approach, and stated shunt replacement may be in his future though would need to wait at least 4 weeks.  He may be able to do temporizing CSF removal however he stated this would only be temporary until more definitive treatment as above.  Patient will require continued neurosurgical care.     ID returned to the case for management of CSF infection as CSF culture and  shunt tip culture grew ESBL Klebsiella and Serratia marcescens.  Antibiotics changed to IV cipro and furthermore to oral cipro for 14 days post shunt removal EOT 24.  ID signed off on .     His hospital stay was further complicated by a bowel obstruction on May 19, noted on CT after patient vomited fecal material.  He underwent hemicolectomy and  magnesium sulfate 2000 mg in 50 mL IVPB premix  2,000 mg IntraVENous PRN    sodium phosphate 15 mmol in sodium chloride 0.9 % 250 mL IVPB  15 mmol IntraVENous PRN    sodium chloride flush 0.9 % injection 5-40 mL  5-40 mL IntraVENous 2 times per day    sodium chloride flush 0.9 % injection 5-40 mL  5-40 mL IntraVENous PRN    0.9 % sodium chloride infusion   IntraVENous PRN    glucose chewable tablet 16 g  4 tablet Oral PRN    dextrose bolus 10% 125 mL  125 mL IntraVENous PRN    Or    dextrose bolus 10% 250 mL  250 mL IntraVENous PRN    Glucagon Emergency KIT 1 mg  1 mg SubCUTAneous PRN    dextrose 10 % infusion   IntraVENous Continuous PRN    haloperidol lactate (HALDOL) injection 1 mg  1 mg IntraVENous Q6H PRN    medicated lip ointment (BLISTEX)   Topical PRN    ondansetron (ZOFRAN-ODT) disintegrating tablet 4 mg  4 mg Oral Q8H PRN    Or    ondansetron (ZOFRAN) injection 4 mg  4 mg IntraVENous Q6H PRN    enoxaparin (LOVENOX) injection 40 mg  40 mg SubCUTAneous Daily    LORazepam (ATIVAN) 2 mg in sodium chloride (PF) 0.9 % 10 mL injection  2 mg IntraVENous Q4H PRN    calcium carbonate (TUMS) chewable tablet 500 mg  500 mg Oral TID PRN    guaiFENesin-dextromethorphan (ROBITUSSIN DM) 100-10 MG/5ML syrup 5 mL  5 mL Oral Q4H PRN       Signed:  Indiana Cruz DO    Part of this note may have been written by using a voice dictation software.  The note has been proof read but may still contain some grammatical/other typographical errors.

## 2024-07-13 NOTE — PROGRESS NOTES
Hourly rounds completed.  Pt turned Q2H overnight.  Otherwise uneventful shift, pt rested well.  Dressings to back of R thigh/R hip changed.  Bed in low position, wheels locked, call light within reach.

## 2024-07-13 NOTE — PLAN OF CARE
Problem: Discharge Planning  Goal: Discharge to home or other facility with appropriate resources  Outcome: Progressing     Problem: Safety - Adult  Goal: Free from fall injury  Outcome: Progressing     Problem: Neurosensory - Adult  Goal: Achieves stable or improved neurological status  Outcome: Progressing     Problem: Musculoskeletal - Adult  Goal: Return mobility to safest level of function  Outcome: Progressing     Problem: Skin/Tissue Integrity  Goal: Absence of new skin breakdown  Description: 1.  Monitor for areas of redness and/or skin breakdown  2.  Assess vascular access sites hourly  3.  Every 4-6 hours minimum:  Change oxygen saturation probe site  4.  Every 4-6 hours:  If on nasal continuous positive airway pressure, respiratory therapy assess nares and determine need for appliance change or resting period.  Outcome: Progressing     Problem: Pain  Goal: Verbalizes/displays adequate comfort level or baseline comfort level  Outcome: Progressing  Flowsheets (Taken 7/13/2024 0701)  Verbalizes/displays adequate comfort level or baseline comfort level: Encourage patient to monitor pain and request assistance     Problem: Skin/Tissue Integrity - Adult  Goal: Skin integrity remains intact  Outcome: Progressing  Flowsheets (Taken 7/13/2024 1826)  Skin Integrity Remains Intact: Monitor for areas of redness and/or skin breakdown  Goal: Incisions, wounds, or drain sites healing without S/S of infection  Outcome: Progressing     Problem: Gastrointestinal - Adult  Goal: Maintains adequate nutritional intake  Outcome: Progressing     Problem: Genitourinary - Adult  Goal: Absence of urinary retention  Outcome: Progressing     Problem: Infection - Adult  Goal: Absence of infection at discharge  Outcome: Progressing  Goal: Absence of infection during hospitalization  Outcome: Progressing     Problem: Metabolic/Fluid and Electrolytes - Adult  Goal: Electrolytes maintained within normal limits  Outcome:

## 2024-07-14 PROCEDURE — 6360000002 HC RX W HCPCS: Performed by: SURGERY

## 2024-07-14 PROCEDURE — 6370000000 HC RX 637 (ALT 250 FOR IP): Performed by: NURSE PRACTITIONER

## 2024-07-14 PROCEDURE — 1100000000 HC RM PRIVATE

## 2024-07-14 PROCEDURE — 6370000000 HC RX 637 (ALT 250 FOR IP): Performed by: PHYSICIAN ASSISTANT

## 2024-07-14 PROCEDURE — 6370000000 HC RX 637 (ALT 250 FOR IP)

## 2024-07-14 PROCEDURE — 2580000003 HC RX 258: Performed by: STUDENT IN AN ORGANIZED HEALTH CARE EDUCATION/TRAINING PROGRAM

## 2024-07-14 PROCEDURE — 6370000000 HC RX 637 (ALT 250 FOR IP): Performed by: INTERNAL MEDICINE

## 2024-07-14 RX ADMIN — LEVETIRACETAM 500 MG: 500 TABLET, FILM COATED ORAL at 09:01

## 2024-07-14 RX ADMIN — METOPROLOL TARTRATE 25 MG: 25 TABLET, FILM COATED ORAL at 09:02

## 2024-07-14 RX ADMIN — DOCUSATE SODIUM 50 MG AND SENNOSIDES 8.6 MG 2 TABLET: 8.6; 5 TABLET, FILM COATED ORAL at 09:01

## 2024-07-14 RX ADMIN — CETIRIZINE HYDROCHLORIDE 10 MG: 10 TABLET ORAL at 09:01

## 2024-07-14 RX ADMIN — PANTOPRAZOLE SODIUM 40 MG: 40 TABLET, DELAYED RELEASE ORAL at 16:26

## 2024-07-14 RX ADMIN — ENOXAPARIN SODIUM 40 MG: 100 INJECTION SUBCUTANEOUS at 09:01

## 2024-07-14 RX ADMIN — TRIAMCINOLONE ACETONIDE: 1 CREAM TOPICAL at 20:25

## 2024-07-14 RX ADMIN — SODIUM CHLORIDE, PRESERVATIVE FREE 10 ML: 5 INJECTION INTRAVENOUS at 20:25

## 2024-07-14 RX ADMIN — METOPROLOL TARTRATE 25 MG: 25 TABLET, FILM COATED ORAL at 20:25

## 2024-07-14 RX ADMIN — TRAZODONE HYDROCHLORIDE 50 MG: 50 TABLET ORAL at 20:25

## 2024-07-14 RX ADMIN — SODIUM CHLORIDE, PRESERVATIVE FREE 10 ML: 5 INJECTION INTRAVENOUS at 09:02

## 2024-07-14 RX ADMIN — LEVETIRACETAM 500 MG: 500 TABLET, FILM COATED ORAL at 20:25

## 2024-07-14 RX ADMIN — TRIAMCINOLONE ACETONIDE: 1 CREAM TOPICAL at 09:02

## 2024-07-14 RX ADMIN — PANTOPRAZOLE SODIUM 40 MG: 40 TABLET, DELAYED RELEASE ORAL at 04:59

## 2024-07-14 ASSESSMENT — PAIN SCALES - GENERAL: PAINLEVEL_OUTOF10: 0

## 2024-07-14 NOTE — PROGRESS NOTES
Pt denies needs at this time, NAD, dressing changes completed at 1800, pt has been turned every 2 hrs and prn. I attempted to shave, however pt's beard thick, pt given partial shave, bathed and hair shampooed. Hourly rounds completed. Bed in low and locked position, call light and personal items within reach. Will give bedside report to oncoming nurse.

## 2024-07-14 NOTE — PROGRESS NOTES
Pt denies needs at this time, NAD. Hourly rounds completed. Dressing change to R. Hip and sacrum w/NS/, pt turned every 2 hours and prn. Bed in low and locked position, call light and personal items within reach. Will give bedside report to oncoming nurse.

## 2024-07-14 NOTE — PROGRESS NOTES
Hospitalist Progress Note   Admit Date:  2024  4:30 PM   Name:  Brendan Saleh   Age:  49 y.o.  Sex:  male  :  1975   MRN:  [id number]   Room:  [facility]/    Presenting/Chief Complaint: Altered Mental Status     Reason(s) for Admission: Acute hypernatremia [E87.0]  Volume depletion [E86.9]  Acute kidney injury (HCC) [N17.9]  Acute encephalopathy [G93.40]  Septic shock (HCC) [A41.9, R65.21]     Hospital Course:   Mr. Saleh is a 48 y.o. male with a PMH of SDH s/p poonam hole 2024, hydrocephalus s/p  shunt, sacral decubitus stage IV, who originally presented to the ER  with chief complaint of increased confusion.     He was diagnosed with UTI with hematuria and started on course of vancomycin, Zosyn.  Urine culture finalized negative.   blood cultures with CoNS, determined contaminant.  However, he met sepsis criteria with WBC 14.6, HR 120s.  ID consulted to assist.  ECHO showed preserved EF.  Underwent sacral wound debridement on  with General Surgery, Dr. Zapien.     CT head with ongoing hydrocephalus and a  shunt.  NSGY consulted.  Patient underwent right ventricular peritoneal shunt removal on  due to non-functioning shunt.   Per Dr. Ortiz, patient needs a stepwise approach, and stated shunt replacement may be in his future though would need to wait at least 4 weeks.  He may be able to do temporizing CSF removal however he stated this would only be temporary until more definitive treatment as above.  Patient will require continued neurosurgical care.     ID returned to the case for management of CSF infection as CSF culture and  shunt tip culture grew ESBL Klebsiella and Serratia marcescens.  Antibiotics changed to IV cipro and furthermore to oral cipro for 14 days post shunt removal EOT 24.  ID signed off on .     His hospital stay was further complicated by a bowel obstruction on May 19, noted on CT after patient vomited fecal material.  He underwent hemicolectomy and

## 2024-07-14 NOTE — PROGRESS NOTES
Hourly rounds completed.  Pt turned Q2H.  Uneventful shift, pt rested well.  Bed in low position, wheels locked, call light within reach.

## 2024-07-15 PROCEDURE — 6370000000 HC RX 637 (ALT 250 FOR IP): Performed by: PHYSICIAN ASSISTANT

## 2024-07-15 PROCEDURE — 1100000000 HC RM PRIVATE

## 2024-07-15 PROCEDURE — 97530 THERAPEUTIC ACTIVITIES: CPT

## 2024-07-15 PROCEDURE — 6370000000 HC RX 637 (ALT 250 FOR IP)

## 2024-07-15 PROCEDURE — 2580000003 HC RX 258: Performed by: STUDENT IN AN ORGANIZED HEALTH CARE EDUCATION/TRAINING PROGRAM

## 2024-07-15 PROCEDURE — 6360000002 HC RX W HCPCS: Performed by: SURGERY

## 2024-07-15 PROCEDURE — 6370000000 HC RX 637 (ALT 250 FOR IP): Performed by: INTERNAL MEDICINE

## 2024-07-15 PROCEDURE — 97112 NEUROMUSCULAR REEDUCATION: CPT

## 2024-07-15 PROCEDURE — 6370000000 HC RX 637 (ALT 250 FOR IP): Performed by: NURSE PRACTITIONER

## 2024-07-15 RX ADMIN — ENOXAPARIN SODIUM 40 MG: 100 INJECTION SUBCUTANEOUS at 08:22

## 2024-07-15 RX ADMIN — SODIUM CHLORIDE, PRESERVATIVE FREE 10 ML: 5 INJECTION INTRAVENOUS at 22:40

## 2024-07-15 RX ADMIN — SODIUM CHLORIDE, PRESERVATIVE FREE 10 ML: 5 INJECTION INTRAVENOUS at 08:31

## 2024-07-15 RX ADMIN — METOPROLOL TARTRATE 25 MG: 25 TABLET, FILM COATED ORAL at 22:34

## 2024-07-15 RX ADMIN — TRIAMCINOLONE ACETONIDE: 1 CREAM TOPICAL at 08:24

## 2024-07-15 RX ADMIN — TRIAMCINOLONE ACETONIDE: 1 CREAM TOPICAL at 22:40

## 2024-07-15 RX ADMIN — PANTOPRAZOLE SODIUM 40 MG: 40 TABLET, DELAYED RELEASE ORAL at 16:53

## 2024-07-15 RX ADMIN — DOCUSATE SODIUM 50 MG AND SENNOSIDES 8.6 MG 2 TABLET: 8.6; 5 TABLET, FILM COATED ORAL at 08:22

## 2024-07-15 RX ADMIN — TRAZODONE HYDROCHLORIDE 50 MG: 50 TABLET ORAL at 22:38

## 2024-07-15 RX ADMIN — LEVETIRACETAM 500 MG: 500 TABLET, FILM COATED ORAL at 22:34

## 2024-07-15 RX ADMIN — CETIRIZINE HYDROCHLORIDE 10 MG: 10 TABLET ORAL at 08:22

## 2024-07-15 RX ADMIN — PANTOPRAZOLE SODIUM 40 MG: 40 TABLET, DELAYED RELEASE ORAL at 05:20

## 2024-07-15 RX ADMIN — METOPROLOL TARTRATE 25 MG: 25 TABLET, FILM COATED ORAL at 08:22

## 2024-07-15 RX ADMIN — LEVETIRACETAM 500 MG: 500 TABLET, FILM COATED ORAL at 08:22

## 2024-07-15 ASSESSMENT — PAIN SCALES - GENERAL: PAINLEVEL_OUTOF10: 4

## 2024-07-15 NOTE — PROGRESS NOTES
ACUTE PHYSICAL THERAPY GOALS:   (Developed with and agreed upon by patient and/or caregiver.)  PT goals re-assessed during re-evaluation on 7/11/24  STG:  (1.)Mr. Saleh will move from supine to sit and sit to supine  in bed with MODERATE ASSIST within 3 treatment day(s).  (2.)Mr. Saleh will scoot up and down in bed with MODERATE ASSIST within 3 treatment day(s).    (3.)Mr. Saleh will roll side to side in bed with MINIMAL ASSIST within 3 treatment day(s).    (4.)Mr. Saleh will tolerate sitting up in recliner chair for 2 hours with stable vital signs to increase upright tolerance within 3 treatment day(s).    (5.)Mr. Saleh will maintain static/dynamic sitting x 25 minutes with MINIMAL ASSIST for improved balance within 3 treatment days.  (6.)Mr. Saleh will follow 75% of commands for increased participation in therapeutic activity/exercise within 3 treatment days.     LTG:  (1.) Brendan Saleh  will move from supine to sit and sit to supine and scoot up and down with MINIMAL ASSIST within 7 treatment day(s).    (2.)Mr. Saleh will roll side to side in bed with CONTACT GUARD ASSIST within 7 treatment day(s).    (3.) Brendan Saleh will transfer from bed to chair and chair to bed with MODERATE ASSISTx1 using the least restrictive device within 7 treatment day(s).    (4.) Brendan Saleh will perform therapeutic exercises x 20 min for HEP with CONTACT GUARD ASSIST to improve strength, endurance, and functional mobility within 7 treatment day(s).   (5.) Brendan aSleh will perform seated static and dynamic balance activities x25 minutes with CONTACT GUARD ASSIST to improve safety within 7 treatment day(s).  (6.) Brendan Saleh will perform standing static and dynamic balance activities x 5 minutes with MODERATE ASSIST to improve safety and mobility within 7 treatment day(s).     PHYSICAL THERAPY: Daily Note PM   (Link to Caseload Tracking: PT Visit Days : 2  Time In/Out PT Charge Capture

## 2024-07-15 NOTE — PROGRESS NOTES
Hourly rounds completed.  Wound care performed per order.  Otherwise uneventful shift, pt rested well.  Turned Q2H. Bed in low position, wheels locked, call light within reach.

## 2024-07-15 NOTE — PLAN OF CARE
Problem: Discharge Planning  Goal: Discharge to home or other facility with appropriate resources  7/15/2024 0741 by Maryan Quiles RN  Outcome: Progressing  7/15/2024 0107 by Anni Cole RN  Outcome: Progressing  7/15/2024 0107 by Anni Cole RN  Outcome: Progressing  Flowsheets (Taken 7/14/2024 1914)  Discharge to home or other facility with appropriate resources:   Identify barriers to discharge with patient and caregiver   Arrange for needed discharge resources and transportation as appropriate   Identify discharge learning needs (meds, wound care, etc)   Refer to discharge planning if patient needs post-hospital services based on physician order or complex needs related to functional status, cognitive ability or social support system     Problem: Safety - Adult  Goal: Free from fall injury  7/15/2024 0741 by Maryan Quiles RN  Outcome: Progressing  7/15/2024 0107 by Anni Cole RN  Outcome: Progressing  7/15/2024 0107 by Anni Cole RN  Outcome: Progressing  Flowsheets (Taken 7/14/2024 1914)  Free From Fall Injury: Instruct family/caregiver on patient safety     Problem: Neurosensory - Adult  Goal: Achieves stable or improved neurological status  7/15/2024 0741 by Maryan Quiles RN  Outcome: Progressing  7/15/2024 0107 by Anni Cole RN  Outcome: Progressing  7/15/2024 0107 by Anni Cole RN  Outcome: Progressing  Flowsheets (Taken 7/14/2024 1914)  Achieves stable or improved neurological status: Assess for and report changes in neurological status     Problem: Musculoskeletal - Adult  Goal: Return mobility to safest level of function  7/15/2024 0741 by Maryan Quiles RN  Outcome: Progressing  7/15/2024 0107 by Anni Cole RN  Outcome: Progressing  7/15/2024 0107 by Anni Cole RN  Outcome: Progressing  Flowsheets (Taken 7/14/2024 1914)  Return Mobility to Safest Level of Function: Assess patient stability and activity tolerance for standing, transferring and ambulating

## 2024-07-15 NOTE — PROGRESS NOTES
Pt up in chair for a couple hours after working with PT. Overall, uneventful day. Dressing changes done last night per night shift RN.    Rounds performed throughout shift. Pt denies needs at this time. Bed in low position, locked and call light/personal items within reach.

## 2024-07-15 NOTE — PLAN OF CARE
Problem: Discharge Planning  Goal: Discharge to home or other facility with appropriate resources  Outcome: Progressing  Flowsheets (Taken 7/14/2024 1914)  Discharge to home or other facility with appropriate resources:   Identify barriers to discharge with patient and caregiver   Arrange for needed discharge resources and transportation as appropriate   Identify discharge learning needs (meds, wound care, etc)   Refer to discharge planning if patient needs post-hospital services based on physician order or complex needs related to functional status, cognitive ability or social support system     Problem: Safety - Adult  Goal: Free from fall injury  Outcome: Progressing  Flowsheets (Taken 7/14/2024 1914)  Free From Fall Injury: Instruct family/caregiver on patient safety     Problem: Neurosensory - Adult  Goal: Achieves stable or improved neurological status  Outcome: Progressing  Flowsheets (Taken 7/14/2024 1914)  Achieves stable or improved neurological status: Assess for and report changes in neurological status     Problem: Musculoskeletal - Adult  Goal: Return mobility to safest level of function  Outcome: Progressing  Flowsheets (Taken 7/14/2024 1914)  Return Mobility to Safest Level of Function: Assess patient stability and activity tolerance for standing, transferring and ambulating with or without assistive devices     Problem: Skin/Tissue Integrity  Goal: Absence of new skin breakdown  Description: 1.  Monitor for areas of redness and/or skin breakdown  2.  Assess vascular access sites hourly  3.  Every 4-6 hours minimum:  Change oxygen saturation probe site  4.  Every 4-6 hours:  If on nasal continuous positive airway pressure, respiratory therapy assess nares and determine need for appliance change or resting period.  Outcome: Progressing     Problem: Pain  Goal: Verbalizes/displays adequate comfort level or baseline comfort level  Outcome: Progressing  Flowsheets (Taken 7/14/2024

## 2024-07-15 NOTE — CARE COORDINATION
CM continues to attempt to obtain copy SS Disability Letter for pt to qualify for LTC. CM will continue to follow.

## 2024-07-15 NOTE — PROGRESS NOTES
Hospitalist Progress Note   Admit Date:  2024  4:30 PM   Name:  Brendan Saleh   Age:  49 y.o.  Sex:  male  :  1975   MRN:  [id number]   Room:  [facility]/    Presenting/Chief Complaint: Altered Mental Status     Reason(s) for Admission: Acute hypernatremia [E87.0]  Volume depletion [E86.9]  Acute kidney injury (HCC) [N17.9]  Acute encephalopathy [G93.40]  Septic shock (HCC) [A41.9, R65.21]     Hospital Course:   Mr. Saleh is a 48 y.o. male with a PMH of SDH s/p poonam hole 2024, hydrocephalus s/p  shunt, sacral decubitus stage IV, who originally presented to the ER  with chief complaint of increased confusion.     He was diagnosed with UTI with hematuria and started on course of vancomycin, Zosyn.  Urine culture finalized negative.   blood cultures with CoNS, determined contaminant.  However, he met sepsis criteria with WBC 14.6, HR 120s.  ID consulted to assist.  ECHO showed preserved EF.  Underwent sacral wound debridement on  with General Surgery, Dr. Zapien.     CT head with ongoing hydrocephalus and a  shunt.  NSGY consulted.  Patient underwent right ventricular peritoneal shunt removal on  due to non-functioning shunt.   Per Dr. Ortiz, patient needs a stepwise approach, and stated shunt replacement may be in his future though would need to wait at least 4 weeks.  He may be able to do temporizing CSF removal however he stated this would only be temporary until more definitive treatment as above.  Patient will require continued neurosurgical care.     ID returned to the case for management of CSF infection as CSF culture and  shunt tip culture grew ESBL Klebsiella and Serratia marcescens.  Antibiotics changed to IV cipro and furthermore to oral cipro for 14 days post shunt removal EOT 24.  ID signed off on .     His hospital stay was further complicated by a bowel obstruction on May 19, noted on CT after patient vomited fecal material.  He underwent hemicolectomy and  Oximeter Device Mode: Intermittent  Pulse Oximeter Device Location: Finger  O2 Device: None (Room air)  Skin Assessment: Clean, dry, & intact  O2 Flow Rate (L/min): 0 L/min  Oxygen Therapy: None (Room air)    Estimated body mass index is 16.29 kg/m² as calculated from the following:    Height as of this encounter: 1.905 m (6' 3\").    Weight as of this encounter: 59.1 kg (130 lb 4.8 oz).    Intake/Output Summary (Last 24 hours) at 7/15/2024 0801  Last data filed at 7/15/2024 0336  Gross per 24 hour   Intake 360 ml   Output 1900 ml   Net -1540 ml           Physical Exam:   General:    Thin, appears older than stated age and chronically ill.   Head:  Normocephalic, atraumatic  Eyes:  Sclerae appear normal.  Pupils equally round.  ENT:  Nares appear normal.  Moist oral mucosa  Neck:  No restricted ROM.  Trachea midline   CV:   RRR.  No m/r/g.  No jugular venous distension.  Lungs:   CTAB.  No wheezing, rhonchi, or rales.  Symmetric expansion. RA O2.   Abdomen:   Soft, nontender, nondistended. Ex-lap incision noted.   Extremities: No cyanosis or clubbing.  No edema  Skin:     No rashes.  Normal coloration.   Sacral decubitus ulcer present. He has onychomycoses to fingernails on his hands and toes.  He has flaky yellow plaques to his bilateral feet on the plantar aspects  Neuro:  NIHSS 0. He could answer 2+2 accurately and could spell cat. He could not recite the alphabet backwards.   Psych:  Flat mood and affect.              I have personally reviewed labs and tests:  Recent Labs:  No results found for this or any previous visit (from the past 48 hour(s)).        No results for input(s): \"COVID19\" in the last 72 hours.    Current Meds:  Current Facility-Administered Medications   Medication Dose Route Frequency    oxyCODONE (ROXICODONE) immediate release tablet 5 mg  5 mg Oral Q4H PRN    cyclobenzaprine (FLEXERIL) tablet 10 mg  10 mg Oral TID PRN    traZODone (DESYREL) tablet 50 mg  50 mg Oral Nightly PRN

## 2024-07-16 LAB
GLUCOSE BLD STRIP.AUTO-MCNC: 155 MG/DL (ref 65–100)
SERVICE CMNT-IMP: ABNORMAL

## 2024-07-16 PROCEDURE — 6370000000 HC RX 637 (ALT 250 FOR IP): Performed by: NURSE PRACTITIONER

## 2024-07-16 PROCEDURE — 6370000000 HC RX 637 (ALT 250 FOR IP): Performed by: PHYSICIAN ASSISTANT

## 2024-07-16 PROCEDURE — 1100000000 HC RM PRIVATE

## 2024-07-16 PROCEDURE — 6360000002 HC RX W HCPCS: Performed by: SURGERY

## 2024-07-16 PROCEDURE — 2580000003 HC RX 258: Performed by: STUDENT IN AN ORGANIZED HEALTH CARE EDUCATION/TRAINING PROGRAM

## 2024-07-16 PROCEDURE — 6370000000 HC RX 637 (ALT 250 FOR IP)

## 2024-07-16 PROCEDURE — 82962 GLUCOSE BLOOD TEST: CPT

## 2024-07-16 RX ADMIN — TRIAMCINOLONE ACETONIDE: 1 CREAM TOPICAL at 08:32

## 2024-07-16 RX ADMIN — PANTOPRAZOLE SODIUM 40 MG: 40 TABLET, DELAYED RELEASE ORAL at 17:10

## 2024-07-16 RX ADMIN — DOCUSATE SODIUM 50 MG AND SENNOSIDES 8.6 MG 2 TABLET: 8.6; 5 TABLET, FILM COATED ORAL at 08:24

## 2024-07-16 RX ADMIN — METOPROLOL TARTRATE 25 MG: 25 TABLET, FILM COATED ORAL at 20:55

## 2024-07-16 RX ADMIN — CETIRIZINE HYDROCHLORIDE 10 MG: 10 TABLET ORAL at 08:24

## 2024-07-16 RX ADMIN — SODIUM CHLORIDE, PRESERVATIVE FREE 10 ML: 5 INJECTION INTRAVENOUS at 20:55

## 2024-07-16 RX ADMIN — TRIAMCINOLONE ACETONIDE: 1 CREAM TOPICAL at 20:57

## 2024-07-16 RX ADMIN — SODIUM CHLORIDE, PRESERVATIVE FREE 10 ML: 5 INJECTION INTRAVENOUS at 08:32

## 2024-07-16 RX ADMIN — LEVETIRACETAM 500 MG: 500 TABLET, FILM COATED ORAL at 20:55

## 2024-07-16 RX ADMIN — METOPROLOL TARTRATE 25 MG: 25 TABLET, FILM COATED ORAL at 08:24

## 2024-07-16 RX ADMIN — LEVETIRACETAM 500 MG: 500 TABLET, FILM COATED ORAL at 08:24

## 2024-07-16 RX ADMIN — ENOXAPARIN SODIUM 40 MG: 100 INJECTION SUBCUTANEOUS at 08:24

## 2024-07-16 RX ADMIN — PANTOPRAZOLE SODIUM 40 MG: 40 TABLET, DELAYED RELEASE ORAL at 05:36

## 2024-07-16 ASSESSMENT — PAIN SCALES - GENERAL: PAINLEVEL_OUTOF10: 0

## 2024-07-16 NOTE — PROGRESS NOTES
Hospitalist Progress Note   Admit Date:  2024  4:30 PM   Name:  Brendan Saleh   Age:  49 y.o.  Sex:  male  :  1975   MRN:  [id number]   Room:  [facility]/    Presenting/Chief Complaint: Altered Mental Status     Reason(s) for Admission: Acute hypernatremia [E87.0]  Volume depletion [E86.9]  Acute kidney injury (HCC) [N17.9]  Acute encephalopathy [G93.40]  Septic shock (HCC) [A41.9, R65.21]     Hospital Course:   Mr. Saleh is a 48 y.o. male with a PMH of SDH s/p poonam hole 2024, hydrocephalus s/p  shunt, sacral decubitus stage IV, who originally presented to the ER  with chief complaint of increased confusion.     He was diagnosed with UTI with hematuria and started on course of vancomycin, Zosyn.  Urine culture finalized negative.   blood cultures with CoNS, determined contaminant.  However, he met sepsis criteria with WBC 14.6, HR 120s.  ID consulted to assist.  ECHO showed preserved EF.  Underwent sacral wound debridement on  with General Surgery, Dr. Zapien.     CT head with ongoing hydrocephalus and a  shunt.  NSGY consulted.  Patient underwent right ventricular peritoneal shunt removal on  due to non-functioning shunt.   Per Dr. Ortiz, patient needs a stepwise approach, and stated shunt replacement may be in his future though would need to wait at least 4 weeks.  He may be able to do temporizing CSF removal however he stated this would only be temporary until more definitive treatment as above.  Patient will require continued neurosurgical care.     ID returned to the case for management of CSF infection as CSF culture and  shunt tip culture grew ESBL Klebsiella and Serratia marcescens.  Antibiotics changed to IV cipro and furthermore to oral cipro for 14 days post shunt removal EOT 24.  ID signed off on .     His hospital stay was further complicated by a bowel obstruction on May 19, noted on CT after patient vomited fecal material.  He underwent hemicolectomy and

## 2024-07-16 NOTE — PROGRESS NOTES
Dressing change done on sacrum due to bowel movement saturating the dressing. Hip dressing change done last night per RN.    Rounds performed throughout shift. Pt denies needs at this time. Bed in low position, locked and call light/personal items within reach.

## 2024-07-16 NOTE — PLAN OF CARE
Problem: Discharge Planning  Goal: Discharge to home or other facility with appropriate resources  7/16/2024 0715 by Maryan Quiles RN  Outcome: Progressing  7/16/2024 0332 by Panfilo Newby RN  Outcome: Progressing     Problem: Safety - Adult  Goal: Free from fall injury  7/16/2024 0715 by Maryan Quiles RN  Outcome: Progressing  7/16/2024 0332 by Panfilo Newby RN  Outcome: Progressing     Problem: Neurosensory - Adult  Goal: Achieves stable or improved neurological status  7/16/2024 0715 by Maryan Quiles RN  Outcome: Progressing  7/16/2024 0332 by Panfilo Newby RN  Outcome: Progressing     Problem: Musculoskeletal - Adult  Goal: Return mobility to safest level of function  7/16/2024 0715 by Maryan Quiles RN  Outcome: Progressing  7/16/2024 0332 by Panfilo Newby RN  Outcome: Progressing     Problem: Skin/Tissue Integrity  Goal: Absence of new skin breakdown  Description: 1.  Monitor for areas of redness and/or skin breakdown  2.  Assess vascular access sites hourly  3.  Every 4-6 hours minimum:  Change oxygen saturation probe site  4.  Every 4-6 hours:  If on nasal continuous positive airway pressure, respiratory therapy assess nares and determine need for appliance change or resting period.  7/16/2024 0715 by Maryan Quiles RN  Outcome: Progressing  7/16/2024 0332 by Panfilo eNwby RN  Outcome: Progressing     Problem: Pain  Goal: Verbalizes/displays adequate comfort level or baseline comfort level  7/16/2024 0715 by Maryan Quiles RN  Outcome: Progressing  7/16/2024 0332 by Panfilo Newby RN  Outcome: Progressing     Problem: Skin/Tissue Integrity - Adult  Goal: Skin integrity remains intact  7/16/2024 0715 by Maryan Quiles RN  Outcome: Progressing  7/16/2024 0332 by Panfilo Newby RN  Outcome: Progressing  Goal: Incisions, wounds, or drain sites healing without S/S of infection  7/16/2024 0715 by Maryan Quiles RN  Outcome: Progressing  7/16/2024 0332 by Panfilo Newby RN  Outcome:  Progressing     Problem: Gastrointestinal - Adult  Goal: Maintains adequate nutritional intake  7/16/2024 0715 by Maryan Quiles RN  Outcome: Progressing  7/16/2024 0332 by Panfilo Newby RN  Outcome: Progressing     Problem: Genitourinary - Adult  Goal: Absence of urinary retention  7/16/2024 0715 by Maryan Quiles RN  Outcome: Progressing  7/16/2024 0332 by Panfilo Newby RN  Outcome: Progressing     Problem: Infection - Adult  Goal: Absence of infection at discharge  7/16/2024 0715 by Maryan Quiles RN  Outcome: Progressing  7/16/2024 0332 by Panfilo Newby RN  Outcome: Progressing  Goal: Absence of infection during hospitalization  7/16/2024 0715 by Maryan Quiles RN  Outcome: Progressing  7/16/2024 0332 by Panfilo Newby RN  Outcome: Progressing     Problem: Metabolic/Fluid and Electrolytes - Adult  Goal: Electrolytes maintained within normal limits  7/16/2024 0715 by Maryan Quiles RN  Outcome: Progressing  7/16/2024 0332 by Panfilo Newby RN  Outcome: Progressing  Goal: Hemodynamic stability and optimal renal function maintained  7/16/2024 0715 by Maryan Quiles RN  Outcome: Progressing  7/16/2024 0332 by Panfilo Newby RN  Outcome: Progressing     Problem: Cardiovascular - Adult  Goal: Maintains optimal cardiac output and hemodynamic stability  7/16/2024 0715 by Maryan Quiles RN  Outcome: Progressing  7/16/2024 0332 by Panfilo Newby RN  Outcome: Progressing     Problem: Hematologic - Adult  Goal: Maintains hematologic stability  7/16/2024 0715 by Maryan Quiles RN  Outcome: Progressing  7/16/2024 0332 by Panfilo Newby RN  Outcome: Progressing

## 2024-07-17 PROCEDURE — 6370000000 HC RX 637 (ALT 250 FOR IP): Performed by: NURSE PRACTITIONER

## 2024-07-17 PROCEDURE — 6360000002 HC RX W HCPCS: Performed by: SURGERY

## 2024-07-17 PROCEDURE — 6370000000 HC RX 637 (ALT 250 FOR IP): Performed by: PHYSICIAN ASSISTANT

## 2024-07-17 PROCEDURE — 97530 THERAPEUTIC ACTIVITIES: CPT

## 2024-07-17 PROCEDURE — 6370000000 HC RX 637 (ALT 250 FOR IP)

## 2024-07-17 PROCEDURE — 2580000003 HC RX 258: Performed by: STUDENT IN AN ORGANIZED HEALTH CARE EDUCATION/TRAINING PROGRAM

## 2024-07-17 PROCEDURE — 1100000000 HC RM PRIVATE

## 2024-07-17 RX ADMIN — TRIAMCINOLONE ACETONIDE: 1 CREAM TOPICAL at 08:49

## 2024-07-17 RX ADMIN — ENOXAPARIN SODIUM 40 MG: 100 INJECTION SUBCUTANEOUS at 08:48

## 2024-07-17 RX ADMIN — DOCUSATE SODIUM 50 MG AND SENNOSIDES 8.6 MG 2 TABLET: 8.6; 5 TABLET, FILM COATED ORAL at 08:48

## 2024-07-17 RX ADMIN — TRIAMCINOLONE ACETONIDE: 1 CREAM TOPICAL at 20:59

## 2024-07-17 RX ADMIN — METOPROLOL TARTRATE 25 MG: 25 TABLET, FILM COATED ORAL at 08:48

## 2024-07-17 RX ADMIN — PANTOPRAZOLE SODIUM 40 MG: 40 TABLET, DELAYED RELEASE ORAL at 05:18

## 2024-07-17 RX ADMIN — SODIUM CHLORIDE, PRESERVATIVE FREE 10 ML: 5 INJECTION INTRAVENOUS at 20:58

## 2024-07-17 RX ADMIN — PANTOPRAZOLE SODIUM 40 MG: 40 TABLET, DELAYED RELEASE ORAL at 17:23

## 2024-07-17 RX ADMIN — METOPROLOL TARTRATE 25 MG: 25 TABLET, FILM COATED ORAL at 20:58

## 2024-07-17 RX ADMIN — LEVETIRACETAM 500 MG: 500 TABLET, FILM COATED ORAL at 08:48

## 2024-07-17 RX ADMIN — SODIUM CHLORIDE, PRESERVATIVE FREE 10 ML: 5 INJECTION INTRAVENOUS at 08:49

## 2024-07-17 RX ADMIN — LEVETIRACETAM 500 MG: 500 TABLET, FILM COATED ORAL at 20:58

## 2024-07-17 RX ADMIN — CETIRIZINE HYDROCHLORIDE 10 MG: 10 TABLET ORAL at 08:48

## 2024-07-17 ASSESSMENT — PAIN SCALES - GENERAL: PAINLEVEL_OUTOF10: 0

## 2024-07-17 NOTE — PLAN OF CARE
Problem: Discharge Planning  Goal: Discharge to home or other facility with appropriate resources  Outcome: Progressing     Problem: Safety - Adult  Goal: Free from fall injury  Outcome: Progressing     Problem: Neurosensory - Adult  Goal: Achieves stable or improved neurological status  Outcome: Progressing     Problem: Musculoskeletal - Adult  Goal: Return mobility to safest level of function  Outcome: Progressing     Problem: Skin/Tissue Integrity  Goal: Absence of new skin breakdown  Description: 1.  Monitor for areas of redness and/or skin breakdown  2.  Assess vascular access sites hourly  3.  Every 4-6 hours minimum:  Change oxygen saturation probe site  4.  Every 4-6 hours:  If on nasal continuous positive airway pressure, respiratory therapy assess nares and determine need for appliance change or resting period.  Outcome: Progressing     Problem: Pain  Goal: Verbalizes/displays adequate comfort level or baseline comfort level  Outcome: Progressing     Problem: Skin/Tissue Integrity - Adult  Goal: Skin integrity remains intact  Outcome: Progressing  Goal: Incisions, wounds, or drain sites healing without S/S of infection  Outcome: Progressing     Problem: Gastrointestinal - Adult  Goal: Maintains adequate nutritional intake  Outcome: Progressing     Problem: Genitourinary - Adult  Goal: Absence of urinary retention  Outcome: Progressing     Problem: Infection - Adult  Goal: Absence of infection at discharge  Outcome: Progressing  Goal: Absence of infection during hospitalization  Outcome: Progressing     Problem: Metabolic/Fluid and Electrolytes - Adult  Goal: Electrolytes maintained within normal limits  Outcome: Progressing  Goal: Hemodynamic stability and optimal renal function maintained  Outcome: Progressing     Problem: Cardiovascular - Adult  Goal: Maintains optimal cardiac output and hemodynamic stability  Outcome: Progressing     Problem: Hematologic - Adult  Goal: Maintains hematologic  stability  Outcome: Progressing

## 2024-07-17 NOTE — PROGRESS NOTES
Hospitalist Progress Note   Admit Date:  2024  4:30 PM   Name:  Brendan Saleh   Age:  49 y.o.  Sex:  male  :  1975   MRN:  [id number]   Room:  [facility]/    Presenting/Chief Complaint: Altered Mental Status     Reason(s) for Admission: Acute hypernatremia [E87.0]  Volume depletion [E86.9]  Acute kidney injury (HCC) [N17.9]  Acute encephalopathy [G93.40]  Septic shock (HCC) [A41.9, R65.21]     Hospital Course:   Mr. Saleh is a 48 y.o. male with a PMH of SDH s/p poonam hole 2024, hydrocephalus s/p  shunt, sacral decubitus stage IV, who originally presented to the ER  with chief complaint of increased confusion.     He was diagnosed with UTI with hematuria and started on course of vancomycin, Zosyn.  Urine culture finalized negative.   blood cultures with CoNS, determined contaminant.  However, he met sepsis criteria with WBC 14.6, HR 120s.  ID consulted to assist.  ECHO showed preserved EF.  Underwent sacral wound debridement on  with General Surgery, Dr. Zapien.     CT head with ongoing hydrocephalus and a  shunt.  NSGY consulted.  Patient underwent right ventricular peritoneal shunt removal on  due to non-functioning shunt.   Per Dr. Ortiz, patient needs a stepwise approach, and stated shunt replacement may be in his future though would need to wait at least 4 weeks.  He may be able to do temporizing CSF removal however he stated this would only be temporary until more definitive treatment as above.  Patient will require continued neurosurgical care.     ID returned to the case for management of CSF infection as CSF culture and  shunt tip culture grew ESBL Klebsiella and Serratia marcescens.  Antibiotics changed to IV cipro and furthermore to oral cipro for 14 days post shunt removal EOT 24.  ID signed off on .     His hospital stay was further complicated by a bowel obstruction on May 19, noted on CT after patient vomited fecal material.  He underwent hemicolectomy and

## 2024-07-17 NOTE — PLAN OF CARE
Problem: Discharge Planning  Goal: Discharge to home or other facility with appropriate resources  7/17/2024 0822 by Alberta Samaniego RN  Outcome: Progressing  7/16/2024 2139 by Pam Fine RN  Outcome: Progressing     Problem: Safety - Adult  Goal: Free from fall injury  7/17/2024 0822 by Alberta Samaniego RN  Outcome: Progressing  7/16/2024 2139 by Pam Fine RN  Outcome: Progressing     Problem: Neurosensory - Adult  Goal: Achieves stable or improved neurological status  7/17/2024 0822 by Alberta Samaniego RN  Outcome: Progressing  Flowsheets (Taken 7/17/2024 0735)  Achieves stable or improved neurological status: Assess for and report changes in neurological status  7/16/2024 2139 by Pam Fine RN  Outcome: Progressing     Problem: Musculoskeletal - Adult  Goal: Return mobility to safest level of function  7/17/2024 0822 by Alberta Samaniego RN  Outcome: Progressing  Flowsheets (Taken 7/17/2024 0735)  Return Mobility to Safest Level of Function: Assess patient stability and activity tolerance for standing, transferring and ambulating with or without assistive devices  7/16/2024 2139 by Pam Fine RN  Outcome: Progressing     Problem: Skin/Tissue Integrity  Goal: Absence of new skin breakdown  Description: 1.  Monitor for areas of redness and/or skin breakdown  2.  Assess vascular access sites hourly  3.  Every 4-6 hours minimum:  Change oxygen saturation probe site  4.  Every 4-6 hours:  If on nasal continuous positive airway pressure, respiratory therapy assess nares and determine need for appliance change or resting period.  7/17/2024 0822 by Alberta Samaniego RN  Outcome: Progressing  7/16/2024 2139 by Pam Fine RN  Outcome: Progressing     Problem: Pain  Goal: Verbalizes/displays adequate comfort level or baseline comfort level  7/17/2024 0822 by Alberta Samaniego RN  Outcome: Progressing  Flowsheets (Taken 7/17/2024 0735)  Verbalizes/displays adequate comfort level or baseline comfort  - Adult  Goal: Maintains optimal cardiac output and hemodynamic stability  7/17/2024 0822 by Alberta Samaniego RN  Outcome: Progressing  Flowsheets (Taken 7/17/2024 0735)  Maintains optimal cardiac output and hemodynamic stability: Monitor blood pressure and heart rate  7/16/2024 2139 by Pam Fine RN  Outcome: Progressing     Problem: Hematologic - Adult  Goal: Maintains hematologic stability  7/17/2024 0822 by Alberta Samaniego RN  Outcome: Progressing  7/16/2024 2139 by Pam Fine RN  Outcome: Progressing

## 2024-07-17 NOTE — PROGRESS NOTES
ACUTE PHYSICAL THERAPY GOALS:   (Developed with and agreed upon by patient and/or caregiver.)  PT goals re-assessed during re-evaluation on 7/11/24  STG:  (1.)Mr. Saleh will move from supine to sit and sit to supine  in bed with MODERATE ASSIST within 3 treatment day(s).  (2.)Mr. Saleh will scoot up and down in bed with MODERATE ASSIST within 3 treatment day(s).    (3.)Mr. Saleh will roll side to side in bed with MINIMAL ASSIST within 3 treatment day(s).    (4.)Mr. Saleh will tolerate sitting up in recliner chair for 2 hours with stable vital signs to increase upright tolerance within 3 treatment day(s).    (5.)Mr. Saleh will maintain static/dynamic sitting x 25 minutes with MINIMAL ASSIST for improved balance within 3 treatment days.  (6.)Mr. Saleh will follow 75% of commands for increased participation in therapeutic activity/exercise within 3 treatment days.     LTG:  (1.) Brendan Saleh  will move from supine to sit and sit to supine and scoot up and down with MINIMAL ASSIST within 7 treatment day(s).    (2.)Mr. Saleh will roll side to side in bed with CONTACT GUARD ASSIST within 7 treatment day(s).    (3.) Brendan Saleh will transfer from bed to chair and chair to bed with MODERATE ASSISTx1 using the least restrictive device within 7 treatment day(s).    (4.) Brendan Saleh will perform therapeutic exercises x 20 min for HEP with CONTACT GUARD ASSIST to improve strength, endurance, and functional mobility within 7 treatment day(s).   (5.) Brendan Saleh will perform seated static and dynamic balance activities x25 minutes with CONTACT GUARD ASSIST to improve safety within 7 treatment day(s).  (6.) Brendan Saleh will perform standing static and dynamic balance activities x 5 minutes with MODERATE ASSIST to improve safety and mobility within 7 treatment day(s).     PHYSICAL THERAPY: Daily Note AM   (Link to Caseload Tracking: PT Visit Days : 3  Time In/Out PT Charge Capture   PRECAUTIONS: All liv prominences off-loaded, Alarm Activated, Bed/Chair Locked, Call light within reach, Chair, and Needs within reach    INTERDISCIPLINARY COLLABORATION:  RN/ PCT, PT/ PTA, and Rehab Attendant    EDUCATION:      TIME IN/OUT:  Time In: 1103  Time Out: 1126  Minutes: 23    LEXIE CÁRDENAS, PTA    Declined

## 2024-07-18 PROCEDURE — 6370000000 HC RX 637 (ALT 250 FOR IP): Performed by: PHYSICIAN ASSISTANT

## 2024-07-18 PROCEDURE — 6370000000 HC RX 637 (ALT 250 FOR IP)

## 2024-07-18 PROCEDURE — 6370000000 HC RX 637 (ALT 250 FOR IP): Performed by: NURSE PRACTITIONER

## 2024-07-18 PROCEDURE — 6360000002 HC RX W HCPCS: Performed by: SURGERY

## 2024-07-18 PROCEDURE — 1100000000 HC RM PRIVATE

## 2024-07-18 PROCEDURE — 97168 OT RE-EVAL EST PLAN CARE: CPT

## 2024-07-18 PROCEDURE — 97112 NEUROMUSCULAR REEDUCATION: CPT

## 2024-07-18 PROCEDURE — 97530 THERAPEUTIC ACTIVITIES: CPT

## 2024-07-18 PROCEDURE — 2580000003 HC RX 258: Performed by: STUDENT IN AN ORGANIZED HEALTH CARE EDUCATION/TRAINING PROGRAM

## 2024-07-18 PROCEDURE — 97535 SELF CARE MNGMENT TRAINING: CPT

## 2024-07-18 RX ADMIN — PANTOPRAZOLE SODIUM 40 MG: 40 TABLET, DELAYED RELEASE ORAL at 15:51

## 2024-07-18 RX ADMIN — TRIAMCINOLONE ACETONIDE: 1 CREAM TOPICAL at 08:21

## 2024-07-18 RX ADMIN — METOPROLOL TARTRATE 25 MG: 25 TABLET, FILM COATED ORAL at 08:17

## 2024-07-18 RX ADMIN — PANTOPRAZOLE SODIUM 40 MG: 40 TABLET, DELAYED RELEASE ORAL at 05:34

## 2024-07-18 RX ADMIN — SODIUM CHLORIDE, PRESERVATIVE FREE 10 ML: 5 INJECTION INTRAVENOUS at 20:53

## 2024-07-18 RX ADMIN — DOCUSATE SODIUM 50 MG AND SENNOSIDES 8.6 MG 2 TABLET: 8.6; 5 TABLET, FILM COATED ORAL at 08:17

## 2024-07-18 RX ADMIN — SODIUM CHLORIDE, PRESERVATIVE FREE 10 ML: 5 INJECTION INTRAVENOUS at 08:21

## 2024-07-18 RX ADMIN — ENOXAPARIN SODIUM 40 MG: 100 INJECTION SUBCUTANEOUS at 08:17

## 2024-07-18 RX ADMIN — TRIAMCINOLONE ACETONIDE: 1 CREAM TOPICAL at 20:55

## 2024-07-18 RX ADMIN — CETIRIZINE HYDROCHLORIDE 10 MG: 10 TABLET ORAL at 08:17

## 2024-07-18 RX ADMIN — METOPROLOL TARTRATE 25 MG: 25 TABLET, FILM COATED ORAL at 20:53

## 2024-07-18 RX ADMIN — LEVETIRACETAM 500 MG: 500 TABLET, FILM COATED ORAL at 08:17

## 2024-07-18 RX ADMIN — LEVETIRACETAM 500 MG: 500 TABLET, FILM COATED ORAL at 20:53

## 2024-07-18 ASSESSMENT — PAIN SCALES - GENERAL: PAINLEVEL_OUTOF10: 0

## 2024-07-18 NOTE — PROGRESS NOTES
ACUTE PHYSICAL THERAPY GOALS:   (Developed with and agreed upon by patient and/or caregiver.)  PT goals re-assessed during re-evaluation on 7/11/24  STG:  (1.)Mr. Saleh will move from supine to sit and sit to supine  in bed with MODERATE ASSIST within 3 treatment day(s).  (2.)Mr. Saleh will scoot up and down in bed with MODERATE ASSIST within 3 treatment day(s).    (3.)Mr. Saleh will roll side to side in bed with MINIMAL ASSIST within 3 treatment day(s).    (4.)Mr. Saleh will tolerate sitting up in recliner chair for 2 hours with stable vital signs to increase upright tolerance within 3 treatment day(s).    (5.)Mr. Saleh will maintain static/dynamic sitting x 25 minutes with MINIMAL ASSIST for improved balance within 3 treatment days.  (6.)Mr. Saleh will follow 75% of commands for increased participation in therapeutic activity/exercise within 3 treatment days.     LTG:  (1.) Brendan Saleh  will move from supine to sit and sit to supine and scoot up and down with MINIMAL ASSIST within 7 treatment day(s).    (2.)Mr. Saleh will roll side to side in bed with CONTACT GUARD ASSIST within 7 treatment day(s).    (3.) Brendan Saleh will transfer from bed to chair and chair to bed with MODERATE ASSISTx1 using the least restrictive device within 7 treatment day(s).    (4.) Brendan Saleh will perform therapeutic exercises x 20 min for HEP with CONTACT GUARD ASSIST to improve strength, endurance, and functional mobility within 7 treatment day(s).   (5.) Brendan Saleh will perform seated static and dynamic balance activities x25 minutes with CONTACT GUARD ASSIST to improve safety within 7 treatment day(s).  (6.) Brendan Saleh will perform standing static and dynamic balance activities x 5 minutes with MODERATE ASSIST to improve safety and mobility within 7 treatment day(s).     PHYSICAL THERAPY: Daily Note AM   (Link to Caseload Tracking: PT Visit Days : 4  Time In/Out PT Charge Capture

## 2024-07-18 NOTE — PLAN OF CARE
Problem: Discharge Planning  Goal: Discharge to home or other facility with appropriate resources  Outcome: Progressing     Problem: Safety - Adult  Goal: Free from fall injury  Outcome: Progressing     Problem: Neurosensory - Adult  Goal: Achieves stable or improved neurological status  Outcome: Progressing     Problem: Musculoskeletal - Adult  Goal: Return mobility to safest level of function  Outcome: Progressing     Problem: Skin/Tissue Integrity  Goal: Absence of new skin breakdown  Description: 1.  Monitor for areas of redness and/or skin breakdown  2.  Assess vascular access sites hourly  3.  Every 4-6 hours minimum:  Change oxygen saturation probe site  4.  Every 4-6 hours:  If on nasal continuous positive airway pressure, respiratory therapy assess nares and determine need for appliance change or resting period.  Outcome: Progressing     Problem: Pain  Goal: Verbalizes/displays adequate comfort level or baseline comfort level  Outcome: Progressing  Flowsheets (Taken 7/17/2024 1916)  Verbalizes/displays adequate comfort level or baseline comfort level:   Encourage patient to monitor pain and request assistance   Assess pain using appropriate pain scale     Problem: Skin/Tissue Integrity - Adult  Goal: Skin integrity remains intact  Outcome: Progressing  Goal: Incisions, wounds, or drain sites healing without S/S of infection  Outcome: Progressing     Problem: Gastrointestinal - Adult  Goal: Maintains adequate nutritional intake  Outcome: Progressing     Problem: Genitourinary - Adult  Goal: Absence of urinary retention  Outcome: Progressing     Problem: Infection - Adult  Goal: Absence of infection at discharge  Outcome: Progressing  Goal: Absence of infection during hospitalization  Outcome: Progressing     Problem: Metabolic/Fluid and Electrolytes - Adult  Goal: Electrolytes maintained within normal limits  Outcome: Progressing  Goal: Hemodynamic stability and optimal renal function maintained  Outcome:

## 2024-07-18 NOTE — PROGRESS NOTES
Hospitalist Progress Note   Admit Date:  2024  4:30 PM   Name:  Brendan Saleh   Age:  49 y.o.  Sex:  male  :  1975   MRN:  [id number]   Room:  [facility]/    Presenting/Chief Complaint: Altered Mental Status     Reason(s) for Admission: Acute hypernatremia [E87.0]  Volume depletion [E86.9]  Acute kidney injury (HCC) [N17.9]  Acute encephalopathy [G93.40]  Septic shock (HCC) [A41.9, R65.21]     Hospital Course:   Mr. Saleh is a 48 y.o. male with a PMH of SDH s/p poonam hole 2024, hydrocephalus s/p  shunt, sacral decubitus stage IV, who originally presented to the ER  with chief complaint of increased confusion.     He was diagnosed with UTI with hematuria and started on course of vancomycin, Zosyn.  Urine culture finalized negative.   blood cultures with CoNS, determined contaminant.  However, he met sepsis criteria with WBC 14.6, HR 120s.  ID consulted to assist.  ECHO showed preserved EF.  Underwent sacral wound debridement on  with General Surgery, Dr. Zapien.     CT head with ongoing hydrocephalus and a  shunt.  NSGY consulted.  Patient underwent right ventricular peritoneal shunt removal on  due to non-functioning shunt.   Per Dr. Ortiz, patient needs a stepwise approach, and stated shunt replacement may be in his future though would need to wait at least 4 weeks.  He may be able to do temporizing CSF removal however he stated this would only be temporary until more definitive treatment as above.  Patient will require continued neurosurgical care.     ID returned to the case for management of CSF infection as CSF culture and  shunt tip culture grew ESBL Klebsiella and Serratia marcescens.  Antibiotics changed to IV cipro and furthermore to oral cipro for 14 days post shunt removal EOT 24.  ID signed off on .     His hospital stay was further complicated by a bowel obstruction on May 19, noted on CT after patient vomited fecal material.  He underwent hemicolectomy and  cyclobenzaprine (FLEXERIL) tablet 10 mg  10 mg Oral TID PRN    traZODone (DESYREL) tablet 50 mg  50 mg Oral Nightly PRN    acetaminophen (TYLENOL) tablet 650 mg  650 mg Oral Q4H PRN    levETIRAcetam (KEPPRA) tablet 500 mg  500 mg Oral BID    sennosides-docusate sodium (SENOKOT-S) 8.6-50 MG tablet 2 tablet  2 tablet Oral Daily    pantoprazole (PROTONIX) tablet 40 mg  40 mg Oral BID AC    metoprolol tartrate (LOPRESSOR) tablet 25 mg  25 mg Oral BID    triamcinolone (KENALOG) 0.1 % cream   Topical BID    diphenhydrAMINE (BENADRYL) injection 25 mg  25 mg IntraVENous Q6H PRN    cetirizine (ZYRTEC) tablet 10 mg  10 mg Oral Daily    0.9 % sodium chloride infusion  25 mL IntraVENous PRN    potassium chloride 10 mEq/100 mL IVPB (Peripheral Line)  10 mEq IntraVENous PRN    magnesium sulfate 2000 mg in 50 mL IVPB premix  2,000 mg IntraVENous PRN    sodium phosphate 15 mmol in sodium chloride 0.9 % 250 mL IVPB  15 mmol IntraVENous PRN    sodium chloride flush 0.9 % injection 5-40 mL  5-40 mL IntraVENous 2 times per day    sodium chloride flush 0.9 % injection 5-40 mL  5-40 mL IntraVENous PRN    0.9 % sodium chloride infusion   IntraVENous PRN    glucose chewable tablet 16 g  4 tablet Oral PRN    dextrose bolus 10% 125 mL  125 mL IntraVENous PRN    Or    dextrose bolus 10% 250 mL  250 mL IntraVENous PRN    Glucagon Emergency KIT 1 mg  1 mg SubCUTAneous PRN    dextrose 10 % infusion   IntraVENous Continuous PRN    haloperidol lactate (HALDOL) injection 1 mg  1 mg IntraVENous Q6H PRN    medicated lip ointment (BLISTEX)   Topical PRN    ondansetron (ZOFRAN-ODT) disintegrating tablet 4 mg  4 mg Oral Q8H PRN    Or    ondansetron (ZOFRAN) injection 4 mg  4 mg IntraVENous Q6H PRN    enoxaparin (LOVENOX) injection 40 mg  40 mg SubCUTAneous Daily    LORazepam (ATIVAN) 2 mg in sodium chloride (PF) 0.9 % 10 mL injection  2 mg IntraVENous Q4H PRN    calcium carbonate (TUMS) chewable tablet 500 mg  500 mg Oral TID PRN

## 2024-07-18 NOTE — PLAN OF CARE
Problem: Discharge Planning  Goal: Discharge to home or other facility with appropriate resources  7/18/2024 0723 by Maryan Quiles RN  Outcome: Progressing  7/18/2024 0040 by Misty Webber RN  Outcome: Progressing     Problem: Safety - Adult  Goal: Free from fall injury  7/18/2024 0723 by Maryan Quiles RN  Outcome: Progressing  7/18/2024 0040 by Misty Webber RN  Outcome: Progressing     Problem: Neurosensory - Adult  Goal: Achieves stable or improved neurological status  7/18/2024 0723 by Maryan Quiles RN  Outcome: Progressing  7/18/2024 0040 by Misty Webber RN  Outcome: Progressing     Problem: Musculoskeletal - Adult  Goal: Return mobility to safest level of function  7/18/2024 0723 by Maryan Quiles RN  Outcome: Progressing  7/18/2024 0040 by Misty Webber RN  Outcome: Progressing     Problem: Skin/Tissue Integrity  Goal: Absence of new skin breakdown  Description: 1.  Monitor for areas of redness and/or skin breakdown  2.  Assess vascular access sites hourly  3.  Every 4-6 hours minimum:  Change oxygen saturation probe site  4.  Every 4-6 hours:  If on nasal continuous positive airway pressure, respiratory therapy assess nares and determine need for appliance change or resting period.  7/18/2024 0723 by Maryan Quiles RN  Outcome: Progressing  7/18/2024 0040 by Misty Webber RN  Outcome: Progressing     Problem: Pain  Goal: Verbalizes/displays adequate comfort level or baseline comfort level  7/18/2024 0723 by Maryan Quiles RN  Outcome: Progressing  Flowsheets (Taken 7/18/2024 0303 by Misty Webber RN)  Verbalizes/displays adequate comfort level or baseline comfort level:   Assess pain using appropriate pain scale   Encourage patient to monitor pain and request assistance  7/18/2024 0040 by Misty Webber RN  Outcome: Progressing  Flowsheets (Taken 7/17/2024 1916)  Verbalizes/displays adequate comfort level or baseline comfort level:   Encourage patient to monitor pain and request assistance

## 2024-07-18 NOTE — PROGRESS NOTES
Pt sat in chair pretty much all of shift after working with PT/OT. Pt had BM. Dressing change not done due to pt in chair.    Rounds performed throughout shift. Pt denies needs at this time. Bed in low position, locked and call light/personal items within reach.

## 2024-07-18 NOTE — PROGRESS NOTES
ACUTE OCCUPATIONAL THERAPY GOALS: GOALS REVIEWED AND UPDATED AT RE-EVALUATION ON 7/18/24  (Developed with and agreed upon by patient and/or caregiver.)  1. Patient will complete lower body bathing and dressing with MAXIMAL ASSIST and adaptive equipment as needed.     3. Patient will complete grooming ADL in unsupported sitting with MINIMAL ASSIST.  4. Patient will tolerate 25 minutes of OT treatment with 1-2 rest breaks to increase activity tolerance for ADLs.   5. Patient will complete functional transfers with MODERATE ASSIST and adaptive equipment as needed.   6. Patient will tolerate 10 minutes BUE exercises to increase strength for safe, functional transfers.   2. Patient will complete upper body bathing and dressing with STAND BY ASSIST and adaptive equipment as needed.   GOAL MET   8. Patient will tolerate 10 minutes unsupported sitting balance edge of bed with CONTACT GUARD ASSIST in preparation for ADL performance.   9. Patient will tolerate static standing task for >30 seconds with MAXIMAL ASSIST in preparation for ADL tasks and to decrease caregiver burden.       OCCUPATIONAL THERAPY Daily Note and Re-evaluation       OT Visit Days: 1  Acknowledge Orders  Time  OT Charge Capture  Rehab Caseload Tracker      Brendan Saleh is a 49 y.o. male   PRIMARY DIAGNOSIS: Encephalitis  Acute hypernatremia [E87.0]  Volume depletion [E86.9]  Acute kidney injury (HCC) [N17.9]  Acute encephalopathy [G93.40]  Septic shock (HCC) [A41.9, R65.21]  Procedure(s) (LRB):  LAPAROTOMY EXPLORATORY, PRIMARY CLOSURE SMALL BOWEL PERFORATION (N/A)  47 Days Post-Op  Reason for Referral: Generalized Muscle Weakness (M62.81)  Difficulty in walking, Not elsewhere classified (R26.2)  Other abnormalities of gait and mobility (R26.89)  Inpatient: Payor: [employer] / Plan: [employer] / Product Type: *No Product type* /     ASSESSMENT:     REHAB RECOMMENDATIONS:   Recommendation to date pending progress:  Setting:  Pt continues to be

## 2024-07-19 LAB
ANION GAP SERPL CALC-SCNC: 9 MMOL/L (ref 9–18)
BASOPHILS # BLD: 0 K/UL (ref 0–0.2)
BASOPHILS NFR BLD: 0 % (ref 0–2)
BUN SERPL-MCNC: 12 MG/DL (ref 6–23)
CALCIUM SERPL-MCNC: 8.8 MG/DL (ref 8.8–10.2)
CHLORIDE SERPL-SCNC: 101 MMOL/L (ref 98–107)
CO2 SERPL-SCNC: 27 MMOL/L (ref 20–28)
CREAT SERPL-MCNC: 0.67 MG/DL (ref 0.8–1.3)
DIFFERENTIAL METHOD BLD: ABNORMAL
EOSINOPHIL # BLD: 0.1 K/UL (ref 0–0.8)
EOSINOPHIL NFR BLD: 1 % (ref 0.5–7.8)
ERYTHROCYTE [DISTWIDTH] IN BLOOD BY AUTOMATED COUNT: 14 % (ref 11.9–14.6)
GLUCOSE SERPL-MCNC: 105 MG/DL (ref 70–99)
HCT VFR BLD AUTO: 32.7 % (ref 41.1–50.3)
HGB BLD-MCNC: 10.2 G/DL (ref 13.6–17.2)
IMM GRANULOCYTES # BLD AUTO: 0 K/UL (ref 0–0.5)
IMM GRANULOCYTES NFR BLD AUTO: 0 % (ref 0–5)
LYMPHOCYTES # BLD: 1.6 K/UL (ref 0.5–4.6)
LYMPHOCYTES NFR BLD: 28 % (ref 13–44)
MCH RBC QN AUTO: 30.1 PG (ref 26.1–32.9)
MCHC RBC AUTO-ENTMCNC: 31.2 G/DL (ref 31.4–35)
MCV RBC AUTO: 96.5 FL (ref 82–102)
MONOCYTES # BLD: 0.6 K/UL (ref 0.1–1.3)
MONOCYTES NFR BLD: 10 % (ref 4–12)
NEUTS SEG # BLD: 3.4 K/UL (ref 1.7–8.2)
NEUTS SEG NFR BLD: 61 % (ref 43–78)
NRBC # BLD: 0 K/UL (ref 0–0.2)
PLATELET # BLD AUTO: 231 K/UL (ref 150–450)
PMV BLD AUTO: 9.5 FL (ref 9.4–12.3)
POTASSIUM SERPL-SCNC: 3.8 MMOL/L (ref 3.5–5.1)
RBC # BLD AUTO: 3.39 M/UL (ref 4.23–5.6)
SODIUM SERPL-SCNC: 137 MMOL/L (ref 136–145)
WBC # BLD AUTO: 5.7 K/UL (ref 4.3–11.1)

## 2024-07-19 PROCEDURE — 2580000003 HC RX 258: Performed by: STUDENT IN AN ORGANIZED HEALTH CARE EDUCATION/TRAINING PROGRAM

## 2024-07-19 PROCEDURE — 6370000000 HC RX 637 (ALT 250 FOR IP)

## 2024-07-19 PROCEDURE — 6370000000 HC RX 637 (ALT 250 FOR IP): Performed by: PHYSICIAN ASSISTANT

## 2024-07-19 PROCEDURE — 85025 COMPLETE CBC W/AUTO DIFF WBC: CPT

## 2024-07-19 PROCEDURE — 6370000000 HC RX 637 (ALT 250 FOR IP): Performed by: NURSE PRACTITIONER

## 2024-07-19 PROCEDURE — 6360000002 HC RX W HCPCS: Performed by: SURGERY

## 2024-07-19 PROCEDURE — 6370000000 HC RX 637 (ALT 250 FOR IP): Performed by: INTERNAL MEDICINE

## 2024-07-19 PROCEDURE — 36415 COLL VENOUS BLD VENIPUNCTURE: CPT

## 2024-07-19 PROCEDURE — 1100000000 HC RM PRIVATE

## 2024-07-19 PROCEDURE — 80048 BASIC METABOLIC PNL TOTAL CA: CPT

## 2024-07-19 RX ADMIN — TRIAMCINOLONE ACETONIDE: 1 CREAM TOPICAL at 20:46

## 2024-07-19 RX ADMIN — LEVETIRACETAM 500 MG: 500 TABLET, FILM COATED ORAL at 10:05

## 2024-07-19 RX ADMIN — LEVETIRACETAM 500 MG: 500 TABLET, FILM COATED ORAL at 20:50

## 2024-07-19 RX ADMIN — PANTOPRAZOLE SODIUM 40 MG: 40 TABLET, DELAYED RELEASE ORAL at 05:07

## 2024-07-19 RX ADMIN — SODIUM CHLORIDE, PRESERVATIVE FREE 10 ML: 5 INJECTION INTRAVENOUS at 10:05

## 2024-07-19 RX ADMIN — SODIUM CHLORIDE, PRESERVATIVE FREE 10 ML: 5 INJECTION INTRAVENOUS at 20:48

## 2024-07-19 RX ADMIN — METOPROLOL TARTRATE 25 MG: 25 TABLET, FILM COATED ORAL at 10:05

## 2024-07-19 RX ADMIN — PANTOPRAZOLE SODIUM 40 MG: 40 TABLET, DELAYED RELEASE ORAL at 17:50

## 2024-07-19 RX ADMIN — CETIRIZINE HYDROCHLORIDE 10 MG: 10 TABLET ORAL at 10:05

## 2024-07-19 RX ADMIN — DOCUSATE SODIUM 50 MG AND SENNOSIDES 8.6 MG 2 TABLET: 8.6; 5 TABLET, FILM COATED ORAL at 10:05

## 2024-07-19 RX ADMIN — TRAZODONE HYDROCHLORIDE 50 MG: 50 TABLET ORAL at 20:50

## 2024-07-19 RX ADMIN — METOPROLOL TARTRATE 25 MG: 25 TABLET, FILM COATED ORAL at 20:50

## 2024-07-19 NOTE — CARE COORDINATION
Dispo update:  CM spoke to Mr. Saleh in room 614 to update him.  He has applied for [place] Medicaid, and is waiting for a Social Security disability determination.  He told CM that his family is getting his mail.     CM called his aunt, Ms. Lesley Doyle in [place], [place] at [phone].  She said that they received a letter from [place] Medicaid yesterday, and the request for Medicaid was denied.  CM stated that the reason may be because he has not received the Social Security disability letter yet.  She is going to come to [place] next week and bring his letter to Ms. Nayan Muhammad [facility].      The discharge plan is that if he receives SS disability, then CM will look for SNFs that might take Medicaid-pending for long-term SNF care.  A Medicaid Level of Care (LOC) will be required).  CM will continue to follow and assist with placement.

## 2024-07-19 NOTE — PROGRESS NOTES
Hospitalist Progress Note   Admit Date:  2024  4:30 PM   Name:  Brendan Saleh   Age:  49 y.o.  Sex:  male  :  1975   MRN:  [id number]   Room:  [facility]/    Presenting/Chief Complaint: Altered Mental Status     Reason(s) for Admission: Acute hypernatremia [E87.0]  Volume depletion [E86.9]  Acute kidney injury (HCC) [N17.9]  Acute encephalopathy [G93.40]  Septic shock (HCC) [A41.9, R65.21]     Hospital Course:   Mr. Saleh is a 48 y.o. male with a PMH of SDH s/p poonam hole 2024, hydrocephalus s/p  shunt, sacral decubitus stage IV, who originally presented to the ER  with chief complaint of increased confusion.     He was diagnosed with UTI with hematuria and started on course of vancomycin, Zosyn.  Urine culture finalized negative.   blood cultures with CoNS, determined contaminant.  However, he met sepsis criteria with WBC 14.6, HR 120s.  ID consulted to assist.  ECHO showed preserved EF.  Underwent sacral wound debridement on  with General Surgery, Dr. Zapien.     CT head with ongoing hydrocephalus and a  shunt.  NSGY consulted.  Patient underwent right ventricular peritoneal shunt removal on  due to non-functioning shunt.   Per Dr. Ortiz, patient needs a stepwise approach, and stated shunt replacement may be in his future though would need to wait at least 4 weeks.  He may be able to do temporizing CSF removal however he stated this would only be temporary until more definitive treatment as above.  Patient will require continued neurosurgical care.     ID returned to the case for management of CSF infection as CSF culture and  shunt tip culture grew ESBL Klebsiella and Serratia marcescens.  Antibiotics changed to IV cipro and furthermore to oral cipro for 14 days post shunt removal EOT 24.  ID signed off on .     His hospital stay was further complicated by a bowel obstruction on May 19, noted on CT after patient vomited fecal material.  He underwent hemicolectomy and  complaints or pain to soles of feed. And no pruritus to surrounding rash - Needs podiatry at discharge     HAP, resolved  Leukocytosis, resolved  Candiduria, resolved  Sepsis, resolved  Lower extremity rash, resolved      Anticipated Discharge Arrangements:   Pending    PT/OT evals ordered?  Therapy evals ordered  Diet:  ADULT DIET; Dysphagia - Minced and Moist; Low Fiber; No Carbonated Beverages, Double Protein Portions  VTE prophylaxis: Lovenox  Code status: DNR      Non-peripheral Lines and Tubes (if present):      External Urinary Catheter (Active)        Telemetry (if present):  Cardiac/Telemetry Monitor On: No        Hospital Problems:  Principal Problem:    Encephalitis  Active Problems:    S/P  shunt    Communicating hydrocephalus (HCC)    Anemia    Sepsis following intra-abdominal surgery (HCC)    Wound disruption    Sinus tachycardia    NPH (normal pressure hydrocephalus) (HCC)    Shunt malfunction    Colon obstruction (HCC)    Infection of  (ventriculoperitoneal) shunt (HCC)    Colonic mass    Adenocarcinoma of colon (HCC)    Severe protein-calorie malnutrition (HCC)    Sacral decubitus ulcer, stage IV (HCC)    ESBL (extended spectrum beta-lactamase) producing bacteria infection    Acute blood loss as cause of postoperative anemia    Bowel perforation (HCC)    Critical illness myopathy    Cachexia (HCC)    Unintentional weight loss    Adult failure to thrive    Encounter for palliative care  Resolved Problems:    Hypernatremia    MIRNA (acute kidney injury) (HCC)    UTI (urinary tract infection)    Septic shock (HCC)      Objective:   Patient Vitals for the past 24 hrs:   Temp Pulse Resp BP SpO2   07/19/24 1005 -- 84 -- 112/80 --   07/19/24 0744 97.7 °F (36.5 °C) 84 18 112/80 100 %   07/19/24 0342 98.2 °F (36.8 °C) 93 18 99/72 100 %   07/18/24 1916 98.4 °F (36.9 °C) 93 16 110/82 96 %   07/18/24 1529 98.2 °F (36.8 °C) 96 18 106/82 100 %       Oxygen Therapy  SpO2: 100 %  Pulse Oximetry Type:

## 2024-07-20 PROCEDURE — 6370000000 HC RX 637 (ALT 250 FOR IP): Performed by: PHYSICIAN ASSISTANT

## 2024-07-20 PROCEDURE — 6370000000 HC RX 637 (ALT 250 FOR IP)

## 2024-07-20 PROCEDURE — 6360000002 HC RX W HCPCS: Performed by: SURGERY

## 2024-07-20 PROCEDURE — 6370000000 HC RX 637 (ALT 250 FOR IP): Performed by: NURSE PRACTITIONER

## 2024-07-20 PROCEDURE — 1100000000 HC RM PRIVATE

## 2024-07-20 PROCEDURE — 6370000000 HC RX 637 (ALT 250 FOR IP): Performed by: INTERNAL MEDICINE

## 2024-07-20 PROCEDURE — 2580000003 HC RX 258: Performed by: STUDENT IN AN ORGANIZED HEALTH CARE EDUCATION/TRAINING PROGRAM

## 2024-07-20 RX ORDER — PANTOPRAZOLE SODIUM 40 MG/1
40 TABLET, DELAYED RELEASE ORAL NIGHTLY
Status: DISCONTINUED | OUTPATIENT
Start: 2024-07-21 | End: 2024-10-18 | Stop reason: HOSPADM

## 2024-07-20 RX ADMIN — LEVETIRACETAM 500 MG: 500 TABLET, FILM COATED ORAL at 20:39

## 2024-07-20 RX ADMIN — DOCUSATE SODIUM 50 MG AND SENNOSIDES 8.6 MG 2 TABLET: 8.6; 5 TABLET, FILM COATED ORAL at 09:31

## 2024-07-20 RX ADMIN — TRAZODONE HYDROCHLORIDE 50 MG: 50 TABLET ORAL at 20:39

## 2024-07-20 RX ADMIN — ENOXAPARIN SODIUM 40 MG: 100 INJECTION SUBCUTANEOUS at 09:31

## 2024-07-20 RX ADMIN — PANTOPRAZOLE SODIUM 40 MG: 40 TABLET, DELAYED RELEASE ORAL at 04:53

## 2024-07-20 RX ADMIN — METOPROLOL TARTRATE 25 MG: 25 TABLET, FILM COATED ORAL at 09:31

## 2024-07-20 RX ADMIN — LEVETIRACETAM 500 MG: 500 TABLET, FILM COATED ORAL at 09:31

## 2024-07-20 RX ADMIN — TRIAMCINOLONE ACETONIDE: 1 CREAM TOPICAL at 20:52

## 2024-07-20 RX ADMIN — METOPROLOL TARTRATE 25 MG: 25 TABLET, FILM COATED ORAL at 20:40

## 2024-07-20 RX ADMIN — CETIRIZINE HYDROCHLORIDE 10 MG: 10 TABLET ORAL at 09:31

## 2024-07-20 NOTE — PLAN OF CARE
Problem: Discharge Planning  Goal: Discharge to home or other facility with appropriate resources  Outcome: Progressing     Problem: Safety - Adult  Goal: Free from fall injury  Outcome: Progressing     Problem: Neurosensory - Adult  Goal: Achieves stable or improved neurological status  Outcome: Progressing     Problem: Musculoskeletal - Adult  Goal: Return mobility to safest level of function  Outcome: Progressing     Problem: Skin/Tissue Integrity - Adult  Goal: Skin integrity remains intact  Outcome: Progressing  Goal: Incisions, wounds, or drain sites healing without S/S of infection  Outcome: Progressing     Problem: Gastrointestinal - Adult  Goal: Maintains adequate nutritional intake  Outcome: Progressing     Problem: Genitourinary - Adult  Goal: Absence of urinary retention  Outcome: Progressing     Problem: Infection - Adult  Goal: Absence of infection at discharge  Outcome: Progressing  Goal: Absence of infection during hospitalization  Outcome: Progressing     Problem: Metabolic/Fluid and Electrolytes - Adult  Goal: Electrolytes maintained within normal limits  Outcome: Progressing  Goal: Hemodynamic stability and optimal renal function maintained  Outcome: Progressing     Problem: Cardiovascular - Adult  Goal: Maintains optimal cardiac output and hemodynamic stability  Outcome: Progressing     Problem: Hematologic - Adult  Goal: Maintains hematologic stability  Outcome: Progressing     Problem: Skin/Tissue Integrity  Goal: Absence of new skin breakdown  Description: 1.  Monitor for areas of redness and/or skin breakdown  2.  Assess vascular access sites hourly  3.  Every 4-6 hours minimum:  Change oxygen saturation probe site  4.  Every 4-6 hours:  If on nasal continuous positive airway pressure, respiratory therapy assess nares and determine need for appliance change or resting period.  Outcome: Progressing     Problem: Pain  Goal: Verbalizes/displays adequate comfort level or baseline comfort

## 2024-07-20 NOTE — PROGRESS NOTES
Hospitalist Progress Note   Admit Date:  2024  4:30 PM   Name:  Brendan Saleh   Age:  49 y.o.  Sex:  male  :  1975   MRN:  [id number]   Room:  [facility]/    Presenting/Chief Complaint: Altered Mental Status     Reason(s) for Admission: Acute hypernatremia [E87.0]  Volume depletion [E86.9]  Acute kidney injury (HCC) [N17.9]  Acute encephalopathy [G93.40]  Septic shock (HCC) [A41.9, R65.21]     Hospital Course:   Mr. Saleh is a 48 y.o. male with a PMH of SDH s/p poonam hole 2024, hydrocephalus s/p  shunt, sacral decubitus stage IV, who originally presented to the ER  with chief complaint of increased confusion.     He was diagnosed with UTI with hematuria and started on course of vancomycin, Zosyn.  Urine culture finalized negative.   blood cultures with CoNS, determined contaminant.  However, he met sepsis criteria with WBC 14.6, HR 120s.  ID consulted to assist.  ECHO showed preserved EF.  Underwent sacral wound debridement on  with General Surgery, Dr. Zapien.     CT head with ongoing hydrocephalus and a  shunt.  NSGY consulted.  Patient underwent right ventricular peritoneal shunt removal on  due to non-functioning shunt.   Per Dr. Ortiz, patient needs a stepwise approach, and stated shunt replacement may be in his future though would need to wait at least 4 weeks.  He may be able to do temporizing CSF removal however he stated this would only be temporary until more definitive treatment as above.  Patient will require continued neurosurgical care.     ID returned to the case for management of CSF infection as CSF culture and  shunt tip culture grew ESBL Klebsiella and Serratia marcescens.  Antibiotics changed to IV cipro and furthermore to oral cipro for 14 days post shunt removal EOT 24.  ID signed off on .     His hospital stay was further complicated by a bowel obstruction on May 19, noted on CT after patient vomited fecal material.  He underwent hemicolectomy and  Finger  O2 Device: None (Room air)  Skin Assessment: Clean, dry, & intact  O2 Flow Rate (L/min): 0 L/min  Oxygen Therapy: None (Room air)    Estimated body mass index is 16.56 kg/m² as calculated from the following:    Height as of this encounter: 1.905 m (6' 3\").    Weight as of this encounter: 60.1 kg (132 lb 7.9 oz).    Intake/Output Summary (Last 24 hours) at 7/20/2024 1137  Last data filed at 7/20/2024 0945  Gross per 24 hour   Intake 600 ml   Output 2800 ml   Net -2200 ml           Physical Exam:   General:    Frail, ill appearing    Eyes:  Sclerae appear normal.  Pupils equally round.  ENT:  Nares appear normal.  Moist oral mucosa  Lungs:   No wheezing.  Symmetric expansion.  Abdomen:   Soft, nontender, nondistended.  Ex lap incision.  Skin:     Sacral decubitus ulcer present. He has onychomycoses to fingernails on his hands and toes. He has flaky yellow plaques to his bilateral feet on the plantar aspects   Psych:  Flat affect.      I have personally reviewed labs and tests:  Recent Labs:  Recent Results (from the past 48 hour(s))   Basic Metabolic Panel w/ Reflex to MG    Collection Time: 07/19/24  5:42 AM   Result Value Ref Range    Sodium 137 136 - 145 mmol/L    Potassium 3.8 3.5 - 5.1 mmol/L    Chloride 101 98 - 107 mmol/L    CO2 27 20 - 28 mmol/L    Anion Gap 9 9 - 18 mmol/L    Glucose 105 (H) 70 - 99 mg/dL    BUN 12 6 - 23 MG/DL    Creatinine 0.67 (L) 0.80 - 1.30 MG/DL    Est, Glom Filt Rate >90 >60 ml/min/1.73m2    Calcium 8.8 8.8 - 10.2 MG/DL   CBC with Auto Differential    Collection Time: 07/19/24  5:42 AM   Result Value Ref Range    WBC 5.7 4.3 - 11.1 K/uL    RBC 3.39 (L) 4.23 - 5.6 M/uL    Hemoglobin 10.2 (L) 13.6 - 17.2 g/dL    Hematocrit 32.7 (L) 41.1 - 50.3 %    MCV 96.5 82 - 102 FL    MCH 30.1 26.1 - 32.9 PG    MCHC 31.2 (L) 31.4 - 35.0 g/dL    RDW 14.0 11.9 - 14.6 %    Platelets 231 150 - 450 K/uL    MPV 9.5 9.4 - 12.3 FL    nRBC 0.00 0.0 - 0.2 K/uL    Differential Type AUTOMATED

## 2024-07-20 NOTE — PROGRESS NOTES
Hourly rounding completed during shift. All needs met at this time. Bed L/L with call bell in reach. Bed alarm on.  Report given to oncoming RN.

## 2024-07-21 PROCEDURE — 6370000000 HC RX 637 (ALT 250 FOR IP): Performed by: PHYSICIAN ASSISTANT

## 2024-07-21 PROCEDURE — 6370000000 HC RX 637 (ALT 250 FOR IP)

## 2024-07-21 PROCEDURE — 6370000000 HC RX 637 (ALT 250 FOR IP): Performed by: INTERNAL MEDICINE

## 2024-07-21 PROCEDURE — 1100000000 HC RM PRIVATE

## 2024-07-21 PROCEDURE — 6370000000 HC RX 637 (ALT 250 FOR IP): Performed by: NURSE PRACTITIONER

## 2024-07-21 PROCEDURE — 6360000002 HC RX W HCPCS: Performed by: SURGERY

## 2024-07-21 RX ADMIN — TRIAMCINOLONE ACETONIDE: 1 CREAM TOPICAL at 20:27

## 2024-07-21 RX ADMIN — DOCUSATE SODIUM 50 MG AND SENNOSIDES 8.6 MG 2 TABLET: 8.6; 5 TABLET, FILM COATED ORAL at 08:54

## 2024-07-21 RX ADMIN — TRIAMCINOLONE ACETONIDE: 1 CREAM TOPICAL at 08:57

## 2024-07-21 RX ADMIN — PANTOPRAZOLE SODIUM 40 MG: 40 TABLET, DELAYED RELEASE ORAL at 20:23

## 2024-07-21 RX ADMIN — TRAZODONE HYDROCHLORIDE 50 MG: 50 TABLET ORAL at 20:23

## 2024-07-21 RX ADMIN — LEVETIRACETAM 500 MG: 500 TABLET, FILM COATED ORAL at 08:55

## 2024-07-21 RX ADMIN — METOPROLOL TARTRATE 25 MG: 25 TABLET, FILM COATED ORAL at 20:23

## 2024-07-21 RX ADMIN — CETIRIZINE HYDROCHLORIDE 10 MG: 10 TABLET ORAL at 08:54

## 2024-07-21 RX ADMIN — ENOXAPARIN SODIUM 40 MG: 100 INJECTION SUBCUTANEOUS at 08:54

## 2024-07-21 RX ADMIN — LEVETIRACETAM 500 MG: 500 TABLET, FILM COATED ORAL at 20:22

## 2024-07-21 RX ADMIN — METOPROLOL TARTRATE 25 MG: 25 TABLET, FILM COATED ORAL at 08:55

## 2024-07-21 NOTE — PROGRESS NOTES
Hourly rounds completed.  Pt awake most of shift despite trazodone administration, otherwise uneventful night.  Wound care performed per order.  No c/o pain.  Turned Q2H.  Bed in low position, wheels locked, call light within reach.

## 2024-07-21 NOTE — PLAN OF CARE
Problem: Discharge Planning  Goal: Discharge to home or other facility with appropriate resources  Outcome: Progressing  Flowsheets (Taken 7/20/2024 1903)  Discharge to home or other facility with appropriate resources:   Identify barriers to discharge with patient and caregiver   Arrange for needed discharge resources and transportation as appropriate   Identify discharge learning needs (meds, wound care, etc)   Refer to discharge planning if patient needs post-hospital services based on physician order or complex needs related to functional status, cognitive ability or social support system     Problem: Safety - Adult  Goal: Free from fall injury  Outcome: Progressing  Flowsheets (Taken 7/20/2024 1903)  Free From Fall Injury: Instruct family/caregiver on patient safety     Problem: Neurosensory - Adult  Goal: Achieves stable or improved neurological status  Outcome: Progressing  Flowsheets (Taken 7/20/2024 1903)  Achieves stable or improved neurological status: Assess for and report changes in neurological status     Problem: Musculoskeletal - Adult  Goal: Return mobility to safest level of function  Outcome: Progressing  Flowsheets (Taken 7/20/2024 1903)  Return Mobility to Safest Level of Function: Assess patient stability and activity tolerance for standing, transferring and ambulating with or without assistive devices     Problem: Skin/Tissue Integrity  Goal: Absence of new skin breakdown  Description: 1.  Monitor for areas of redness and/or skin breakdown  2.  Assess vascular access sites hourly  3.  Every 4-6 hours minimum:  Change oxygen saturation probe site  4.  Every 4-6 hours:  If on nasal continuous positive airway pressure, respiratory therapy assess nares and determine need for appliance change or resting period.  Outcome: Progressing     Problem: Pain  Goal: Verbalizes/displays adequate comfort level or baseline comfort level  Outcome: Progressing  Flowsheets (Taken 7/20/2024

## 2024-07-21 NOTE — PROGRESS NOTES
Hospitalist Progress Note   Admit Date:  2024  4:30 PM   Name:  Brendan Saleh   Age:  49 y.o.  Sex:  male  :  1975   MRN:  [id number]   Room:  [facility]/    Presenting/Chief Complaint: Altered Mental Status     Reason(s) for Admission: Acute hypernatremia [E87.0]  Volume depletion [E86.9]  Acute kidney injury (HCC) [N17.9]  Acute encephalopathy [G93.40]  Septic shock (HCC) [A41.9, R65.21]     Hospital Course:   Mr. Saleh is a 48 y.o. male with a PMH of SDH s/p poonam hole 2024, hydrocephalus s/p  shunt, sacral decubitus stage IV, who originally presented to the ER  with chief complaint of increased confusion.     He was diagnosed with UTI with hematuria and started on course of vancomycin, Zosyn.  Urine culture finalized negative.   blood cultures with CoNS, determined contaminant.  However, he met sepsis criteria with WBC 14.6, HR 120s.  ID consulted to assist.  ECHO showed preserved EF.  Underwent sacral wound debridement on  with General Surgery, Dr. Zapien.     CT head with ongoing hydrocephalus and a  shunt.  NSGY consulted.  Patient underwent right ventricular peritoneal shunt removal on  due to non-functioning shunt.   Per Dr. Ortiz, patient needs a stepwise approach, and stated shunt replacement may be in his future though would need to wait at least 4 weeks.  He may be able to do temporizing CSF removal however he stated this would only be temporary until more definitive treatment as above.  Patient will require continued neurosurgical care.     ID returned to the case for management of CSF infection as CSF culture and  shunt tip culture grew ESBL Klebsiella and Serratia marcescens.  Antibiotics changed to IV cipro and furthermore to oral cipro for 14 days post shunt removal EOT 24.  ID signed off on .     His hospital stay was further complicated by a bowel obstruction on May 19, noted on CT after patient vomited fecal material.  He underwent hemicolectomy and  % cream   Topical BID    diphenhydrAMINE (BENADRYL) injection 25 mg  25 mg IntraVENous Q6H PRN    cetirizine (ZYRTEC) tablet 10 mg  10 mg Oral Daily    0.9 % sodium chloride infusion  25 mL IntraVENous PRN    sodium chloride flush 0.9 % injection 5-40 mL  5-40 mL IntraVENous 2 times per day    sodium chloride flush 0.9 % injection 5-40 mL  5-40 mL IntraVENous PRN    0.9 % sodium chloride infusion   IntraVENous PRN    glucose chewable tablet 16 g  4 tablet Oral PRN    dextrose bolus 10% 125 mL  125 mL IntraVENous PRN    Or    dextrose bolus 10% 250 mL  250 mL IntraVENous PRN    Glucagon Emergency KIT 1 mg  1 mg SubCUTAneous PRN    dextrose 10 % infusion   IntraVENous Continuous PRN    haloperidol lactate (HALDOL) injection 1 mg  1 mg IntraVENous Q6H PRN    medicated lip ointment (BLISTEX)   Topical PRN    ondansetron (ZOFRAN-ODT) disintegrating tablet 4 mg  4 mg Oral Q8H PRN    Or    ondansetron (ZOFRAN) injection 4 mg  4 mg IntraVENous Q6H PRN    enoxaparin (LOVENOX) injection 40 mg  40 mg SubCUTAneous Daily    LORazepam (ATIVAN) 2 mg in sodium chloride (PF) 0.9 % 10 mL injection  2 mg IntraVENous Q4H PRN    calcium carbonate (TUMS) chewable tablet 500 mg  500 mg Oral TID PRN    guaiFENesin-dextromethorphan (ROBITUSSIN DM) 100-10 MG/5ML syrup 5 mL  5 mL Oral Q4H PRN       Signed:  YOSELIN Padilla - CNP    Part of this note may have been written by using a voice dictation software.  The note has been proof read but may still contain some grammatical/other typographical errors.

## 2024-07-22 PROCEDURE — 97535 SELF CARE MNGMENT TRAINING: CPT

## 2024-07-22 PROCEDURE — 6370000000 HC RX 637 (ALT 250 FOR IP): Performed by: PHYSICIAN ASSISTANT

## 2024-07-22 PROCEDURE — 1100000000 HC RM PRIVATE

## 2024-07-22 PROCEDURE — 6370000000 HC RX 637 (ALT 250 FOR IP): Performed by: NURSE PRACTITIONER

## 2024-07-22 PROCEDURE — 97112 NEUROMUSCULAR REEDUCATION: CPT

## 2024-07-22 PROCEDURE — 6360000002 HC RX W HCPCS: Performed by: SURGERY

## 2024-07-22 PROCEDURE — 97530 THERAPEUTIC ACTIVITIES: CPT

## 2024-07-22 PROCEDURE — 6370000000 HC RX 637 (ALT 250 FOR IP)

## 2024-07-22 RX ADMIN — TRIAMCINOLONE ACETONIDE: 1 CREAM TOPICAL at 08:46

## 2024-07-22 RX ADMIN — LEVETIRACETAM 500 MG: 500 TABLET, FILM COATED ORAL at 08:49

## 2024-07-22 RX ADMIN — LEVETIRACETAM 500 MG: 500 TABLET, FILM COATED ORAL at 20:28

## 2024-07-22 RX ADMIN — METOPROLOL TARTRATE 25 MG: 25 TABLET, FILM COATED ORAL at 08:49

## 2024-07-22 RX ADMIN — PANTOPRAZOLE SODIUM 40 MG: 40 TABLET, DELAYED RELEASE ORAL at 20:26

## 2024-07-22 RX ADMIN — ENOXAPARIN SODIUM 40 MG: 100 INJECTION SUBCUTANEOUS at 08:49

## 2024-07-22 RX ADMIN — CETIRIZINE HYDROCHLORIDE 10 MG: 10 TABLET ORAL at 08:50

## 2024-07-22 RX ADMIN — METOPROLOL TARTRATE 25 MG: 25 TABLET, FILM COATED ORAL at 20:29

## 2024-07-22 ASSESSMENT — PAIN SCALES - GENERAL: PAINLEVEL_OUTOF10: 0

## 2024-07-22 NOTE — PROGRESS NOTES
[facility]    Neurosurgery will be unable to replace  shunt while patient has active open wounds due to high risk for infection.  We will continue to follow along.  Patient Vitals for the past 12 hrs:   Temp Pulse Resp BP SpO2   07/22/24 0849 -- (!) 103 -- 102/76 --   07/22/24 0758 97.2 °F (36.2 °C) (!) 103 18 102/76 100 %   07/22/24 0308 98.1 °F (36.7 °C) (!) 102 17 117/88 99 %        No results found for this or any previous visit (from the past 24 hour(s)).     Janelle Adam, APRN - CNP

## 2024-07-22 NOTE — PROGRESS NOTES
ACUTE PHYSICAL THERAPY GOALS:   (Developed with and agreed upon by patient and/or caregiver.)  PT goals re-assessed during re-evaluation on 7/11/24  STG:  (1.)Mr. Saleh will move from supine to sit and sit to supine  in bed with MODERATE ASSIST within 3 treatment day(s).  (2.)Mr. Saleh will scoot up and down in bed with MODERATE ASSIST within 3 treatment day(s).    (3.)Mr. Saleh will roll side to side in bed with MINIMAL ASSIST within 3 treatment day(s).    (4.)Mr. Saleh will tolerate sitting up in recliner chair for 2 hours with stable vital signs to increase upright tolerance within 3 treatment day(s).    (5.)Mr. Saleh will maintain static/dynamic sitting x 25 minutes with MINIMAL ASSIST for improved balance within 3 treatment days.  (6.)Mr. Saleh will follow 75% of commands for increased participation in therapeutic activity/exercise within 3 treatment days.     LTG:  (1.) Brendan Saleh  will move from supine to sit and sit to supine and scoot up and down with MINIMAL ASSIST within 7 treatment day(s).    (2.)Mr. Saleh will roll side to side in bed with CONTACT GUARD ASSIST within 7 treatment day(s).    (3.) Brendan Saleh will transfer from bed to chair and chair to bed with MODERATE ASSISTx1 using the least restrictive device within 7 treatment day(s).    (4.) Brendan Saleh will perform therapeutic exercises x 20 min for HEP with CONTACT GUARD ASSIST to improve strength, endurance, and functional mobility within 7 treatment day(s).   (5.) Brendan Saleh will perform seated static and dynamic balance activities x25 minutes with CONTACT GUARD ASSIST to improve safety within 7 treatment day(s).  (6.) Brendan Saleh will perform standing static and dynamic balance activities x 5 minutes with MODERATE ASSIST to improve safety and mobility within 7 treatment day(s).     PHYSICAL THERAPY: Daily Note PM   (Link to Caseload Tracking: PT Visit Days : 5  Time In/Out PT Charge Capture   Mobility, and Strength.    TREATMENT GRID:  N/A    AFTER TREATMENT PRECAUTIONS: All liv prominences off-loaded, Alarm Activated, Bed/Chair Locked, Call light within reach, Chair, Needs within reach, and RN notified    INTERDISCIPLINARY COLLABORATION:  RN/ PCT and OT/ POND    EDUCATION: Education Given To: Patient  Education Provided: Transfer Training;Equipment;Fall Prevention Strategies  Education Method: Verbal  Barriers to Learning: None  Education Outcome: Continued education needed    TIME IN/OUT:  Time In: 1453  Time Out: 1521  Minutes: 28    [facility]ounger PT

## 2024-07-22 NOTE — PROGRESS NOTES
Hospitalist Progress Note   Admit Date:  2024  4:30 PM   Name:  Brendan Saleh   Age:  49 y.o.  Sex:  male  :  1975   MRN:  [id number]   Room:  [facility]/    Presenting/Chief Complaint: Altered Mental Status     Reason(s) for Admission: Acute hypernatremia [E87.0]  Volume depletion [E86.9]  Acute kidney injury (HCC) [N17.9]  Acute encephalopathy [G93.40]  Septic shock (HCC) [A41.9, R65.21]     Hospital Course:   Mr. Saleh is a 48 y.o. male with a PMH of SDH s/p poonam hole 2024, hydrocephalus s/p  shunt, sacral decubitus stage IV, who originally presented to the ER  with chief complaint of increased confusion.     He was diagnosed with UTI with hematuria and started on course of vancomycin, Zosyn.  Urine culture finalized negative.   blood cultures with CoNS, determined contaminant.  However, he met sepsis criteria with WBC 14.6, HR 120s.  ID consulted to assist.  ECHO showed preserved EF.  Underwent sacral wound debridement on  with General Surgery, Dr. Zapien.     CT head with ongoing hydrocephalus and a  shunt.  NSGY consulted.  Patient underwent right ventricular peritoneal shunt removal on  due to non-functioning shunt.   Per Dr. Ortiz, patient needs a stepwise approach, and stated shunt replacement may be in his future though would need to wait at least 4 weeks.  He may be able to do temporizing CSF removal however he stated this would only be temporary until more definitive treatment as above.  Patient will require continued neurosurgical care.     ID returned to the case for management of CSF infection as CSF culture and  shunt tip culture grew ESBL Klebsiella and Serratia marcescens.  Antibiotics changed to IV cipro and furthermore to oral cipro for 14 days post shunt removal EOT 24.  ID signed off on .     His hospital stay was further complicated by a bowel obstruction on May 19, noted on CT after patient vomited fecal material.  He underwent hemicolectomy and

## 2024-07-22 NOTE — PLAN OF CARE
Problem: Discharge Planning  Goal: Discharge to home or other facility with appropriate resources  Outcome: Progressing  Flowsheets (Taken 7/21/2024 1904)  Discharge to home or other facility with appropriate resources:   Identify barriers to discharge with patient and caregiver   Arrange for needed discharge resources and transportation as appropriate   Identify discharge learning needs (meds, wound care, etc)   Refer to discharge planning if patient needs post-hospital services based on physician order or complex needs related to functional status, cognitive ability or social support system     Problem: Safety - Adult  Goal: Free from fall injury  Outcome: Progressing  Flowsheets (Taken 7/21/2024 1904)  Free From Fall Injury: Instruct family/caregiver on patient safety     Problem: Neurosensory - Adult  Goal: Achieves stable or improved neurological status  Outcome: Progressing  Flowsheets (Taken 7/21/2024 1904)  Achieves stable or improved neurological status: Assess for and report changes in neurological status     Problem: Musculoskeletal - Adult  Goal: Return mobility to safest level of function  Outcome: Progressing  Flowsheets (Taken 7/21/2024 1904)  Return Mobility to Safest Level of Function: Assess patient stability and activity tolerance for standing, transferring and ambulating with or without assistive devices     Problem: Skin/Tissue Integrity  Goal: Absence of new skin breakdown  Description: 1.  Monitor for areas of redness and/or skin breakdown  2.  Assess vascular access sites hourly  3.  Every 4-6 hours minimum:  Change oxygen saturation probe site  4.  Every 4-6 hours:  If on nasal continuous positive airway pressure, respiratory therapy assess nares and determine need for appliance change or resting period.  Outcome: Progressing     Problem: Pain  Goal: Verbalizes/displays adequate comfort level or baseline comfort level  Outcome: Progressing  Flowsheets (Taken 7/21/2024  1922)  Verbalizes/displays adequate comfort level or baseline comfort level: Encourage patient to monitor pain and request assistance     Problem: Skin/Tissue Integrity - Adult  Goal: Skin integrity remains intact  Outcome: Progressing  Flowsheets (Taken 7/21/2024 1904)  Skin Integrity Remains Intact: Monitor for areas of redness and/or skin breakdown  Goal: Incisions, wounds, or drain sites healing without S/S of infection  Outcome: Progressing  Flowsheets (Taken 7/21/2024 1904)  Incisions, Wounds, or Drain Sites Healing Without Sign and Symptoms of Infection: TWICE DAILY: Assess and document skin integrity     Problem: Gastrointestinal - Adult  Goal: Maintains adequate nutritional intake  Outcome: Progressing     Problem: Genitourinary - Adult  Goal: Absence of urinary retention  Outcome: Progressing  Flowsheets (Taken 7/21/2024 1904)  Absence of urinary retention: Assess patient’s ability to void and empty bladder     Problem: Infection - Adult  Goal: Absence of infection at discharge  Outcome: Progressing  Goal: Absence of infection during hospitalization  Outcome: Progressing     Problem: Metabolic/Fluid and Electrolytes - Adult  Goal: Electrolytes maintained within normal limits  Outcome: Progressing  Goal: Hemodynamic stability and optimal renal function maintained  Outcome: Progressing     Problem: Cardiovascular - Adult  Goal: Maintains optimal cardiac output and hemodynamic stability  Outcome: Progressing  Flowsheets (Taken 7/21/2024 1904)  Maintains optimal cardiac output and hemodynamic stability: Monitor blood pressure and heart rate     Problem: Hematologic - Adult  Goal: Maintains hematologic stability  Outcome: Progressing

## 2024-07-22 NOTE — PROGRESS NOTES
Comprehensive Nutrition Assessment    Type and Reason for Visit: Reassess  Malnutrition Screening Tool: Malnutrition Screen  Have you recently lost weight without trying?: 2 to 13 pounds (1 point)  Have you been eating poorly because of a decreased appetite?: Yes (1 point)  Malnutrition Screening Tool Score: 2 and Best Practice Alert for BMI <18.5  5/18: Poor intake/appetite (Hospitalist)  5/24: General nutrition management (Hospitalist)  Parenteral Nutrition Management (Hospitalists) 6/2    Nutrition Recommendations/Plan:   Meals and Snacks:  Diet: Continue current order Continue double portions to assist with meeting needs.   Remove No carbonated beverages restriction      Malnutrition Assessment:6/2  Malnutrition Status: Severe malnutrition  Context: Acute Illness  Findings of clinical characteristics of malnutrition:   Energy Intake:  50% or less of estimated energy requirements for 5 or more days  Weight Loss:  Unable to assess (wide weight vrainces, [facility] c/w severe weight loss at time of admission)     Body Fat Loss:  Moderate body fat loss (visual) Fat Overlying Ribs   Muscle Mass Loss:  Moderate muscle mass loss (visual) Temples (temporalis), Clavicles (pectoralis & deltoids)         Nutrition Assessment:  Nutrition History: 5/7: Pt reports decreased intake for \"far too long\". He is unable to provide any other details.         Weight History: Pt reports wt loss however he is unsure how much. Per EMR wt hx review of neurosurgery office visits 11/7 180lb, 1/26 198lb, 3/27 190lb.   Unable to validate wt loss given wide weight variances with overall trend of weight gain since admission.     Nutrition Background:       PMH significant for hydrocephalus s/p  shunt 2/2024, CHF, seizures, and decubitus ulcer. Pt admitted with acute encephalopathy, sepsis, MIRNA, and hypernatremia from hypovolemia. New diagnosis of colon cancer. Pt with a wound, see wound care note 5/7. S/p debridement 5/8.    shunt removed 5/17.

## 2024-07-22 NOTE — PLAN OF CARE
Problem: Discharge Planning  Goal: Discharge to home or other facility with appropriate resources  7/22/2024 1058 by Nidia Madison RN  Outcome: Progressing  7/22/2024 0030 by Anni Cole RN  Outcome: Progressing  Flowsheets (Taken 7/21/2024 1904)  Discharge to home or other facility with appropriate resources:   Identify barriers to discharge with patient and caregiver   Arrange for needed discharge resources and transportation as appropriate   Identify discharge learning needs (meds, wound care, etc)   Refer to discharge planning if patient needs post-hospital services based on physician order or complex needs related to functional status, cognitive ability or social support system     Problem: Safety - Adult  Goal: Free from fall injury  7/22/2024 1058 by Nidia Madison RN  Outcome: Progressing  7/22/2024 0030 by Anni Cole RN  Outcome: Progressing  Flowsheets (Taken 7/21/2024 1904)  Free From Fall Injury: Instruct family/caregiver on patient safety     Problem: Neurosensory - Adult  Goal: Achieves stable or improved neurological status  7/22/2024 1058 by Nidia Madison RN  Outcome: Progressing  7/22/2024 0030 by Anni Cole RN  Outcome: Progressing  Flowsheets (Taken 7/21/2024 1904)  Achieves stable or improved neurological status: Assess for and report changes in neurological status     Problem: Musculoskeletal - Adult  Goal: Return mobility to safest level of function  7/22/2024 1058 by Nidia Madison RN  Outcome: Progressing  7/22/2024 0030 by Anni Cole RN  Outcome: Progressing  Flowsheets (Taken 7/21/2024 1904)  Return Mobility to Safest Level of Function: Assess patient stability and activity tolerance for standing, transferring and ambulating with or without assistive devices     Problem: Skin/Tissue Integrity  Goal: Absence of new skin breakdown  Description: 1.  Monitor for areas of redness and/or skin breakdown  2.  Assess vascular access sites hourly  3.  Every 4-6 hours minimum:

## 2024-07-22 NOTE — PROGRESS NOTES
ACUTE OCCUPATIONAL THERAPY GOALS: GOALS REVIEWED AND UPDATED AT RE-EVALUATION ON 7/18/24  (Developed with and agreed upon by patient and/or caregiver.)  1. Patient will complete lower body bathing and dressing with MAXIMAL ASSIST and adaptive equipment as needed.     3. Patient will complete grooming ADL in unsupported sitting with MINIMAL ASSIST.  4. Patient will tolerate 25 minutes of OT treatment with 1-2 rest breaks to increase activity tolerance for ADLs.   5. Patient will complete functional transfers with MODERATE ASSIST and adaptive equipment as needed.   6. Patient will tolerate 10 minutes BUE exercises to increase strength for safe, functional transfers.   2. Patient will complete upper body bathing and dressing with STAND BY ASSIST and adaptive equipment as needed.   GOAL MET   8. Patient will tolerate 10 minutes unsupported sitting balance edge of bed with CONTACT GUARD ASSIST in preparation for ADL performance.   9. Patient will tolerate static standing task for >30 seconds with MAXIMAL ASSIST in preparation for ADL tasks and to decrease caregiver laith   OCCUPATIONAL THERAPY: Daily Note PM   OT Visit Days: 2   Time In/Out  OT Charge Capture  Rehab Caseload Tracker  OT Orders    Brendan Saleh is a 49 y.o. male   PRIMARY DIAGNOSIS: Encephalitis  Acute hypernatremia [E87.0]  Volume depletion [E86.9]  Acute kidney injury (HCC) [N17.9]  Acute encephalopathy [G93.40]  Septic shock (HCC) [A41.9, R65.21]  Procedure(s) (LRB):  LAPAROTOMY EXPLORATORY, PRIMARY CLOSURE SMALL BOWEL PERFORATION (N/A)  51 Days Post-Op  Inpatient: Payor: [employer] / Plan: [employer]BETTER / Product Type: *No Product type* /     ASSESSMENT:     REHAB RECOMMENDATIONS:   Recommendation to date pending progress:  Setting:  Short-term Rehab    Equipment:    To Be Determined     ASSESSMENT:  Mr. Saleh presents supine in the bed upon arrival. Pt is agreeable to getting up to the chair with some encouragement. Pt needed mod X 2 for bed

## 2024-07-22 NOTE — PROGRESS NOTES
Rounds performed throughout shift. Pt denies needs at this time. Bed in low position, locked and call light/personal items within reach. Will report to night shift nurse.

## 2024-07-23 PROBLEM — L89.154 SACRAL DECUBITUS ULCER, STAGE IV (HCC): Chronic | Status: ACTIVE | Noted: 2024-05-24

## 2024-07-23 PROCEDURE — 6370000000 HC RX 637 (ALT 250 FOR IP): Performed by: NURSE PRACTITIONER

## 2024-07-23 PROCEDURE — 1100000000 HC RM PRIVATE

## 2024-07-23 PROCEDURE — 97530 THERAPEUTIC ACTIVITIES: CPT

## 2024-07-23 PROCEDURE — 6360000002 HC RX W HCPCS: Performed by: SURGERY

## 2024-07-23 PROCEDURE — 6370000000 HC RX 637 (ALT 250 FOR IP): Performed by: PHYSICIAN ASSISTANT

## 2024-07-23 RX ORDER — LORAZEPAM 0.5 MG/1
0.5 TABLET ORAL EVERY 6 HOURS PRN
Status: DISCONTINUED | OUTPATIENT
Start: 2024-07-23 | End: 2024-10-18 | Stop reason: HOSPADM

## 2024-07-23 RX ORDER — DIPHENHYDRAMINE HYDROCHLORIDE 50 MG/ML
25 INJECTION INTRAMUSCULAR; INTRAVENOUS EVERY 6 HOURS PRN
Status: DISCONTINUED | OUTPATIENT
Start: 2024-07-23 | End: 2024-10-18 | Stop reason: HOSPADM

## 2024-07-23 RX ADMIN — ENOXAPARIN SODIUM 40 MG: 100 INJECTION SUBCUTANEOUS at 08:37

## 2024-07-23 RX ADMIN — METOPROLOL TARTRATE 25 MG: 25 TABLET, FILM COATED ORAL at 08:37

## 2024-07-23 RX ADMIN — LEVETIRACETAM 500 MG: 500 TABLET, FILM COATED ORAL at 08:37

## 2024-07-23 RX ADMIN — PANTOPRAZOLE SODIUM 40 MG: 40 TABLET, DELAYED RELEASE ORAL at 20:57

## 2024-07-23 RX ADMIN — METOPROLOL TARTRATE 25 MG: 25 TABLET, FILM COATED ORAL at 21:01

## 2024-07-23 RX ADMIN — LEVETIRACETAM 500 MG: 500 TABLET, FILM COATED ORAL at 20:57

## 2024-07-23 ASSESSMENT — PAIN SCALES - GENERAL: PAINLEVEL_OUTOF10: 0

## 2024-07-23 NOTE — PROGRESS NOTES
ACUTE PHYSICAL THERAPY GOALS:   (Developed with and agreed upon by patient and/or caregiver.)  PT goals re-assessed during re-evaluation on 7/11/24  STG:  (1.)Mr. Saleh will move from supine to sit and sit to supine  in bed with MODERATE ASSIST within 3 treatment day(s).  (2.)Mr. Saleh will scoot up and down in bed with MODERATE ASSIST within 3 treatment day(s).    (3.)Mr. Saleh will roll side to side in bed with MINIMAL ASSIST within 3 treatment day(s).    (4.)Mr. Saleh will tolerate sitting up in recliner chair for 2 hours with stable vital signs to increase upright tolerance within 3 treatment day(s).    (5.)Mr. Saleh will maintain static/dynamic sitting x 25 minutes with MINIMAL ASSIST for improved balance within 3 treatment days.  (6.)Mr. Saleh will follow 75% of commands for increased participation in therapeutic activity/exercise within 3 treatment days.     LTG:  (1.) Brendan Saleh  will move from supine to sit and sit to supine and scoot up and down with MINIMAL ASSIST within 7 treatment day(s).    (2.)Mr. Saleh will roll side to side in bed with CONTACT GUARD ASSIST within 7 treatment day(s).    (3.) Brendan Saleh will transfer from bed to chair and chair to bed with MODERATE ASSISTx1 using the least restrictive device within 7 treatment day(s).    (4.) Brendan Saleh will perform therapeutic exercises x 20 min for HEP with CONTACT GUARD ASSIST to improve strength, endurance, and functional mobility within 7 treatment day(s).   (5.) Brendan Saleh will perform seated static and dynamic balance activities x25 minutes with CONTACT GUARD ASSIST to improve safety within 7 treatment day(s).  (6.) Brendan Saleh will perform standing static and dynamic balance activities x 5 minutes with MODERATE ASSIST to improve safety and mobility within 7 treatment day(s).     PHYSICAL THERAPY: Daily Note AM   (Link to Caseload Tracking: PT Visit Days : 6  Time In/Out PT Charge Capture   Rehab Caseload Tracker  Orders    Brendan Saleh is a 49 y.o. male   PRIMARY DIAGNOSIS: Infection of  (ventriculoperitoneal) shunt (HCC)  Acute hypernatremia [E87.0]  Volume depletion [E86.9]  Acute kidney injury (HCC) [N17.9]  Acute encephalopathy [G93.40]  Septic shock (HCC) [A41.9, R65.21]  Procedure(s) (LRB):  LAPAROTOMY EXPLORATORY, PRIMARY CLOSURE SMALL BOWEL PERFORATION (N/A)  52 Days Post-Op  Inpatient: Payor: [employer] / Plan: [employer]TER / Product Type: *No Product type* /     ASSESSMENT:     REHAB RECOMMENDATIONS:   Recommendation to date pending progress:  Setting:  Inpatient Rehab Facility    Equipment:    To Be Determined     ASSESSMENT:  Mr. Saleh was supine on contact and agreeable to work with therapy. Pt required Mod A x2 with sup to sit, but was able to assist more with moving his legs to the EOB. Once sitting EOB pt initally demonstrated fair sitting balance, but fatigued quickly and started leaning backwards and requried Mod Ax2 for regaining sitting balance EOB. Max Ax2 for scooting forward and max Ax2 for STS transfer and stepping towards chair with a stand>pivot transfer due to posterior lean. Once seated, pt was then instructed through LE exercises to improve mobility and strength. Pt left up in chair with alarm activated and RN notified. Some progress made today with supine to sit transfer.      SUBJECTIVE:   Mr. Saleh states \"I'll try\".     Social/Functional Lives With: Alone  Type of Home: Trailer  Home Layout: One level  Home Access: Stairs to enter with rails  Entrance Stairs - Number of Steps: 5  Entrance Stairs - Rails: Both  OBJECTIVE:     PAIN: VITALS / O2: PRECAUTION / LINES / DRAINS:   Pre Treatment:          Post Treatment: None stated Vitals        Oxygen    External Catheter    RESTRICTIONS/PRECAUTIONS:  Restrictions/Precautions  Restrictions/Precautions: Fall Risk, Bed Alarm  Restrictions/Precautions: Fall Risk, Bed Alarm     MOBILITY: I Mod I S SBA CGA Min Mod Max Total

## 2024-07-23 NOTE — PROGRESS NOTES
Hospitalist Progress Note   Admit Date:  2024  4:30 PM   Name:  Brendan Saleh   Age:  49 y.o.  Sex:  male  :  1975   MRN:  [id number]   Room:  [facility]/    Presenting/Chief Complaint: Altered Mental Status     Reason(s) for Admission: Acute hypernatremia [E87.0]  Volume depletion [E86.9]  Acute kidney injury (HCC) [N17.9]  Acute encephalopathy [G93.40]  Septic shock (HCC) [A41.9, R65.21]     Hospital Course:   Mr. Saleh is a 48 y.o. male with a PMH of SDH s/p poonam hole 2024, hydrocephalus s/p  shunt, sacral decubitus stage IV, who originally presented to the ER  with chief complaint of increased confusion.     He was diagnosed with UTI with hematuria and started on course of vancomycin, Zosyn.  Urine culture finalized negative.   blood cultures with CoNS, determined contaminant.  However, he met sepsis criteria with WBC 14.6, HR 120s.  ID consulted to assist.  ECHO showed preserved EF.  Underwent sacral wound debridement on  with General Surgery, Dr. Zapien.     CT head with ongoing hydrocephalus and a  shunt.  NSGY consulted.  Patient underwent right ventricular peritoneal shunt removal on  due to non-functioning shunt.   Per Dr. Ortiz, patient needs a stepwise approach, and stated shunt replacement may be in his future though would need to wait at least 4 weeks.  He may be able to do temporizing CSF removal however he stated this would only be temporary until more definitive treatment as above.  Patient will require continued neurosurgical care.     ID returned to the case for management of CSF infection as CSF culture and  shunt tip culture grew ESBL Klebsiella and Serratia marcescens.  Antibiotics changed to IV cipro and furthermore to oral cipro for 14 days post shunt removal EOT 24.  ID signed off on .     His hospital stay was further complicated by a bowel obstruction on May 19, noted on CT after patient vomited fecal material.  He underwent hemicolectomy and

## 2024-07-23 NOTE — PLAN OF CARE
Problem: Discharge Planning  Goal: Discharge to home or other facility with appropriate resources  7/23/2024 0736 by Nidia Madison RN  Outcome: Progressing  7/22/2024 2311 by Maru Navarro RN  Outcome: Progressing     Problem: Safety - Adult  Goal: Free from fall injury  7/23/2024 0736 by Nidia Madison RN  Outcome: Progressing  7/22/2024 2311 by Maru Navarro RN  Outcome: Progressing     Problem: Neurosensory - Adult  Goal: Achieves stable or improved neurological status  7/23/2024 0736 by Nidia Madison RN  Outcome: Progressing  7/22/2024 2311 by Maru Navarro RN  Outcome: Progressing     Problem: Musculoskeletal - Adult  Goal: Return mobility to safest level of function  7/23/2024 0736 by Nidia Madison RN  Outcome: Progressing  7/22/2024 2311 by Maru Navarro RN  Outcome: Progressing     Problem: Skin/Tissue Integrity  Goal: Absence of new skin breakdown  Description: 1.  Monitor for areas of redness and/or skin breakdown  2.  Assess vascular access sites hourly  3.  Every 4-6 hours minimum:  Change oxygen saturation probe site  4.  Every 4-6 hours:  If on nasal continuous positive airway pressure, respiratory therapy assess nares and determine need for appliance change or resting period.  7/23/2024 0736 by Nidia Madison RN  Outcome: Progressing  7/22/2024 2311 by Maru Navarro RN  Outcome: Progressing     Problem: Pain  Goal: Verbalizes/displays adequate comfort level or baseline comfort level  7/23/2024 0736 by Nidia Madsion RN  Outcome: Progressing  7/22/2024 2311 by Maru Navarro RN  Outcome: Progressing     Problem: Skin/Tissue Integrity - Adult  Goal: Skin integrity remains intact  7/23/2024 0736 by Nidia Madison RN  Outcome: Progressing  7/22/2024 2311 by Maru Navarro RN  Outcome: Progressing  Goal: Incisions, wounds, or drain sites healing without S/S of infection  7/23/2024 0736 by Nidia Madison RN  Outcome: Progressing  7/22/2024 2311 by Maru Navarro RN  Outcome: Progressing    No evidence of gout noted on exam, sx due to stress fracture? Will obtain xray and in the meantime begin Ibuprofen as needed for pain and inflammation.

## 2024-07-24 PROCEDURE — 92610 EVALUATE SWALLOWING FUNCTION: CPT

## 2024-07-24 PROCEDURE — 6370000000 HC RX 637 (ALT 250 FOR IP): Performed by: NURSE PRACTITIONER

## 2024-07-24 PROCEDURE — 97164 PT RE-EVAL EST PLAN CARE: CPT

## 2024-07-24 PROCEDURE — 1100000000 HC RM PRIVATE

## 2024-07-24 PROCEDURE — 6370000000 HC RX 637 (ALT 250 FOR IP): Performed by: PHYSICIAN ASSISTANT

## 2024-07-24 PROCEDURE — 6360000002 HC RX W HCPCS: Performed by: SURGERY

## 2024-07-24 PROCEDURE — 97530 THERAPEUTIC ACTIVITIES: CPT

## 2024-07-24 PROCEDURE — 97112 NEUROMUSCULAR REEDUCATION: CPT

## 2024-07-24 RX ADMIN — METOPROLOL TARTRATE 25 MG: 25 TABLET, FILM COATED ORAL at 10:48

## 2024-07-24 RX ADMIN — LEVETIRACETAM 500 MG: 500 TABLET, FILM COATED ORAL at 10:48

## 2024-07-24 RX ADMIN — METOPROLOL TARTRATE 25 MG: 25 TABLET, FILM COATED ORAL at 20:30

## 2024-07-24 RX ADMIN — ENOXAPARIN SODIUM 40 MG: 100 INJECTION SUBCUTANEOUS at 10:48

## 2024-07-24 RX ADMIN — CETIRIZINE HYDROCHLORIDE 10 MG: 10 TABLET ORAL at 10:48

## 2024-07-24 RX ADMIN — LEVETIRACETAM 500 MG: 500 TABLET, FILM COATED ORAL at 20:31

## 2024-07-24 RX ADMIN — PANTOPRAZOLE SODIUM 40 MG: 40 TABLET, DELAYED RELEASE ORAL at 20:30

## 2024-07-24 NOTE — PROGRESS NOTES
ACUTE OCCUPATIONAL THERAPY GOALS: GOALS REVIEWED AND UPDATED AT RE-EVALUATION ON 7/18/24  (Developed with and agreed upon by patient and/or caregiver.)  1. Patient will complete lower body bathing and dressing with MAXIMAL ASSIST and adaptive equipment as needed.     3. Patient will complete grooming ADL in unsupported sitting with MINIMAL ASSIST.  4. Patient will tolerate 25 minutes of OT treatment with 1-2 rest breaks to increase activity tolerance for ADLs.   5. Patient will complete functional transfers with MODERATE ASSIST and adaptive equipment as needed.   6. Patient will tolerate 10 minutes BUE exercises to increase strength for safe, functional transfers.   2. Patient will complete upper body bathing and dressing with STAND BY ASSIST and adaptive equipment as needed.   GOAL MET   8. Patient will tolerate 10 minutes unsupported sitting balance edge of bed with CONTACT GUARD ASSIST in preparation for ADL performance.   9. Patient will tolerate static standing task for >30 seconds with MAXIMAL ASSIST in preparation for ADL tasks and to decrease caregiver burden.    Timeframe: 7 visits   OCCUPATIONAL THERAPY: Daily Note PM   OT Visit Days: 3   Time In/Out  OT Charge Capture  Rehab Caseload Tracker  OT Orders    Brendan Saleh is a 49 y.o. male   PRIMARY DIAGNOSIS: Infection of  (ventriculoperitoneal) shunt (HCC)  Acute hypernatremia [E87.0]  Volume depletion [E86.9]  Acute kidney injury (HCC) [N17.9]  Acute encephalopathy [G93.40]  Septic shock (HCC) [A41.9, R65.21]  Procedure(s) (LRB):  LAPAROTOMY EXPLORATORY, PRIMARY CLOSURE SMALL BOWEL PERFORATION (N/A)  53 Days Post-Op  Inpatient: Payor: [employer] / Plan: AMBETTER / Product Type: *No Product type* /     ASSESSMENT:     REHAB RECOMMENDATIONS:   Recommendation to date pending progress:  Setting:  Pt continues to be medically stable for discharge, however awaiting medicaid/ social security disability    Pt would benefit from rehab when able to be

## 2024-07-24 NOTE — PROGRESS NOTES
ACUTE PHYSICAL THERAPY GOALS:   (Developed with and agreed upon by patient and/or caregiver.)  PT goals re-assessed during re-evaluation on 7/24/24    (1.)Mr. Saleh will move from supine to sit and sit to supine  in bed with MODERATE ASSIST within 3 treatment day(s).  (2.)Mr. Saleh will scoot up and down in bed with MODERATE ASSIST within 3 treatment day(s).    (3.)Mr. Saleh will roll side to side in bed with MINIMAL ASSIST within 3 treatment day(s).    (4.)Mr. Saleh will tolerate sitting up in recliner chair for 2 hours with stable vital signs to increase upright tolerance within 3 treatment day(s).  (MET 7/24/24)  (5.)Mr. Saleh will maintain static/dynamic sitting x 25 minutes with MINIMAL ASSIST for improved balance within 3 treatment days.  (6.)Mr. Saleh will follow 75% of commands for increased participation in therapeutic activity/exercise within 3 treatment days.(MET 7/24/24)    LTG:  (1.) Brendan Saleh  will move from supine to sit and sit to supine and scoot up and down with MINIMAL ASSIST within 7 treatment day(s).    (2.)Mr. Saleh will roll side to side in bed with CONTACT GUARD ASSIST within 7 treatment day(s).    (3.) Brendan Saleh will transfer from bed to chair and chair to bed with MODERATE ASSISTx1 using the least restrictive device within 7 treatment day(s).    (4.) Brendan Saleh will perform therapeutic exercises x 20 min for HEP with CONTACT GUARD ASSIST to improve strength, endurance, and functional mobility within 7 treatment day(s). (MET 7/24/24)  (5.) Brendan Saleh will perform seated static and dynamic balance activities x25 minutes with CONTACT GUARD ASSIST to improve safety within 7 treatment day(s).  (6.) Brendan Saleh will perform standing static and dynamic balance activities x 5 minutes with MODERATE ASSIST to improve safety and mobility within 7 treatment day(s).   (7.)Brendan Saleh will be able to transfer bed to chair/wheelchair and

## 2024-07-24 NOTE — PROGRESS NOTES
GOALS:  LTG: Patient will maintain adequate hydration/nutrition with optimum safety and efficiency of swallowing function with PO intake without overt signs or symptoms of aspiration for the highest appropriate diet level.  STG:  Patient will consume soft and bite-sized textures and thin liquids without overt signs or symptoms of airway compromise.  Patient will safely ingest diet trials during therapeutic feedings with SLP without overt signs or symptoms of respiratory compromise in efforts to advance diet.    SPEECH LANGUAGE PATHOLOGY: DYSPHAGIA Re-evaluation    Acknowledge Order  I  Therapy Time  I   Charges     I  Rehab Caseload Tracker      NAME: Brendan Saleh  : 1975  MRN: [id number]    ADMISSION DATE: 2024  PRIMARY DIAGNOSIS: Infection of  (ventriculoperitoneal) shunt (HCC)    ICD-10: Treatment Diagnosis: R13.11 Dysphagia, Oral Phase    RECOMMENDATIONS   Diet:    Soft and Bite-Sized- allow cereal   Thin Liquids    Medication: as tolerated   Compensatory Swallowing Strategies:   Slow rate of intake  Small bites/sips  Upright as possible for all oral intake   Therapeutic Intervention:   Patient/family education  Dysphagia treatment   Patient continues to require skilled intervention:  Yes. Recommend ongoing speech therapy services during this hospitalization.     Anticipated Discharge Needs: Do not anticipate ongoing speech therapy needs upon discharge.      ASSESSMENT    Patient presents with chronic oral dysphagia secondary to edentulous status. Prolonged oral phase, no overt indications of airway compromise observed this date.     Recommend soft and bite sized / thin liquid, medications as tolerated. Speech therapy will follow to ensure tolerance of diet upgrade.     GENERAL    Subjective: RN Q cleared patient to participate with speech therapy. Patient agreeable to speech therapy re-evaluation. Pleasant and conversational.     Recent Information/Background: Mr. Saleh is a 48 y.o. male

## 2024-07-24 NOTE — CARE COORDINATION
Pt is still waiting for SS disability letter, needs letter for facilities to consider for Medicaid pending, family is checking his mail. CM Director, Helen, is assisting.

## 2024-07-24 NOTE — PROGRESS NOTES
Hourly rounds performed this shift. Sacral wound dressing cleansed and changed per order. Bed lowered and locked. Call light within reach. All needs met at this time. Bedside shift report will be given to oncoming nurse.

## 2024-07-24 NOTE — PROGRESS NOTES
Hospitalist Progress Note   Admit Date:  2024  4:30 PM   Name:  Brendan Saleh   Age:  49 y.o.  Sex:  male  :  1975   MRN:  [id number]   Room:  [facility]/    Presenting/Chief Complaint: Altered Mental Status     Reason(s) for Admission: Acute hypernatremia [E87.0]  Volume depletion [E86.9]  Acute kidney injury (HCC) [N17.9]  Acute encephalopathy [G93.40]  Septic shock (HCC) [A41.9, R65.21]     Hospital Course:   Mr. Saleh is a 48 y.o. male with a PMH of SDH s/p poonam hole 2024, hydrocephalus s/p  shunt, sacral decubitus stage IV, who originally presented to the ER  with chief complaint of increased confusion.     He was diagnosed with UTI with hematuria and started on course of vancomycin, Zosyn.  Urine culture finalized negative.   blood cultures with CoNS, determined contaminant.  However, he met sepsis criteria with WBC 14.6, HR 120s.  ID consulted to assist.  ECHO showed preserved EF.  Underwent sacral wound debridement on  with General Surgery, Dr. Zapien.     CT head with ongoing hydrocephalus and a  shunt.  NSGY consulted.  Patient underwent right ventricular peritoneal shunt removal on  due to non-functioning shunt.   Per Dr. Ortiz, patient needs a stepwise approach, and stated shunt replacement may be in his future though would need to wait at least 4 weeks.  He may be able to do temporizing CSF removal however he stated this would only be temporary until more definitive treatment as above.  Patient will require continued neurosurgical care.     ID returned to the case for management of CSF infection as CSF culture and  shunt tip culture grew ESBL Klebsiella and Serratia marcescens.  Antibiotics changed to IV cipro and furthermore to oral cipro for 14 days post shunt removal EOT 24.  ID signed off on .     His hospital stay was further complicated by a bowel obstruction on May 19, noted on CT after patient vomited fecal material.  He underwent hemicolectomy and

## 2024-07-24 NOTE — PROGRESS NOTES
Pt resting in bed at present time without complaints. Dressing changed per MAR.     Rounds performed, all needs met this shift. Bed locked and low, call light within reach. Will give report to oncoming day shift nurse.

## 2024-07-25 PROCEDURE — 6370000000 HC RX 637 (ALT 250 FOR IP): Performed by: INTERNAL MEDICINE

## 2024-07-25 PROCEDURE — 6370000000 HC RX 637 (ALT 250 FOR IP)

## 2024-07-25 PROCEDURE — 6370000000 HC RX 637 (ALT 250 FOR IP): Performed by: PHYSICIAN ASSISTANT

## 2024-07-25 PROCEDURE — 1100000000 HC RM PRIVATE

## 2024-07-25 PROCEDURE — 97530 THERAPEUTIC ACTIVITIES: CPT

## 2024-07-25 PROCEDURE — 6370000000 HC RX 637 (ALT 250 FOR IP): Performed by: NURSE PRACTITIONER

## 2024-07-25 PROCEDURE — 97542 WHEELCHAIR MNGMENT TRAINING: CPT

## 2024-07-25 PROCEDURE — 97112 NEUROMUSCULAR REEDUCATION: CPT

## 2024-07-25 PROCEDURE — 97110 THERAPEUTIC EXERCISES: CPT

## 2024-07-25 PROCEDURE — 6360000002 HC RX W HCPCS: Performed by: SURGERY

## 2024-07-25 RX ADMIN — LEVETIRACETAM 500 MG: 500 TABLET, FILM COATED ORAL at 20:23

## 2024-07-25 RX ADMIN — TRAZODONE HYDROCHLORIDE 50 MG: 50 TABLET ORAL at 21:54

## 2024-07-25 RX ADMIN — METOPROLOL TARTRATE 25 MG: 25 TABLET, FILM COATED ORAL at 20:23

## 2024-07-25 RX ADMIN — METOPROLOL TARTRATE 25 MG: 25 TABLET, FILM COATED ORAL at 09:12

## 2024-07-25 RX ADMIN — ENOXAPARIN SODIUM 40 MG: 100 INJECTION SUBCUTANEOUS at 09:10

## 2024-07-25 RX ADMIN — OXYCODONE 5 MG: 5 TABLET ORAL at 21:58

## 2024-07-25 RX ADMIN — CETIRIZINE HYDROCHLORIDE 10 MG: 10 TABLET ORAL at 09:11

## 2024-07-25 RX ADMIN — PANTOPRAZOLE SODIUM 40 MG: 40 TABLET, DELAYED RELEASE ORAL at 20:23

## 2024-07-25 RX ADMIN — LEVETIRACETAM 500 MG: 500 TABLET, FILM COATED ORAL at 09:10

## 2024-07-25 ASSESSMENT — PAIN DESCRIPTION - DESCRIPTORS: DESCRIPTORS: ACHING;DISCOMFORT

## 2024-07-25 ASSESSMENT — PAIN - FUNCTIONAL ASSESSMENT: PAIN_FUNCTIONAL_ASSESSMENT: ACTIVITIES ARE NOT PREVENTED

## 2024-07-25 ASSESSMENT — PAIN DESCRIPTION - ORIENTATION: ORIENTATION: MID;LOWER

## 2024-07-25 ASSESSMENT — PAIN SCALES - GENERAL
PAINLEVEL_OUTOF10: 0
PAINLEVEL_OUTOF10: 10

## 2024-07-25 ASSESSMENT — PAIN DESCRIPTION - LOCATION: LOCATION: BACK

## 2024-07-25 NOTE — PROGRESS NOTES
SPEECH LANGUAGE PATHOLOGY: ATTEMPT     NAME: Brendan Saleh  : 1975  MRN: [id number]    ADMISSION DATE: 2024  PRIMARY DIAGNOSIS: Infection of  (ventriculoperitoneal) shunt (HCC)    Attempted to see patient this AM for diet tolerance with AM meal. Patient had already completed meal and states \"That was so good!\".   Will re-attempt patient at meal time at later time/date.      Seble Tohmason, SLP  2024 8:54 AM

## 2024-07-25 NOTE — PROGRESS NOTES
Hourly rounds performed this shift. Bed lowered and locked. Q2 turns during shift. Call light within reach. All needs met at this time. Bedside shift report will be given to oncoming nurse.

## 2024-07-25 NOTE — PROGRESS NOTES
Hospitalist Progress Note   Admit Date:  2024  4:30 PM   Name:  Brendan Saleh   Age:  49 y.o.  Sex:  male  :  1975   MRN:  [id number]   Room:  [facility]/    Presenting/Chief Complaint: Altered Mental Status     Reason(s) for Admission: Acute hypernatremia [E87.0]  Volume depletion [E86.9]  Acute kidney injury (HCC) [N17.9]  Acute encephalopathy [G93.40]  Septic shock (HCC) [A41.9, R65.21]     Hospital Course:   Mr. Saleh is a 48 y.o. male with a PMH of SDH s/p poonam hole 2024, hydrocephalus s/p  shunt, sacral decubitus stage IV, who originally presented to the ER  with chief complaint of increased confusion.     He was diagnosed with UTI with hematuria and started on course of vancomycin, Zosyn.  Urine culture finalized negative.   blood cultures with CoNS, determined contaminant.  However, he met sepsis criteria with WBC 14.6, HR 120s.  ID consulted to assist.  ECHO showed preserved EF.  Underwent sacral wound debridement on  with General Surgery, Dr. Zapien.     CT head with ongoing hydrocephalus and a  shunt.  NSGY consulted.  Patient underwent right ventricular peritoneal shunt removal on  due to non-functioning shunt.   Per Dr. Ortiz, patient needs a stepwise approach, and stated shunt replacement may be in his future though would need to wait at least 4 weeks.  He may be able to do temporizing CSF removal however he stated this would only be temporary until more definitive treatment as above.  Patient will require continued neurosurgical care.     ID returned to the case for management of CSF infection as CSF culture and  shunt tip culture grew ESBL Klebsiella and Serratia marcescens.  Antibiotics changed to IV cipro and furthermore to oral cipro for 14 days post shunt removal EOT 24.  ID signed off on .     His hospital stay was further complicated by a bowel obstruction on May 19, noted on CT after patient vomited fecal material.  He underwent hemicolectomy and  spectrum beta-lactamase) producing bacteria infection    Acute blood loss as cause of postoperative anemia    Bowel perforation (HCC)    Critical illness myopathy    Cachexia (HCC)    Unintentional weight loss    Adult failure to thrive    Encounter for palliative care  Resolved Problems:    Hypernatremia    MIRNA (acute kidney injury) (HCC)    UTI (urinary tract infection)    Septic shock (HCC)      Objective:   Patient Vitals for the past 24 hrs:   Temp Pulse Resp BP SpO2   07/25/24 0912 -- (!) 103 -- 120/87 --   07/25/24 0716 97.5 °F (36.4 °C) (!) 103 16 120/87 100 %   07/25/24 0338 97.7 °F (36.5 °C) 83 18 114/86 100 %   07/24/24 2030 -- (!) 101 -- 115/86 --   07/24/24 1912 98.2 °F (36.8 °C) 64 18 114/86 90 %   07/24/24 1426 97.9 °F (36.6 °C) 94 20 106/82 99 %         Oxygen Therapy  SpO2: 100 %  Pulse Oximetry Type: Intermittent  Pulse via Oximetry: 128 beats per minute  Pulse Oximeter Device Mode: Intermittent  Pulse Oximeter Device Location: Finger  O2 Device: None (Room air)  Skin Assessment: Clean, dry, & intact  O2 Flow Rate (L/min): 1 L/min  Oxygen Therapy: None (Room air)    Estimated body mass index is 14.2 kg/m² as calculated from the following:    Height as of this encounter: 1.905 m (6' 3\").    Weight as of this encounter: 51.5 kg (113 lb 9.6 oz).    Intake/Output Summary (Last 24 hours) at 7/25/2024 1211  Last data filed at 7/25/2024 0935  Gross per 24 hour   Intake 600 ml   Output 2350 ml   Net -1750 ml           Physical Exam:   General:    Frail, chronically ill appearing    Eyes:  Sclerae appear normal.  Pupils equally round.  CV:  RRR  Lungs:   CTAB.   Symmetric expansion.  Abdomen:   Soft, nontender, nondistended.   Skin:      He has onychomycoses to fingernails on his hands and toes. He has flaky yellow plaques to his bilateral feet on the plantar aspects   Psych:  Flat affect.      I have personally reviewed labs and tests:  Recent Labs:  No results found for this or any previous visit (from

## 2024-07-25 NOTE — PROGRESS NOTES
Pt resting in bed at present time without complaints. Dressings changed per MAR.    Hourly rounds completed, all needs met this shift. Bed locked and low, call light and personal belongings within reach. Will give report to oncoming day shift nurse.

## 2024-07-25 NOTE — PROGRESS NOTES
ACUTE OCCUPATIONAL THERAPY GOALS: GOALS REVIEWED AND UPDATED AT RE-EVALUATION ON 7/18/24  (Developed with and agreed upon by patient and/or caregiver.)  1. Patient will complete lower body bathing and dressing with MAXIMAL ASSIST and adaptive equipment as needed.     3. Patient will complete grooming ADL in unsupported sitting with MINIMAL ASSIST.  4. Patient will tolerate 25 minutes of OT treatment with 1-2 rest breaks to increase activity tolerance for ADLs.   5. Patient will complete functional transfers with MODERATE ASSIST and adaptive equipment as needed.   6. Patient will tolerate 10 minutes BUE exercises to increase strength for safe, functional transfers.   2. Patient will complete upper body bathing and dressing with STAND BY ASSIST and adaptive equipment as needed.   GOAL MET   8. Patient will tolerate 10 minutes unsupported sitting balance edge of bed with CONTACT GUARD ASSIST in preparation for ADL performance.   9. Patient will tolerate static standing task for >30 seconds with MAXIMAL ASSIST in preparation for ADL tasks and to decrease caregiver burden.    Timeframe: 7 visits   OCCUPATIONAL THERAPY: Daily Note PM   OT Visit Days: 4   Time In/Out  OT Charge Capture  Rehab Caseload Tracker  OT Orders    Brendan Saleh is a 49 y.o. male   PRIMARY DIAGNOSIS: Infection of  (ventriculoperitoneal) shunt (HCC)  Acute hypernatremia [E87.0]  Volume depletion [E86.9]  Acute kidney injury (HCC) [N17.9]  Acute encephalopathy [G93.40]  Septic shock (HCC) [A41.9, R65.21]  Procedure(s) (LRB):  LAPAROTOMY EXPLORATORY, PRIMARY CLOSURE SMALL BOWEL PERFORATION (N/A)  54 Days Post-Op  Inpatient: Payor: [employer] / Plan: AMBETTER / Product Type: *No Product type* /     ASSESSMENT:     REHAB RECOMMENDATIONS:   Recommendation to date pending progress:  Setting:  Pt continues to be medically stable for discharge, however awaiting medicaid/ social security disability    Pt would benefit from rehab when able to be

## 2024-07-26 LAB
ANION GAP SERPL CALC-SCNC: 9 MMOL/L (ref 9–18)
BASOPHILS # BLD: 0 K/UL (ref 0–0.2)
BASOPHILS NFR BLD: 0 % (ref 0–2)
BUN SERPL-MCNC: 16 MG/DL (ref 6–23)
CALCIUM SERPL-MCNC: 8.9 MG/DL (ref 8.8–10.2)
CHLORIDE SERPL-SCNC: 99 MMOL/L (ref 98–107)
CO2 SERPL-SCNC: 29 MMOL/L (ref 20–28)
CREAT SERPL-MCNC: 0.64 MG/DL (ref 0.8–1.3)
DIFFERENTIAL METHOD BLD: ABNORMAL
EOSINOPHIL # BLD: 0.1 K/UL (ref 0–0.8)
EOSINOPHIL NFR BLD: 2 % (ref 0.5–7.8)
ERYTHROCYTE [DISTWIDTH] IN BLOOD BY AUTOMATED COUNT: 13.6 % (ref 11.9–14.6)
GLUCOSE SERPL-MCNC: 115 MG/DL (ref 70–99)
HCT VFR BLD AUTO: 33.8 % (ref 41.1–50.3)
HGB BLD-MCNC: 10.5 G/DL (ref 13.6–17.2)
IMM GRANULOCYTES # BLD AUTO: 0 K/UL (ref 0–0.5)
IMM GRANULOCYTES NFR BLD AUTO: 0 % (ref 0–5)
LYMPHOCYTES # BLD: 1.4 K/UL (ref 0.5–4.6)
LYMPHOCYTES NFR BLD: 21 % (ref 13–44)
MCH RBC QN AUTO: 30.3 PG (ref 26.1–32.9)
MCHC RBC AUTO-ENTMCNC: 31.1 G/DL (ref 31.4–35)
MCV RBC AUTO: 97.4 FL (ref 82–102)
MONOCYTES # BLD: 0.5 K/UL (ref 0.1–1.3)
MONOCYTES NFR BLD: 7 % (ref 4–12)
NEUTS SEG # BLD: 4.8 K/UL (ref 1.7–8.2)
NEUTS SEG NFR BLD: 70 % (ref 43–78)
NRBC # BLD: 0 K/UL (ref 0–0.2)
PLATELET # BLD AUTO: 251 K/UL (ref 150–450)
PMV BLD AUTO: 9.3 FL (ref 9.4–12.3)
POTASSIUM SERPL-SCNC: 4 MMOL/L (ref 3.5–5.1)
RBC # BLD AUTO: 3.47 M/UL (ref 4.23–5.6)
SODIUM SERPL-SCNC: 137 MMOL/L (ref 136–145)
WBC # BLD AUTO: 6.8 K/UL (ref 4.3–11.1)

## 2024-07-26 PROCEDURE — 6370000000 HC RX 637 (ALT 250 FOR IP): Performed by: INTERNAL MEDICINE

## 2024-07-26 PROCEDURE — 6370000000 HC RX 637 (ALT 250 FOR IP): Performed by: PHYSICIAN ASSISTANT

## 2024-07-26 PROCEDURE — 6370000000 HC RX 637 (ALT 250 FOR IP)

## 2024-07-26 PROCEDURE — 6370000000 HC RX 637 (ALT 250 FOR IP): Performed by: NURSE PRACTITIONER

## 2024-07-26 PROCEDURE — 6360000002 HC RX W HCPCS: Performed by: SURGERY

## 2024-07-26 PROCEDURE — 92526 ORAL FUNCTION THERAPY: CPT

## 2024-07-26 PROCEDURE — 36415 COLL VENOUS BLD VENIPUNCTURE: CPT

## 2024-07-26 PROCEDURE — 1100000000 HC RM PRIVATE

## 2024-07-26 PROCEDURE — 80048 BASIC METABOLIC PNL TOTAL CA: CPT

## 2024-07-26 PROCEDURE — 85025 COMPLETE CBC W/AUTO DIFF WBC: CPT

## 2024-07-26 RX ADMIN — CETIRIZINE HYDROCHLORIDE 10 MG: 10 TABLET ORAL at 08:52

## 2024-07-26 RX ADMIN — CYCLOBENZAPRINE 10 MG: 10 TABLET, FILM COATED ORAL at 16:04

## 2024-07-26 RX ADMIN — TRAZODONE HYDROCHLORIDE 50 MG: 50 TABLET ORAL at 20:31

## 2024-07-26 RX ADMIN — METOPROLOL TARTRATE 25 MG: 25 TABLET, FILM COATED ORAL at 20:31

## 2024-07-26 RX ADMIN — PANTOPRAZOLE SODIUM 40 MG: 40 TABLET, DELAYED RELEASE ORAL at 20:31

## 2024-07-26 RX ADMIN — ENOXAPARIN SODIUM 40 MG: 100 INJECTION SUBCUTANEOUS at 08:52

## 2024-07-26 RX ADMIN — LEVETIRACETAM 500 MG: 500 TABLET, FILM COATED ORAL at 08:52

## 2024-07-26 RX ADMIN — METOPROLOL TARTRATE 25 MG: 25 TABLET, FILM COATED ORAL at 08:53

## 2024-07-26 RX ADMIN — DOCUSATE SODIUM 50 MG AND SENNOSIDES 8.6 MG 2 TABLET: 8.6; 5 TABLET, FILM COATED ORAL at 08:52

## 2024-07-26 RX ADMIN — OXYCODONE 5 MG: 5 TABLET ORAL at 20:31

## 2024-07-26 ASSESSMENT — PAIN DESCRIPTION - LOCATION
LOCATION: LEG
LOCATION: BACK;GENERALIZED

## 2024-07-26 ASSESSMENT — PAIN - FUNCTIONAL ASSESSMENT: PAIN_FUNCTIONAL_ASSESSMENT: PREVENTS OR INTERFERES WITH MANY ACTIVE NOT PASSIVE ACTIVITIES

## 2024-07-26 ASSESSMENT — PAIN DESCRIPTION - ORIENTATION
ORIENTATION: RIGHT
ORIENTATION: RIGHT;LEFT

## 2024-07-26 ASSESSMENT — PAIN SCALES - GENERAL
PAINLEVEL_OUTOF10: 7
PAINLEVEL_OUTOF10: 3

## 2024-07-26 ASSESSMENT — PAIN DESCRIPTION - DESCRIPTORS: DESCRIPTORS: ACHING

## 2024-07-26 NOTE — PLAN OF CARE
Problem: Discharge Planning  Goal: Discharge to home or other facility with appropriate resources  7/26/2024 1951 by Anni Cole RN  Outcome: Progressing  Flowsheets (Taken 7/26/2024 1906)  Discharge to home or other facility with appropriate resources:   Identify barriers to discharge with patient and caregiver   Arrange for needed discharge resources and transportation as appropriate   Identify discharge learning needs (meds, wound care, etc)   Refer to discharge planning if patient needs post-hospital services based on physician order or complex needs related to functional status, cognitive ability or social support system  7/26/2024 1635 by Pablo Eugene RN  Outcome: Progressing     Problem: Safety - Adult  Goal: Free from fall injury  7/26/2024 1951 by Anni Cole RN  Outcome: Progressing  Flowsheets (Taken 7/26/2024 1906)  Free From Fall Injury: Instruct family/caregiver on patient safety  7/26/2024 1635 by Pablo Eugene RN  Outcome: Progressing     Problem: Neurosensory - Adult  Goal: Achieves stable or improved neurological status  7/26/2024 1951 by Anni Cole RN  Outcome: Progressing  Flowsheets (Taken 7/26/2024 1906)  Achieves stable or improved neurological status: Assess for and report changes in neurological status  7/26/2024 1635 by Pablo Eugene RN  Outcome: Progressing     Problem: Musculoskeletal - Adult  Goal: Return mobility to safest level of function  7/26/2024 1951 by Anni Cole RN  Outcome: Progressing  Flowsheets (Taken 7/26/2024 1906)  Return Mobility to Safest Level of Function: Assess patient stability and activity tolerance for standing, transferring and ambulating with or without assistive devices  7/26/2024 1635 by Pablo Eugene RN  Outcome: Progressing     Problem: Skin/Tissue Integrity  Goal: Absence of new skin breakdown  Description: 1.  Monitor for areas of redness and/or skin breakdown  2.  Assess vascular access sites hourly  3.  Every 4-6

## 2024-07-26 NOTE — PROGRESS NOTES
GOALS:  LTG: Patient will maintain adequate hydration/nutrition with optimum safety and efficiency of swallowing function with PO intake without overt signs or symptoms of aspiration for the highest appropriate diet level.  STG: PROGRESSING 24  Patient will consume soft and bite-sized textures and thin liquids without overt signs or symptoms of airway compromise.  Patient will safely ingest diet trials during therapeutic feedings with SLP without overt signs or symptoms of respiratory compromise in efforts to advance diet.    SPEECH LANGUAGE PATHOLOGY: DYSPHAGIA Daily Note #1    Acknowledge Order  I  Therapy Time  I   Charges     I  Rehab Caseload Tracker      NAME: Brendan Saleh  : 1975  MRN: [id number]    ADMISSION DATE: 2024  PRIMARY DIAGNOSIS: Infection of  (ventriculoperitoneal) shunt (HCC)    ICD-10: Treatment Diagnosis: R13.11 Dysphagia, Oral Phase    RECOMMENDATIONS   Diet:    Soft and Bite-Sized- allow cereal   Thin Liquids    Medication: as tolerated   Compensatory Swallowing Strategies:   Slow rate of intake  Small bites/sips  Upright as possible for all oral intake   Therapeutic Intervention:   Patient/family education  Dysphagia treatment   Patient continues to require skilled intervention:  Yes. Recommend ongoing speech therapy services during this hospitalization.     Anticipated Discharge Needs: Do not anticipate ongoing speech therapy needs upon discharge.      ASSESSMENT    Patient presents with chronic oral dysphagia secondary to edentulous status. Tolerating upgrade to soft and bite sized solids appropriately.      Recommend soft and bite sized / thin liquid, medications as tolerated. Speech therapy will follow to ensure tolerance of diet upgrade.     GENERAL    Subjective: RN cleared patient to participate. Patient / RN report patient tolerating upgrade well. Adequate levels of intake.    Recent Information/Background: Mr. Saleh is a 48 y.o. male with a PMH of SDH s/p

## 2024-07-26 NOTE — PROGRESS NOTES
Pt resting in bed at present time without complaints. Wound dressings changed per MAR.     Rounds performed, all needs met this shift. Bed locked and low, call light within reach. Will give report to oncoming day shift nurse.

## 2024-07-26 NOTE — FLOWSHEET NOTE
07/26/24 0738   Skin Integumentary    Skin Integumentary (WDL) X   Skin Condition/Temp Dry;Flaky;Warm   Location sacrum, l/r hip,r arm   Skin Integrity Abrasion;Wound (see LDA);Scars (comment);Redness   Nails X   Nail Condition Discolored;Thick

## 2024-07-26 NOTE — PROGRESS NOTES
Hospitalist Progress Note   Admit Date:  2024  4:30 PM   Name:  Brendan Saleh   Age:  49 y.o.  Sex:  male  :  1975   MRN:  [id number]   Room:  [facility]/    Presenting/Chief Complaint: Altered Mental Status     Reason(s) for Admission: Acute hypernatremia [E87.0]  Volume depletion [E86.9]  Acute kidney injury (HCC) [N17.9]  Acute encephalopathy [G93.40]  Septic shock (HCC) [A41.9, R65.21]     Hospital Course:   Mr. Saleh is a 48 y.o. male with a PMH of SDH s/p poonam hole 2024, hydrocephalus s/p  shunt, sacral decubitus stage IV, who originally presented to the ER  with chief complaint of increased confusion.     He was diagnosed with UTI with hematuria and started on course of vancomycin, Zosyn.  Urine culture finalized negative.   blood cultures with CoNS, determined contaminant.  However, he met sepsis criteria with WBC 14.6, HR 120s.  ID consulted to assist.  ECHO showed preserved EF.  Underwent sacral wound debridement on  with General Surgery, Dr. Zapien.     CT head with ongoing hydrocephalus and a  shunt.  NSGY consulted.  Patient underwent right ventricular peritoneal shunt removal on  due to non-functioning shunt.   Per Dr. Ortiz, patient needs a stepwise approach, and stated shunt replacement may be in his future though would need to wait at least 4 weeks.  Neurosurgery followed up  and said will be unable to replace  shunt while patient has active open wounds due to high risk for infection.      ID returned to the case for management of CSF infection as CSF culture and  shunt tip culture grew ESBL Klebsiella and Serratia marcescens.  Antibiotics changed to IV cipro and furthermore to oral cipro for 14 days post shunt removal EOT 24.  ID signed off on .     His hospital stay was further complicated by a bowel obstruction on May 19, noted on CT after patient vomited fecal material.  He underwent hemicolectomy and primary anastomosis on  with   contain some grammatical/other typographical errors.

## 2024-07-26 NOTE — FLOWSHEET NOTE
07/26/24 0757   Vital Signs   Temp 97.7 °F (36.5 °C)   Temp Source Oral   Pulse (!) 105   Heart Rate Source Monitor   Respirations 20   BP 95/72  (Lying)   MAP (Calculated) 80   MAP (mmHg) 80   BP Location Right upper arm   Patient Position Supine   Orthostatic B/P and Pulse? Yes   Blood Pressure Sitting 96/71   Pulse Sitting 66 PER MINUTE   Blood Pressure Standing 103/63   Pulse Standing 69 PER MINUTE   Oxygen Therapy   SpO2 96 %

## 2024-07-26 NOTE — PROGRESS NOTES
Pt back in bed from being in chair for a couple of hours. He is turned to the right side. 2 side rails up, bed low locked, call light with in reach.

## 2024-07-27 PROCEDURE — 6360000002 HC RX W HCPCS: Performed by: SURGERY

## 2024-07-27 PROCEDURE — 6370000000 HC RX 637 (ALT 250 FOR IP): Performed by: NURSE PRACTITIONER

## 2024-07-27 PROCEDURE — 1100000000 HC RM PRIVATE

## 2024-07-27 PROCEDURE — 6370000000 HC RX 637 (ALT 250 FOR IP): Performed by: INTERNAL MEDICINE

## 2024-07-27 PROCEDURE — 6370000000 HC RX 637 (ALT 250 FOR IP)

## 2024-07-27 PROCEDURE — 44602 SUTURE SMALL INTESTINE: CPT | Performed by: SURGERY

## 2024-07-27 PROCEDURE — 49000 EXPLORATION OF ABDOMEN: CPT | Performed by: SURGERY

## 2024-07-27 PROCEDURE — 6370000000 HC RX 637 (ALT 250 FOR IP): Performed by: PHYSICIAN ASSISTANT

## 2024-07-27 RX ADMIN — METOPROLOL TARTRATE 25 MG: 25 TABLET, FILM COATED ORAL at 20:12

## 2024-07-27 RX ADMIN — TRAZODONE HYDROCHLORIDE 50 MG: 50 TABLET ORAL at 20:12

## 2024-07-27 RX ADMIN — OXYCODONE 5 MG: 5 TABLET ORAL at 20:12

## 2024-07-27 RX ADMIN — PANTOPRAZOLE SODIUM 40 MG: 40 TABLET, DELAYED RELEASE ORAL at 20:12

## 2024-07-27 RX ADMIN — METOPROLOL TARTRATE 25 MG: 25 TABLET, FILM COATED ORAL at 08:15

## 2024-07-27 RX ADMIN — ENOXAPARIN SODIUM 40 MG: 100 INJECTION SUBCUTANEOUS at 08:15

## 2024-07-27 RX ADMIN — DOCUSATE SODIUM 50 MG AND SENNOSIDES 8.6 MG 2 TABLET: 8.6; 5 TABLET, FILM COATED ORAL at 08:15

## 2024-07-27 RX ADMIN — OXYCODONE 5 MG: 5 TABLET ORAL at 10:54

## 2024-07-27 ASSESSMENT — PAIN SCALES - GENERAL
PAINLEVEL_OUTOF10: 0
PAINLEVEL_OUTOF10: 10
PAINLEVEL_OUTOF10: 6

## 2024-07-27 ASSESSMENT — PAIN - FUNCTIONAL ASSESSMENT: PAIN_FUNCTIONAL_ASSESSMENT: ACTIVITIES ARE NOT PREVENTED

## 2024-07-27 ASSESSMENT — PAIN DESCRIPTION - LOCATION
LOCATION: SACRUM
LOCATION: BACK;GENERALIZED

## 2024-07-27 ASSESSMENT — PAIN DESCRIPTION - DESCRIPTORS
DESCRIPTORS: ACHING
DESCRIPTORS: ACHING;SORE

## 2024-07-27 ASSESSMENT — PAIN DESCRIPTION - ORIENTATION: ORIENTATION: RIGHT;LEFT

## 2024-07-27 NOTE — PROGRESS NOTES
Hourly rounds completed.  Pt had BM x2 overnight.  Declined turning most of shift.  Bed in low position, wheels locked, call light within reach.

## 2024-07-27 NOTE — PROGRESS NOTES
as cause of postoperative anemia    Bowel perforation (HCC)    Critical illness myopathy    Cachexia (HCC)    Unintentional weight loss    Adult failure to thrive    Encounter for palliative care  Resolved Problems:    Hypernatremia    MIRNA (acute kidney injury) (HCC)    UTI (urinary tract infection)    Septic shock (HCC)      Objective:   Patient Vitals for the past 24 hrs:   Temp Pulse Resp BP SpO2   07/27/24 0732 98.1 °F (36.7 °C) 100 18 101/79 95 %   07/26/24 2101 -- -- 18 -- --   07/26/24 2031 -- -- 20 -- --   07/26/24 1941 98.1 °F (36.7 °C) (!) 113 16 112/79 94 %   07/26/24 1535 98.1 °F (36.7 °C) 100 20 109/84 99 %         Oxygen Therapy  SpO2: 95 %  Pulse Oximetry Type: Intermittent  Pulse via Oximetry: 128 beats per minute  Pulse Oximeter Device Mode: Intermittent  Pulse Oximeter Device Location: Finger  O2 Device: None (Room air)  Skin Assessment: Clean, dry, & intact  O2 Flow Rate (L/min): 1 L/min  Oxygen Therapy: None (Room air)    Estimated body mass index is 14.2 kg/m² as calculated from the following:    Height as of this encounter: 1.905 m (6' 3\").    Weight as of this encounter: 51.5 kg (113 lb 9.6 oz).    Intake/Output Summary (Last 24 hours) at 7/27/2024 0838  Last data filed at 7/27/2024 0327  Gross per 24 hour   Intake 120 ml   Output 2650 ml   Net -2530 ml           Physical Exam:   General:    Frail, chronically ill appearing    Eyes:  Sclerae appear normal.  Pupils equally round.  CV:  RRR  Lungs:   CTAB.   Symmetric expansion.  Abdomen:   Soft, nontender, nondistended.   Skin:      He has onychomycoses to fingernails on his hands and toes. He has flaky yellow plaques to his bilateral feet on the plantar aspects   Psych:  Flat affect.      I have personally reviewed labs and tests:  Recent Labs:  Recent Results (from the past 48 hour(s))   Basic Metabolic Panel w/ Reflex to MG    Collection Time: 07/26/24 10:23 AM   Result Value Ref Range    Sodium 137 136 - 145 mmol/L    Potassium 4.0 3.5 -  5.1 mmol/L    Chloride 99 98 - 107 mmol/L    CO2 29 (H) 20 - 28 mmol/L    Anion Gap 9 9 - 18 mmol/L    Glucose 115 (H) 70 - 99 mg/dL    BUN 16 6 - 23 MG/DL    Creatinine 0.64 (L) 0.80 - 1.30 MG/DL    Est, Glom Filt Rate >90 >60 ml/min/1.73m2    Calcium 8.9 8.8 - 10.2 MG/DL   CBC with Auto Differential    Collection Time: 07/26/24 10:23 AM   Result Value Ref Range    WBC 6.8 4.3 - 11.1 K/uL    RBC 3.47 (L) 4.23 - 5.6 M/uL    Hemoglobin 10.5 (L) 13.6 - 17.2 g/dL    Hematocrit 33.8 (L) 41.1 - 50.3 %    MCV 97.4 82 - 102 FL    MCH 30.3 26.1 - 32.9 PG    MCHC 31.1 (L) 31.4 - 35.0 g/dL    RDW 13.6 11.9 - 14.6 %    Platelets 251 150 - 450 K/uL    MPV 9.3 (L) 9.4 - 12.3 FL    nRBC 0.00 0.0 - 0.2 K/uL    Differential Type AUTOMATED      Neutrophils % 70 43 - 78 %    Lymphocytes % 21 13 - 44 %    Monocytes % 7 4.0 - 12.0 %    Eosinophils % 2 0.5 - 7.8 %    Basophils % 0 0.0 - 2.0 %    Immature Granulocytes % 0 0.0 - 5.0 %    Neutrophils Absolute 4.8 1.7 - 8.2 K/UL    Lymphocytes Absolute 1.4 0.5 - 4.6 K/UL    Monocytes Absolute 0.5 0.1 - 1.3 K/UL    Eosinophils Absolute 0.1 0.0 - 0.8 K/UL    Basophils Absolute 0.0 0.0 - 0.2 K/UL    Immature Granulocytes Absolute 0.0 0.0 - 0.5 K/UL         No results for input(s): \"COVID19\" in the last 72 hours.    Current Meds:  Current Facility-Administered Medications   Medication Dose Route Frequency    LORazepam (ATIVAN) tablet 0.5 mg  0.5 mg Oral Q6H PRN    diphenhydrAMINE (BENADRYL) injection 25 mg  25 mg IntraVENous Q6H PRN    pantoprazole (PROTONIX) tablet 40 mg  40 mg Oral Nightly    oxyCODONE (ROXICODONE) immediate release tablet 5 mg  5 mg Oral Q4H PRN    cyclobenzaprine (FLEXERIL) tablet 10 mg  10 mg Oral TID PRN    traZODone (DESYREL) tablet 50 mg  50 mg Oral Nightly PRN    acetaminophen (TYLENOL) tablet 650 mg  650 mg Oral Q4H PRN    sennosides-docusate sodium (SENOKOT-S) 8.6-50 MG tablet 2 tablet  2 tablet Oral Daily    metoprolol tartrate (LOPRESSOR) tablet 25 mg  25 mg Oral

## 2024-07-27 NOTE — PLAN OF CARE
Problem: Discharge Planning  Goal: Discharge to home or other facility with appropriate resources  Outcome: Progressing  Flowsheets (Taken 7/27/2024 1901)  Discharge to home or other facility with appropriate resources:   Identify barriers to discharge with patient and caregiver   Arrange for needed discharge resources and transportation as appropriate   Identify discharge learning needs (meds, wound care, etc)   Refer to discharge planning if patient needs post-hospital services based on physician order or complex needs related to functional status, cognitive ability or social support system     Problem: Safety - Adult  Goal: Free from fall injury  Outcome: Progressing  Flowsheets (Taken 7/27/2024 1901)  Free From Fall Injury: Instruct family/caregiver on patient safety     Problem: Neurosensory - Adult  Goal: Achieves stable or improved neurological status  Outcome: Progressing  Flowsheets (Taken 7/27/2024 1901)  Achieves stable or improved neurological status: Assess for and report changes in neurological status     Problem: Musculoskeletal - Adult  Goal: Return mobility to safest level of function  Outcome: Progressing  Flowsheets (Taken 7/27/2024 1901)  Return Mobility to Safest Level of Function: Assess patient stability and activity tolerance for standing, transferring and ambulating with or without assistive devices     Problem: Skin/Tissue Integrity  Goal: Absence of new skin breakdown  Description: 1.  Monitor for areas of redness and/or skin breakdown  2.  Assess vascular access sites hourly  3.  Every 4-6 hours minimum:  Change oxygen saturation probe site  4.  Every 4-6 hours:  If on nasal continuous positive airway pressure, respiratory therapy assess nares and determine need for appliance change or resting period.  Outcome: Progressing     Problem: Pain  Goal: Verbalizes/displays adequate comfort level or baseline comfort level  Outcome: Progressing  Flowsheets (Taken 7/27/2024

## 2024-07-28 PROCEDURE — 1100000000 HC RM PRIVATE

## 2024-07-28 PROCEDURE — 6370000000 HC RX 637 (ALT 250 FOR IP): Performed by: INTERNAL MEDICINE

## 2024-07-28 PROCEDURE — 6370000000 HC RX 637 (ALT 250 FOR IP)

## 2024-07-28 PROCEDURE — 6370000000 HC RX 637 (ALT 250 FOR IP): Performed by: PHYSICIAN ASSISTANT

## 2024-07-28 PROCEDURE — 6360000002 HC RX W HCPCS: Performed by: SURGERY

## 2024-07-28 PROCEDURE — 6370000000 HC RX 637 (ALT 250 FOR IP): Performed by: NURSE PRACTITIONER

## 2024-07-28 RX ADMIN — OXYCODONE 5 MG: 5 TABLET ORAL at 20:20

## 2024-07-28 RX ADMIN — DOCUSATE SODIUM 50 MG AND SENNOSIDES 8.6 MG 2 TABLET: 8.6; 5 TABLET, FILM COATED ORAL at 08:42

## 2024-07-28 RX ADMIN — ENOXAPARIN SODIUM 40 MG: 100 INJECTION SUBCUTANEOUS at 08:42

## 2024-07-28 RX ADMIN — TRAZODONE HYDROCHLORIDE 50 MG: 50 TABLET ORAL at 20:20

## 2024-07-28 RX ADMIN — METOPROLOL TARTRATE 25 MG: 25 TABLET, FILM COATED ORAL at 08:42

## 2024-07-28 RX ADMIN — CYCLOBENZAPRINE 10 MG: 10 TABLET, FILM COATED ORAL at 01:08

## 2024-07-28 RX ADMIN — METOPROLOL TARTRATE 25 MG: 25 TABLET, FILM COATED ORAL at 20:20

## 2024-07-28 RX ADMIN — PANTOPRAZOLE SODIUM 40 MG: 40 TABLET, DELAYED RELEASE ORAL at 20:20

## 2024-07-28 ASSESSMENT — PAIN DESCRIPTION - LOCATION: LOCATION: BACK;GENERALIZED

## 2024-07-28 ASSESSMENT — PAIN SCALES - GENERAL
PAINLEVEL_OUTOF10: 7
PAINLEVEL_OUTOF10: 4

## 2024-07-28 ASSESSMENT — PAIN DESCRIPTION - DESCRIPTORS: DESCRIPTORS: ACHING

## 2024-07-28 ASSESSMENT — PAIN DESCRIPTION - ORIENTATION: ORIENTATION: POSTERIOR

## 2024-07-28 ASSESSMENT — PAIN - FUNCTIONAL ASSESSMENT: PAIN_FUNCTIONAL_ASSESSMENT: PREVENTS OR INTERFERES WITH MANY ACTIVE NOT PASSIVE ACTIVITIES

## 2024-07-28 NOTE — PLAN OF CARE
Problem: Discharge Planning  Goal: Discharge to home or other facility with appropriate resources  Outcome: Progressing  Flowsheets (Taken 7/28/2024 1903)  Discharge to home or other facility with appropriate resources:   Identify barriers to discharge with patient and caregiver   Arrange for needed discharge resources and transportation as appropriate   Identify discharge learning needs (meds, wound care, etc)   Refer to discharge planning if patient needs post-hospital services based on physician order or complex needs related to functional status, cognitive ability or social support system     Problem: Safety - Adult  Goal: Free from fall injury  Outcome: Progressing  Flowsheets (Taken 7/28/2024 1903)  Free From Fall Injury: Instruct family/caregiver on patient safety     Problem: Neurosensory - Adult  Goal: Achieves stable or improved neurological status  Outcome: Progressing  Flowsheets (Taken 7/28/2024 1903)  Achieves stable or improved neurological status: Assess for and report changes in neurological status     Problem: Musculoskeletal - Adult  Goal: Return mobility to safest level of function  Outcome: Progressing  Flowsheets (Taken 7/28/2024 1903)  Return Mobility to Safest Level of Function: Assess patient stability and activity tolerance for standing, transferring and ambulating with or without assistive devices     Problem: Skin/Tissue Integrity  Goal: Absence of new skin breakdown  Description: 1.  Monitor for areas of redness and/or skin breakdown  2.  Assess vascular access sites hourly  3.  Every 4-6 hours minimum:  Change oxygen saturation probe site  4.  Every 4-6 hours:  If on nasal continuous positive airway pressure, respiratory therapy assess nares and determine need for appliance change or resting period.  Outcome: Progressing     Problem: Pain  Goal: Verbalizes/displays adequate comfort level or baseline comfort level  Outcome: Progressing     Problem: Skin/Tissue Integrity - Adult  Goal:

## 2024-07-28 NOTE — PROGRESS NOTES
Hospitalist Progress Note   Admit Date:  2024  4:30 PM   Name:  Brendan Saleh   Age:  49 y.o.  Sex:  male  :  1975   MRN:  [id number]   Room:  [facility]/    Presenting/Chief Complaint: Altered Mental Status     Reason(s) for Admission: Acute hypernatremia [E87.0]  Volume depletion [E86.9]  Acute kidney injury (HCC) [N17.9]  Acute encephalopathy [G93.40]  Septic shock (HCC) [A41.9, R65.21]     Hospital Course:   Mr. Saleh is a 48 y.o. male with a PMH of SDH s/p poonam hole 2024, hydrocephalus s/p  shunt, sacral decubitus stage IV, who originally presented to the ER  with chief complaint of increased confusion.     He was diagnosed with UTI with hematuria and started on course of vancomycin, Zosyn.  Urine culture finalized negative.   blood cultures with CoNS, determined contaminant.  However, he met sepsis criteria with WBC 14.6, HR 120s.  ID consulted to assist.  ECHO showed preserved EF.  Underwent sacral wound debridement on  with General Surgery, Dr. Zapien.     CT head with ongoing hydrocephalus and a  shunt.  NSGY consulted.  Patient underwent right ventricular peritoneal shunt removal on  due to non-functioning shunt.   Per Dr. Ortiz, patient needs a stepwise approach, and stated shunt replacement may be in his future though would need to wait at least 4 weeks.  Neurosurgery followed up  and said will be unable to replace  shunt while patient has active open wounds due to high risk for infection.      ID returned to the case for management of CSF infection as CSF culture and  shunt tip culture grew ESBL Klebsiella and Serratia marcescens.  Antibiotics changed to IV cipro and furthermore to oral cipro for 14 days post shunt removal EOT 24.  ID signed off on .     His hospital stay was further complicated by a bowel obstruction on May 19, noted on CT after patient vomited fecal material.  He underwent hemicolectomy and primary anastomosis on  with

## 2024-07-29 LAB
ANION GAP SERPL CALC-SCNC: 8 MMOL/L (ref 9–18)
BUN SERPL-MCNC: 16 MG/DL (ref 6–23)
CALCIUM SERPL-MCNC: 8.9 MG/DL (ref 8.8–10.2)
CHLORIDE SERPL-SCNC: 99 MMOL/L (ref 98–107)
CO2 SERPL-SCNC: 29 MMOL/L (ref 20–28)
CREAT SERPL-MCNC: 0.52 MG/DL (ref 0.8–1.3)
ERYTHROCYTE [DISTWIDTH] IN BLOOD BY AUTOMATED COUNT: 13.4 % (ref 11.9–14.6)
GLUCOSE SERPL-MCNC: 91 MG/DL (ref 70–99)
HCT VFR BLD AUTO: 34.1 % (ref 41.1–50.3)
HGB BLD-MCNC: 10.3 G/DL (ref 13.6–17.2)
MAGNESIUM SERPL-MCNC: 1.8 MG/DL (ref 1.8–2.4)
MCH RBC QN AUTO: 29.4 PG (ref 26.1–32.9)
MCHC RBC AUTO-ENTMCNC: 30.2 G/DL (ref 31.4–35)
MCV RBC AUTO: 97.4 FL (ref 82–102)
NRBC # BLD: 0 K/UL (ref 0–0.2)
PHOSPHATE SERPL-MCNC: 4.5 MG/DL (ref 2.5–4.5)
PLATELET # BLD AUTO: 243 K/UL (ref 150–450)
PMV BLD AUTO: 9.6 FL (ref 9.4–12.3)
POTASSIUM SERPL-SCNC: 4.2 MMOL/L (ref 3.5–5.1)
RBC # BLD AUTO: 3.5 M/UL (ref 4.23–5.6)
SODIUM SERPL-SCNC: 136 MMOL/L (ref 136–145)
WBC # BLD AUTO: 6.1 K/UL (ref 4.3–11.1)

## 2024-07-29 PROCEDURE — 80048 BASIC METABOLIC PNL TOTAL CA: CPT

## 2024-07-29 PROCEDURE — 6370000000 HC RX 637 (ALT 250 FOR IP): Performed by: PHYSICIAN ASSISTANT

## 2024-07-29 PROCEDURE — 97110 THERAPEUTIC EXERCISES: CPT

## 2024-07-29 PROCEDURE — 85027 COMPLETE CBC AUTOMATED: CPT

## 2024-07-29 PROCEDURE — 84100 ASSAY OF PHOSPHORUS: CPT

## 2024-07-29 PROCEDURE — 83735 ASSAY OF MAGNESIUM: CPT

## 2024-07-29 PROCEDURE — 6370000000 HC RX 637 (ALT 250 FOR IP): Performed by: NURSE PRACTITIONER

## 2024-07-29 PROCEDURE — 36415 COLL VENOUS BLD VENIPUNCTURE: CPT

## 2024-07-29 PROCEDURE — 97530 THERAPEUTIC ACTIVITIES: CPT

## 2024-07-29 PROCEDURE — 1100000000 HC RM PRIVATE

## 2024-07-29 PROCEDURE — 6360000002 HC RX W HCPCS: Performed by: SURGERY

## 2024-07-29 PROCEDURE — 97112 NEUROMUSCULAR REEDUCATION: CPT

## 2024-07-29 PROCEDURE — 6370000000 HC RX 637 (ALT 250 FOR IP)

## 2024-07-29 RX ADMIN — METOPROLOL TARTRATE 25 MG: 25 TABLET, FILM COATED ORAL at 09:44

## 2024-07-29 RX ADMIN — DOCUSATE SODIUM 50 MG AND SENNOSIDES 8.6 MG 2 TABLET: 8.6; 5 TABLET, FILM COATED ORAL at 09:44

## 2024-07-29 RX ADMIN — METOPROLOL TARTRATE 25 MG: 25 TABLET, FILM COATED ORAL at 21:54

## 2024-07-29 RX ADMIN — PANTOPRAZOLE SODIUM 40 MG: 40 TABLET, DELAYED RELEASE ORAL at 21:54

## 2024-07-29 RX ADMIN — ENOXAPARIN SODIUM 40 MG: 100 INJECTION SUBCUTANEOUS at 09:48

## 2024-07-29 ASSESSMENT — PAIN SCALES - GENERAL: PAINLEVEL_OUTOF10: 0

## 2024-07-29 NOTE — PROGRESS NOTES
Hospitalist Progress Note   Admit Date:  2024  4:30 PM   Name:  Brendan Saleh   Age:  49 y.o.  Sex:  male  :  1975   MRN:  [id number]   Room:  [facility]/    Presenting/Chief Complaint: Altered Mental Status     Reason(s) for Admission: Acute hypernatremia [E87.0]  Volume depletion [E86.9]  Acute kidney injury (HCC) [N17.9]  Acute encephalopathy [G93.40]  Septic shock (HCC) [A41.9, R65.21]     Hospital Course:   Mr. Saleh is a 48 y.o. male with a PMH of SDH s/p poonam hole 2024, hydrocephalus s/p  shunt, sacral decubitus stage IV, who originally presented to the ER  with chief complaint of increased confusion.     He was diagnosed with UTI with hematuria and started on course of vancomycin, Zosyn.  Urine culture finalized negative.   blood cultures with CoNS, determined contaminant.  However, he met sepsis criteria with WBC 14.6, HR 120s.  ID consulted to assist.  ECHO showed preserved EF.  Underwent sacral wound debridement on  with General Surgery, Dr. Zapien.     CT head with ongoing hydrocephalus and a  shunt.  NSGY consulted.  Patient underwent right ventricular peritoneal shunt removal on  due to non-functioning shunt.   Per Dr. Ortiz, patient needs a stepwise approach, and stated shunt replacement may be in his future though would need to wait at least 4 weeks.  Neurosurgery followed up  and said will be unable to replace  shunt while patient has active open wounds due to high risk for infection.      ID returned to the case for management of CSF infection as CSF culture and  shunt tip culture grew ESBL Klebsiella and Serratia marcescens.  Antibiotics changed to IV cipro and furthermore to oral cipro for 14 days post shunt removal EOT 24.  ID signed off on .     His hospital stay was further complicated by a bowel obstruction on May 19, noted on CT after patient vomited fecal material.  He underwent hemicolectomy and primary anastomosis on  with

## 2024-07-29 NOTE — PROGRESS NOTES
Hourly rounds completed.  Uneventful shift, pt rested well.  Pain managed per MAR.  Bed in low position, wheels locked, call light within reach.

## 2024-07-29 NOTE — PROGRESS NOTES
ACUTE PHYSICAL THERAPY GOALS:   (Developed with and agreed upon by patient and/or caregiver.)  PT goals re-assessed during re-evaluation on 7/24/24     (1.)Mr. Saleh will move from supine to sit and sit to supine  in bed with MODERATE ASSIST within 3 treatment day(s).  (2.)Mr. Saleh will scoot up and down in bed with MODERATE ASSIST within 3 treatment day(s).    (3.)Mr. Saleh will roll side to side in bed with MINIMAL ASSIST within 3 treatment day(s).    (4.)Mr. Saleh will tolerate sitting up in recliner chair for 2 hours with stable vital signs to increase upright tolerance within 3 treatment day(s).  (MET 7/24/24)  (5.)Mr. Saleh will maintain static/dynamic sitting x 25 minutes with MINIMAL ASSIST for improved balance within 3 treatment days.  (6.)Mr. Saleh will follow 75% of commands for increased participation in therapeutic activity/exercise within 3 treatment days.(MET 7/24/24)     LTG:  (1.) Brendan Saleh  will move from supine to sit and sit to supine and scoot up and down with MINIMAL ASSIST within 7 treatment day(s).    (2.)Mr. Saleh will roll side to side in bed with CONTACT GUARD ASSIST within 7 treatment day(s).    (3.) Brendan Saleh will transfer from bed to chair and chair to bed with MODERATE ASSISTx1 using the least restrictive device within 7 treatment day(s).    (4.) Brendan Saleh will perform therapeutic exercises x 20 min for HEP with CONTACT GUARD ASSIST to improve strength, endurance, and functional mobility within 7 treatment day(s). (MET 7/24/24)  (5.) Brendan Saleh will perform seated static and dynamic balance activities x25 minutes with CONTACT GUARD ASSIST to improve safety within 7 treatment day(s).  (6.) Brendan Saleh will perform standing static and dynamic balance activities x 5 minutes with MODERATE ASSIST to improve safety and mobility within 7 treatment day(s).   (7.)Brendan Saleh will be able to transfer bed to chair/wheelchair and  Activity (45 Minutes): Therapeutic activity included Rolling, Supine to Sit, Scooting, Lateral Scooting, Transfer Training, and wheelchair mobility, sliding board transfer to improve functional Activity tolerance and Mobility.    TREATMENT GRID:  N/A    AFTER TREATMENT PRECAUTIONS: Alarm Activated, Bed/Chair Locked, Call light within reach, Chair, Needs within reach, and RN notified    INTERDISCIPLINARY COLLABORATION:  RN/ PCT and OT/ POND    EDUCATION:      TIME IN/OUT:  Time In: 1125  Time Out: 1210  Minutes: 45    Fe Aburto, PTA

## 2024-07-29 NOTE — PROGRESS NOTES
ACUTE OCCUPATIONAL THERAPY GOALS: GOALS REVIEWED AND UPDATED AT RE-EVALUATION ON 7/18/24  (Developed with and agreed upon by patient and/or caregiver.)  1. Patient will complete lower body bathing and dressing with MAXIMAL ASSIST and adaptive equipment as needed.     3. Patient will complete grooming ADL in unsupported sitting with MINIMAL ASSIST.  4. Patient will tolerate 25 minutes of OT treatment with 1-2 rest breaks to increase activity tolerance for ADLs.   5. Patient will complete functional transfers with MODERATE ASSIST and adaptive equipment as needed.   6. Patient will tolerate 10 minutes BUE exercises to increase strength for safe, functional transfers.   2. Patient will complete upper body bathing and dressing with STAND BY ASSIST and adaptive equipment as needed.   GOAL MET   8. Patient will tolerate 10 minutes unsupported sitting balance edge of bed with CONTACT GUARD ASSIST in preparation for ADL performance.   9. Patient will tolerate static standing task for >30 seconds with MAXIMAL ASSIST in preparation for ADL tasks and to decrease caregiver burden.    Timeframe: 7 visits   OCCUPATIONAL THERAPY: Daily Note AM   OT Visit Days: 5   Time In/Out  OT Charge Capture  Rehab Caseload Tracker  OT Orders    Brendan Saleh is a 49 y.o. male   PRIMARY DIAGNOSIS: Infection of  (ventriculoperitoneal) shunt (HCC)  Acute hypernatremia [E87.0]  Volume depletion [E86.9]  Acute kidney injury (HCC) [N17.9]  Acute encephalopathy [G93.40]  Septic shock (HCC) [A41.9, R65.21]  Procedure(s) (LRB):  LAPAROTOMY EXPLORATORY, PRIMARY CLOSURE SMALL BOWEL PERFORATION (N/A)  58 Days Post-Op  Inpatient: Payor: [employer] / Plan: [employer] / Product Type: *No Product type* /     ASSESSMENT:     REHAB RECOMMENDATIONS:   Recommendation to date pending progress:  Setting:  Pt continues to be medically stable for discharge, however awaiting medicaid/ social security disability    Pt would benefit from rehab when able to be    I=Independent, Mod I=Modified Independent, S=Supervision/Setup, SBA=Standby Assistance, CGA=Contact Guard Assistance, Min=Minimal Assistance, Mod=Moderate Assistance, Max=Maximal Assistance, Total=Total Assistance, NT=Not Tested    ACTIVITIES OF DAILY LIVING: I Mod I S SBA CGA Min Mod Max Total NT Comments   BASIC ADLs:              Upper Body   Bathing [] [] [] [] [] [] [] [] [] [x]     Lower Body Bathing [] [] [] [] [] [] [] [] [] [x]     Toileting [] [] [] [] [] [] [] [] [] [x]    Upper Body Dressing [] [] [] [] [] [] [] [] [] [x]    Lower Body Dressing [] [] [] [] [] [] [] [] [] [x]    Feeding [] [] [] [] [] [] [] [] [] [x]    Grooming [] [] [] [] [] [] [] [] [] [x]    Personal Device Care [] [] [] [] [] [] [] [] [] [x]    Functional Mobility [] [] [] [] [] [] [] [x] [] [] X 2 slideboard transfer bed<>wheelchair, wheelchair mobility   I=Independent, Mod I=Modified Independent, S=Supervision/Setup, SBA=Standby Assistance, CGA=Contact Guard Assistance, Min=Minimal Assistance, Mod=Moderate Assistance, Max=Maximal Assistance, Total=Total Assistance, NT=Not Tested    BALANCE: Good Fair+ Fair Fair- Poor NT Comments   Sitting Static [] [] [x] [] [] []    Sitting Dynamic [] [] [x] [x] [] []              Standing Static [] [] [] [] [x] []    Standing Dynamic [] [] [] [] [x] []        PLAN:     FREQUENCY/DURATION   OT Plan of Care: 3 times/week for duration of hospital stay or until stated goals are met, whichever comes first.    TREATMENT:     TREATMENT:   Co-Treatment PT/OT necessary due to patient's decreased overall endurance/tolerance levels, as well as need for high level skilled assistance to complete functional transfers/mobility and functional tasks  Therapeutic Exercise (15 Minutes): Therapeutic exercises noted below to improve functional activity tolerance, AROM, strength, and mobility.   Neuromuscular Re-education (30 Minutes): Patient participated in neuromuscular re-education including functional reaching,

## 2024-07-29 NOTE — PROGRESS NOTES
Patient in bed, alert and oriented, denies pain. Urinating via male wick, clear, yellow urine. Sacrum wound, dressing, clean, dry and intact. Bed low position, bed alarm on

## 2024-07-30 PROCEDURE — 6370000000 HC RX 637 (ALT 250 FOR IP): Performed by: NURSE PRACTITIONER

## 2024-07-30 PROCEDURE — 6360000002 HC RX W HCPCS: Performed by: SURGERY

## 2024-07-30 PROCEDURE — 6370000000 HC RX 637 (ALT 250 FOR IP): Performed by: PHYSICIAN ASSISTANT

## 2024-07-30 PROCEDURE — 1100000000 HC RM PRIVATE

## 2024-07-30 RX ADMIN — PANTOPRAZOLE SODIUM 40 MG: 40 TABLET, DELAYED RELEASE ORAL at 21:16

## 2024-07-30 RX ADMIN — ENOXAPARIN SODIUM 40 MG: 100 INJECTION SUBCUTANEOUS at 09:30

## 2024-07-30 RX ADMIN — METOPROLOL TARTRATE 25 MG: 25 TABLET, FILM COATED ORAL at 21:16

## 2024-07-30 ASSESSMENT — PAIN SCALES - GENERAL: PAINLEVEL_OUTOF10: 0

## 2024-07-30 NOTE — PROGRESS NOTES
Hospitalist Progress Note   Admit Date:  2024  4:30 PM   Name:  Brendan Saleh   Age:  49 y.o.  Sex:  male  :  1975   MRN:  [id number]   Room:  [facility]/    Presenting/Chief Complaint: Altered Mental Status     Reason(s) for Admission: Acute hypernatremia [E87.0]  Volume depletion [E86.9]  Acute kidney injury (HCC) [N17.9]  Acute encephalopathy [G93.40]  Septic shock (HCC) [A41.9, R65.21]     Hospital Course:   Mr. Saleh is a 48 y.o. male with a PMH of SDH s/p poonam hole 2024, hydrocephalus s/p  shunt, sacral decubitus stage IV, who originally presented to the ER  with chief complaint of increased confusion.     He was diagnosed with UTI with hematuria and started on course of vancomycin, Zosyn.  Urine culture finalized negative.   blood cultures with CoNS, determined contaminant.  However, he met sepsis criteria with WBC 14.6, HR 120s.  ID consulted to assist.  ECHO showed preserved EF.  Underwent sacral wound debridement on  with General Surgery, Dr. Zapien.     CT head with ongoing hydrocephalus and a  shunt.  NSGY consulted.  Patient underwent right ventricular peritoneal shunt removal on  due to non-functioning shunt.   Per Dr. Ortiz, patient needs a stepwise approach, and stated shunt replacement may be in his future though would need to wait at least 4 weeks.  Neurosurgery followed up  and said will be unable to replace  shunt while patient has active open wounds due to high risk for infection.      ID returned to the case for management of CSF infection as CSF culture and  shunt tip culture grew ESBL Klebsiella and Serratia marcescens.  Antibiotics changed to IV cipro and furthermore to oral cipro for 14 days post shunt removal EOT 24.  ID signed off on .     His hospital stay was further complicated by a bowel obstruction on May 19, noted on CT after patient vomited fecal material.  He underwent hemicolectomy and primary anastomosis on  with

## 2024-07-30 NOTE — PLAN OF CARE
Problem: Discharge Planning  Goal: Discharge to home or other facility with appropriate resources  7/30/2024 1702 by Mirian Gil RN  Outcome: Progressing  7/30/2024 0540 by Panfilo Newby RN  Outcome: Progressing     Problem: Safety - Adult  Goal: Free from fall injury  7/30/2024 1702 by Mirian Gil RN  Outcome: Progressing  7/30/2024 0540 by Panfilo Newby RN  Outcome: Progressing     Problem: Neurosensory - Adult  Goal: Achieves stable or improved neurological status  7/30/2024 1702 by Mirian Gil RN  Outcome: Progressing  7/30/2024 0540 by Panfilo Newby RN  Outcome: Progressing     Problem: Musculoskeletal - Adult  Goal: Return mobility to safest level of function  7/30/2024 1702 by Mirian Gil RN  Outcome: Progressing  7/30/2024 0540 by Panfilo Newby RN  Outcome: Progressing     Problem: Skin/Tissue Integrity  Goal: Absence of new skin breakdown  Description: 1.  Monitor for areas of redness and/or skin breakdown  2.  Assess vascular access sites hourly  3.  Every 4-6 hours minimum:  Change oxygen saturation probe site  4.  Every 4-6 hours:  If on nasal continuous positive airway pressure, respiratory therapy assess nares and determine need for appliance change or resting period.  7/30/2024 1702 by Mirian Gil RN  Outcome: Progressing  7/30/2024 0540 by Panfilo Newby RN  Outcome: Progressing     Problem: Pain  Goal: Verbalizes/displays adequate comfort level or baseline comfort level  7/30/2024 1702 by Mirian Gil RN  Outcome: Progressing  7/30/2024 0540 by Panfilo Newby RN  Outcome: Progressing     Problem: Skin/Tissue Integrity - Adult  Goal: Skin integrity remains intact  7/30/2024 1702 by Mirian Gil RN  Outcome: Progressing  7/30/2024 0540 by Panfilo Newby RN  Outcome: Progressing  Goal: Incisions, wounds, or drain sites healing without S/S of infection  7/30/2024 1702 by Mirian Gil RN  Outcome: Progressing  7/30/2024 0540 by Panfilo Newby RN  Outcome: Progressing      Problem: Gastrointestinal - Adult  Goal: Maintains adequate nutritional intake  7/30/2024 1702 by Mirian Gil RN  Outcome: Progressing  7/30/2024 0540 by Panfilo Newby RN  Outcome: Progressing     Problem: Genitourinary - Adult  Goal: Absence of urinary retention  7/30/2024 1702 by Mirian iGl RN  Outcome: Progressing  7/30/2024 0540 by Panfilo Newby RN  Outcome: Progressing     Problem: Infection - Adult  Goal: Absence of infection at discharge  7/30/2024 1702 by Mirian Gil RN  Outcome: Progressing  7/30/2024 0540 by Panfilo Newby RN  Outcome: Progressing  Goal: Absence of infection during hospitalization  7/30/2024 1702 by Mirian Gil RN  Outcome: Progressing  7/30/2024 0540 by Panfilo Newby RN  Outcome: Progressing     Problem: Metabolic/Fluid and Electrolytes - Adult  Goal: Electrolytes maintained within normal limits  7/30/2024 1702 by Mirian Gil RN  Outcome: Progressing  7/30/2024 0540 by Panfilo Newby RN  Outcome: Progressing  Goal: Hemodynamic stability and optimal renal function maintained  7/30/2024 1702 by Mirian Gil RN  Outcome: Progressing  7/30/2024 0540 by Panfilo Newby RN  Outcome: Progressing     Problem: Cardiovascular - Adult  Goal: Maintains optimal cardiac output and hemodynamic stability  7/30/2024 1702 by Mirian Gil RN  Outcome: Progressing  7/30/2024 0540 by Panfilo Newby RN  Outcome: Progressing     Problem: Hematologic - Adult  Goal: Maintains hematologic stability  7/30/2024 1702 by Mirian Gil RN  Outcome: Progressing  7/30/2024 0540 by Panfilo Newby RN  Outcome: Progressing

## 2024-07-30 NOTE — CARE COORDINATION
CM called and emailed Nayan from [employer] for possible social security contact number.Will try again tmrw.  CM call  office- [phone]  and automated message stated processing disability letters ti taking over 200 day. CM attempted to call pt aunt, Lesley, to inquire if pt has an online account to check status, [email or web address] and create account. . CM will continue to follow for disability letter.

## 2024-07-30 NOTE — PROGRESS NOTES
Patient in bed,alert and oriented. Male wick, draining, clear yellow urine. Wound care completed. Patient turned, to right side.. colace held, 4 bowel movement, metoprolol held per attending, blood pressure 108/80. Plan of care waiting on bed.   1444  Hourly rounding, patient requesting something to drink.

## 2024-07-31 ENCOUNTER — APPOINTMENT (OUTPATIENT)
Dept: CT IMAGING | Age: 49
End: 2024-07-31
Payer: COMMERCIAL

## 2024-07-31 PROCEDURE — 70450 CT HEAD/BRAIN W/O DYE: CPT

## 2024-07-31 PROCEDURE — 97110 THERAPEUTIC EXERCISES: CPT

## 2024-07-31 PROCEDURE — 97530 THERAPEUTIC ACTIVITIES: CPT

## 2024-07-31 PROCEDURE — 97112 NEUROMUSCULAR REEDUCATION: CPT

## 2024-07-31 PROCEDURE — 6370000000 HC RX 637 (ALT 250 FOR IP): Performed by: PHYSICIAN ASSISTANT

## 2024-07-31 PROCEDURE — 6370000000 HC RX 637 (ALT 250 FOR IP): Performed by: NURSE PRACTITIONER

## 2024-07-31 PROCEDURE — 6360000002 HC RX W HCPCS: Performed by: SURGERY

## 2024-07-31 PROCEDURE — 92526 ORAL FUNCTION THERAPY: CPT

## 2024-07-31 PROCEDURE — 1100000000 HC RM PRIVATE

## 2024-07-31 RX ADMIN — ENOXAPARIN SODIUM 40 MG: 100 INJECTION SUBCUTANEOUS at 08:47

## 2024-07-31 RX ADMIN — PANTOPRAZOLE SODIUM 40 MG: 40 TABLET, DELAYED RELEASE ORAL at 20:32

## 2024-07-31 RX ADMIN — METOPROLOL TARTRATE 25 MG: 25 TABLET, FILM COATED ORAL at 08:47

## 2024-07-31 RX ADMIN — METOPROLOL TARTRATE 25 MG: 25 TABLET, FILM COATED ORAL at 20:32

## 2024-07-31 NOTE — PROGRESS NOTES
Hospitalist Progress Note   Admit Date:  2024  4:30 PM   Name:  Brendan Saleh   Age:  49 y.o.  Sex:  male  :  1975   MRN:  [id number]   Room:  [facility]/    Presenting/Chief Complaint: Altered Mental Status     Reason(s) for Admission: Acute hypernatremia [E87.0]  Volume depletion [E86.9]  Acute kidney injury (HCC) [N17.9]  Acute encephalopathy [G93.40]  Septic shock (HCC) [A41.9, R65.21]     Hospital Course:   Mr. Saleh is a 48 y.o. male with a PMH of SDH s/p poonam hole 2024, hydrocephalus s/p  shunt, sacral decubitus stage IV, who originally presented to the ER  with chief complaint of increased confusion.     He was diagnosed with UTI with hematuria and started on course of vancomycin, Zosyn.  Urine culture finalized negative.   blood cultures with CoNS, determined contaminant.  However, he met sepsis criteria with WBC 14.6, HR 120s.  ID consulted to assist.  ECHO showed preserved EF.  Underwent sacral wound debridement on  with General Surgery, Dr. Zapien.     CT head with ongoing hydrocephalus and a  shunt.  NSGY consulted.  Patient underwent right ventricular peritoneal shunt removal on  due to non-functioning shunt.   Per Dr. Ortiz, patient needs a stepwise approach, and stated shunt replacement may be in his future though would need to wait at least 4 weeks.  Neurosurgery followed up  and said will be unable to replace  shunt while patient has active open wounds due to high risk for infection.      ID returned to the case for management of CSF infection as CSF culture and  shunt tip culture grew ESBL Klebsiella and Serratia marcescens.  Antibiotics changed to IV cipro and furthermore to oral cipro for 14 days post shunt removal EOT 24.  ID signed off on .     His hospital stay was further complicated by a bowel obstruction on May 19, noted on CT after patient vomited fecal material.  He underwent hemicolectomy and primary anastomosis on  with

## 2024-07-31 NOTE — PROGRESS NOTES
ACUTE OCCUPATIONAL THERAPY GOALS: GOALS REVIEWED AND UPDATED AT RE-EVALUATION ON 7/18/24  (Developed with and agreed upon by patient and/or caregiver.)  1. Patient will complete lower body bathing and dressing with MAXIMAL ASSIST and adaptive equipment as needed.     3. Patient will complete grooming ADL in unsupported sitting with MINIMAL ASSIST.  4. Patient will tolerate 25 minutes of OT treatment with 1-2 rest breaks to increase activity tolerance for ADLs.   5. Patient will complete functional transfers with MODERATE ASSIST and adaptive equipment as needed.   6. Patient will tolerate 10 minutes BUE exercises to increase strength for safe, functional transfers.   2. Patient will complete upper body bathing and dressing with STAND BY ASSIST and adaptive equipment as needed.   GOAL MET   8. Patient will tolerate 10 minutes unsupported sitting balance edge of bed with CONTACT GUARD ASSIST in preparation for ADL performance.   9. Patient will tolerate static standing task for >30 seconds with MAXIMAL ASSIST in preparation for ADL tasks and to decrease caregiver burden.    Timeframe: 7 visits   OCCUPATIONAL THERAPY: Daily Note PM   OT Visit Days: 6   Time In/Out  OT Charge Capture  Rehab Caseload Tracker  OT Orders    Brendan Saleh is a 49 y.o. male   PRIMARY DIAGNOSIS: Infection of  (ventriculoperitoneal) shunt (HCC)  Acute hypernatremia [E87.0]  Volume depletion [E86.9]  Acute kidney injury (HCC) [N17.9]  Acute encephalopathy [G93.40]  Septic shock (HCC) [A41.9, R65.21]  Procedure(s) (LRB):  LAPAROTOMY EXPLORATORY, PRIMARY CLOSURE SMALL BOWEL PERFORATION (N/A)  60 Days Post-Op  Inpatient: Payor: [employer] / Plan: [employer] / Product Type: *No Product type* /     ASSESSMENT:     REHAB RECOMMENDATIONS:   Recommendation to date pending progress:  Setting:  Pt continues to be medically stable for discharge, however awaiting medicaid/ social security disability    Pt would benefit from rehab when able to be

## 2024-07-31 NOTE — PROGRESS NOTES
patient tolerating upgrade well. Adequate levels of intake. Patient reports \"I'm hungry for anything\".    Recent Information/Background: Mr. Saleh is a 48 y.o. male with a PMH of SDH s/p poonam hole 2/2024, hydrocephalus s/p  shunt, sacral decubitus stage IV, who originally presented to the ER 5/6 with chief complaint of increased confusion. Since then his hospital course has been complicated by right ventricular peritoneal shunt removal, bowel obstruction secondary to colonic mass.     History of Present Injury/Illness: Mr. Saleh  has no past medical history on file.. He also  has a past surgical history that includes Pressure ulcer debridement (N/A, 5/8/2024); shunt removal (Right, 5/17/2024); Small intestine surgery (Right, 5/19/2024); and laparotomy (N/A, 6/1/2024).  Precautions/Allergies: Patient has no known allergies.     Observations:  Alertness: Alert  Voice: WFL  Speech: Intelligible  Expressive Language: Fluent and Able to communicate wants and needs  Receptive Language: Answers yes/no questions, Follows basic commands, and Appropriately responds to questions  Cognition: Able to recall recent events and medical history and Appropriately attends to clinician    Prior Dysphagia History: Patient followed extensively by speech therapy during this hospitalization 5/7/24-6/14/24, most recent recommendation for minced and moist solids/thin liquids which patient has been consuming since discharge from speech therapy caseload.  Prior Instrumental Assessment: None    Current Diet:  Soft and Bite-Sized  Thin Liquids    Respiratory Status: Room air    Pain:  Patient does not c/o pain  Pain intervention: None- No pain observed  Pain response: Patient satisfied    OBJECTIVE    Oral Motor Assessment: Labial: no impairment  Lingual: no impairment  Dentition: no upper dentition and no lower dentition  Oral Hygiene: adequate  Oropharyngeal Assessment: Patient agreeable to PO, mild difficulty self feeding therefore  1354  Time Out: 1412  Minutes: 18    Bertha Kern M.S., CCC-SLP   7/31/2024 2:22 PM

## 2024-08-01 PROCEDURE — 6370000000 HC RX 637 (ALT 250 FOR IP): Performed by: INTERNAL MEDICINE

## 2024-08-01 PROCEDURE — 1100000000 HC RM PRIVATE

## 2024-08-01 PROCEDURE — 97110 THERAPEUTIC EXERCISES: CPT

## 2024-08-01 PROCEDURE — 6370000000 HC RX 637 (ALT 250 FOR IP)

## 2024-08-01 PROCEDURE — 6370000000 HC RX 637 (ALT 250 FOR IP): Performed by: NURSE PRACTITIONER

## 2024-08-01 PROCEDURE — 6360000002 HC RX W HCPCS: Performed by: SURGERY

## 2024-08-01 PROCEDURE — 6370000000 HC RX 637 (ALT 250 FOR IP): Performed by: PHYSICIAN ASSISTANT

## 2024-08-01 RX ADMIN — OXYCODONE 5 MG: 5 TABLET ORAL at 09:37

## 2024-08-01 RX ADMIN — ENOXAPARIN SODIUM 40 MG: 100 INJECTION SUBCUTANEOUS at 09:38

## 2024-08-01 RX ADMIN — DOCUSATE SODIUM 50 MG AND SENNOSIDES 8.6 MG 2 TABLET: 8.6; 5 TABLET, FILM COATED ORAL at 09:39

## 2024-08-01 RX ADMIN — METOPROLOL TARTRATE 25 MG: 25 TABLET, FILM COATED ORAL at 13:29

## 2024-08-01 RX ADMIN — METOPROLOL TARTRATE 25 MG: 25 TABLET, FILM COATED ORAL at 20:41

## 2024-08-01 RX ADMIN — PANTOPRAZOLE SODIUM 40 MG: 40 TABLET, DELAYED RELEASE ORAL at 20:41

## 2024-08-01 ASSESSMENT — PAIN DESCRIPTION - ORIENTATION: ORIENTATION: RIGHT;LEFT

## 2024-08-01 ASSESSMENT — PAIN DESCRIPTION - DESCRIPTORS: DESCRIPTORS: SHARP

## 2024-08-01 ASSESSMENT — PAIN DESCRIPTION - LOCATION: LOCATION: SACRUM

## 2024-08-01 ASSESSMENT — PAIN SCALES - GENERAL: PAINLEVEL_OUTOF10: 7

## 2024-08-01 NOTE — PROGRESS NOTES
Hospitalist Progress Note   Admit Date:  2024  4:30 PM   Name:  Brendan Saleh   Age:  49 y.o.  Sex:  male  :  1975   MRN:  [id number]   Room:  [facility]/    Presenting/Chief Complaint: Altered Mental Status     Reason(s) for Admission: Acute hypernatremia [E87.0]  Volume depletion [E86.9]  Acute kidney injury (HCC) [N17.9]  Acute encephalopathy [G93.40]  Septic shock (HCC) [A41.9, R65.21]     Hospital Course:   Mr. Saleh is a 48 y.o. male with a PMH of SDH s/p poonam hole 2024, hydrocephalus s/p  shunt, sacral decubitus stage IV, who originally presented to the ER  with chief complaint of increased confusion.     He was diagnosed with UTI with hematuria and started on course of vancomycin, Zosyn.  Urine culture finalized negative.   blood cultures with CoNS, determined contaminant.  However, he met sepsis criteria with WBC 14.6, HR 120s.  ID consulted to assist.  ECHO showed preserved EF.  Underwent sacral wound debridement on  with General Surgery, Dr. Zapien.     CT head with ongoing hydrocephalus and a  shunt.  NSGY consulted.  Patient underwent right ventricular peritoneal shunt removal on  due to non-functioning shunt.   Per Dr. Ortiz, patient needs a stepwise approach, and stated shunt replacement may be in his future though would need to wait at least 4 weeks.  Neurosurgery followed up  and said will be unable to replace  shunt while patient has active open wounds due to high risk for infection.      ID returned to the case for management of CSF infection as CSF culture and  shunt tip culture grew ESBL Klebsiella and Serratia marcescens.  Antibiotics changed to IV cipro and furthermore to oral cipro for 14 days post shunt removal EOT 24.  ID signed off on .     His hospital stay was further complicated by a bowel obstruction on May 19, noted on CT after patient vomited fecal material.  He underwent hemicolectomy and primary anastomosis on  with   IntraVENous PRN    glucose chewable tablet 16 g  4 tablet Oral PRN    dextrose bolus 10% 125 mL  125 mL IntraVENous PRN    Or    dextrose bolus 10% 250 mL  250 mL IntraVENous PRN    Glucagon Emergency KIT 1 mg  1 mg SubCUTAneous PRN    dextrose 10 % infusion   IntraVENous Continuous PRN    medicated lip ointment (BLISTEX)   Topical PRN    ondansetron (ZOFRAN-ODT) disintegrating tablet 4 mg  4 mg Oral Q8H PRN    Or    ondansetron (ZOFRAN) injection 4 mg  4 mg IntraVENous Q6H PRN    enoxaparin (LOVENOX) injection 40 mg  40 mg SubCUTAneous Daily    calcium carbonate (TUMS) chewable tablet 500 mg  500 mg Oral TID PRN    guaiFENesin-dextromethorphan (ROBITUSSIN DM) 100-10 MG/5ML syrup 5 mL  5 mL Oral Q4H PRN       Signed:  Kain Strong MD    Part of this note may have been written by using a voice dictation software.  The note has been proof read but may still contain some grammatical/other typographical errors.

## 2024-08-01 NOTE — PROGRESS NOTES
wheelchair/chair to bed via scooting with MODERATE ASSIST to increase mobility possibilities in 7 treatment days.   (8). Brendan Saleh will self propel wheelchair x100ft with STAND BY ASSISTANCE to demonstrate increase in UE strength and independence in mobility.    PHYSICAL THERAPY: Daily Note AM   (Link to Caseload Tracking: PT Visit Days : 3  Time In/Out PT Charge Capture  Rehab Caseload Tracker  Orders    Brendan Saleh is a 49 y.o. male   PRIMARY DIAGNOSIS: Infection of  (ventriculoperitoneal) shunt (HCC)  Acute hypernatremia [E87.0]  Volume depletion [E86.9]  Acute kidney injury (HCC) [N17.9]  Acute encephalopathy [G93.40]  Septic shock (HCC) [A41.9, R65.21]  Procedure(s) (LRB):  LAPAROTOMY EXPLORATORY, PRIMARY CLOSURE SMALL BOWEL PERFORATION (N/A)  61 Days Post-Op  Inpatient: Payor: [employer] / Plan: [employer] / Product Type: *No Product type* /     ASSESSMENT:     REHAB RECOMMENDATIONS:   Recommendation to date pending progress:  Setting:  Short-term Rehab    Equipment:    To Be Determined     ASSESSMENT:  Mr. Saleh was supine in bed on arrival. He is having increased pain in his buttocks area today so transfers were avoided. Performed B LE and UE exercises while supine. Rolled left and right for linen adjustments. Pt left on right side for pressure relief. Slow progress but pt very willing to work.        SUBJECTIVE:   Mr. Saleh states, \"My buttcrack hurts\"     Social/Functional Lives With: Alone  Type of Home: Trailer  Home Layout: One level  Home Access: Stairs to enter with rails  Entrance Stairs - Number of Steps: 5  Entrance Stairs - Rails: Both  OBJECTIVE:     PAIN: VITALS / O2: PRECAUTION / LINES / DRAINS:   Pre Treatment:    0/10      Post Treatment: 0/10 Vitals        Oxygen    External Catheter    RESTRICTIONS/PRECAUTIONS:  Restrictions/Precautions  Restrictions/Precautions: Fall Risk, Bed Alarm  Restrictions/Precautions: Fall Risk, Bed Alarm     MOBILITY: I Mod I S SBA CGA Min Mod

## 2024-08-01 NOTE — PLAN OF CARE
Problem: Discharge Planning  Goal: Discharge to home or other facility with appropriate resources  Outcome: Adequate for Discharge     Problem: Safety - Adult  Goal: Free from fall injury  Outcome: Adequate for Discharge     Problem: Neurosensory - Adult  Goal: Achieves stable or improved neurological status  Outcome: Adequate for Discharge     Problem: Musculoskeletal - Adult  Goal: Return mobility to safest level of function  Outcome: Adequate for Discharge     Problem: Skin/Tissue Integrity  Goal: Absence of new skin breakdown  Description: 1.  Monitor for areas of redness and/or skin breakdown  2.  Assess vascular access sites hourly  3.  Every 4-6 hours minimum:  Change oxygen saturation probe site  4.  Every 4-6 hours:  If on nasal continuous positive airway pressure, respiratory therapy assess nares and determine need for appliance change or resting period.  Outcome: Adequate for Discharge     Problem: Pain  Goal: Verbalizes/displays adequate comfort level or baseline comfort level  Outcome: Adequate for Discharge     Problem: Skin/Tissue Integrity - Adult  Goal: Skin integrity remains intact  Outcome: Adequate for Discharge  Goal: Incisions, wounds, or drain sites healing without S/S of infection  Outcome: Adequate for Discharge     Problem: Gastrointestinal - Adult  Goal: Maintains adequate nutritional intake  Outcome: Adequate for Discharge     Problem: Genitourinary - Adult  Goal: Absence of urinary retention  Outcome: Adequate for Discharge     Problem: Infection - Adult  Goal: Absence of infection at discharge  Outcome: Adequate for Discharge  Goal: Absence of infection during hospitalization  Outcome: Adequate for Discharge     Problem: Metabolic/Fluid and Electrolytes - Adult  Goal: Electrolytes maintained within normal limits  Outcome: Adequate for Discharge  Goal: Hemodynamic stability and optimal renal function maintained  Outcome: Adequate for Discharge     Problem: Cardiovascular - Adult  Goal:

## 2024-08-01 NOTE — PROGRESS NOTES
Patient blood pressure low 90's,  9 am metoprolol held till noon   1300  .Re-assess blood pressure low 100's ok to give per attending. Patient pain controlled at this time. Patient does not like vanilla ensure, requesting only strawberry and chocolate. Supplement order updated, with patient preferences.   1646  Rounding, patient in bed, Patient received one dose of oxycodone  for  wound pain . Pain is controlled at this time. One bowel movement, good urine output. Patient has no needs at this time. Patient refused to get up today, exercises completed in bed.

## 2024-08-01 NOTE — PLAN OF CARE
Problem: Discharge Planning  Goal: Discharge to home or other facility with appropriate resources  Outcome: Progressing  Flowsheets (Taken 7/31/2024 1919)  Discharge to home or other facility with appropriate resources:   Identify barriers to discharge with patient and caregiver   Identify discharge learning needs (meds, wound care, etc)   Refer to discharge planning if patient needs post-hospital services based on physician order or complex needs related to functional status, cognitive ability or social support system     Problem: Safety - Adult  Goal: Free from fall injury  Outcome: Progressing     Problem: Neurosensory - Adult  Goal: Achieves stable or improved neurological status  Outcome: Progressing  Flowsheets (Taken 7/31/2024 1919)  Achieves stable or improved neurological status: Assess for and report changes in neurological status

## 2024-08-02 PROCEDURE — 6370000000 HC RX 637 (ALT 250 FOR IP): Performed by: INTERNAL MEDICINE

## 2024-08-02 PROCEDURE — 97110 THERAPEUTIC EXERCISES: CPT

## 2024-08-02 PROCEDURE — 97530 THERAPEUTIC ACTIVITIES: CPT

## 2024-08-02 PROCEDURE — 6370000000 HC RX 637 (ALT 250 FOR IP): Performed by: PHYSICIAN ASSISTANT

## 2024-08-02 PROCEDURE — 6370000000 HC RX 637 (ALT 250 FOR IP)

## 2024-08-02 PROCEDURE — 97112 NEUROMUSCULAR REEDUCATION: CPT

## 2024-08-02 PROCEDURE — 97168 OT RE-EVAL EST PLAN CARE: CPT

## 2024-08-02 PROCEDURE — 6360000002 HC RX W HCPCS: Performed by: SURGERY

## 2024-08-02 PROCEDURE — 1100000000 HC RM PRIVATE

## 2024-08-02 PROCEDURE — 92526 ORAL FUNCTION THERAPY: CPT

## 2024-08-02 PROCEDURE — 6370000000 HC RX 637 (ALT 250 FOR IP): Performed by: NURSE PRACTITIONER

## 2024-08-02 RX ADMIN — METOPROLOL TARTRATE 25 MG: 25 TABLET, FILM COATED ORAL at 08:18

## 2024-08-02 RX ADMIN — METOPROLOL TARTRATE 25 MG: 25 TABLET, FILM COATED ORAL at 21:07

## 2024-08-02 RX ADMIN — PANTOPRAZOLE SODIUM 40 MG: 40 TABLET, DELAYED RELEASE ORAL at 21:07

## 2024-08-02 RX ADMIN — TRAZODONE HYDROCHLORIDE 50 MG: 50 TABLET ORAL at 21:07

## 2024-08-02 RX ADMIN — DOCUSATE SODIUM 50 MG AND SENNOSIDES 8.6 MG 2 TABLET: 8.6; 5 TABLET, FILM COATED ORAL at 08:18

## 2024-08-02 RX ADMIN — ENOXAPARIN SODIUM 40 MG: 100 INJECTION SUBCUTANEOUS at 08:18

## 2024-08-02 NOTE — PROGRESS NOTES
GOALS:  LTG: Patient will maintain adequate hydration/nutrition with optimum safety and efficiency of swallowing function with PO intake without overt signs or symptoms of aspiration for the highest appropriate diet level.  STG: GOALS MET 24  Patient will consume easy to chew textures and thin liquids without overt signs or symptoms of airway compromise.  Patient will safely ingest diet trials during therapeutic feedings with SLP without overt signs or symptoms of respiratory compromise in efforts to advance diet.    SPEECH LANGUAGE PATHOLOGY: DYSPHAGIA Daily Note #3 and Discharge    Acknowledge Order  I  Therapy Time  I   Charges     I  Rehab Caseload Tracker      NAME: Brendan Saleh  : 1975  MRN: [id number]    ADMISSION DATE: 2024  PRIMARY DIAGNOSIS: Infection of  (ventriculoperitoneal) shunt (HCC)    ICD-10: Treatment Diagnosis: R13.11 Dysphagia, Oral Phase    RECOMMENDATIONS   Diet:    Easy to Chew- allow cereal   Thin Liquids    Medication: as tolerated   Compensatory Swallowing Strategies:   Slow rate of intake  Small bites/sips  Upright as possible for all oral intake   Therapeutic Intervention:   Patient/family education  Dysphagia treatment   Patient continues to require skilled intervention:  No. Please re-consult if new concerns arise.      Anticipated Discharge Needs: No additional speech therapy is indicated.      ASSESSMENT    Patient with chronic oral dysphagia secondary to edentulous status. Tolerating easy to chew/thin liquid lunch tray with appropriate oral phase, no overt indications of airway compromise. Patient is consuming safest/least restrictive diet at this time, appropriate for discharge from speech therapy caseload.     Recommend continue easy to chew solids / thin liquid, medications as tolerated. No further speech therapy is indicated.     GENERAL    Subjective: RN cleared patient to participate. Patient upright in bed with lunch tray at bedside.     Recent

## 2024-08-02 NOTE — PROGRESS NOTES
Hospitalist Progress Note   Admit Date:  2024  4:30 PM   Name:  Brendan Saleh   Age:  49 y.o.  Sex:  male  :  1975   MRN:  [id number]   Room:  [facility]/    Presenting/Chief Complaint: Altered Mental Status     Reason(s) for Admission: Acute hypernatremia [E87.0]  Volume depletion [E86.9]  Acute kidney injury (HCC) [N17.9]  Acute encephalopathy [G93.40]  Septic shock (HCC) [A41.9, R65.21]     Hospital Course:   Mr. Saleh is a 48 y.o. male with a PMH of SDH s/p poonam hole 2024, hydrocephalus s/p  shunt, sacral decubitus stage IV, who originally presented to the ER  with chief complaint of increased confusion.     He was diagnosed with UTI with hematuria and started on course of vancomycin, Zosyn.  Urine culture finalized negative.   blood cultures with CoNS, determined contaminant.  However, he met sepsis criteria with WBC 14.6, HR 120s.  ID consulted to assist.  ECHO showed preserved EF.  Underwent sacral wound debridement on  with General Surgery, Dr. Zapien.     CT head with ongoing hydrocephalus and a  shunt.  NSGY consulted.  Patient underwent right ventricular peritoneal shunt removal on  due to non-functioning shunt.   Per Dr. Ortiz, patient needs a stepwise approach, and stated shunt replacement may be in his future though would need to wait at least 4 weeks.  Neurosurgery followed up  and said will be unable to replace  shunt while patient has active open wounds due to high risk for infection.      ID returned to the case for management of CSF infection as CSF culture and  shunt tip culture grew ESBL Klebsiella and Serratia marcescens.  Antibiotics changed to IV cipro and furthermore to oral cipro for 14 days post shunt removal EOT 24.  ID signed off on .     His hospital stay was further complicated by a bowel obstruction on May 19, noted on CT after patient vomited fecal material.  He underwent hemicolectomy and primary anastomosis on  with   1242  Last data filed at 8/2/2024 0849  Gross per 24 hour   Intake 240 ml   Output 3700 ml   Net -3460 ml           Physical Exam:   General:    Cachectic, chronically ill appearance, no acute distress.  Head:  Normocephalic, atraumatic  Eyes:  Sclerae appear normal.  Pupils equally round.  ENT:  Nares appear normal.  Moist oral mucosa  Neck:  No restricted ROM.  Trachea midline   CV:   Heart rate 100s, regular..  No m/r/g.  No jugular venous distension.  Lungs:   CTAB.  No wheezing, rhonchi, or rales.  Symmetric expansion.  Abdomen:   Soft, nontender, nondistended.  Extremities: No cyanosis or clubbing.  No edema.  Skin:     No rashes.  Normal coloration.   Warm and dry.    Neuro:  CN II-XII grossly intact.  Strength 5/5 in bilateral upper extremities.  Strength 4/5 bilateral lower extremities.  Psych:  Normal mood and affect.      I have personally reviewed labs and tests:  Recent Labs:  No results found for this or any previous visit (from the past 48 hour(s)).    No results for input(s): \"COVID19\" in the last 72 hours.    Current Meds:  Current Facility-Administered Medications   Medication Dose Route Frequency    LORazepam (ATIVAN) tablet 0.5 mg  0.5 mg Oral Q6H PRN    diphenhydrAMINE (BENADRYL) injection 25 mg  25 mg IntraVENous Q6H PRN    pantoprazole (PROTONIX) tablet 40 mg  40 mg Oral Nightly    oxyCODONE (ROXICODONE) immediate release tablet 5 mg  5 mg Oral Q4H PRN    cyclobenzaprine (FLEXERIL) tablet 10 mg  10 mg Oral TID PRN    traZODone (DESYREL) tablet 50 mg  50 mg Oral Nightly PRN    acetaminophen (TYLENOL) tablet 650 mg  650 mg Oral Q4H PRN    sennosides-docusate sodium (SENOKOT-S) 8.6-50 MG tablet 2 tablet  2 tablet Oral Daily    metoprolol tartrate (LOPRESSOR) tablet 25 mg  25 mg Oral BID    0.9 % sodium chloride infusion  25 mL IntraVENous PRN    sodium chloride flush 0.9 % injection 5-40 mL  5-40 mL IntraVENous 2 times per day    sodium chloride flush 0.9 % injection 5-40 mL  5-40 mL IntraVENous

## 2024-08-02 NOTE — PROGRESS NOTES
ACUTE OCCUPATIONAL THERAPY GOALS: GOALS REVIEWED AND UPDATED AT RE-EVALUATION ON 8/2/24  (Developed with and agreed upon by patient and/or caregiver.)  1. Patient will complete lower body bathing and dressing with MODERATE ASSIST and adaptive equipment as needed.     2. Patient will complete grooming ADL in unsupported sitting with CONTACT GUARD ASSIST.  3. Patient will tolerate 25 minutes of OT treatment with 1-2 rest breaks to increase activity tolerance for ADLs.   4. Patient will complete functional transfers via slide board with STAND BY ASSIST and adaptive equipment as needed.   5. Patient will tolerate 20 minutes BUE exercises to increase strength for safe, functional transfers.   6. Patient will complete upper body bathing and dressing with STAND BY ASSIST and adaptive equipment as needed.  7. Patient will tolerate 10 minutes unsupported sitting balance edge of bed with STAND BY ASSIST in preparation for ADL performance.   9. Patient will tolerate static standing task for >30 seconds with MAXIMAL ASSIST in preparation for ADL tasks and to decrease caregiver burden.     Timeframe: 7 visits    OCCUPATIONAL THERAPY Daily Note and Re-evaluation       OT Visit Days: 1  Acknowledge Orders  Time  OT Charge Capture  Rehab Caseload Tracker      Brendan Saleh is a 49 y.o. male   PRIMARY DIAGNOSIS: Infection of  (ventriculoperitoneal) shunt (HCC)  Acute hypernatremia [E87.0]  Volume depletion [E86.9]  Acute kidney injury (HCC) [N17.9]  Acute encephalopathy [G93.40]  Septic shock (HCC) [A41.9, R65.21]  Procedure(s) (LRB):  LAPAROTOMY EXPLORATORY, PRIMARY CLOSURE SMALL BOWEL PERFORATION (N/A)  62 Days Post-Op  Reason for Referral: Generalized Muscle Weakness (M62.81)  Difficulty in walking, Not elsewhere classified (R26.2)  Other abnormalities of gait and mobility (R26.89)  Inpatient: Payor: [employer] / Plan: [employer] / Product Type: *No Product type* /     ASSESSMENT:     REHAB RECOMMENDATIONS:  [] [] [] [x] [] [] [] [] [] [] Slide board transfer bed>w/c   Stand to Sit [] [] [] [] [] [] [x] [] [] [] [x]    Tub/Shower [] [] [] [] [] [] [] [] [] [x] []     Toilet [] [] [] [] [] [] [] [] [] [x] []      [] [] [] [] [] [] [] [] [] [x] []    I=Independent, Mod I=Modified Independent, S=Supervision/Setup, SBA=Standby Assistance, CGA=Contact Guard Assistance, Min=Minimal Assistance, Mod=Moderate Assistance, Max=Maximal Assistance, Total=Total Assistance, NT=Not Tested    ACTIVITIES OF DAILY LIVING: I Mod I S SBA CGA Min Mod Max Total NT Comments:    BASIC ADLs:              Upper Body Bathing  [] [] [] [] [] [] [] [] [] [x]     Lower Body Bathing [] [] [] [] [] [] [] [] [] [x]     Toileting [] [] [] [] [] [] [] [] [] [x]    Upper Body Dressing [] [] [] [] [] [] [] [] [] [x]    Lower Body Dressing [] [] [] [] [] [] [] [x] [] [] Donning tennis shoes   Feeding [] [] [] [] [] [] [] [] [] [x]    Grooming [] [] [] [] [] [] [] [] [] [x]    Personal Device Care [] [] [] [] [] [] [] [] [] [x]    Functional Mobility [] [] [] [] [x] [] [x] [] [] [] X2, slide board transfer and stand pivot transfers, increasing w/c mobility distance   I=Independent, Mod I=Modified Independent, S=Supervision/Setup, SBA=Standby Assistance, CGA=Contact Guard Assistance, Min=Minimal Assistance, Mod=Moderate Assistance, Max=Maximal Assistance, Total=Total Assistance, NT=Not Tested    PLAN:   FREQUENCY/DURATION   OT Plan of Care: 3 times/week for duration of hospital stay or until stated goals are met, whichever comes first.    PROBLEM LIST:   (Skilled intervention is medically necessary to address:)  Decreased ADL/Functional Activities  Decreased Activity Tolerance  Decreased AROM/PROM  Decreased Balance  Decreased Cognition  Decreased Coordination  Decreased Gait Ability  Decreased Safety Awareness  Decreased Strength  Decreased Transfer Abilities   INTERVENTIONS PLANNED:  (Benefits and precautions of occupational therapy have been discussed with

## 2024-08-02 NOTE — PROGRESS NOTES
ACUTE PHYSICAL THERAPY GOALS:   (Developed with and agreed upon by patient and/or caregiver.)  LT goals re-assessed during re-evaluation on 7/24/24     (1.)Mr. Saleh will move from supine to sit and sit to supine  in bed with MODERATE ASSIST within 3 treatment day(s).  (2.)Mr. Saleh will scoot up and down in bed with MODERATE ASSIST within 3 treatment day(s).    (3.)Mr. Saleh will roll side to side in bed with MINIMAL ASSIST within 3 treatment day(s).    (4.)Mr. Saleh will tolerate sitting up in recliner chair for 2 hours with stable vital signs to increase upright tolerance within 3 treatment day(s).  (MET 7/24/24)  (5.)Mr. Saleh will maintain static/dynamic sitting x 25 minutes with MINIMAL ASSIST for improved balance within 3 treatment days.  (6.)Mr. Saleh will follow 75% of commands for increased participation in therapeutic activity/exercise within 3 treatment days.(MET 7/24/24)     LTG:  (1.) Brendan Saleh  will move from supine to sit and sit to supine and scoot up and down with MINIMAL ASSIST within 7 treatment day(s).    (2.)Mr. Saleh will roll side to side in bed with CONTACT GUARD ASSIST within 7 treatment day(s).    (3.) Brendan Saleh will transfer from bed to chair and chair to bed with MODERATE ASSISTx1 using the least restrictive device within 7 treatment day(s).    (4.) Brendan Saleh will perform therapeutic exercises x 20 min for HEP with CONTACT GUARD ASSIST to improve strength, endurance, and functional mobility within 7 treatment day(s). (MET 7/24/24)  (5.) Brendan Saleh will perform seated static and dynamic balance activities x25 minutes with CONTACT GUARD ASSIST to improve safety within 7 treatment day(s).  (6.) Brendan Saleh will perform standing static and dynamic balance activities x 5 minutes with MODERATE ASSIST to improve safety and mobility within 7 treatment day(s).   (7.)Brendan Saleh will be able to transfer bed to chair/wheelchair and

## 2024-08-02 NOTE — PLAN OF CARE
Problem: Discharge Planning  Goal: Discharge to home or other facility with appropriate resources  8/1/2024 2003 by Magalie Elder RN  Outcome: Progressing  8/1/2024 1652 by Mirian Gil RN  Outcome: Adequate for Discharge     Problem: Safety - Adult  Goal: Free from fall injury  8/1/2024 2003 by Magalie Elder RN  Outcome: Progressing  8/1/2024 1652 by Mirian Gil RN  Outcome: Adequate for Discharge     Problem: Skin/Tissue Integrity - Adult  Goal: Skin integrity remains intact  8/1/2024 2003 by Magalie Elder RN  Outcome: Progressing  Flowsheets (Taken 8/1/2024 1904)  Skin Integrity Remains Intact: Monitor for areas of redness and/or skin breakdown  8/1/2024 1652 by Mirian Gil, MARIZA  Outcome: Adequate for Discharge

## 2024-08-02 NOTE — CARE COORDINATION
Dispo update:  Mr. Saleh is still waiting on his Social Security disability letter, which will allow him to apply for [place] Medicaid, which will allow [facility] to apply for Medicaid Level of Care, which will allow him to be placed into a skilled nursing facility for long-term care.

## 2024-08-03 PROCEDURE — 6370000000 HC RX 637 (ALT 250 FOR IP): Performed by: PHYSICIAN ASSISTANT

## 2024-08-03 PROCEDURE — 1100000000 HC RM PRIVATE

## 2024-08-03 PROCEDURE — 6370000000 HC RX 637 (ALT 250 FOR IP): Performed by: NURSE PRACTITIONER

## 2024-08-03 PROCEDURE — 6370000000 HC RX 637 (ALT 250 FOR IP): Performed by: INTERNAL MEDICINE

## 2024-08-03 PROCEDURE — 6370000000 HC RX 637 (ALT 250 FOR IP)

## 2024-08-03 PROCEDURE — 6360000002 HC RX W HCPCS: Performed by: SURGERY

## 2024-08-03 RX ADMIN — TRAZODONE HYDROCHLORIDE 50 MG: 50 TABLET ORAL at 19:44

## 2024-08-03 RX ADMIN — PANTOPRAZOLE SODIUM 40 MG: 40 TABLET, DELAYED RELEASE ORAL at 19:44

## 2024-08-03 RX ADMIN — METOPROLOL TARTRATE 25 MG: 25 TABLET, FILM COATED ORAL at 10:41

## 2024-08-03 RX ADMIN — ENOXAPARIN SODIUM 40 MG: 100 INJECTION SUBCUTANEOUS at 10:40

## 2024-08-03 RX ADMIN — DOCUSATE SODIUM 50 MG AND SENNOSIDES 8.6 MG 2 TABLET: 8.6; 5 TABLET, FILM COATED ORAL at 10:41

## 2024-08-03 NOTE — PLAN OF CARE
Problem: Discharge Planning  Goal: Discharge to home or other facility with appropriate resources  8/3/2024 1152 by Alberta Kenney RN  Outcome: Progressing  8/3/2024 0555 by Deshawn Marino RN  Outcome: Progressing     Problem: Safety - Adult  Goal: Free from fall injury  8/3/2024 1152 by Alberta Kenney RN  Outcome: Progressing  8/3/2024 0555 by Deshawn Marino RN  Outcome: Progressing     Problem: Neurosensory - Adult  Goal: Achieves stable or improved neurological status  8/3/2024 1152 by Alberta Kenney RN  Outcome: Progressing  8/3/2024 0555 by Deshawn Marino RN  Outcome: Progressing     Problem: Musculoskeletal - Adult  Goal: Return mobility to safest level of function  8/3/2024 1152 by Alberta Kenney RN  Outcome: Progressing  8/3/2024 0555 by Deshawn Marino RN  Outcome: Progressing

## 2024-08-03 NOTE — PLAN OF CARE
Problem: Discharge Planning  Goal: Discharge to home or other facility with appropriate resources  8/3/2024 1953 by Yessica Reed RN  Outcome: Progressing  Flowsheets (Taken 8/3/2024 1948)  Discharge to home or other facility with appropriate resources:   Identify barriers to discharge with patient and caregiver   Arrange for needed discharge resources and transportation as appropriate   Arrange for interpreters to assist at discharge as needed  8/3/2024 1152 by Alberta Kenney RN  Outcome: Progressing  8/3/2024 0555 by Deshawn Marino RN  Outcome: Progressing     Problem: Safety - Adult  Goal: Free from fall injury  8/3/2024 1953 by Yessica Reed RN  Outcome: Progressing  8/3/2024 1152 by Alberta Kenney RN  Outcome: Progressing  8/3/2024 0555 by Deshawn Marino RN  Outcome: Progressing     Problem: Neurosensory - Adult  Goal: Achieves stable or improved neurological status  8/3/2024 1953 by Yessica Reed RN  Outcome: Progressing  Flowsheets (Taken 8/3/2024 1948)  Achieves stable or improved neurological status:   Assess for and report changes in neurological status   Initiate measures to prevent increased intracranial pressure   Maintain blood pressure and fluid volume within ordered parameters to optimize cerebral perfusion and minimize risk of hemorrhage  8/3/2024 1152 by Alberta Kenney RN  Outcome: Progressing  8/3/2024 0555 by Deshawn Marino RN  Outcome: Progressing     Problem: Musculoskeletal - Adult  Goal: Return mobility to safest level of function  8/3/2024 1953 by Yessica Reed RN  Outcome: Progressing  Flowsheets (Taken 8/3/2024 1948)  Return Mobility to Safest Level of Function:   Assess patient stability and activity tolerance for standing, transferring and ambulating with or without assistive devices   Assist with transfers and ambulation using safe patient handling equipment as needed   Ensure adequate protection for wounds/incisions during mobilization   Obtain physical therapy/occupational therapy consults as  1.94

## 2024-08-03 NOTE — PLAN OF CARE
Problem: Discharge Planning  Goal: Discharge to home or other facility with appropriate resources  Outcome: Progressing     Problem: Safety - Adult  Goal: Free from fall injury  Outcome: Progressing     Problem: Neurosensory - Adult  Goal: Achieves stable or improved neurological status  Outcome: Progressing     Problem: Musculoskeletal - Adult  Goal: Return mobility to safest level of function  Outcome: Progressing     Problem: Skin/Tissue Integrity - Adult  Goal: Skin integrity remains intact  Outcome: Progressing  Goal: Incisions, wounds, or drain sites healing without S/S of infection  Outcome: Progressing     Problem: Gastrointestinal - Adult  Goal: Maintains adequate nutritional intake  Outcome: Progressing     Problem: Genitourinary - Adult  Goal: Absence of urinary retention  Outcome: Progressing

## 2024-08-03 NOTE — PROGRESS NOTES
Hospitalist Progress Note   Admit Date:  2024  4:30 PM   Name:  Brendan Saleh   Age:  49 y.o.  Sex:  male  :  1975   MRN:  [id number]   Room:  [facility]/    Presenting/Chief Complaint: Altered Mental Status     Reason(s) for Admission: Acute hypernatremia [E87.0]  Volume depletion [E86.9]  Acute kidney injury (HCC) [N17.9]  Acute encephalopathy [G93.40]  Septic shock (HCC) [A41.9, R65.21]     Hospital Course:   Mr. Saleh is a 48 y.o. male with a PMH of SDH s/p poonam hole 2024, hydrocephalus s/p  shunt, sacral decubitus stage IV, who originally presented to the ER  with chief complaint of increased confusion.     He was diagnosed with UTI with hematuria and started on course of vancomycin, Zosyn.  Urine culture finalized negative.   blood cultures with CoNS, determined contaminant.  However, he met sepsis criteria with WBC 14.6, HR 120s.  ID consulted to assist.  ECHO showed preserved EF.  Underwent sacral wound debridement on  with General Surgery, Dr. Zapien.     CT head with ongoing hydrocephalus and a  shunt.  NSGY consulted.  Patient underwent right ventricular peritoneal shunt removal on  due to non-functioning shunt.   Per Dr. Ortiz, patient needs a stepwise approach, and stated shunt replacement may be in his future though would need to wait at least 4 weeks.  Neurosurgery followed up  and said will be unable to replace  shunt while patient has active open wounds due to high risk for infection.      ID returned to the case for management of CSF infection as CSF culture and  shunt tip culture grew ESBL Klebsiella and Serratia marcescens.  Antibiotics changed to IV cipro and furthermore to oral cipro for 14 days post shunt removal EOT 24.  ID signed off on .     His hospital stay was further complicated by a bowel obstruction on May 19, noted on CT after patient vomited fecal material.  He underwent hemicolectomy and primary anastomosis on  with

## 2024-08-04 PROCEDURE — 6370000000 HC RX 637 (ALT 250 FOR IP): Performed by: INTERNAL MEDICINE

## 2024-08-04 PROCEDURE — 6370000000 HC RX 637 (ALT 250 FOR IP): Performed by: NURSE PRACTITIONER

## 2024-08-04 PROCEDURE — 6370000000 HC RX 637 (ALT 250 FOR IP): Performed by: PHYSICIAN ASSISTANT

## 2024-08-04 PROCEDURE — 1100000000 HC RM PRIVATE

## 2024-08-04 PROCEDURE — 6370000000 HC RX 637 (ALT 250 FOR IP)

## 2024-08-04 PROCEDURE — 6360000002 HC RX W HCPCS: Performed by: SURGERY

## 2024-08-04 RX ADMIN — METOPROLOL TARTRATE 25 MG: 25 TABLET, FILM COATED ORAL at 20:00

## 2024-08-04 RX ADMIN — ENOXAPARIN SODIUM 40 MG: 100 INJECTION SUBCUTANEOUS at 09:02

## 2024-08-04 RX ADMIN — CYCLOBENZAPRINE 10 MG: 10 TABLET, FILM COATED ORAL at 20:00

## 2024-08-04 RX ADMIN — METOPROLOL TARTRATE 25 MG: 25 TABLET, FILM COATED ORAL at 09:02

## 2024-08-04 RX ADMIN — DOCUSATE SODIUM 50 MG AND SENNOSIDES 8.6 MG 2 TABLET: 8.6; 5 TABLET, FILM COATED ORAL at 09:02

## 2024-08-04 RX ADMIN — PANTOPRAZOLE SODIUM 40 MG: 40 TABLET, DELAYED RELEASE ORAL at 20:00

## 2024-08-04 RX ADMIN — TRAZODONE HYDROCHLORIDE 50 MG: 50 TABLET ORAL at 20:00

## 2024-08-04 ASSESSMENT — PAIN SCALES - GENERAL: PAINLEVEL_OUTOF10: 3

## 2024-08-04 ASSESSMENT — PAIN DESCRIPTION - LOCATION: LOCATION: BACK

## 2024-08-04 NOTE — PROGRESS NOTES
Hospitalist Progress Note   Admit Date:  2024  4:30 PM   Name:  Brendan Saleh   Age:  49 y.o.  Sex:  male  :  1975   MRN:  [id number]   Room:  [facility]/    Presenting/Chief Complaint: Altered Mental Status     Reason(s) for Admission: Acute hypernatremia [E87.0]  Volume depletion [E86.9]  Acute kidney injury (HCC) [N17.9]  Acute encephalopathy [G93.40]  Septic shock (HCC) [A41.9, R65.21]     Hospital Course:   Mr. Saleh is a 48 y.o. male with a PMH of SDH s/p poonam hole 2024, hydrocephalus s/p  shunt, sacral decubitus stage IV, who originally presented to the ER  with chief complaint of increased confusion.     He was diagnosed with UTI with hematuria and started on course of vancomycin, Zosyn.  Urine culture finalized negative.   blood cultures with CoNS, determined contaminant.  However, he met sepsis criteria with WBC 14.6, HR 120s.  ID consulted to assist.  ECHO showed preserved EF.  Underwent sacral wound debridement on  with General Surgery, Dr. Zapien.     CT head with ongoing hydrocephalus and a  shunt.  NSGY consulted.  Patient underwent right ventricular peritoneal shunt removal on  due to non-functioning shunt.   Per Dr. Ortiz, patient needs a stepwise approach, and stated shunt replacement may be in his future though would need to wait at least 4 weeks.  Neurosurgery followed up  and said will be unable to replace  shunt while patient has active open wounds due to high risk for infection.      ID returned to the case for management of CSF infection as CSF culture and  shunt tip culture grew ESBL Klebsiella and Serratia marcescens.  Antibiotics changed to IV cipro and furthermore to oral cipro for 14 days post shunt removal EOT 24.  ID signed off on .     His hospital stay was further complicated by a bowel obstruction on May 19, noted on CT after patient vomited fecal material.  He underwent hemicolectomy and primary anastomosis on  with

## 2024-08-04 NOTE — PROGRESS NOTES
Wound(s) dressed per order on coccyx and hips. Patient tolerated well. No events/concerns tonight. Pt remains q2 turns. VSS.

## 2024-08-05 LAB
ANION GAP SERPL CALC-SCNC: 11 MMOL/L (ref 9–18)
BUN SERPL-MCNC: 20 MG/DL (ref 6–23)
CALCIUM SERPL-MCNC: 9.2 MG/DL (ref 8.8–10.2)
CHLORIDE SERPL-SCNC: 100 MMOL/L (ref 98–107)
CO2 SERPL-SCNC: 27 MMOL/L (ref 20–28)
CREAT SERPL-MCNC: 0.6 MG/DL (ref 0.8–1.3)
ERYTHROCYTE [DISTWIDTH] IN BLOOD BY AUTOMATED COUNT: 13.3 % (ref 11.9–14.6)
GLUCOSE SERPL-MCNC: 103 MG/DL (ref 70–99)
HCT VFR BLD AUTO: 34.8 % (ref 41.1–50.3)
HGB BLD-MCNC: 10.6 G/DL (ref 13.6–17.2)
MAGNESIUM SERPL-MCNC: 1.9 MG/DL (ref 1.8–2.4)
MCH RBC QN AUTO: 29 PG (ref 26.1–32.9)
MCHC RBC AUTO-ENTMCNC: 30.5 G/DL (ref 31.4–35)
MCV RBC AUTO: 95.1 FL (ref 82–102)
NRBC # BLD: 0 K/UL (ref 0–0.2)
PHOSPHATE SERPL-MCNC: 4.9 MG/DL (ref 2.5–4.5)
PLATELET # BLD AUTO: 253 K/UL (ref 150–450)
PMV BLD AUTO: 9.4 FL (ref 9.4–12.3)
POTASSIUM SERPL-SCNC: 4.1 MMOL/L (ref 3.5–5.1)
RBC # BLD AUTO: 3.66 M/UL (ref 4.23–5.6)
SODIUM SERPL-SCNC: 138 MMOL/L (ref 136–145)
WBC # BLD AUTO: 5.2 K/UL (ref 4.3–11.1)

## 2024-08-05 PROCEDURE — 83735 ASSAY OF MAGNESIUM: CPT

## 2024-08-05 PROCEDURE — 80048 BASIC METABOLIC PNL TOTAL CA: CPT

## 2024-08-05 PROCEDURE — 6370000000 HC RX 637 (ALT 250 FOR IP): Performed by: NURSE PRACTITIONER

## 2024-08-05 PROCEDURE — 85027 COMPLETE CBC AUTOMATED: CPT

## 2024-08-05 PROCEDURE — 6370000000 HC RX 637 (ALT 250 FOR IP): Performed by: PHYSICIAN ASSISTANT

## 2024-08-05 PROCEDURE — 97530 THERAPEUTIC ACTIVITIES: CPT

## 2024-08-05 PROCEDURE — 36415 COLL VENOUS BLD VENIPUNCTURE: CPT

## 2024-08-05 PROCEDURE — 97112 NEUROMUSCULAR REEDUCATION: CPT

## 2024-08-05 PROCEDURE — 84100 ASSAY OF PHOSPHORUS: CPT

## 2024-08-05 PROCEDURE — 1100000000 HC RM PRIVATE

## 2024-08-05 PROCEDURE — 6370000000 HC RX 637 (ALT 250 FOR IP): Performed by: INTERNAL MEDICINE

## 2024-08-05 PROCEDURE — 6360000002 HC RX W HCPCS: Performed by: SURGERY

## 2024-08-05 PROCEDURE — 97535 SELF CARE MNGMENT TRAINING: CPT

## 2024-08-05 RX ADMIN — ENOXAPARIN SODIUM 40 MG: 100 INJECTION SUBCUTANEOUS at 08:37

## 2024-08-05 RX ADMIN — TRAZODONE HYDROCHLORIDE 50 MG: 50 TABLET ORAL at 21:49

## 2024-08-05 RX ADMIN — PANTOPRAZOLE SODIUM 40 MG: 40 TABLET, DELAYED RELEASE ORAL at 21:49

## 2024-08-05 RX ADMIN — METOPROLOL TARTRATE 25 MG: 25 TABLET, FILM COATED ORAL at 21:49

## 2024-08-05 RX ADMIN — METOPROLOL TARTRATE 25 MG: 25 TABLET, FILM COATED ORAL at 08:37

## 2024-08-05 ASSESSMENT — PAIN SCALES - GENERAL: PAINLEVEL_OUTOF10: 0

## 2024-08-05 ASSESSMENT — PAIN SCALES - WONG BAKER: WONGBAKER_NUMERICALRESPONSE: NO HURT

## 2024-08-05 NOTE — CARE COORDINATION
Patient Medicaid application still pending. No anticipated date of discharge at this time. CM will continue to follow for discharge plan.

## 2024-08-05 NOTE — PROGRESS NOTES
Hospitalist Progress Note   Admit Date:  2024  4:30 PM   Name:  Brendan Saleh   Age:  49 y.o.  Sex:  male  :  1975   MRN:  [id number]   Room:  [facility]/    Presenting/Chief Complaint: Altered Mental Status     Reason(s) for Admission: Acute hypernatremia [E87.0]  Volume depletion [E86.9]  Acute kidney injury (HCC) [N17.9]  Acute encephalopathy [G93.40]  Septic shock (HCC) [A41.9, R65.21]     Hospital Course:   Mr. Saleh is a 48 y.o. male with a PMH of SDH s/p poonam hole 2024, hydrocephalus s/p  shunt, sacral decubitus stage IV, who originally presented to the ER  with chief complaint of increased confusion.     He was diagnosed with UTI with hematuria and started on course of vancomycin, Zosyn.  Urine culture finalized negative.   blood cultures with CoNS, determined contaminant.  However, he met sepsis criteria with WBC 14.6, HR 120s.  ID consulted to assist.  ECHO showed preserved EF.  Underwent sacral wound debridement on  with General Surgery, Dr. Zapien.     CT head with ongoing hydrocephalus and a  shunt.  NSGY consulted.  Patient underwent right ventricular peritoneal shunt removal on  due to non-functioning shunt.   Per Dr. Ortiz, patient needs a stepwise approach, and stated shunt replacement may be in his future though would need to wait at least 4 weeks.  Neurosurgery followed up  and said will be unable to replace  shunt while patient has active open wounds due to high risk for infection.      ID returned to the case for management of CSF infection as CSF culture and  shunt tip culture grew ESBL Klebsiella and Serratia marcescens.  Antibiotics changed to IV cipro and furthermore to oral cipro for 14 days post shunt removal EOT 24.  ID signed off on .     His hospital stay was further complicated by a bowel obstruction on May 19, noted on CT after patient vomited fecal material.  He underwent hemicolectomy and primary anastomosis on  with

## 2024-08-05 NOTE — PROGRESS NOTES
Category Underweight (BMI less than 18.5)  Estimated Daily Nutrient Needs:  Energy (kcal/day): 2300-2290 (30-35 kcal/kg) (Kcal/kg Weight used: 64 kg (bed scale 5/6 lower than IBW) Admission  Protein (g/day):  (1.5-2 g/kg) Weight Used: (Admission) 64 kg  Fluid (ml/day):   (1 ml/kcal)    Nutrition Diagnosis:   Increased nutrient needs related to  (wound, pathology  + adenocarcinoma) as evidenced by  (requiring increased kcal and protein provisions in setting of severe malnutrition)  Severe malnutrition, In context of acute illness or injury related to inadequate protein-energy intake as evidenced by Criteria as identified in malnutrition assessment  Nutrition Interventions:   Food and/or Nutrient Delivery: Continue Current Diet, Continue Oral Nutrition Supplement     Coordination of Nutrition Care: Continue to monitor while inpatient      Goals:   Previous Goal Met: Goal(s) Achieved  Active Goal: Meet at least 75% of estimated needs, by next RD assessment       Nutrition Monitoring and Evaluation:      Food/Nutrient Intake Outcomes: Food and Nutrient Intake, Supplement Intake  Physical Signs/Symptoms Outcomes: Weight, Meal Time Behavior    Discharge Planning:    Continue current diet, Continue Oral Nutrition Supplement    Erika Ornelas RD

## 2024-08-05 NOTE — PROGRESS NOTES
ACUTE OCCUPATIONAL THERAPY GOALS: GOALS REVIEWED AND UPDATED AT RE-EVALUATION ON 8/2/24  (Developed with and agreed upon by patient and/or caregiver.)  1. Patient will complete lower body bathing and dressing with MODERATE ASSIST and adaptive equipment as needed.     2. Patient will complete grooming ADL in unsupported sitting with CONTACT GUARD ASSIST.  3. Patient will tolerate 25 minutes of OT treatment with 1-2 rest breaks to increase activity tolerance for ADLs.   4. Patient will complete functional transfers via slide board with STAND BY ASSIST and adaptive equipment as needed.   5. Patient will tolerate 20 minutes BUE exercises to increase strength for safe, functional transfers.   6. Patient will complete upper body bathing and dressing with STAND BY ASSIST and adaptive equipment as needed.  7. Patient will tolerate 10 minutes unsupported sitting balance edge of bed with STAND BY ASSIST in preparation for ADL performance.   9. Patient will tolerate static standing task for >30 seconds with MAXIMAL ASSIST in preparation for ADL tasks and to decrease caregiver burden.      Timeframe: 7 visits        OCCUPATIONAL THERAPY: Daily Note PM   OT Visit Days: 2   Time In/Out  OT Charge Capture  Rehab Caseload Tracker  OT Orders    Brendan Saleh is a 49 y.o. male   PRIMARY DIAGNOSIS: Infection of  (ventriculoperitoneal) shunt (HCC)  Acute hypernatremia [E87.0]  Volume depletion [E86.9]  Acute kidney injury (HCC) [N17.9]  Acute encephalopathy [G93.40]  Septic shock (HCC) [A41.9, R65.21]  Procedure(s) (LRB):  LAPAROTOMY EXPLORATORY, PRIMARY CLOSURE SMALL BOWEL PERFORATION (N/A)  65 Days Post-Op  Inpatient: Payor: [employer] / Plan: AMBETTER / Product Type: *No Product type* /     ASSESSMENT:     REHAB RECOMMENDATIONS:   Recommendation to date pending progress:  Setting:  Pt continues to be medically stable for discharge, however awaiting medicaid/ social security disability    Pt would benefit from rehab when         PLAN:     FREQUENCY/DURATION   OT Plan of Care: 3 times/week for duration of hospital stay or until stated goals are met, whichever comes first.    TREATMENT:     TREATMENT:   Co-Treatment PT/OT necessary due to patient's decreased overall endurance/tolerance levels, as well as need for high level skilled assistance to complete functional transfers/mobility and functional tasks  Neuromuscular Re-education (30 Minutes): Patient participated in neuromuscular re-education including functional reaching, weight shifting, postural training, midline training, standing tolerance activity , and sitting balance activity   with moderate and maximal assistance, verbal cues, tactile cues, visual cues, education, and adaptive equipment to improve sitting balance, standing balance, proprioception, posture, coordination, static balance, and dynamic balance in order to prepare for functional task, prepare for seated ADLs, prepare for functional transfer, increase safety awareness, and prepare for self care..   Self Care (60 minutes): Patient participated in upper body bathing, lower body bathing, upper body dressing, lower body dressing, and grooming ADLs in supported sitting with maximal verbal, manual, and tactile cueing to increase independence, decrease assistance required, increase activity tolerance, and increase safety awareness. Patient also participated in bed mobility, functional mobility, functional transfer, energy conservation, and adaptive equipment training to increase independence, decrease assistance required, increase activity tolerance, and increase safety awareness.     TREATMENT GRID:  N/A    AFTER TREATMENT PRECAUTIONS: Bed, Bed/Chair Locked, Call light within reach, Needs within reach, and RN notified    INTERDISCIPLINARY COLLABORATION:  RN/ PCT, PT/ PTA, and OT/ POND    EDUCATION:       TOTAL TREATMENT DURATION AND TIME:  Time In: 1420  Time Out: 1550  Minutes: Racquel Rowell OT

## 2024-08-05 NOTE — PROGRESS NOTES
EOS     Pt resting in bed at this time. Pt denies any needs or pain. Call light and personal belongings left within reach. Pt encouraged to call with any needs. PT/OT took the patient to the 9th floor today and helped him shower and change. Hourly rounding completed during this shift. Bed in low/locked position.

## 2024-08-05 NOTE — PROGRESS NOTES
ACUTE PHYSICAL THERAPY GOALS:   (Developed with and agreed upon by patient and/or caregiver.)  LT goals re-assessed during re-evaluation on 7/24/24     (1.)Mr. Saleh will move from supine to sit and sit to supine  in bed with MODERATE ASSIST within 3 treatment day(s).  (2.)Mr. Saleh will scoot up and down in bed with MODERATE ASSIST within 3 treatment day(s).    (3.)Mr. Saleh will roll side to side in bed with MINIMAL ASSIST within 3 treatment day(s).    (4.)Mr. Saleh will tolerate sitting up in recliner chair for 2 hours with stable vital signs to increase upright tolerance within 3 treatment day(s).  (MET 7/24/24)  (5.)Mr. Saleh will maintain static/dynamic sitting x 25 minutes with MINIMAL ASSIST for improved balance within 3 treatment days.  (6.)Mr. Saleh will follow 75% of commands for increased participation in therapeutic activity/exercise within 3 treatment days.(MET 7/24/24)     LTG:  (1.) Brendan Saleh  will move from supine to sit and sit to supine and scoot up and down with MINIMAL ASSIST within 7 treatment day(s).    (2.)Mr. Saleh will roll side to side in bed with CONTACT GUARD ASSIST within 7 treatment day(s).    (3.) Brendan Saleh will transfer from bed to chair and chair to bed with MODERATE ASSISTx1 using the least restrictive device within 7 treatment day(s).    (4.) Brendan Saleh will perform therapeutic exercises x 20 min for HEP with CONTACT GUARD ASSIST to improve strength, endurance, and functional mobility within 7 treatment day(s). (MET 7/24/24)  (5.) Brendan Saleh will perform seated static and dynamic balance activities x25 minutes with CONTACT GUARD ASSIST to improve safety within 7 treatment day(s).  (6.) Brendan Saleh will perform standing static and dynamic balance activities x 5 minutes with MODERATE ASSIST to improve safety and mobility within 7 treatment day(s).   (7.)Brendan Saleh will be able to transfer bed to chair/wheelchair and  follow.      SUBJECTIVE:   Mr. Saleh states, \"I'm ready\"     Social/Functional Lives With: Alone  Type of Home: Trailer  Home Layout: One level  Home Access: Stairs to enter with rails  Entrance Stairs - Number of Steps: 5  Entrance Stairs - Rails: Both  OBJECTIVE:     PAIN: VITALS / O2: PRECAUTION / LINES / DRAINS:   Pre Treatment:   Pain Assessment: None - Denies Pain0/10      Post Treatment: 0/10 Vitals        Oxygen    External Catheter    RESTRICTIONS/PRECAUTIONS:  Restrictions/Precautions  Restrictions/Precautions: Fall Risk, Bed Alarm  Restrictions/Precautions: Fall Risk, Bed Alarm     MOBILITY: BLE  I Mod I S SBA CGA Min Mod Max Total  NT x2 Comments:   Bed Mobility    Rolling [] [] [] [] [] [x] [] [] [] [] [x]    Supine to Sit [] [] [] [] [] [x] [] [] [] [] [x]    Scooting [] [] [] [] [] [x] [] [] [] [] [x]    Sit to Supine [] [] [] [] [] [] [x] [] [] [] [x]    Transfers    Sit to Stand [] [] [] [] [] [] [x] [x] [] [] [x]    Bed to Chair [] [] [] [] [] [] [x] [x] [] [] [x] Transfer board  Bed<>wheelchair    Stand to Sit [] [] [] [] [] [] [] [x] [] [] [x]     [] [] [] [] [] [] [] [] [] [] []    I=Independent, Mod I=Modified Independent, S=Supervision, SBA=Standby Assistance, CGA=Contact Guard Assistance,   Min=Minimal Assistance, Mod=Moderate Assistance, Max=Maximal Assistance, Total=Total Assistance, NT=Not Tested    BALANCE: Good Fair+ Fair Fair- Poor NT Comments   Sitting Static [] [x] [x] [] [] []    Sitting Dynamic [] [] [x] [x] [] []              Standing Static [] [] [] [] [x] []    Standing Dynamic [] [] [] [] [] [x]      Wheelchair mobility I Mod I S SBA CGA Min Mod Max Total  NT x2 Comments:   Level of Assistance [] [] [] [] [x] [] [] [] [] [] []    Distance 100 feet    DME Wheelchair mobility    Gait Quality NA at this time    Weightbearing Status      Stairs      I=Independent, Mod I=Modified Independent, S=Supervision, SBA=Standby Assistance, CGA=Contact Guard Assistance,   Min=Minimal

## 2024-08-06 PROCEDURE — 6360000002 HC RX W HCPCS: Performed by: SURGERY

## 2024-08-06 PROCEDURE — 6370000000 HC RX 637 (ALT 250 FOR IP): Performed by: NURSE PRACTITIONER

## 2024-08-06 PROCEDURE — 1100000000 HC RM PRIVATE

## 2024-08-06 PROCEDURE — 6370000000 HC RX 637 (ALT 250 FOR IP): Performed by: PHYSICIAN ASSISTANT

## 2024-08-06 RX ADMIN — METOPROLOL TARTRATE 25 MG: 25 TABLET, FILM COATED ORAL at 20:50

## 2024-08-06 RX ADMIN — ENOXAPARIN SODIUM 40 MG: 100 INJECTION SUBCUTANEOUS at 08:56

## 2024-08-06 RX ADMIN — PANTOPRAZOLE SODIUM 40 MG: 40 TABLET, DELAYED RELEASE ORAL at 20:50

## 2024-08-06 NOTE — PROGRESS NOTES
ml clear yellow urine this shift. Received Trazadone for sleep; see MAR. Resting quietly in bed. No distress noted.

## 2024-08-06 NOTE — PROGRESS NOTES
Hospitalist Progress Note   Admit Date:  2024  4:30 PM   Name:  Brendan Saleh   Age:  49 y.o.  Sex:  male  :  1975   MRN:  [id number]   Room:  [facility]/    Presenting/Chief Complaint: Altered Mental Status     Reason(s) for Admission: Acute hypernatremia [E87.0]  Volume depletion [E86.9]  Acute kidney injury (HCC) [N17.9]  Acute encephalopathy [G93.40]  Septic shock (HCC) [A41.9, R65.21]     Hospital Course:   Mr. Saleh is a 48 y.o. male with a PMH of SDH s/p poonam hole 2024, hydrocephalus s/p  shunt, sacral decubitus stage IV, who originally presented to the ER  with chief complaint of increased confusion.     He was diagnosed with UTI with hematuria and started on course of vancomycin, Zosyn.  Urine culture finalized negative.   blood cultures with CoNS, determined contaminant.  However, he met sepsis criteria with WBC 14.6, HR 120s.  ID consulted to assist.  ECHO showed preserved EF.  Underwent sacral wound debridement on  with General Surgery, Dr. Zapien.     CT head with ongoing hydrocephalus and a  shunt.  NSGY consulted.  Patient underwent right ventricular peritoneal shunt removal on  due to non-functioning shunt.   Per Dr. Ortiz, patient needs a stepwise approach, and stated shunt replacement may be in his future though would need to wait at least 4 weeks.  Neurosurgery followed up  and said will be unable to replace  shunt while patient has active open wounds due to high risk for infection.      ID returned to the case for management of CSF infection as CSF culture and  shunt tip culture grew ESBL Klebsiella and Serratia marcescens.  Antibiotics changed to IV cipro and furthermore to oral cipro for 14 days post shunt removal EOT 24.  ID signed off on .     His hospital stay was further complicated by a bowel obstruction on May 19, noted on CT after patient vomited fecal material.  He underwent hemicolectomy and primary anastomosis on  with   (5/19/24).    Communicating hydrocephalus, NPH/status-post  shunt malfunction; infection of ventriculoperitoneal shunt/acute seizure disorder  Patient has had no further seizure-like activity noted.  Remains on no AED's.  Neurosurgery follow-up with patient periodically as needed.    Acute blood loss anemia  Hb remains stable in 10's.  Following weekly CBC while awaiting placement.    Sinus tachycardia/hypertension  HR & BP stable on metoprolol BID.    Moderate protein calorie malnutrition (BMI 17.2)  As evidenced by patient's BMI, temporal wasting.  Currently tolerating oral diet well.  Continue double portions, nutrition supplementation    Critical illness myopathy  Due to patient's prolonged hospitalization and critical illnesses.  Continue PT/OT.    Leg cramps  No complaints.  Continue Flexeril as needed     Goals of care  Patient currently DNR.  Patient reports that he is okay with additional neurosurgery if needed but would not want any further intra-abdominal surgery if required.  Patient is also agreeable to chemotherapy for his cancer if required.    Wounds: Sacrum and left hip  Wounds appear to be improving.  -Continue with local wound care     Leukocytosis resolved  Candiduria resolved  Sepsis resolved  Lower extremity rash resolved  HAP resolved      PT/OT evals ordered?  Therapy evals ordered  Diet:  ADULT DIET; Easy to Chew; Low Fiber; Double Protein Portions  ADULT ORAL NUTRITION SUPPLEMENT; Breakfast, Lunch, Dinner; Standard High Calorie/High Protein Oral Supplement  VTE prophylaxis: Lovenox  Code status: DNR    DISPOSITION:  Remains medically stable for discharge to SNF for LTC.  Medicaid application remains pending, dependent on Social Security disability letter per case management, .      Non-peripheral Lines and Tubes (if present):      External Urinary Catheter (Active)          Telemetry (if present):  Cardiac/Telemetry Monitor On: No        Hospital Problems:  Principal Problem:    Infection

## 2024-08-06 NOTE — PLAN OF CARE
Problem: Discharge Planning  Goal: Discharge to home or other facility with appropriate resources  Outcome: Not Progressing     Problem: Safety - Adult  Goal: Free from fall injury  8/6/2024 0749 by Tete Cabrera RN  Outcome: Not Progressing  8/5/2024 2038 by Noemi Kenney RN  Outcome: Progressing     Problem: Neurosensory - Adult  Goal: Achieves stable or improved neurological status  Outcome: Not Progressing     Problem: Musculoskeletal - Adult  Goal: Return mobility to safest level of function  Outcome: Not Progressing     Problem: Skin/Tissue Integrity  Goal: Absence of new skin breakdown  Description: 1.  Monitor for areas of redness and/or skin breakdown  2.  Assess vascular access sites hourly  3.  Every 4-6 hours minimum:  Change oxygen saturation probe site  4.  Every 4-6 hours:  If on nasal continuous positive airway pressure, respiratory therapy assess nares and determine need for appliance change or resting period.  Outcome: Not Progressing     Problem: Pain  Goal: Verbalizes/displays adequate comfort level or baseline comfort level  8/6/2024 0749 by Tete Cabrera RN  Outcome: Not Progressing  8/5/2024 2038 by Noemi Kenney RN  Outcome: Progressing     Problem: Skin/Tissue Integrity - Adult  Goal: Skin integrity remains intact  8/6/2024 0749 by Tete Cabrera RN  Outcome: Not Progressing  8/5/2024 2038 by Noemi Kenney RN  Outcome: Progressing  Goal: Incisions, wounds, or drain sites healing without S/S of infection  Outcome: Not Progressing     Problem: Gastrointestinal - Adult  Goal: Maintains adequate nutritional intake  Outcome: Not Progressing     Problem: Genitourinary - Adult  Goal: Absence of urinary retention  Outcome: Not Progressing     Problem: Infection - Adult  Goal: Absence of infection at discharge  Outcome: Not Progressing  Goal: Absence of infection during hospitalization  Outcome: Not Progressing     Problem: Metabolic/Fluid and Electrolytes - Adult  Goal:

## 2024-08-06 NOTE — PROGRESS NOTES
EOS     Pt resting in bed at this time. Pt denies any needs or pain. Call light and personal belongings left within reach. Pt encouraged to call with any needs. Hourly rounding completed during this shift.

## 2024-08-07 ENCOUNTER — APPOINTMENT (OUTPATIENT)
Dept: CT IMAGING | Age: 49
End: 2024-08-07
Payer: COMMERCIAL

## 2024-08-07 ENCOUNTER — APPOINTMENT (OUTPATIENT)
Dept: GENERAL RADIOLOGY | Age: 49
End: 2024-08-07
Payer: COMMERCIAL

## 2024-08-07 PROCEDURE — 97112 NEUROMUSCULAR REEDUCATION: CPT

## 2024-08-07 PROCEDURE — 6370000000 HC RX 637 (ALT 250 FOR IP): Performed by: PHYSICIAN ASSISTANT

## 2024-08-07 PROCEDURE — 73562 X-RAY EXAM OF KNEE 3: CPT

## 2024-08-07 PROCEDURE — 6370000000 HC RX 637 (ALT 250 FOR IP): Performed by: INTERNAL MEDICINE

## 2024-08-07 PROCEDURE — 6360000002 HC RX W HCPCS: Performed by: SURGERY

## 2024-08-07 PROCEDURE — 6370000000 HC RX 637 (ALT 250 FOR IP)

## 2024-08-07 PROCEDURE — 6370000000 HC RX 637 (ALT 250 FOR IP): Performed by: NURSE PRACTITIONER

## 2024-08-07 PROCEDURE — 97530 THERAPEUTIC ACTIVITIES: CPT

## 2024-08-07 PROCEDURE — 97110 THERAPEUTIC EXERCISES: CPT

## 2024-08-07 PROCEDURE — 70450 CT HEAD/BRAIN W/O DYE: CPT

## 2024-08-07 PROCEDURE — 1100000000 HC RM PRIVATE

## 2024-08-07 RX ADMIN — METOPROLOL TARTRATE 25 MG: 25 TABLET, FILM COATED ORAL at 21:27

## 2024-08-07 RX ADMIN — ENOXAPARIN SODIUM 40 MG: 100 INJECTION SUBCUTANEOUS at 08:25

## 2024-08-07 RX ADMIN — METOPROLOL TARTRATE 25 MG: 25 TABLET, FILM COATED ORAL at 08:25

## 2024-08-07 RX ADMIN — PANTOPRAZOLE SODIUM 40 MG: 40 TABLET, DELAYED RELEASE ORAL at 21:27

## 2024-08-07 RX ADMIN — OXYCODONE 5 MG: 5 TABLET ORAL at 21:27

## 2024-08-07 RX ADMIN — OXYCODONE 5 MG: 5 TABLET ORAL at 08:30

## 2024-08-07 ASSESSMENT — PAIN DESCRIPTION - LOCATION
LOCATION: ARM
LOCATION: ARM
LOCATION: SACRUM

## 2024-08-07 ASSESSMENT — PAIN DESCRIPTION - ORIENTATION
ORIENTATION: RIGHT
ORIENTATION: RIGHT

## 2024-08-07 ASSESSMENT — PAIN SCALES - GENERAL
PAINLEVEL_OUTOF10: 3
PAINLEVEL_OUTOF10: 9
PAINLEVEL_OUTOF10: 0
PAINLEVEL_OUTOF10: 10

## 2024-08-07 ASSESSMENT — PAIN - FUNCTIONAL ASSESSMENT
PAIN_FUNCTIONAL_ASSESSMENT: ACTIVITIES ARE NOT PREVENTED
PAIN_FUNCTIONAL_ASSESSMENT: ACTIVITIES ARE NOT PREVENTED

## 2024-08-07 ASSESSMENT — PAIN DESCRIPTION - DESCRIPTORS
DESCRIPTORS: ACHING;SHARP
DESCRIPTORS: SHARP
DESCRIPTORS: ACHING;SHARP

## 2024-08-07 NOTE — PROGRESS NOTES
ACUTE OCCUPATIONAL THERAPY GOALS: GOALS REVIEWED AND UPDATED AT RE-EVALUATION ON 8/2/24  (Developed with and agreed upon by patient and/or caregiver.)  1. Patient will complete lower body bathing and dressing with MODERATE ASSIST and adaptive equipment as needed.     2. Patient will complete grooming ADL in unsupported sitting with CONTACT GUARD ASSIST.  3. Patient will tolerate 25 minutes of OT treatment with 1-2 rest breaks to increase activity tolerance for ADLs.   4. Patient will complete functional transfers via slide board with STAND BY ASSIST and adaptive equipment as needed.   5. Patient will tolerate 20 minutes BUE exercises to increase strength for safe, functional transfers.   6. Patient will complete upper body bathing and dressing with STAND BY ASSIST and adaptive equipment as needed.  7. Patient will tolerate 10 minutes unsupported sitting balance edge of bed with STAND BY ASSIST in preparation for ADL performance.   9. Patient will tolerate static standing task for >30 seconds with MAXIMAL ASSIST in preparation for ADL tasks and to decrease caregiver burden.      Timeframe: 7 visits        OCCUPATIONAL THERAPY: Daily Note AM   OT Visit Days: 3   Time In/Out  OT Charge Capture  Rehab Caseload Tracker  OT Orders    Brendan Saleh is a 49 y.o. male   PRIMARY DIAGNOSIS: Infection of  (ventriculoperitoneal) shunt (HCC)  Acute hypernatremia [E87.0]  Volume depletion [E86.9]  Acute kidney injury (HCC) [N17.9]  Acute encephalopathy [G93.40]  Septic shock (HCC) [A41.9, R65.21]  Procedure(s) (LRB):  LAPAROTOMY EXPLORATORY, PRIMARY CLOSURE SMALL BOWEL PERFORATION (N/A)  67 Days Post-Op  Inpatient: Payor: [employer] / Plan: AMBETTER / Product Type: *No Product type* /     ASSESSMENT:     REHAB RECOMMENDATIONS:   Recommendation to date pending progress:  Setting:  Pt continues to be medically stable for discharge, however awaiting medicaid/ social security disability    Pt would benefit from rehab when

## 2024-08-07 NOTE — PROGRESS NOTES
Pt was given oxycodone once this morning for sacral discomfort. Pt responded well to medication. Pt's sacral dressing was changed near end of shift d/t bowel movement.     All known needs met at this time. Bed is currently low, call light was left in reach, and pt was encouraged to ask for assistance. Pt was left resting in bed with no complaints. Will give bedside report to oncoming nurse.

## 2024-08-07 NOTE — PROGRESS NOTES
Hospitalist Progress Note   Admit Date:  2024  4:30 PM   Name:  Brendan Saleh   Age:  49 y.o.  Sex:  male  :  1975   MRN:  [id number]   Room:  [facility]/    Presenting/Chief Complaint: Altered Mental Status     Reason(s) for Admission: Acute hypernatremia [E87.0]  Volume depletion [E86.9]  Acute kidney injury (HCC) [N17.9]  Acute encephalopathy [G93.40]  Septic shock (HCC) [A41.9, R65.21]     Hospital Course:   Mr. Saleh is a 48 y.o. male with a PMH of SDH s/p poonam hole 2024, hydrocephalus s/p  shunt, sacral decubitus stage IV, who originally presented to the ER  with chief complaint of increased confusion.     He was diagnosed with UTI with hematuria and started on course of vancomycin, Zosyn.  Urine culture finalized negative.   blood cultures with CoNS, determined contaminant.  However, he met sepsis criteria with WBC 14.6, HR 120s.  ID consulted to assist.  ECHO showed preserved EF.  Underwent sacral wound debridement on  with General Surgery, Dr. Zapien.     CT head with ongoing hydrocephalus and a  shunt.  NSGY consulted.  Patient underwent right ventricular peritoneal shunt removal on  due to non-functioning shunt.   Per Dr. Ortiz, patient needs a stepwise approach, and stated shunt replacement may be in his future though would need to wait at least 4 weeks.  Neurosurgery followed up  and said will be unable to replace  shunt while patient has active open wounds due to high risk for infection.      ID returned to the case for management of CSF infection as CSF culture and  shunt tip culture grew ESBL Klebsiella and Serratia marcescens.  Antibiotics changed to IV cipro and furthermore to oral cipro for 14 days post shunt removal EOT 24.  ID signed off on .     His hospital stay was further complicated by a bowel obstruction on May 19, noted on CT after patient vomited fecal material.  He underwent hemicolectomy and primary anastomosis on  with

## 2024-08-07 NOTE — PROGRESS NOTES
Hourly rounds performed this shift. Patient resting with no complaints at this time. Bed locked in lowest position, call light, and all personal belongings in pt reach. VS stable, IV patent. All dressing changes performed on this shift. will give report to oncoming nurse.

## 2024-08-07 NOTE — PROGRESS NOTES
ACUTE PHYSICAL THERAPY GOALS:   (Developed with and agreed upon by patient and/or caregiver.)  LT goals re-assessed during re-evaluation on 7/24/24     (1.)Mr. Saleh will move from supine to sit and sit to supine  in bed with MODERATE ASSIST within 3 treatment day(s).  (2.)Mr. Saleh will scoot up and down in bed with MODERATE ASSIST within 3 treatment day(s).    (3.)Mr. Saleh will roll side to side in bed with MINIMAL ASSIST within 3 treatment day(s).    (4.)Mr. Saleh will tolerate sitting up in recliner chair for 2 hours with stable vital signs to increase upright tolerance within 3 treatment day(s).  (MET 7/24/24)  (5.)Mr. Saleh will maintain static/dynamic sitting x 25 minutes with MINIMAL ASSIST for improved balance within 3 treatment days.  (6.)Mr. Saleh will follow 75% of commands for increased participation in therapeutic activity/exercise within 3 treatment days.(MET 7/24/24)     LTG:  (1.) Brendan Saleh  will move from supine to sit and sit to supine and scoot up and down with MINIMAL ASSIST within 7 treatment day(s).    (2.)Mr. Saleh will roll side to side in bed with CONTACT GUARD ASSIST within 7 treatment day(s).    (3.) Brendan Saleh will transfer from bed to chair and chair to bed with MODERATE ASSISTx1 using the least restrictive device within 7 treatment day(s).    (4.) Brendan Saleh will perform therapeutic exercises x 20 min for HEP with CONTACT GUARD ASSIST to improve strength, endurance, and functional mobility within 7 treatment day(s). (MET 7/24/24)  (5.) Brendan Saleh will perform seated static and dynamic balance activities x25 minutes with CONTACT GUARD ASSIST to improve safety within 7 treatment day(s).  (6.) Brendan Saleh will perform standing static and dynamic balance activities x 5 minutes with MODERATE ASSIST to improve safety and mobility within 7 treatment day(s).   (7.)Brendan Saleh will be able to transfer bed to chair/wheelchair and  Catheter    RESTRICTIONS/PRECAUTIONS:  Restrictions/Precautions  Restrictions/Precautions: Fall Risk, Bed Alarm  Restrictions/Precautions: Fall Risk, Bed Alarm     MOBILITY: BLE  I Mod I S SBA CGA Min Mod Max Total  NT x2 Comments:   Bed Mobility    Rolling [] [] [] [] [] [] [] [] [] [] []    Supine to Sit [] [] [] [] [] [x] [] [] [] [] []    Scooting [] [] [] [] [] [x] [] [] [] [] []    Sit to Supine [] [] [] [] [] [] [] [] [] [] []    Transfers    Sit to Stand [] [] [] [] [] [] [x] [x] [] [] [x]    Bed to Chair [] [] [] [] [] [] [x] [x] [] [] [x]    Stand to Sit [] [] [] [] [] [] [x] [x] [] [] [x]     [] [] [] [] [] [] [] [] [] [] []    I=Independent, Mod I=Modified Independent, S=Supervision, SBA=Standby Assistance, CGA=Contact Guard Assistance,   Min=Minimal Assistance, Mod=Moderate Assistance, Max=Maximal Assistance, Total=Total Assistance, NT=Not Tested    BALANCE: Good Fair+ Fair Fair- Poor NT Comments   Sitting Static [] [x] [x] [] [] []    Sitting Dynamic [] [] [x] [x] [] []              Standing Static [] [] [] [] [x] []    Standing Dynamic [] [] [] [] [x] []      Wheelchair mobility I Mod I S SBA CGA Min Mod Max Total  NT x2 Comments:   Level of Assistance [] [] [] [x] [x] [] [] [] [] [] []    Distance Community distance      DME Wheelchair mobility    Gait Quality NA at this time    Weightbearing Status      Stairs      I=Independent, Mod I=Modified Independent, S=Supervision, SBA=Standby Assistance, CGA=Contact Guard Assistance,   Min=Minimal Assistance, Mod=Moderate Assistance, Max=Maximal Assistance, Total=Total Assistance, NT=Not Tested    PLAN:   FREQUENCY AND DURATION: 3 times/week for duration of hospital stay or until stated goals are met, whichever comes first.    TREATMENT:   TREATMENT:   Co-Treatment PT/OT necessary due to patient's decreased overall endurance/tolerance levels, as well as need for high level skilled assistance to complete functional transfers/mobility and functional

## 2024-08-08 PROCEDURE — 6370000000 HC RX 637 (ALT 250 FOR IP): Performed by: NURSE PRACTITIONER

## 2024-08-08 PROCEDURE — 6370000000 HC RX 637 (ALT 250 FOR IP): Performed by: INTERNAL MEDICINE

## 2024-08-08 PROCEDURE — 6360000002 HC RX W HCPCS: Performed by: SURGERY

## 2024-08-08 PROCEDURE — 6370000000 HC RX 637 (ALT 250 FOR IP): Performed by: PHYSICIAN ASSISTANT

## 2024-08-08 PROCEDURE — 1100000000 HC RM PRIVATE

## 2024-08-08 RX ADMIN — METOPROLOL TARTRATE 25 MG: 25 TABLET, FILM COATED ORAL at 08:21

## 2024-08-08 RX ADMIN — METOPROLOL TARTRATE 25 MG: 25 TABLET, FILM COATED ORAL at 20:59

## 2024-08-08 RX ADMIN — ENOXAPARIN SODIUM 40 MG: 100 INJECTION SUBCUTANEOUS at 08:21

## 2024-08-08 RX ADMIN — OXYCODONE 5 MG: 5 TABLET ORAL at 17:20

## 2024-08-08 RX ADMIN — PANTOPRAZOLE SODIUM 40 MG: 40 TABLET, DELAYED RELEASE ORAL at 20:59

## 2024-08-08 ASSESSMENT — PAIN SCALES - GENERAL: PAINLEVEL_OUTOF10: 6

## 2024-08-08 ASSESSMENT — PAIN DESCRIPTION - ORIENTATION: ORIENTATION: MID

## 2024-08-08 ASSESSMENT — PAIN DESCRIPTION - LOCATION: LOCATION: BACK

## 2024-08-08 ASSESSMENT — PAIN DESCRIPTION - DESCRIPTORS: DESCRIPTORS: DISCOMFORT

## 2024-08-08 NOTE — PROGRESS NOTES
Hourly rounds performed this shift. Patient resting with no complaints at this time. Bed locked in lowest position, call light, and all personal belongings in pt reach. VS stable, IV patent.      Prn pain medication administered once this shift pt complaining of pain to RUE. Pt has skin tear to RUE, cleansed with wound cleanser border foam dressing applied. Will give report to oncoming nurse.

## 2024-08-08 NOTE — PROGRESS NOTES
Pt's wounds were redressed by wound nurse this shift. See note. Pt complained of back pain near end of shift and was given oxycodone one time. Pt responded well to medication.     All known needs met at this time. Bed is currently low, call light was left in reach, and pt was encouraged to ask for assistance. Pt was left resting in bed with no complaints. Will give bedside report to oncoming nurse.

## 2024-08-08 NOTE — PROGRESS NOTES
POST FALL MANAGEMENT    Brendan Saleh  MEDICAL RECORD NUMBER:  [id number]  AGE: 49 y.o.   GENDER: male  : 1975  TODAYS DATE:  2024    Details     Fall Occurred: Yes    Was the Fall Witnessed:  No      Brief Review of Event: tech found pt  in the floor upon getting vs. Staff assisted pt back in bed, pt assessed, MD notified, along with house supervisor.        Who found the patient: ean (tech)      Where was the patient at the time of the fall: in pt room on floor      Patient Comments: pt stated \"I did not hit my  head, I'm fine\"       Date Fall Occurred:  2024 .       Time Fall Occurred: 7:20p.m.     Assessment     Post Fall Head to Toe Assessment Completed: Yes    Post Fall Predictive Analytic Score Reviewed: Yes     Post Fall Vitals Completed: Yes    Post Fall Neuro Checks Completed: Yes    Injury Occurred(if yes, describe injury):  yes - skin tear           Did the Patient Experience:(Check Nic all that apply)    [] Patient hit head  [] Loss of consciousness  [] Change in mental status following the fall  [x] Patient is on an anticoagulant medication      CT Performed:  yes    Follow-up     Persons Notified of Fall:  (Provide names of persons notified)   [x] Physician: Laureen AGUILAR  [] LEIGHA:  [x] Nursing Supervisior: Gwen   [] Manager:  [] Pharmacist:  [] Family: pt rf family notification  [] Other:       Electronically signed by Pam Fine RN 2024 at 10:37 PM

## 2024-08-08 NOTE — WOUND CARE
Follow up on wounds: sacral, L hip, L elbow.     L elbow/RLE with skin tears, steri-strips placed with viable skin. Leave steri-strips in place, allow to fall off. Okay to place dry/silicone border foam dressing PRN for drainage.    Sacrum [id number], tunnel 2cm@12 o'clock, full thickness pink/red, granular, slough, large serosanguinous drainage, no odor. Continue NS wet-dry rolled gauze packing into wound, dry gauze, cover with dry/silicone border foam dressing daily/PRN.      Wound looks like it is worsening, could be related to increased pressure or nutrition related. Continue frequent turning/repositioning, already has recliner cushion in chair.  (Previous 7/8...)      L hip 2x2x0.2cm, pink/red, small sanguinous drainage. Continue silicone border foam dressing every other day/PRN. Wound is improving greatly.    (Previous 7/8...)

## 2024-08-08 NOTE — PROGRESS NOTES
Hospitalist Progress Note   Admit Date:  2024  4:30 PM   Name:  Brendan Saleh   Age:  49 y.o.  Sex:  male  :  1975   MRN:  [id number]   Room:  [facility]/    Presenting/Chief Complaint: Altered Mental Status     Reason(s) for Admission: Acute hypernatremia [E87.0]  Volume depletion [E86.9]  Acute kidney injury (HCC) [N17.9]  Acute encephalopathy [G93.40]  Septic shock (HCC) [A41.9, R65.21]     Hospital Course:   Mr. Saleh is a 48 y.o. male with a PMH of SDH s/p poonam hole 2024, hydrocephalus s/p  shunt, sacral decubitus stage IV, who originally presented to the ER  with chief complaint of increased confusion.     He was diagnosed with UTI with hematuria and started on course of vancomycin, Zosyn.  Urine culture finalized negative.   blood cultures with CoNS, determined contaminant.  However, he met sepsis criteria with WBC 14.6, HR 120s.  ID consulted to assist.  ECHO showed preserved EF.  Underwent sacral wound debridement on  with General Surgery, Dr. Zapien.     CT head with ongoing hydrocephalus and a  shunt.  NSGY consulted.  Patient underwent right ventricular peritoneal shunt removal on  due to non-functioning shunt.   Per Dr. Ortiz, patient needs a stepwise approach, and stated shunt replacement may be in his future though would need to wait at least 4 weeks.  Neurosurgery followed up  and said will be unable to replace  shunt while patient has active open wounds due to high risk for infection.      ID returned to the case for management of CSF infection as CSF culture and  shunt tip culture grew ESBL Klebsiella and Serratia marcescens.  Antibiotics changed to IV cipro and furthermore to oral cipro for 14 days post shunt removal EOT 24.  ID signed off on .     His hospital stay was further complicated by a bowel obstruction on May 19, noted on CT after patient vomited fecal material.  He underwent hemicolectomy and primary anastomosis on  with

## 2024-08-09 PROCEDURE — 6370000000 HC RX 637 (ALT 250 FOR IP): Performed by: PHYSICIAN ASSISTANT

## 2024-08-09 PROCEDURE — 6360000002 HC RX W HCPCS: Performed by: SURGERY

## 2024-08-09 PROCEDURE — 6370000000 HC RX 637 (ALT 250 FOR IP): Performed by: INTERNAL MEDICINE

## 2024-08-09 PROCEDURE — 1100000000 HC RM PRIVATE

## 2024-08-09 PROCEDURE — 6370000000 HC RX 637 (ALT 250 FOR IP): Performed by: NURSE PRACTITIONER

## 2024-08-09 RX ADMIN — PANTOPRAZOLE SODIUM 40 MG: 40 TABLET, DELAYED RELEASE ORAL at 21:11

## 2024-08-09 RX ADMIN — METOPROLOL TARTRATE 25 MG: 25 TABLET, FILM COATED ORAL at 21:11

## 2024-08-09 RX ADMIN — OXYCODONE 5 MG: 5 TABLET ORAL at 18:49

## 2024-08-09 RX ADMIN — ENOXAPARIN SODIUM 40 MG: 100 INJECTION SUBCUTANEOUS at 08:12

## 2024-08-09 RX ADMIN — METOPROLOL TARTRATE 25 MG: 25 TABLET, FILM COATED ORAL at 08:12

## 2024-08-09 ASSESSMENT — PAIN DESCRIPTION - DESCRIPTORS: DESCRIPTORS: SHARP

## 2024-08-09 ASSESSMENT — PAIN DESCRIPTION - LOCATION: LOCATION: BACK;BUTTOCKS

## 2024-08-09 ASSESSMENT — PAIN SCALES - GENERAL: PAINLEVEL_OUTOF10: 9

## 2024-08-09 NOTE — PROGRESS NOTES
Pt still awaiting placement. Pt turned throughout shift.     All known needs met at this time. Bed is currently low, call light was left in reach, and pt was encouraged to ask for assistance. Pt was left resting in bed with no complaints. Will give bedside report to oncoming nurse.

## 2024-08-09 NOTE — PROGRESS NOTES
Hospitalist Progress Note   Admit Date:  2024  4:30 PM   Name:  Brendan Saleh   Age:  49 y.o.  Sex:  male  :  1975   MRN:  [id number]   Room:  [facility]/    Presenting/Chief Complaint: Altered Mental Status     Reason(s) for Admission: Acute hypernatremia [E87.0]  Volume depletion [E86.9]  Acute kidney injury (HCC) [N17.9]  Acute encephalopathy [G93.40]  Septic shock (HCC) [A41.9, R65.21]     Hospital Course:   Mr. Saleh is a 48 y.o. male with a PMH of SDH s/p poonam hole 2024, hydrocephalus s/p  shunt, sacral decubitus stage IV, who originally presented to the ER  with chief complaint of increased confusion.     He was diagnosed with UTI with hematuria and started on course of vancomycin, Zosyn.  Urine culture finalized negative.   blood cultures with CoNS, determined contaminant.  However, he met sepsis criteria with WBC 14.6, HR 120s.  ID consulted to assist.  ECHO showed preserved EF.  Underwent sacral wound debridement on  with General Surgery, Dr. Zapien.     CT head with ongoing hydrocephalus and a  shunt.  NSGY consulted.  Patient underwent right ventricular peritoneal shunt removal on  due to non-functioning shunt.   Per Dr. Ortiz, patient needs a stepwise approach, and stated shunt replacement may be in his future though would need to wait at least 4 weeks.  Neurosurgery followed up  and said will be unable to replace  shunt while patient has active open wounds due to high risk for infection.      ID returned to the case for management of CSF infection as CSF culture and  shunt tip culture grew ESBL Klebsiella and Serratia marcescens.  Antibiotics changed to IV cipro and furthermore to oral cipro for 14 days post shunt removal EOT 24.  ID signed off on .     His hospital stay was further complicated by a bowel obstruction on May 19, noted on CT after patient vomited fecal material.  He underwent hemicolectomy and primary anastomosis on  with   Q8H PRN    Or    ondansetron (ZOFRAN) injection 4 mg  4 mg IntraVENous Q6H PRN    enoxaparin (LOVENOX) injection 40 mg  40 mg SubCUTAneous Daily    calcium carbonate (TUMS) chewable tablet 500 mg  500 mg Oral TID PRN    guaiFENesin-dextromethorphan (ROBITUSSIN DM) 100-10 MG/5ML syrup 5 mL  5 mL Oral Q4H PRN       Signed:  Raphael Lobo M.D.   Hospitalist  08/09/24   7:29 AM

## 2024-08-10 PROCEDURE — 6360000002 HC RX W HCPCS: Performed by: SURGERY

## 2024-08-10 PROCEDURE — 1100000000 HC RM PRIVATE

## 2024-08-10 PROCEDURE — 6370000000 HC RX 637 (ALT 250 FOR IP)

## 2024-08-10 PROCEDURE — 6370000000 HC RX 637 (ALT 250 FOR IP): Performed by: NURSE PRACTITIONER

## 2024-08-10 PROCEDURE — 6370000000 HC RX 637 (ALT 250 FOR IP): Performed by: PHYSICIAN ASSISTANT

## 2024-08-10 RX ADMIN — ENOXAPARIN SODIUM 40 MG: 100 INJECTION SUBCUTANEOUS at 08:27

## 2024-08-10 RX ADMIN — METOPROLOL TARTRATE 25 MG: 25 TABLET, FILM COATED ORAL at 08:27

## 2024-08-10 RX ADMIN — METOPROLOL TARTRATE 25 MG: 25 TABLET, FILM COATED ORAL at 21:30

## 2024-08-10 RX ADMIN — PANTOPRAZOLE SODIUM 40 MG: 40 TABLET, DELAYED RELEASE ORAL at 21:30

## 2024-08-10 NOTE — PROGRESS NOTES
Hospitalist Progress Note   Admit Date:  2024  4:30 PM   Name:  Brendan Saleh   Age:  49 y.o.  Sex:  male  :  1975   MRN:  [id number]   Room:  [facility]/    Presenting/Chief Complaint: Altered Mental Status     Reason(s) for Admission: Acute hypernatremia [E87.0]  Volume depletion [E86.9]  Acute kidney injury (HCC) [N17.9]  Acute encephalopathy [G93.40]  Septic shock (HCC) [A41.9, R65.21]     Hospital Course:   Mr. Saleh is a 48 y.o. male with a PMH of SDH s/p poonam hole 2024, hydrocephalus s/p  shunt, sacral decubitus stage IV, who originally presented to the ER  with chief complaint of increased confusion.     He was diagnosed with UTI with hematuria and started on course of vancomycin, Zosyn.  Urine culture finalized negative.   blood cultures with CoNS, determined contaminant.  However, he met sepsis criteria with WBC 14.6, HR 120s.  ID consulted to assist.  ECHO showed preserved EF.  Underwent sacral wound debridement on  with General Surgery, Dr. Zapien.     CT head with ongoing hydrocephalus and a  shunt.  NSGY consulted.  Patient underwent right ventricular peritoneal shunt removal on  due to non-functioning shunt.   Per Dr. Ortiz, patient needs a stepwise approach, and stated shunt replacement may be in his future though would need to wait at least 4 weeks.  Neurosurgery followed up  and said will be unable to replace  shunt while patient has active open wounds due to high risk for infection.      ID returned to the case for management of CSF infection as CSF culture and  shunt tip culture grew ESBL Klebsiella and Serratia marcescens.  Antibiotics changed to IV cipro and furthermore to oral cipro for 14 days post shunt removal EOT 24.  ID signed off on .     His hospital stay was further complicated by a bowel obstruction on May 19, noted on CT after patient vomited fecal material.  He underwent hemicolectomy and primary anastomosis on  with   (5/19/24).    Communicating hydrocephalus, NPH/status-post  shunt malfunction; infection of ventriculoperitoneal shunt/acute seizure disorder  Patient has had no further seizure-like activity.  Remains on no AED's.  Neurosurgery follow-up with patient periodically as needed.    Acute blood loss anemia  Hb remains stable in 10's on weekly CBC while continuing to await placement - next check on 8/12.    Sinus tachycardia/hypertension  Vital signs remained stable on metoprolol BID.    Moderate protein calorie malnutrition (BMI 17.2)  As evidenced by patient's BMI, bitemporal wasting.  Currently tolerating oral diet well.  Continue double protein portions, nutrition supplementation    Critical illness myopathy  Due to patient's prolonged hospitalization and critical illnesses.  Continue PT/OT.    Leg cramps  No complaints.  Continue Flexeril as needed     Goals of care  Patient DNR.  Patient reports that he is okay with additional neurosurgery if needed but would not want any further intra-abdominal surgery if required.  Patient agreeable to chemotherapy for his cancer if required.    Wounds: Sacrum and left hip  Continue local wound care     Leukocytosis resolved  Candiduria resolved  Sepsis resolved  Lower extremity rash resolved  HAP resolved      PT/OT evals ordered?  Therapy evals ordered  Diet:  ADULT DIET; Easy to Chew; Low Fiber; Double Protein Portions  ADULT ORAL NUTRITION SUPPLEMENT; Breakfast, Lunch, Dinner; Standard High Calorie/High Protein Oral Supplement  VTE prophylaxis: Lovenox  Code status: DNR    DISPOSITION:  Remains medically stable for discharge to SNF.  Medicaid application remains pending, dependent on Social Security disability letter (pending) per case management, .      Non-peripheral Lines and Tubes (if present):      External Urinary Catheter (Active)          Telemetry (if present):  Cardiac/Telemetry Monitor On: No        Hospital Problems:  Principal Problem:    Infection of

## 2024-08-11 PROCEDURE — 6370000000 HC RX 637 (ALT 250 FOR IP): Performed by: PHYSICIAN ASSISTANT

## 2024-08-11 PROCEDURE — 1100000000 HC RM PRIVATE

## 2024-08-11 PROCEDURE — 6360000002 HC RX W HCPCS: Performed by: SURGERY

## 2024-08-11 PROCEDURE — 6370000000 HC RX 637 (ALT 250 FOR IP): Performed by: NURSE PRACTITIONER

## 2024-08-11 RX ADMIN — PANTOPRAZOLE SODIUM 40 MG: 40 TABLET, DELAYED RELEASE ORAL at 20:59

## 2024-08-11 RX ADMIN — METOPROLOL TARTRATE 25 MG: 25 TABLET, FILM COATED ORAL at 20:59

## 2024-08-11 RX ADMIN — ENOXAPARIN SODIUM 40 MG: 100 INJECTION SUBCUTANEOUS at 10:06

## 2024-08-11 RX ADMIN — METOPROLOL TARTRATE 25 MG: 25 TABLET, FILM COATED ORAL at 10:06

## 2024-08-11 NOTE — PROGRESS NOTES
Pt  bed in lowest position, wheels locked, and call light within reach. Hourly rounds completed. VSS. Pt turned twice by this RN this shift.

## 2024-08-11 NOTE — PROGRESS NOTES
Hospitalist Progress Note   Admit Date:  2024  4:30 PM   Name:  Brendan Saleh   Age:  49 y.o.  Sex:  male  :  1975   MRN:  [id number]   Room:  [facility]/    Presenting/Chief Complaint: Altered Mental Status     Reason(s) for Admission: Acute hypernatremia [E87.0]  Volume depletion [E86.9]  Acute kidney injury (HCC) [N17.9]  Acute encephalopathy [G93.40]  Septic shock (HCC) [A41.9, R65.21]     Hospital Day #97    Hospital Course:   Mr. Saleh is a 48 y.o. male with a PMH of SDH s/p poonam hole 2024, hydrocephalus s/p  shunt, sacral decubitus stage IV, who originally presented to the ER  with chief complaint of increased confusion.     He was diagnosed with UTI with hematuria and started on course of vancomycin, Zosyn.  Urine culture finalized negative.   blood cultures with CoNS, determined contaminant.  However, he met sepsis criteria with WBC 14.6, HR 120s.  ID consulted to assist.  ECHO showed preserved EF.  Underwent sacral wound debridement on  with General Surgery, Dr. Zapien.     CT head with ongoing hydrocephalus and a  shunt.  NSGY consulted.  Patient underwent right ventricular peritoneal shunt removal on  due to non-functioning shunt.   Per Dr. Ortiz, patient needs a stepwise approach, and stated shunt replacement may be in his future though would need to wait at least 4 weeks.  Neurosurgery followed up  and said will be unable to replace  shunt while patient has active open wounds due to high risk for infection.      ID returned to the case for management of CSF infection as CSF culture and  shunt tip culture grew ESBL Klebsiella and Serratia marcescens.  Antibiotics changed to IV cipro and furthermore to oral cipro for 14 days post shunt removal EOT 24.  ID signed off on .     His hospital stay was further complicated by a bowel obstruction on May 19, noted on CT after patient vomited fecal material.  He underwent hemicolectomy and primary anastomosis  on 5/19 with Dr. Wright, General surgery.  Pathology returned moderately diff adenocarcinoma.  Oncology consulted and recommended CEA (negative), CT chest (negative for nodules/mass) for staging, and possible MRI brain if any further mentation deterioration.  Per Dr. Wong (Oncology) last note on 5/22/2024, pt's insurance is not accepted by their office and will require external referral to oncology. External referral placed faxed over referral to [facility].      Patient was noted to have progressive anemia on 5/21, with Hgb 10.3 to 6.8.  No black or bloody stool, hematemesis, hematuria, or increased pain at that time.  He received 1 unit PRBCs with improvement to 7.7.  He had a BM on 5/22 that was noted to be black with bright red blood as well.  Surgery notified and hemoglobin trended.  Lovenox held and PPI started.  Felt related to expected mucosal shedding after colon resection and would be self limiting.      On 6/1, pt noted to be tachycardic and distended. + emesis.  Imaging revealed free air, concerning for anastomotic leak.  S/p urgent ex-lap 6/1/2024  Antibiotics completed. TPN weaned off. Patient tolerating minced/moist, low fiber diet.  Staples removed 6/24.  Surgery signed off 6/11.     Palliative care consulted during hospitalization with GOC defined on 6/3/24 - he would want more neurosurgery if he is a candidate but would not want intra-abdominal surgery. He is doing much better, however, than he was when GOC were defined.      Awaiting placement.      Subjective & 24hr Events:   Patient reclining comfortably in bed watching TV.  He has no new complaints.      Assessment & Plan:     Colon obstruction secondary to colonic mass/adenocarcinoma of colon/acute bowel perforation  Stable.  Tolerating regular diet.  Follow-up with oncology as outpatient for chemotherapy and other treatments.  S/P ex-lap & closure of small bowel perforation (6/1/24).  S/P right hemicolectomy & primary anastamosis

## 2024-08-12 LAB
ANION GAP SERPL CALC-SCNC: 10 MMOL/L (ref 9–18)
BUN SERPL-MCNC: 18 MG/DL (ref 6–23)
CALCIUM SERPL-MCNC: 9.3 MG/DL (ref 8.8–10.2)
CHLORIDE SERPL-SCNC: 102 MMOL/L (ref 98–107)
CO2 SERPL-SCNC: 28 MMOL/L (ref 20–28)
CREAT SERPL-MCNC: 0.57 MG/DL (ref 0.8–1.3)
ERYTHROCYTE [DISTWIDTH] IN BLOOD BY AUTOMATED COUNT: 13 % (ref 11.9–14.6)
GLUCOSE SERPL-MCNC: 100 MG/DL (ref 70–99)
HCT VFR BLD AUTO: 35.1 % (ref 41.1–50.3)
HGB BLD-MCNC: 10.8 G/DL (ref 13.6–17.2)
MAGNESIUM SERPL-MCNC: 2 MG/DL (ref 1.8–2.4)
MCH RBC QN AUTO: 29.3 PG (ref 26.1–32.9)
MCHC RBC AUTO-ENTMCNC: 30.8 G/DL (ref 31.4–35)
MCV RBC AUTO: 95.4 FL (ref 82–102)
NRBC # BLD: 0 K/UL (ref 0–0.2)
PHOSPHATE SERPL-MCNC: 4.7 MG/DL (ref 2.5–4.5)
PLATELET # BLD AUTO: 237 K/UL (ref 150–450)
PMV BLD AUTO: 9.9 FL (ref 9.4–12.3)
POTASSIUM SERPL-SCNC: 4.1 MMOL/L (ref 3.5–5.1)
RBC # BLD AUTO: 3.68 M/UL (ref 4.23–5.6)
SODIUM SERPL-SCNC: 140 MMOL/L (ref 136–145)
WBC # BLD AUTO: 6.1 K/UL (ref 4.3–11.1)

## 2024-08-12 PROCEDURE — 36415 COLL VENOUS BLD VENIPUNCTURE: CPT

## 2024-08-12 PROCEDURE — 6370000000 HC RX 637 (ALT 250 FOR IP): Performed by: NURSE PRACTITIONER

## 2024-08-12 PROCEDURE — 83735 ASSAY OF MAGNESIUM: CPT

## 2024-08-12 PROCEDURE — 80048 BASIC METABOLIC PNL TOTAL CA: CPT

## 2024-08-12 PROCEDURE — 85027 COMPLETE CBC AUTOMATED: CPT

## 2024-08-12 PROCEDURE — 6370000000 HC RX 637 (ALT 250 FOR IP): Performed by: INTERNAL MEDICINE

## 2024-08-12 PROCEDURE — 6360000002 HC RX W HCPCS: Performed by: SURGERY

## 2024-08-12 PROCEDURE — 1100000000 HC RM PRIVATE

## 2024-08-12 PROCEDURE — 6370000000 HC RX 637 (ALT 250 FOR IP): Performed by: PHYSICIAN ASSISTANT

## 2024-08-12 PROCEDURE — 84100 ASSAY OF PHOSPHORUS: CPT

## 2024-08-12 RX ADMIN — ENOXAPARIN SODIUM 40 MG: 100 INJECTION SUBCUTANEOUS at 07:58

## 2024-08-12 RX ADMIN — TRAZODONE HYDROCHLORIDE 50 MG: 50 TABLET ORAL at 20:32

## 2024-08-12 RX ADMIN — PANTOPRAZOLE SODIUM 40 MG: 40 TABLET, DELAYED RELEASE ORAL at 20:32

## 2024-08-12 RX ADMIN — METOPROLOL TARTRATE 25 MG: 25 TABLET, FILM COATED ORAL at 20:32

## 2024-08-12 RX ADMIN — METOPROLOL TARTRATE 25 MG: 25 TABLET, FILM COATED ORAL at 07:58

## 2024-08-12 NOTE — PROGRESS NOTES
Occupational Therapy Note:    Attempted to see patient this PM for occupational therapy treatment  session. Patient is currently getting a bath with staff. Will follow and re-attempt as schedule permits/patient available. Thank you,    Akua Caro, OT    Rehab Caseload Tracker

## 2024-08-12 NOTE — PROGRESS NOTES
Pt resting in bed at present time without complaints. Wound dressing changed per MAR.    Hourly rounds completed, all needs met this shift. Bed locked and low, call light within reach. Will give report to oncoming day shift nurse.

## 2024-08-12 NOTE — PROGRESS NOTES
Physical Therapy Note:    Attempted to see patient this PM for physical therapy treatment  session. Patient currently getting bath from nursing staff. Will follow and re-attempt as schedule permits/patient available. Thank you,    Ok Younger, PT     Rehab Caseload Tracker

## 2024-08-12 NOTE — PROGRESS NOTES
Hospitalist Progress Note   Admit Date:  2024  4:30 PM   Name:  Brendan Saleh   Age:  49 y.o.  Sex:  male  :  1975   MRN:  [id number]   Room:  [facility]/    Presenting/Chief Complaint: Altered Mental Status     Reason(s) for Admission: Acute hypernatremia [E87.0]  Volume depletion [E86.9]  Acute kidney injury (HCC) [N17.9]  Acute encephalopathy [G93.40]  Septic shock (HCC) [A41.9, R65.21]     Hospital Day #98    Hospital Course:   Mr. Saleh is a 48 y.o. male with a PMH of SDH s/p poonam hole 2024, hydrocephalus s/p  shunt, sacral decubitus stage IV, who originally presented to the ER  with chief complaint of increased confusion.     He was diagnosed with UTI with hematuria and started on course of vancomycin, Zosyn.  Urine culture finalized negative.   blood cultures with CoNS, determined contaminant.  However, he met sepsis criteria with WBC 14.6, HR 120s.  ID consulted to assist.  ECHO showed preserved EF.  Underwent sacral wound debridement on  with General Surgery, Dr. Zapien.     CT head with ongoing hydrocephalus and a  shunt.  NSGY consulted.  Patient underwent right ventricular peritoneal shunt removal on  due to non-functioning shunt.   Per Dr. Ortiz, patient needs a stepwise approach, and stated shunt replacement may be in his future though would need to wait at least 4 weeks.  Neurosurgery followed up  and said will be unable to replace  shunt while patient has active open wounds due to high risk for infection.      ID returned to the case for management of CSF infection as CSF culture and  shunt tip culture grew ESBL Klebsiella and Serratia marcescens.  Antibiotics changed to IV cipro and furthermore to oral cipro for 14 days post shunt removal EOT 24.  ID signed off on .     His hospital stay was further complicated by a bowel obstruction on May 19, noted on CT after patient vomited fecal material.  He underwent hemicolectomy and primary anastomosis  immediate release tablet 5 mg  5 mg Oral Q4H PRN    cyclobenzaprine (FLEXERIL) tablet 10 mg  10 mg Oral TID PRN    traZODone (DESYREL) tablet 50 mg  50 mg Oral Nightly PRN    acetaminophen (TYLENOL) tablet 650 mg  650 mg Oral Q4H PRN    sennosides-docusate sodium (SENOKOT-S) 8.6-50 MG tablet 2 tablet  2 tablet Oral Daily    metoprolol tartrate (LOPRESSOR) tablet 25 mg  25 mg Oral BID    0.9 % sodium chloride infusion  25 mL IntraVENous PRN    sodium chloride flush 0.9 % injection 5-40 mL  5-40 mL IntraVENous PRN    0.9 % sodium chloride infusion   IntraVENous PRN    glucose chewable tablet 16 g  4 tablet Oral PRN    dextrose bolus 10% 125 mL  125 mL IntraVENous PRN    Or    dextrose bolus 10% 250 mL  250 mL IntraVENous PRN    Glucagon Emergency KIT 1 mg  1 mg SubCUTAneous PRN    dextrose 10 % infusion   IntraVENous Continuous PRN    medicated lip ointment (BLISTEX)   Topical PRN    ondansetron (ZOFRAN-ODT) disintegrating tablet 4 mg  4 mg Oral Q8H PRN    Or    ondansetron (ZOFRAN) injection 4 mg  4 mg IntraVENous Q6H PRN    enoxaparin (LOVENOX) injection 40 mg  40 mg SubCUTAneous Daily    calcium carbonate (TUMS) chewable tablet 500 mg  500 mg Oral TID PRN    guaiFENesin-dextromethorphan (ROBITUSSIN DM) 100-10 MG/5ML syrup 5 mL  5 mL Oral Q4H PRN       Signed:  Raphael Lobo M.D.   Hospitalist  08/12/24   7:17 AM

## 2024-08-13 PROCEDURE — 6370000000 HC RX 637 (ALT 250 FOR IP): Performed by: NURSE PRACTITIONER

## 2024-08-13 PROCEDURE — 6360000002 HC RX W HCPCS: Performed by: SURGERY

## 2024-08-13 PROCEDURE — 6370000000 HC RX 637 (ALT 250 FOR IP): Performed by: PHYSICIAN ASSISTANT

## 2024-08-13 PROCEDURE — 97530 THERAPEUTIC ACTIVITIES: CPT

## 2024-08-13 PROCEDURE — 6370000000 HC RX 637 (ALT 250 FOR IP): Performed by: INTERNAL MEDICINE

## 2024-08-13 PROCEDURE — 97112 NEUROMUSCULAR REEDUCATION: CPT

## 2024-08-13 PROCEDURE — 97110 THERAPEUTIC EXERCISES: CPT

## 2024-08-13 PROCEDURE — 1100000000 HC RM PRIVATE

## 2024-08-13 RX ADMIN — METOPROLOL TARTRATE 25 MG: 25 TABLET, FILM COATED ORAL at 21:41

## 2024-08-13 RX ADMIN — ENOXAPARIN SODIUM 40 MG: 100 INJECTION SUBCUTANEOUS at 08:17

## 2024-08-13 RX ADMIN — TRAZODONE HYDROCHLORIDE 50 MG: 50 TABLET ORAL at 21:44

## 2024-08-13 RX ADMIN — PANTOPRAZOLE SODIUM 40 MG: 40 TABLET, DELAYED RELEASE ORAL at 21:41

## 2024-08-13 RX ADMIN — METOPROLOL TARTRATE 25 MG: 25 TABLET, FILM COATED ORAL at 08:17

## 2024-08-13 NOTE — PROGRESS NOTES
Pt medicated per MAR. He received PRN trazodone tonight to help him sleep. A total of  1225 mLs of yellow, clear urine was emptied from T3 Search[employer] canister this shift. Pt in bed, resting with no complaints at this time. All known needs met at this time. Bed locked and lowered with call light within reach.

## 2024-08-13 NOTE — PROGRESS NOTES
Hospitalist Progress Note   Admit Date:  2024  4:30 PM   Name:  Brendan Saleh   Age:  49 y.o.  Sex:  male  :  1975   MRN:  [id number]   Room:  [facility]/    Presenting/Chief Complaint: Altered Mental Status     Reason(s) for Admission: Acute hypernatremia [E87.0]  Volume depletion [E86.9]  Acute kidney injury (HCC) [N17.9]  Acute encephalopathy [G93.40]  Septic shock (HCC) [A41.9, R65.21]     Hospital Course:   Mr. Saleh is a 48 y.o. male with a PMH of SDH s/p poonam hole 2024, hydrocephalus s/p  shunt, sacral decubitus stage IV, who originally presented to the ER  with chief complaint of increased confusion.     He was diagnosed with UTI with hematuria and started on course of vancomycin, Zosyn.  Urine culture finalized negative.   blood cultures with CoNS, determined contaminant.  However, he met sepsis criteria with WBC 14.6, HR 120s.  ID consulted to assist.  ECHO showed preserved EF.  Underwent sacral wound debridement on  with General Surgery, Dr. Zapien.     CT head with ongoing hydrocephalus and a  shunt.  NSGY consulted.  Patient underwent right ventricular peritoneal shunt removal on  due to non-functioning shunt.   Per Dr. Ortiz, patient needs a stepwise approach, and stated shunt replacement may be in his future though would need to wait at least 4 weeks.  Neurosurgery followed up  and said will be unable to replace  shunt while patient has active open wounds due to high risk for infection.      ID returned to the case for management of CSF infection as CSF culture and  shunt tip culture grew ESBL Klebsiella and Serratia marcescens.  Antibiotics changed to IV cipro and furthermore to oral cipro for 14 days post shunt removal EOT 24.  ID signed off on .     His hospital stay was further complicated by a bowel obstruction on May 19, noted on CT after patient vomited fecal material.  He underwent hemicolectomy and primary anastomosis on  with   traZODone (DESYREL) tablet 50 mg  50 mg Oral Nightly PRN    acetaminophen (TYLENOL) tablet 650 mg  650 mg Oral Q4H PRN    sennosides-docusate sodium (SENOKOT-S) 8.6-50 MG tablet 2 tablet  2 tablet Oral Daily    metoprolol tartrate (LOPRESSOR) tablet 25 mg  25 mg Oral BID    0.9 % sodium chloride infusion  25 mL IntraVENous PRN    sodium chloride flush 0.9 % injection 5-40 mL  5-40 mL IntraVENous PRN    0.9 % sodium chloride infusion   IntraVENous PRN    glucose chewable tablet 16 g  4 tablet Oral PRN    dextrose bolus 10% 125 mL  125 mL IntraVENous PRN    Or    dextrose bolus 10% 250 mL  250 mL IntraVENous PRN    Glucagon Emergency KIT 1 mg  1 mg SubCUTAneous PRN    dextrose 10 % infusion   IntraVENous Continuous PRN    medicated lip ointment (BLISTEX)   Topical PRN    ondansetron (ZOFRAN-ODT) disintegrating tablet 4 mg  4 mg Oral Q8H PRN    Or    ondansetron (ZOFRAN) injection 4 mg  4 mg IntraVENous Q6H PRN    enoxaparin (LOVENOX) injection 40 mg  40 mg SubCUTAneous Daily    calcium carbonate (TUMS) chewable tablet 500 mg  500 mg Oral TID PRN    guaiFENesin-dextromethorphan (ROBITUSSIN DM) 100-10 MG/5ML syrup 5 mL  5 mL Oral Q4H PRN       Signed:  DINA LAKE MD

## 2024-08-13 NOTE — PROGRESS NOTES
ACUTE PHYSICAL THERAPY GOALS:   (Developed with and agreed upon by patient and/or caregiver.)  LT goals re-assessed during re-evaluation on 7/24/24     (1.)Mr. Saleh will move from supine to sit and sit to supine  in bed with MODERATE ASSIST within 3 treatment day(s).  (2.)Mr. Saleh will scoot up and down in bed with MODERATE ASSIST within 3 treatment day(s).    (3.)Mr. Saleh will roll side to side in bed with MINIMAL ASSIST within 3 treatment day(s).    (4.)Mr. Saleh will tolerate sitting up in recliner chair for 2 hours with stable vital signs to increase upright tolerance within 3 treatment day(s).  (MET 7/24/24)  (5.)Mr. Saleh will maintain static/dynamic sitting x 25 minutes with MINIMAL ASSIST for improved balance within 3 treatment days.  (6.)Mr. Saleh will follow 75% of commands for increased participation in therapeutic activity/exercise within 3 treatment days.(MET 7/24/24)     LTG:  (1.) Brendan Saleh  will move from supine to sit and sit to supine and scoot up and down with MINIMAL ASSIST within 7 treatment day(s).    (2.)Mr. Saleh will roll side to side in bed with CONTACT GUARD ASSIST within 7 treatment day(s).    (3.) Brendan Saleh will transfer from bed to chair and chair to bed with MODERATE ASSISTx1 using the least restrictive device within 7 treatment day(s).    (4.) Brendan Saleh will perform therapeutic exercises x 20 min for HEP with CONTACT GUARD ASSIST to improve strength, endurance, and functional mobility within 7 treatment day(s). (MET 7/24/24)  (5.) Brendan Saleh will perform seated static and dynamic balance activities x25 minutes with CONTACT GUARD ASSIST to improve safety within 7 treatment day(s).  (6.) Brendan Saleh will perform standing static and dynamic balance activities x 5 minutes with MODERATE ASSIST to improve safety and mobility within 7 treatment day(s).   (7.)Brendan Saleh will be able to transfer bed to chair/wheelchair and  tasks  Therapeutic Activity (44 Minutes): Therapeutic activity included Supine to Sit, Scooting, Lateral Scooting, Transfer Training, Sitting balance , and Standing balance to improve functional Activity tolerance, Balance, Coordination, Mobility, Strength, and ROM.    TREATMENT GRID:  N/A    AFTER TREATMENT PRECAUTIONS: Alarm Activated, Bed/Chair Locked, Call light within reach, Chair, Needs within reach, and RN notified    INTERDISCIPLINARY COLLABORATION:  RN/ PCT and OT/ POND    EDUCATION: Education Given To: Patient  Education Provided: Transfer Training;Fall Prevention Strategies  Education Method: Verbal  Barriers to Learning: None  Education Outcome: Continued education needed    TIME IN/OUT:  Time In: 1405  Time Out: 1449  Minutes: 44    Ok Younger PT

## 2024-08-13 NOTE — PROGRESS NOTES
ACUTE OCCUPATIONAL THERAPY GOALS: GOALS REVIEWED AND UPDATED AT RE-EVALUATION ON 8/2/24  (Developed with and agreed upon by patient and/or caregiver.)  1. Patient will complete lower body bathing and dressing with MODERATE ASSIST and adaptive equipment as needed.     2. Patient will complete grooming ADL in unsupported sitting with CONTACT GUARD ASSIST.  3. Patient will tolerate 25 minutes of OT treatment with 1-2 rest breaks to increase activity tolerance for ADLs.   4. Patient will complete functional transfers via slide board with STAND BY ASSIST and adaptive equipment as needed.   5. Patient will tolerate 20 minutes BUE exercises to increase strength for safe, functional transfers.   6. Patient will complete upper body bathing and dressing with STAND BY ASSIST and adaptive equipment as needed.  7. Patient will tolerate 10 minutes unsupported sitting balance edge of bed with STAND BY ASSIST in preparation for ADL performance.   9. Patient will tolerate static standing task for >30 seconds with MAXIMAL ASSIST in preparation for ADL tasks and to decrease caregiver burden.      Timeframe: 7 visits        OCCUPATIONAL THERAPY: Daily Note AM   OT Visit Days: 4   Time In/Out  OT Charge Capture  Rehab Caseload Tracker  OT Orders    Brendan Saleh is a 49 y.o. male   PRIMARY DIAGNOSIS: Infection of  (ventriculoperitoneal) shunt (HCC)  Acute hypernatremia [E87.0]  Volume depletion [E86.9]  Acute kidney injury (HCC) [N17.9]  Acute encephalopathy [G93.40]  Septic shock (HCC) [A41.9, R65.21]  Procedure(s) (LRB):  LAPAROTOMY EXPLORATORY, PRIMARY CLOSURE SMALL BOWEL PERFORATION (N/A)  73 Days Post-Op  Inpatient: Payor: AMBETTER / Plan: AMBETTER / Product Type: *No Product type* /     ASSESSMENT:     REHAB RECOMMENDATIONS:   Recommendation to date pending progress:  Setting:  Pt continues to be medically stable for discharge, however awaiting medicaid/ social security disability    Pt would benefit from rehab when

## 2024-08-14 LAB
ANION GAP SERPL CALC-SCNC: 10 MMOL/L (ref 9–18)
BUN SERPL-MCNC: 24 MG/DL (ref 6–23)
CALCIUM SERPL-MCNC: 9.1 MG/DL (ref 8.8–10.2)
CHLORIDE SERPL-SCNC: 101 MMOL/L (ref 98–107)
CO2 SERPL-SCNC: 28 MMOL/L (ref 20–28)
CREAT SERPL-MCNC: 0.74 MG/DL (ref 0.8–1.3)
GLUCOSE SERPL-MCNC: 99 MG/DL (ref 70–99)
POTASSIUM SERPL-SCNC: 4.1 MMOL/L (ref 3.5–5.1)
SODIUM SERPL-SCNC: 138 MMOL/L (ref 136–145)

## 2024-08-14 PROCEDURE — 6370000000 HC RX 637 (ALT 250 FOR IP): Performed by: INTERNAL MEDICINE

## 2024-08-14 PROCEDURE — 6370000000 HC RX 637 (ALT 250 FOR IP): Performed by: NURSE PRACTITIONER

## 2024-08-14 PROCEDURE — 6370000000 HC RX 637 (ALT 250 FOR IP): Performed by: PHYSICIAN ASSISTANT

## 2024-08-14 PROCEDURE — 97530 THERAPEUTIC ACTIVITIES: CPT

## 2024-08-14 PROCEDURE — 1100000000 HC RM PRIVATE

## 2024-08-14 PROCEDURE — 80048 BASIC METABOLIC PNL TOTAL CA: CPT

## 2024-08-14 PROCEDURE — 36415 COLL VENOUS BLD VENIPUNCTURE: CPT

## 2024-08-14 PROCEDURE — 6360000002 HC RX W HCPCS: Performed by: SURGERY

## 2024-08-14 PROCEDURE — 97110 THERAPEUTIC EXERCISES: CPT

## 2024-08-14 PROCEDURE — 97164 PT RE-EVAL EST PLAN CARE: CPT

## 2024-08-14 PROCEDURE — 97112 NEUROMUSCULAR REEDUCATION: CPT

## 2024-08-14 RX ORDER — SODIUM HYPOCHLORITE 1.25 MG/ML
SOLUTION TOPICAL DAILY
Status: DISCONTINUED | OUTPATIENT
Start: 2024-08-15 | End: 2024-08-24

## 2024-08-14 RX ADMIN — TRAZODONE HYDROCHLORIDE 50 MG: 50 TABLET ORAL at 20:34

## 2024-08-14 RX ADMIN — ENOXAPARIN SODIUM 40 MG: 100 INJECTION SUBCUTANEOUS at 08:28

## 2024-08-14 RX ADMIN — METOPROLOL TARTRATE 25 MG: 25 TABLET, FILM COATED ORAL at 20:34

## 2024-08-14 RX ADMIN — PANTOPRAZOLE SODIUM 40 MG: 40 TABLET, DELAYED RELEASE ORAL at 20:34

## 2024-08-14 RX ADMIN — METOPROLOL TARTRATE 25 MG: 25 TABLET, FILM COATED ORAL at 08:28

## 2024-08-14 NOTE — WOUND CARE
Re-consulted for malodorous, green drainage from sacral wound. Last seen 8/8 by Wound team. Primary RN already changed bandage, but would benefit to return back to [facility] wet-dry dressing for 2 weeks (8/15-8/26). MD Stover made aware/confirmed.

## 2024-08-14 NOTE — PROGRESS NOTES
Uneventful shift for pt.  Slept intermittently.  Turned side to side regularly.  Denies pain.  VS stable.  Hourly rounds performed this shift.  Pt medicated per MAR.  All known needs met at this time.  Bed low and locked, call light in reach.

## 2024-08-14 NOTE — PROGRESS NOTES
ACUTE OCCUPATIONAL THERAPY GOALS: GOALS REVIEWED AND UPDATED AT RE-EVALUATION ON 8/2/24  (Developed with and agreed upon by patient and/or caregiver.)  1. Patient will complete lower body bathing and dressing with MODERATE ASSIST and adaptive equipment as needed.     2. Patient will complete grooming ADL in unsupported sitting with CONTACT GUARD ASSIST.  3. Patient will tolerate 25 minutes of OT treatment with 1-2 rest breaks to increase activity tolerance for ADLs.   4. Patient will complete functional transfers via slide board with STAND BY ASSIST and adaptive equipment as needed.   5. Patient will tolerate 20 minutes BUE exercises to increase strength for safe, functional transfers.   6. Patient will complete upper body bathing and dressing with STAND BY ASSIST and adaptive equipment as needed.  7. Patient will tolerate 10 minutes unsupported sitting balance edge of bed with STAND BY ASSIST in preparation for ADL performance.   9. Patient will tolerate static standing task for >30 seconds with MAXIMAL ASSIST in preparation for ADL tasks and to decrease caregiver burden.      Timeframe: 7 visits        OCCUPATIONAL THERAPY: Daily Note PM   OT Visit Days: 5   Time In/Out  OT Charge Capture  Rehab Caseload Tracker  OT Orders    Brendan Saleh is a 49 y.o. male   PRIMARY DIAGNOSIS: Infection of  (ventriculoperitoneal) shunt (HCC)  Acute hypernatremia [E87.0]  Volume depletion [E86.9]  Acute kidney injury (HCC) [N17.9]  Acute encephalopathy [G93.40]  Septic shock (HCC) [A41.9, R65.21]  Procedure(s) (LRB):  LAPAROTOMY EXPLORATORY, PRIMARY CLOSURE SMALL BOWEL PERFORATION (N/A)  74 Days Post-Op  Inpatient: Payor: AMBETTER / Plan: AMBETTER / Product Type: *No Product type* /     ASSESSMENT:     REHAB RECOMMENDATIONS:   Recommendation to date pending progress:  Setting:  Pt continues to be medically stable for discharge, however awaiting medicaid/ social security disability    Pt would benefit from rehab when  overall endurance/tolerance levels, as well as need for high level skilled assistance to complete functional transfers/mobility and functional tasks  Therapeutic Exercise (30 Minutes): Therapeutic exercises noted below to improve functional activity tolerance, AROM, strength, mobility, and with patient completing functional strengthening with locking/unlocking locks on wheelchair and propelling himself with arms/legs and over different surfaces.   Neuromuscular Re-education (23 Minutes): Patient participated in neuromuscular re-education including functional reaching, weight shifting, postural training, midline training, standing tolerance activity , and sitting balance activity   with minimal and moderate assistance, verbal cues, tactile cues, visual cues, and education to improve sitting balance, standing balance, posture, coordination, static balance, and dynamic balance in order to prepare for functional task, prepare for seated ADLs, prepare for standing ADLs, prepare for functional transfer, increase safety awareness, and prepare for self care..     TREATMENT GRID:  N/A    AFTER TREATMENT PRECAUTIONS: Alarm Activated, Bed/Chair Locked, Call light within reach, Chair, Needs within reach, and RN notified    INTERDISCIPLINARY COLLABORATION:  RN/ PCT, PT/ PTA, and OT/ POND    EDUCATION:       TOTAL TREATMENT DURATION AND TIME:  Time In: 1504  Time Out: 1557  Minutes: 53    Pamela Rowell OT

## 2024-08-14 NOTE — PROGRESS NOTES
ACUTE PHYSICAL THERAPY GOALS:   (Developed with and agreed upon by patient and/or caregiver.)  LT goals re-assessed during re-evaluation on 8/14/24    (1.) Brendan Saleh  will move from supine to sit and sit to supine and scoot up and down with CONTACT GUARD ASSIST within 7 treatment day(s).      (2.) Brendan Saleh will transfer from bed to chair and chair to bed with MINIMAL ASSISTx1 using the least restrictive device within 7 treatment day(s).    (3.) Brendan Saleh will perform seated static and dynamic balance activities x 20 minutes with STAND BY ASSIST to improve safety within 7 treatment day(s).  (4.) Brendan Saleh will perform standing static and dynamic balance activities x 2 minutes with MODERATE ASSIST to improve safety and mobility within 7 treatment day(s).   (5.)Brendan Saleh will be able to transfer bed to chair/wheelchair and wheelchair/chair to bed via stand pivot transfer with MINIMAL ASSIST to increase mobility possibilities in 7 treatment days.   (6). Brendan Saleh will self propel wheelchair x 1000 ft with STAND BY ASSISTANCE to demonstrate increase in UE and LE strength and independence in mobility.    PHYSICAL THERAPY Daily Note, Re-evaluation, and PM  (Link to Caseload Tracking: PT Visit Days : 1  Acknowledge Orders  Time In/Out  PT Charge Capture  Rehab Caseload Tracker    Brendan Saleh is a 49 y.o. male   PRIMARY DIAGNOSIS: Infection of  (ventriculoperitoneal) shunt (HCC)  Acute hypernatremia [E87.0]  Volume depletion [E86.9]  Acute kidney injury (HCC) [N17.9]  Acute encephalopathy [G93.40]  Septic shock (HCC) [A41.9, R65.21]  Procedure(s) (LRB):  LAPAROTOMY EXPLORATORY, PRIMARY CLOSURE SMALL BOWEL PERFORATION (N/A)  74 Days Post-Op  Reason for Referral: Generalized Muscle Weakness (M62.81)  Difficulty in walking, Not elsewhere classified (R26.2)  Inpatient: Payor: AMBETTER / Plan: AMBETTER / Product Type: *No Product type* /     ASSESSMENT:

## 2024-08-14 NOTE — PROGRESS NOTES
Hospitalist Progress Note   Admit Date:  2024  4:30 PM   Name:  Brendan Saleh   Age:  49 y.o.  Sex:  male  :  1975   MRN:  [id number]   Room:  [facility]/    Presenting/Chief Complaint: Altered Mental Status     Reason(s) for Admission: Acute hypernatremia [E87.0]  Volume depletion [E86.9]  Acute kidney injury (HCC) [N17.9]  Acute encephalopathy [G93.40]  Septic shock (HCC) [A41.9, R65.21]     Hospital Course:   Mr. Saleh is a 48 y.o. male with a PMH of SDH s/p poonam hole 2024, hydrocephalus s/p  shunt, sacral decubitus stage IV, who originally presented to the ER  with chief complaint of increased confusion.     He was diagnosed with UTI with hematuria and started on course of vancomycin, Zosyn.  Urine culture finalized negative.   blood cultures with CoNS, determined contaminant.  However, he met sepsis criteria with WBC 14.6, HR 120s.  ID consulted to assist.  ECHO showed preserved EF.  Underwent sacral wound debridement on  with General Surgery, Dr. Zapien.     CT head with ongoing hydrocephalus and a  shunt.  NSGY consulted.  Patient underwent right ventricular peritoneal shunt removal on  due to non-functioning shunt.   Per Dr. Ortiz, patient needs a stepwise approach, and stated shunt replacement may be in his future though would need to wait at least 4 weeks.  Neurosurgery followed up  and said will be unable to replace  shunt while patient has active open wounds due to high risk for infection.      ID returned to the case for management of CSF infection as CSF culture and  shunt tip culture grew ESBL Klebsiella and Serratia marcescens.  Antibiotics changed to IV cipro and furthermore to oral cipro for 14 days post shunt removal EOT 24.  ID signed off on .     His hospital stay was further complicated by a bowel obstruction on May 19, noted on CT after patient vomited fecal material.  He underwent hemicolectomy and primary anastomosis on  with      glucose chewable tablet 16 g  4 tablet Oral PRN    dextrose bolus 10% 125 mL  125 mL IntraVENous PRN    Or    dextrose bolus 10% 250 mL  250 mL IntraVENous PRN    Glucagon Emergency KIT 1 mg  1 mg SubCUTAneous PRN    dextrose 10 % infusion   IntraVENous Continuous PRN    medicated lip ointment (BLISTEX)   Topical PRN    ondansetron (ZOFRAN-ODT) disintegrating tablet 4 mg  4 mg Oral Q8H PRN    Or    ondansetron (ZOFRAN) injection 4 mg  4 mg IntraVENous Q6H PRN    enoxaparin (LOVENOX) injection 40 mg  40 mg SubCUTAneous Daily    calcium carbonate (TUMS) chewable tablet 500 mg  500 mg Oral TID PRN    guaiFENesin-dextromethorphan (ROBITUSSIN DM) 100-10 MG/5ML syrup 5 mL  5 mL Oral Q4H PRN       Signed:  DINA LAKE MD

## 2024-08-14 NOTE — PROGRESS NOTES
Pt in bed resting at this time. Pt was sitting in the recliner with foam padding under buttocks. Q2 turns but pt repositions himself when he feels discomfort. Sacral dressing changed twice during shift. The last time it was changed per the new wound orders. Swab of wound was taken but not sent. Waiting on provider order. Nurse notified wound nurse. All dressings changed on bony prominences. Hourly rounds performed this shift. Bed lowered and locked. Call light within reach. All needs met at this time.

## 2024-08-15 PROCEDURE — 6360000002 HC RX W HCPCS: Performed by: SURGERY

## 2024-08-15 PROCEDURE — 87070 CULTURE OTHR SPECIMN AEROBIC: CPT

## 2024-08-15 PROCEDURE — 6370000000 HC RX 637 (ALT 250 FOR IP): Performed by: NURSE PRACTITIONER

## 2024-08-15 PROCEDURE — 87205 SMEAR GRAM STAIN: CPT

## 2024-08-15 PROCEDURE — 87186 SC STD MICRODIL/AGAR DIL: CPT

## 2024-08-15 PROCEDURE — 1100000000 HC RM PRIVATE

## 2024-08-15 PROCEDURE — 6370000000 HC RX 637 (ALT 250 FOR IP): Performed by: INTERNAL MEDICINE

## 2024-08-15 PROCEDURE — 6370000000 HC RX 637 (ALT 250 FOR IP): Performed by: PHYSICIAN ASSISTANT

## 2024-08-15 RX ADMIN — ENOXAPARIN SODIUM 40 MG: 100 INJECTION SUBCUTANEOUS at 07:57

## 2024-08-15 RX ADMIN — METOPROLOL TARTRATE 25 MG: 25 TABLET, FILM COATED ORAL at 07:56

## 2024-08-15 RX ADMIN — DAKIN'S SOLUTION 0.125% (QUARTER STRENGTH): 0.12 SOLUTION at 07:58

## 2024-08-15 RX ADMIN — METOPROLOL TARTRATE 25 MG: 25 TABLET, FILM COATED ORAL at 20:30

## 2024-08-15 RX ADMIN — PANTOPRAZOLE SODIUM 40 MG: 40 TABLET, DELAYED RELEASE ORAL at 20:30

## 2024-08-15 RX ADMIN — OXYCODONE 5 MG: 5 TABLET ORAL at 20:03

## 2024-08-15 ASSESSMENT — PAIN DESCRIPTION - LOCATION: LOCATION: HEAD

## 2024-08-15 ASSESSMENT — PAIN - FUNCTIONAL ASSESSMENT: PAIN_FUNCTIONAL_ASSESSMENT: ACTIVITIES ARE NOT PREVENTED

## 2024-08-15 ASSESSMENT — PAIN DESCRIPTION - DESCRIPTORS: DESCRIPTORS: ACHING;DISCOMFORT;THROBBING

## 2024-08-15 ASSESSMENT — PAIN SCALES - GENERAL: PAINLEVEL_OUTOF10: 4

## 2024-08-15 NOTE — PROGRESS NOTES
Pt medicated per MAR. He received PRN trazodone for sleep. No c/o pain tonight. Sacral dressing C,D, & I. No BM tonight. Pt repositions himself in bed. Pt in bed with eyes closed, respirations noted. All known needs met at this time. Bed locked and lowered with call light within reach.

## 2024-08-15 NOTE — PROGRESS NOTES
Hospitalist Progress Note   Admit Date:  2024  4:30 PM   Name:  Brendan Saleh   Age:  49 y.o.  Sex:  male  :  1975   MRN:  [id number]   Room:  [facility]/    Presenting/Chief Complaint: Altered Mental Status     Reason(s) for Admission: Acute hypernatremia [E87.0]  Volume depletion [E86.9]  Acute kidney injury (HCC) [N17.9]  Acute encephalopathy [G93.40]  Septic shock (HCC) [A41.9, R65.21]     Hospital Course:   Mr. Saleh is a 48 y.o. male with a PMH of SDH s/p poonam hole 2024, hydrocephalus s/p  shunt, sacral decubitus stage IV, who originally presented to the ER  with chief complaint of increased confusion.     He was diagnosed with UTI with hematuria and started on course of vancomycin, Zosyn.  Urine culture finalized negative.   blood cultures with CoNS, determined contaminant.  However, he met sepsis criteria with WBC 14.6, HR 120s.  ID consulted to assist.  ECHO showed preserved EF.  Underwent sacral wound debridement on  with General Surgery, Dr. Zapien.     CT head with ongoing hydrocephalus and a  shunt.  NSGY consulted.  Patient underwent right ventricular peritoneal shunt removal on  due to non-functioning shunt.   Per Dr. Ortiz, patient needs a stepwise approach, and stated shunt replacement may be in his future though would need to wait at least 4 weeks.  Neurosurgery followed up  and said will be unable to replace  shunt while patient has active open wounds due to high risk for infection.      ID returned to the case for management of CSF infection as CSF culture and  shunt tip culture grew ESBL Klebsiella and Serratia marcescens.  Antibiotics changed to IV cipro and furthermore to oral cipro for 14 days post shunt removal EOT 24.  ID signed off on .     His hospital stay was further complicated by a bowel obstruction on May 19, noted on CT after patient vomited fecal material.  He underwent hemicolectomy and primary anastomosis on  with

## 2024-08-15 NOTE — PROGRESS NOTES
Pt did not complain of pain this shift. Pt's sacrum dressing was changed today as well as a would culture obtained and sent to lab. Wound culture still in process.     All known needs met at this time. Bed is currently low, call light was left in reach, and pt was encouraged to ask for assistance. Pt was left resting in bed with no complaints. Will give bedside report to oncoming nurse.

## 2024-08-16 PROCEDURE — 97110 THERAPEUTIC EXERCISES: CPT

## 2024-08-16 PROCEDURE — 6370000000 HC RX 637 (ALT 250 FOR IP): Performed by: PHYSICIAN ASSISTANT

## 2024-08-16 PROCEDURE — 97112 NEUROMUSCULAR REEDUCATION: CPT

## 2024-08-16 PROCEDURE — 1100000000 HC RM PRIVATE

## 2024-08-16 PROCEDURE — 6370000000 HC RX 637 (ALT 250 FOR IP)

## 2024-08-16 PROCEDURE — 6360000002 HC RX W HCPCS: Performed by: SURGERY

## 2024-08-16 PROCEDURE — 6370000000 HC RX 637 (ALT 250 FOR IP): Performed by: NURSE PRACTITIONER

## 2024-08-16 PROCEDURE — 6370000000 HC RX 637 (ALT 250 FOR IP): Performed by: INTERNAL MEDICINE

## 2024-08-16 PROCEDURE — 97530 THERAPEUTIC ACTIVITIES: CPT

## 2024-08-16 RX ADMIN — PANTOPRAZOLE SODIUM 40 MG: 40 TABLET, DELAYED RELEASE ORAL at 20:37

## 2024-08-16 RX ADMIN — METOPROLOL TARTRATE 25 MG: 25 TABLET, FILM COATED ORAL at 09:57

## 2024-08-16 RX ADMIN — ENOXAPARIN SODIUM 40 MG: 100 INJECTION SUBCUTANEOUS at 09:57

## 2024-08-16 RX ADMIN — DAKIN'S SOLUTION 0.125% (QUARTER STRENGTH): 0.12 SOLUTION at 09:59

## 2024-08-16 RX ADMIN — OXYCODONE 5 MG: 5 TABLET ORAL at 03:58

## 2024-08-16 RX ADMIN — METOPROLOL TARTRATE 25 MG: 25 TABLET, FILM COATED ORAL at 20:38

## 2024-08-16 ASSESSMENT — PAIN DESCRIPTION - LOCATION: LOCATION: HEAD

## 2024-08-16 ASSESSMENT — PAIN - FUNCTIONAL ASSESSMENT: PAIN_FUNCTIONAL_ASSESSMENT: ACTIVITIES ARE NOT PREVENTED

## 2024-08-16 ASSESSMENT — PAIN DESCRIPTION - DESCRIPTORS: DESCRIPTORS: ACHING;DISCOMFORT;SORE

## 2024-08-16 ASSESSMENT — PAIN SCALES - GENERAL: PAINLEVEL_OUTOF10: 5

## 2024-08-16 NOTE — PROGRESS NOTES
ACUTE PHYSICAL THERAPY GOALS:   (Developed with and agreed upon by patient and/or caregiver.)  (1.) Brendan Saleh  will move from supine to sit and sit to supine and scoot up and down with CONTACT GUARD ASSIST within 7 treatment day(s).      (2.) Brendan aSleh will transfer from bed to chair and chair to bed with MINIMAL ASSISTx1 using the least restrictive device within 7 treatment day(s).    (3.) Brendan Saleh will perform seated static and dynamic balance activities x 20 minutes with STAND BY ASSIST to improve safety within 7 treatment day(s).  (4.) Brendan Saleh will perform standing static and dynamic balance activities x 2 minutes with MODERATE ASSIST to improve safety and mobility within 7 treatment day(s).   (5.)Brendan Saleh will be able to transfer bed to chair/wheelchair and wheelchair/chair to bed via stand pivot transfer with MINIMAL ASSIST to increase mobility possibilities in 7 treatment days.   (6). Brendan Saleh will self propel wheelchair x 1000 ft with STAND BY ASSISTANCE to demonstrate increase in UE and LE strength and independence in mobility    PHYSICAL THERAPY: Daily Note AM   (Link to Caseload Tracking: PT Visit Days : 2  Time In/Out PT Charge Capture  Rehab Caseload Tracker  Orders    Brendan Saleh is a 49 y.o. male   PRIMARY DIAGNOSIS: Infection of  (ventriculoperitoneal) shunt (HCC)  Acute hypernatremia [E87.0]  Volume depletion [E86.9]  Acute kidney injury (HCC) [N17.9]  Acute encephalopathy [G93.40]  Septic shock (HCC) [A41.9, R65.21]  Procedure(s) (LRB):  LAPAROTOMY EXPLORATORY, PRIMARY CLOSURE SMALL BOWEL PERFORATION (N/A)  76 Days Post-Op  Inpatient: Payor: AMBETTER / Plan: AMBETTER / Product Type: *No Product type* /     ASSESSMENT:     REHAB RECOMMENDATIONS:   Recommendation to date pending progress:  Setting:  Short-term Rehab    Equipment:    To Be Determined     ASSESSMENT:  Mr. Saleh presented sitting in chair with alarm active  Bed/Chair Locked, Call light within reach, Chair, and RN at bedside    INTERDISCIPLINARY COLLABORATION:  RN/ PCT and OT/ POND    EDUCATION: Education Given To: Patient  Education Provided: Role of Therapy;Plan of Care  Education Method: Demonstration;Verbal  Barriers to Learning: None  Education Outcome: Verbalized understanding;Demonstrated understanding    TIME IN/OUT:  Time In: 1108  Time Out: 1203  Minutes: 55    Trace Carrillo PT

## 2024-08-16 NOTE — PROGRESS NOTES
Intake --   Output 3000 ml   Net -3000 ml         Physical Exam:     General:    Well nourished.    Head:  Normocephalic, atraumatic  Eyes:  Sclerae appear normal.  Pupils equally round.  ENT:  Nares appear normal.  Moist oral mucosa  Neck:  No restricted ROM.  Trachea midline   CV:   RRR.  No m/r/g.  No jugular venous distension.  Lungs:   CTAB.  No wheezing, rhonchi, or rales.  Symmetric expansion.  Abdomen:   Soft, nontender, nondistended.  Extremities: No cyanosis or clubbing.  No edema  Skin:     Sacra wound   Neuro:  CN II-XII grossly intact.    Psych:  Normal mood and affect.      I have personally reviewed labs and tests:  Recent Labs:  Recent Results (from the past 48 hour(s))   Culture, Wound    Collection Time: 08/15/24  7:53 AM    Specimen: Sacrum   Result Value Ref Range    Special Requests NO SPECIAL REQUESTS      Gram Stain FEW WBCS SEEN      Gram Stain NO DEFINITE ORGANISM SEEN      Culture (A)       MODERATE Staphylococcus aureus SENSITIVITY TO FOLLOW    Culture SCANT Mixed Gram Negative Rods (A)         No results for input(s): \"COVID19\" in the last 72 hours.    Current Meds:  Current Facility-Administered Medications   Medication Dose Route Frequency    sodium hypochlorite (DAKINS) 0.125 % external solution   Irrigation Daily    LORazepam (ATIVAN) tablet 0.5 mg  0.5 mg Oral Q6H PRN    diphenhydrAMINE (BENADRYL) injection 25 mg  25 mg IntraVENous Q6H PRN    pantoprazole (PROTONIX) tablet 40 mg  40 mg Oral Nightly    oxyCODONE (ROXICODONE) immediate release tablet 5 mg  5 mg Oral Q4H PRN    cyclobenzaprine (FLEXERIL) tablet 10 mg  10 mg Oral TID PRN    traZODone (DESYREL) tablet 50 mg  50 mg Oral Nightly PRN    acetaminophen (TYLENOL) tablet 650 mg  650 mg Oral Q4H PRN    sennosides-docusate sodium (SENOKOT-S) 8.6-50 MG tablet 2 tablet  2 tablet Oral Daily    metoprolol tartrate (LOPRESSOR) tablet 25 mg  25 mg Oral BID    0.9 % sodium chloride infusion  25 mL IntraVENous PRN    sodium chloride  flush 0.9 % injection 5-40 mL  5-40 mL IntraVENous PRN    0.9 % sodium chloride infusion   IntraVENous PRN    glucose chewable tablet 16 g  4 tablet Oral PRN    dextrose bolus 10% 125 mL  125 mL IntraVENous PRN    Or    dextrose bolus 10% 250 mL  250 mL IntraVENous PRN    Glucagon Emergency KIT 1 mg  1 mg SubCUTAneous PRN    dextrose 10 % infusion   IntraVENous Continuous PRN    medicated lip ointment (BLISTEX)   Topical PRN    ondansetron (ZOFRAN-ODT) disintegrating tablet 4 mg  4 mg Oral Q8H PRN    Or    ondansetron (ZOFRAN) injection 4 mg  4 mg IntraVENous Q6H PRN    enoxaparin (LOVENOX) injection 40 mg  40 mg SubCUTAneous Daily    calcium carbonate (TUMS) chewable tablet 500 mg  500 mg Oral TID PRN    guaiFENesin-dextromethorphan (ROBITUSSIN DM) 100-10 MG/5ML syrup 5 mL  5 mL Oral Q4H PRN       Signed:  DINA LAKE MD

## 2024-08-16 NOTE — PROGRESS NOTES
Sacral wound cleaned and dressed as ordered. No drainage/odor noted. Pt tolerated well. He is now OOB to chair, wheels locked. Call light within reach.

## 2024-08-16 NOTE — PROGRESS NOTES
ACUTE OCCUPATIONAL THERAPY GOALS: GOALS REVIEWED AND UPDATED AT RE-EVALUATION ON 8/2/24  (Developed with and agreed upon by patient and/or caregiver.)  1. Patient will complete lower body bathing and dressing with MODERATE ASSIST and adaptive equipment as needed.     2. Patient will complete grooming ADL in unsupported sitting with CONTACT GUARD ASSIST.  3. Patient will tolerate 25 minutes of OT treatment with 1-2 rest breaks to increase activity tolerance for ADLs.   4. Patient will complete functional transfers via slide board with STAND BY ASSIST and adaptive equipment as needed.   5. Patient will tolerate 20 minutes BUE exercises to increase strength for safe, functional transfers.   6. Patient will complete upper body bathing and dressing with STAND BY ASSIST and adaptive equipment as needed.  7. Patient will tolerate 10 minutes unsupported sitting balance edge of bed with STAND BY ASSIST in preparation for ADL performance.   9. Patient will tolerate static standing task for >30 seconds with MAXIMAL ASSIST in preparation for ADL tasks and to decrease caregiver burden.      Timeframe: 7 visits        OCCUPATIONAL THERAPY: Daily Note AM   OT Visit Days: 6   Time In/Out  OT Charge Capture  Rehab Caseload Tracker  OT Orders    Brendan Saleh is a 49 y.o. male   PRIMARY DIAGNOSIS: Infection of  (ventriculoperitoneal) shunt (HCC)  Acute hypernatremia [E87.0]  Volume depletion [E86.9]  Acute kidney injury (HCC) [N17.9]  Acute encephalopathy [G93.40]  Septic shock (HCC) [A41.9, R65.21]  Procedure(s) (LRB):  LAPAROTOMY EXPLORATORY, PRIMARY CLOSURE SMALL BOWEL PERFORATION (N/A)  76 Days Post-Op  Inpatient: Payor: AMBETTER / Plan: AMBETTER / Product Type: *No Product type* /     ASSESSMENT:     REHAB RECOMMENDATIONS:   Recommendation to date pending progress:  Setting:  Pt continues to be medically stable for discharge, however awaiting medicaid/ social security disability    Pt would benefit from rehab when

## 2024-08-16 NOTE — PROGRESS NOTES
Pt medicated per MAR. He received PRN oxy twice for headache pain. Vitals were taken again tonight due to temperature being slightly elevated at 100.2 degrees F and  bpm, this was prior to pain medication. Repeat temperature is 98.6 degrees F and  bpm. All known needs met at this time. Bed locked and lowered with call light within reach.

## 2024-08-16 NOTE — ADT AUTH CERT
[facility]  [address] [place] [address]  [id number]  [id number]    Auth number: [id number]  CONT STAY   24  PT STILL IN HOUSE    Return Contact Information:  BRIAN ALBRECHT  PH: [phone]  FAX: [phone]       Last edited by Brian Albrecht on 08/15/24 at 0890     Patient Demographics    Name Patient ID SSN Gender Identity Birth Date   Brendan Saleh [id number]  Male 75 (49 yrs)     Address Phone Email Employer    [employer] [place] [id number] [phone] (H)  [phone] (M)  [phone] (W) [email or web address] NOT EMPLOYED      County Race Occupation Emp Status    [place] White (non-) -- Not Employed      Reg Status PCP Date Last Verified Next Review Date    Verified -- 24      Admission Date Discharge Date Admitting Provider     24 -- Lei Sargent MD       Marital Status Buddhism      Single Unknown        Emergency Contact 1   Lesley Doyle (4) [phone] (G)     Physician Progress Notes  Notes from 24 through 24  Progress Notes by Alayna Garcia MD at 8/15/2024  9:39 AM    Author: Alayna Garcia MD Service: Internal Medicine Author Type: Physician   Filed: 8/15/2024  4:19 PM Date of Service: 8/15/2024  9:39 AM Status: Addendum   : Alayna Garcia MD (Physician)   Related Notes: Original Note by Alayna Garcia MD (Physician) filed at 8/15/2024  4:05 PM   Expand All Collapse All         Hospitalist Progress Note   Admit Date:     2024  4:30 PM   Name:              Brendan Saleh   Age:                 49 y.o.  Sex:                 male  :               1975   MRN:               [id number]   Room:              [facility]     Presenting/Chief Complaint: Altered Mental Status     Reason(s) for Admission: Acute hypernatremia [E87.0]  Volume depletion [E86.9]  Acute kidney injury (HCC) [N17.9]  Acute encephalopathy [G93.40]  Septic shock (HCC) [A41.9, R65.21]

## 2024-08-16 NOTE — CARE COORDINATION
Signed       Dispo update: Mr. Saleh is still waiting on his Social Security disability letter, which will allow him to apply for [place] Medicaid, which will allow [facility] to apply for Medicaid Level of Care, which will allow him to be placed into a skilled nursing facility for long-term care.

## 2024-08-17 LAB
ANION GAP SERPL CALC-SCNC: 10 MMOL/L (ref 9–18)
BACTERIA SPEC CULT: ABNORMAL
BACTERIA SPEC CULT: ABNORMAL
BASOPHILS # BLD: 0 K/UL (ref 0–0.2)
BASOPHILS NFR BLD: 0 % (ref 0–2)
BUN SERPL-MCNC: 20 MG/DL (ref 6–23)
CALCIUM SERPL-MCNC: 8.8 MG/DL (ref 8.8–10.2)
CHLORIDE SERPL-SCNC: 99 MMOL/L (ref 98–107)
CO2 SERPL-SCNC: 26 MMOL/L (ref 20–28)
CREAT SERPL-MCNC: 0.81 MG/DL (ref 0.8–1.3)
DIFFERENTIAL METHOD BLD: ABNORMAL
EOSINOPHIL # BLD: 0 K/UL (ref 0–0.8)
EOSINOPHIL NFR BLD: 0 % (ref 0.5–7.8)
ERYTHROCYTE [DISTWIDTH] IN BLOOD BY AUTOMATED COUNT: 13.4 % (ref 11.9–14.6)
GLUCOSE SERPL-MCNC: 111 MG/DL (ref 70–99)
GRAM STN SPEC: ABNORMAL
GRAM STN SPEC: ABNORMAL
HCT VFR BLD AUTO: 34.4 % (ref 41.1–50.3)
HGB BLD-MCNC: 10.4 G/DL (ref 13.6–17.2)
IMM GRANULOCYTES # BLD AUTO: 0 K/UL (ref 0–0.5)
IMM GRANULOCYTES NFR BLD AUTO: 0 % (ref 0–5)
LYMPHOCYTES # BLD: 1.3 K/UL (ref 0.5–4.6)
LYMPHOCYTES NFR BLD: 34 % (ref 13–44)
MCH RBC QN AUTO: 29 PG (ref 26.1–32.9)
MCHC RBC AUTO-ENTMCNC: 30.2 G/DL (ref 31.4–35)
MCV RBC AUTO: 95.8 FL (ref 82–102)
MONOCYTES # BLD: 0.4 K/UL (ref 0.1–1.3)
MONOCYTES NFR BLD: 11 % (ref 4–12)
NEUTS SEG # BLD: 2.2 K/UL (ref 1.7–8.2)
NEUTS SEG NFR BLD: 55 % (ref 43–78)
NRBC # BLD: 0 K/UL (ref 0–0.2)
PLATELET # BLD AUTO: 193 K/UL (ref 150–450)
PMV BLD AUTO: 9.8 FL (ref 9.4–12.3)
POTASSIUM SERPL-SCNC: 4.2 MMOL/L (ref 3.5–5.1)
RBC # BLD AUTO: 3.59 M/UL (ref 4.23–5.6)
SERVICE CMNT-IMP: ABNORMAL
SODIUM SERPL-SCNC: 135 MMOL/L (ref 136–145)
WBC # BLD AUTO: 4 K/UL (ref 4.3–11.1)

## 2024-08-17 PROCEDURE — 80048 BASIC METABOLIC PNL TOTAL CA: CPT

## 2024-08-17 PROCEDURE — 36415 COLL VENOUS BLD VENIPUNCTURE: CPT

## 2024-08-17 PROCEDURE — 6370000000 HC RX 637 (ALT 250 FOR IP): Performed by: NURSE PRACTITIONER

## 2024-08-17 PROCEDURE — 6360000002 HC RX W HCPCS: Performed by: FAMILY MEDICINE

## 2024-08-17 PROCEDURE — 6360000002 HC RX W HCPCS: Performed by: SURGERY

## 2024-08-17 PROCEDURE — 85025 COMPLETE CBC W/AUTO DIFF WBC: CPT

## 2024-08-17 PROCEDURE — 1100000000 HC RM PRIVATE

## 2024-08-17 PROCEDURE — 6370000000 HC RX 637 (ALT 250 FOR IP): Performed by: PHYSICIAN ASSISTANT

## 2024-08-17 PROCEDURE — 2580000003 HC RX 258: Performed by: FAMILY MEDICINE

## 2024-08-17 RX ADMIN — METOPROLOL TARTRATE 25 MG: 25 TABLET, FILM COATED ORAL at 08:44

## 2024-08-17 RX ADMIN — CEFTRIAXONE 1000 MG: 1 INJECTION, POWDER, FOR SOLUTION INTRAMUSCULAR; INTRAVENOUS at 17:46

## 2024-08-17 RX ADMIN — ENOXAPARIN SODIUM 40 MG: 100 INJECTION SUBCUTANEOUS at 08:44

## 2024-08-17 RX ADMIN — PANTOPRAZOLE SODIUM 40 MG: 40 TABLET, DELAYED RELEASE ORAL at 21:02

## 2024-08-17 RX ADMIN — METOPROLOL TARTRATE 25 MG: 25 TABLET, FILM COATED ORAL at 21:02

## 2024-08-17 RX ADMIN — DAKIN'S SOLUTION 0.125% (QUARTER STRENGTH): 0.12 SOLUTION at 08:45

## 2024-08-17 RX ADMIN — VANCOMYCIN HYDROCHLORIDE 1500 MG: 10 INJECTION, POWDER, LYOPHILIZED, FOR SOLUTION INTRAVENOUS at 18:28

## 2024-08-17 NOTE — PROGRESS NOTES
Hospitalist Progress Note   Admit Date:  2024  4:30 PM   Name:  Brendan Saleh   Age:  49 y.o.  Sex:  male  :  1975   MRN:  [id number]   Room:  [facility]/    Presenting/Chief Complaint: Altered Mental Status     Reason(s) for Admission: Acute hypernatremia [E87.0]  Volume depletion [E86.9]  Acute kidney injury (HCC) [N17.9]  Acute encephalopathy [G93.40]  Septic shock (HCC) [A41.9, R65.21]     Hospital Course:   Mr. Saleh is a 48 y.o. male with a PMH of SDH s/p poonam hole 2024, hydrocephalus s/p  shunt, sacral decubitus stage IV, who originally presented to the ER  with chief complaint of increased confusion.     He was diagnosed with UTI with hematuria and started on course of vancomycin, Zosyn.  Urine culture finalized negative.   blood cultures with CoNS, determined contaminant.  However, he met sepsis criteria with WBC 14.6, HR 120s.  ID consulted to assist.  ECHO showed preserved EF.  Underwent sacral wound debridement on  with General Surgery, Dr. Zapien.     CT head with ongoing hydrocephalus and a  shunt.  NSGY consulted.  Patient underwent right ventricular peritoneal shunt removal on  due to non-functioning shunt.   Per Dr. Ortiz, patient needs a stepwise approach, and stated shunt replacement may be in his future though would need to wait at least 4 weeks.  Neurosurgery followed up  and said will be unable to replace  shunt while patient has active open wounds due to high risk for infection.      ID returned to the case for management of CSF infection as CSF culture and  shunt tip culture grew ESBL Klebsiella and Serratia marcescens.  Antibiotics changed to IV cipro and furthermore to oral cipro for 14 days post shunt removal EOT 24.  ID signed off on .     His hospital stay was further complicated by a bowel obstruction on May 19, noted on CT after patient vomited fecal material.  He underwent hemicolectomy and primary anastomosis on  with

## 2024-08-17 NOTE — PROGRESS NOTES
Sacral wound cleaned and dressed as ordered. No drainage noted. Pt is OOB to chair to chair, locked, call light within reach.

## 2024-08-17 NOTE — PROGRESS NOTES
I touch base with the medical service about 's progress.  They state that he is still needing aggressive care for his stage IV sacral decubitus.  It was draining another earlier in the week that they obtain new cultures.  As long as he still has an open wound shunting would not be a safe or valid option.    We will continue to keep an eye on his progress and we will intervene as the time arises that it is safe to do so.  The medical service will notify us if there are changes or improvement.

## 2024-08-17 NOTE — PLAN OF CARE
Problem: Discharge Planning  Goal: Discharge to home or other facility with appropriate resources  Outcome: Progressing     Problem: Safety - Adult  Goal: Free from fall injury  Outcome: Progressing     Problem: Neurosensory - Adult  Goal: Achieves stable or improved neurological status  Outcome: Progressing     Problem: Musculoskeletal - Adult  Goal: Return mobility to safest level of function  Outcome: Progressing     Problem: Skin/Tissue Integrity - Adult  Goal: Skin integrity remains intact  Outcome: Progressing  Goal: Incisions, wounds, or drain sites healing without S/S of infection  Outcome: Progressing     Problem: Gastrointestinal - Adult  Goal: Maintains adequate nutritional intake  Outcome: Progressing     Problem: Genitourinary - Adult  Goal: Absence of urinary retention  Outcome: Progressing     Problem: Infection - Adult  Goal: Absence of infection at discharge  Outcome: Progressing  Goal: Absence of infection during hospitalization  Outcome: Progressing     Problem: Metabolic/Fluid and Electrolytes - Adult  Goal: Electrolytes maintained within normal limits  Outcome: Progressing  Goal: Hemodynamic stability and optimal renal function maintained  Outcome: Progressing     Problem: Cardiovascular - Adult  Goal: Maintains optimal cardiac output and hemodynamic stability  Outcome: Progressing     Problem: Skin/Tissue Integrity  Goal: Absence of new skin breakdown  Description: 1.  Monitor for areas of redness and/or skin breakdown  2.  Assess vascular access sites hourly  3.  Every 4-6 hours minimum:  Change oxygen saturation probe site  4.  Every 4-6 hours:  If on nasal continuous positive airway pressure, respiratory therapy assess nares and determine need for appliance change or resting period.  Outcome: Progressing     Problem: Hematologic - Adult  Goal: Maintains hematologic stability  Outcome: Progressing     Problem: Pain  Goal: Verbalizes/displays adequate comfort level or baseline comfort  "Encounter Date: 3/10/2017    SCRIBE #1 NOTE: I, Mode Harvey, am scribing for, and in the presence of,  Giovanna Miranda NP. I have scribed the following portions of the note - the Resident attestation. Other sections scribed: ROS, HPI.       History     Chief Complaint   Patient presents with    Female  Problem     Pt. presents with vaginal bleeding that has been ongoing since October. Pt. reports that she was told she was having a miscarriage in November. Pt. states the bleeding has been intermittent and this evening, "I attempted to put a tampon in and met resistance".      Review of patient's allergies indicates:  No Known Allergies  HPI Comments: CC:  problem    HPI: This 28 y.o. F, who has no past medical history, presents to the ED for evaluation of constant, waxing and waning vaginal bleeding since a miscarriage 6 months ago. She became concerned earlier today when she tried to put a tampon in but felt some unusual resistance. She also notes intermittent, contraction-like abdominal cramping for the past two days. She has a hx of multiple miscarriages. She notes she finished a z-salazar a few weeks ago and is recovering from a cold. She denies fatigue, dizziness, nausea, vomiting and headache.    The history is provided by the patient.     History reviewed. No pertinent past medical history.  Past Surgical History:   Procedure Laterality Date     SECTION      CHOLECYSTECTOMY       Family History   Problem Relation Age of Onset    Breast cancer Paternal Grandmother     Diabetes Mother     Hypertension Neg Hx     Colon cancer Neg Hx     Ovarian cancer Neg Hx      Social History   Substance Use Topics    Smoking status: Current Every Day Smoker    Smokeless tobacco: Never Used    Alcohol use No     Review of Systems   Constitutional: Negative for fever.   HENT: Negative for sore throat.    Eyes: Negative for visual disturbance.   Respiratory: Negative for shortness of breath.  "   Cardiovascular: Negative for chest pain.   Gastrointestinal: Positive for abdominal pain (cramping). Negative for nausea and vomiting.   Genitourinary: Positive for vaginal bleeding. Negative for dysuria.   Musculoskeletal: Negative for back pain.   Skin: Negative for rash.   Neurological: Negative for seizures, syncope and headaches.       Physical Exam   Initial Vitals   BP Pulse Resp Temp SpO2   03/10/17 1755 03/10/17 1755 03/10/17 1755 03/10/17 1755 03/10/17 1755   134/67 91 17 98.5 °F (36.9 °C) 100 %     Physical Exam    Constitutional: Vital signs are normal. She appears well-developed and well-nourished.  Non-toxic appearance.   Eyes: EOM are normal.   Neck: Full passive range of motion without pain. Neck supple. No rigidity.   Cardiovascular: Normal rate, S1 normal, S2 normal and normal heart sounds. Exam reveals no gallop.    No murmur heard.  Pulmonary/Chest: Effort normal and breath sounds normal. No tachypnea. She has no decreased breath sounds. She has no wheezes. She has no rhonchi. She has no rales.   Abdominal: Soft. Normal appearance. There is no tenderness. There is no CVA tenderness, no tenderness at McBurney's point and negative Mixon's sign.   Genitourinary: Uterus normal. There is no lesion on the right labia. There is no lesion on the left labia. Uterus is not enlarged and not tender. Cervix exhibits no motion tenderness, no discharge and no friability. Right adnexum displays no tenderness. Left adnexum displays no tenderness. There is bleeding (moderate amount) in the vagina. No foreign body in the vagina. No vaginal discharge found.   Genitourinary Comments: Chaperoned by DANDRE Talamantes  Cervix closed   Neurological: She is alert and oriented to person, place, and time. She has normal strength. Gait normal. GCS eye subscore is 4. GCS verbal subscore is 5. GCS motor subscore is 6.   Skin: Skin is warm and dry. No rash noted.         ED Course   Procedures  Labs Reviewed   URINALYSIS - Abnormal;  Notable for the following:        Result Value    Occult Blood UA 3+ (*)     All other components within normal limits   VAGINAL SCREEN - Abnormal; Notable for the following:     Clue Cells, Wet Prep Few (*)     WBC - Vaginal Screen Few (*)     Bacteria - Vaginal Screen Few (*)     All other components within normal limits   CBC W/ AUTO DIFFERENTIAL - Abnormal; Notable for the following:     WBC 12.87 (*)     Lymph # 5.0 (*)     All other components within normal limits   URINALYSIS MICROSCOPIC - Abnormal; Notable for the following:     RBC, UA 5 (*)     All other components within normal limits   C. TRACHOMATIS/N. GONORRHOEAE BY AMP DNA   HCG, QUANTITATIVE, PREGNANCY   POCT URINE PREGNANCY             Medical Decision Making:   ED Management:  This is a 28-year-old nonpregnant female who presents to the ED with complaints of vaginal bleeding and abdominal cramping.  She is afebrile and well-appearing.  She is not pregnant.  Patient had a miscarriage approximate 6 months ago.  She is not currently on hormone therapy or seeing an OB/GYN.  On exam, abdomen is soft with mild suprapubic tenderness.  On pelvic exam, cervix is closed with mild bleeding.  No CMT or adnexal tenderness.  No abnormal palpable masses.  Wet prep positive for clue cells.  No significant anemia, evidence of UTI or PID.  Discharged home with prescription for Flagyl and instructions for supportive care and follow-up.  Return precautions given.  Patient's case was discussed with Dr. Moralez, who agrees with her plan of care.            Scribe Attestation:   Scribe #1: I performed the above scribed service and the documentation accurately describes the services I performed. I attest to the accuracy of the note.    Attending Attestation:     Physician Attestation Statement for NP/PA:   I discussed this assessment and plan of this patient with the NP/PA, but I did not personally examine the patient. The face to face encounter was performed by the  NP/PA.    Other NP/PA Attestation Additions:      Medical Decision Making: Agree with assessment and management.  No evidence of pregnancy.       Physician Attestation for Scribe:  Physician Attestation Statement for Scribe #1: I, Taz Moralez MD, reviewed documentation, as scribed by Mode Harvey in my presence, and it is both accurate and complete.                 ED Course     Clinical Impression:   The primary encounter diagnosis was Dysfunctional uterine bleeding. A diagnosis of BV (bacterial vaginosis) was also pertinent to this visit.    Disposition:   Disposition: Discharged  Condition: Stable       Giovanna Miranda NP  03/10/17 2057       Taz Moralez MD  03/10/17 2100

## 2024-08-17 NOTE — PROGRESS NOTES
Nails Treated: x10   1-5 bilateral    What was done:   Trimming and Debridement     Instrument used: nail nipper 5 inch and nail drill with sanding cap         This nurse noted small amount of red colored urine from meatal opening during sonido care. Pt denies pain. Urine has been clear and yellow throughout shift. No other c/o at this time. Pt turned every 2 hours to alleviate pressure on wounds. Bandages intact. Bed low and locked. Call light within reach. All needs met at this time. nurse to continue care plan. Report given to oncoming nurse.

## 2024-08-18 LAB
ANION GAP SERPL CALC-SCNC: 14 MMOL/L (ref 9–18)
BUN SERPL-MCNC: 20 MG/DL (ref 6–23)
CALCIUM SERPL-MCNC: 9.1 MG/DL (ref 8.8–10.2)
CHLORIDE SERPL-SCNC: 98 MMOL/L (ref 98–107)
CO2 SERPL-SCNC: 23 MMOL/L (ref 20–28)
CREAT SERPL-MCNC: 0.69 MG/DL (ref 0.8–1.3)
GLUCOSE SERPL-MCNC: 114 MG/DL (ref 70–99)
POTASSIUM SERPL-SCNC: 4 MMOL/L (ref 3.5–5.1)
SODIUM SERPL-SCNC: 135 MMOL/L (ref 136–145)

## 2024-08-18 PROCEDURE — 36415 COLL VENOUS BLD VENIPUNCTURE: CPT

## 2024-08-18 PROCEDURE — 80048 BASIC METABOLIC PNL TOTAL CA: CPT

## 2024-08-18 PROCEDURE — 1100000000 HC RM PRIVATE

## 2024-08-18 PROCEDURE — 6360000002 HC RX W HCPCS: Performed by: SURGERY

## 2024-08-18 PROCEDURE — 6370000000 HC RX 637 (ALT 250 FOR IP): Performed by: PHYSICIAN ASSISTANT

## 2024-08-18 PROCEDURE — 2580000003 HC RX 258: Performed by: FAMILY MEDICINE

## 2024-08-18 PROCEDURE — 6360000002 HC RX W HCPCS: Performed by: FAMILY MEDICINE

## 2024-08-18 PROCEDURE — 6370000000 HC RX 637 (ALT 250 FOR IP): Performed by: NURSE PRACTITIONER

## 2024-08-18 PROCEDURE — 6370000000 HC RX 637 (ALT 250 FOR IP): Performed by: FAMILY MEDICINE

## 2024-08-18 RX ORDER — SULFAMETHOXAZOLE AND TRIMETHOPRIM 800; 160 MG/1; MG/1
1 TABLET ORAL EVERY 12 HOURS SCHEDULED
Status: DISCONTINUED | OUTPATIENT
Start: 2024-08-18 | End: 2024-08-22

## 2024-08-18 RX ADMIN — METOPROLOL TARTRATE 25 MG: 25 TABLET, FILM COATED ORAL at 09:55

## 2024-08-18 RX ADMIN — DAKIN'S SOLUTION 0.125% (QUARTER STRENGTH): 0.12 SOLUTION at 10:01

## 2024-08-18 RX ADMIN — ENOXAPARIN SODIUM 40 MG: 100 INJECTION SUBCUTANEOUS at 09:55

## 2024-08-18 RX ADMIN — SULFAMETHOXAZOLE AND TRIMETHOPRIM 1 TABLET: 800; 160 TABLET ORAL at 10:00

## 2024-08-18 RX ADMIN — METOPROLOL TARTRATE 25 MG: 25 TABLET, FILM COATED ORAL at 21:30

## 2024-08-18 RX ADMIN — PANTOPRAZOLE SODIUM 40 MG: 40 TABLET, DELAYED RELEASE ORAL at 21:30

## 2024-08-18 RX ADMIN — CEFTRIAXONE 1000 MG: 1 INJECTION, POWDER, FOR SOLUTION INTRAMUSCULAR; INTRAVENOUS at 16:50

## 2024-08-18 RX ADMIN — SULFAMETHOXAZOLE AND TRIMETHOPRIM 1 TABLET: 800; 160 TABLET ORAL at 21:30

## 2024-08-18 NOTE — PLAN OF CARE
Problem: Discharge Planning  Goal: Discharge to home or other facility with appropriate resources  Outcome: Progressing     Problem: Safety - Adult  Goal: Free from fall injury  Outcome: Progressing     Problem: Neurosensory - Adult  Goal: Achieves stable or improved neurological status  Outcome: Progressing     Problem: Musculoskeletal - Adult  Goal: Return mobility to safest level of function  Outcome: Progressing     Problem: Skin/Tissue Integrity - Adult  Goal: Skin integrity remains intact  Outcome: Progressing  Goal: Incisions, wounds, or drain sites healing without S/S of infection  Outcome: Progressing     Problem: Gastrointestinal - Adult  Goal: Maintains adequate nutritional intake  Outcome: Progressing     Problem: Genitourinary - Adult  Goal: Absence of urinary retention  Outcome: Progressing     Problem: Infection - Adult  Goal: Absence of infection at discharge  Outcome: Progressing  Goal: Absence of infection during hospitalization  Outcome: Progressing     Problem: Metabolic/Fluid and Electrolytes - Adult  Goal: Electrolytes maintained within normal limits  Outcome: Progressing  Goal: Hemodynamic stability and optimal renal function maintained  Outcome: Progressing     Problem: Cardiovascular - Adult  Goal: Maintains optimal cardiac output and hemodynamic stability  Outcome: Progressing     Problem: Hematologic - Adult  Goal: Maintains hematologic stability  Outcome: Progressing     Problem: Skin/Tissue Integrity  Goal: Absence of new skin breakdown  Description: 1.  Monitor for areas of redness and/or skin breakdown  2.  Assess vascular access sites hourly  3.  Every 4-6 hours minimum:  Change oxygen saturation probe site  4.  Every 4-6 hours:  If on nasal continuous positive airway pressure, respiratory therapy assess nares and determine need for appliance change or resting period.  Outcome: Progressing     Problem: Pain  Goal: Verbalizes/displays adequate comfort level or baseline comfort  level  Outcome: Progressing

## 2024-08-18 NOTE — PROGRESS NOTES
Pts dressing became saturated with urine. Wound care provided at 0600. Dressing to sacrum removed. Wound cleaned and wet to dry gauze placed. Gauze soaked in dakin's solution and packed in wound. Abd pad cover placed with tape. Bed low and locked. Call light within reach. All needs met at this time. This nurse continuing current care plan. Report given to oncoming nurse.

## 2024-08-18 NOTE — PROGRESS NOTES
Pt cleaned from a stool, sacral wound cleaned and dressed as ordered. No noted drainage/odor. Pt turned to left side.    External catheter/tubing changed.

## 2024-08-18 NOTE — PROGRESS NOTES
78 %    Lymphocytes % 34 13 - 44 %    Monocytes % 11 4.0 - 12.0 %    Eosinophils % 0 (L) 0.5 - 7.8 %    Basophils % 0 0.0 - 2.0 %    Immature Granulocytes % 0 0.0 - 5.0 %    Neutrophils Absolute 2.2 1.7 - 8.2 K/UL    Lymphocytes Absolute 1.3 0.5 - 4.6 K/UL    Monocytes Absolute 0.4 0.1 - 1.3 K/UL    Eosinophils Absolute 0.0 0.0 - 0.8 K/UL    Basophils Absolute 0.0 0.0 - 0.2 K/UL    Immature Granulocytes Absolute 0.0 0.0 - 0.5 K/UL   Basic Metabolic Panel w/ Reflex to MG    Collection Time: 08/18/24  5:04 AM   Result Value Ref Range    Sodium 135 (L) 136 - 145 mmol/L    Potassium 4.0 3.5 - 5.1 mmol/L    Chloride 98 98 - 107 mmol/L    CO2 23 20 - 28 mmol/L    Anion Gap 14 9 - 18 mmol/L    Glucose 114 (H) 70 - 99 mg/dL    BUN 20 6 - 23 MG/DL    Creatinine 0.69 (L) 0.80 - 1.30 MG/DL    Est, Glom Filt Rate >90 >60 ml/min/1.73m2    Calcium 9.1 8.8 - 10.2 MG/DL         No results for input(s): \"COVID19\" in the last 72 hours.    Current Meds:  Current Facility-Administered Medications   Medication Dose Route Frequency    sulfamethoxazole-trimethoprim (BACTRIM DS;SEPTRA DS) 800-160 MG per tablet 1 tablet  1 tablet Oral 2 times per day    cefTRIAXone (ROCEPHIN) 1,000 mg in sterile water 10 mL IV syringe  1,000 mg IntraVENous Q24H    sodium hypochlorite (DAKINS) 0.125 % external solution   Irrigation Daily    LORazepam (ATIVAN) tablet 0.5 mg  0.5 mg Oral Q6H PRN    diphenhydrAMINE (BENADRYL) injection 25 mg  25 mg IntraVENous Q6H PRN    pantoprazole (PROTONIX) tablet 40 mg  40 mg Oral Nightly    oxyCODONE (ROXICODONE) immediate release tablet 5 mg  5 mg Oral Q4H PRN    cyclobenzaprine (FLEXERIL) tablet 10 mg  10 mg Oral TID PRN    traZODone (DESYREL) tablet 50 mg  50 mg Oral Nightly PRN    acetaminophen (TYLENOL) tablet 650 mg  650 mg Oral Q4H PRN    sennosides-docusate sodium (SENOKOT-S) 8.6-50 MG tablet 2 tablet  2 tablet Oral Daily    metoprolol tartrate (LOPRESSOR) tablet 25 mg  25 mg Oral BID    0.9 % sodium chloride

## 2024-08-19 LAB
ANION GAP SERPL CALC-SCNC: 10 MMOL/L (ref 9–18)
BUN SERPL-MCNC: 23 MG/DL (ref 6–23)
CALCIUM SERPL-MCNC: 9 MG/DL (ref 8.8–10.2)
CHLORIDE SERPL-SCNC: 101 MMOL/L (ref 98–107)
CO2 SERPL-SCNC: 25 MMOL/L (ref 20–28)
CREAT SERPL-MCNC: 0.74 MG/DL (ref 0.8–1.3)
ERYTHROCYTE [DISTWIDTH] IN BLOOD BY AUTOMATED COUNT: 13.2 % (ref 11.9–14.6)
GLUCOSE SERPL-MCNC: 86 MG/DL (ref 70–99)
HCT VFR BLD AUTO: 34.9 % (ref 41.1–50.3)
HGB BLD-MCNC: 10.4 G/DL (ref 13.6–17.2)
MAGNESIUM SERPL-MCNC: 1.7 MG/DL (ref 1.8–2.4)
MCH RBC QN AUTO: 28.7 PG (ref 26.1–32.9)
MCHC RBC AUTO-ENTMCNC: 29.8 G/DL (ref 31.4–35)
MCV RBC AUTO: 96.4 FL (ref 82–102)
NRBC # BLD: 0 K/UL (ref 0–0.2)
PHOSPHATE SERPL-MCNC: 4.6 MG/DL (ref 2.5–4.5)
PLATELET # BLD AUTO: 212 K/UL (ref 150–450)
PMV BLD AUTO: 9.9 FL (ref 9.4–12.3)
POTASSIUM SERPL-SCNC: 4.2 MMOL/L (ref 3.5–5.1)
RBC # BLD AUTO: 3.62 M/UL (ref 4.23–5.6)
SODIUM SERPL-SCNC: 137 MMOL/L (ref 136–145)
WBC # BLD AUTO: 4.2 K/UL (ref 4.3–11.1)

## 2024-08-19 PROCEDURE — 6370000000 HC RX 637 (ALT 250 FOR IP)

## 2024-08-19 PROCEDURE — 84100 ASSAY OF PHOSPHORUS: CPT

## 2024-08-19 PROCEDURE — 80048 BASIC METABOLIC PNL TOTAL CA: CPT

## 2024-08-19 PROCEDURE — 97112 NEUROMUSCULAR REEDUCATION: CPT

## 2024-08-19 PROCEDURE — 97168 OT RE-EVAL EST PLAN CARE: CPT

## 2024-08-19 PROCEDURE — 6370000000 HC RX 637 (ALT 250 FOR IP): Performed by: PHYSICIAN ASSISTANT

## 2024-08-19 PROCEDURE — 2580000003 HC RX 258: Performed by: FAMILY MEDICINE

## 2024-08-19 PROCEDURE — 85027 COMPLETE CBC AUTOMATED: CPT

## 2024-08-19 PROCEDURE — 6360000002 HC RX W HCPCS: Performed by: SURGERY

## 2024-08-19 PROCEDURE — 97110 THERAPEUTIC EXERCISES: CPT

## 2024-08-19 PROCEDURE — 6370000000 HC RX 637 (ALT 250 FOR IP): Performed by: NURSE PRACTITIONER

## 2024-08-19 PROCEDURE — 6360000002 HC RX W HCPCS: Performed by: FAMILY MEDICINE

## 2024-08-19 PROCEDURE — 97530 THERAPEUTIC ACTIVITIES: CPT

## 2024-08-19 PROCEDURE — 83735 ASSAY OF MAGNESIUM: CPT

## 2024-08-19 PROCEDURE — 36415 COLL VENOUS BLD VENIPUNCTURE: CPT

## 2024-08-19 PROCEDURE — 1100000000 HC RM PRIVATE

## 2024-08-19 PROCEDURE — 6370000000 HC RX 637 (ALT 250 FOR IP): Performed by: FAMILY MEDICINE

## 2024-08-19 PROCEDURE — 2580000003 HC RX 258: Performed by: STUDENT IN AN ORGANIZED HEALTH CARE EDUCATION/TRAINING PROGRAM

## 2024-08-19 RX ADMIN — CEFTRIAXONE 1000 MG: 1 INJECTION, POWDER, FOR SOLUTION INTRAMUSCULAR; INTRAVENOUS at 18:22

## 2024-08-19 RX ADMIN — ENOXAPARIN SODIUM 40 MG: 100 INJECTION SUBCUTANEOUS at 09:49

## 2024-08-19 RX ADMIN — METOPROLOL TARTRATE 25 MG: 25 TABLET, FILM COATED ORAL at 21:30

## 2024-08-19 RX ADMIN — SULFAMETHOXAZOLE AND TRIMETHOPRIM 1 TABLET: 800; 160 TABLET ORAL at 09:49

## 2024-08-19 RX ADMIN — SODIUM CHLORIDE, PRESERVATIVE FREE 10 ML: 5 INJECTION INTRAVENOUS at 21:48

## 2024-08-19 RX ADMIN — DOCUSATE SODIUM 50 MG AND SENNOSIDES 8.6 MG 2 TABLET: 8.6; 5 TABLET, FILM COATED ORAL at 09:50

## 2024-08-19 RX ADMIN — SODIUM CHLORIDE, PRESERVATIVE FREE 10 ML: 5 INJECTION INTRAVENOUS at 09:50

## 2024-08-19 RX ADMIN — PANTOPRAZOLE SODIUM 40 MG: 40 TABLET, DELAYED RELEASE ORAL at 21:30

## 2024-08-19 RX ADMIN — DAKIN'S SOLUTION 0.125% (QUARTER STRENGTH): 0.12 SOLUTION at 18:49

## 2024-08-19 RX ADMIN — SULFAMETHOXAZOLE AND TRIMETHOPRIM 1 TABLET: 800; 160 TABLET ORAL at 21:30

## 2024-08-19 RX ADMIN — METOPROLOL TARTRATE 25 MG: 25 TABLET, FILM COATED ORAL at 09:51

## 2024-08-19 NOTE — PROGRESS NOTES
Hourly rounds complete this shift, no new complaints at this time,  Patient turned, no drainage noted this shift: bed in low, locked position, call light and bedside table within reach,  all needs met. Report to day shift nurse.

## 2024-08-19 NOTE — PROGRESS NOTES
Patient in bed alert and oriented, nurses aide at bedside, full bath completed, denies pain. Urinating via male wick, urine clear yellow.   1200  Rounding no needs at this time  1400  Rounding, patient sitting up in bed, no needs at this time.   1818  Hourly rounding, patient sitting up in chair, no needs at this time.   1848  Dressing sacrum change complete, wet to dry.

## 2024-08-19 NOTE — PROGRESS NOTES
ACUTE PHYSICAL THERAPY GOALS:   (Developed with and agreed upon by patient and/or caregiver.)  (1.) Brendan Salhe  will move from supine to sit and sit to supine and scoot up and down with CONTACT GUARD ASSIST within 7 treatment day(s).      (2.) Brendan Saleh will transfer from bed to chair and chair to bed with MINIMAL ASSISTx1 using the least restrictive device within 7 treatment day(s).    (3.) Brendan Saleh will perform seated static and dynamic balance activities x 20 minutes with STAND BY ASSIST to improve safety within 7 treatment day(s).  (4.) Brendan Saleh will perform standing static and dynamic balance activities x 2 minutes with MODERATE ASSIST to improve safety and mobility within 7 treatment day(s).   (5.)Brendan Saleh will be able to transfer bed to chair/wheelchair and wheelchair/chair to bed via stand pivot transfer with MINIMAL ASSIST to increase mobility possibilities in 7 treatment days.   (6). Brendan Saleh will self propel wheelchair x 1000 ft with STAND BY ASSISTANCE to demonstrate increase in UE and LE strength and independence in mobility    PHYSICAL THERAPY: Daily Note PM   (Link to Caseload Tracking: PT Visit Days : 3  Time In/Out PT Charge Capture  Rehab Caseload Tracker  Orders    Brendan Saleh is a 49 y.o. male   PRIMARY DIAGNOSIS: Infection of  (ventriculoperitoneal) shunt (HCC)  Acute hypernatremia [E87.0]  Volume depletion [E86.9]  Acute kidney injury (HCC) [N17.9]  Acute encephalopathy [G93.40]  Septic shock (HCC) [A41.9, R65.21]  Procedure(s) (LRB):  LAPAROTOMY EXPLORATORY, PRIMARY CLOSURE SMALL BOWEL PERFORATION (N/A)  79 Days Post-Op  Inpatient: Payor: [employer] / Plan: [employer] / Product Type: *No Product type* /     ASSESSMENT:     REHAB RECOMMENDATIONS:   Recommendation to date pending progress:  Setting:  Short-term Rehab    Equipment:    To Be Determined     ASSESSMENT:  Mr. Saleh presented supine in bed, agreeable to therapy.  light within reach, Chair, and Needs within reach    INTERDISCIPLINARY COLLABORATION:  RN/ PCT and OT/ POND    EDUCATION: Education Given To: Patient  Education Provided: Precautions;Transfer Training;Fall Prevention Strategies;Equipment  Education Method: Verbal  Barriers to Learning: None  Education Outcome: Continued education needed    TIME IN/OUT:  Time In: 1446  Time Out: 1546  Minutes: 60    Ok Younger PT

## 2024-08-19 NOTE — PROGRESS NOTES
ACUTE OCCUPATIONAL THERAPY GOALS: GOALS REVIEWED AND UPDATED AT RE-EVALUATION ON 8/19/24  (Developed with and agreed upon by patient and/or caregiver.)  1. Patient will complete lower body bathing and dressing with MODERATE ASSIST and adaptive equipment as needed. PROGRESSING  2. Patient will complete grooming ADL in unsupported sitting with CONTACT GUARD ASSIST. PROGRESSING  3. Patient will tolerate 25 minutes of OT treatment with 1-2 rest breaks to increase activity tolerance for ADLs.   4. Patient will complete functional transfers via slide board with STAND BY ASSIST and adaptive equipment as needed. PROGRESSING  5. Patient will tolerate 20 minutes BUE exercises to increase strength for safe, functional transfers.   6. Patient will complete upper body bathing and dressing with STAND BY ASSIST and adaptive equipment as needed.  7. Patient will tolerate 10 minutes unsupported sitting balance edge of bed with STAND BY ASSIST in preparation for ADL performance. PROGRESSING  8.. Patient will tolerate static standing task for >30 seconds with MAXIMAL ASSIST in preparation for ADL tasks and to decrease caregiver burden. PROGRESSING   9. Patient will complete functional mobility for short distances with minimal assistance x 2 with use of rolling walker NEW GOAL    Timeframe: 7 visits    OCCUPATIONAL THERAPY Daily Note, Re-evaluation, and PM       OT Visit Days: 1  Acknowledge Orders  Time  OT Charge Capture  Rehab Caseload Tracker      Brendan Saleh is a 49 y.o. male   PRIMARY DIAGNOSIS: Infection of  (ventriculoperitoneal) shunt (HCC)  Acute hypernatremia [E87.0]  Volume depletion [E86.9]  Acute kidney injury (HCC) [N17.9]  Acute encephalopathy [G93.40]  Septic shock (HCC) [A41.9, R65.21]  Procedure(s) (LRB):  LAPAROTOMY EXPLORATORY, PRIMARY CLOSURE SMALL BOWEL PERFORATION (N/A)  79 Days Post-Op  Reason for Referral: Generalized Muscle Weakness (M62.81)  Difficulty in walking, Not elsewhere classified  1446  Time Out: 1546  Minutes: 60    Akua Caro, OT

## 2024-08-19 NOTE — PROGRESS NOTES
9.4 - 12.3 FL    nRBC 0.00 0.0 - 0.2 K/uL    Differential Type AUTOMATED      Neutrophils % 55 43 - 78 %    Lymphocytes % 34 13 - 44 %    Monocytes % 11 4.0 - 12.0 %    Eosinophils % 0 (L) 0.5 - 7.8 %    Basophils % 0 0.0 - 2.0 %    Immature Granulocytes % 0 0.0 - 5.0 %    Neutrophils Absolute 2.2 1.7 - 8.2 K/UL    Lymphocytes Absolute 1.3 0.5 - 4.6 K/UL    Monocytes Absolute 0.4 0.1 - 1.3 K/UL    Eosinophils Absolute 0.0 0.0 - 0.8 K/UL    Basophils Absolute 0.0 0.0 - 0.2 K/UL    Immature Granulocytes Absolute 0.0 0.0 - 0.5 K/UL   Basic Metabolic Panel w/ Reflex to MG    Collection Time: 08/18/24  5:04 AM   Result Value Ref Range    Sodium 135 (L) 136 - 145 mmol/L    Potassium 4.0 3.5 - 5.1 mmol/L    Chloride 98 98 - 107 mmol/L    CO2 23 20 - 28 mmol/L    Anion Gap 14 9 - 18 mmol/L    Glucose 114 (H) 70 - 99 mg/dL    BUN 20 6 - 23 MG/DL    Creatinine 0.69 (L) 0.80 - 1.30 MG/DL    Est, Glom Filt Rate >90 >60 ml/min/1.73m2    Calcium 9.1 8.8 - 10.2 MG/DL   CBC    Collection Time: 08/19/24  5:08 AM   Result Value Ref Range    WBC 4.2 (L) 4.3 - 11.1 K/uL    RBC 3.62 (L) 4.23 - 5.6 M/uL    Hemoglobin 10.4 (L) 13.6 - 17.2 g/dL    Hematocrit 34.9 (L) 41.1 - 50.3 %    MCV 96.4 82 - 102 FL    MCH 28.7 26.1 - 32.9 PG    MCHC 29.8 (L) 31.4 - 35.0 g/dL    RDW 13.2 11.9 - 14.6 %    Platelets 212 150 - 450 K/uL    MPV 9.9 9.4 - 12.3 FL    nRBC 0.00 0.0 - 0.2 K/uL   Basic Metabolic Panel w/ Reflex to MG    Collection Time: 08/19/24  5:08 AM   Result Value Ref Range    Sodium 137 136 - 145 mmol/L    Potassium 4.2 3.5 - 5.1 mmol/L    Chloride 101 98 - 107 mmol/L    CO2 25 20 - 28 mmol/L    Anion Gap 10 9 - 18 mmol/L    Glucose 86 70 - 99 mg/dL    BUN 23 6 - 23 MG/DL    Creatinine 0.74 (L) 0.80 - 1.30 MG/DL    Est, Glom Filt Rate >90 >60 ml/min/1.73m2    Calcium 9.0 8.8 - 10.2 MG/DL   Magnesium    Collection Time: 08/19/24  5:08 AM   Result Value Ref Range    Magnesium 1.7 (L) 1.8 - 2.4 mg/dL   Phosphorus    Collection Time: 08/19/24

## 2024-08-20 PROBLEM — E83.42 HYPOMAGNESEMIA: Status: ACTIVE | Noted: 2024-08-20

## 2024-08-20 PROCEDURE — 6360000002 HC RX W HCPCS: Performed by: SURGERY

## 2024-08-20 PROCEDURE — 6370000000 HC RX 637 (ALT 250 FOR IP)

## 2024-08-20 PROCEDURE — 6360000002 HC RX W HCPCS: Performed by: FAMILY MEDICINE

## 2024-08-20 PROCEDURE — 6370000000 HC RX 637 (ALT 250 FOR IP): Performed by: PHYSICIAN ASSISTANT

## 2024-08-20 PROCEDURE — 6370000000 HC RX 637 (ALT 250 FOR IP): Performed by: NURSE PRACTITIONER

## 2024-08-20 PROCEDURE — 2580000003 HC RX 258: Performed by: STUDENT IN AN ORGANIZED HEALTH CARE EDUCATION/TRAINING PROGRAM

## 2024-08-20 PROCEDURE — 2580000003 HC RX 258: Performed by: FAMILY MEDICINE

## 2024-08-20 PROCEDURE — 1100000000 HC RM PRIVATE

## 2024-08-20 PROCEDURE — 6370000000 HC RX 637 (ALT 250 FOR IP): Performed by: FAMILY MEDICINE

## 2024-08-20 RX ORDER — MAGNESIUM SULFATE IN WATER 40 MG/ML
2000 INJECTION, SOLUTION INTRAVENOUS ONCE
Status: COMPLETED | OUTPATIENT
Start: 2024-08-20 | End: 2024-08-20

## 2024-08-20 RX ADMIN — DOCUSATE SODIUM 50 MG AND SENNOSIDES 8.6 MG 2 TABLET: 8.6; 5 TABLET, FILM COATED ORAL at 09:06

## 2024-08-20 RX ADMIN — ENOXAPARIN SODIUM 40 MG: 100 INJECTION SUBCUTANEOUS at 09:06

## 2024-08-20 RX ADMIN — SULFAMETHOXAZOLE AND TRIMETHOPRIM 1 TABLET: 800; 160 TABLET ORAL at 09:06

## 2024-08-20 RX ADMIN — METOPROLOL TARTRATE 25 MG: 25 TABLET, FILM COATED ORAL at 09:06

## 2024-08-20 RX ADMIN — SULFAMETHOXAZOLE AND TRIMETHOPRIM 1 TABLET: 800; 160 TABLET ORAL at 20:43

## 2024-08-20 RX ADMIN — CEFTRIAXONE 1000 MG: 1 INJECTION, POWDER, FOR SOLUTION INTRAMUSCULAR; INTRAVENOUS at 17:05

## 2024-08-20 RX ADMIN — MAGNESIUM SULFATE HEPTAHYDRATE 2000 MG: 40 INJECTION, SOLUTION INTRAVENOUS at 09:17

## 2024-08-20 RX ADMIN — DAKIN'S SOLUTION 0.125% (QUARTER STRENGTH): 0.12 SOLUTION at 18:51

## 2024-08-20 RX ADMIN — SODIUM CHLORIDE, PRESERVATIVE FREE 10 ML: 5 INJECTION INTRAVENOUS at 20:43

## 2024-08-20 RX ADMIN — PANTOPRAZOLE SODIUM 40 MG: 40 TABLET, DELAYED RELEASE ORAL at 20:44

## 2024-08-20 ASSESSMENT — PAIN SCALES - GENERAL
PAINLEVEL_OUTOF10: 0
PAINLEVEL_OUTOF10: 0

## 2024-08-20 NOTE — PROGRESS NOTES
Hospitalist Progress Note   Admit Date:  2024  4:30 PM   Name:  Brendan Saleh   Age:  49 y.o.  Sex:  male  :  1975   MRN:  [id number]   Room:  [facility]/    Presenting/Chief Complaint: Altered Mental Status     Reason(s) for Admission: Acute hypernatremia [E87.0]  Volume depletion [E86.9]  Acute kidney injury (HCC) [N17.9]  Acute encephalopathy [G93.40]  Septic shock (HCC) [A41.9, R65.21]     Hospital Course:   Mr. Saleh is a 48 y.o. male with a PMH of SDH s/p poonam hole 2024, hydrocephalus s/p  shunt, sacral decubitus stage IV, who originally presented to the ER  with chief complaint of increased confusion.     He was diagnosed with UTI with hematuria and started on course of vancomycin, Zosyn.  Urine culture finalized negative.   blood cultures with CoNS, determined contaminant.  However, he met sepsis criteria with WBC 14.6, HR 120s.  ID consulted to assist.  ECHO showed preserved EF.  Underwent sacral wound debridement on  with General Surgery, Dr. Zapien.     CT head with ongoing hydrocephalus and a  shunt.  NSGY consulted.  Patient underwent right ventricular peritoneal shunt removal on  due to non-functioning shunt.   Per Dr. Ortiz, patient needs a stepwise approach, and stated shunt replacement may be in his future though would need to wait at least 4 weeks.  Neurosurgery followed up  and said will be unable to replace  shunt while patient has active open wounds due to high risk for infection.      ID returned to the case for management of CSF infection as CSF culture and  shunt tip culture grew ESBL Klebsiella and Serratia marcescens.  Antibiotics changed to IV cipro and furthermore to oral cipro for 14 days post shunt removal EOT 24.  ID signed off on .     His hospital stay was further complicated by a bowel obstruction on May 19, noted on CT after patient vomited fecal material.  He underwent hemicolectomy and primary anastomosis on  with   (LOVENOX) injection 40 mg  40 mg SubCUTAneous Daily    calcium carbonate (TUMS) chewable tablet 500 mg  500 mg Oral TID PRN    guaiFENesin-dextromethorphan (ROBITUSSIN DM) 100-10 MG/5ML syrup 5 mL  5 mL Oral Q4H PRN       Signed:  DINA LAKE MD

## 2024-08-20 NOTE — PROGRESS NOTES
Patient in bed alert and oriented, following commands, reports no bowel movement overnight, last bowel movement several days ago. Lungs diminished, denies pain at this time. Magnesium 1.7, will perfect serve attending.   10:17 AM  Hourly rounding, sitting up in bed, watching TV, no needs at this time.

## 2024-08-20 NOTE — PROGRESS NOTES
Hourly rounds complete this shift, no new complaints at this time, : bed in low, locked position, call light and bedside table within reach,  all needs met. Report to day shift nurse.

## 2024-08-21 LAB
ANION GAP SERPL CALC-SCNC: 10 MMOL/L (ref 9–18)
BUN SERPL-MCNC: 23 MG/DL (ref 6–23)
CALCIUM SERPL-MCNC: 9.1 MG/DL (ref 8.8–10.2)
CHLORIDE SERPL-SCNC: 101 MMOL/L (ref 98–107)
CO2 SERPL-SCNC: 27 MMOL/L (ref 20–28)
CREAT SERPL-MCNC: 0.8 MG/DL (ref 0.8–1.3)
GLUCOSE SERPL-MCNC: 90 MG/DL (ref 70–99)
MAGNESIUM SERPL-MCNC: 2 MG/DL (ref 1.8–2.4)
POTASSIUM SERPL-SCNC: 4.2 MMOL/L (ref 3.5–5.1)
SODIUM SERPL-SCNC: 138 MMOL/L (ref 136–145)

## 2024-08-21 PROCEDURE — 6370000000 HC RX 637 (ALT 250 FOR IP): Performed by: PHYSICIAN ASSISTANT

## 2024-08-21 PROCEDURE — 97112 NEUROMUSCULAR REEDUCATION: CPT

## 2024-08-21 PROCEDURE — 6370000000 HC RX 637 (ALT 250 FOR IP): Performed by: FAMILY MEDICINE

## 2024-08-21 PROCEDURE — 6360000002 HC RX W HCPCS: Performed by: SURGERY

## 2024-08-21 PROCEDURE — 36415 COLL VENOUS BLD VENIPUNCTURE: CPT

## 2024-08-21 PROCEDURE — 2580000003 HC RX 258: Performed by: FAMILY MEDICINE

## 2024-08-21 PROCEDURE — 6370000000 HC RX 637 (ALT 250 FOR IP): Performed by: NURSE PRACTITIONER

## 2024-08-21 PROCEDURE — 1100000000 HC RM PRIVATE

## 2024-08-21 PROCEDURE — 83735 ASSAY OF MAGNESIUM: CPT

## 2024-08-21 PROCEDURE — 80048 BASIC METABOLIC PNL TOTAL CA: CPT

## 2024-08-21 PROCEDURE — 97530 THERAPEUTIC ACTIVITIES: CPT

## 2024-08-21 PROCEDURE — 97535 SELF CARE MNGMENT TRAINING: CPT

## 2024-08-21 PROCEDURE — 6360000002 HC RX W HCPCS: Performed by: FAMILY MEDICINE

## 2024-08-21 RX ADMIN — SULFAMETHOXAZOLE AND TRIMETHOPRIM 1 TABLET: 800; 160 TABLET ORAL at 08:24

## 2024-08-21 RX ADMIN — ENOXAPARIN SODIUM 40 MG: 100 INJECTION SUBCUTANEOUS at 08:25

## 2024-08-21 RX ADMIN — DAKIN'S SOLUTION 0.125% (QUARTER STRENGTH): 0.12 SOLUTION at 08:28

## 2024-08-21 RX ADMIN — PANTOPRAZOLE SODIUM 40 MG: 40 TABLET, DELAYED RELEASE ORAL at 20:27

## 2024-08-21 RX ADMIN — CEFTRIAXONE 1000 MG: 1 INJECTION, POWDER, FOR SOLUTION INTRAMUSCULAR; INTRAVENOUS at 17:28

## 2024-08-21 RX ADMIN — METOPROLOL TARTRATE 25 MG: 25 TABLET, FILM COATED ORAL at 08:24

## 2024-08-21 RX ADMIN — SULFAMETHOXAZOLE AND TRIMETHOPRIM 1 TABLET: 800; 160 TABLET ORAL at 20:28

## 2024-08-21 ASSESSMENT — PAIN SCALES - GENERAL: PAINLEVEL_OUTOF10: 0

## 2024-08-21 NOTE — PROGRESS NOTES
ACUTE PHYSICAL THERAPY GOALS:   (Developed with and agreed upon by patient and/or caregiver.)  (1.) Brendan Saleh  will move from supine to sit and sit to supine and scoot up and down with CONTACT GUARD ASSIST within 7 treatment day(s).      (2.) Brendan Saleh will transfer from bed to chair and chair to bed with MINIMAL ASSISTx1 using the least restrictive device within 7 treatment day(s).    (3.) Brendan Saleh will perform seated static and dynamic balance activities x 20 minutes with STAND BY ASSIST to improve safety within 7 treatment day(s).  (4.) Brendan Saleh will perform standing static and dynamic balance activities x 2 minutes with MODERATE ASSIST to improve safety and mobility within 7 treatment day(s).   (5.)Brendan Saleh will be able to transfer bed to chair/wheelchair and wheelchair/chair to bed via stand pivot transfer with MINIMAL ASSIST to increase mobility possibilities in 7 treatment days.   (6). Brendan Saleh will self propel wheelchair x 1000 ft with STAND BY ASSISTANCE to demonstrate increase in UE and LE strength and independence in mobility    PHYSICAL THERAPY: Daily Note PM   (Link to Caseload Tracking: PT Visit Days : 4  Time In/Out PT Charge Capture  Rehab Caseload Tracker  Orders    Brendan Saleh is a 49 y.o. male   PRIMARY DIAGNOSIS: Infection of  (ventriculoperitoneal) shunt (HCC)  Acute hypernatremia [E87.0]  Volume depletion [E86.9]  Acute kidney injury (HCC) [N17.9]  Acute encephalopathy [G93.40]  Septic shock (HCC) [A41.9, R65.21]  Procedure(s) (LRB):  LAPAROTOMY EXPLORATORY, PRIMARY CLOSURE SMALL BOWEL PERFORATION (N/A)  81 Days Post-Op  Inpatient: Payor: [employer] / Plan: AMBETTER / Product Type: *No Product type* /     ASSESSMENT:     REHAB RECOMMENDATIONS:   Recommendation to date pending progress:  Setting:  Short-term Rehab    Equipment:    To Be Determined     ASSESSMENT:  Mr. Saleh presented supine in bed, agreeable to therapy.

## 2024-08-21 NOTE — PLAN OF CARE
Problem: Discharge Planning  Goal: Discharge to home or other facility with appropriate resources  8/20/2024 2204 by Pam Fine RN  Outcome: Progressing  8/20/2024 1747 by Mirian Gil RN  Outcome: Progressing     Problem: Safety - Adult  Goal: Free from fall injury  8/20/2024 2204 by Pam Fine RN  Outcome: Progressing  8/20/2024 1747 by Mirian Gil RN  Outcome: Progressing     Problem: Neurosensory - Adult  Goal: Achieves stable or improved neurological status  8/20/2024 2204 by Pam Fine RN  Outcome: Progressing  8/20/2024 1747 by Mirian Gil RN  Outcome: Progressing     Problem: Musculoskeletal - Adult  Goal: Return mobility to safest level of function  8/20/2024 2204 by Pam Fine RN  Outcome: Progressing  8/20/2024 1747 by Mirian Gil RN  Outcome: Progressing     Problem: Skin/Tissue Integrity  Goal: Absence of new skin breakdown  Description: 1.  Monitor for areas of redness and/or skin breakdown  2.  Assess vascular access sites hourly  3.  Every 4-6 hours minimum:  Change oxygen saturation probe site  4.  Every 4-6 hours:  If on nasal continuous positive airway pressure, respiratory therapy assess nares and determine need for appliance change or resting period.  8/20/2024 2204 by Pam Fine RN  Outcome: Progressing  8/20/2024 1747 by Mirian Gil RN  Outcome: Progressing     Problem: Pain  Goal: Verbalizes/displays adequate comfort level or baseline comfort level  8/20/2024 2204 by Pam Fine RN  Outcome: Progressing  8/20/2024 1747 by Mirian Gil RN  Outcome: Progressing     Problem: Skin/Tissue Integrity - Adult  Goal: Skin integrity remains intact  8/20/2024 2204 by Pam Fine RN  Outcome: Progressing  8/20/2024 1747 by Mirian Gil RN  Outcome: Progressing  Goal: Incisions, wounds, or drain sites healing without S/S of infection  8/20/2024 2204 by Pam Fine RN  Outcome: Progressing  8/20/2024 1747 by Mirian Gil RN  Outcome: Progressing     Problem:

## 2024-08-21 NOTE — PROGRESS NOTES
Pt was consulted in-patient with general surgery. Pt did not complain of pain this shift. Pt had a bowel movement and his dressing was removed, wound cleansed, and new dressing applied, see flowsheets.     All known needs met at this time. Bed is currently low, call light was left in reach, and pt was encouraged to ask for assistance. Pt was left resting in bed with no complaints. Will give bedside report to oncoming nurse.

## 2024-08-21 NOTE — PROGRESS NOTES
Hospitalist Progress Note   Admit Date:  2024  4:30 PM   Name:  Brendan Saleh   Age:  49 y.o.  Sex:  male  :  1975   MRN:  [id number]   Room:  [facility]/    Presenting/Chief Complaint: Altered Mental Status     Reason(s) for Admission: Acute hypernatremia [E87.0]  Volume depletion [E86.9]  Acute kidney injury (HCC) [N17.9]  Acute encephalopathy [G93.40]  Septic shock (HCC) [A41.9, R65.21]     Hospital Course:   Copied from previous note:    Brendan Saleh is a 49 y.o. male with medical history of SDH s/p poonam hole 2024, hydrocephalus s/p  shunt, sacral decubitus stage IV, who originally presented to the ER  with chief complaint of increased confusion.     He was diagnosed with UTI with hematuria and started on course of vancomycin, Zosyn.  Urine culture finalized negative.   blood cultures with CoNS, determined contaminant.  However, he met sepsis criteria with WBC 14.6, HR 120s.  ID consulted to assist.  ECHO showed preserved EF.  Underwent sacral wound debridement on  with General Surgery, Dr. Zapien.     CT head with ongoing hydrocephalus and a  shunt.  NSGY consulted.  Patient underwent right ventricular peritoneal shunt removal on  due to non-functioning shunt.   Per Dr. Ortiz, patient needs a stepwise approach, and stated shunt replacement may be in his future though would need to wait at least 4 weeks.  Neurosurgery followed up  and said will be unable to replace  shunt while patient has active open wounds due to high risk for infection.      ID returned to the case for management of CSF infection as CSF culture and  shunt tip culture grew ESBL Klebsiella and Serratia marcescens.  Antibiotics changed to IV cipro and furthermore to oral cipro for 14 days post shunt removal EOT 24.  ID signed off on .     His hospital stay was further complicated by a bowel obstruction on May 19, noted on CT after patient vomited fecal material.  He underwent  tablet 1 tablet  1 tablet Oral 2 times per day    cefTRIAXone (ROCEPHIN) 1,000 mg in sterile water 10 mL IV syringe  1,000 mg IntraVENous Q24H    sodium hypochlorite (DAKINS) 0.125 % external solution   Irrigation Daily    LORazepam (ATIVAN) tablet 0.5 mg  0.5 mg Oral Q6H PRN    diphenhydrAMINE (BENADRYL) injection 25 mg  25 mg IntraVENous Q6H PRN    pantoprazole (PROTONIX) tablet 40 mg  40 mg Oral Nightly    oxyCODONE (ROXICODONE) immediate release tablet 5 mg  5 mg Oral Q4H PRN    cyclobenzaprine (FLEXERIL) tablet 10 mg  10 mg Oral TID PRN    traZODone (DESYREL) tablet 50 mg  50 mg Oral Nightly PRN    acetaminophen (TYLENOL) tablet 650 mg  650 mg Oral Q4H PRN    sennosides-docusate sodium (SENOKOT-S) 8.6-50 MG tablet 2 tablet  2 tablet Oral Daily    metoprolol tartrate (LOPRESSOR) tablet 25 mg  25 mg Oral BID    0.9 % sodium chloride infusion  25 mL IntraVENous PRN    sodium chloride flush 0.9 % injection 5-40 mL  5-40 mL IntraVENous PRN    0.9 % sodium chloride infusion   IntraVENous PRN    glucose chewable tablet 16 g  4 tablet Oral PRN    dextrose bolus 10% 125 mL  125 mL IntraVENous PRN    Or    dextrose bolus 10% 250 mL  250 mL IntraVENous PRN    Glucagon Emergency KIT 1 mg  1 mg SubCUTAneous PRN    dextrose 10 % infusion   IntraVENous Continuous PRN    medicated lip ointment (BLISTEX)   Topical PRN    ondansetron (ZOFRAN-ODT) disintegrating tablet 4 mg  4 mg Oral Q8H PRN    Or    ondansetron (ZOFRAN) injection 4 mg  4 mg IntraVENous Q6H PRN    enoxaparin (LOVENOX) injection 40 mg  40 mg SubCUTAneous Daily    calcium carbonate (TUMS) chewable tablet 500 mg  500 mg Oral TID PRN    guaiFENesin-dextromethorphan (ROBITUSSIN DM) 100-10 MG/5ML syrup 5 mL  5 mL Oral Q4H PRN       Signed:  Rach Olmos DO    Part of this note may have been written by using a voice dictation software.  The note has been proof read but may still contain some grammatical/other typographical errors.

## 2024-08-21 NOTE — PROGRESS NOTES
ACUTE OCCUPATIONAL THERAPY GOALS:   (Developed with and agreed upon by patient and/or caregiver.)  1. Patient will complete lower body bathing and dressing with MODERATE ASSIST and adaptive equipment as needed. PROGRESSING  2. Patient will complete grooming ADL in unsupported sitting with CONTACT GUARD ASSIST. PROGRESSING  3. Patient will tolerate 25 minutes of OT treatment with 1-2 rest breaks to increase activity tolerance for ADLs.   4. Patient will complete functional transfers via slide board with STAND BY ASSIST and adaptive equipment as needed. PROGRESSING  5. Patient will tolerate 20 minutes BUE exercises to increase strength for safe, functional transfers.   6. Patient will complete upper body bathing and dressing with STAND BY ASSIST and adaptive equipment as needed.  7. Patient will tolerate 10 minutes unsupported sitting balance edge of bed with STAND BY ASSIST in preparation for ADL performance. PROGRESSING  8.. Patient will tolerate static standing task for >30 seconds with MAXIMAL ASSIST in preparation for ADL tasks and to decrease caregiver burden. PROGRESSING   9. Patient will complete functional mobility for short distances with minimal assistance x 2 with use of rolling walker NEW GOAL     Timeframe: 7 visits      OCCUPATIONAL THERAPY: Daily Note PM   OT Visit Days: 2   Time In/Out  OT Charge Capture  Rehab Caseload Tracker  OT Orders    Brendan Saleh is a 49 y.o. male   PRIMARY DIAGNOSIS: Infection of  (ventriculoperitoneal) shunt (HCC)  Acute hypernatremia [E87.0]  Volume depletion [E86.9]  Acute kidney injury (HCC) [N17.9]  Acute encephalopathy [G93.40]  Septic shock (HCC) [A41.9, R65.21]  Procedure(s) (LRB):  LAPAROTOMY EXPLORATORY, PRIMARY CLOSURE SMALL BOWEL PERFORATION (N/A)  81 Days Post-Op  Inpatient: Payor: [employer]TER / Plan: AMBETTER / Product Type: *No Product type* /     ASSESSMENT:     REHAB RECOMMENDATIONS:   Recommendation to date pending progress:  Setting:  Pt continues

## 2024-08-21 NOTE — PROGRESS NOTES
Hourly rounds performed this shift. Patient resting with no complaints at this time. Bed locked in lowest position, call light, and all personal belongings in pt reach. VS stable, IV patent. will give report to oncoming nurse.

## 2024-08-21 NOTE — CONSULTS
Consult Note  Brendan Saleh  MRN: [id number]  :1975  Age:49 y.o.    HPI: Brendan Saleh is a 49 y.o. male who general surgery is asked in consultation by Chris Spann NP (Infectious Disease) to see for evaluation of debridement of stage IV sacral wound.   The patient has a PMHx of  shunt placed  for communicating hydrocephalus (10/2023), craniotomy with burrholes with ligation of  shunt (2023), nonischemic cardiomyopathy, and seizures..     He presented 2024 with altered mental status.   He was admitted by the hospitalist service on 2024 for altered mental status, MIRNA, and septic shock.     Microbiology while inpatient:   24 culture  shunt catheter(ESBL Klebsiella pneumoniae and serratia) treated with ciprofloxacin with EOT 6/3/2024  6/15/24 urine culture Candida albicans    Surgical history while inpatient:   2024 Excisional debridement of sacral ulcer to fascia, 7.5 x 6 cm -Dr. Zapien  2024 right ventricular peritoneal shunt removal Dr. Ortiz  2024 open right colectomy with primary anastomosis . Dr. 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 Dr. Carcamo    Of note, wound care following with wound care recommendations on  2024 for 2 weeks duration of Dakin's wet to dry dressing changes daily    History reviewed. No pertinent past medical history.  Past Surgical History:   Procedure Laterality Date    LAPAROTOMY N/A 2024    LAPAROTOMY EXPLORATORY, PRIMARY CLOSURE SMALL BOWEL PERFORATION performed by Misael Carcamo MD at [facility] OR

## 2024-08-21 NOTE — CONSULTS
Infectious Disease Re-Consult      Today's Date: 8/21/2024   Admit Date: 5/6/2024  YOB: 1975    Impression:   Stage 4 chronic sacral wound infection: wound culture +MRSA   Intermittent confusion   Hydrocephalus s/p  shunt placement 10/2023; subdural hematoma 2/2024 s/p removal 5/17/24 for  shunt infection (ESBL Klebsiella pneumoniae and serratia) (CNS portion removal with retained middle portion) treated with Cipro EOT 6/3/24  SDH s/p [place] Hole 2/2024  CoNS bacteremia 1/4 bottles (5/2024); suspected contamination at that time. Repeat BCX 5/12 negative.   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   Will check a CRP tomorrow am  Recommend general surgery consult for evaluation of debridement as this wound seems to be worsening based on wound care note.   Continue aggressive wound care  ID will follow along.     Anti-infectives:   Vancomycin x1 dose  Bactrim 8/18-  Rocephin 8/17-    Subjective:     Date of Consultation:  August 21, 2024  Date of Admission: 5/6/2024   Referring Provider: Jose Catalan   Reason for consult: \"stage 4 sacral decub with culture growing MRSA on culture\"    Patient is a 49 y.o. male who has been admitted to [facility] since May 6, 2024 for complaint of increased confusion with PMH of hx  shunt placement 10/2023 for hydrocephalus, subdural hematoma 2/24/24 s/p poonam hole with subsequent development of VPS infection in May of 6/2024 s/p partial shunt removal from CNS with retained middle portion and ongoing sacral decubitus ulcer with infection s/p debridement by surgery with no cultures obtained. He had CoNS BSI on admission that was thought to be contamination. Pt's known to ID service since May of 2024. His long hospital course has been complicated by small bowel obstruction required ex lap s/p hemicolectomy and primary anastomosis on 5/19/24, and hepatic flexure cancer found, UTI  for sacral wound; has a dressing on it today   Lymph nodes: Cervical and supraclavicular normal   Musculoskeletal: No swelling or deformity   Lines/Devices:  Intact, no erythema, drainage or tenderness   Psych: Alert and oriented, normal mood affect given the setting       CBC:  Recent Labs     08/19/24  0508   WBC 4.2*   HGB 10.4*   HCT 34.9*          BMP:  Recent Labs     08/19/24  0508 08/21/24  0510   BUN 23 23    138   K 4.2 4.2    101   CO2 25 27       LFTS:  No results for input(s): \"ALT\", \"TP\" in the last 72 hours.    Invalid input(s): \"TBILI\", \"SGOT\", \"AP\", \"ALB\"    Data Review:   Recent Results (from the past 24 hour(s))   Basic Metabolic Panel    Collection Time: 08/21/24  5:10 AM   Result Value Ref Range    Sodium 138 136 - 145 mmol/L    Potassium 4.2 3.5 - 5.1 mmol/L    Chloride 101 98 - 107 mmol/L    CO2 27 20 - 28 mmol/L    Anion Gap 10 9 - 18 mmol/L    Glucose 90 70 - 99 mg/dL    BUN 23 6 - 23 MG/DL    Creatinine 0.80 0.80 - 1.30 MG/DL    Est, Glom Filt Rate >90 >60 ml/min/1.73m2    Calcium 9.1 8.8 - 10.2 MG/DL   Magnesium    Collection Time: 08/21/24  5:10 AM   Result Value Ref Range    Magnesium 2.0 1.8 - 2.4 mg/dL        Microbiology:  Reviewed    Studies:  Reviewed    Signed By: Chris Spann, APRN - CNP     August 21, 2024

## 2024-08-22 PROCEDURE — 6360000002 HC RX W HCPCS: Performed by: SURGERY

## 2024-08-22 PROCEDURE — 1100000000 HC RM PRIVATE

## 2024-08-22 PROCEDURE — 6370000000 HC RX 637 (ALT 250 FOR IP): Performed by: FAMILY MEDICINE

## 2024-08-22 PROCEDURE — 86140 C-REACTIVE PROTEIN: CPT

## 2024-08-22 PROCEDURE — 6370000000 HC RX 637 (ALT 250 FOR IP): Performed by: NURSE PRACTITIONER

## 2024-08-22 PROCEDURE — 6370000000 HC RX 637 (ALT 250 FOR IP): Performed by: INTERNAL MEDICINE

## 2024-08-22 PROCEDURE — 97605 NEG PRS WND THER DME<=50SQCM: CPT

## 2024-08-22 PROCEDURE — 36415 COLL VENOUS BLD VENIPUNCTURE: CPT

## 2024-08-22 PROCEDURE — 6370000000 HC RX 637 (ALT 250 FOR IP): Performed by: PHYSICIAN ASSISTANT

## 2024-08-22 RX ORDER — LINEZOLID 600 MG/1
600 TABLET, FILM COATED ORAL EVERY 12 HOURS SCHEDULED
Status: COMPLETED | OUTPATIENT
Start: 2024-08-22 | End: 2024-09-01

## 2024-08-22 RX ORDER — LINEZOLID 600 MG/1
600 TABLET, FILM COATED ORAL EVERY 12 HOURS SCHEDULED
Status: DISCONTINUED | OUTPATIENT
Start: 2024-08-22 | End: 2024-08-22

## 2024-08-22 RX ADMIN — TRAZODONE HYDROCHLORIDE 50 MG: 50 TABLET ORAL at 21:13

## 2024-08-22 RX ADMIN — DAKIN'S SOLUTION 0.125% (QUARTER STRENGTH): 0.12 SOLUTION at 08:44

## 2024-08-22 RX ADMIN — AMOXICILLIN AND CLAVULANATE POTASSIUM 1 TABLET: 875; 125 TABLET, FILM COATED ORAL at 21:08

## 2024-08-22 RX ADMIN — PANTOPRAZOLE SODIUM 40 MG: 40 TABLET, DELAYED RELEASE ORAL at 21:08

## 2024-08-22 RX ADMIN — SULFAMETHOXAZOLE AND TRIMETHOPRIM 1 TABLET: 800; 160 TABLET ORAL at 08:43

## 2024-08-22 RX ADMIN — LINEZOLID 600 MG: 600 TABLET, FILM COATED ORAL at 21:08

## 2024-08-22 RX ADMIN — METOPROLOL TARTRATE 25 MG: 25 TABLET, FILM COATED ORAL at 08:43

## 2024-08-22 RX ADMIN — ENOXAPARIN SODIUM 40 MG: 100 INJECTION SUBCUTANEOUS at 08:43

## 2024-08-22 ASSESSMENT — PAIN SCALES - GENERAL: PAINLEVEL_OUTOF10: 0

## 2024-08-22 NOTE — PROGRESS NOTES
Hospitalist Progress Note   Admit Date:  2024  4:30 PM   Name:  Brendan Saleh   Age:  49 y.o.  Sex:  male  :  1975   MRN:  [id number]   Room:  [facility]    Presenting/Chief Complaint: Altered Mental Status     Reason(s) for Admission: Acute hypernatremia [E87.0]  Volume depletion [E86.9]  Acute kidney injury (HCC) [N17.9]  Acute encephalopathy [G93.40]  Septic shock (HCC) [A41.9, R65.21]     Hospital Course:   Copy from previous notes as he has been here since : Brendan Saleh is a 49 y.o. male with medical history of SDH s/p poonam hole 2024, hydrocephalus s/p  shunt, sacral decubitus stage IV, who originally presented to the ER  with chief complaint of increased confusion.     He was diagnosed with UTI with hematuria and started on course of vancomycin, Zosyn.  Urine culture finalized negative.   blood cultures with CoNS, determined contaminant.  However, he met sepsis criteria with WBC 14.6, HR 120s.  ID consulted to assist.  ECHO showed preserved EF.  Underwent sacral wound debridement on  with General Surgery, Dr. Zapien.     CT head with ongoing hydrocephalus and a  shunt.  NSGY consulted.  Patient underwent right ventricular peritoneal shunt removal on  due to non-functioning shunt.   Per Dr. Ortiz, patient needs a stepwise approach, and stated shunt replacement may be in his future though would need to wait at least 4 weeks.  Neurosurgery followed up  and said will be unable to replace  shunt while patient has active open wounds due to high risk for infection.      ID returned to the case for management of CSF infection as CSF culture and  shunt tip culture grew ESBL Klebsiella and Serratia marcescens.  Antibiotics changed to IV cipro and furthermore to oral cipro for 14 days post shunt removal EOT 24.  ID signed off on .     His hospital stay was further complicated by a bowel obstruction on May 19, noted on CT after patient vomited fecal    08/21/24 1909 97.3 °F (36.3 °C) 85 14 109/86 100 %       Oxygen Therapy  SpO2: 100 %  Pulse Oximetry Type: Intermittent  Pulse via Oximetry: 128 beats per minute  Pulse Oximeter Device Mode: Intermittent  Pulse Oximeter Device Location: Finger  O2 Device: None (Room air)  Skin Assessment: Clean, dry, & intact  O2 Flow Rate (L/min): 1 L/min  Oxygen Therapy: None (Room air)    Estimated body mass index is 17.22 kg/m² as calculated from the following:    Height as of this encounter: 1.905 m (6' 3\").    Weight as of this encounter: 62.5 kg (137 lb 12.6 oz).    Intake/Output Summary (Last 24 hours) at 8/22/2024 1226  Last data filed at 8/22/2024 0857  Gross per 24 hour   Intake 240 ml   Output 1350 ml   Net -1110 ml         Physical Exam:   General:          Flat affect, but pleasant and answers questions  Head:               Normocephalic, atraumatic, temporal wasting  Eyes:               Sclerae appear normal.  Pupils equally round.  ENT:                Nares appear normal.  Moist oral mucosa  Neck:               No restricted ROM.  Trachea midline   CV:                  RRR. No jugular venous distension.  Lungs:             CTAB.  No wheezing, rhonchi, or rales.  Symmetric expansion.  Abdomen:        Soft, nontender, nondistended.  Extremities:     No cyanosis or clubbing.  No edema. Muscle wasting to extremities  Skin:                No rashes.  Normal coloration.   Warm and dry. Chronic sacral decub   Psych:             Normal mood and bit of a flat affect.      I have personally reviewed labs and tests:  Recent Labs:  Recent Results (from the past 48 hour(s))   Basic Metabolic Panel    Collection Time: 08/21/24  5:10 AM   Result Value Ref Range    Sodium 138 136 - 145 mmol/L    Potassium 4.2 3.5 - 5.1 mmol/L    Chloride 101 98 - 107 mmol/L    CO2 27 20 - 28 mmol/L    Anion Gap 10 9 - 18 mmol/L    Glucose 90 70 - 99 mg/dL    BUN 23 6 - 23 MG/DL    Creatinine 0.80 0.80 - 1.30 MG/DL    Est, Glom Filt Rate >90 >60

## 2024-08-22 NOTE — PROGRESS NOTES
See wound nurse note this shift. Pt now had a wound vac on sacral wound. Pt's left hip dressing was changed this shift as well. Pt has not complained of pain to RN. Pt's antibiotics were adjusted this shift, see orders.     All known needs met at this time. Bed is currently low, call light was left in reach, and pt was encouraged to ask for assistance. Pt was left resting in bed with no complaints. Will give bedside report to oncoming nurse.

## 2024-08-22 NOTE — WOUND CARE
Wound vac placed on Sacral wound, adequate seal obtained/functioning properly. Hydrocolloid used on intact skin around wound edge as barrier. Will continue to follow MWF for wound vac dressing changes. Continue wound bed use, ensure mattress blower at foot of bed is always turned on.    Sacrum 4x2x0.5cm, NEW tunnel 2cm@5 o'clock, old tunnel has completely closed; full thickness, pink/red, granular, slough, small serosanguinous, no odor.      (Previous 8/8)

## 2024-08-23 LAB — CRP SERPL-MCNC: 11 MG/L (ref 0–10)

## 2024-08-23 PROCEDURE — 97530 THERAPEUTIC ACTIVITIES: CPT

## 2024-08-23 PROCEDURE — 6370000000 HC RX 637 (ALT 250 FOR IP): Performed by: INTERNAL MEDICINE

## 2024-08-23 PROCEDURE — 6370000000 HC RX 637 (ALT 250 FOR IP): Performed by: PHYSICIAN ASSISTANT

## 2024-08-23 PROCEDURE — 1100000000 HC RM PRIVATE

## 2024-08-23 PROCEDURE — 6360000002 HC RX W HCPCS: Performed by: SURGERY

## 2024-08-23 PROCEDURE — 2580000003 HC RX 258: Performed by: STUDENT IN AN ORGANIZED HEALTH CARE EDUCATION/TRAINING PROGRAM

## 2024-08-23 PROCEDURE — 97112 NEUROMUSCULAR REEDUCATION: CPT

## 2024-08-23 PROCEDURE — 6370000000 HC RX 637 (ALT 250 FOR IP): Performed by: NURSE PRACTITIONER

## 2024-08-23 RX ADMIN — AMOXICILLIN AND CLAVULANATE POTASSIUM 1 TABLET: 875; 125 TABLET, FILM COATED ORAL at 09:03

## 2024-08-23 RX ADMIN — SODIUM CHLORIDE, PRESERVATIVE FREE 10 ML: 5 INJECTION INTRAVENOUS at 09:03

## 2024-08-23 RX ADMIN — METOPROLOL TARTRATE 25 MG: 25 TABLET, FILM COATED ORAL at 09:04

## 2024-08-23 RX ADMIN — ENOXAPARIN SODIUM 40 MG: 100 INJECTION SUBCUTANEOUS at 09:05

## 2024-08-23 RX ADMIN — LINEZOLID 600 MG: 600 TABLET, FILM COATED ORAL at 21:15

## 2024-08-23 RX ADMIN — AMOXICILLIN AND CLAVULANATE POTASSIUM 1 TABLET: 875; 125 TABLET, FILM COATED ORAL at 21:15

## 2024-08-23 RX ADMIN — PANTOPRAZOLE SODIUM 40 MG: 40 TABLET, DELAYED RELEASE ORAL at 21:15

## 2024-08-23 RX ADMIN — TRAZODONE HYDROCHLORIDE 50 MG: 50 TABLET ORAL at 21:15

## 2024-08-23 RX ADMIN — LINEZOLID 600 MG: 600 TABLET, FILM COATED ORAL at 09:03

## 2024-08-23 ASSESSMENT — PAIN SCALES - GENERAL
PAINLEVEL_OUTOF10: 0
PAINLEVEL_OUTOF10: 0

## 2024-08-23 NOTE — PROGRESS NOTES
ACUTE OCCUPATIONAL THERAPY GOALS:   (Developed with and agreed upon by patient and/or caregiver.)  1. Patient will complete lower body bathing and dressing with MODERATE ASSIST and adaptive equipment as needed. PROGRESSING  2. Patient will complete grooming ADL in unsupported sitting with CONTACT GUARD ASSIST. PROGRESSING  3. Patient will tolerate 25 minutes of OT treatment with 1-2 rest breaks to increase activity tolerance for ADLs.   4. Patient will complete functional transfers via slide board with STAND BY ASSIST and adaptive equipment as needed. PROGRESSING  5. Patient will tolerate 20 minutes BUE exercises to increase strength for safe, functional transfers.   6. Patient will complete upper body bathing and dressing with STAND BY ASSIST and adaptive equipment as needed.  7. Patient will tolerate 10 minutes unsupported sitting balance edge of bed with STAND BY ASSIST in preparation for ADL performance. PROGRESSING  8.. Patient will tolerate static standing task for >30 seconds with MAXIMAL ASSIST in preparation for ADL tasks and to decrease caregiver burden. PROGRESSING   9. Patient will complete functional mobility for short distances with minimal assistance x 2 with use of rolling walker NEW GOAL     Timeframe: 7 visits      OCCUPATIONAL THERAPY: Daily Note PM   OT Visit Days: 3   Time In/Out  OT Charge Capture  Rehab Caseload Tracker  OT Orders    Brendan Saleh is a 49 y.o. male   PRIMARY DIAGNOSIS: Infection of  (ventriculoperitoneal) shunt (HCC)  Acute hypernatremia [E87.0]  Volume depletion [E86.9]  Acute kidney injury (HCC) [N17.9]  Acute encephalopathy [G93.40]  Septic shock (HCC) [A41.9, R65.21]  Procedure(s) (LRB):  LAPAROTOMY EXPLORATORY, PRIMARY CLOSURE SMALL BOWEL PERFORATION (N/A)  83 Days Post-Op  Inpatient: Payor: [employer] / Plan: AMBETTER / Product Type: *No Product type* /     ASSESSMENT:     REHAB RECOMMENDATIONS:   Recommendation to date pending progress:  Setting:  Pt continues     Toilet [] [] [] [] [] [] [] [] [] [x] []      [] [] [] [] [] [] [] [] [] [] []    I=Independent, Mod I=Modified Independent, S=Supervision/Setup, SBA=Standby Assistance, CGA=Contact Guard Assistance, Min=Minimal Assistance, Mod=Moderate Assistance, Max=Maximal Assistance, Total=Total Assistance, NT=Not Tested    ACTIVITIES OF DAILY LIVING: I Mod I S SBA CGA Min Mod Max Total NT Comments   BASIC ADLs:              Upper Body   Bathing [] [] [] [] [] [] [] [] [] [x]     Lower Body Bathing [] [] [] [] [] [] [] [] [] [x]     Toileting [] [] [] [] [] [] [] [] [] [x]    Upper Body Dressing [] [] [] [] [] [] [] [] [] [x]    Lower Body Dressing [] [] [] [] [] [] [] [] [] [x]    Feeding [] [] [] [] [] [] [] [] [] [x]    Grooming [] [] [] [] [] [] [] [] [] [x]    Personal Device Care [] [] [] [] [] [] [] [] [] [x]    Functional Mobility [] [] [] [] [] [x] [x] [] [] [] With w/c propulsion   I=Independent, Mod I=Modified Independent, S=Supervision/Setup, SBA=Standby Assistance, CGA=Contact Guard Assistance, Min=Minimal Assistance, Mod=Moderate Assistance, Max=Maximal Assistance, Total=Total Assistance, NT=Not Tested    BALANCE: Good Fair+ Fair Fair- Poor NT Comments   Sitting Static [] [x] [] [] [] []    Sitting Dynamic [] [] [x] [] [] []              Standing Static [] [] [] [x] [] []    Standing Dynamic [] [] [] [x] [] []        PLAN:     FREQUENCY/DURATION   OT Plan of Care: 3 times/week for duration of hospital stay or until stated goals are met, whichever comes first.    TREATMENT:     TREATMENT:   Co-Treatment PT/OT necessary due to patient's decreased overall endurance/tolerance levels, as well as need for high level skilled assistance to complete functional transfers/mobility and functional tasks  Neuromuscular Re-education (39 Minutes): Patient participated in neuromuscular re-education including functional reaching, weight shifting, postural training, midline training, standing tolerance activity , and sitting

## 2024-08-23 NOTE — PROGRESS NOTES
Hospitalist Progress Note   Admit Date:  2024  4:30 PM   Name:  Brendan Saleh   Age:  49 y.o.  Sex:  male  :  1975   MRN:  [id number]   Room:  [facility]    Presenting/Chief Complaint: Altered Mental Status     Reason(s) for Admission: Acute hypernatremia [E87.0]  Volume depletion [E86.9]  Acute kidney injury (HCC) [N17.9]  Acute encephalopathy [G93.40]  Septic shock (HCC) [A41.9, R65.21]     Hospital Course:   Copy from previous notes as he has been here since : Brendan Saleh is a 49 y.o. male with medical history of SDH s/p poonam hole 2024, hydrocephalus s/p  shunt, sacral decubitus stage IV, who originally presented to the ER  with chief complaint of increased confusion.     He was diagnosed with UTI with hematuria and started on course of vancomycin, Zosyn.  Urine culture finalized negative.   blood cultures with CoNS, determined contaminant.  However, he met sepsis criteria with WBC 14.6, HR 120s.  ID consulted to assist.  ECHO showed preserved EF.  Underwent sacral wound debridement on  with General Surgery, Dr. Zapien.     CT head with ongoing hydrocephalus and a  shunt.  NSGY consulted.  Patient underwent right ventricular peritoneal shunt removal on  due to non-functioning shunt.   Per Dr. Ortiz, patient needs a stepwise approach, and stated shunt replacement may be in his future though would need to wait at least 4 weeks.  Neurosurgery followed up  and said will be unable to replace  shunt while patient has active open wounds due to high risk for infection.      ID returned to the case for management of CSF infection as CSF culture and  shunt tip culture grew ESBL Klebsiella and Serratia marcescens.  Antibiotics changed to IV cipro and furthermore to oral cipro for 14 days post shunt removal EOT 24.  ID signed off on .     His hospital stay was further complicated by a bowel obstruction on May 19, noted on CT after patient vomited fecal  none

## 2024-08-23 NOTE — PROGRESS NOTES
ACUTE PHYSICAL THERAPY GOALS:   (Developed with and agreed upon by patient and/or caregiver.)  (1.) Brendan Saleh  will move from supine to sit and sit to supine and scoot up and down with CONTACT GUARD ASSIST within 7 treatment day(s).      (2.) Brendan Saleh will transfer from bed to chair and chair to bed with MINIMAL ASSISTx1 using the least restrictive device within 7 treatment day(s).    (3.) Brendan Saleh will perform seated static and dynamic balance activities x 20 minutes with STAND BY ASSIST to improve safety within 7 treatment day(s).  (4.) Brendan Saleh will perform standing static and dynamic balance activities x 2 minutes with MODERATE ASSIST to improve safety and mobility within 7 treatment day(s).   (5.)Brendan Saleh will be able to transfer bed to chair/wheelchair and wheelchair/chair to bed via stand pivot transfer with MINIMAL ASSIST to increase mobility possibilities in 7 treatment days.   (6). Brendan Saleh will self propel wheelchair x 1000 ft with STAND BY ASSISTANCE to demonstrate increase in UE and LE strength and independence in mobility    PHYSICAL THERAPY: Daily Note PM   (Link to Caseload Tracking: PT Visit Days : 5  Time In/Out PT Charge Capture  Rehab Caseload Tracker  Orders    Brendan Saleh is a 49 y.o. male   PRIMARY DIAGNOSIS: Infection of  (ventriculoperitoneal) shunt (HCC)  Acute hypernatremia [E87.0]  Volume depletion [E86.9]  Acute kidney injury (HCC) [N17.9]  Acute encephalopathy [G93.40]  Septic shock (HCC) [A41.9, R65.21]  Procedure(s) (LRB):  LAPAROTOMY EXPLORATORY, PRIMARY CLOSURE SMALL BOWEL PERFORATION (N/A)  83 Days Post-Op  Inpatient: Payor: [employer] / Plan: [employer]BETTER / Product Type: *No Product type* /     ASSESSMENT:     REHAB RECOMMENDATIONS:   Recommendation to date pending progress:  Setting:  Short-term Rehab    Equipment:    To Be Determined     ASSESSMENT:  Mr. Saleh was supine on contact and agreeable to work

## 2024-08-23 NOTE — CARE COORDINATION
Dispo update:  Mr. Saleh is still waiting on his Social Security disability letter, which will allow him to apply for [place] Medicaid, which will allow [facility] to apply for Medicaid Level of Care, which will allow him to be placed into a skilled nursing facility for long-term care. He has a history of a subdural hematoma and ongoing hydrocephalous with a  shunt.      CM also called his aunt Ms. Lesley Doyle at [phone] to update her, and see if they have received the Social Security letter in Mr. Saleh's mail box.  No answer, and her voice mail box if full, so CM could not leave a message.

## 2024-08-23 NOTE — PLAN OF CARE
Problem: Discharge Planning  Goal: Discharge to home or other facility with appropriate resources  Outcome: Progressing     Problem: Safety - Adult  Goal: Free from fall injury  Outcome: Progressing     Problem: Neurosensory - Adult  Goal: Achieves stable or improved neurological status  Outcome: Progressing

## 2024-08-24 PROCEDURE — 1100000000 HC RM PRIVATE

## 2024-08-24 PROCEDURE — 6370000000 HC RX 637 (ALT 250 FOR IP): Performed by: INTERNAL MEDICINE

## 2024-08-24 PROCEDURE — 6360000002 HC RX W HCPCS: Performed by: SURGERY

## 2024-08-24 PROCEDURE — 6370000000 HC RX 637 (ALT 250 FOR IP): Performed by: PHYSICIAN ASSISTANT

## 2024-08-24 PROCEDURE — 6370000000 HC RX 637 (ALT 250 FOR IP): Performed by: NURSE PRACTITIONER

## 2024-08-24 PROCEDURE — 2580000003 HC RX 258: Performed by: STUDENT IN AN ORGANIZED HEALTH CARE EDUCATION/TRAINING PROGRAM

## 2024-08-24 RX ADMIN — SODIUM CHLORIDE, PRESERVATIVE FREE 10 ML: 5 INJECTION INTRAVENOUS at 08:30

## 2024-08-24 RX ADMIN — TRAZODONE HYDROCHLORIDE 50 MG: 50 TABLET ORAL at 20:38

## 2024-08-24 RX ADMIN — PANTOPRAZOLE SODIUM 40 MG: 40 TABLET, DELAYED RELEASE ORAL at 20:38

## 2024-08-24 RX ADMIN — AMOXICILLIN AND CLAVULANATE POTASSIUM 1 TABLET: 875; 125 TABLET, FILM COATED ORAL at 20:38

## 2024-08-24 RX ADMIN — ENOXAPARIN SODIUM 40 MG: 100 INJECTION SUBCUTANEOUS at 08:30

## 2024-08-24 RX ADMIN — AMOXICILLIN AND CLAVULANATE POTASSIUM 1 TABLET: 875; 125 TABLET, FILM COATED ORAL at 08:29

## 2024-08-24 RX ADMIN — LINEZOLID 600 MG: 600 TABLET, FILM COATED ORAL at 20:38

## 2024-08-24 RX ADMIN — METOPROLOL TARTRATE 25 MG: 25 TABLET, FILM COATED ORAL at 08:30

## 2024-08-24 RX ADMIN — METOPROLOL TARTRATE 25 MG: 25 TABLET, FILM COATED ORAL at 20:38

## 2024-08-24 RX ADMIN — LINEZOLID 600 MG: 600 TABLET, FILM COATED ORAL at 08:29

## 2024-08-24 ASSESSMENT — PAIN SCALES - GENERAL
PAINLEVEL_OUTOF10: 0
PAINLEVEL_OUTOF10: 0

## 2024-08-24 NOTE — PLAN OF CARE
Problem: Discharge Planning  Goal: Discharge to home or other facility with appropriate resources  8/24/2024 1940 by Anni Cole RN  Outcome: Progressing  Flowsheets (Taken 8/24/2024 1911)  Discharge to home or other facility with appropriate resources:   Identify barriers to discharge with patient and caregiver   Arrange for needed discharge resources and transportation as appropriate   Identify discharge learning needs (meds, wound care, etc)   Refer to discharge planning if patient needs post-hospital services based on physician order or complex needs related to functional status, cognitive ability or social support system  8/24/2024 0722 by Yessica Jefferson RN  Outcome: Progressing  Flowsheets (Taken 8/23/2024 1945 by Suzanne Kumari, RN)  Discharge to home or other facility with appropriate resources:   Identify barriers to discharge with patient and caregiver   Arrange for needed discharge resources and transportation as appropriate   Identify discharge learning needs (meds, wound care, etc)     Problem: Safety - Adult  Goal: Free from fall injury  8/24/2024 1940 by Anni Cole RN  Outcome: Progressing  Flowsheets (Taken 8/24/2024 1911)  Free From Fall Injury: Instruct family/caregiver on patient safety  8/24/2024 0722 by Yessica Jefferson RN  Outcome: Progressing     Problem: Neurosensory - Adult  Goal: Achieves stable or improved neurological status  8/24/2024 1940 by Anni Cole RN  Outcome: Progressing  8/24/2024 0722 by Yessica Jefferson RN  Outcome: Progressing     Problem: Musculoskeletal - Adult  Goal: Return mobility to safest level of function  Outcome: Progressing  Flowsheets (Taken 8/24/2024 1911)  Return Mobility to Safest Level of Function: Assess patient stability and activity tolerance for standing, transferring and ambulating with or without assistive devices     Problem: Skin/Tissue Integrity  Goal: Absence of new skin breakdown  Description: 1.  Monitor for areas of redness

## 2024-08-24 NOTE — PROGRESS NOTES
Hospitalist Progress Note   Admit Date:  2024  4:30 PM   Name:  Brendan Saleh   Age:  49 y.o.  Sex:  male  :  1975   MRN:  [id number]   Room:  [facility]    Presenting/Chief Complaint: Altered Mental Status     Reason(s) for Admission: Acute hypernatremia [E87.0]  Volume depletion [E86.9]  Acute kidney injury (HCC) [N17.9]  Acute encephalopathy [G93.40]  Septic shock (HCC) [A41.9, R65.21]     Hospital Course:   Copy from previous notes as he has been here since : Brendan Saleh is a 49 y.o. male with medical history of SDH s/p poonam hole 2024, hydrocephalus s/p  shunt, sacral decubitus stage IV, who originally presented to the ER  with chief complaint of increased confusion.     He was diagnosed with UTI with hematuria and started on course of vancomycin, Zosyn.  Urine culture finalized negative.   blood cultures with CoNS, determined contaminant.  However, he met sepsis criteria with WBC 14.6, HR 120s.  ID consulted to assist.  ECHO showed preserved EF.  Underwent sacral wound debridement on  with General Surgery, Dr. Zapien.     CT head with ongoing hydrocephalus and a  shunt.  NSGY consulted.  Patient underwent right ventricular peritoneal shunt removal on  due to non-functioning shunt.   Per Dr. Ortiz, patient needs a stepwise approach, and stated shunt replacement may be in his future though would need to wait at least 4 weeks.  Neurosurgery followed up  and said will be unable to replace  shunt while patient has active open wounds due to high risk for infection.      ID returned to the case for management of CSF infection as CSF culture and  shunt tip culture grew ESBL Klebsiella and Serratia marcescens.  Antibiotics changed to IV cipro and furthermore to oral cipro for 14 days post shunt removal EOT 24.  ID signed off on .     His hospital stay was further complicated by a bowel obstruction on May 19, noted on CT after patient vomited fecal  protein portions, nutrition supplementation        Critical illness myopathy  -Due to patient's prolonged hospitalization and critical illnesses.  Continue PT/OT.        Leg cramps  - No complaints.  Continue Flexeril as needed          Hypomagnesemia  - Monitor with labs and repleting PRN        ESBL (extended spectrum beta-lactamase) producing bacteria infection  Leukocytosis resolved  Candiduria resolved  Sepsis resolved  Lower extremity rash resolved  HAP resolved      Anticipated Discharge Arrangements:   Skilled Nursing Facility    PT/OT evals ordered?  Therapy evals ordered  Diet:  ADULT DIET; Easy to Chew; Low Fiber; Double Protein Portions  ADULT ORAL NUTRITION SUPPLEMENT; Breakfast, Lunch, Dinner; Standard High Calorie/High Protein Oral Supplement  VTE prophylaxis: Lovenox  Code status: DNR      Non-peripheral Lines and Tubes (if present):      Negative Pressure Wound Therapy Sacrum Mid (Active)       External Urinary Catheter (Active)        Telemetry (if present):  Cardiac/Telemetry Monitor On: No        Hospital Problems:  Principal Problem:    Infection of  (ventriculoperitoneal) shunt (HCC)  Active Problems:    Encephalitis    S/P  shunt    Communicating hydrocephalus (HCC)    Anemia    Sepsis following intra-abdominal surgery (HCC)    Wound disruption    Sinus tachycardia    NPH (normal pressure hydrocephalus) (HCC)    Shunt malfunction    Colon obstruction (HCC)    Colonic mass    Adenocarcinoma of colon (HCC)    Severe protein-calorie malnutrition (HCC)    Sacral decubitus ulcer, stage IV (HCC)    ESBL (extended spectrum beta-lactamase) producing bacteria infection    Acute blood loss as cause of postoperative anemia    Bowel perforation (HCC)    Critical illness myopathy    Cachexia (HCC)    Unintentional weight loss    Adult failure to thrive    Encounter for palliative care    Hypomagnesemia  Resolved Problems:    Hypernatremia    MIRNA (acute kidney injury) (HCC)    UTI (urinary tract

## 2024-08-24 NOTE — PLAN OF CARE
Problem: Discharge Planning  Goal: Discharge to home or other facility with appropriate resources  Outcome: Progressing  Flowsheets (Taken 8/23/2024 1945 by Suzanne Kumari, RN)  Discharge to home or other facility with appropriate resources:   Identify barriers to discharge with patient and caregiver   Arrange for needed discharge resources and transportation as appropriate   Identify discharge learning needs (meds, wound care, etc)     Problem: Safety - Adult  Goal: Free from fall injury  Outcome: Progressing     Problem: Neurosensory - Adult  Goal: Achieves stable or improved neurological status  Outcome: Progressing

## 2024-08-25 PROCEDURE — 6370000000 HC RX 637 (ALT 250 FOR IP): Performed by: INTERNAL MEDICINE

## 2024-08-25 PROCEDURE — 6360000002 HC RX W HCPCS: Performed by: SURGERY

## 2024-08-25 PROCEDURE — 6370000000 HC RX 637 (ALT 250 FOR IP): Performed by: NURSE PRACTITIONER

## 2024-08-25 PROCEDURE — 6370000000 HC RX 637 (ALT 250 FOR IP): Performed by: PHYSICIAN ASSISTANT

## 2024-08-25 PROCEDURE — 1100000000 HC RM PRIVATE

## 2024-08-25 RX ADMIN — METOPROLOL TARTRATE 25 MG: 25 TABLET, FILM COATED ORAL at 08:34

## 2024-08-25 RX ADMIN — AMOXICILLIN AND CLAVULANATE POTASSIUM 1 TABLET: 875; 125 TABLET, FILM COATED ORAL at 08:34

## 2024-08-25 RX ADMIN — LINEZOLID 600 MG: 600 TABLET, FILM COATED ORAL at 20:30

## 2024-08-25 RX ADMIN — AMOXICILLIN AND CLAVULANATE POTASSIUM 1 TABLET: 875; 125 TABLET, FILM COATED ORAL at 20:30

## 2024-08-25 RX ADMIN — PANTOPRAZOLE SODIUM 40 MG: 40 TABLET, DELAYED RELEASE ORAL at 20:29

## 2024-08-25 RX ADMIN — TRAZODONE HYDROCHLORIDE 50 MG: 50 TABLET ORAL at 20:29

## 2024-08-25 RX ADMIN — METOPROLOL TARTRATE 25 MG: 25 TABLET, FILM COATED ORAL at 20:30

## 2024-08-25 RX ADMIN — LINEZOLID 600 MG: 600 TABLET, FILM COATED ORAL at 08:34

## 2024-08-25 RX ADMIN — ENOXAPARIN SODIUM 40 MG: 100 INJECTION SUBCUTANEOUS at 08:34

## 2024-08-25 NOTE — PROGRESS NOTES
Hourly rounds completed.  Uneventful shift, pt rested well.  Wound vac intact.  Bed in low position, wheels locked, call light within reach.

## 2024-08-25 NOTE — PLAN OF CARE
Problem: Discharge Planning  Goal: Discharge to home or other facility with appropriate resources  8/25/2024 0757 by Alberta Samaniego RN  Outcome: Progressing  8/24/2024 1940 by Anni Cole RN  Outcome: Progressing  Flowsheets (Taken 8/24/2024 1911)  Discharge to home or other facility with appropriate resources:   Identify barriers to discharge with patient and caregiver   Arrange for needed discharge resources and transportation as appropriate   Identify discharge learning needs (meds, wound care, etc)   Refer to discharge planning if patient needs post-hospital services based on physician order or complex needs related to functional status, cognitive ability or social support system     Problem: Safety - Adult  Goal: Free from fall injury  8/25/2024 0757 by Alberta Samaniego RN  Outcome: Progressing  8/24/2024 1940 by Anni Cole RN  Outcome: Progressing  Flowsheets (Taken 8/24/2024 1911)  Free From Fall Injury: Instruct family/caregiver on patient safety     Problem: Neurosensory - Adult  Goal: Achieves stable or improved neurological status  8/25/2024 0757 by Alberta Samaniego RN  Outcome: Progressing  8/24/2024 1940 by Anni Cole RN  Outcome: Progressing     Problem: Musculoskeletal - Adult  Goal: Return mobility to safest level of function  8/25/2024 0757 by Alberta Samaniego RN  Outcome: Progressing  8/24/2024 1940 by Anni Cole RN  Outcome: Progressing  Flowsheets (Taken 8/24/2024 1911)  Return Mobility to Safest Level of Function: Assess patient stability and activity tolerance for standing, transferring and ambulating with or without assistive devices     Problem: Skin/Tissue Integrity  Goal: Absence of new skin breakdown  Description: 1.  Monitor for areas of redness and/or skin breakdown  2.  Assess vascular access sites hourly  3.  Every 4-6 hours minimum:  Change oxygen saturation probe site  4.  Every 4-6 hours:  If on nasal continuous positive airway pressure, respiratory  therapy assess nares and determine need for appliance change or resting period.  8/25/2024 0757 by Alberta Samaniego RN  Outcome: Progressing  8/24/2024 1940 by Anni Cole RN  Outcome: Progressing     Problem: Pain  Goal: Verbalizes/displays adequate comfort level or baseline comfort level  8/25/2024 0757 by Alberta Samaniego RN  Outcome: Progressing  8/24/2024 1940 by Anni Cole RN  Outcome: Progressing  Flowsheets (Taken 8/24/2024 1906)  Verbalizes/displays adequate comfort level or baseline comfort level: Encourage patient to monitor pain and request assistance     Problem: Skin/Tissue Integrity - Adult  Goal: Skin integrity remains intact  8/25/2024 0757 by Alberta Samaniego RN  Outcome: Progressing  8/24/2024 1940 by Anni Cole RN  Outcome: Progressing  Flowsheets (Taken 8/24/2024 1911)  Skin Integrity Remains Intact: Monitor for areas of redness and/or skin breakdown  Goal: Incisions, wounds, or drain sites healing without S/S of infection  8/25/2024 0757 by Alberta Samaniego RN  Outcome: Progressing  8/24/2024 1940 by Anni Cole RN  Outcome: Progressing  Flowsheets (Taken 8/24/2024 1911)  Incisions, Wounds, or Drain Sites Healing Without Sign and Symptoms of Infection: TWICE DAILY: Assess and document skin integrity     Problem: Gastrointestinal - Adult  Goal: Maintains adequate nutritional intake  8/25/2024 0757 by Alberta Samaniego RN  Outcome: Progressing  8/24/2024 1940 by Anni Cole RN  Outcome: Progressing     Problem: Genitourinary - Adult  Goal: Absence of urinary retention  8/25/2024 0757 by Alberta Samaniego RN  Outcome: Progressing  8/24/2024 1940 by Anni Cole RN  Outcome: Progressing  Flowsheets (Taken 8/24/2024 1911)  Absence of urinary retention: Assess patient’s ability to void and empty bladder     Problem: Infection - Adult  Goal: Absence of infection at discharge  8/25/2024 0757 by Alberta Samaniego RN  Outcome: Progressing  8/24/2024 1940 by Anni Cole

## 2024-08-25 NOTE — PROGRESS NOTES
Hospitalist Progress Note   Admit Date:  2024  4:30 PM   Name:  Brendan Saleh   Age:  49 y.o.  Sex:  male  :  1975   MRN:  [id number]   Room:  [facility] [place]    Presenting/Chief Complaint: Altered Mental Status     Reason(s) for Admission: Acute hypernatremia [E87.0]  Volume depletion [E86.9]  Acute kidney injury (HCC) [N17.9]  Acute encephalopathy [G93.40]  Septic shock (HCC) [A41.9, R65.21]     Hospital Course:   Copy from previous notes and added updated as he has been here since :     Brendan Saleh is a 49 y.o. male with medical history of SDH s/p poonam hole 2024, hydrocephalus s/p  shunt, sacral decubitus stage IV, who originally presented to the ER  with chief complaint of increased confusion.     He was diagnosed with UTI with hematuria and started on course of vancomycin, Zosyn.  Urine culture finalized negative.   blood cultures with CoNS, determined contaminant.  However, he met sepsis criteria with WBC 14.6, HR 120s.  ID consulted to assist.  ECHO showed preserved EF.  Underwent sacral wound debridement on  with General Surgery, Dr. Zapien.     CT head with ongoing hydrocephalus and a  shunt.  NSGY consulted.  Patient underwent right ventricular peritoneal shunt removal on  due to non-functioning shunt.   Per Dr. Ortiz, patient needs a stepwise approach, and stated shunt replacement may be in his future though would need to wait at least 4 weeks.  Neurosurgery followed up  and said will be unable to replace  shunt while patient has active open wounds due to high risk for infection.      ID returned to the case for management of CSF infection as CSF culture and  shunt tip culture grew ESBL Klebsiella and Serratia marcescens.  Antibiotics changed to IV cipro and furthermore to oral cipro for 14 days post shunt removal EOT 24.  ID signed off on .     His hospital stay was further complicated by a bowel obstruction on May 19, noted on CT after patient  palliative care    Hypomagnesemia  Resolved Problems:    Hypernatremia    MIRNA (acute kidney injury) ([facility])    UTI (urinary tract infection)    Septic shock ([facility])      Objective:   Patient Vitals for the past 24 hrs:   Temp Pulse Resp BP SpO2   08/25/24 0700 97.6 °F (36.4 °C) 82 14 101/71 99 %   08/24/24 1906 98.1 °F (36.7 °C) 84 17 102/72 97 %   08/24/24 1530 97.7 °F (36.5 °C) 76 16 90/63 99 %       Oxygen Therapy  SpO2: 99 %  Pulse Oximetry Type: Intermittent  Pulse via Oximetry: 128 beats per minute  Pulse Oximeter Device Mode: Intermittent  Pulse Oximeter Device Location: Finger  O2 Device: None (Room air)  Skin Assessment: Clean, dry, & intact  O2 Flow Rate (L/min): 1 L/min  Oxygen Therapy: None (Room air)    Estimated body mass index is 17.99 kg/m² as calculated from the following:    Height as of this encounter: 1.905 m (6' 3\").    Weight as of this encounter: 65.3 kg (143 lb 14.4 oz).    Intake/Output Summary (Last 24 hours) at 8/25/2024 1156  Last data filed at 8/25/2024 0951  Gross per 24 hour   Intake 540 ml   Output 1650 ml   Net -1110 ml         Physical Exam:   General:          Pleasant and answers questions  Head:               Normocephalic, atraumatic, temporal wasting  Eyes:               Sclerae appear normal.  Pupils equally round.  ENT:                Nares appear normal.  Moist oral mucosa  Neck:               No restricted ROM.  Trachea midline   CV:                  RRR. No jugular venous distension.  Lungs:             CTAB.  No wheezing, rhonchi, or rales.  Symmetric expansion.  Abdomen:        Soft, nontender, nondistended.  Extremities:     No cyanosis or clubbing.  No edema. Muscle wasting to extremities  Skin:                No rashes.  Normal coloration.   Warm and dry. Chronic sacral decub with wound vac in place  Psych:             Normal mood and bit of a flat affect, but improved.    I have personally reviewed labs and tests:  Recent Labs:  No results found for this or any previous

## 2024-08-26 LAB
ANION GAP SERPL CALC-SCNC: 8 MMOL/L (ref 9–18)
BUN SERPL-MCNC: 17 MG/DL (ref 6–23)
CALCIUM SERPL-MCNC: 9.2 MG/DL (ref 8.8–10.2)
CHLORIDE SERPL-SCNC: 103 MMOL/L (ref 98–107)
CO2 SERPL-SCNC: 28 MMOL/L (ref 20–28)
CREAT SERPL-MCNC: 0.72 MG/DL (ref 0.8–1.3)
ERYTHROCYTE [DISTWIDTH] IN BLOOD BY AUTOMATED COUNT: 13 % (ref 11.9–14.6)
GLUCOSE SERPL-MCNC: 99 MG/DL (ref 70–99)
HCT VFR BLD AUTO: 34 % (ref 41.1–50.3)
HGB BLD-MCNC: 10.7 G/DL (ref 13.6–17.2)
MAGNESIUM SERPL-MCNC: 1.9 MG/DL (ref 1.8–2.4)
MCH RBC QN AUTO: 29.2 PG (ref 26.1–32.9)
MCHC RBC AUTO-ENTMCNC: 31.5 G/DL (ref 31.4–35)
MCV RBC AUTO: 92.9 FL (ref 82–102)
NRBC # BLD: 0 K/UL (ref 0–0.2)
PHOSPHATE SERPL-MCNC: 4.6 MG/DL (ref 2.5–4.5)
PLATELET # BLD AUTO: 266 K/UL (ref 150–450)
PMV BLD AUTO: 9.8 FL (ref 9.4–12.3)
POTASSIUM SERPL-SCNC: 3.9 MMOL/L (ref 3.5–5.1)
RBC # BLD AUTO: 3.66 M/UL (ref 4.23–5.6)
SODIUM SERPL-SCNC: 138 MMOL/L (ref 136–145)
WBC # BLD AUTO: 4.4 K/UL (ref 4.3–11.1)

## 2024-08-26 PROCEDURE — 1100000000 HC RM PRIVATE

## 2024-08-26 PROCEDURE — 97605 NEG PRS WND THER DME<=50SQCM: CPT

## 2024-08-26 PROCEDURE — 36415 COLL VENOUS BLD VENIPUNCTURE: CPT

## 2024-08-26 PROCEDURE — 85027 COMPLETE CBC AUTOMATED: CPT

## 2024-08-26 PROCEDURE — 6370000000 HC RX 637 (ALT 250 FOR IP): Performed by: INTERNAL MEDICINE

## 2024-08-26 PROCEDURE — 83735 ASSAY OF MAGNESIUM: CPT

## 2024-08-26 PROCEDURE — 6370000000 HC RX 637 (ALT 250 FOR IP): Performed by: PHYSICIAN ASSISTANT

## 2024-08-26 PROCEDURE — 6360000002 HC RX W HCPCS: Performed by: SURGERY

## 2024-08-26 PROCEDURE — 6370000000 HC RX 637 (ALT 250 FOR IP): Performed by: NURSE PRACTITIONER

## 2024-08-26 PROCEDURE — 84100 ASSAY OF PHOSPHORUS: CPT

## 2024-08-26 PROCEDURE — 80048 BASIC METABOLIC PNL TOTAL CA: CPT

## 2024-08-26 RX ADMIN — AMOXICILLIN AND CLAVULANATE POTASSIUM 1 TABLET: 875; 125 TABLET, FILM COATED ORAL at 08:38

## 2024-08-26 RX ADMIN — METOPROLOL TARTRATE 25 MG: 25 TABLET, FILM COATED ORAL at 08:38

## 2024-08-26 RX ADMIN — LINEZOLID 600 MG: 600 TABLET, FILM COATED ORAL at 21:08

## 2024-08-26 RX ADMIN — PANTOPRAZOLE SODIUM 40 MG: 40 TABLET, DELAYED RELEASE ORAL at 21:08

## 2024-08-26 RX ADMIN — TRAZODONE HYDROCHLORIDE 50 MG: 50 TABLET ORAL at 21:08

## 2024-08-26 RX ADMIN — ENOXAPARIN SODIUM 40 MG: 100 INJECTION SUBCUTANEOUS at 08:38

## 2024-08-26 RX ADMIN — LINEZOLID 600 MG: 600 TABLET, FILM COATED ORAL at 08:38

## 2024-08-26 RX ADMIN — METOPROLOL TARTRATE 25 MG: 25 TABLET, FILM COATED ORAL at 21:08

## 2024-08-26 RX ADMIN — AMOXICILLIN AND CLAVULANATE POTASSIUM 1 TABLET: 875; 125 TABLET, FILM COATED ORAL at 21:08

## 2024-08-26 NOTE — PROGRESS NOTES
Wound vac continuously alarming \"blockage alert.\"  Despite multiple intervention attempts by this RN, wound vac continues to alarm.  Wound vac dressing removed and wet-to-dry dressing applied to sacral wound.  <10mL sanguinous drainage noted in wound vac canister.  L hip dressing also replaced at this time.

## 2024-08-26 NOTE — CARE COORDINATION
Pt chart reviewed. MSW called MsNeo Lesley Doyle, pt aunt ([phone]) to ask about status of Social Security letter for pt. Aunt advised letters had just came in the mail and she was on the way to hospital to deliver them. MSW told aunt to request for CM when she she gets to floor to get letters. No other needs expressed at this time for case management. Discharge to be determined. MSW will follow patient for care.

## 2024-08-26 NOTE — WOUND CARE
Noted difficulty with wound vac over weekend. Will attempt to apply again, if frequent alarms continue to happen will not be able to keep vac and will have to return to wet to dry dressings.  Tunnel at began to close, now only 1.6cm Coccyx wound vac applied, duoderm at cleft seal, bridge to Left buttock/ hip area.  Seal achieved machine working well.

## 2024-08-26 NOTE — PROGRESS NOTES
Hospitalist Progress Note   Admit Date:  2024  4:30 PM   Name:  Brendan Saleh   Age:  49 y.o.  Sex:  male  :  1975   MRN:  [id number]   Room:  [facility]    Presenting/Chief Complaint: Altered Mental Status     Reason(s) for Admission: Acute hypernatremia [E87.0]  Volume depletion [E86.9]  Acute kidney injury (HCC) [N17.9]  Acute encephalopathy [G93.40]  Septic shock (HCC) [A41.9, R65.21]     Hospital Course:   Copy from previous notes and added updated as he has been here since :      Brendan Saleh is a 49 y.o. male with medical history of SDH s/p poonam hole 2024, hydrocephalus s/p  shunt, sacral decubitus stage IV, who originally presented to the ER  with chief complaint of increased confusion.     He was diagnosed with UTI with hematuria and started on course of vancomycin, Zosyn.  Urine culture finalized negative.   blood cultures with CoNS, determined contaminant.  However, he met sepsis criteria with WBC 14.6, HR 120s.  ID consulted to assist.  ECHO showed preserved EF.  Underwent sacral wound debridement on  with General Surgery, Dr. Zapien.     CT head with ongoing hydrocephalus and a  shunt.  NSGY consulted.  Patient underwent right ventricular peritoneal shunt removal on  due to non-functioning shunt.   Per Dr. Ortiz, patient needs a stepwise approach, and stated shunt replacement may be in his future though would need to wait at least 4 weeks.  Neurosurgery followed up  and said will be unable to replace  shunt while patient has active open wounds due to high risk for infection.      ID returned to the case for management of CSF infection as CSF culture and  shunt tip culture grew ESBL Klebsiella and Serratia marcescens.  Antibiotics changed to IV cipro and furthermore to oral cipro for 14 days post shunt removal EOT 24.  ID signed off on .     His hospital stay was further complicated by a bowel obstruction on May 19, noted on CT after patient

## 2024-08-26 NOTE — PROGRESS NOTES
Pt with BM during shift.    Rounds performed throughout shift. Pt denies needs at this time. Bed in low position, locked and call light/personal items within reach.

## 2024-08-26 NOTE — PLAN OF CARE
Problem: Discharge Planning  Goal: Discharge to home or other facility with appropriate resources  8/26/2024 0726 by Maryan Quiles RN  Outcome: Progressing  8/25/2024 2135 by Anni Cole RN  Outcome: Progressing  Flowsheets (Taken 8/25/2024 1908)  Discharge to home or other facility with appropriate resources:   Identify barriers to discharge with patient and caregiver   Arrange for needed discharge resources and transportation as appropriate   Identify discharge learning needs (meds, wound care, etc)   Refer to discharge planning if patient needs post-hospital services based on physician order or complex needs related to functional status, cognitive ability or social support system     Problem: Safety - Adult  Goal: Free from fall injury  8/26/2024 0726 by Maryan Quiles RN  Outcome: Progressing  8/25/2024 2135 by Anni Cole RN  Outcome: Progressing  Flowsheets (Taken 8/25/2024 1908)  Free From Fall Injury: Instruct family/caregiver on patient safety     Problem: Neurosensory - Adult  Goal: Achieves stable or improved neurological status  8/26/2024 0726 by Maryan Quiles RN  Outcome: Progressing  8/25/2024 2135 by Anni Cole RN  Outcome: Progressing  Flowsheets (Taken 8/25/2024 1908)  Achieves stable or improved neurological status: Assess for and report changes in neurological status     Problem: Musculoskeletal - Adult  Goal: Return mobility to safest level of function  8/26/2024 0726 by Maryan Quiles RN  Outcome: Progressing  8/25/2024 2135 by Anni Cole RN  Outcome: Progressing  Flowsheets (Taken 8/25/2024 1908)  Return Mobility to Safest Level of Function: Assess patient stability and activity tolerance for standing, transferring and ambulating with or without assistive devices     Problem: Skin/Tissue Integrity  Goal: Absence of new skin breakdown  Description: 1.  Monitor for areas of redness and/or skin breakdown  2.  Assess vascular access sites hourly  3.  Every 4-6 hours  minimum:  Change oxygen saturation probe site  4.  Every 4-6 hours:  If on nasal continuous positive airway pressure, respiratory therapy assess nares and determine need for appliance change or resting period.  8/26/2024 0726 by Maryan Quiles RN  Outcome: Progressing  8/25/2024 2135 by Anni Cole RN  Outcome: Progressing     Problem: Pain  Goal: Verbalizes/displays adequate comfort level or baseline comfort level  8/26/2024 0726 by Maryan Quiles RN  Outcome: Progressing  8/25/2024 2135 by Anni Cole RN  Outcome: Progressing  Flowsheets  Taken 8/25/2024 1940 by Anni Cole RN  Verbalizes/displays adequate comfort level or baseline comfort level: Encourage patient to monitor pain and request assistance  Taken 8/25/2024 1519 by Alberta Samaniego RN  Verbalizes/displays adequate comfort level or baseline comfort level: Encourage patient to monitor pain and request assistance     Problem: Skin/Tissue Integrity - Adult  Goal: Skin integrity remains intact  8/26/2024 0726 by Maryan Quiles RN  Outcome: Progressing  8/25/2024 2135 by Anni Cole RN  Outcome: Progressing  Flowsheets (Taken 8/25/2024 1908)  Skin Integrity Remains Intact: Monitor for areas of redness and/or skin breakdown  Goal: Incisions, wounds, or drain sites healing without S/S of infection  8/26/2024 0726 by Maryan Quiles RN  Outcome: Progressing  8/25/2024 2135 by Anni Cole RN  Outcome: Progressing  Flowsheets (Taken 8/25/2024 1908)  Incisions, Wounds, or Drain Sites Healing Without Sign and Symptoms of Infection: TWICE DAILY: Assess and document skin integrity     Problem: Gastrointestinal - Adult  Goal: Maintains adequate nutritional intake  8/26/2024 0726 by Maryan Quiles RN  Outcome: Progressing  8/25/2024 2135 by Anni Cole RN  Outcome: Progressing     Problem: Genitourinary - Adult  Goal: Absence of urinary retention  8/26/2024 0726 by Maryan Quiles RN  Outcome: Progressing  8/25/2024 2135 by Anni Cole

## 2024-08-26 NOTE — PLAN OF CARE
Problem: Discharge Planning  Goal: Discharge to home or other facility with appropriate resources  8/25/2024 2135 by Anni Cole RN  Outcome: Progressing  Flowsheets (Taken 8/25/2024 1908)  Discharge to home or other facility with appropriate resources:   Identify barriers to discharge with patient and caregiver   Arrange for needed discharge resources and transportation as appropriate   Identify discharge learning needs (meds, wound care, etc)   Refer to discharge planning if patient needs post-hospital services based on physician order or complex needs related to functional status, cognitive ability or social support system  8/25/2024 0757 by Alberta Samaniego RN  Outcome: Progressing     Problem: Safety - Adult  Goal: Free from fall injury  8/25/2024 2135 by Anni Cole RN  Outcome: Progressing  Flowsheets (Taken 8/25/2024 1908)  Free From Fall Injury: Instruct family/caregiver on patient safety  8/25/2024 0757 by Alberta Samaniego RN  Outcome: Progressing     Problem: Neurosensory - Adult  Goal: Achieves stable or improved neurological status  8/25/2024 2135 by Anni Cole RN  Outcome: Progressing  Flowsheets (Taken 8/25/2024 1908)  Achieves stable or improved neurological status: Assess for and report changes in neurological status  8/25/2024 0757 by Alberta Samaniego RN  Outcome: Progressing     Problem: Musculoskeletal - Adult  Goal: Return mobility to safest level of function  8/25/2024 2135 by Anni Cole RN  Outcome: Progressing  Flowsheets (Taken 8/25/2024 1908)  Return Mobility to Safest Level of Function: Assess patient stability and activity tolerance for standing, transferring and ambulating with or without assistive devices  8/25/2024 0757 by Alberta Samaniego RN  Outcome: Progressing     Problem: Skin/Tissue Integrity  Goal: Absence of new skin breakdown  Description: 1.  Monitor for areas of redness and/or skin breakdown  2.  Assess vascular access sites hourly  3.  Every 4-6  Progressing     Problem: Genitourinary - Adult  Goal: Absence of urinary retention  8/25/2024 2135 by Anni Cole RN  Outcome: Progressing  Flowsheets (Taken 8/25/2024 1908)  Absence of urinary retention: Assess patient’s ability to void and empty bladder  8/25/2024 0757 by Alberta Samaniego RN  Outcome: Progressing     Problem: Infection - Adult  Goal: Absence of infection at discharge  8/25/2024 2135 by Anni Cole RN  Outcome: Progressing  8/25/2024 0757 by Alberta Samaniego RN  Outcome: Progressing  Goal: Absence of infection during hospitalization  8/25/2024 2135 by Anni Cole RN  Outcome: Progressing  8/25/2024 0757 by Alberta Samaniego RN  Outcome: Progressing     Problem: Metabolic/Fluid and Electrolytes - Adult  Goal: Electrolytes maintained within normal limits  8/25/2024 2135 by Anni Cole RN  Outcome: Progressing  8/25/2024 0757 by Alberta Samaniego RN  Outcome: Progressing  Goal: Hemodynamic stability and optimal renal function maintained  8/25/2024 2135 by Anni Cole RN  Outcome: Progressing  8/25/2024 0757 by Alberta Samaniego RN  Outcome: Progressing     Problem: Cardiovascular - Adult  Goal: Maintains optimal cardiac output and hemodynamic stability  8/25/2024 2135 by Anni Cole RN  Outcome: Progressing  Flowsheets (Taken 8/25/2024 1908)  Maintains optimal cardiac output and hemodynamic stability: Monitor blood pressure and heart rate  8/25/2024 0757 by Alberta Samaniego RN  Outcome: Progressing     Problem: Hematologic - Adult  Goal: Maintains hematologic stability  8/25/2024 2135 by Anni Cole RN  Outcome: Progressing  8/25/2024 0757 by Alberta Samaniego RN  Outcome: Progressing

## 2024-08-27 PROCEDURE — 1100000000 HC RM PRIVATE

## 2024-08-27 PROCEDURE — 6360000002 HC RX W HCPCS: Performed by: SURGERY

## 2024-08-27 PROCEDURE — 6370000000 HC RX 637 (ALT 250 FOR IP): Performed by: PHYSICIAN ASSISTANT

## 2024-08-27 PROCEDURE — 6370000000 HC RX 637 (ALT 250 FOR IP): Performed by: NURSE PRACTITIONER

## 2024-08-27 PROCEDURE — 6370000000 HC RX 637 (ALT 250 FOR IP): Performed by: INTERNAL MEDICINE

## 2024-08-27 RX ORDER — CHOLESTYRAMINE LIGHT 4 G/5.7G
4 POWDER, FOR SUSPENSION ORAL 3 TIMES DAILY PRN
Status: DISCONTINUED | OUTPATIENT
Start: 2024-08-27 | End: 2024-08-28

## 2024-08-27 RX ADMIN — CHOLESTYRAMINE 4 G: 4 POWDER, FOR SUSPENSION ORAL at 16:29

## 2024-08-27 RX ADMIN — AMOXICILLIN AND CLAVULANATE POTASSIUM 1 TABLET: 875; 125 TABLET, FILM COATED ORAL at 07:57

## 2024-08-27 RX ADMIN — AMOXICILLIN AND CLAVULANATE POTASSIUM 1 TABLET: 875; 125 TABLET, FILM COATED ORAL at 20:29

## 2024-08-27 RX ADMIN — METOPROLOL TARTRATE 25 MG: 25 TABLET, FILM COATED ORAL at 07:57

## 2024-08-27 RX ADMIN — METOPROLOL TARTRATE 25 MG: 25 TABLET, FILM COATED ORAL at 20:29

## 2024-08-27 RX ADMIN — ENOXAPARIN SODIUM 40 MG: 100 INJECTION SUBCUTANEOUS at 07:57

## 2024-08-27 RX ADMIN — LINEZOLID 600 MG: 600 TABLET, FILM COATED ORAL at 07:57

## 2024-08-27 RX ADMIN — PANTOPRAZOLE SODIUM 40 MG: 40 TABLET, DELAYED RELEASE ORAL at 20:29

## 2024-08-27 RX ADMIN — LINEZOLID 600 MG: 600 TABLET, FILM COATED ORAL at 20:29

## 2024-08-27 NOTE — ADT AUTH CERT
[facility]  [facility] SURG  [address] [place] [address]  [id number]  [id number]    Auth number: [id number]  CONT STAY   24  PT STILL IN HOUSE    Return Contact Information:  BRIAN ALBRECHT  PH: [phone]  FAX: [phone]       Last edited by Brian Albrecht on 08/15/24 at 0813     Patient Demographics    Name Patient ID SSN Gender Identity Birth Date   Brendan Saleh [id number]  Male [birth year] (49 yrs)     Address Phone Email Employer    [address] [place] [id number] [phone] (H)  [phone] (M)  [phone] (W) [email or web address] NOT EMPLOYED      County Race Occupation Emp Status    [place] White (non-) -- Not Employed      Reg Status PCP Date Last Verified Next Review Date    Verified -- 24      Admission Date Discharge Date Admitting Provider     24 -- Lei Sargent MD       Marital Status Adventist      Single Unknown        Emergency Contact 1   Lesley Doyle (4) [phone] (U)     Physician Progress Notes  Notes from 24 through 24  Progress Notes by Billy Chong DO at 2024 12:01 PM    Author: Billy Chong DO Service: Internal Medicine Author Type: Physician   Filed: 2024 12:05 PM Date of Service: 2024 12:01 PM Status: Signed   : Billy Chong DO (Physician)   Expand All Collapse All         Hospitalist Progress Note   Admit Date:     2024  4:30 PM   Name:              Brendan Saleh   Age:                 49 y.o.  Sex:                 male  :               1975   MRN:               [id number]   Room:              [facility]/     Presenting/Chief Complaint: Altered Mental Status     Reason(s) for Admission: Acute hypernatremia [E87.0]  Volume depletion [E86.9]  Acute kidney injury (HCC) [N17.9]  Acute encephalopathy [G93.40]  Septic shock (HCC) [A41.9, R65.21]      Hospital Course:      Copied from prior provider HPI/summary:  Brendan Saleh is a  [place].      Patient was noted to have progressive anemia on 5/21, with Hgb 10.3 to 6.8.  No black or bloody stool, hematemesis, hematuria, or increased pain at that time.  He received 1 unit PRBCs with improvement to 7.7.  He had a BM on 5/22 that was noted to be black with bright red blood as well.  Surgery notified and hemoglobin trended.  Lovenox held and PPI started.  Felt related to expected mucosal shedding after colon resection and would be self limiting.      On 6/1, pt noted to be tachycardic and distended. + emesis.  Imaging revealed free air, concerning for anastomotic leak.  S/p urgent ex-lap 6/1/2024  Antibiotics completed. TPN weaned off. Patient tolerating minced/moist, low fiber diet.  Staples removed 6/24.  Surgery signed off 6/11.     Palliative care consulted during hospitalization with GOC defined on 6/3/24 - he would want more neurosurgery if he is a candidate but would not want intra-abdominal surgery. He is doing much better, however, than he was when GOC were defined.      Discharge from the sacral decubitus ulcer, wound culture positive for MRSA and gram-negative rods, was started on Rocephin and vancomycin, did get 1 dose of vancomycin and changed to oral Bactrim and Rocephin     ID consult for 8/21 regarding duration of treatment and consulted surgery. No surgery need. Wound vac in place.     Awaiting placement.     8/24: PO Zyvox and Augmentin to be continued through 8/31     Subjective & 24hr Events:      No new complaints or obvious distress.  Awaiting Medicaid approval and placement.        Assessment & Plan:   Sacral decubitus ulcer, stage IV (HCC)  - Worsening discharge over the last few days and antibiotics started  - 8/21: ID was consulted and will saw him, continue Bactrim and Rocephin for now  8/22: Consulted surgery for possible surgical intervention yesterday--did not see any surgery need and antibiotics changed today per ID recommendation  8/23: now on PO antibiotics and

## 2024-08-27 NOTE — PROGRESS NOTES
Hospitalist Progress Note   Admit Date:  2024  4:30 PM   Name:  Brendan Saleh   Age:  49 y.o.  Sex:  male  :  1975   MRN:  [id number]   Room:  [facility]/    Presenting/Chief Complaint: Altered Mental Status     Reason(s) for Admission: Acute hypernatremia [E87.0]  Volume depletion [E86.9]  Acute kidney injury (HCC) [N17.9]  Acute encephalopathy [G93.40]  Septic shock (HCC) [A41.9, R65.21]     Hospital Course:     Copied from prior provider HPI/summary:  Brendan Saleh is a 49 y.o. male with medical history of SDH s/p poonam hole 2024, hydrocephalus s/p  shunt, sacral decubitus stage IV, who originally presented to the ER  with chief complaint of increased confusion.     He was diagnosed with UTI with hematuria and started on course of vancomycin, Zosyn.  Urine culture finalized negative.   blood cultures with CoNS, determined contaminant.  However, he met sepsis criteria with WBC 14.6, HR 120s.  ID consulted to assist.  ECHO showed preserved EF.  Underwent sacral wound debridement on  with General Surgery, Dr. Zapien.     CT head with ongoing hydrocephalus and a  shunt.  NSGY consulted.  Patient underwent right ventricular peritoneal shunt removal on  due to non-functioning shunt.   Per Dr. Ortiz, patient needs a stepwise approach, and stated shunt replacement may be in his future though would need to wait at least 4 weeks.  Neurosurgery followed up  and said will be unable to replace  shunt while patient has active open wounds due to high risk for infection.      ID returned to the case for management of CSF infection as CSF culture and  shunt tip culture grew ESBL Klebsiella and Serratia marcescens.  Antibiotics changed to IV cipro and furthermore to oral cipro for 14 days post shunt removal EOT 24.  ID signed off on .     His hospital stay was further complicated by a bowel obstruction on May 19, noted on CT after patient vomited fecal material.  He  - 11.1 K/uL    RBC 3.66 (L) 4.23 - 5.6 M/uL    Hemoglobin 10.7 (L) 13.6 - 17.2 g/dL    Hematocrit 34.0 (L) 41.1 - 50.3 %    MCV 92.9 82 - 102 FL    MCH 29.2 26.1 - 32.9 PG    MCHC 31.5 31.4 - 35.0 g/dL    RDW 13.0 11.9 - 14.6 %    Platelets 266 150 - 450 K/uL    MPV 9.8 9.4 - 12.3 FL    nRBC 0.00 0.0 - 0.2 K/uL   Magnesium    Collection Time: 08/26/24  4:32 AM   Result Value Ref Range    Magnesium 1.9 1.8 - 2.4 mg/dL   Basic Metabolic Panel    Collection Time: 08/26/24  4:32 AM   Result Value Ref Range    Sodium 138 136 - 145 mmol/L    Potassium 3.9 3.5 - 5.1 mmol/L    Chloride 103 98 - 107 mmol/L    CO2 28 20 - 28 mmol/L    Anion Gap 8 (L) 9 - 18 mmol/L    Glucose 99 70 - 99 mg/dL    BUN 17 6 - 23 MG/DL    Creatinine 0.72 (L) 0.80 - 1.30 MG/DL    Est, Glom Filt Rate >90 >60 ml/min/1.73m2    Calcium 9.2 8.8 - 10.2 MG/DL   Phosphorus    Collection Time: 08/26/24  4:32 AM   Result Value Ref Range    Phosphorus 4.6 (H) 2.5 - 4.5 MG/DL       No results for input(s): \"COVID19\" in the last 72 hours.    Current Meds:  Current Facility-Administered Medications   Medication Dose Route Frequency    linezolid (ZYVOX) tablet 600 mg  600 mg Oral 2 times per day    amoxicillin-clavulanate (AUGMENTIN) 875-125 MG per tablet 1 tablet  1 tablet Oral 2 times per day    LORazepam (ATIVAN) tablet 0.5 mg  0.5 mg Oral Q6H PRN    diphenhydrAMINE (BENADRYL) injection 25 mg  25 mg IntraVENous Q6H PRN    pantoprazole (PROTONIX) tablet 40 mg  40 mg Oral Nightly    oxyCODONE (ROXICODONE) immediate release tablet 5 mg  5 mg Oral Q4H PRN    cyclobenzaprine (FLEXERIL) tablet 10 mg  10 mg Oral TID PRN    traZODone (DESYREL) tablet 50 mg  50 mg Oral Nightly PRN    acetaminophen (TYLENOL) tablet 650 mg  650 mg Oral Q4H PRN    sennosides-docusate sodium (SENOKOT-S) 8.6-50 MG tablet 2 tablet  2 tablet Oral Daily    metoprolol tartrate (LOPRESSOR) tablet 25 mg  25 mg Oral BID    0.9 % sodium chloride infusion  25 mL IntraVENous PRN    sodium chloride

## 2024-08-27 NOTE — PROGRESS NOTES
Hourly rounds performed this shift. Bed rails up x2, bed set in lowest position, locked, and call bell within reach.  All needs met at this time.

## 2024-08-27 NOTE — PROGRESS NOTES
Pt with multiple loose stools during shift; stool still with some form so stool sample not sent down for C diff. PRN med given per MAR. Wound care done per order.    Rounds performed throughout shift. Pt denies needs at this time. Bed in low position, locked and call light/personal items within reach.

## 2024-08-28 PROBLEM — R19.7 DIARRHEA: Status: ACTIVE | Noted: 2024-08-28

## 2024-08-28 PROCEDURE — 6360000002 HC RX W HCPCS: Performed by: SURGERY

## 2024-08-28 PROCEDURE — 6370000000 HC RX 637 (ALT 250 FOR IP): Performed by: NURSE PRACTITIONER

## 2024-08-28 PROCEDURE — 6370000000 HC RX 637 (ALT 250 FOR IP): Performed by: PHYSICIAN ASSISTANT

## 2024-08-28 PROCEDURE — 97530 THERAPEUTIC ACTIVITIES: CPT

## 2024-08-28 PROCEDURE — 97112 NEUROMUSCULAR REEDUCATION: CPT

## 2024-08-28 PROCEDURE — 97605 NEG PRS WND THER DME<=50SQCM: CPT

## 2024-08-28 PROCEDURE — 6370000000 HC RX 637 (ALT 250 FOR IP): Performed by: INTERNAL MEDICINE

## 2024-08-28 PROCEDURE — 1100000000 HC RM PRIVATE

## 2024-08-28 RX ORDER — LOPERAMIDE HYDROCHLORIDE 2 MG/1
2 CAPSULE ORAL 4 TIMES DAILY PRN
Status: DISCONTINUED | OUTPATIENT
Start: 2024-08-28 | End: 2024-08-30

## 2024-08-28 RX ORDER — LACTOBACILLUS RHAMNOSUS GG 10B CELL
1 CAPSULE ORAL
Status: DISCONTINUED | OUTPATIENT
Start: 2024-08-28 | End: 2024-09-26

## 2024-08-28 RX ADMIN — AMOXICILLIN AND CLAVULANATE POTASSIUM 1 TABLET: 875; 125 TABLET, FILM COATED ORAL at 21:47

## 2024-08-28 RX ADMIN — PANTOPRAZOLE SODIUM 40 MG: 40 TABLET, DELAYED RELEASE ORAL at 21:47

## 2024-08-28 RX ADMIN — METOPROLOL TARTRATE 25 MG: 25 TABLET, FILM COATED ORAL at 08:33

## 2024-08-28 RX ADMIN — ENOXAPARIN SODIUM 40 MG: 100 INJECTION SUBCUTANEOUS at 08:34

## 2024-08-28 RX ADMIN — AMOXICILLIN AND CLAVULANATE POTASSIUM 1 TABLET: 875; 125 TABLET, FILM COATED ORAL at 08:33

## 2024-08-28 RX ADMIN — LINEZOLID 600 MG: 600 TABLET, FILM COATED ORAL at 08:33

## 2024-08-28 RX ADMIN — LINEZOLID 600 MG: 600 TABLET, FILM COATED ORAL at 21:47

## 2024-08-28 RX ADMIN — METOPROLOL TARTRATE 25 MG: 25 TABLET, FILM COATED ORAL at 21:47

## 2024-08-28 RX ADMIN — Medication 1 CAPSULE: at 16:48

## 2024-08-28 ASSESSMENT — PAIN SCALES - GENERAL
PAINLEVEL_OUTOF10: 0
PAINLEVEL_OUTOF10: 0

## 2024-08-28 ASSESSMENT — PAIN SCALES - WONG BAKER: WONGBAKER_NUMERICALRESPONSE: NO HURT

## 2024-08-28 NOTE — WOUND CARE
Coccyx wound vac dressing changed, improved granulation, edges are beginning to roll,  the fascia over the bone is exposed, but with increased granulation. Continue frequent turning and offloading. Vac seal achieved and machine working well.

## 2024-08-28 NOTE — PROGRESS NOTES
ACUTE PHYSICAL THERAPY GOALS:   (Developed with and agreed upon by patient and/or caregiver.)  (1.) Brendan Saleh  will move from supine to sit and sit to supine and scoot up and down with CONTACT GUARD ASSIST within 7 treatment day(s).      (2.) Brendan Saleh will transfer from bed to chair and chair to bed with MINIMAL ASSISTx1 using the least restrictive device within 7 treatment day(s).    (3.) Brendan Saleh will perform seated static and dynamic balance activities x 20 minutes with STAND BY ASSIST to improve safety within 7 treatment day(s).  (4.) Brendan Saleh will perform standing static and dynamic balance activities x 2 minutes with MODERATE ASSIST to improve safety and mobility within 7 treatment day(s).   (5.)Brendan Saleh will be able to transfer bed to chair/wheelchair and wheelchair/chair to bed via stand pivot transfer with MINIMAL ASSIST to increase mobility possibilities in 7 treatment days.   (6). Brendan Saleh will self propel wheelchair x 1000 ft with STAND BY ASSISTANCE to demonstrate increase in UE and LE strength and independence in mobility    PHYSICAL THERAPY: Daily Note PM   (Link to Caseload Tracking: PT Visit Days : 6  Time In/Out PT Charge Capture  Rehab Caseload Tracker  Orders    Brendan Salhe is a 49 y.o. male   PRIMARY DIAGNOSIS: Infection of  (ventriculoperitoneal) shunt (HCC)  Acute hypernatremia [E87.0]  Volume depletion [E86.9]  Acute kidney injury (HCC) [N17.9]  Acute encephalopathy [G93.40]  Septic shock (HCC) [A41.9, R65.21]  Procedure(s) (LRB):  LAPAROTOMY EXPLORATORY, PRIMARY CLOSURE SMALL BOWEL PERFORATION (N/A)  88 Days Post-Op  Inpatient: Payor: AMBETTER / Plan: AMBETTER / Product Type: *No Product type* /     ASSESSMENT:     REHAB RECOMMENDATIONS:   Recommendation to date pending progress:  Setting:  Short-term Rehab    Equipment:    To Be Determined     ASSESSMENT:  Mr. Saleh was supine on contact and agreeable to work  Training;Fall Prevention Strategies;Equipment  Education Method: Verbal  Barriers to Learning: None  Education Outcome: Continued education needed    TIME IN/OUT:  Time In: 1434  Time Out: 1514  Minutes: 40    [facility] PT

## 2024-08-28 NOTE — PROGRESS NOTES
ACUTE OCCUPATIONAL THERAPY GOALS:   (Developed with and agreed upon by patient and/or caregiver.)  1. Patient will complete lower body bathing and dressing with MODERATE ASSIST and adaptive equipment as needed. PROGRESSING  2. Patient will complete grooming ADL in unsupported sitting with CONTACT GUARD ASSIST. PROGRESSING  3. Patient will tolerate 25 minutes of OT treatment with 1-2 rest breaks to increase activity tolerance for ADLs.   4. Patient will complete functional transfers via slide board with STAND BY ASSIST and adaptive equipment as needed. PROGRESSING  5. Patient will tolerate 20 minutes BUE exercises to increase strength for safe, functional transfers.   6. Patient will complete upper body bathing and dressing with STAND BY ASSIST and adaptive equipment as needed.  7. Patient will tolerate 10 minutes unsupported sitting balance edge of bed with STAND BY ASSIST in preparation for ADL performance. PROGRESSING  8.. Patient will tolerate static standing task for >30 seconds with MAXIMAL ASSIST in preparation for ADL tasks and to decrease caregiver burden. PROGRESSING   9. Patient will complete functional mobility for short distances with minimal assistance x 2 with use of rolling walker NEW GOAL     Timeframe: 7 visits      OCCUPATIONAL THERAPY: Daily Note PM   OT Visit Days: 3   Time In/Out  OT Charge Capture  Rehab Caseload Tracker  OT Orders    Brendan Saleh is a 49 y.o. male   PRIMARY DIAGNOSIS: Infection of  (ventriculoperitoneal) shunt (HCC)  Acute hypernatremia [E87.0]  Volume depletion [E86.9]  Acute kidney injury (HCC) [N17.9]  Acute encephalopathy [G93.40]  Septic shock (HCC) [A41.9, R65.21]  Procedure(s) (LRB):  LAPAROTOMY EXPLORATORY, PRIMARY CLOSURE SMALL BOWEL PERFORATION (N/A)  88 Days Post-Op  Inpatient: Payor: AMBETTER / Plan: AMBETTER / Product Type: *No Product type* /     ASSESSMENT:     REHAB RECOMMENDATIONS:   Recommendation to date pending progress:  Setting:  Pt continues

## 2024-08-28 NOTE — PROGRESS NOTES
Hospitalist Progress Note   Admit Date:  2024  4:30 PM   Name:  Brendan Saleh   Age:  49 y.o.  Sex:  male  :  1975   MRN:  [id number]   Room:  [facility]/    Presenting/Chief Complaint: Altered Mental Status     Reason(s) for Admission: Acute hypernatremia [E87.0]  Volume depletion [E86.9]  Acute kidney injury (HCC) [N17.9]  Acute encephalopathy [G93.40]  Septic shock (HCC) [A41.9, R65.21]     Hospital Course:     Copied from prior provider HPI/summary:  Brendan Saleh is a 49 y.o. male with medical history of SDH s/p poonam hole 2024, hydrocephalus s/p  shunt, sacral decubitus stage IV, who originally presented to the ER  with chief complaint of increased confusion.     He was diagnosed with UTI with hematuria and started on course of vancomycin, Zosyn.  Urine culture finalized negative.   blood cultures with CoNS, determined contaminant.  However, he met sepsis criteria with WBC 14.6, HR 120s.  ID consulted to assist.  ECHO showed preserved EF.  Underwent sacral wound debridement on  with General Surgery, Dr. Zapien.     CT head with ongoing hydrocephalus and a  shunt.  NSGY consulted.  Patient underwent right ventricular peritoneal shunt removal on  due to non-functioning shunt.   Per Dr. Ortiz, patient needs a stepwise approach, and stated shunt replacement may be in his future though would need to wait at least 4 weeks.  Neurosurgery followed up  and said will be unable to replace  shunt while patient has active open wounds due to high risk for infection.      ID returned to the case for management of CSF infection as CSF culture and  shunt tip culture grew ESBL Klebsiella and Serratia marcescens.  Antibiotics changed to IV cipro and furthermore to oral cipro for 14 days post shunt removal EOT 24.  ID signed off on .     His hospital stay was further complicated by a bowel obstruction on May 19, noted on CT after patient vomited fecal material.  He  Summary (Last 24 hours) at 8/28/2024 1320  Last data filed at 8/28/2024 1108  Gross per 24 hour   Intake 480 ml   Output 1900 ml   Net -1420 ml         Physical Exam:     General-alert and oriented x3, no acute distress at time of exam  Eyes-conjunctive are clear pupils equal round react to light extraocular muscles intact  Heart-no gross S3 gallop, no JVD or HJR.  No loud murmurs  Lungs-clear auscultation palpation and percussion, symmetric excursion of the chest wall.  No wheezing    Abdomen-soft, nontender, no gross percussible organomegaly.  No gross distention.  Bowel sounds present all 4 quadrants    Extremities-no significant unilateral lower extremity edema  Neurologic-cranial nerves II through XII grossly intact, no focal motor deficits grossly.  No tremor  Psychiatric-no reports of suicidal or homicidal ideations.      I have personally reviewed labs and tests:  Recent Labs:  No results found for this or any previous visit (from the past 48 hour(s)).      No results for input(s): \"COVID19\" in the last 72 hours.    Current Meds:  Current Facility-Administered Medications   Medication Dose Route Frequency    loperamide (IMODIUM) capsule 2 mg  2 mg Oral 4x Daily PRN    lactobacillus (CULTURELLE) capsule 1 capsule  1 capsule Oral Daily with breakfast    linezolid (ZYVOX) tablet 600 mg  600 mg Oral 2 times per day    amoxicillin-clavulanate (AUGMENTIN) 875-125 MG per tablet 1 tablet  1 tablet Oral 2 times per day    LORazepam (ATIVAN) tablet 0.5 mg  0.5 mg Oral Q6H PRN    diphenhydrAMINE (BENADRYL) injection 25 mg  25 mg IntraVENous Q6H PRN    pantoprazole (PROTONIX) tablet 40 mg  40 mg Oral Nightly    oxyCODONE (ROXICODONE) immediate release tablet 5 mg  5 mg Oral Q4H PRN    cyclobenzaprine (FLEXERIL) tablet 10 mg  10 mg Oral TID PRN    traZODone (DESYREL) tablet 50 mg  50 mg Oral Nightly PRN    acetaminophen (TYLENOL) tablet 650 mg  650 mg Oral Q4H PRN    metoprolol tartrate (LOPRESSOR) tablet 25 mg  25 mg

## 2024-08-28 NOTE — CARE COORDINATION
Chart reviewed and patient discussed in IDT rounds this AM.  days. Patient awaiting medicaid, and social security disability letter. Per last CM note family might have received disability letter. Letter has not been given to hospital. Discharge pending medicaid AND long term care bed offer.    Bettina BARRAZA, ACM  [facility]

## 2024-08-29 LAB
ALBUMIN SERPL-MCNC: 3 G/DL (ref 3.5–5)
ALBUMIN/GLOB SERPL: 0.6 (ref 1–1.9)
ALP SERPL-CCNC: 72 U/L (ref 40–129)
ALT SERPL-CCNC: 5 U/L (ref 12–65)
ANION GAP SERPL CALC-SCNC: 11 MMOL/L (ref 9–18)
AST SERPL-CCNC: 20 U/L (ref 15–37)
BASOPHILS # BLD: 0 K/UL (ref 0–0.2)
BASOPHILS NFR BLD: 0 % (ref 0–2)
BILIRUB SERPL-MCNC: <0.2 MG/DL (ref 0–1.2)
BUN SERPL-MCNC: 19 MG/DL (ref 6–23)
CALCIUM SERPL-MCNC: 9.3 MG/DL (ref 8.8–10.2)
CHLORIDE SERPL-SCNC: 102 MMOL/L (ref 98–107)
CO2 SERPL-SCNC: 26 MMOL/L (ref 20–28)
CREAT SERPL-MCNC: 0.83 MG/DL (ref 0.8–1.3)
DIFFERENTIAL METHOD BLD: ABNORMAL
EOSINOPHIL # BLD: 0.1 K/UL (ref 0–0.8)
EOSINOPHIL NFR BLD: 3 % (ref 0.5–7.8)
ERYTHROCYTE [DISTWIDTH] IN BLOOD BY AUTOMATED COUNT: 13.1 % (ref 11.9–14.6)
GLOBULIN SER CALC-MCNC: 4.7 G/DL (ref 2.3–3.5)
GLUCOSE SERPL-MCNC: 108 MG/DL (ref 70–99)
HCT VFR BLD AUTO: 38.1 % (ref 41.1–50.3)
HGB BLD-MCNC: 11.9 G/DL (ref 13.6–17.2)
IMM GRANULOCYTES # BLD AUTO: 0 K/UL (ref 0–0.5)
IMM GRANULOCYTES NFR BLD AUTO: 0 % (ref 0–5)
LYMPHOCYTES # BLD: 1.7 K/UL (ref 0.5–4.6)
LYMPHOCYTES NFR BLD: 31 % (ref 13–44)
MCH RBC QN AUTO: 29.3 PG (ref 26.1–32.9)
MCHC RBC AUTO-ENTMCNC: 31.2 G/DL (ref 31.4–35)
MCV RBC AUTO: 93.8 FL (ref 82–102)
MONOCYTES # BLD: 0.6 K/UL (ref 0.1–1.3)
MONOCYTES NFR BLD: 10 % (ref 4–12)
NEUTS SEG # BLD: 3 K/UL (ref 1.7–8.2)
NEUTS SEG NFR BLD: 56 % (ref 43–78)
NRBC # BLD: 0 K/UL (ref 0–0.2)
PLATELET # BLD AUTO: 241 K/UL (ref 150–450)
PMV BLD AUTO: 9.6 FL (ref 9.4–12.3)
POTASSIUM SERPL-SCNC: 3.9 MMOL/L (ref 3.5–5.1)
PROT SERPL-MCNC: 7.7 G/DL (ref 6.3–8.2)
RBC # BLD AUTO: 4.06 M/UL (ref 4.23–5.6)
SODIUM SERPL-SCNC: 138 MMOL/L (ref 136–145)
WBC # BLD AUTO: 5.4 K/UL (ref 4.3–11.1)

## 2024-08-29 PROCEDURE — 85025 COMPLETE CBC W/AUTO DIFF WBC: CPT

## 2024-08-29 PROCEDURE — 6360000002 HC RX W HCPCS: Performed by: SURGERY

## 2024-08-29 PROCEDURE — 97112 NEUROMUSCULAR REEDUCATION: CPT

## 2024-08-29 PROCEDURE — 80053 COMPREHEN METABOLIC PANEL: CPT

## 2024-08-29 PROCEDURE — 36415 COLL VENOUS BLD VENIPUNCTURE: CPT

## 2024-08-29 PROCEDURE — 6370000000 HC RX 637 (ALT 250 FOR IP): Performed by: NURSE PRACTITIONER

## 2024-08-29 PROCEDURE — 6370000000 HC RX 637 (ALT 250 FOR IP): Performed by: INTERNAL MEDICINE

## 2024-08-29 PROCEDURE — 1100000000 HC RM PRIVATE

## 2024-08-29 PROCEDURE — 97110 THERAPEUTIC EXERCISES: CPT

## 2024-08-29 PROCEDURE — 6370000000 HC RX 637 (ALT 250 FOR IP): Performed by: PHYSICIAN ASSISTANT

## 2024-08-29 PROCEDURE — 97164 PT RE-EVAL EST PLAN CARE: CPT

## 2024-08-29 PROCEDURE — 97530 THERAPEUTIC ACTIVITIES: CPT

## 2024-08-29 RX ADMIN — PANTOPRAZOLE SODIUM 40 MG: 40 TABLET, DELAYED RELEASE ORAL at 21:02

## 2024-08-29 RX ADMIN — LINEZOLID 600 MG: 600 TABLET, FILM COATED ORAL at 21:02

## 2024-08-29 RX ADMIN — ENOXAPARIN SODIUM 40 MG: 100 INJECTION SUBCUTANEOUS at 10:03

## 2024-08-29 RX ADMIN — Medication 1 CAPSULE: at 10:03

## 2024-08-29 RX ADMIN — CYCLOBENZAPRINE 10 MG: 10 TABLET, FILM COATED ORAL at 17:01

## 2024-08-29 RX ADMIN — LINEZOLID 600 MG: 600 TABLET, FILM COATED ORAL at 10:03

## 2024-08-29 RX ADMIN — AMOXICILLIN AND CLAVULANATE POTASSIUM 1 TABLET: 875; 125 TABLET, FILM COATED ORAL at 21:02

## 2024-08-29 RX ADMIN — METOPROLOL TARTRATE 25 MG: 25 TABLET, FILM COATED ORAL at 21:02

## 2024-08-29 RX ADMIN — AMOXICILLIN AND CLAVULANATE POTASSIUM 1 TABLET: 875; 125 TABLET, FILM COATED ORAL at 10:03

## 2024-08-29 RX ADMIN — METOPROLOL TARTRATE 25 MG: 25 TABLET, FILM COATED ORAL at 10:03

## 2024-08-29 RX ADMIN — OXYCODONE 5 MG: 5 TABLET ORAL at 18:31

## 2024-08-29 ASSESSMENT — PAIN DESCRIPTION - LOCATION
LOCATION: LEG
LOCATION: LEG

## 2024-08-29 ASSESSMENT — PAIN SCALES - WONG BAKER: WONGBAKER_NUMERICALRESPONSE: NO HURT

## 2024-08-29 ASSESSMENT — PAIN DESCRIPTION - DESCRIPTORS
DESCRIPTORS: CRAMPING;SHOOTING
DESCRIPTORS: CRAMPING

## 2024-08-29 ASSESSMENT — PAIN SCALES - GENERAL
PAINLEVEL_OUTOF10: 0
PAINLEVEL_OUTOF10: 5
PAINLEVEL_OUTOF10: 2
PAINLEVEL_OUTOF10: 10

## 2024-08-29 ASSESSMENT — PAIN DESCRIPTION - ORIENTATION
ORIENTATION: RIGHT;LEFT
ORIENTATION: RIGHT;LEFT

## 2024-08-29 NOTE — PROGRESS NOTES
ACUTE PHYSICAL THERAPY GOALS:   (Developed with and agreed upon by patient and/or caregiver.)  Goals updated 8/29/24  (1.) Brendan Saleh  will move from supine to sit and sit to supine and scoot up and down with CONTACT GUARD ASSIST within 7 treatment day(s).      (2.) Brendan Saleh will transfer from bed to chair and chair to bed with MINIMAL ASSISTx1 using the least restrictive device within 7 treatment day(s).    (3.) Brendan Saleh will perform standing static and dynamic balance activities x 5 minutes with CONTACT GUARD ASSIST to improve safety within 7 treatment day(s).     (4.) Brendan Saleh will ambulate with CONTACT GUARD ASSIST for 100 feet with the least restrictive device within 7 treatment day(s).         PHYSICAL THERAPY Daily Note, Re-evaluation, and PM  (Link to Caseload Tracking: PT Visit Days : 1  Acknowledge Orders  Time In/Out  PT Charge Capture  Rehab Caseload Tracker    Brendan Saleh is a 49 y.o. male   PRIMARY DIAGNOSIS: Infection of  (ventriculoperitoneal) shunt (HCC)  Acute hypernatremia [E87.0]  Volume depletion [E86.9]  Acute kidney injury (HCC) [N17.9]  Acute encephalopathy [G93.40]  Septic shock (HCC) [A41.9, R65.21]  Procedure(s) (LRB):  LAPAROTOMY EXPLORATORY, PRIMARY CLOSURE SMALL BOWEL PERFORATION (N/A)  89 Days Post-Op  Reason for Referral: Generalized Muscle Weakness (M62.81)  Difficulty in walking, Not elsewhere classified (R26.2)  Inpatient: Payor: AMBETTER / Plan: AMBETTER / Product Type: *No Product type* /     ASSESSMENT:     REHAB RECOMMENDATIONS:   Recommendation to date pending progress:  Setting:  Short-term Rehab    Equipment:    To Be Determined     ASSESSMENT:  Mr. Saleh presents supine in bed, agreeable to therapy. Pt able to perform sup to sit with Min A secondary to decreased strength. Pt then able to perform STS to RW with Min A and then able to amb 50' with RW with Min A x2 for balance and safety, chair followed behind for safety.

## 2024-08-29 NOTE — PROGRESS NOTES
ACUTE OCCUPATIONAL THERAPY GOALS:   (Developed with and agreed upon by patient and/or caregiver.)  1. Patient will complete lower body bathing and dressing with MODERATE ASSIST and adaptive equipment as needed. PROGRESSING  2. Patient will complete grooming ADL in unsupported sitting with CONTACT GUARD ASSIST. PROGRESSING  3. Patient will tolerate 25 minutes of OT treatment with 1-2 rest breaks to increase activity tolerance for ADLs.   4. Patient will complete functional transfers via slide board with STAND BY ASSIST and adaptive equipment as needed. PROGRESSING  5. Patient will tolerate 20 minutes BUE exercises to increase strength for safe, functional transfers.   6. Patient will complete upper body bathing and dressing with STAND BY ASSIST and adaptive equipment as needed.  7. Patient will tolerate 10 minutes unsupported sitting balance edge of bed with STAND BY ASSIST in preparation for ADL performance. PROGRESSING  8.. Patient will tolerate static standing task for >30 seconds with MAXIMAL ASSIST in preparation for ADL tasks and to decrease caregiver burden. PROGRESSING   9. Patient will complete functional mobility for short distances with minimal assistance x 2 with use of rolling walker NEW GOAL     Timeframe: 7 visits      OCCUPATIONAL THERAPY: Daily Note PM   OT Visit Days: 5   Time In/Out  OT Charge Capture  Rehab Caseload Tracker  OT Orders    Brendan Saleh is a 49 y.o. male   PRIMARY DIAGNOSIS: Infection of  (ventriculoperitoneal) shunt (HCC)  Acute hypernatremia [E87.0]  Volume depletion [E86.9]  Acute kidney injury (HCC) [N17.9]  Acute encephalopathy [G93.40]  Septic shock (HCC) [A41.9, R65.21]  Procedure(s) (LRB):  LAPAROTOMY EXPLORATORY, PRIMARY CLOSURE SMALL BOWEL PERFORATION (N/A)  89 Days Post-Op  Inpatient: Payor: AMBETTER / Plan: AMBETTER / Product Type: *No Product type* /     ASSESSMENT:     REHAB RECOMMENDATIONS:   Recommendation to date pending progress:  Setting:  Pt continues

## 2024-08-29 NOTE — PROGRESS NOTES
Hospitalist Progress Note   Admit Date:  2024  4:30 PM   Name:  Brendan Saleh   Age:  49 y.o.  Sex:  male  :  1975   MRN:  [id number]   Room:  [facility]/    Presenting/Chief Complaint: Altered Mental Status     Reason(s) for Admission: Acute hypernatremia [E87.0]  Volume depletion [E86.9]  Acute kidney injury (HCC) [N17.9]  Acute encephalopathy [G93.40]  Septic shock (HCC) [A41.9, R65.21]     Hospital Course:     Copied from prior provider HPI/summary:  Brendan Saleh is a 49 y.o. male with medical history of SDH s/p poonam hole 2024, hydrocephalus s/p  shunt, sacral decubitus stage IV, who originally presented to the ER  with chief complaint of increased confusion.     He was diagnosed with UTI with hematuria and started on course of vancomycin, Zosyn.  Urine culture finalized negative.   blood cultures with CoNS, determined contaminant.  However, he met sepsis criteria with WBC 14.6, HR 120s.  ID consulted to assist.  ECHO showed preserved EF.  Underwent sacral wound debridement on  with General Surgery, Dr. Zapien.     CT head with ongoing hydrocephalus and a  shunt.  NSGY consulted.  Patient underwent right ventricular peritoneal shunt removal on  due to non-functioning shunt.   Per Dr. Ortiz, patient needs a stepwise approach, and stated shunt replacement may be in his future though would need to wait at least 4 weeks.  Neurosurgery followed up  and said will be unable to replace  shunt while patient has active open wounds due to high risk for infection.      ID returned to the case for management of CSF infection as CSF culture and  shunt tip culture grew ESBL Klebsiella and Serratia marcescens.  Antibiotics changed to IV cipro and furthermore to oral cipro for 14 days post shunt removal EOT 24.  ID signed off on .     His hospital stay was further complicated by a bowel obstruction on May 19, noted on CT after patient vomited fecal material.  He

## 2024-08-30 PROCEDURE — 6360000002 HC RX W HCPCS: Performed by: SURGERY

## 2024-08-30 PROCEDURE — 6370000000 HC RX 637 (ALT 250 FOR IP): Performed by: PHYSICIAN ASSISTANT

## 2024-08-30 PROCEDURE — 6370000000 HC RX 637 (ALT 250 FOR IP): Performed by: NURSE PRACTITIONER

## 2024-08-30 PROCEDURE — 6370000000 HC RX 637 (ALT 250 FOR IP): Performed by: INTERNAL MEDICINE

## 2024-08-30 PROCEDURE — 97605 NEG PRS WND THER DME<=50SQCM: CPT

## 2024-08-30 PROCEDURE — 97530 THERAPEUTIC ACTIVITIES: CPT

## 2024-08-30 PROCEDURE — 1100000000 HC RM PRIVATE

## 2024-08-30 RX ORDER — LOPERAMIDE HYDROCHLORIDE 2 MG/1
2 CAPSULE ORAL 4 TIMES DAILY PRN
Status: DISPENSED | OUTPATIENT
Start: 2024-08-30 | End: 2024-09-01

## 2024-08-30 RX ADMIN — LINEZOLID 600 MG: 600 TABLET, FILM COATED ORAL at 21:47

## 2024-08-30 RX ADMIN — Medication 1 CAPSULE: at 09:25

## 2024-08-30 RX ADMIN — AMOXICILLIN AND CLAVULANATE POTASSIUM 1 TABLET: 875; 125 TABLET, FILM COATED ORAL at 09:26

## 2024-08-30 RX ADMIN — LINEZOLID 600 MG: 600 TABLET, FILM COATED ORAL at 09:26

## 2024-08-30 RX ADMIN — PANTOPRAZOLE SODIUM 40 MG: 40 TABLET, DELAYED RELEASE ORAL at 21:47

## 2024-08-30 RX ADMIN — METOPROLOL TARTRATE 25 MG: 25 TABLET, FILM COATED ORAL at 21:48

## 2024-08-30 RX ADMIN — AMOXICILLIN AND CLAVULANATE POTASSIUM 1 TABLET: 875; 125 TABLET, FILM COATED ORAL at 21:47

## 2024-08-30 RX ADMIN — ENOXAPARIN SODIUM 40 MG: 100 INJECTION SUBCUTANEOUS at 09:26

## 2024-08-30 RX ADMIN — METOPROLOL TARTRATE 25 MG: 25 TABLET, FILM COATED ORAL at 09:25

## 2024-08-30 RX ADMIN — LOPERAMIDE HYDROCHLORIDE 2 MG: 2 CAPSULE ORAL at 21:48

## 2024-08-30 NOTE — PROGRESS NOTES
0930: Pt bathed and turned on left side, reminded PCT to assist/remind/perform turning and offloading of patient every 2 hours and PRN, see order 05/15/24.    Wound Care RN at bedside, wound vac dressing completed by RN.     1435: Pt up in hallway walking with phy. Therapy, pt walked to station and back.     1844: All needs met, VSS, NAD. Hourly and PRN rounds completed. Bed in low and locked position,bed alarm on, call light and personal items within reach. Wound vac intact and functioning, 125mmHg.   BSSR given to night nurse.

## 2024-08-30 NOTE — PROGRESS NOTES
came to assist. Min to mod a and chair follow for additional amb. Pt walks with a narrow AMBER, ataxic gt and is impulsive. He tends to sit before chair is secured behind him with out warning. Repeated cues to push up from chair.  Progress toward goals demonstrated by amb a much greater distance today.   Pt still functioning below their baseline with deficits in strength, coordination and endurance and will benefit from physical therapy.  .     SUBJECTIVE:   Mr. Saleh states \"Yesterday I got to go outside\".     Social/Functional Lives With: Alone  Type of Home: Trailer  Home Layout: One level  Home Access: Stairs to enter with rails  Entrance Stairs - Number of Steps: 5  Entrance Stairs - Rails: Both  Home Equipment: Cane  Additional Comments: reports that he was living on [place] acres and was able to take care of the property  OBJECTIVE:     PAIN: VITALS / O2: PRECAUTION / LINES / DRAINS:   Pre Treatment:  Wound site.         Post Treatment: Did not rate. Wound site.  Vitals        Oxygen    Wound Vac    RESTRICTIONS/PRECAUTIONS:  Restrictions/Precautions  Restrictions/Precautions: Fall Risk, Bed Alarm, Isolation  Restrictions/Precautions: Fall Risk, Bed Alarm, Isolation     MOBILITY:   I Mod I S SBA CGA Min Mod Max Total  NT x2 Comments:   Bed Mobility    Rolling [] [] [] [x] [] [] [] [] [] [] []    Supine to Sit [] [] [] [x] [] [] [] [] [] [] []    Scooting [] [] [] [x] [] [] [] [] [] [] []    Sit to Supine [] [] [] [x] [] [] [] [] [] [] []    Transfers    Sit to Stand [] [] [] [] [] [x] [x] [] [] [] [x]    Bed to Chair [] [] [] [] [] [] [] [] [] [x] []    Stand to Sit [] [] [] [] [] [] [x] [] [] [] []     [] [] [] [] [] [] [] [] [] [] []    I=Independent, Mod I=Modified Independent, S=Supervision, SBA=Standby Assistance, CGA=Contact Guard Assistance,   Min=Minimal Assistance, Mod=Moderate Assistance, Max=Maximal Assistance, Total=Total Assistance, NT=Not Tested    BALANCE: Good Fair+ Fair Fair- Poor NT Comments

## 2024-08-30 NOTE — PROGRESS NOTES
Hospitalist Progress Note   Admit Date:  2024  4:30 PM   Name:  Brendan Saleh   Age:  49 y.o.  Sex:  male  :  1975   MRN:  [id number]   Room:  [facility]/    Presenting/Chief Complaint: Altered Mental Status     Reason(s) for Admission: Acute hypernatremia [E87.0]  Volume depletion [E86.9]  Acute kidney injury (HCC) [N17.9]  Acute encephalopathy [G93.40]  Septic shock (HCC) [A41.9, R65.21]     Hospital Course:     Copied from prior provider HPI/summary:  Brendan Saleh is a 49 y.o. male with medical history of SDH s/p poonam hole 2024, hydrocephalus s/p  shunt, sacral decubitus stage IV, who originally presented to the ER  with chief complaint of increased confusion.     He was diagnosed with UTI with hematuria and started on course of vancomycin, Zosyn.  Urine culture finalized negative.   blood cultures with CoNS, determined contaminant.  However, he met sepsis criteria with WBC 14.6, HR 120s.  ID consulted to assist.  ECHO showed preserved EF.  Underwent sacral wound debridement on  with General Surgery, Dr. Zapien.     CT head with ongoing hydrocephalus and a  shunt.  NSGY consulted.  Patient underwent right ventricular peritoneal shunt removal on  due to non-functioning shunt.   Per Dr. Ortiz, patient needs a stepwise approach, and stated shunt replacement may be in his future though would need to wait at least 4 weeks.  Neurosurgery followed up  and said will be unable to replace  shunt while patient has active open wounds due to high risk for infection.      ID returned to the case for management of CSF infection as CSF culture and  shunt tip culture grew ESBL Klebsiella and Serratia marcescens.  Antibiotics changed to IV cipro and furthermore to oral cipro for 14 days post shunt removal EOT 24.  ID signed off on .     His hospital stay was further complicated by a bowel obstruction on May 19, noted on CT after patient vomited fecal material.  He

## 2024-08-30 NOTE — PLAN OF CARE
Problem: Discharge Planning  Goal: Discharge to home or other facility with appropriate resources  Outcome: Progressing     Problem: Safety - Adult  Goal: Free from fall injury  Outcome: Progressing     Problem: Neurosensory - Adult  Goal: Achieves stable or improved neurological status  Outcome: Progressing     Problem: Musculoskeletal - Adult  Goal: Return mobility to safest level of function  Outcome: Progressing     Problem: Skin/Tissue Integrity - Adult  Goal: Skin integrity remains intact  Outcome: Progressing  Goal: Incisions, wounds, or drain sites healing without S/S of infection  Outcome: Progressing     Problem: Cardiovascular - Adult  Goal: Maintains optimal cardiac output and hemodynamic stability  Outcome: Progressing

## 2024-08-30 NOTE — PLAN OF CARE
Problem: Discharge Planning  Goal: Discharge to home or other facility with appropriate resources  8/30/2024 0719 by Cira Jackson RN  Outcome: Progressing  8/29/2024 2343 by Deshawn Marino RN  Outcome: Progressing     Problem: Safety - Adult  Goal: Free from fall injury  8/30/2024 0719 by Cira Jackson RN  Outcome: Progressing  8/29/2024 2343 by Deshawn Marino RN  Outcome: Progressing     Problem: Neurosensory - Adult  Goal: Achieves stable or improved neurological status  8/30/2024 0719 by Cira Jackson RN  Outcome: Progressing  8/29/2024 2343 by Deshawn Marino RN  Outcome: Progressing     Problem: Musculoskeletal - Adult  Goal: Return mobility to safest level of function  8/30/2024 0719 by Cira Jackson RN  Outcome: Progressing  8/29/2024 2343 by Deshawn Marino RN  Outcome: Progressing     Problem: Skin/Tissue Integrity  Goal: Absence of new skin breakdown  Description: 1.  Monitor for areas of redness and/or skin breakdown  2.  Assess vascular access sites hourly  3.  Every 4-6 hours minimum:  Change oxygen saturation probe site  4.  Every 4-6 hours:  If on nasal continuous positive airway pressure, respiratory therapy assess nares and determine need for appliance change or resting period.  Outcome: Progressing     Problem: Pain  Goal: Verbalizes/displays adequate comfort level or baseline comfort level  Outcome: Progressing     Problem: Skin/Tissue Integrity - Adult  Goal: Skin integrity remains intact  8/30/2024 0719 by Cira Jackson RN  Outcome: Progressing  8/29/2024 2343 by Deshawn Marino RN  Outcome: Progressing  Goal: Incisions, wounds, or drain sites healing without S/S of infection  8/30/2024 0719 by Cira Jackson RN  Outcome: Progressing  8/29/2024 2343 by Deshawn Marino RN  Outcome: Progressing     Problem: Gastrointestinal - Adult  Goal: Maintains adequate nutritional intake  Outcome: Progressing     Problem: Genitourinary - Adult  Goal: Absence of urinary retention  Outcome:

## 2024-08-30 NOTE — CARE COORDINATION
Chart reviewed and patient discussed in IDT rounds this AM.  days. Patient pending medicaid and pending long term care placement.     Bettina BENAVIDEZ, ACM  [facility]

## 2024-08-30 NOTE — WOUND CARE
Wound vac dressing change to sacral wound. Wound has increased granulation, pink and viable.  Duoderm used at [facility] to help with seal.  Will monitor.

## 2024-08-31 PROCEDURE — 6370000000 HC RX 637 (ALT 250 FOR IP): Performed by: PHYSICIAN ASSISTANT

## 2024-08-31 PROCEDURE — 1100000000 HC RM PRIVATE

## 2024-08-31 PROCEDURE — 6360000002 HC RX W HCPCS: Performed by: SURGERY

## 2024-08-31 PROCEDURE — 6370000000 HC RX 637 (ALT 250 FOR IP): Performed by: INTERNAL MEDICINE

## 2024-08-31 PROCEDURE — 6370000000 HC RX 637 (ALT 250 FOR IP): Performed by: NURSE PRACTITIONER

## 2024-08-31 RX ADMIN — AMOXICILLIN AND CLAVULANATE POTASSIUM 1 TABLET: 875; 125 TABLET, FILM COATED ORAL at 09:17

## 2024-08-31 RX ADMIN — AMOXICILLIN AND CLAVULANATE POTASSIUM 1 TABLET: 875; 125 TABLET, FILM COATED ORAL at 21:14

## 2024-08-31 RX ADMIN — LINEZOLID 600 MG: 600 TABLET, FILM COATED ORAL at 21:15

## 2024-08-31 RX ADMIN — ENOXAPARIN SODIUM 40 MG: 100 INJECTION SUBCUTANEOUS at 09:22

## 2024-08-31 RX ADMIN — Medication 1 CAPSULE: at 09:17

## 2024-08-31 RX ADMIN — METOPROLOL TARTRATE 25 MG: 25 TABLET, FILM COATED ORAL at 21:15

## 2024-08-31 RX ADMIN — PANTOPRAZOLE SODIUM 40 MG: 40 TABLET, DELAYED RELEASE ORAL at 21:14

## 2024-08-31 RX ADMIN — LINEZOLID 600 MG: 600 TABLET, FILM COATED ORAL at 09:18

## 2024-08-31 NOTE — PROGRESS NOTES
Pt. Cleaned from large loose brown stool. Malewick\tubing changed. Pt. Declined to turn to either side. Bed low and locked. Call light within reach.

## 2024-08-31 NOTE — PROGRESS NOTES
Hospitalist Progress Note   Admit Date:  2024  4:30 PM   Name:  Brendan Saleh   Age:  49 y.o.  Sex:  male  :  1975   MRN:  [id number]   Room:  [facility]/    Presenting/Chief Complaint: Altered Mental Status     Reason(s) for Admission: Acute hypernatremia [E87.0]  Volume depletion [E86.9]  Acute kidney injury (HCC) [N17.9]  Acute encephalopathy [G93.40]  Septic shock (HCC) [A41.9, R65.21]     Hospital Course:   Brendan Saleh is a 49 y.o. male with medical history of SDH s/p poonam hole 2024, hydrocephalus s/p  shunt, sacral decubitus stage IV, who originally presented to the ER  with chief complaint of increased confusion.     He was diagnosed with UTI with hematuria and started on course of vancomycin, Zosyn.  Urine culture finalized negative.   blood cultures with CoNS, determined contaminant.  However, he met sepsis criteria with WBC 14.6, HR 120s.  ID consulted to assist.  ECHO showed preserved EF.  Underwent sacral wound debridement on  with General Surgery, Dr. Zapien.     CT head with ongoing hydrocephalus and a  shunt.  NSGY consulted.  Patient underwent right ventricular peritoneal shunt removal on  due to non-functioning shunt.   Per Dr. Ortiz, patient needs a stepwise approach, and stated shunt replacement may be in his future though would need to wait at least 4 weeks.  Neurosurgery followed up  and said will be unable to replace  shunt while patient has active open wounds due to high risk for infection.      ID returned to the case for management of CSF infection as CSF culture and  shunt tip culture grew ESBL Klebsiella and Serratia marcescens.  Antibiotics changed to IV cipro and furthermore to oral cipro for 14 days post shunt removal EOT 24.  ID signed off on .     His hospital stay was further complicated by a bowel obstruction on May 19, noted on CT after patient vomited fecal material.  He underwent hemicolectomy and primary anastomosis on  complaints at time of exam and no overnight events.  Patient resting comfortably in bed.  Patient does not note increased bowel movements or diarrhea.  Continuing to wait for Medicaid approval and placement.  Wound care following and managing wound VAC.  Physical therapy following patient.      Assessment & Plan:     Principal Problem:    Sacral decubitus ulcer, stage IV (HCC)  Plan:   -Infectious disease following  -Wound care consulted and managing wound VAC, wound VAC changed on 8/30  -Patient on Augmentin and Zyvox (8/24-8/31)  -Offload weight as able  -Intermittent diarrhea which has improved, will monitor closely and order C. 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  Communicating hydrocephalus  Plan:   -As above  -Continuing to monitor for neurocomplaints or seizure-like activity  - shunt removed in May 2024 and not replaced at this time    Normocytic anemia  Plan:   -Hemoglobin stable, appears to be chronic  -Periodic monitoring  -No signs of bleeding      Sepsis following intra-abdominal surgery (HCC)    Colon obstruction (HCC)    Colonic mass    Adenocarcinoma of colon (HCC)  Plan:   -Stable at this time and tolerating regular diet  -Follow-up with oncology as outpatient for treatment options  -Status post ex lap and closure of small bowel perforation on 6/1/2024  -Status post right hemicolectomy and primary anastomosis on 5/19/2024  -Patient awaiting Medicaid and placement      Severe protein-calorie malnutrition (HCC)  Plan:   -Encourage p.o. intake  -Tolerating oral diet, double portions with nutrition supplementation      ESBL (extended spectrum

## 2024-08-31 NOTE — PROGRESS NOTES
Pt denies needs at this time, NAD, pt turned every 2 hours and repositioned for comfort PRN. Hourly and PRN rounds completed. Bed in low and locked position, call light and personal items within reach. Will give bedside report to oncoming nurse.

## 2024-08-31 NOTE — PLAN OF CARE
Problem: Discharge Planning  Goal: Discharge to home or other facility with appropriate resources  8/31/2024 0821 by Cira Jackson RN  Outcome: Progressing  8/31/2024 0041 by Emma Hu RN  Outcome: Progressing     Problem: Safety - Adult  Goal: Free from fall injury  8/31/2024 0821 by Cira Jackson RN  Outcome: Progressing  8/31/2024 0041 by Emma Hu RN  Outcome: Progressing     Problem: Neurosensory - Adult  Goal: Achieves stable or improved neurological status  8/31/2024 0821 by Cira Jackson RN  Outcome: Progressing  8/31/2024 0041 by Emma Hu RN  Outcome: Progressing     Problem: Musculoskeletal - Adult  Goal: Return mobility to safest level of function  8/31/2024 0821 by Cira Jackson RN  Outcome: Progressing  8/31/2024 0041 by Emma Hu RN  Outcome: Progressing     Problem: Skin/Tissue Integrity  Goal: Absence of new skin breakdown  Description: 1.  Monitor for areas of redness and/or skin breakdown  2.  Assess vascular access sites hourly  3.  Every 4-6 hours minimum:  Change oxygen saturation probe site  4.  Every 4-6 hours:  If on nasal continuous positive airway pressure, respiratory therapy assess nares and determine need for appliance change or resting period.  8/31/2024 0821 by Cira Jackson RN  Outcome: Progressing  8/31/2024 0041 by Emma Hu RN  Outcome: Progressing     Problem: Pain  Goal: Verbalizes/displays adequate comfort level or baseline comfort level  8/31/2024 0821 by Cira Jackson RN  Outcome: Progressing  8/31/2024 0041 by Emma Hu RN  Outcome: Progressing     Problem: Skin/Tissue Integrity - Adult  Goal: Skin integrity remains intact  8/31/2024 0821 by Cira Jackson RN  Outcome: Progressing  8/31/2024 0041 by Emma Hu RN  Outcome: Progressing  Goal: Incisions, wounds, or drain sites healing without S/S of infection  8/31/2024 0821 by iCra Jackson RN  Outcome: Progressing  8/31/2024 0041 by Emma Hu RN  Outcome:

## 2024-09-01 PROCEDURE — 1100000000 HC RM PRIVATE

## 2024-09-01 PROCEDURE — 6370000000 HC RX 637 (ALT 250 FOR IP): Performed by: INTERNAL MEDICINE

## 2024-09-01 PROCEDURE — 6370000000 HC RX 637 (ALT 250 FOR IP): Performed by: NURSE PRACTITIONER

## 2024-09-01 PROCEDURE — 6370000000 HC RX 637 (ALT 250 FOR IP): Performed by: PHYSICIAN ASSISTANT

## 2024-09-01 PROCEDURE — 6360000002 HC RX W HCPCS: Performed by: SURGERY

## 2024-09-01 RX ADMIN — AMOXICILLIN AND CLAVULANATE POTASSIUM 1 TABLET: 875; 125 TABLET, FILM COATED ORAL at 09:09

## 2024-09-01 RX ADMIN — PANTOPRAZOLE SODIUM 40 MG: 40 TABLET, DELAYED RELEASE ORAL at 22:49

## 2024-09-01 RX ADMIN — LINEZOLID 600 MG: 600 TABLET, FILM COATED ORAL at 09:09

## 2024-09-01 RX ADMIN — METOPROLOL TARTRATE 25 MG: 25 TABLET, FILM COATED ORAL at 22:49

## 2024-09-01 RX ADMIN — ENOXAPARIN SODIUM 40 MG: 100 INJECTION SUBCUTANEOUS at 09:10

## 2024-09-01 RX ADMIN — METOPROLOL TARTRATE 25 MG: 25 TABLET, FILM COATED ORAL at 09:10

## 2024-09-01 RX ADMIN — Medication 1 CAPSULE: at 09:09

## 2024-09-01 NOTE — PROGRESS NOTES
extremities  -PT last evaluated on 8/30       Diarrhea  Plan:   -To have improved  -Will monitor closely and if diarrhea worsens will get C. difficile labs      Anticipated Discharge Arrangements:   Skilled Nursing Facility    PT/OT evals ordered?  Therapy evals ordered  Diet:  ADULT DIET; Easy to Chew; Low Fiber; Double Protein Portions  ADULT ORAL NUTRITION SUPPLEMENT; Breakfast, Lunch, Dinner; Standard High Calorie/High Protein Oral Supplement  VTE prophylaxis: Lovenox  Code status: DNR      Non-peripheral Lines and Tubes (if present):      Negative Pressure Wound Therapy Sacrum Mid (Active)       External Urinary Catheter (Active)       External Urinary Catheter (Active)        Telemetry (if present):  Cardiac/Telemetry Monitor On: No        Hospital Problems:  Principal Problem:    Infection of  (ventriculoperitoneal) shunt (HCC)  Active Problems:    Encephalitis    S/P  shunt    Communicating hydrocephalus (HCC)    Anemia    Sepsis following intra-abdominal surgery (HCC)    Wound disruption    Sinus tachycardia    NPH (normal pressure hydrocephalus) (HCC)    Shunt malfunction    Colon obstruction (HCC)    Colonic mass    Adenocarcinoma of colon (HCC)    Severe protein-calorie malnutrition (HCC)    Sacral decubitus ulcer, stage IV (HCC)    ESBL (extended spectrum beta-lactamase) producing bacteria infection    Acute blood loss as cause of postoperative anemia    Bowel perforation (HCC)    Critical illness myopathy    Cachexia (HCC)    Unintentional weight loss    Adult failure to thrive    Encounter for palliative care    Hypomagnesemia    Diarrhea  Resolved Problems:    Hypernatremia    MIRNA (acute kidney injury) (HCC)    UTI (urinary tract infection)    Septic shock (HCC)      Objective:   Patient Vitals for the past 24 hrs:   Temp Pulse Resp BP SpO2   09/01/24 0745 98.4 °F (36.9 °C) 88 16 111/78 98 %   08/31/24 1939 98.6 °F (37 °C) 90 16 114/83 98 %       Oxygen Therapy  SpO2: 98 %  Pulse Oximetry Type:  10 mg Oral TID PRN    traZODone (DESYREL) tablet 50 mg  50 mg Oral Nightly PRN    acetaminophen (TYLENOL) tablet 650 mg  650 mg Oral Q4H PRN    metoprolol tartrate (LOPRESSOR) tablet 25 mg  25 mg Oral BID    0.9 % sodium chloride infusion  25 mL IntraVENous PRN    sodium chloride flush 0.9 % injection 5-40 mL  5-40 mL IntraVENous PRN    0.9 % sodium chloride infusion   IntraVENous PRN    glucose chewable tablet 16 g  4 tablet Oral PRN    dextrose bolus 10% 125 mL  125 mL IntraVENous PRN    Or    dextrose bolus 10% 250 mL  250 mL IntraVENous PRN    Glucagon Emergency KIT 1 mg  1 mg SubCUTAneous PRN    dextrose 10 % infusion   IntraVENous Continuous PRN    medicated lip ointment (BLISTEX)   Topical PRN    ondansetron (ZOFRAN-ODT) disintegrating tablet 4 mg  4 mg Oral Q8H PRN    Or    ondansetron (ZOFRAN) injection 4 mg  4 mg IntraVENous Q6H PRN    enoxaparin (LOVENOX) injection 40 mg  40 mg SubCUTAneous Daily    calcium carbonate (TUMS) chewable tablet 500 mg  500 mg Oral TID PRN       Signed:  Osbaldo Burnham DO    Part of this note may have been written by using a voice dictation software.  The note has been proof read but may still contain some grammatical/other typographical errors.

## 2024-09-01 NOTE — PROGRESS NOTES
Pt had no bowel movements this shift.  Slept well overnight.  Hourly rounds performed this shift.  VS stable.  Pt medicated per MAR.  All known needs met at this time.  Bed low and locked, call light in reach.

## 2024-09-01 NOTE — PROGRESS NOTES
Hourly rounds completed.  Patient resting quietly in bed.  Patient had one loose bowel movement this shift.  Wound vac dressing soiled, changed wound vac dressing.  All knows needs met at this time.  Bed low and locked, call light and belongings within reach.

## 2024-09-02 PROCEDURE — 6360000002 HC RX W HCPCS: Performed by: SURGERY

## 2024-09-02 PROCEDURE — 6370000000 HC RX 637 (ALT 250 FOR IP): Performed by: INTERNAL MEDICINE

## 2024-09-02 PROCEDURE — 1100000000 HC RM PRIVATE

## 2024-09-02 PROCEDURE — 97168 OT RE-EVAL EST PLAN CARE: CPT

## 2024-09-02 PROCEDURE — 97530 THERAPEUTIC ACTIVITIES: CPT

## 2024-09-02 PROCEDURE — 6370000000 HC RX 637 (ALT 250 FOR IP): Performed by: PHYSICIAN ASSISTANT

## 2024-09-02 PROCEDURE — 97535 SELF CARE MNGMENT TRAINING: CPT

## 2024-09-02 PROCEDURE — 6370000000 HC RX 637 (ALT 250 FOR IP): Performed by: NURSE PRACTITIONER

## 2024-09-02 RX ADMIN — ENOXAPARIN SODIUM 40 MG: 100 INJECTION SUBCUTANEOUS at 08:42

## 2024-09-02 RX ADMIN — Medication 1 CAPSULE: at 08:42

## 2024-09-02 RX ADMIN — PANTOPRAZOLE SODIUM 40 MG: 40 TABLET, DELAYED RELEASE ORAL at 20:49

## 2024-09-02 RX ADMIN — METOPROLOL TARTRATE 25 MG: 25 TABLET, FILM COATED ORAL at 20:49

## 2024-09-02 RX ADMIN — METOPROLOL TARTRATE 25 MG: 25 TABLET, FILM COATED ORAL at 08:42

## 2024-09-02 NOTE — PROGRESS NOTES
Pt is in bed without complaints. Hourly rounds completed and all needs met. Wound vac seal contained and reinforced this shift. Bed is low, locked, and call light is in reach. Pt encouraged to call for assistance.

## 2024-09-02 NOTE — PROGRESS NOTES
Hospitalist Progress Note   Admit Date:  2024  4:30 PM   Name:  Brendan Saleh   Age:  49 y.o.  Sex:  male  :  1975   MRN:  [id number]   Room:  [facility]/    Presenting/Chief Complaint: Altered Mental Status     Reason(s) for Admission: Acute hypernatremia [E87.0]  Volume depletion [E86.9]  Acute kidney injury (HCC) [N17.9]  Acute encephalopathy [G93.40]  Septic shock (HCC) [A41.9, R65.21]     Hospital Course:   Brendan Saleh is a 49 y.o. male with medical history of SDH s/p poonam hole 2024, hydrocephalus s/p  shunt, sacral decubitus stage IV, who originally presented to the ER  with chief complaint of increased confusion.     He was diagnosed with UTI with hematuria and started on course of vancomycin, Zosyn.  Urine culture finalized negative.   blood cultures with CoNS, determined contaminant.  However, he met sepsis criteria with WBC 14.6, HR 120s.  ID consulted to assist.  ECHO showed preserved EF.  Underwent sacral wound debridement on  with General Surgery, Dr. Zapien.     CT head with ongoing hydrocephalus and a  shunt.  NSGY consulted.  Patient underwent right ventricular peritoneal shunt removal on  due to non-functioning shunt.   Per Dr. Ortiz, patient needs a stepwise approach, and stated shunt replacement may be in his future though would need to wait at least 4 weeks.  Neurosurgery followed up  and said will be unable to replace  shunt while patient has active open wounds due to high risk for infection.      ID returned to the case for management of CSF infection as CSF culture and  shunt tip culture grew ESBL Klebsiella and Serratia marcescens.  Antibiotics changed to IV cipro and furthermore to oral cipro for 14 days post shunt removal EOT 24.  ID signed off on .     His hospital stay was further complicated by a bowel obstruction on May 19, noted on CT after patient vomited fecal material.  He underwent hemicolectomy and primary anastomosis on

## 2024-09-02 NOTE — PROGRESS NOTES
ACUTE OCCUPATIONAL THERAPY GOALS: GOALS REVIEWED AND UPDATED AT RE-EVALUATION ON 9/2/24  (Developed with and agreed upon by patient and/or caregiver.)  1. Patient will complete lower body bathing and dressing with MINIMAL ASSIST and adaptive equipment as needed. UPGRADED  2. Patient will complete grooming ADL in standing with CONTACT GUARD ASSIST. UPGRADED  3. Patient will tolerate 25 minutes of OT treatment with 1-2 rest breaks to increase activity tolerance for ADLs. PROGRESSING  4. Patient will complete functional transfers with STAND BY ASSIST and adaptive equipment as needed. UPGRADED  5. Patient will tolerate 20 minutes BUE exercises to increase strength for safe, functional transfers. PROGRESSING  6. Patient will complete upper body bathing and dressing with SET UP ASSIST and adaptive equipment as needed. UGRADED  7. Patient will tolerate static standing task for >7 minutes with STAND BY ASSIST in preparation for ADL tasks.  8. Patient will complete functional mobility of household distances with STAND BY ASSIST and adaptive equipment as needed. UPGRADED    Timeframe: 7 visits    OCCUPATIONAL THERAPY Daily Note, Re-evaluation, and PM       OT Visit Days: 1  Acknowledge Orders  Time  OT Charge Capture  Rehab Caseload Tracker      Brendan Saleh is a 49 y.o. male   PRIMARY DIAGNOSIS: Infection of  (ventriculoperitoneal) shunt (HCC)  Acute hypernatremia [E87.0]  Volume depletion [E86.9]  Acute kidney injury (HCC) [N17.9]  Acute encephalopathy [G93.40]  Septic shock (HCC) [A41.9, R65.21]  Procedure(s) (LRB):  LAPAROTOMY EXPLORATORY, PRIMARY CLOSURE SMALL BOWEL PERFORATION (N/A)  93 Days Post-Op  Reason for Referral: Generalized Muscle Weakness (M62.81)  Difficulty in walking, Not elsewhere classified (R26.2)  Other abnormalities of gait and mobility (R26.89)  Inpatient: Payor: [employer]TER / Plan: AMBETTER / Product Type: *No Product type* /     ASSESSMENT:     REHAB RECOMMENDATIONS:   Recommendation to  toilet, bedpan, or urinal)?: A Lot  How much help is needed for putting on and taking off regular upper body clothing?: A Little  How much help is needed for taking care of personal grooming?: None  How much help for eating meals?: None  AM-PAC Inpatient Daily Activity Raw Score: 17  AM-PAC Inpatient ADL T-Scale Score : 37.26  ADL Inpatient CMS 0-100% Score: 50.11  ADL Inpatient CMS G-Code Modifier : CK    Previous AM-PAC 5/20/24: 10  Previous AM-PAC 6/10/24: 13  Previous AM-PAC 7/18/24: 15  Previous AM-PAC 8/19/24: 15    SUBJECTIVE:     Mr. Saleh states, \"thank you guys\"     Social/Functional Lives With: Alone  Type of Home: Trailer  Home Layout: One level  Home Access: Stairs to enter with rails  Entrance Stairs - Number of Steps: 5  Entrance Stairs - Rails: Both  Home Equipment: Cane  Additional Comments: reports that he was living on [place] acres and was able to take care of the property    OBJECTIVE:     LINES / DRAINS / AIRWAY: External Catheter and Wound Vac    RESTRICTIONS/PRECAUTIONS:  Restrictions/Precautions: Fall Risk, Bed Alarm, Isolation    PAIN: VITALS / O2:   Pre Treatment:    Did not c/o pain      Post Treatment: no c/o pain, resting comfortably in bedside chair       Vitals          Oxygen            GROSS EVALUATION: INTACT IMPAIRED   (See Comments)   UE AROM [x] []   UE PROM [x] []   Strength []  Generalized weakness      Posture / Balance [] Sitting - Static: Fair, +  Sitting - Dynamic: Fair  Standing - Static: Fair, -  Standing - Dynamic: Fair, -  BUE support at rolling walker in standing   Sensation []     Coordination []       Tone []       Edema [x]    Activity Tolerance []  Continues to be limited by pain, weakness, prolonged hospital stay     Hand Dominance R [] L []      COGNITION/  PERCEPTION: INTACT IMPAIRED   (See Comments)   Orientation [x]  AAO x3 to self, place, situation, hx TBI/ born with hydrocephalus    Vision [x]     Hearing [x]     Cognition  []  Follows commands, decreased

## 2024-09-02 NOTE — PROGRESS NOTES
Dressing to L side of hip changed this shift.  Wound vac seal intact, pressure to 125 mm Hg.  Pt turned Q2 this shift.  Denies pain.  Hourly rounds performed this shift.  VS stable.  Pt medicated per MAR.  All known needs met at this time.  Bed low and locked, call light in reach.

## 2024-09-02 NOTE — PROGRESS NOTES
ACUTE PHYSICAL THERAPY GOALS:   (Developed with and agreed upon by patient and/or caregiver.)  Goals updated 8/29/24  (1.) Brendan Saleh  will move from supine to sit and sit to supine and scoot up and down with CONTACT GUARD ASSIST within 7 treatment day(s).      (2.) Brendan Saleh will transfer from bed to chair and chair to bed with MINIMAL ASSISTx1 using the least restrictive device within 7 treatment day(s).    (3.) Brendan Saleh will perform standing static and dynamic balance activities x 5 minutes with CONTACT GUARD ASSIST to improve safety within 7 treatment day(s).     (4.) Brendan Saleh will ambulate with CONTACT GUARD ASSIST for 100 feet with the least restrictive device within 7 treatment day(s).     PHYSICAL THERAPY: Daily Note PM   (Link to Caseload Tracking: PT Visit Days : 3  Time In/Out PT Charge Capture  Rehab Caseload Tracker  Orders    Brendan Saleh is a 49 y.o. male   PRIMARY DIAGNOSIS: Infection of  (ventriculoperitoneal) shunt (HCC)  Acute hypernatremia [E87.0]  Volume depletion [E86.9]  Acute kidney injury (HCC) [N17.9]  Acute encephalopathy [G93.40]  Septic shock (HCC) [A41.9, R65.21]  Procedure(s) (LRB):  LAPAROTOMY EXPLORATORY, PRIMARY CLOSURE SMALL BOWEL PERFORATION (N/A)  93 Days Post-Op  Inpatient: Payor: [employer] / Plan: AMBETTER / Product Type: *No Product type* /     ASSESSMENT:     REHAB RECOMMENDATIONS:   Recommendation to date pending progress:  Setting:  Short-term Rehab    Equipment:    To Be Determined     ASSESSMENT:  Mr. Saleh presents supine in bed, agreeable to therapy. Pt able to perform sup to sit with Min A secondary to decreased strength. Pt then able to perform STS to RW with Min A x2, then able to amb into bathroom and perform static standing ADL task for approx 5 mins with CGA/Marcelo, pt then sat in chair, unsupported, for approx 15 mins to finish ADL task. Pt then stood with Min A x2 to RW and amb to chair, able to sit with Min A,  pt then able to perform STS and standing at RW for approx 5 mins so RN could check WoundVAC seal, pt making progress, left up in chair with RN in room, will continue to follow     SUBJECTIVE:   Mr. Saleh states, \"Sounds good\"     Social/Functional Lives With: Alone  Type of Home: Trailer  Home Layout: One level  Home Access: Stairs to enter with rails  Entrance Stairs - Number of Steps: 5  Entrance Stairs - Rails: Both  Home Equipment: Cane  Additional Comments: reports that he was living on [place] acres and was able to take care of the property  OBJECTIVE:     PAIN: VITALS / O2: PRECAUTION / LINES / DRAINS:   Pre Treatment:   Pain Assessment: None - Denies Pain      Post Treatment: none stated Vitals        Oxygen    External Catheter and Wound Vac    RESTRICTIONS/PRECAUTIONS:  Restrictions/Precautions  Restrictions/Precautions: Fall Risk, Bed Alarm, Isolation  Restrictions/Precautions: Fall Risk, Bed Alarm, Isolation     MOBILITY: I Mod I S SBA CGA Min Mod Max Total  NT x2 Comments:   Bed Mobility    Rolling [] [] [] [] [] [] [] [] [] [] []    Supine to Sit [] [] [] [] [] [x] [] [] [] [] []    Scooting [] [] [] [] [] [x] [] [] [] [] []    Sit to Supine [] [] [] [] [] [] [] [] [] [] []    Transfers    Sit to Stand [] [] [] [] [] [x] [] [] [] [] [x]    Bed to Chair [] [] [] [] [] [] [] [] [] [] []    Stand to Sit [] [] [] [] [] [x] [] [] [] [] [x]     [] [] [] [] [] [] [] [] [] [] []    I=Independent, Mod I=Modified Independent, S=Supervision, SBA=Standby Assistance, CGA=Contact Guard Assistance,   Min=Minimal Assistance, Mod=Moderate Assistance, Max=Maximal Assistance, Total=Total Assistance, NT=Not Tested    BALANCE: Good Fair+ Fair Fair- Poor NT Comments   Sitting Static [] [x] [] [] [] []    Sitting Dynamic [] [x] [] [] [] []              Standing Static [] [] [x] [] [] []    Standing Dynamic [] [] [x] [] [] []      GAIT: I Mod I S SBA CGA Min Mod Max Total  NT x2 Comments:   Level of Assistance [] [] [] [] [] [x]

## 2024-09-03 LAB
ANION GAP SERPL CALC-SCNC: 9 MMOL/L (ref 9–18)
BASOPHILS # BLD: 0 K/UL (ref 0–0.2)
BASOPHILS NFR BLD: 0 % (ref 0–2)
BUN SERPL-MCNC: 22 MG/DL (ref 6–23)
CALCIUM SERPL-MCNC: 9.1 MG/DL (ref 8.8–10.2)
CHLORIDE SERPL-SCNC: 104 MMOL/L (ref 98–107)
CO2 SERPL-SCNC: 27 MMOL/L (ref 20–28)
CREAT SERPL-MCNC: 0.74 MG/DL (ref 0.8–1.3)
DIFFERENTIAL METHOD BLD: ABNORMAL
EOSINOPHIL # BLD: 0.2 K/UL (ref 0–0.8)
EOSINOPHIL NFR BLD: 2 % (ref 0.5–7.8)
ERYTHROCYTE [DISTWIDTH] IN BLOOD BY AUTOMATED COUNT: 13.2 % (ref 11.9–14.6)
GLUCOSE SERPL-MCNC: 101 MG/DL (ref 70–99)
HCT VFR BLD AUTO: 34.5 % (ref 41.1–50.3)
HGB BLD-MCNC: 10.6 G/DL (ref 13.6–17.2)
IMM GRANULOCYTES # BLD AUTO: 0 K/UL (ref 0–0.5)
IMM GRANULOCYTES NFR BLD AUTO: 0 % (ref 0–5)
LYMPHOCYTES # BLD: 1.4 K/UL (ref 0.5–4.6)
LYMPHOCYTES NFR BLD: 19 % (ref 13–44)
MCH RBC QN AUTO: 28.9 PG (ref 26.1–32.9)
MCHC RBC AUTO-ENTMCNC: 30.7 G/DL (ref 31.4–35)
MCV RBC AUTO: 94 FL (ref 82–102)
MONOCYTES # BLD: 0.8 K/UL (ref 0.1–1.3)
MONOCYTES NFR BLD: 11 % (ref 4–12)
NEUTS SEG # BLD: 4.9 K/UL (ref 1.7–8.2)
NEUTS SEG NFR BLD: 68 % (ref 43–78)
NRBC # BLD: 0 K/UL (ref 0–0.2)
PLATELET # BLD AUTO: 162 K/UL (ref 150–450)
PMV BLD AUTO: 9.8 FL (ref 9.4–12.3)
POTASSIUM SERPL-SCNC: 3.8 MMOL/L (ref 3.5–5.1)
RBC # BLD AUTO: 3.67 M/UL (ref 4.23–5.6)
SODIUM SERPL-SCNC: 140 MMOL/L (ref 136–145)
WBC # BLD AUTO: 7.2 K/UL (ref 4.3–11.1)

## 2024-09-03 PROCEDURE — 6370000000 HC RX 637 (ALT 250 FOR IP): Performed by: INTERNAL MEDICINE

## 2024-09-03 PROCEDURE — 1100000000 HC RM PRIVATE

## 2024-09-03 PROCEDURE — 97530 THERAPEUTIC ACTIVITIES: CPT

## 2024-09-03 PROCEDURE — 6370000000 HC RX 637 (ALT 250 FOR IP): Performed by: PHYSICIAN ASSISTANT

## 2024-09-03 PROCEDURE — 85025 COMPLETE CBC W/AUTO DIFF WBC: CPT

## 2024-09-03 PROCEDURE — 6370000000 HC RX 637 (ALT 250 FOR IP): Performed by: NURSE PRACTITIONER

## 2024-09-03 PROCEDURE — 36415 COLL VENOUS BLD VENIPUNCTURE: CPT

## 2024-09-03 PROCEDURE — 6360000002 HC RX W HCPCS: Performed by: SURGERY

## 2024-09-03 PROCEDURE — 80048 BASIC METABOLIC PNL TOTAL CA: CPT

## 2024-09-03 RX ADMIN — PANTOPRAZOLE SODIUM 40 MG: 40 TABLET, DELAYED RELEASE ORAL at 20:44

## 2024-09-03 RX ADMIN — METOPROLOL TARTRATE 25 MG: 25 TABLET, FILM COATED ORAL at 20:44

## 2024-09-03 RX ADMIN — Medication 1 CAPSULE: at 08:27

## 2024-09-03 RX ADMIN — METOPROLOL TARTRATE 25 MG: 25 TABLET, FILM COATED ORAL at 08:27

## 2024-09-03 RX ADMIN — ENOXAPARIN SODIUM 40 MG: 100 INJECTION SUBCUTANEOUS at 08:26

## 2024-09-03 NOTE — WOUND CARE
Wound vac became soiled over weekend and had to be changed per chart on 9/1/24. The seal today was not alarming, however the vac did not have suction at the portion in the wound.  There is a new skin tear on right buttocks, likely from tape shearing. 1x 2x0.1cm, unknown etiology, not consistent with pressure wound.  Wound with granulation, improving.

## 2024-09-03 NOTE — PROGRESS NOTES
Comprehensive Nutrition Assessment    Type and Reason for Visit: Reassess  Malnutrition Screening Tool: Malnutrition Screen  Have you recently lost weight without trying?: 2 to 13 pounds (1 point)  Have you been eating poorly because of a decreased appetite?: Yes (1 point)  Malnutrition Screening Tool Score: 2 and Best Practice Alert for BMI <18.5  5/18: Poor intake/appetite (Hospitalist)  5/24: General nutrition management (Hospitalist)  Parenteral Nutrition Management (Hospitalists) 6/2    Nutrition Recommendations/Plan:   Meals and Snacks:  Diet: Continue current order  Nutrition Supplement Therapy:  Medical food supplement therapy:  Continue Ensure Enlive three times per day (this provides 350 kcal and 20 grams protein per bottle)     Malnutrition Assessment:6/2  Malnutrition Status: Severe malnutrition  Context: Acute Illness  Findings of clinical characteristics of malnutrition:   Energy Intake:  50% or less of estimated energy requirements for 5 or more days  Weight Loss:  Unable to assess (wide weight vrainces, [facility] c/w severe weight loss at time of admission)     Body Fat Loss:  Moderate body fat loss (visual) Fat Overlying Ribs   Muscle Mass Loss:  Moderate muscle mass loss (visual) Temples (temporalis), Clavicles (pectoralis & deltoids)         Nutrition Assessment:  Nutrition History: 5/7: Pt reports decreased intake for \"far too long\". He is unable to provide any other details.         Weight History: Pt reports wt loss however he is unsure how much. Per EMR wt hx review of neurosurgery office visits 11/7 180lb, 1/26 198lb, 3/27 190lb.   Unable to validate wt loss given wide weight variances with overall trend of weight gain since admission.     Nutrition Background:       PMH significant for hydrocephalus s/p  shunt 2/2024, CHF, seizures, and decubitus ulcer. Pt admitted with acute encephalopathy, sepsis, MIRNA, and hypernatremia from hypovolemia. New diagnosis of colon cancer. Pt with a wound, see

## 2024-09-03 NOTE — PROGRESS NOTES
Pt is in bed without complaints. Hourly rounds completed and all needs met. Bed is low, locked, and call light is in reach. Pt encouraged to call for assistance.

## 2024-09-03 NOTE — PROGRESS NOTES
Lovenox  Code status: DNR      Non-peripheral Lines and Tubes (if present):      Negative Pressure Wound Therapy Sacrum Mid (Active)       External Urinary Catheter (Active)        Telemetry (if present):  Cardiac/Telemetry Monitor On: No        Hospital Problems:  Principal Problem:    Infection of  (ventriculoperitoneal) shunt (HCC)  Active Problems:    Encephalitis    S/P  shunt    Communicating hydrocephalus (HCC)    Anemia    Sepsis following intra-abdominal surgery (HCC)    Wound disruption    Sinus tachycardia    NPH (normal pressure hydrocephalus) (HCC)    Shunt malfunction    Colon obstruction (HCC)    Colonic mass    Adenocarcinoma of colon (HCC)    Severe protein-calorie malnutrition (HCC)    Sacral decubitus ulcer, stage IV (HCC)    ESBL (extended spectrum beta-lactamase) producing bacteria infection    Acute blood loss as cause of postoperative anemia    Bowel perforation (HCC)    Critical illness myopathy    Cachexia (HCC)    Unintentional weight loss    Adult failure to thrive    Encounter for palliative care    Hypomagnesemia    Diarrhea  Resolved Problems:    Hypernatremia    MIRNA (acute kidney injury) (HCC)    UTI (urinary tract infection)    Septic shock (HCC)      Objective:   Patient Vitals for the past 24 hrs:   Temp Pulse Resp BP SpO2   09/03/24 0827 -- 79 -- 108/79 --   09/03/24 0735 97.5 °F (36.4 °C) 79 18 108/79 99 %   09/02/24 1920 98.6 °F (37 °C) 96 17 100/66 99 %       Oxygen Therapy  SpO2: 99 %  Pulse Oximetry Type: Intermittent  Pulse via Oximetry: 128 beats per minute  Pulse Oximeter Device Mode: Intermittent  Pulse Oximeter Device Location: Finger  O2 Device: None (Room air)  Skin Assessment: Clean, dry, & intact  O2 Flow Rate (L/min): 1 L/min  Oxygen Therapy: None (Room air)    Estimated body mass index is 19 kg/m² as calculated from the following:    Height as of this encounter: 1.905 m (6' 3\").    Weight as of this encounter: 68.9 kg (152 lb).    Intake/Output Summary (Last 24  hours) at 9/3/2024 1419  Last data filed at 9/3/2024 1328  Gross per 24 hour   Intake 480 ml   Output 1100 ml   Net -620 ml         Physical Exam:   General:          Resting comfortably in bed, no acute complaints  Head:               Normocephalic, atraumatic  CV:                  RRR.  No m/r/g.  No jugular venous distension.  Lungs:             CTAB.  No wheezing, rhonchi, or rales.  Symmetric expansion.  Abdomen:        Soft, nontender, nondistended.  Extremities:     No cyanosis or clubbing.  No edema  Skin:                No rashes.  Normal coloration.   Warm and dry.  Was not able to examine sacral wound  Neuro:             CN II-XII grossly intact.    Psych:             Answers questions appropriately    I have personally reviewed labs and tests:  Recent Labs:  Recent Results (from the past 48 hour(s))   CBC with Auto Differential    Collection Time: 09/03/24  5:30 AM   Result Value Ref Range    WBC 7.2 4.3 - 11.1 K/uL    RBC 3.67 (L) 4.23 - 5.6 M/uL    Hemoglobin 10.6 (L) 13.6 - 17.2 g/dL    Hematocrit 34.5 (L) 41.1 - 50.3 %    MCV 94.0 82 - 102 FL    MCH 28.9 26.1 - 32.9 PG    MCHC 30.7 (L) 31.4 - 35.0 g/dL    RDW 13.2 11.9 - 14.6 %    Platelets 162 150 - 450 K/uL    MPV 9.8 9.4 - 12.3 FL    nRBC 0.00 0.0 - 0.2 K/uL    Differential Type AUTOMATED      Neutrophils % 68 43 - 78 %    Lymphocytes % 19 13 - 44 %    Monocytes % 11 4.0 - 12.0 %    Eosinophils % 2 0.5 - 7.8 %    Basophils % 0 0.0 - 2.0 %    Immature Granulocytes % 0 0.0 - 5.0 %    Neutrophils Absolute 4.9 1.7 - 8.2 K/UL    Lymphocytes Absolute 1.4 0.5 - 4.6 K/UL    Monocytes Absolute 0.8 0.1 - 1.3 K/UL    Eosinophils Absolute 0.2 0.0 - 0.8 K/UL    Basophils Absolute 0.0 0.0 - 0.2 K/UL    Immature Granulocytes Absolute 0.0 0.0 - 0.5 K/UL   Basic Metabolic Panel w/ Reflex to MG    Collection Time: 09/03/24  5:30 AM   Result Value Ref Range    Sodium 140 136 - 145 mmol/L    Potassium 3.8 3.5 - 5.1 mmol/L    Chloride 104 98 - 107 mmol/L    CO2 27 20

## 2024-09-03 NOTE — PROGRESS NOTES
ACUTE PHYSICAL THERAPY GOALS:   (Developed with and agreed upon by patient and/or caregiver.)  Goals updated 8/29/24  (1.) Brendan Saleh  will move from supine to sit and sit to supine and scoot up and down with CONTACT GUARD ASSIST within 7 treatment day(s).      (2.) Brendan Saleh will transfer from bed to chair and chair to bed with MINIMAL ASSISTx1 using the least restrictive device within 7 treatment day(s).    (3.) Brendan Saleh will perform standing static and dynamic balance activities x 5 minutes with CONTACT GUARD ASSIST to improve safety within 7 treatment day(s).     (4.) Brendan Saleh will ambulate with CONTACT GUARD ASSIST for 100 feet with the least restrictive device within 7 treatment day(s).     PHYSICAL THERAPY: Daily Note PM   (Link to Caseload Tracking: PT Visit Days : 4  Time In/Out PT Charge Capture  Rehab Caseload Tracker  Orders    Brendan Saleh is a 49 y.o. male   PRIMARY DIAGNOSIS: Infection of  (ventriculoperitoneal) shunt (HCC)  Acute hypernatremia [E87.0]  Volume depletion [E86.9]  Acute kidney injury (HCC) [N17.9]  Acute encephalopathy [G93.40]  Septic shock (HCC) [A41.9, R65.21]  Procedure(s) (LRB):  LAPAROTOMY EXPLORATORY, PRIMARY CLOSURE SMALL BOWEL PERFORATION (N/A)  94 Days Post-Op  Inpatient: Payor: AMBETTER / Plan: AMBETTER / Product Type: *No Product type* /     ASSESSMENT:     REHAB RECOMMENDATIONS:   Recommendation to date pending progress:  Setting:  Short-term Rehab    Equipment:    To Be Determined     ASSESSMENT:  Mr. Saleh presents sitting up on EOB finishing lunch, agreeable to therapy. Pt able to perform STS with Min/Mod A secondary to decreased strength. Pt then able to amb with RW with Min/Mod A for 110', required increased assist with balance with turns, pt then assisted back to room and up into chair, limited secondary to fatigue, but performed amb today without w/c follow, left up in chair with needs within reach, alarm

## 2024-09-04 PROCEDURE — 6370000000 HC RX 637 (ALT 250 FOR IP): Performed by: PHYSICIAN ASSISTANT

## 2024-09-04 PROCEDURE — 1100000000 HC RM PRIVATE

## 2024-09-04 PROCEDURE — 6370000000 HC RX 637 (ALT 250 FOR IP): Performed by: INTERNAL MEDICINE

## 2024-09-04 PROCEDURE — 97116 GAIT TRAINING THERAPY: CPT

## 2024-09-04 PROCEDURE — 97530 THERAPEUTIC ACTIVITIES: CPT

## 2024-09-04 PROCEDURE — 6360000002 HC RX W HCPCS: Performed by: SURGERY

## 2024-09-04 PROCEDURE — 6370000000 HC RX 637 (ALT 250 FOR IP): Performed by: NURSE PRACTITIONER

## 2024-09-04 RX ADMIN — METOPROLOL TARTRATE 25 MG: 25 TABLET, FILM COATED ORAL at 20:41

## 2024-09-04 RX ADMIN — ENOXAPARIN SODIUM 40 MG: 100 INJECTION SUBCUTANEOUS at 08:50

## 2024-09-04 RX ADMIN — METOPROLOL TARTRATE 25 MG: 25 TABLET, FILM COATED ORAL at 08:50

## 2024-09-04 RX ADMIN — Medication 1 CAPSULE: at 08:49

## 2024-09-04 RX ADMIN — PANTOPRAZOLE SODIUM 40 MG: 40 TABLET, DELAYED RELEASE ORAL at 20:41

## 2024-09-04 NOTE — PROGRESS NOTES
Hospitalist Progress Note   Admit Date:  2024  4:30 PM   Name:  Brendan Saleh   Age:  49 y.o.  Sex:  male  :  1975   MRN:  [id number]   Room:  [facility]    Presenting/Chief Complaint: Altered Mental Status     Reason(s) for Admission: Acute hypernatremia [E87.0]  Volume depletion [E86.9]  Acute kidney injury (HCC) [N17.9]  Acute encephalopathy [G93.40]  Septic shock (HCC) [A41.9, R65.21]     Hospital Course:   Brendan Saleh is a 49 y.o. male with medical history of dural hematoma status post bur hole, , hydrocephalus s/p  shunt, sacral decubitus stage IV ulcer who presented 2024 with confusion.    He initially was diagnosed with urinary tract infection with hematuria and started on vancomycin and Zosyn.  Urine cultures were finalized and found to be negative.  1 of 4 blood cultures demonstrated coagulase-negative staph this was determined to be contaminant.  At that time, patient met septic criteria with WBC of 14.6 and heart rate in the 120s.  ID was consulted to assist.  Echo was done at that time which showed preserved ejection fraction without vegetation.  On 2024, patient underwent sacral wound debridement with general surgeon Dr. Zapien.     CT head demonstrated ongoing hydrocephalus with  shunt.  Neurosurgery was consulted at that time and patient underwent a right ventriculoperitoneal shunt removal on 2024. Per Dr. Ortiz, patient needs a stepwise approach, and stated shunt replacement may be in his future though would need to wait at least 4 weeks.  Neurosurgery followed up  and said patient would be unable to replace  shunt because of high risk of infection.    Infectious disease was later reconsulted for management of CSF infection and CSF culture and  shunt tip cultured grew ESBL Klebsiella and Serratia.  Antibiotics were then changed to intravenous ciprofloxacin then oral ciprofloxacin following shunt removal.  ID signed off .    [facility] hospital  IntraVENous PRN    glucose chewable tablet 16 g  4 tablet Oral PRN    dextrose bolus 10% 125 mL  125 mL IntraVENous PRN    Or    dextrose bolus 10% 250 mL  250 mL IntraVENous PRN    Glucagon Emergency KIT 1 mg  1 mg SubCUTAneous PRN    dextrose 10 % infusion   IntraVENous Continuous PRN    medicated lip ointment (BLISTEX)   Topical PRN    ondansetron (ZOFRAN-ODT) disintegrating tablet 4 mg  4 mg Oral Q8H PRN    Or    ondansetron (ZOFRAN) injection 4 mg  4 mg IntraVENous Q6H PRN    enoxaparin (LOVENOX) injection 40 mg  40 mg SubCUTAneous Daily    calcium carbonate (TUMS) chewable tablet 500 mg  500 mg Oral TID PRN       Signed:    Giovanni Dodd,    Internal Medicine      Part of this note may have been written by using a voice dictation software.  The note has been proof read but may still contain some grammatical/other typographical errors.

## 2024-09-04 NOTE — PROGRESS NOTES
ACUTE OCCUPATIONAL THERAPY GOALS:   (Developed with and agreed upon by patient and/or caregiver.)      (Developed with and agreed upon by patient and/or caregiver.)  1. Patient will complete lower body bathing and dressing with MINIMAL ASSIST and adaptive equipment as needed. UPGRADED  2. Patient will complete grooming ADL in standing with CONTACT GUARD ASSIST. UPGRADED  3. Patient will tolerate 25 minutes of OT treatment with 1-2 rest breaks to increase activity tolerance for ADLs. PROGRESSING  4. Patient will complete functional transfers with STAND BY ASSIST and adaptive equipment as needed. UPGRADED  5. Patient will tolerate 20 minutes BUE exercises to increase strength for safe, functional transfers. PROGRESSING  6. Patient will complete upper body bathing and dressing with SET UP ASSIST and adaptive equipment as needed. UGRADED  7. Patient will tolerate static standing task for >7 minutes with STAND BY ASSIST in preparation for ADL tasks.  8. Patient will complete functional mobility of household distances with STAND BY ASSIST and adaptive equipment as needed. UPGRADED     Timeframe: 7 visits                                                    OCCUPATIONAL THERAPY: Daily Note AM   OT Visit Days: 2   Time In/Out  OT Charge Capture  Rehab Caseload Tracker  OT Orders    Brendan Saleh is a 49 y.o. male   PRIMARY DIAGNOSIS: Infection of  (ventriculoperitoneal) shunt (HCC)  Acute hypernatremia [E87.0]  Volume depletion [E86.9]  Acute kidney injury (HCC) [N17.9]  Acute encephalopathy [G93.40]  Septic shock (HCC) [A41.9, R65.21]  Procedure(s) (LRB):  LAPAROTOMY EXPLORATORY, PRIMARY CLOSURE SMALL BOWEL PERFORATION (N/A)  95 Days Post-Op  Inpatient: Payor: AMBETTER / Plan: AMBETTER / Product Type: *No Product type* /     ASSESSMENT:     REHAB RECOMMENDATIONS:   Recommendation to date pending progress:  Setting:  Short-term Rehab    Equipment:    To Be Determined     ASSESSMENT:  Mr. Saleh has been here for

## 2024-09-04 NOTE — PROGRESS NOTES
Pt is in bed without complaints. Hourly rounds completed and all needs met. Wound care completed. Bed is low, locked, and call light is in reach. Pt encouraged to call for assistance.

## 2024-09-04 NOTE — PROGRESS NOTES
ACUTE PHYSICAL THERAPY GOALS:   (Developed with and agreed upon by patient and/or caregiver.)  Goals updated 8/29/24  (1.) Brendan Saleh  will move from supine to sit and sit to supine and scoot up and down with CONTACT GUARD ASSIST within 7 treatment day(s).      (2.) Brendan Saleh will transfer from bed to chair and chair to bed with MINIMAL ASSISTx1 using the least restrictive device within 7 treatment day(s).    (3.) Brendan Saleh will perform standing static and dynamic balance activities x 5 minutes with CONTACT GUARD ASSIST to improve safety within 7 treatment day(s).     (4.) Brendan Saleh will ambulate with CONTACT GUARD ASSIST for 100 feet with the least restrictive device within 7 treatment day(s).     PHYSICAL THERAPY: Daily Note AM   (Link to Caseload Tracking: PT Visit Days : 5  Time In/Out PT Charge Capture  Rehab Caseload Tracker  Orders    Brendan Saleh is a 49 y.o. male   PRIMARY DIAGNOSIS: Infection of  (ventriculoperitoneal) shunt (HCC)  Acute hypernatremia [E87.0]  Volume depletion [E86.9]  Acute kidney injury (HCC) [N17.9]  Acute encephalopathy [G93.40]  Septic shock (HCC) [A41.9, R65.21]  Procedure(s) (LRB):  LAPAROTOMY EXPLORATORY, PRIMARY CLOSURE SMALL BOWEL PERFORATION (N/A)  95 Days Post-Op  Inpatient: Payor: AMBETTER / Plan: AMBETTER / Product Type: *No Product type* /     ASSESSMENT:     REHAB RECOMMENDATIONS:   Recommendation to date pending progress:  Setting:  Short-term Rehab    Equipment:    To Be Determined     ASSESSMENT:  Mr. Saleh was supine on contact and agreeable to work with therapy. The PT session today focused on therapeutic activities including sit <> stand and gt activity. Pt was able to amb with ' x 2 with a seated rest. Chair brought behind him. His gt is ataxic and initially needed mod a but after he got into the hallway this improved and he was min a x 1.  Pt needed a and cues for amb speed, use of RW safely, safety with sit

## 2024-09-05 PROCEDURE — 6360000002 HC RX W HCPCS: Performed by: SURGERY

## 2024-09-05 PROCEDURE — 6370000000 HC RX 637 (ALT 250 FOR IP): Performed by: PHYSICIAN ASSISTANT

## 2024-09-05 PROCEDURE — 6370000000 HC RX 637 (ALT 250 FOR IP): Performed by: NURSE PRACTITIONER

## 2024-09-05 PROCEDURE — 6370000000 HC RX 637 (ALT 250 FOR IP): Performed by: INTERNAL MEDICINE

## 2024-09-05 PROCEDURE — 6370000000 HC RX 637 (ALT 250 FOR IP)

## 2024-09-05 PROCEDURE — 1100000000 HC RM PRIVATE

## 2024-09-05 PROCEDURE — 97112 NEUROMUSCULAR REEDUCATION: CPT

## 2024-09-05 PROCEDURE — 97535 SELF CARE MNGMENT TRAINING: CPT

## 2024-09-05 PROCEDURE — 97530 THERAPEUTIC ACTIVITIES: CPT

## 2024-09-05 RX ORDER — PRENATAL VIT 91/IRON/FOLIC/DHA 28-975-200
COMBINATION PACKAGE (EA) ORAL 2 TIMES DAILY
Status: DISCONTINUED | OUTPATIENT
Start: 2024-09-05 | End: 2024-10-18 | Stop reason: HOSPADM

## 2024-09-05 RX ADMIN — PANTOPRAZOLE SODIUM 40 MG: 40 TABLET, DELAYED RELEASE ORAL at 19:47

## 2024-09-05 RX ADMIN — ENOXAPARIN SODIUM 40 MG: 100 INJECTION SUBCUTANEOUS at 08:12

## 2024-09-05 RX ADMIN — TERBINAFINE HYDROCHLORIDE: 1 CREAM TOPICAL at 19:50

## 2024-09-05 RX ADMIN — METOPROLOL TARTRATE 25 MG: 25 TABLET, FILM COATED ORAL at 08:12

## 2024-09-05 RX ADMIN — Medication 1 CAPSULE: at 08:12

## 2024-09-05 RX ADMIN — METOPROLOL TARTRATE 25 MG: 25 TABLET, FILM COATED ORAL at 19:48

## 2024-09-05 NOTE — PROGRESS NOTES
ACUTE OCCUPATIONAL THERAPY GOALS:   (Developed with and agreed upon by patient and/or caregiver.)  1. Patient will complete lower body bathing and dressing with MINIMAL ASSIST and adaptive equipment as needed. UPGRADED  2. Patient will complete grooming ADL in standing with CONTACT GUARD ASSIST. UPGRADED  3. Patient will tolerate 25 minutes of OT treatment with 1-2 rest breaks to increase activity tolerance for ADLs. PROGRESSING  4. Patient will complete functional transfers with STAND BY ASSIST and adaptive equipment as needed. UPGRADED  5. Patient will tolerate 20 minutes BUE exercises to increase strength for safe, functional transfers. PROGRESSING  6. Patient will complete upper body bathing and dressing with SET UP ASSIST and adaptive equipment as needed. UPGRADED  7. Patient will tolerate static standing task for >7 minutes with STAND BY ASSIST in preparation for ADL tasks.  8. Patient will complete functional mobility of household distances with STAND BY ASSIST and adaptive equipment as needed. UPGRADED     Timeframe: 7 visits  OCCUPATIONAL THERAPY: Daily Note AM   OT Visit Days: 3   Time In/Out  OT Charge Capture  Rehab Caseload Tracker  OT Orders    Brendan Saleh is a 49 y.o. male   PRIMARY DIAGNOSIS: Infection of  (ventriculoperitoneal) shunt (HCC)  Acute hypernatremia [E87.0]  Volume depletion [E86.9]  Acute kidney injury (HCC) [N17.9]  Acute encephalopathy [G93.40]  Septic shock (HCC) [A41.9, R65.21]  Procedure(s) (LRB):  LAPAROTOMY EXPLORATORY, PRIMARY CLOSURE SMALL BOWEL PERFORATION (N/A)  96 Days Post-Op  Inpatient: Payor: AMBETTER / Plan: AMBETTER / Product Type: *No Product type* /     ASSESSMENT:     REHAB RECOMMENDATIONS:   Recommendation to date pending progress:  Setting:  Pt continues to be medically stable for discharge, however awaiting medicaid/ social security disability    Pt would benefit from rehab when able to be placed    Equipment:    To Be Determined     ASSESSMENT:

## 2024-09-05 NOTE — PLAN OF CARE
Problem: Discharge Planning  Goal: Discharge to home or other facility with appropriate resources  9/5/2024 0739 by Maryan Quiles RN  Outcome: Progressing  9/5/2024 0017 by Panfilo Newby RN  Outcome: Progressing     Problem: Safety - Adult  Goal: Free from fall injury  9/5/2024 0739 by Maryan Quiles RN  Outcome: Progressing  9/5/2024 0017 by Panfilo Newby RN  Outcome: Progressing     Problem: Neurosensory - Adult  Goal: Achieves stable or improved neurological status  9/5/2024 0739 by Maryan Quiles RN  Outcome: Progressing  9/5/2024 0017 by Panfilo Newby RN  Outcome: Progressing     Problem: Musculoskeletal - Adult  Goal: Return mobility to safest level of function  9/5/2024 0739 by Maryan Quiles RN  Outcome: Progressing  9/5/2024 0017 by Panfilo Newby RN  Outcome: Progressing     Problem: Skin/Tissue Integrity  Goal: Absence of new skin breakdown  Description: 1.  Monitor for areas of redness and/or skin breakdown  2.  Assess vascular access sites hourly  3.  Every 4-6 hours minimum:  Change oxygen saturation probe site  4.  Every 4-6 hours:  If on nasal continuous positive airway pressure, respiratory therapy assess nares and determine need for appliance change or resting period.  9/5/2024 0739 by Maryan Quiles RN  Outcome: Progressing  9/5/2024 0017 by Panfilo Newby RN  Outcome: Progressing     Problem: Pain  Goal: Verbalizes/displays adequate comfort level or baseline comfort level  9/5/2024 0739 by Maryan Quiles RN  Outcome: Progressing  9/5/2024 0017 by Panfilo Newby RN  Outcome: Progressing     Problem: Skin/Tissue Integrity - Adult  Goal: Skin integrity remains intact  9/5/2024 0739 by Maryan Quiles RN  Outcome: Progressing  9/5/2024 0017 by Panfilo Newby RN  Outcome: Progressing  Goal: Incisions, wounds, or drain sites healing without S/S of infection  9/5/2024 0739 by Maryan Quiles RN  Outcome: Progressing  9/5/2024 0017 by Panfilo Newby RN  Outcome: Progressing

## 2024-09-05 NOTE — PROGRESS NOTES
Hospitalist Progress Note   Admit Date:  2024  4:30 PM   Name:  Brendan Saleh   Age:  49 y.o.  Sex:  male  :  1975   MRN:  [id number]   Room:  [facility]    Presenting/Chief Complaint: Altered Mental Status     Reason(s) for Admission: Acute hypernatremia [E87.0]  Volume depletion [E86.9]  Acute kidney injury (HCC) [N17.9]  Acute encephalopathy [G93.40]  Septic shock (HCC) [A41.9, R65.21]     Hospital Course:   Brendan Saleh is a 49 y.o. male with medical history of dural hematoma status post bur hole, , hydrocephalus s/p  shunt, sacral decubitus stage IV ulcer who presented 2024 with confusion.    He initially was diagnosed with urinary tract infection with hematuria and started on vancomycin and Zosyn.  Urine cultures were finalized and found to be negative.  1 of 4 blood cultures demonstrated coagulase-negative staph this was determined to be contaminant.  At that time, patient met septic criteria with WBC of 14.6 and heart rate in the 120s.  ID was consulted to assist.  Echo was done at that time which showed preserved ejection fraction without vegetation.  On 2024, patient underwent sacral wound debridement with general surgeon Dr. Zapien.     CT head demonstrated ongoing hydrocephalus with  shunt.  Neurosurgery was consulted at that time and patient underwent a right ventriculoperitoneal shunt removal on 2024. Per Dr. Ortiz, patient needs a stepwise approach, and stated shunt replacement may be in his future though would need to wait at least 4 weeks.  Neurosurgery followed up  and said patient would be unable to replace  shunt because of high risk of infection.    Infectious disease was later reconsulted for management of CSF infection and CSF culture and  shunt tip cultured grew ESBL Klebsiella and Serratia.  Antibiotics were then changed to intravenous ciprofloxacin then oral ciprofloxacin following shunt removal.  ID signed off .    [facility] hospital

## 2024-09-05 NOTE — PROGRESS NOTES
Pt had BM during shift. Pt ambulated halls with PT/OT.    Rounds performed throughout shift. Pt denies needs at this time. Bed in low position, locked and call light/personal items within reach.

## 2024-09-05 NOTE — PROGRESS NOTES
ACUTE PHYSICAL THERAPY GOALS:   (Developed with and agreed upon by patient and/or caregiver.)  Goals updated 8/29/24  (1.) Brendan Saleh  will move from supine to sit and sit to supine and scoot up and down with CONTACT GUARD ASSIST within 7 treatment day(s).      (2.) Brendan Saleh will transfer from bed to chair and chair to bed with MINIMAL ASSISTx1 using the least restrictive device within 7 treatment day(s).    (3.) Brendan Saleh will perform standing static and dynamic balance activities x 5 minutes with CONTACT GUARD ASSIST to improve safety within 7 treatment day(s).     (4.) Brendan Saelh will ambulate with CONTACT GUARD ASSIST for 100 feet with the least restrictive device within 7 treatment day(s).     PHYSICAL THERAPY: Daily Note PM   (Link to Caseload Tracking: PT Visit Days : 6  Time In/Out PT Charge Capture  Rehab Caseload Tracker  Orders    Brendan Saleh is a 49 y.o. male   PRIMARY DIAGNOSIS: Infection of  (ventriculoperitoneal) shunt (HCC)  Acute hypernatremia [E87.0]  Volume depletion [E86.9]  Acute kidney injury (HCC) [N17.9]  Acute encephalopathy [G93.40]  Septic shock (HCC) [A41.9, R65.21]  Procedure(s) (LRB):  LAPAROTOMY EXPLORATORY, PRIMARY CLOSURE SMALL BOWEL PERFORATION (N/A)  96 Days Post-Op  Inpatient: Payor: AMBETTER / Plan: AMBETTER / Product Type: *No Product type* /     ASSESSMENT:     REHAB RECOMMENDATIONS:   Recommendation to date pending progress:  Setting:  Short-term Rehab    Equipment:    To Be Determined     ASSESSMENT:  Mr. Saleh was supine on contact and agreeable to work with therapy. The PT session today focused on therapeutic activities including sit <> stand and gt activity. Pt was able to amb with '  with 5 seated rests. Chair brought behind him. His gt is ataxic and initially needed mod a but after he got into the hallway this improved and he was min a x 1.  Pt needed cues for amb speed, use of RW safely, safety with sit <>

## 2024-09-06 LAB
ANION GAP SERPL CALC-SCNC: 7 MMOL/L (ref 9–18)
BASOPHILS # BLD: 0 K/UL (ref 0–0.2)
BASOPHILS NFR BLD: 0 % (ref 0–2)
BUN SERPL-MCNC: 25 MG/DL (ref 6–23)
CALCIUM SERPL-MCNC: 9.3 MG/DL (ref 8.8–10.2)
CHLORIDE SERPL-SCNC: 102 MMOL/L (ref 98–107)
CO2 SERPL-SCNC: 30 MMOL/L (ref 20–28)
CREAT SERPL-MCNC: 0.84 MG/DL (ref 0.8–1.3)
DIFFERENTIAL METHOD BLD: ABNORMAL
EOSINOPHIL # BLD: 0.2 K/UL (ref 0–0.8)
EOSINOPHIL NFR BLD: 3 % (ref 0.5–7.8)
ERYTHROCYTE [DISTWIDTH] IN BLOOD BY AUTOMATED COUNT: 13.8 % (ref 11.9–14.6)
GLUCOSE SERPL-MCNC: 105 MG/DL (ref 70–99)
HCT VFR BLD AUTO: 34.9 % (ref 41.1–50.3)
HGB BLD-MCNC: 11 G/DL (ref 13.6–17.2)
IMM GRANULOCYTES # BLD AUTO: 0 K/UL (ref 0–0.5)
IMM GRANULOCYTES NFR BLD AUTO: 0 % (ref 0–5)
LYMPHOCYTES # BLD: 1.5 K/UL (ref 0.5–4.6)
LYMPHOCYTES NFR BLD: 28 % (ref 13–44)
MCH RBC QN AUTO: 28.7 PG (ref 26.1–32.9)
MCHC RBC AUTO-ENTMCNC: 31.5 G/DL (ref 31.4–35)
MCV RBC AUTO: 91.1 FL (ref 82–102)
MONOCYTES # BLD: 0.6 K/UL (ref 0.1–1.3)
MONOCYTES NFR BLD: 12 % (ref 4–12)
NEUTS SEG # BLD: 3.1 K/UL (ref 1.7–8.2)
NEUTS SEG NFR BLD: 57 % (ref 43–78)
NRBC # BLD: 0 K/UL (ref 0–0.2)
PLATELET # BLD AUTO: 167 K/UL (ref 150–450)
PMV BLD AUTO: 9.7 FL (ref 9.4–12.3)
POTASSIUM SERPL-SCNC: 4 MMOL/L (ref 3.5–5.1)
RBC # BLD AUTO: 3.83 M/UL (ref 4.23–5.6)
SODIUM SERPL-SCNC: 139 MMOL/L (ref 136–145)
WBC # BLD AUTO: 5.4 K/UL (ref 4.3–11.1)

## 2024-09-06 PROCEDURE — 6370000000 HC RX 637 (ALT 250 FOR IP): Performed by: PHYSICIAN ASSISTANT

## 2024-09-06 PROCEDURE — 80048 BASIC METABOLIC PNL TOTAL CA: CPT

## 2024-09-06 PROCEDURE — 85025 COMPLETE CBC W/AUTO DIFF WBC: CPT

## 2024-09-06 PROCEDURE — 2580000003 HC RX 258: Performed by: STUDENT IN AN ORGANIZED HEALTH CARE EDUCATION/TRAINING PROGRAM

## 2024-09-06 PROCEDURE — 97605 NEG PRS WND THER DME<=50SQCM: CPT

## 2024-09-06 PROCEDURE — 6370000000 HC RX 637 (ALT 250 FOR IP): Performed by: INTERNAL MEDICINE

## 2024-09-06 PROCEDURE — 36415 COLL VENOUS BLD VENIPUNCTURE: CPT

## 2024-09-06 PROCEDURE — 1100000000 HC RM PRIVATE

## 2024-09-06 PROCEDURE — 6360000002 HC RX W HCPCS: Performed by: SURGERY

## 2024-09-06 PROCEDURE — 6370000000 HC RX 637 (ALT 250 FOR IP): Performed by: NURSE PRACTITIONER

## 2024-09-06 RX ADMIN — ENOXAPARIN SODIUM 40 MG: 100 INJECTION SUBCUTANEOUS at 08:54

## 2024-09-06 RX ADMIN — METOPROLOL TARTRATE 25 MG: 25 TABLET, FILM COATED ORAL at 08:54

## 2024-09-06 RX ADMIN — TRAZODONE HYDROCHLORIDE 50 MG: 50 TABLET ORAL at 20:03

## 2024-09-06 RX ADMIN — Medication 1 CAPSULE: at 08:54

## 2024-09-06 RX ADMIN — TERBINAFINE HYDROCHLORIDE: 1 CREAM TOPICAL at 20:04

## 2024-09-06 RX ADMIN — SODIUM CHLORIDE, PRESERVATIVE FREE 10 ML: 5 INJECTION INTRAVENOUS at 08:58

## 2024-09-06 RX ADMIN — METOPROLOL TARTRATE 25 MG: 25 TABLET, FILM COATED ORAL at 20:03

## 2024-09-06 RX ADMIN — TERBINAFINE HYDROCHLORIDE: 1 CREAM TOPICAL at 08:58

## 2024-09-06 RX ADMIN — PANTOPRAZOLE SODIUM 40 MG: 40 TABLET, DELAYED RELEASE ORAL at 20:03

## 2024-09-06 NOTE — WOUND CARE
Patient seen for wound vac dressing change of sacral wound. Removed old vac dressing cleansed with dermal wound cleanser and applied new wound vac, tracked to right hip for track pad placement. Pt tolerated well. Plan for next dressing change on Monday. Wound team will continue to follow while in acute care setting.

## 2024-09-06 NOTE — PROGRESS NOTES
Hospitalist Progress Note   Admit Date:  2024  4:30 PM   Name:  Brendan Saleh   Age:  49 y.o.  Sex:  male  :  1975   MRN:  [id number]   Room:  [facility]    Presenting/Chief Complaint: Altered Mental Status     Reason(s) for Admission: Acute hypernatremia [E87.0]  Volume depletion [E86.9]  Acute kidney injury (HCC) [N17.9]  Acute encephalopathy [G93.40]  Septic shock (HCC) [A41.9, R65.21]     Hospital Course:   Brendan Saleh is a 49 y.o. male with medical history of dural hematoma status post bur hole, , hydrocephalus s/p  shunt, sacral decubitus stage IV ulcer who presented 2024 with confusion.    He initially was diagnosed with urinary tract infection with hematuria and started on vancomycin and Zosyn.  Urine cultures were finalized and found to be negative.  1 of 4 blood cultures demonstrated coagulase-negative staph this was determined to be contaminant.  At that time, patient met septic criteria with WBC of 14.6 and heart rate in the 120s.  ID was consulted to assist.  Echo was done at that time which showed preserved ejection fraction without vegetation.  On 2024, patient underwent sacral wound debridement with general surgeon Dr. Zapien.     CT head demonstrated ongoing hydrocephalus with  shunt.  Neurosurgery was consulted at that time and patient underwent a right ventriculoperitoneal shunt removal on 2024. Per Dr. Ortiz, patient needs a stepwise approach, and stated shunt replacement may be in his future though would need to wait at least 4 weeks.  Neurosurgery followed up  and said patient would be unable to replace  shunt because of high risk of infection.    Infectious disease was later reconsulted for management of CSF infection and CSF culture and  shunt tip cultured grew ESBL Klebsiella and Serratia.  Antibiotics were then changed to intravenous ciprofloxacin then oral ciprofloxacin following shunt removal.  ID signed off .    [facility] hospital

## 2024-09-06 NOTE — PLAN OF CARE
Problem: Discharge Planning  Goal: Discharge to home or other facility with appropriate resources  Outcome: Progressing  Flowsheets (Taken 9/6/2024 1904)  Discharge to home or other facility with appropriate resources:   Identify barriers to discharge with patient and caregiver   Arrange for needed discharge resources and transportation as appropriate   Identify discharge learning needs (meds, wound care, etc)   Refer to discharge planning if patient needs post-hospital services based on physician order or complex needs related to functional status, cognitive ability or social support system     Problem: Safety - Adult  Goal: Free from fall injury  Outcome: Progressing  Flowsheets (Taken 9/6/2024 1905)  Free From Fall Injury: Instruct family/caregiver on patient safety     Problem: Neurosensory - Adult  Goal: Achieves stable or improved neurological status  Outcome: Progressing  Flowsheets (Taken 9/6/2024 1904)  Achieves stable or improved neurological status: Assess for and report changes in neurological status     Problem: Musculoskeletal - Adult  Goal: Return mobility to safest level of function  Outcome: Progressing  Flowsheets (Taken 9/6/2024 1904)  Return Mobility to Safest Level of Function: Assess patient stability and activity tolerance for standing, transferring and ambulating with or without assistive devices     Problem: Skin/Tissue Integrity  Goal: Absence of new skin breakdown  Description: 1.  Monitor for areas of redness and/or skin breakdown  2.  Assess vascular access sites hourly  3.  Every 4-6 hours minimum:  Change oxygen saturation probe site  4.  Every 4-6 hours:  If on nasal continuous positive airway pressure, respiratory therapy assess nares and determine need for appliance change or resting period.  Outcome: Progressing     Problem: Pain  Goal: Verbalizes/displays adequate comfort level or baseline comfort level  Outcome: Progressing     Problem: Skin/Tissue Integrity - Adult  Goal: Skin

## 2024-09-07 PROCEDURE — 6370000000 HC RX 637 (ALT 250 FOR IP): Performed by: INTERNAL MEDICINE

## 2024-09-07 PROCEDURE — 6370000000 HC RX 637 (ALT 250 FOR IP): Performed by: PHYSICIAN ASSISTANT

## 2024-09-07 PROCEDURE — 1100000000 HC RM PRIVATE

## 2024-09-07 PROCEDURE — 6370000000 HC RX 637 (ALT 250 FOR IP): Performed by: NURSE PRACTITIONER

## 2024-09-07 PROCEDURE — 6360000002 HC RX W HCPCS: Performed by: SURGERY

## 2024-09-07 RX ADMIN — METOPROLOL TARTRATE 25 MG: 25 TABLET, FILM COATED ORAL at 08:00

## 2024-09-07 RX ADMIN — PANTOPRAZOLE SODIUM 40 MG: 40 TABLET, DELAYED RELEASE ORAL at 19:53

## 2024-09-07 RX ADMIN — OXYCODONE 5 MG: 5 TABLET ORAL at 19:53

## 2024-09-07 RX ADMIN — Medication 1 CAPSULE: at 08:01

## 2024-09-07 RX ADMIN — METOPROLOL TARTRATE 25 MG: 25 TABLET, FILM COATED ORAL at 19:53

## 2024-09-07 RX ADMIN — TERBINAFINE HYDROCHLORIDE: 1 CREAM TOPICAL at 08:58

## 2024-09-07 RX ADMIN — TRAZODONE HYDROCHLORIDE 50 MG: 50 TABLET ORAL at 19:53

## 2024-09-07 RX ADMIN — ENOXAPARIN SODIUM 40 MG: 100 INJECTION SUBCUTANEOUS at 08:01

## 2024-09-07 RX ADMIN — TERBINAFINE HYDROCHLORIDE: 1 CREAM TOPICAL at 19:54

## 2024-09-07 ASSESSMENT — PAIN SCALES - GENERAL
PAINLEVEL_OUTOF10: 7
PAINLEVEL_OUTOF10: 2

## 2024-09-07 ASSESSMENT — PAIN DESCRIPTION - DESCRIPTORS: DESCRIPTORS: ACHING

## 2024-09-07 ASSESSMENT — PAIN DESCRIPTION - ORIENTATION: ORIENTATION: MID;LOWER

## 2024-09-07 ASSESSMENT — PAIN - FUNCTIONAL ASSESSMENT: PAIN_FUNCTIONAL_ASSESSMENT: ACTIVITIES ARE NOT PREVENTED

## 2024-09-07 ASSESSMENT — PAIN DESCRIPTION - LOCATION: LOCATION: BACK

## 2024-09-07 NOTE — PLAN OF CARE
Problem: Discharge Planning  Goal: Discharge to home or other facility with appropriate resources  Outcome: Progressing  Flowsheets (Taken 9/7/2024 1906)  Discharge to home or other facility with appropriate resources:   Identify barriers to discharge with patient and caregiver   Arrange for needed discharge resources and transportation as appropriate   Identify discharge learning needs (meds, wound care, etc)   Refer to discharge planning if patient needs post-hospital services based on physician order or complex needs related to functional status, cognitive ability or social support system     Problem: Safety - Adult  Goal: Free from fall injury  Outcome: Progressing  Flowsheets (Taken 9/7/2024 1906)  Free From Fall Injury: Instruct family/caregiver on patient safety     Problem: Neurosensory - Adult  Goal: Achieves stable or improved neurological status  Outcome: Progressing  Flowsheets (Taken 9/7/2024 1906)  Achieves stable or improved neurological status: Assess for and report changes in neurological status     Problem: Musculoskeletal - Adult  Goal: Return mobility to safest level of function  Outcome: Progressing  Flowsheets (Taken 9/7/2024 1906)  Return Mobility to Safest Level of Function: Assess patient stability and activity tolerance for standing, transferring and ambulating with or without assistive devices     Problem: Skin/Tissue Integrity  Goal: Absence of new skin breakdown  Description: 1.  Monitor for areas of redness and/or skin breakdown  2.  Assess vascular access sites hourly  3.  Every 4-6 hours minimum:  Change oxygen saturation probe site  4.  Every 4-6 hours:  If on nasal continuous positive airway pressure, respiratory therapy assess nares and determine need for appliance change or resting period.  Outcome: Progressing     Problem: Pain  Goal: Verbalizes/displays adequate comfort level or baseline comfort level  Outcome: Progressing  Flowsheets (Taken 9/7/2024 1922)  Verbalizes/displays  adequate comfort level or baseline comfort level: Encourage patient to monitor pain and request assistance     Problem: Skin/Tissue Integrity - Adult  Goal: Skin integrity remains intact  Outcome: Progressing  Goal: Incisions, wounds, or drain sites healing without S/S of infection  Outcome: Progressing     Problem: Gastrointestinal - Adult  Goal: Maintains adequate nutritional intake  Outcome: Progressing     Problem: Genitourinary - Adult  Goal: Absence of urinary retention  Outcome: Progressing  Flowsheets (Taken 9/7/2024 1906)  Absence of urinary retention: Assess patient’s ability to void and empty bladder     Problem: Infection - Adult  Goal: Absence of infection at discharge  Outcome: Progressing  Goal: Absence of infection during hospitalization  Outcome: Progressing     Problem: Metabolic/Fluid and Electrolytes - Adult  Goal: Electrolytes maintained within normal limits  Outcome: Progressing  Goal: Hemodynamic stability and optimal renal function maintained  Outcome: Progressing     Problem: Cardiovascular - Adult  Goal: Maintains optimal cardiac output and hemodynamic stability  Outcome: Progressing  Flowsheets (Taken 9/7/2024 1906)  Maintains optimal cardiac output and hemodynamic stability: Monitor blood pressure and heart rate     Problem: Hematologic - Adult  Goal: Maintains hematologic stability  Outcome: Progressing

## 2024-09-07 NOTE — PROGRESS NOTES
Hospitalist Progress Note   Admit Date:  2024  4:30 PM   Name:  Brendan Saleh   Age:  49 y.o.  Sex:  male  :  1975   MRN:  [id number]   Room:  [facility]    Presenting/Chief Complaint: Altered Mental Status     Reason(s) for Admission: Acute hypernatremia [E87.0]  Volume depletion [E86.9]  Acute kidney injury (HCC) [N17.9]  Acute encephalopathy [G93.40]  Septic shock (HCC) [A41.9, R65.21]     Hospital Course:   Brendan Saleh is a 49 y.o. male with medical history of dural hematoma status post bur hole, , hydrocephalus s/p  shunt, sacral decubitus stage IV ulcer who presented 2024 with confusion.    He initially was diagnosed with urinary tract infection with hematuria and started on vancomycin and Zosyn.  Urine cultures were finalized and found to be negative.  1 of 4 blood cultures demonstrated coagulase-negative staph this was determined to be contaminant.  At that time, patient met septic criteria with WBC of 14.6 and heart rate in the 120s.  ID was consulted to assist.  Echo was done at that time which showed preserved ejection fraction without vegetation.  On 2024, patient underwent sacral wound debridement with general surgeon Dr. Zapien.     CT head demonstrated ongoing hydrocephalus with  shunt.  Neurosurgery was consulted at that time and patient underwent a right ventriculoperitoneal shunt removal on 2024. Per Dr. Ortiz, patient needs a stepwise approach, and stated shunt replacement may be in his future though would need to wait at least 4 weeks.  Neurosurgery followed up  and said patient would be unable to replace  shunt because of high risk of infection.    Infectious disease was later reconsulted for management of CSF infection and CSF culture and  shunt tip cultured grew ESBL Klebsiella and Serratia.  Antibiotics were then changed to intravenous ciprofloxacin then oral ciprofloxacin following shunt removal.  ID signed off .    [facility] hospital  mg Oral Q4H PRN    metoprolol tartrate (LOPRESSOR) tablet 25 mg  25 mg Oral BID    0.9 % sodium chloride infusion  25 mL IntraVENous PRN    sodium chloride flush 0.9 % injection 5-40 mL  5-40 mL IntraVENous PRN    0.9 % sodium chloride infusion   IntraVENous PRN    glucose chewable tablet 16 g  4 tablet Oral PRN    dextrose bolus 10% 125 mL  125 mL IntraVENous PRN    Or    dextrose bolus 10% 250 mL  250 mL IntraVENous PRN    Glucagon Emergency KIT 1 mg  1 mg SubCUTAneous PRN    dextrose 10 % infusion   IntraVENous Continuous PRN    medicated lip ointment (BLISTEX)   Topical PRN    ondansetron (ZOFRAN-ODT) disintegrating tablet 4 mg  4 mg Oral Q8H PRN    Or    ondansetron (ZOFRAN) injection 4 mg  4 mg IntraVENous Q6H PRN    enoxaparin (LOVENOX) injection 40 mg  40 mg SubCUTAneous Daily    calcium carbonate (TUMS) chewable tablet 500 mg  500 mg Oral TID PRN       Signed:    Giovanni Dodd DO   Internal Medicine      Part of this note may have been written by using a voice dictation software.  The note has been proof read but may still contain some grammatical/other typographical errors.

## 2024-09-08 PROCEDURE — 6370000000 HC RX 637 (ALT 250 FOR IP)

## 2024-09-08 PROCEDURE — 2580000003 HC RX 258: Performed by: STUDENT IN AN ORGANIZED HEALTH CARE EDUCATION/TRAINING PROGRAM

## 2024-09-08 PROCEDURE — 1100000000 HC RM PRIVATE

## 2024-09-08 PROCEDURE — 6370000000 HC RX 637 (ALT 250 FOR IP): Performed by: PHYSICIAN ASSISTANT

## 2024-09-08 PROCEDURE — 6370000000 HC RX 637 (ALT 250 FOR IP): Performed by: NURSE PRACTITIONER

## 2024-09-08 PROCEDURE — 6370000000 HC RX 637 (ALT 250 FOR IP): Performed by: INTERNAL MEDICINE

## 2024-09-08 PROCEDURE — 6360000002 HC RX W HCPCS: Performed by: SURGERY

## 2024-09-08 RX ADMIN — SODIUM CHLORIDE, PRESERVATIVE FREE 10 ML: 5 INJECTION INTRAVENOUS at 20:22

## 2024-09-08 RX ADMIN — TERBINAFINE HYDROCHLORIDE: 1 CREAM TOPICAL at 20:23

## 2024-09-08 RX ADMIN — TERBINAFINE HYDROCHLORIDE: 1 CREAM TOPICAL at 08:55

## 2024-09-08 RX ADMIN — TRAZODONE HYDROCHLORIDE 50 MG: 50 TABLET ORAL at 20:22

## 2024-09-08 RX ADMIN — ENOXAPARIN SODIUM 40 MG: 100 INJECTION SUBCUTANEOUS at 08:43

## 2024-09-08 RX ADMIN — Medication 1 CAPSULE: at 08:42

## 2024-09-08 RX ADMIN — PANTOPRAZOLE SODIUM 40 MG: 40 TABLET, DELAYED RELEASE ORAL at 20:22

## 2024-09-08 RX ADMIN — METOPROLOL TARTRATE 25 MG: 25 TABLET, FILM COATED ORAL at 08:42

## 2024-09-08 RX ADMIN — METOPROLOL TARTRATE 25 MG: 25 TABLET, FILM COATED ORAL at 20:22

## 2024-09-08 NOTE — PROGRESS NOTES
Hospitalist Progress Note   Admit Date:  2024  4:30 PM   Name:  Brendan Saleh   Age:  49 y.o.  Sex:  male  :  1975   MRN:  [id number]   Room:  [facility]    Presenting/Chief Complaint: Altered Mental Status     Reason(s) for Admission: Acute hypernatremia [E87.0]  Volume depletion [E86.9]  Acute kidney injury (HCC) [N17.9]  Acute encephalopathy [G93.40]  Septic shock (HCC) [A41.9, R65.21]     Hospital Course:   Brendan Saleh is a 49 y.o. male with medical history of dural hematoma status post bur hole, , hydrocephalus s/p  shunt, sacral decubitus stage IV ulcer who presented 2024 with confusion.    He initially was diagnosed with urinary tract infection with hematuria and started on vancomycin and Zosyn.  Urine cultures were finalized and found to be negative.  1 of 4 blood cultures demonstrated coagulase-negative staph this was determined to be contaminant.  At that time, patient met septic criteria with WBC of 14.6 and heart rate in the 120s.  ID was consulted to assist.  Echo was done at that time which showed preserved ejection fraction without vegetation.  On 2024, patient underwent sacral wound debridement with general surgeon Dr. Zapien.     CT head demonstrated ongoing hydrocephalus with  shunt.  Neurosurgery was consulted at that time and patient underwent a right ventriculoperitoneal shunt removal on 2024. Per Dr. Ortiz, patient needs a stepwise approach, and stated shunt replacement may be in his future though would need to wait at least 4 weeks.  Neurosurgery followed up  and said patient would be unable to replace  shunt because of high risk of infection.    Infectious disease was later reconsulted for management of CSF infection and CSF culture and  shunt tip cultured grew ESBL Klebsiella and Serratia.  Antibiotics were then changed to intravenous ciprofloxacin then oral ciprofloxacin following shunt removal.  ID signed off .    [facility] hospital

## 2024-09-09 PROCEDURE — 6370000000 HC RX 637 (ALT 250 FOR IP)

## 2024-09-09 PROCEDURE — 2580000003 HC RX 258: Performed by: STUDENT IN AN ORGANIZED HEALTH CARE EDUCATION/TRAINING PROGRAM

## 2024-09-09 PROCEDURE — 6370000000 HC RX 637 (ALT 250 FOR IP): Performed by: PHYSICIAN ASSISTANT

## 2024-09-09 PROCEDURE — 6360000002 HC RX W HCPCS: Performed by: SURGERY

## 2024-09-09 PROCEDURE — 1100000000 HC RM PRIVATE

## 2024-09-09 PROCEDURE — 6370000000 HC RX 637 (ALT 250 FOR IP): Performed by: NURSE PRACTITIONER

## 2024-09-09 PROCEDURE — 97605 NEG PRS WND THER DME<=50SQCM: CPT

## 2024-09-09 PROCEDURE — 6370000000 HC RX 637 (ALT 250 FOR IP): Performed by: INTERNAL MEDICINE

## 2024-09-09 RX ADMIN — PANTOPRAZOLE SODIUM 40 MG: 40 TABLET, DELAYED RELEASE ORAL at 21:59

## 2024-09-09 RX ADMIN — TERBINAFINE HYDROCHLORIDE: 1 CREAM TOPICAL at 08:40

## 2024-09-09 RX ADMIN — METOPROLOL TARTRATE 25 MG: 25 TABLET, FILM COATED ORAL at 08:38

## 2024-09-09 RX ADMIN — ENOXAPARIN SODIUM 40 MG: 100 INJECTION SUBCUTANEOUS at 08:38

## 2024-09-09 RX ADMIN — METOPROLOL TARTRATE 25 MG: 25 TABLET, FILM COATED ORAL at 21:58

## 2024-09-09 RX ADMIN — SODIUM CHLORIDE, PRESERVATIVE FREE 10 ML: 5 INJECTION INTRAVENOUS at 21:59

## 2024-09-09 RX ADMIN — TERBINAFINE HYDROCHLORIDE: 1 CREAM TOPICAL at 22:02

## 2024-09-09 RX ADMIN — Medication 1 CAPSULE: at 08:38

## 2024-09-09 NOTE — PLAN OF CARE
Problem: Discharge Planning  Goal: Discharge to home or other facility with appropriate resources  Outcome: Progressing  Flowsheets (Taken 9/8/2024 1914)  Discharge to home or other facility with appropriate resources:   Identify barriers to discharge with patient and caregiver   Arrange for needed discharge resources and transportation as appropriate   Identify discharge learning needs (meds, wound care, etc)   Refer to discharge planning if patient needs post-hospital services based on physician order or complex needs related to functional status, cognitive ability or social support system     Problem: Safety - Adult  Goal: Free from fall injury  Outcome: Progressing  Flowsheets (Taken 9/8/2024 1914)  Free From Fall Injury: Instruct family/caregiver on patient safety     Problem: Neurosensory - Adult  Goal: Achieves stable or improved neurological status  Outcome: Progressing     Problem: Musculoskeletal - Adult  Goal: Return mobility to safest level of function  Outcome: Progressing  Flowsheets (Taken 9/8/2024 1914)  Return Mobility to Safest Level of Function: Assess patient stability and activity tolerance for standing, transferring and ambulating with or without assistive devices     Problem: Skin/Tissue Integrity  Goal: Absence of new skin breakdown  Description: 1.  Monitor for areas of redness and/or skin breakdown  2.  Assess vascular access sites hourly  3.  Every 4-6 hours minimum:  Change oxygen saturation probe site  4.  Every 4-6 hours:  If on nasal continuous positive airway pressure, respiratory therapy assess nares and determine need for appliance change or resting period.  Outcome: Progressing     Problem: Pain  Goal: Verbalizes/displays adequate comfort level or baseline comfort level  Outcome: Progressing  Flowsheets (Taken 9/8/2024 1925)  Verbalizes/displays adequate comfort level or baseline comfort level: Encourage patient to monitor pain and request assistance     Problem: Skin/Tissue

## 2024-09-09 NOTE — PROGRESS NOTES
Hospitalist Progress Note   Admit Date:  2024  4:30 PM   Name:  Brendan Saleh   Age:  49 y.o.  Sex:  male  :  1975   MRN:  [id number]   Room:  [facility]    Presenting/Chief Complaint: Altered Mental Status     Reason(s) for Admission: Acute hypernatremia [E87.0]  Volume depletion [E86.9]  Acute kidney injury (HCC) [N17.9]  Acute encephalopathy [G93.40]  Septic shock (HCC) [A41.9, R65.21]     Hospital Course:   Brendan Saleh is a 49 y.o. male with medical history of dural hematoma status post bur hole, , hydrocephalus s/p  shunt, sacral decubitus stage IV ulcer who presented 2024 with confusion.    He initially was diagnosed with urinary tract infection with hematuria and started on vancomycin and Zosyn.  Urine cultures were finalized and found to be negative.  1 of 4 blood cultures demonstrated coagulase-negative staph this was determined to be contaminant.  At that time, patient met septic criteria with WBC of 14.6 and heart rate in the 120s.  ID was consulted to assist.  Echo was done at that time which showed preserved ejection fraction without vegetation.  On 2024, patient underwent sacral wound debridement with general surgeon Dr. Zapien.     CT head demonstrated ongoing hydrocephalus with  shunt.  Neurosurgery was consulted at that time and patient underwent a right ventriculoperitoneal shunt removal on 2024. Per Dr. Ortiz, patient needs a stepwise approach, and stated shunt replacement may be in his future though would need to wait at least 4 weeks.  Neurosurgery followed up  and said patient would be unable to replace  shunt because of high risk of infection.    Infectious disease was later reconsulted for management of CSF infection and CSF culture and  shunt tip cultured grew ESBL Klebsiella and Serratia.  Antibiotics were then changed to intravenous ciprofloxacin then oral ciprofloxacin following shunt removal.  ID signed off .    [facility] hospital

## 2024-09-09 NOTE — WOUND CARE
Coccyx wound vac dressing changed, bridge to right hip. Wound with increased granulation, 0.8cm undermining from 12-3 o'clock, 2.6x2x0.2cm

## 2024-09-10 LAB
ALBUMIN SERPL-MCNC: 3.1 G/DL (ref 3.5–5)
ALBUMIN/GLOB SERPL: 0.7 (ref 1–1.9)
ALP SERPL-CCNC: 70 U/L (ref 40–129)
ALT SERPL-CCNC: 8 U/L (ref 12–65)
ANION GAP SERPL CALC-SCNC: 8 MMOL/L (ref 9–18)
AST SERPL-CCNC: 20 U/L (ref 15–37)
BASOPHILS # BLD: 0 K/UL (ref 0–0.2)
BASOPHILS NFR BLD: 0 % (ref 0–2)
BILIRUB SERPL-MCNC: 0.3 MG/DL (ref 0–1.2)
BUN SERPL-MCNC: 26 MG/DL (ref 6–23)
CALCIUM SERPL-MCNC: 9.3 MG/DL (ref 8.8–10.2)
CHLORIDE SERPL-SCNC: 101 MMOL/L (ref 98–107)
CO2 SERPL-SCNC: 28 MMOL/L (ref 20–28)
CREAT SERPL-MCNC: 0.83 MG/DL (ref 0.8–1.3)
DIFFERENTIAL METHOD BLD: ABNORMAL
EOSINOPHIL # BLD: 0.1 K/UL (ref 0–0.8)
EOSINOPHIL NFR BLD: 2 % (ref 0.5–7.8)
ERYTHROCYTE [DISTWIDTH] IN BLOOD BY AUTOMATED COUNT: 13.9 % (ref 11.9–14.6)
GLOBULIN SER CALC-MCNC: 4.3 G/DL (ref 2.3–3.5)
GLUCOSE SERPL-MCNC: 82 MG/DL (ref 70–99)
HCT VFR BLD AUTO: 35.7 % (ref 41.1–50.3)
HGB BLD-MCNC: 11 G/DL (ref 13.6–17.2)
IMM GRANULOCYTES # BLD AUTO: 0 K/UL (ref 0–0.5)
IMM GRANULOCYTES NFR BLD AUTO: 0 % (ref 0–5)
LYMPHOCYTES # BLD: 1.6 K/UL (ref 0.5–4.6)
LYMPHOCYTES NFR BLD: 30 % (ref 13–44)
MCH RBC QN AUTO: 28.6 PG (ref 26.1–32.9)
MCHC RBC AUTO-ENTMCNC: 30.8 G/DL (ref 31.4–35)
MCV RBC AUTO: 92.7 FL (ref 82–102)
MONOCYTES # BLD: 0.5 K/UL (ref 0.1–1.3)
MONOCYTES NFR BLD: 9 % (ref 4–12)
NEUTS SEG # BLD: 3 K/UL (ref 1.7–8.2)
NEUTS SEG NFR BLD: 59 % (ref 43–78)
NRBC # BLD: 0 K/UL (ref 0–0.2)
PLATELET # BLD AUTO: 175 K/UL (ref 150–450)
PMV BLD AUTO: 9.8 FL (ref 9.4–12.3)
POTASSIUM SERPL-SCNC: 4.1 MMOL/L (ref 3.5–5.1)
PROT SERPL-MCNC: 7.4 G/DL (ref 6.3–8.2)
RBC # BLD AUTO: 3.85 M/UL (ref 4.23–5.6)
SODIUM SERPL-SCNC: 138 MMOL/L (ref 136–145)
WBC # BLD AUTO: 5.1 K/UL (ref 4.3–11.1)

## 2024-09-10 PROCEDURE — 1100000000 HC RM PRIVATE

## 2024-09-10 PROCEDURE — 6370000000 HC RX 637 (ALT 250 FOR IP): Performed by: PHYSICIAN ASSISTANT

## 2024-09-10 PROCEDURE — 6370000000 HC RX 637 (ALT 250 FOR IP): Performed by: INTERNAL MEDICINE

## 2024-09-10 PROCEDURE — 85025 COMPLETE CBC W/AUTO DIFF WBC: CPT

## 2024-09-10 PROCEDURE — 80053 COMPREHEN METABOLIC PANEL: CPT

## 2024-09-10 PROCEDURE — 6370000000 HC RX 637 (ALT 250 FOR IP): Performed by: NURSE PRACTITIONER

## 2024-09-10 PROCEDURE — 36415 COLL VENOUS BLD VENIPUNCTURE: CPT

## 2024-09-10 PROCEDURE — 97530 THERAPEUTIC ACTIVITIES: CPT

## 2024-09-10 PROCEDURE — 97164 PT RE-EVAL EST PLAN CARE: CPT

## 2024-09-10 PROCEDURE — 6360000002 HC RX W HCPCS: Performed by: SURGERY

## 2024-09-10 PROCEDURE — 2580000003 HC RX 258: Performed by: STUDENT IN AN ORGANIZED HEALTH CARE EDUCATION/TRAINING PROGRAM

## 2024-09-10 RX ADMIN — METOPROLOL TARTRATE 25 MG: 25 TABLET, FILM COATED ORAL at 08:54

## 2024-09-10 RX ADMIN — TERBINAFINE HYDROCHLORIDE: 1 CREAM TOPICAL at 21:54

## 2024-09-10 RX ADMIN — TRAZODONE HYDROCHLORIDE 50 MG: 50 TABLET ORAL at 22:01

## 2024-09-10 RX ADMIN — ENOXAPARIN SODIUM 40 MG: 100 INJECTION SUBCUTANEOUS at 08:54

## 2024-09-10 RX ADMIN — PANTOPRAZOLE SODIUM 40 MG: 40 TABLET, DELAYED RELEASE ORAL at 21:54

## 2024-09-10 RX ADMIN — SODIUM CHLORIDE, PRESERVATIVE FREE 10 ML: 5 INJECTION INTRAVENOUS at 21:54

## 2024-09-10 RX ADMIN — Medication 1 CAPSULE: at 08:54

## 2024-09-10 RX ADMIN — METOPROLOL TARTRATE 25 MG: 25 TABLET, FILM COATED ORAL at 21:53

## 2024-09-10 RX ADMIN — TERBINAFINE HYDROCHLORIDE: 1 CREAM TOPICAL at 08:54

## 2024-09-10 NOTE — PROGRESS NOTES
ACUTE PHYSICAL THERAPY GOALS:   (Developed with and agreed upon by patient and/or caregiver.)  Goals reviewed 9/10/24 and remain appropriate at this time  (1.) Brendan Saleh  will move from supine to sit and sit to supine and scoot up and down with CONTACT GUARD ASSIST within 7 treatment day(s).   SLOW PROGRESS 9/10   (2.) Brendan Saleh will transfer from bed to chair and chair to bed with MINIMAL ASSISTx1 using the least restrictive device within 7 treatment day(s).  MEDIUM PROGRESS 9/10  (3.) Brendan Saleh will perform standing static and dynamic balance activities x 5 minutes with CONTACT GUARD ASSIST to improve safety within 7 treatment day(s).   MEDIUM PROGRESS 9/10  (4.) Brendan Saleh will ambulate with CONTACT GUARD ASSIST for 100 feet with the least restrictive device within 7 treatment day(s). MEDIUM PROGRESS 9/10        PHYSICAL THERAPY Daily Note, Re-evaluation, and PM  (Link to Caseload Tracking: PT Visit Days : 1  Acknowledge Orders  Time In/Out  PT Charge Capture  Rehab Caseload Tracker    Brendan Saleh is a 49 y.o. male   PRIMARY DIAGNOSIS: Infection of  (ventriculoperitoneal) shunt (HCC)  Acute hypernatremia [E87.0]  Volume depletion [E86.9]  Acute kidney injury (HCC) [N17.9]  Acute encephalopathy [G93.40]  Septic shock (HCC) [A41.9, R65.21]  Procedure(s) (LRB):  LAPAROTOMY EXPLORATORY, PRIMARY CLOSURE SMALL BOWEL PERFORATION (N/A)  101 Days Post-Op  Reason for Referral: Generalized Muscle Weakness (M62.81)  Difficulty in walking, Not elsewhere classified (R26.2)  Inpatient: Payor: [employer] / Plan: [employer] / Product Type: *No Product type* /     ASSESSMENT:     REHAB RECOMMENDATIONS:   Recommendation to date pending progress:  Setting:  Short-term Rehab    Equipment:    To Be Determined     ASSESSMENT:  Mr. Saleh presents supine in bed, agreeable to therapy, heavily soiled. When first ambulating in gripped hospital socks, pt has significant LOB and this therapist

## 2024-09-10 NOTE — PROGRESS NOTES
Hospitalist Progress Note   Admit Date:  2024  4:30 PM   Name:  Brendan Saleh   Age:  49 y.o.  Sex:  male  :  1975   MRN:  [id number]   Room:  [facility]    Presenting/Chief Complaint: Altered Mental Status     Reason(s) for Admission: Acute hypernatremia [E87.0]  Volume depletion [E86.9]  Acute kidney injury (HCC) [N17.9]  Acute encephalopathy [G93.40]  Septic shock (HCC) [A41.9, R65.21]     Hospital Course:   Brendan Saleh is a 49 y.o. male with medical history of dural hematoma status post bur hole, , hydrocephalus s/p  shunt, sacral decubitus stage IV ulcer who presented 2024 with confusion.    He initially was diagnosed with urinary tract infection with hematuria and started on vancomycin and Zosyn.  Urine cultures were finalized and found to be negative.  1 of 4 blood cultures demonstrated coagulase-negative staph this was determined to be contaminant.  At that time, patient met septic criteria with WBC of 14.6 and heart rate in the 120s.  ID was consulted to assist.  Echo was done at that time which showed preserved ejection fraction without vegetation.  On 2024, patient underwent sacral wound debridement with general surgeon Dr. Zapien.     CT head demonstrated ongoing hydrocephalus with  shunt.  Neurosurgery was consulted at that time and patient underwent a right ventriculoperitoneal shunt removal on 2024. Per Dr. Ortiz, patient needs a stepwise approach, and stated shunt replacement may be in his future though would need to wait at least 4 weeks.  Neurosurgery followed up  and said patient would be unable to replace  shunt because of high risk of infection.    Infectious disease was later reconsulted for management of CSF infection and CSF culture and  shunt tip cultured grew ESBL Klebsiella and Serratia.  Antibiotics were then changed to intravenous ciprofloxacin then oral ciprofloxacin following shunt removal.  ID signed off .    His hospital  stay was further complicated by bowel obstruction on May 19, after the patient had vomited feculent material.  He then underwent hemicolectomy and primary anastomosis with Dr. Wright of general surgery.  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  Oncology was consulted at that time and recommended CEA which was negative, CT chest which was negative for nodules masses for staging and possible MRI if any mental deterioration.  Per Dr. Wong (Oncology) last note on 5/22/2024, pt's insurance is not accepted by their office and will require external referral to oncology. External referral placed faxed over referral to [facility].     On 06/01/2024, patient noted to be tachycardic and distended with emesis.  Imaging revealed free air concerning for anastomotic leak.  S/p urgent ex lap with complete antibiotic course followed.  TPN was weaned off and patient tolerated minced/moist low fiber diet.  Staples were ultimately removed on 06/24, surgery signed off 06/11    Palliative care was consulted this hospitalization with goals of care defined on 06/3/2024-does desire more neurosurgery if necessary and he is a candidate but does not want intra-abdominal surgery again.      ID was consulted for the third time this hospitalization on 08/21 regarding duration of treatment.  Surgery was also consulted to see if surgery would be required, wound VAC was placed.    Discharge from sacral decubitus ulcer wound culture positive for MRSA and gram-negative rods, was started on Rocephin and vancomycin.  He was then de-escalated to Zyvox and Augmentin which ended on 09/01/2024 no longer requiring antibiotics at this time.    9/6 Patient was seen sitting in bed today.  He was able to walk with therapist yesterday.  His wound VAC was changed out today.  Patient ready for discharge, will be discharged when able.     9/7 Patient was asking if he could have double portions as he is still consistently

## 2024-09-10 NOTE — PROGRESS NOTES
This RN was glad to see this patient and the improvement in his overall spirit.  Patient looks well, all things considered.    Provided thorough treatment of fingernails and toenails with anti-fungal cream, in addition to all other medications per MAR.    Patient's wounds (and wound vac) changed on yesterday and new intervention to be completed on Wed. 09/11/2024.    Pt is resting comfortably in bed without complaints. Hourly rounds completed and all needs are met. Bed is locked, low and call light is within reach and patient is encouraged to call for any need(s).

## 2024-09-11 PROCEDURE — 6360000002 HC RX W HCPCS: Performed by: SURGERY

## 2024-09-11 PROCEDURE — 1100000000 HC RM PRIVATE

## 2024-09-11 PROCEDURE — 6370000000 HC RX 637 (ALT 250 FOR IP): Performed by: INTERNAL MEDICINE

## 2024-09-11 PROCEDURE — 6370000000 HC RX 637 (ALT 250 FOR IP): Performed by: NURSE PRACTITIONER

## 2024-09-11 PROCEDURE — 6370000000 HC RX 637 (ALT 250 FOR IP): Performed by: PHYSICIAN ASSISTANT

## 2024-09-11 RX ADMIN — Medication 1 CAPSULE: at 09:54

## 2024-09-11 RX ADMIN — METOPROLOL TARTRATE 25 MG: 25 TABLET, FILM COATED ORAL at 09:54

## 2024-09-11 RX ADMIN — PANTOPRAZOLE SODIUM 40 MG: 40 TABLET, DELAYED RELEASE ORAL at 20:57

## 2024-09-11 RX ADMIN — METOPROLOL TARTRATE 25 MG: 25 TABLET, FILM COATED ORAL at 20:57

## 2024-09-11 RX ADMIN — TERBINAFINE HYDROCHLORIDE: 1 CREAM TOPICAL at 20:59

## 2024-09-11 RX ADMIN — OXYCODONE 5 MG: 5 TABLET ORAL at 14:33

## 2024-09-11 RX ADMIN — TERBINAFINE HYDROCHLORIDE: 1 CREAM TOPICAL at 09:57

## 2024-09-11 RX ADMIN — ENOXAPARIN SODIUM 40 MG: 100 INJECTION SUBCUTANEOUS at 09:53

## 2024-09-11 ASSESSMENT — PAIN SCALES - WONG BAKER: WONGBAKER_NUMERICALRESPONSE: NO HURT

## 2024-09-11 ASSESSMENT — PAIN SCALES - GENERAL
PAINLEVEL_OUTOF10: 0
PAINLEVEL_OUTOF10: 9

## 2024-09-11 ASSESSMENT — PAIN DESCRIPTION - ORIENTATION: ORIENTATION: UPPER;RIGHT;LEFT

## 2024-09-11 ASSESSMENT — PAIN DESCRIPTION - LOCATION: LOCATION: SACRUM

## 2024-09-11 ASSESSMENT — PAIN DESCRIPTION - DESCRIPTORS: DESCRIPTORS: ACHING

## 2024-09-11 NOTE — PROGRESS NOTES
Patient has had a good overnight.  Requested and was provided with traZODone/DESYREL for sleep. All other interventions provided per MAR.    Patient continues to demonstrate want for additional nutrition. Provided with jamarcus crackers and, later, with cereal.    Pt is resting comfortably in bed without complaints. Hourly rounds completed and all needs are met. Bed is locked, low and call light is within reach and patient is encouraged to call for any need(s).

## 2024-09-11 NOTE — PROGRESS NOTES
Hospitalist Progress Note   Admit Date:  2024  4:30 PM   Name:  Brendan Saleh   Age:  49 y.o.  Sex:  male  :  1975   MRN:  [id number]   Room:  [facility]    Presenting/Chief Complaint: Altered Mental Status     Reason(s) for Admission: Acute hypernatremia [E87.0]  Volume depletion [E86.9]  Acute kidney injury (HCC) [N17.9]  Acute encephalopathy [G93.40]  Septic shock (HCC) [A41.9, R65.21]     Hospital Course:   Brendan Saleh is a 49 y.o. male with medical history of dural hematoma status post bur hole, , hydrocephalus s/p  shunt, sacral decubitus stage IV ulcer who presented 2024 with altered mental status.    Found to initially have urinary tract infection with hematuria was started on vancomycin and Zosyn.  Urine cultures are finalized and found to be negative.  Infectious disease was consulted.  Echocardiography was done which showed heart failure with preserved ejection fraction without vegetation.  On 2024 patient underwent sacral wound debridement with general surgery.     Additionally, CT head demonstrated ongoing hydrocephalus with  shunt.  Neurosurgery consulted and patient underwent right ventriculoperitoneal shunt removal on 2024.  Neurosurgery followed up on 24 and said patient would not be a candidate for  shunt replacement given high risk of infection.  Infectious disease later consulted for CSF infection as  shunt tip grew ESBL Klebsiella and Serratia.  Antibiotics were then changed to IV ciprofloxacin and then oral ciprofloxacin following shunt removal.      Hospital course further complicated by small bowel obstruction on May 19.  Patient underwent hemicolectomy and primary anastomosis with general surgery.  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  Oncology consulted recommend CEA which was negative.On 2024, patient noted to be tachycardic and distended with emesis.  Imaging revealed free air  lower leg: No edema.      Left lower leg: No edema.   Skin:     General: Skin is warm and dry.      Capillary Refill: Capillary refill takes less than 2 seconds.   Neurological:      General: No focal deficit present.      Mental Status: He is oriented to person, place, and time.      Cranial Nerves: No cranial nerve deficit.   Psychiatric:         Mood and Affect: Mood normal.         Behavior: Behavior normal. Behavior is cooperative.          I have personally reviewed labs and tests:  Recent Labs:  Recent Results (from the past 48 hour(s))   CBC with Auto Differential    Collection Time: 09/10/24  7:43 AM   Result Value Ref Range    WBC 5.1 4.3 - 11.1 K/uL    RBC 3.85 (L) 4.23 - 5.6 M/uL    Hemoglobin 11.0 (L) 13.6 - 17.2 g/dL    Hematocrit 35.7 (L) 41.1 - 50.3 %    MCV 92.7 82 - 102 FL    MCH 28.6 26.1 - 32.9 PG    MCHC 30.8 (L) 31.4 - 35.0 g/dL    RDW 13.9 11.9 - 14.6 %    Platelets 175 150 - 450 K/uL    MPV 9.8 9.4 - 12.3 FL    nRBC 0.00 0.0 - 0.2 K/uL    Differential Type AUTOMATED      Neutrophils % 59 43 - 78 %    Lymphocytes % 30 13 - 44 %    Monocytes % 9 4.0 - 12.0 %    Eosinophils % 2 0.5 - 7.8 %    Basophils % 0 0.0 - 2.0 %    Immature Granulocytes % 0 0.0 - 5.0 %    Neutrophils Absolute 3.0 1.7 - 8.2 K/UL    Lymphocytes Absolute 1.6 0.5 - 4.6 K/UL    Monocytes Absolute 0.5 0.1 - 1.3 K/UL    Eosinophils Absolute 0.1 0.0 - 0.8 K/UL    Basophils Absolute 0.0 0.0 - 0.2 K/UL    Immature Granulocytes Absolute 0.0 0.0 - 0.5 K/UL   Comprehensive Metabolic Panel    Collection Time: 09/10/24  7:43 AM   Result Value Ref Range    Sodium 138 136 - 145 mmol/L    Potassium 4.1 3.5 - 5.1 mmol/L    Chloride 101 98 - 107 mmol/L    CO2 28 20 - 28 mmol/L    Anion Gap 8 (L) 9 - 18 mmol/L    Glucose 82 70 - 99 mg/dL    BUN 26 (H) 6 - 23 MG/DL    Creatinine 0.83 0.80 - 1.30 MG/DL    Est, Glom Filt Rate >90 >60 ml/min/1.73m2    Calcium 9.3 8.8 - 10.2 MG/DL    Total Bilirubin 0.3 0.0 - 1.2 MG/DL    ALT 8 (L) 12 - 65 U/L

## 2024-09-11 NOTE — WOUND CARE
Wound VAC dressing changed, wound granular, improved, edges are rolling may need to treat with silver nitrate.  Vac foam applied with bridge to right hip. Wound VAC seal achieved machine working well.

## 2024-09-11 NOTE — PLAN OF CARE
Problem: Skin/Tissue Integrity - Adult  Goal: Skin integrity remains intact  9/11/2024 0830 by Christopher Power RN  Outcome: Progressing  9/11/2024 0830 by Christopher Power RN  Outcome: Progressing  9/11/2024 0106 by Frannie López RN  Outcome: Progressing  Goal: Incisions, wounds, or drain sites healing without S/S of infection  9/11/2024 0830 by Christopher Power RN  Outcome: Progressing  9/11/2024 0830 by Christopher Power RN  Outcome: Progressing  9/11/2024 0106 by Frannie López RN  Outcome: Progressing     Problem: Gastrointestinal - Adult  Goal: Maintains adequate nutritional intake  9/11/2024 0830 by Christopher Power RN  Outcome: Progressing  9/11/2024 0830 by Christopher Power RN  Outcome: Progressing  9/11/2024 0106 by Frannie López RN  Outcome: Progressing     Problem: Genitourinary - Adult  Goal: Absence of urinary retention  9/11/2024 0830 by Christopher Power RN  Outcome: Progressing  9/11/2024 0830 by Christopher Power RN  Outcome: Progressing  9/11/2024 0106 by Frannie López RN  Outcome: Progressing     Problem: Infection - Adult  Goal: Absence of infection at discharge  9/11/2024 0830 by Christopher Power RN  Outcome: Progressing  9/11/2024 0830 by Christopher Power RN  Outcome: Progressing  9/11/2024 0106 by Frannie López RN  Outcome: Progressing  Goal: Absence of infection during hospitalization  9/11/2024 0830 by Christopher Power RN  Outcome: Progressing  9/11/2024 0830 by Christopher Power RN  Outcome: Progressing     Problem: Metabolic/Fluid and Electrolytes - Adult  Goal: Electrolytes maintained within normal limits  9/11/2024 0830 by Christopher Power RN  Outcome: Progressing  9/11/2024 0830 by Christopher Power RN  Outcome: Progressing  9/11/2024 0106 by Frannie López RN  Outcome: Progressing  Goal: Hemodynamic stability and optimal renal function maintained  9/11/2024 0830 by Christopher Power, RN  Outcome: Progressing  9/11/2024 0830 by Christopher Power, RN  Outcome:  Progressing  9/11/2024 0106 by Frannie López RN  Outcome: Progressing     Problem: Cardiovascular - Adult  Goal: Maintains optimal cardiac output and hemodynamic stability  9/11/2024 0830 by Christopher Power RN  Outcome: Progressing  9/11/2024 0830 by Christopher Power RN  Outcome: Progressing  9/11/2024 0106 by Frannie López RN  Outcome: Progressing     Problem: Hematologic - Adult  Goal: Maintains hematologic stability  9/11/2024 0830 by Christopher Power RN  Outcome: Progressing  9/11/2024 0830 by Christopher Power RN  Outcome: Progressing

## 2024-09-11 NOTE — PROGRESS NOTES
1100- PT is resting in the bed and watching TV.  1345-Wound vac dressing reinforced on the sacrum. Vicki DAWKINS wound care notified.  Hourly rounds performed this shift. Bed lowered and locked. Call light within reach. All needs met at this time.

## 2024-09-12 PROCEDURE — 6370000000 HC RX 637 (ALT 250 FOR IP): Performed by: NURSE PRACTITIONER

## 2024-09-12 PROCEDURE — 6370000000 HC RX 637 (ALT 250 FOR IP): Performed by: PHYSICIAN ASSISTANT

## 2024-09-12 PROCEDURE — 97535 SELF CARE MNGMENT TRAINING: CPT

## 2024-09-12 PROCEDURE — 1100000000 HC RM PRIVATE

## 2024-09-12 PROCEDURE — 97112 NEUROMUSCULAR REEDUCATION: CPT

## 2024-09-12 PROCEDURE — 97530 THERAPEUTIC ACTIVITIES: CPT

## 2024-09-12 PROCEDURE — 6370000000 HC RX 637 (ALT 250 FOR IP): Performed by: INTERNAL MEDICINE

## 2024-09-12 PROCEDURE — 6360000002 HC RX W HCPCS: Performed by: SURGERY

## 2024-09-12 RX ADMIN — ENOXAPARIN SODIUM 40 MG: 100 INJECTION SUBCUTANEOUS at 08:51

## 2024-09-12 RX ADMIN — METOPROLOL TARTRATE 25 MG: 25 TABLET, FILM COATED ORAL at 20:38

## 2024-09-12 RX ADMIN — Medication 1 CAPSULE: at 08:51

## 2024-09-12 RX ADMIN — TERBINAFINE HYDROCHLORIDE: 1 CREAM TOPICAL at 10:06

## 2024-09-12 RX ADMIN — METOPROLOL TARTRATE 25 MG: 25 TABLET, FILM COATED ORAL at 08:51

## 2024-09-12 RX ADMIN — TERBINAFINE HYDROCHLORIDE: 1 CREAM TOPICAL at 20:39

## 2024-09-12 RX ADMIN — PANTOPRAZOLE SODIUM 40 MG: 40 TABLET, DELAYED RELEASE ORAL at 20:38

## 2024-09-12 ASSESSMENT — PAIN SCALES - GENERAL
PAINLEVEL_OUTOF10: 0
PAINLEVEL_OUTOF10: 0

## 2024-09-12 ASSESSMENT — PAIN SCALES - WONG BAKER: WONGBAKER_NUMERICALRESPONSE: NO HURT

## 2024-09-12 NOTE — PLAN OF CARE
Problem: Discharge Planning  Goal: Discharge to home or other facility with appropriate resources  9/12/2024 0807 by Christopher Power RN  Outcome: Progressing  9/12/2024 0443 by Sada Bright RN  Outcome: Progressing     Problem: Safety - Adult  Goal: Free from fall injury  9/12/2024 0807 by Christopher Power RN  Outcome: Progressing  9/12/2024 0443 by Sada Bright RN  Outcome: Progressing     Problem: Neurosensory - Adult  Goal: Achieves stable or improved neurological status  9/12/2024 0807 by Christopher Power RN  Outcome: Progressing  9/12/2024 0443 by Sada Bright RN  Outcome: Progressing     Problem: Musculoskeletal - Adult  Goal: Return mobility to safest level of function  9/12/2024 0807 by Christopher Power RN  Outcome: Progressing  9/12/2024 0443 by Sada Bright RN  Outcome: Progressing     Problem: Skin/Tissue Integrity  Goal: Absence of new skin breakdown  Description: 1.  Monitor for areas of redness and/or skin breakdown  2.  Assess vascular access sites hourly  3.  Every 4-6 hours minimum:  Change oxygen saturation probe site  4.  Every 4-6 hours:  If on nasal continuous positive airway pressure, respiratory therapy assess nares and determine need for appliance change or resting period.  9/12/2024 0807 by Christopher Power RN  Outcome: Progressing  9/12/2024 0443 by Sada Bright RN  Outcome: Progressing     Problem: Pain  Goal: Verbalizes/displays adequate comfort level or baseline comfort level  9/12/2024 0807 by Christopher Power RN  Outcome: Progressing  9/12/2024 0443 by Sada Bright RN  Outcome: Progressing     Problem: Skin/Tissue Integrity - Adult  Goal: Skin integrity remains intact  9/12/2024 0807 by Christopher Power RN  Outcome: Progressing  9/12/2024 0443 by Sada Bright RN  Outcome: Progressing  Goal: Incisions, wounds, or drain sites healing without S/S of infection  9/12/2024 0807 by Christopher Power RN  Outcome: Progressing  9/12/2024 0443

## 2024-09-12 NOTE — PROGRESS NOTES
ACUTE OCCUPATIONAL THERAPY GOALS:   (Developed with and agreed upon by patient and/or caregiver.)  1. Patient will complete lower body bathing and dressing with MINIMAL ASSIST and adaptive equipment as needed. UPGRADED  2. Patient will complete grooming ADL in standing with CONTACT GUARD ASSIST. UPGRADED  3. Patient will tolerate 25 minutes of OT treatment with 1-2 rest breaks to increase activity tolerance for ADLs. PROGRESSING  4. Patient will complete functional transfers with STAND BY ASSIST and adaptive equipment as needed. UPGRADED  5. Patient will tolerate 20 minutes BUE exercises to increase strength for safe, functional transfers. PROGRESSING  6. Patient will complete upper body bathing and dressing with SET UP ASSIST and adaptive equipment as needed. UPGRADED  7. Patient will tolerate static standing task for >7 minutes with STAND BY ASSIST in preparation for ADL tasks.  8. Patient will complete functional mobility of household distances with STAND BY ASSIST and adaptive equipment as needed. UPGRADED     Timeframe: 7 visits  OCCUPATIONAL THERAPY: Daily Note PM   OT Visit Days: 4   Time In/Out  OT Charge Capture  Rehab Caseload Tracker  OT Orders    Brendan Saleh is a 49 y.o. male   PRIMARY DIAGNOSIS: Infection of  (ventriculoperitoneal) shunt (HCC)  Acute hypernatremia [E87.0]  Volume depletion [E86.9]  Acute kidney injury (HCC) [N17.9]  Acute encephalopathy [G93.40]  Septic shock (HCC) [A41.9, R65.21]  Procedure(s) (LRB):  LAPAROTOMY EXPLORATORY, PRIMARY CLOSURE SMALL BOWEL PERFORATION (N/A)  103 Days Post-Op  Inpatient: Payor: [employer] / Plan: [employer]TER / Product Type: *No Product type* /     ASSESSMENT:     REHAB RECOMMENDATIONS:   Recommendation to date pending progress:  Setting:  Pt continues to be medically stable for discharge, however awaiting medicaid/ social security disability    Pt would benefit from rehab when able to be placed    Equipment:    To Be Determined     ASSESSMENT:    [] []    Sit to Supine [] [] [] [] [] [] [] [] [] [x] []    Transfers    Sit to Stand [] [] [] [] [] [x] [] [] [] [] [x]    Bed to Chair [] [] [] [] [] [x] [] [] [] [] [x]    Stand to Sit [] [] [] [] [] [x] [] [] [] [] [x]    Tub/Shower [] [] [] [] [] [] [] [] [] [x] []     Toilet [] [] [] [] [] [] [] [] [] [x] []      [] [] [] [] [] [] [] [] [] [] []    I=Independent, Mod I=Modified Independent, S=Supervision/Setup, SBA=Standby Assistance, CGA=Contact Guard Assistance, Min=Minimal Assistance, Mod=Moderate Assistance, Max=Maximal Assistance, Total=Total Assistance, NT=Not Tested    ACTIVITIES OF DAILY LIVING: I Mod I S SBA CGA Min Mod Max Total NT Comments   BASIC ADLs:              Upper Body   Bathing [] [] [] [] [] [] [] [] [] [x]     Lower Body Bathing [] [] [] [] [] [] [] [] [] [x]     Toileting [] [] [] [] [] [] [] [] [] [x]    Upper Body Dressing [] [] [] [] [] [] [] [] [] [x]    Lower Body Dressing [] [] [] [] [] [] [] [x] [] [] Donning shoes edge of bed with some increased difficulty due to gripper socks    Feeding [] [] [] [] [] [] [] [] [] [x]    Grooming [] [] [] [] [] [x] [] [] [] [] Prolonged standing sink side with min A for balance and one seated rest break    Personal Device Care [] [] [] [] [] [] [] [] [] []    Functional Mobility [] [] [] [] [] [x] [] [] [] [] X 1-2 with a rolling walker    I=Independent, Mod I=Modified Independent, S=Supervision/Setup, SBA=Standby Assistance, CGA=Contact Guard Assistance, Min=Minimal Assistance, Mod=Moderate Assistance, Max=Maximal Assistance, Total=Total Assistance, NT=Not Tested    BALANCE: Good Fair+ Fair Fair- Poor NT Comments   Sitting Static [] [x] [] [] [] []    Sitting Dynamic [] [x] [] [] [] []              Standing Static [] [] [x] [x] [] []    Standing Dynamic [] [] [] [x] [x] []        PLAN:     FREQUENCY/DURATION   OT Plan of Care: 3 times/week for duration of hospital stay or until stated goals are met, whichever comes first.    TREATMENT:

## 2024-09-12 NOTE — PLAN OF CARE
Problem: Discharge Planning  Goal: Discharge to home or other facility with appropriate resources  9/12/2024 0809 by Christopher Power RN  Outcome: Progressing  9/12/2024 0807 by Christopher Power RN  Outcome: Progressing  9/12/2024 0443 by Sada Bright RN  Outcome: Progressing     Problem: Safety - Adult  Goal: Free from fall injury  9/12/2024 0809 by Christopher Power RN  Outcome: Progressing  9/12/2024 0807 by Christopher Power RN  Outcome: Progressing  9/12/2024 0443 by Sada Bright RN  Outcome: Progressing     Problem: Neurosensory - Adult  Goal: Achieves stable or improved neurological status  9/12/2024 0809 by Christopher Power RN  Outcome: Progressing  9/12/2024 0807 by Christopher Power RN  Outcome: Progressing  9/12/2024 0443 by Sada Bright RN  Outcome: Progressing     Problem: Musculoskeletal - Adult  Goal: Return mobility to safest level of function  9/12/2024 0809 by Christopher Power RN  Outcome: Progressing  9/12/2024 0807 by Christopher Power RN  Outcome: Progressing  9/12/2024 0443 by Sada Bright RN  Outcome: Progressing     Problem: Skin/Tissue Integrity  Goal: Absence of new skin breakdown  Description: 1.  Monitor for areas of redness and/or skin breakdown  2.  Assess vascular access sites hourly  3.  Every 4-6 hours minimum:  Change oxygen saturation probe site  4.  Every 4-6 hours:  If on nasal continuous positive airway pressure, respiratory therapy assess nares and determine need for appliance change or resting period.  9/12/2024 0809 by Christopher Power RN  Outcome: Progressing  9/12/2024 0807 by Christopher Power RN  Outcome: Progressing  9/12/2024 0443 by Sada Bright RN  Outcome: Progressing     Problem: Pain  Goal: Verbalizes/displays adequate comfort level or baseline comfort level  9/12/2024 0809 by Christopher Power RN  Outcome: Progressing  9/12/2024 0807 by Christopher Power RN  Outcome: Progressing  9/12/2024 0443 by Sada Bright,

## 2024-09-12 NOTE — PROGRESS NOTES
ACUTE PHYSICAL THERAPY GOALS:   (Developed with and agreed upon by patient and/or caregiver.)  (1.) Brendan Saleh  will move from supine to sit and sit to supine and scoot up and down with CONTACT GUARD ASSIST within 7 treatment day(s).   SLOW PROGRESS 9/10   (2.) Brendan Saleh will transfer from bed to chair and chair to bed with MINIMAL ASSISTx1 using the least restrictive device within 7 treatment day(s).  MEDIUM PROGRESS 9/10  (3.) Brendan Saleh will perform standing static and dynamic balance activities x 5 minutes with CONTACT GUARD ASSIST to improve safety within 7 treatment day(s).   MEDIUM PROGRESS 9/10  (4.) Brendan Saleh will ambulate with CONTACT GUARD ASSIST for 100 feet with the least restrictive device within 7 treatment day(s). MEDIUM PROGRESS 9/10    PHYSICAL THERAPY: Daily Note PM   (Link to Caseload Tracking: PT Visit Days : 2  Time In/Out PT Charge Capture  Rehab Caseload Tracker  Orders    Brendan Saleh is a 49 y.o. male   PRIMARY DIAGNOSIS: Infection of  (ventriculoperitoneal) shunt (HCC)  Acute hypernatremia [E87.0]  Volume depletion [E86.9]  Acute kidney injury (HCC) [N17.9]  Acute encephalopathy [G93.40]  Septic shock (HCC) [A41.9, R65.21]  Procedure(s) (LRB):  LAPAROTOMY EXPLORATORY, PRIMARY CLOSURE SMALL BOWEL PERFORATION (N/A)  103 Days Post-Op  Inpatient: Payor: [employer] / Plan: [employer] / Product Type: *No Product type* /     ASSESSMENT:     REHAB RECOMMENDATIONS:   Recommendation to date pending progress:  Setting:  Short-term Rehab    Equipment:    To Be Determined     ASSESSMENT:  Mr. Saleh presents in supine, agreeable to session. Shoes are donned w/ max A at EOB.       Upon entering, Pnt is agreeable to PT treatment.  he reports no pain  at rest. Pnt performed supine > sit with mod a, sitting EOB with fair+ sitting balance control. Sit > stand with min-mod aX2 using RW. Gait x50, 2 x 90 ft with min-mod Ax2, RW, and close chair follow, cues for    I=Independent, Mod I=Modified Independent, S=Supervision, SBA=Standby Assistance, CGA=Contact Guard Assistance,   Min=Minimal Assistance, Mod=Moderate Assistance, Max=Maximal Assistance, Total=Total Assistance, NT=Not Tested    BALANCE: Good Fair+ Fair Fair- Poor NT Comments   Sitting Static [] [x] [] [] [] []    Sitting Dynamic [] [x] [] [] [] []              Standing Static [] [] [x] [] [] []    Standing Dynamic [] [] [] [x] [] []      GAIT: I Mod I S SBA CGA Min Mod Max Total  NT x2 Comments:   Level of Assistance [] [] [] [] [] [x] [x] [] [] [] [x] Close chair follow   Distance 50, 2 x 90   feet    DME Gait Belt and Rolling Walker    Gait Quality Decreased becki , Decreased step clearance, Decreased step length, and Decreased stance    Weightbearing Status      Stairs      I=Independent, Mod I=Modified Independent, S=Supervision, SBA=Standby Assistance, CGA=Contact Guard Assistance,   Min=Minimal Assistance, Mod=Moderate Assistance, Max=Maximal Assistance, Total=Total Assistance, NT=Not Tested    PLAN:   FREQUENCY AND DURATION: 3 times/week for duration of hospital stay or until stated goals are met, whichever comes first.    TREATMENT:   TREATMENT:   Co-Treatment PT/OT necessary due to patient's decreased overall endurance/tolerance levels, as well as need for high level skilled assistance to complete functional transfers/mobility and functional tasks  Therapeutic Activity (24 Minutes): Therapeutic activity included Rolling, Supine to Sit, Scooting, Transfer Training, Ambulation on level ground, Sitting balance , and Standing balance to improve functional Activity tolerance, Balance, Coordination, Mobility, Strength, and ROM.    TREATMENT GRID:  N/A    AFTER TREATMENT PRECAUTIONS:  in chair at sink w/ OT for ADLs    INTERDISCIPLINARY COLLABORATION:  RN/ PCT, PT/ PTA, and OT/ POND    EDUCATION: Education Given To: Patient  Education Provided: Role of Therapy;Plan of Care    TIME IN/OUT:  Time In: 1312  Time

## 2024-09-12 NOTE — PROGRESS NOTES
Hospitalist Progress Note   Admit Date:  2024  4:30 PM   Name:  Brendan Saleh   Age:  49 y.o.  Sex:  male  :  1975   MRN:  [id number]   Room:  [facility]    Presenting/Chief Complaint: Altered Mental Status     Reason(s) for Admission: Acute hypernatremia [E87.0]  Volume depletion [E86.9]  Acute kidney injury (HCC) [N17.9]  Acute encephalopathy [G93.40]  Septic shock (HCC) [A41.9, R65.21]     Hospital Course:   Brendan Saleh is a 49 y.o. male with medical history of dural hematoma status post bur hole, , hydrocephalus s/p  shunt, sacral decubitus stage IV ulcer who presented 2024 with altered mental status.    Found to initially have urinary tract infection with hematuria was started on vancomycin and Zosyn.  Urine cultures are finalized and found to be negative.  Infectious disease was consulted. Echocardiography was done which showed heart failure with preserved ejection fraction without vegetation.  On 2024 patient underwent sacral wound debridement with general surgery.     Additionally, CT head demonstrated ongoing hydrocephalus with  shunt.  Neurosurgery consulted and patient underwent right ventriculoperitoneal shunt removal on 2024.  Neurosurgery followed up on 24 and said patient would not be a candidate for  shunt replacement given high risk of infection.  Infectious disease later consulted for CSF infection as  shunt tip grew ESBL (Klebsiella and Serratia).  Antibiotics were then changed to IV ciprofloxacin and then oral ciprofloxacin following shunt removal.      Hospital course further complicated by small bowel obstruction on May 19. Patient underwent hemicolectomy and primary anastomosis with general surgery. 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 Oncology consulted recommend CEA which was negative. On 2024, patient noted to be tachycardic and distended with emesis.  Imaging revealed free air  treatment options for adenocarcinoma  - S/p ex lap and closure of small bowel perforation on 06/01/2024, secondary to right hemicolectomy and primary anastomosis achieved on 05/19/2024    Severe protein-calorie malnutrition    -Encourage p.o. intake    ESBL  -Previous history of ESBL now resolved  -Not currently on antibiotics     Critical illness myopathy  -Due to prolonged hospitalization and critical illness  -PT and OT following; recommend short term rehab  -Patient able to move all 4 extremities    Anticipated Discharge Arrangements:   Skilled nursing facility, requires Medicaid and placement, awaiting Medicaid letter of disability    PT/OT evals ordered?  Therapy evals ordered  Diet:  ADULT ORAL NUTRITION SUPPLEMENT; Breakfast, Lunch, Dinner; Standard High Calorie/High Protein Oral Supplement  ADULT DIET; Easy to Chew; Low Fiber; Double Protein Portions; double portions of everything, please  VTE prophylaxis: Lovenox  Code status: DNR    Non-peripheral Lines and Tubes (if present):      Negative Pressure Wound Therapy Sacrum Mid (Active)       External Urinary Catheter (Active)        Telemetry (if present):  Cardiac/Telemetry Monitor On: No      Hospital Problems:  Principal Problem:    Infection of  (ventriculoperitoneal) shunt (HCC)  Active Problems:    Encephalitis    S/P  shunt    Communicating hydrocephalus (HCC)    Anemia    Sepsis following intra-abdominal surgery (HCC)    Wound disruption    Sinus tachycardia    NPH (normal pressure hydrocephalus) (HCC)    Shunt malfunction    Colon obstruction (HCC)    Colonic mass    Adenocarcinoma of colon (HCC)    Severe protein-calorie malnutrition (HCC)    Sacral decubitus ulcer, stage IV (HCC)    ESBL (extended spectrum beta-lactamase) producing bacteria infection    Acute blood loss as cause of postoperative anemia    Bowel perforation (HCC)    Critical illness myopathy    Cachexia (HCC)    Unintentional weight loss    Adult failure to thrive    Encounter

## 2024-09-12 NOTE — PROGRESS NOTES
Hourly rounds performed this shift. Bed lowered and locked. Call light within reach. All needs met at this time.

## 2024-09-13 PROCEDURE — 6370000000 HC RX 637 (ALT 250 FOR IP): Performed by: INTERNAL MEDICINE

## 2024-09-13 PROCEDURE — 6370000000 HC RX 637 (ALT 250 FOR IP): Performed by: PHYSICIAN ASSISTANT

## 2024-09-13 PROCEDURE — 6370000000 HC RX 637 (ALT 250 FOR IP): Performed by: NURSE PRACTITIONER

## 2024-09-13 PROCEDURE — 6360000002 HC RX W HCPCS: Performed by: SURGERY

## 2024-09-13 PROCEDURE — 1100000000 HC RM PRIVATE

## 2024-09-13 RX ADMIN — TERBINAFINE HYDROCHLORIDE: 1 CREAM TOPICAL at 09:35

## 2024-09-13 RX ADMIN — METOPROLOL TARTRATE 25 MG: 25 TABLET, FILM COATED ORAL at 09:33

## 2024-09-13 RX ADMIN — PANTOPRAZOLE SODIUM 40 MG: 40 TABLET, DELAYED RELEASE ORAL at 20:39

## 2024-09-13 RX ADMIN — Medication 1 CAPSULE: at 09:33

## 2024-09-13 RX ADMIN — ENOXAPARIN SODIUM 40 MG: 100 INJECTION SUBCUTANEOUS at 09:33

## 2024-09-13 RX ADMIN — TERBINAFINE HYDROCHLORIDE: 1 CREAM TOPICAL at 20:40

## 2024-09-13 RX ADMIN — METOPROLOL TARTRATE 25 MG: 25 TABLET, FILM COATED ORAL at 20:39

## 2024-09-13 NOTE — PROGRESS NOTES
Hospitalist Progress Note   Admit Date:  2024  4:30 PM   Name:  Brendan Saleh   Age:  49 y.o.  Sex:  male  :  1975   MRN:  [id number]   Room:  [facility]    Presenting/Chief Complaint: Altered Mental Status     Reason(s) for Admission: Acute hypernatremia [E87.0]  Volume depletion [E86.9]  Acute kidney injury (HCC) [N17.9]  Acute encephalopathy [G93.40]  Septic shock (HCC) [A41.9, R65.21]     Hospital Course:   Brendan Saleh is a 49 y.o. male with medical history of dural hematoma status post bur hole, , hydrocephalus s/p  shunt, sacral decubitus stage IV ulcer who presented 2024 with altered mental status.    Found to initially have urinary tract infection with hematuria was started on vancomycin and Zosyn.  Urine cultures are finalized and found to be negative.  Infectious disease was consulted. Echocardiography was done which showed heart failure with preserved ejection fraction without vegetation.  On 2024 patient underwent sacral wound debridement with general surgery.     Additionally, CT head demonstrated ongoing hydrocephalus with  shunt.  Neurosurgery consulted and patient underwent right ventriculoperitoneal shunt removal on 2024.  Neurosurgery followed up on 24 and said patient would not be a candidate for  shunt replacement given high risk of infection.  Infectious disease later consulted for CSF infection as  shunt tip grew ESBL (Klebsiella and Serratia).  Antibiotics were then changed to IV ciprofloxacin and then oral ciprofloxacin following shunt removal.      Hospital course further complicated by small bowel obstruction on May 19. Patient underwent hemicolectomy and primary anastomosis with general surgery. 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 Oncology consulted recommend CEA which was negative. On 2024, patient noted to be tachycardic and distended with emesis.  Imaging revealed free air

## 2024-09-13 NOTE — PLAN OF CARE
Problem: Discharge Planning  Goal: Discharge to home or other facility with appropriate resources  Outcome: Progressing     Problem: Safety - Adult  Goal: Free from fall injury  Outcome: Progressing     Problem: Neurosensory - Adult  Goal: Achieves stable or improved neurological status  Outcome: Progressing     Problem: Musculoskeletal - Adult  Goal: Return mobility to safest level of function  Outcome: Progressing     Problem: Skin/Tissue Integrity  Goal: Absence of new skin breakdown  Description: 1.  Monitor for areas of redness and/or skin breakdown  2.  Assess vascular access sites hourly  3.  Every 4-6 hours minimum:  Change oxygen saturation probe site  4.  Every 4-6 hours:  If on nasal continuous positive airway pressure, respiratory therapy assess nares and determine need for appliance change or resting period.  Outcome: Progressing     Problem: Pain  Goal: Verbalizes/displays adequate comfort level or baseline comfort level  Outcome: Progressing  Flowsheets (Taken 9/12/2024 1919)  Verbalizes/displays adequate comfort level or baseline comfort level:   Encourage patient to monitor pain and request assistance   Assess pain using appropriate pain scale     Problem: Skin/Tissue Integrity - Adult  Goal: Skin integrity remains intact  Outcome: Progressing  Goal: Incisions, wounds, or drain sites healing without S/S of infection  Outcome: Progressing     Problem: Gastrointestinal - Adult  Goal: Maintains adequate nutritional intake  Outcome: Progressing     Problem: Genitourinary - Adult  Goal: Absence of urinary retention  Outcome: Progressing     Problem: Infection - Adult  Goal: Absence of infection at discharge  Outcome: Progressing  Goal: Absence of infection during hospitalization  Outcome: Progressing     Problem: Metabolic/Fluid and Electrolytes - Adult  Goal: Electrolytes maintained within normal limits  Outcome: Progressing  Goal: Hemodynamic stability and optimal renal function maintained  Outcome:

## 2024-09-13 NOTE — CARE COORDINATION
Dispo update:  Mr. Saleh is still waiting on his Social Security disability letter, which will allow him to apply for [place] Medicaid, which will allow [facility]  to apply for Medicaid Level of Care, which will allow him to be placed into a skilled nursing facility for long-term care. He has a history of a subdural hematoma and ongoing hydrocephalous with a  shunt.

## 2024-09-13 NOTE — WOUND CARE
Coccyx wound nearly healed, 2x0.8x0.2cm granular, recommend to now use opticel ag and abd, every other day and prn. Continue frequent turning and offloading.

## 2024-09-13 NOTE — PLAN OF CARE
Problem: Discharge Planning  Goal: Discharge to home or other facility with appropriate resources  9/13/2024 0810 by Christopher Power RN  Outcome: Progressing  9/13/2024 0100 by Misty Webber RN  Outcome: Progressing     Problem: Safety - Adult  Goal: Free from fall injury  9/13/2024 0810 by Christopher Power RN  Outcome: Progressing  9/13/2024 0100 by Misty Webber RN  Outcome: Progressing     Problem: Neurosensory - Adult  Goal: Achieves stable or improved neurological status  9/13/2024 0810 by Christopher Power RN  Outcome: Progressing  9/13/2024 0100 by Misty Webber RN  Outcome: Progressing     Problem: Musculoskeletal - Adult  Goal: Return mobility to safest level of function  9/13/2024 0810 by Christopher Power RN  Outcome: Progressing  9/13/2024 0100 by Misty Webber RN  Outcome: Progressing     Problem: Skin/Tissue Integrity  Goal: Absence of new skin breakdown  Description: 1.  Monitor for areas of redness and/or skin breakdown  2.  Assess vascular access sites hourly  3.  Every 4-6 hours minimum:  Change oxygen saturation probe site  4.  Every 4-6 hours:  If on nasal continuous positive airway pressure, respiratory therapy assess nares and determine need for appliance change or resting period.  9/13/2024 0810 by Christopher Power RN  Outcome: Progressing  9/13/2024 0100 by Misty Webber RN  Outcome: Progressing     Problem: Pain  Goal: Verbalizes/displays adequate comfort level or baseline comfort level  9/13/2024 0810 by Christopher Power RN  Outcome: Progressing  9/13/2024 0100 by Misty Webber RN  Outcome: Progressing  Flowsheets (Taken 9/12/2024 1919)  Verbalizes/displays adequate comfort level or baseline comfort level:   Encourage patient to monitor pain and request assistance   Assess pain using appropriate pain scale     Problem: Skin/Tissue Integrity - Adult  Goal: Skin integrity remains intact  9/13/2024 0810 by Christopher Power RN  Outcome: Progressing  9/13/2024 0100 by Misty Webber  by Christopher Power, RN  Outcome: Progressing  9/13/2024 0100 by Misty Webber RN  Outcome: Progressing

## 2024-09-14 PROCEDURE — 6370000000 HC RX 637 (ALT 250 FOR IP): Performed by: NURSE PRACTITIONER

## 2024-09-14 PROCEDURE — 1100000000 HC RM PRIVATE

## 2024-09-14 PROCEDURE — 6360000002 HC RX W HCPCS: Performed by: SURGERY

## 2024-09-14 PROCEDURE — 2580000003 HC RX 258: Performed by: STUDENT IN AN ORGANIZED HEALTH CARE EDUCATION/TRAINING PROGRAM

## 2024-09-14 PROCEDURE — 6370000000 HC RX 637 (ALT 250 FOR IP): Performed by: PHYSICIAN ASSISTANT

## 2024-09-14 PROCEDURE — 6370000000 HC RX 637 (ALT 250 FOR IP): Performed by: INTERNAL MEDICINE

## 2024-09-14 RX ADMIN — SODIUM CHLORIDE, PRESERVATIVE FREE 10 ML: 5 INJECTION INTRAVENOUS at 09:42

## 2024-09-14 RX ADMIN — TERBINAFINE HYDROCHLORIDE: 1 CREAM TOPICAL at 21:25

## 2024-09-14 RX ADMIN — METOPROLOL TARTRATE 25 MG: 25 TABLET, FILM COATED ORAL at 09:41

## 2024-09-14 RX ADMIN — PANTOPRAZOLE SODIUM 40 MG: 40 TABLET, DELAYED RELEASE ORAL at 20:56

## 2024-09-14 RX ADMIN — METOPROLOL TARTRATE 25 MG: 25 TABLET, FILM COATED ORAL at 20:56

## 2024-09-14 RX ADMIN — Medication 1 CAPSULE: at 09:41

## 2024-09-14 RX ADMIN — TERBINAFINE HYDROCHLORIDE: 1 CREAM TOPICAL at 09:51

## 2024-09-14 RX ADMIN — ENOXAPARIN SODIUM 40 MG: 100 INJECTION SUBCUTANEOUS at 09:41

## 2024-09-14 NOTE — PROGRESS NOTES
Pt resting quietly in bed. Hourly rounds completed. Pt's bed low and locked. Call light and personal items within pt's reach. Pt denies needs at this time. Pt's bed alarm turned on. End of shift report given to night shift RN

## 2024-09-14 NOTE — PROGRESS NOTES
weight loss    Adult failure to thrive    Encounter for palliative care    Hypomagnesemia    Diarrhea  Resolved Problems:    Hypernatremia    MIRNA (acute kidney injury) ([facility])    UTI (urinary tract infection)    Septic shock ([facility])      Objective:   Patient Vitals for the past 24 hrs:   Temp Pulse Resp BP SpO2   09/14/24 0705 97.5 °F (36.4 °C) 82 17 106/74 96 %   09/14/24 0329 98.6 °F (37 °C) 91 18 107/77 98 %   09/13/24 1917 98.2 °F (36.8 °C) 100 18 105/71 100 %   09/13/24 0813 97.3 °F (36.3 °C) 78 18 109/77 98 %       Oxygen Therapy  SpO2: 96 %  Pulse Oximetry Type: Intermittent  Pulse via Oximetry: 128 beats per minute  Pulse Oximeter Device Mode: Intermittent  Pulse Oximeter Device Location: Finger  O2 Device: None (Room air)  Skin Assessment: Clean, dry, & intact  O2 Flow Rate (L/min): 1 L/min  Oxygen Therapy: None (Room air)    Estimated body mass index is 19 kg/m² as calculated from the following:    Height as of this encounter: 1.905 m (6' 3\").    Weight as of this encounter: 68.9 kg (152 lb).    Intake/Output Summary (Last 24 hours) at 9/14/2024 0719  Last data filed at 9/14/2024 0355  Gross per 24 hour   Intake --   Output 2200 ml   Net -2200 ml       Physical Exam  Vitals reviewed.   Constitutional:       General: He is awake.      Appearance: He is underweight. He is not ill-appearing or toxic-appearing.   HENT:      Head: Normocephalic and atraumatic.      Right Ear: External ear normal.      Left Ear: External ear normal.      Nose: Nose normal.      Mouth/Throat:      Mouth: Mucous membranes are moist.      Pharynx: Oropharynx is clear.   Eyes:      General: No scleral icterus.     Extraocular Movements: Extraocular movements intact.   Cardiovascular:      Rate and Rhythm: Normal rate and regular rhythm.      Heart sounds: No murmur heard.  Pulmonary:      Effort: No respiratory distress.   Abdominal:      General: Abdomen is flat.      Palpations: Abdomen is soft.   Musculoskeletal:      Cervical back:  Normal range of motion.      Right lower leg: No edema.      Left lower leg: No edema.   Skin:     General: Skin is warm and dry.      Capillary Refill: Capillary refill takes less than 2 seconds.   Neurological:      General: No focal deficit present.      Mental Status: He is oriented to person, place, and time.      Cranial Nerves: No cranial nerve deficit.   Psychiatric:         Mood and Affect: Mood normal.         Behavior: Behavior normal. Behavior is cooperative.          I have personally reviewed labs and tests:  Recent Labs:  No results found for this or any previous visit (from the past 48 hour(s)).          No results for input(s): \"COVID19\" in the last 72 hours.    Current Meds:  Current Facility-Administered Medications   Medication Dose Route Frequency    terbinafine (LAMISIL) 1 % cream   Topical BID    lactobacillus (CULTURELLE) capsule 1 capsule  1 capsule Oral Daily with breakfast    LORazepam (ATIVAN) tablet 0.5 mg  0.5 mg Oral Q6H PRN    diphenhydrAMINE (BENADRYL) injection 25 mg  25 mg IntraVENous Q6H PRN    pantoprazole (PROTONIX) tablet 40 mg  40 mg Oral Nightly    oxyCODONE (ROXICODONE) immediate release tablet 5 mg  5 mg Oral Q4H PRN    cyclobenzaprine (FLEXERIL) tablet 10 mg  10 mg Oral TID PRN    traZODone (DESYREL) tablet 50 mg  50 mg Oral Nightly PRN    acetaminophen (TYLENOL) tablet 650 mg  650 mg Oral Q4H PRN    metoprolol tartrate (LOPRESSOR) tablet 25 mg  25 mg Oral BID    0.9 % sodium chloride infusion  25 mL IntraVENous PRN    sodium chloride flush 0.9 % injection 5-40 mL  5-40 mL IntraVENous PRN    0.9 % sodium chloride infusion   IntraVENous PRN    glucose chewable tablet 16 g  4 tablet Oral PRN    dextrose bolus 10% 125 mL  125 mL IntraVENous PRN    Or    dextrose bolus 10% 250 mL  250 mL IntraVENous PRN    Glucagon Emergency KIT 1 mg  1 mg SubCUTAneous PRN    dextrose 10 % infusion   IntraVENous Continuous PRN    medicated lip ointment (BLISTEX)   Topical PRN    ondansetron

## 2024-09-15 PROCEDURE — 6360000002 HC RX W HCPCS: Performed by: SURGERY

## 2024-09-15 PROCEDURE — 6370000000 HC RX 637 (ALT 250 FOR IP): Performed by: PHYSICIAN ASSISTANT

## 2024-09-15 PROCEDURE — 97535 SELF CARE MNGMENT TRAINING: CPT

## 2024-09-15 PROCEDURE — 97530 THERAPEUTIC ACTIVITIES: CPT

## 2024-09-15 PROCEDURE — 6370000000 HC RX 637 (ALT 250 FOR IP): Performed by: INTERNAL MEDICINE

## 2024-09-15 PROCEDURE — 1100000000 HC RM PRIVATE

## 2024-09-15 PROCEDURE — 6370000000 HC RX 637 (ALT 250 FOR IP): Performed by: NURSE PRACTITIONER

## 2024-09-15 RX ADMIN — ENOXAPARIN SODIUM 40 MG: 100 INJECTION SUBCUTANEOUS at 09:18

## 2024-09-15 RX ADMIN — TERBINAFINE HYDROCHLORIDE: 1 CREAM TOPICAL at 21:10

## 2024-09-15 RX ADMIN — TERBINAFINE HYDROCHLORIDE: 1 CREAM TOPICAL at 09:20

## 2024-09-15 RX ADMIN — PANTOPRAZOLE SODIUM 40 MG: 40 TABLET, DELAYED RELEASE ORAL at 21:08

## 2024-09-15 RX ADMIN — Medication 1 CAPSULE: at 09:18

## 2024-09-15 RX ADMIN — METOPROLOL TARTRATE 25 MG: 25 TABLET, FILM COATED ORAL at 09:18

## 2024-09-15 ASSESSMENT — PAIN SCALES - GENERAL: PAINLEVEL_OUTOF10: 0

## 2024-09-15 NOTE — PROGRESS NOTES
ACUTE OCCUPATIONAL THERAPY GOALS:   (Developed with and agreed upon by patient and/or caregiver.)  1. Patient will complete lower body bathing and dressing with MINIMAL ASSIST and adaptive equipment as needed. UPGRADED  2. Patient will complete grooming ADL in standing with CONTACT GUARD ASSIST. UPGRADED  3. Patient will tolerate 25 minutes of OT treatment with 1-2 rest breaks to increase activity tolerance for ADLs. PROGRESSING  4. Patient will complete functional transfers with STAND BY ASSIST and adaptive equipment as needed. UPGRADED  5. Patient will tolerate 20 minutes BUE exercises to increase strength for safe, functional transfers. PROGRESSING  6. Patient will complete upper body bathing and dressing with SET UP ASSIST and adaptive equipment as needed. UPGRADED  7. Patient will tolerate static standing task for >7 minutes with STAND BY ASSIST in preparation for ADL tasks.  8. Patient will complete functional mobility of household distances with STAND BY ASSIST and adaptive equipment as needed. UPGRADED     Timeframe: 7 visits  OCCUPATIONAL THERAPY: Daily Note PM   OT Visit Days: 4   Time In/Out  OT Charge Capture  Rehab Caseload Tracker  OT Orders    Brendan Saleh is a 49 y.o. male   PRIMARY DIAGNOSIS: Infection of  (ventriculoperitoneal) shunt (HCC)  Acute hypernatremia [E87.0]  Volume depletion [E86.9]  Acute kidney injury (HCC) [N17.9]  Acute encephalopathy [G93.40]  Septic shock (HCC) [A41.9, R65.21]  Procedure(s) (LRB):  LAPAROTOMY EXPLORATORY, PRIMARY CLOSURE SMALL BOWEL PERFORATION (N/A)  106 Days Post-Op  Inpatient: Payor: [employer] / Plan: AMBETTER / Product Type: *No Product type* /     ASSESSMENT:     REHAB RECOMMENDATIONS:   Recommendation to date pending progress:  Setting:  Pt continues to be medically stable for discharge, however awaiting medicaid/ social security disability    Pt would benefit from rehab when able to be placed    Equipment:    To Be Determined     ASSESSMENT:

## 2024-09-15 NOTE — PROGRESS NOTES
Hourly rounds performed this shift.  VS stable.  Pt medicated per MAR.  All known needs met at this time.  Bed low and locked, call light in reach.

## 2024-09-15 NOTE — PROGRESS NOTES
Hospitalist Progress Note   Admit Date:  2024  4:30 PM   Name:  Brendan Saleh   Age:  49 y.o.  Sex:  male  :  1975   MRN:  [id number]   [facility]    Presenting/Chief Complaint: Altered Mental Status     Reason(s) for Admission: Acute hypernatremia [E87.0]  Volume depletion [E86.9]  Acute kidney injury (HCC) [N17.9]  Acute encephalopathy [G93.40]  Septic shock (HCC) [A41.9, R65.21]     Hospital Course:   Brendan Saleh is a 49 y.o. male with medical history of dural hematoma status post bur hole, , hydrocephalus s/p  shunt, sacral decubitus stage IV ulcer who presented 2024 with altered mental status.    Found to initially have urinary tract infection with hematuria was started on vancomycin and Zosyn.  Urine cultures are finalized and found to be negative.  Infectious disease was consulted. Echocardiography was done which showed heart failure with preserved ejection fraction without vegetation.  On 2024 patient underwent sacral wound debridement with general surgery.     Additionally, CT head demonstrated ongoing hydrocephalus with  shunt.  Neurosurgery consulted and patient underwent right ventriculoperitoneal shunt removal on 2024.  Neurosurgery followed up on 24 and said patient would not be a candidate for  shunt replacement given high risk of infection.  Infectious disease later consulted for CSF infection as  shunt tip grew ESBL (Klebsiella and Serratia).  Antibiotics were then changed to IV ciprofloxacin and then oral ciprofloxacin following shunt removal.      Hospital course further complicated by small bowel obstruction on May 19. Patient underwent hemicolectomy and primary anastomosis with general surgery. 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 Oncology consulted recommend CEA which was negative. On 2024, patient noted to be tachycardic and distended with emesis.  Imaging revealed free air  Q8H PRN    Or    ondansetron (ZOFRAN) injection 4 mg  4 mg IntraVENous Q6H PRN    enoxaparin (LOVENOX) injection 40 mg  40 mg SubCUTAneous Daily    calcium carbonate (TUMS) chewable tablet 500 mg  500 mg Oral TID PRN       Signed:    Giovanni Dodd, DO   Internal Medicine      Part of this note may have been written by using a voice dictation software.  The note has been proof read but may still contain some grammatical/other typographical errors.

## 2024-09-16 LAB
ALBUMIN SERPL-MCNC: 3.1 G/DL (ref 3.5–5)
ALBUMIN/GLOB SERPL: 0.7 (ref 1–1.9)
ALP SERPL-CCNC: 67 U/L (ref 40–129)
ALT SERPL-CCNC: 9 U/L (ref 12–65)
ANION GAP SERPL CALC-SCNC: 9 MMOL/L (ref 9–18)
AST SERPL-CCNC: 18 U/L (ref 15–37)
BASOPHILS # BLD: 0 K/UL (ref 0–0.2)
BASOPHILS NFR BLD: 0 % (ref 0–2)
BILIRUB SERPL-MCNC: 0.2 MG/DL (ref 0–1.2)
BUN SERPL-MCNC: 21 MG/DL (ref 6–23)
CALCIUM SERPL-MCNC: 9.2 MG/DL (ref 8.8–10.2)
CHLORIDE SERPL-SCNC: 102 MMOL/L (ref 98–107)
CO2 SERPL-SCNC: 28 MMOL/L (ref 20–28)
CREAT SERPL-MCNC: 0.86 MG/DL (ref 0.8–1.3)
DIFFERENTIAL METHOD BLD: ABNORMAL
EOSINOPHIL # BLD: 0.2 K/UL (ref 0–0.8)
EOSINOPHIL NFR BLD: 4 % (ref 0.5–7.8)
ERYTHROCYTE [DISTWIDTH] IN BLOOD BY AUTOMATED COUNT: 14.1 % (ref 11.9–14.6)
GLOBULIN SER CALC-MCNC: 4.4 G/DL (ref 2.3–3.5)
GLUCOSE SERPL-MCNC: 96 MG/DL (ref 70–99)
HCT VFR BLD AUTO: 35.9 % (ref 41.1–50.3)
HGB BLD-MCNC: 11.1 G/DL (ref 13.6–17.2)
IMM GRANULOCYTES # BLD AUTO: 0 K/UL (ref 0–0.5)
IMM GRANULOCYTES NFR BLD AUTO: 0 % (ref 0–5)
LYMPHOCYTES # BLD: 1.4 K/UL (ref 0.5–4.6)
LYMPHOCYTES NFR BLD: 27 % (ref 13–44)
MCH RBC QN AUTO: 28.9 PG (ref 26.1–32.9)
MCHC RBC AUTO-ENTMCNC: 30.9 G/DL (ref 31.4–35)
MCV RBC AUTO: 93.5 FL (ref 82–102)
MONOCYTES # BLD: 0.5 K/UL (ref 0.1–1.3)
MONOCYTES NFR BLD: 10 % (ref 4–12)
NEUTS SEG # BLD: 3 K/UL (ref 1.7–8.2)
NEUTS SEG NFR BLD: 59 % (ref 43–78)
NRBC # BLD: 0 K/UL (ref 0–0.2)
PLATELET # BLD AUTO: 215 K/UL (ref 150–450)
PMV BLD AUTO: 10.1 FL (ref 9.4–12.3)
POTASSIUM SERPL-SCNC: 4.1 MMOL/L (ref 3.5–5.1)
PROT SERPL-MCNC: 7.5 G/DL (ref 6.3–8.2)
RBC # BLD AUTO: 3.84 M/UL (ref 4.23–5.6)
SODIUM SERPL-SCNC: 139 MMOL/L (ref 136–145)
WBC # BLD AUTO: 5.2 K/UL (ref 4.3–11.1)

## 2024-09-16 PROCEDURE — 6370000000 HC RX 637 (ALT 250 FOR IP): Performed by: INTERNAL MEDICINE

## 2024-09-16 PROCEDURE — 6360000002 HC RX W HCPCS: Performed by: SURGERY

## 2024-09-16 PROCEDURE — 6370000000 HC RX 637 (ALT 250 FOR IP): Performed by: NURSE PRACTITIONER

## 2024-09-16 PROCEDURE — 1100000000 HC RM PRIVATE

## 2024-09-16 PROCEDURE — 6370000000 HC RX 637 (ALT 250 FOR IP): Performed by: PHYSICIAN ASSISTANT

## 2024-09-16 PROCEDURE — 97530 THERAPEUTIC ACTIVITIES: CPT

## 2024-09-16 PROCEDURE — 85025 COMPLETE CBC W/AUTO DIFF WBC: CPT

## 2024-09-16 PROCEDURE — 36415 COLL VENOUS BLD VENIPUNCTURE: CPT

## 2024-09-16 PROCEDURE — 80053 COMPREHEN METABOLIC PANEL: CPT

## 2024-09-16 RX ADMIN — METOPROLOL TARTRATE 25 MG: 25 TABLET, FILM COATED ORAL at 20:37

## 2024-09-16 RX ADMIN — PANTOPRAZOLE SODIUM 40 MG: 40 TABLET, DELAYED RELEASE ORAL at 20:37

## 2024-09-16 RX ADMIN — METOPROLOL TARTRATE 25 MG: 25 TABLET, FILM COATED ORAL at 09:08

## 2024-09-16 RX ADMIN — TERBINAFINE HYDROCHLORIDE: 1 CREAM TOPICAL at 21:49

## 2024-09-16 RX ADMIN — TERBINAFINE HYDROCHLORIDE: 1 CREAM TOPICAL at 09:00

## 2024-09-16 RX ADMIN — Medication 1 CAPSULE: at 09:08

## 2024-09-16 RX ADMIN — ENOXAPARIN SODIUM 40 MG: 100 INJECTION SUBCUTANEOUS at 09:00

## 2024-09-16 ASSESSMENT — PAIN SCALES - GENERAL
PAINLEVEL_OUTOF10: 0
PAINLEVEL_OUTOF10: 0

## 2024-09-16 ASSESSMENT — PAIN SCALES - WONG BAKER: WONGBAKER_NUMERICALRESPONSE: NO HURT

## 2024-09-16 NOTE — PROGRESS NOTES
Hourly rounds performed this shift. Patient resting with no complaints at this time. Bed locked in lowest position, call light, and all personal belongings in pt reach. VS stable, IV patent. Pt linens changed this shift. Pt dressings changed this am. will give report to oncoming nurse.

## 2024-09-16 NOTE — PROGRESS NOTES
Hospitalist Progress Note   Admit Date:  2024  4:30 PM   Name:  Brendan Saleh   Age:  49 y.o.  Sex:  male  :  1975   MRN:  [id number]   Room:  [facility]    Presenting/Chief Complaint: Altered Mental Status     Reason(s) for Admission: Acute hypernatremia [E87.0]  Volume depletion [E86.9]  Acute kidney injury (HCC) [N17.9]  Acute encephalopathy [G93.40]  Septic shock (HCC) [A41.9, R65.21]     Hospital Course:   Brendan Saleh is a 49 y.o. male with medical history of dural hematoma status post bur hole, , hydrocephalus s/p  shunt, sacral decubitus stage IV ulcer who presented 2024 with altered mental status.    Found to initially have urinary tract infection with hematuria was started on vancomycin and Zosyn.  Urine cultures are finalized and found to be negative.  Infectious disease was consulted. Echocardiography was done which showed heart failure with preserved ejection fraction without vegetation.  On 2024 patient underwent sacral wound debridement with general surgery.     Additionally, CT head demonstrated ongoing hydrocephalus with  shunt.  Neurosurgery consulted and patient underwent right ventriculoperitoneal shunt removal on 2024.  Neurosurgery followed up on 24 and said patient would not be a candidate for  shunt replacement given high risk of infection.  Infectious disease later consulted for CSF infection as  shunt tip grew ESBL (Klebsiella and Serratia).  Antibiotics were then changed to IV ciprofloxacin and then oral ciprofloxacin following shunt removal.      Hospital course further complicated by small bowel obstruction on May 19. Patient underwent hemicolectomy and primary anastomosis with general surgery. 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 Oncology consulted recommend CEA which was negative. On 2024, patient noted to be tachycardic and distended with emesis.  Imaging revealed free air

## 2024-09-16 NOTE — PROGRESS NOTES
Hospitalist Progress Note   Admit Date:  2024  4:30 PM   Name:  Brendan Saleh   Age:  49 y.o.  Sex:  male  :  1975   MRN:  [id number]   Room:  Pending sale to [facility]    Presenting/Chief Complaint: Altered Mental Status     Reason(s) for Admission: Acute hypernatremia [E87.0]  Volume depletion [E86.9]  Acute kidney injury (HCC) [N17.9]  Acute encephalopathy [G93.40]  Septic shock (HCC) [A41.9, R65.21]     Hospital Course:   Brendan Saleh is a 49 y.o. male with medical history of dural hematoma status post bur hole, , hydrocephalus s/p  shunt, sacral decubitus stage IV ulcer who presented 2024 with altered mental status.    Found to initially have urinary tract infection with hematuria was started on vancomycin and Zosyn.  Urine cultures are finalized and found to be negative.  Infectious disease was consulted. Echocardiography was done which showed heart failure with preserved ejection fraction without vegetation.  On 2024 patient underwent sacral wound debridement with general surgery.     Additionally, CT head demonstrated ongoing hydrocephalus with  shunt.  Neurosurgery consulted and patient underwent right ventriculoperitoneal shunt removal on 2024.  Neurosurgery followed up on 24 and said patient would not be a candidate for  shunt replacement given high risk of infection.  Infectious disease later consulted for CSF infection as  shunt tip grew ESBL (Klebsiella and Serratia).  Antibiotics were then changed to IV ciprofloxacin and then oral ciprofloxacin following shunt removal.      Hospital course further complicated by small bowel obstruction on May 19. Patient underwent hemicolectomy and primary anastomosis with general surgery. 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 Oncology consulted recommend CEA which was negative. On 2024, patient noted to be tachycardic and distended with emesis.  Imaging revealed free air

## 2024-09-16 NOTE — PROGRESS NOTES
ACUTE PHYSICAL THERAPY GOALS:   (Developed with and agreed upon by patient and/or caregiver.)  (1.) Brendan Saleh  will move from supine to sit and sit to supine and scoot up and down with CONTACT GUARD ASSIST within 7 treatment day(s).   SLOW PROGRESS 9/10   (2.) Brendan Saleh will transfer from bed to chair and chair to bed with MINIMAL ASSISTx1 using the least restrictive device within 7 treatment day(s).  MEDIUM PROGRESS 9/10  (3.) Brendan Saleh will perform standing static and dynamic balance activities x 5 minutes with CONTACT GUARD ASSIST to improve safety within 7 treatment day(s).   MEDIUM PROGRESS 9/10  (4.) Brendan Saleh will ambulate with CONTACT GUARD ASSIST for 100 feet with the least restrictive device within 7 treatment day(s). MEDIUM PROGRESS 9/10    PHYSICAL THERAPY: Daily Note PM   (Link to Caseload Tracking: PT Visit Days : 3  Time In/Out PT Charge Capture  Rehab Caseload Tracker  Orders    Brendan Saleh is a 49 y.o. male   PRIMARY DIAGNOSIS: Infection of  (ventriculoperitoneal) shunt (HCC)  Acute hypernatremia [E87.0]  Volume depletion [E86.9]  Acute kidney injury (HCC) [N17.9]  Acute encephalopathy [G93.40]  Septic shock (HCC) [A41.9, R65.21]  Procedure(s) (LRB):  LAPAROTOMY EXPLORATORY, PRIMARY CLOSURE SMALL BOWEL PERFORATION (N/A)  107 Days Post-Op  Inpatient: Payor: [employer] / Plan: [employer]BETTER / Product Type: *No Product type* /     ASSESSMENT:     REHAB RECOMMENDATIONS:   Recommendation to date pending progress:  Setting:  Short-term Rehab    Equipment:    To Be Determined     ASSESSMENT:  Mr. Saleh presents in supine, agreeable to session.  Pt continues to improve gait distance, but has significant impairment when cognitive task combined w/ walking  (such as identifying room numbers aloud), citing continued high fall risk. All ambulation continues to require a close chair follow.       Upon entering, Pnt is agreeable to PT treatment.  he reports no pain at

## 2024-09-17 PROCEDURE — 1100000000 HC RM PRIVATE

## 2024-09-17 PROCEDURE — 6370000000 HC RX 637 (ALT 250 FOR IP): Performed by: INTERNAL MEDICINE

## 2024-09-17 PROCEDURE — 97535 SELF CARE MNGMENT TRAINING: CPT

## 2024-09-17 PROCEDURE — 6360000002 HC RX W HCPCS: Performed by: SURGERY

## 2024-09-17 PROCEDURE — 6370000000 HC RX 637 (ALT 250 FOR IP): Performed by: NURSE PRACTITIONER

## 2024-09-17 PROCEDURE — 6370000000 HC RX 637 (ALT 250 FOR IP): Performed by: PHYSICIAN ASSISTANT

## 2024-09-17 RX ADMIN — PANTOPRAZOLE SODIUM 40 MG: 40 TABLET, DELAYED RELEASE ORAL at 21:10

## 2024-09-17 RX ADMIN — TERBINAFINE HYDROCHLORIDE: 1 CREAM TOPICAL at 21:11

## 2024-09-17 RX ADMIN — ENOXAPARIN SODIUM 40 MG: 100 INJECTION SUBCUTANEOUS at 09:29

## 2024-09-17 RX ADMIN — Medication 1 CAPSULE: at 09:30

## 2024-09-17 RX ADMIN — TERBINAFINE HYDROCHLORIDE: 1 CREAM TOPICAL at 09:31

## 2024-09-17 RX ADMIN — METOPROLOL TARTRATE 25 MG: 25 TABLET, FILM COATED ORAL at 09:30

## 2024-09-17 ASSESSMENT — PAIN SCALES - GENERAL
PAINLEVEL_OUTOF10: 0
PAINLEVEL_OUTOF10: 0

## 2024-09-17 NOTE — PROGRESS NOTES
ACUTE OCCUPATIONAL THERAPY GOALS:   (Developed with and agreed upon by patient and/or caregiver.)  1. Patient will complete lower body bathing and dressing with MINIMAL ASSIST and adaptive equipment as needed. UPGRADED  2. Patient will complete grooming ADL in standing with CONTACT GUARD ASSIST. UPGRADED  3. Patient will tolerate 25 minutes of OT treatment with 1-2 rest breaks to increase activity tolerance for ADLs. PROGRESSING  4. Patient will complete functional transfers with STAND BY ASSIST and adaptive equipment as needed. UPGRADED  5. Patient will tolerate 20 minutes BUE exercises to increase strength for safe, functional transfers. PROGRESSING  6. Patient will complete upper body bathing and dressing with SET UP ASSIST and adaptive equipment as needed. UPGRADED  7. Patient will tolerate static standing task for >7 minutes with STAND BY ASSIST in preparation for ADL tasks.  8. Patient will complete functional mobility of household distances with STAND BY ASSIST and adaptive equipment as needed. UPGRADED     Timeframe: 7 visits  OCCUPATIONAL THERAPY: Daily Note PM   OT Visit Days: 6   Time In/Out  OT Charge Capture  Rehab Caseload Tracker  OT Orders    Brendan Saleh is a 49 y.o. male   PRIMARY DIAGNOSIS: Infection of  (ventriculoperitoneal) shunt (HCC)  Acute hypernatremia [E87.0]  Volume depletion [E86.9]  Acute kidney injury (HCC) [N17.9]  Acute encephalopathy [G93.40]  Septic shock (HCC) [A41.9, R65.21]  Procedure(s) (LRB):  LAPAROTOMY EXPLORATORY, PRIMARY CLOSURE SMALL BOWEL PERFORATION (N/A)  108 Days Post-Op  Inpatient: Payor: AMBETTER / Plan: AMBETTER / Product Type: *No Product type* /     ASSESSMENT:     REHAB RECOMMENDATIONS:   Recommendation to date pending progress:  Setting:  Pt continues to be medically stable for discharge, however awaiting medicaid/ social security disability    Pt would benefit from rehab when able to be placed    Equipment:    To Be Determined     ASSESSMENT:    [] [] [] []     Toilet [] [] [] [] [x] [x] [] [] [] [] []  Bedside commode     [] [] [] [] [] [] [] [] [] [] []    I=Independent, Mod I=Modified Independent, S=Supervision/Setup, SBA=Standby Assistance, CGA=Contact Guard Assistance, Min=Minimal Assistance, Mod=Moderate Assistance, Max=Maximal Assistance, Total=Total Assistance, NT=Not Tested    ACTIVITIES OF DAILY LIVING: I Mod I S SBA CGA Min Mod Max Total NT Comments   BASIC ADLs:              Upper Body   Bathing [] [] [] [] [] [] [] [] [] [x]     Lower Body Bathing [] [] [] [] [] [] [] [] [] [x]     Toileting [] [] [] [] [] [] [] [x] [] [] Hygiene in standing   Upper Body Dressing [] [] [] [] [] [x] [] [] [] [] Doff/don gown, assist to tie    Lower Body Dressing [] [] [] [] [] [] [x] [] [] [] Doff/don pull-up    Feeding [] [] [] [] [] [] [] [] [] [x]    Grooming [] [] [] [x] [] [] [] [] [] [] Wash face, comb hair, shampoo cap, brush teeth sitting at sink   Personal Device Care [] [] [] [] [] [] [] [] [] []    Functional Mobility [] [] [] [] [x] [] [] [] [] [] Using RW    I=Independent, Mod I=Modified Independent, S=Supervision/Setup, SBA=Standby Assistance, CGA=Contact Guard Assistance, Min=Minimal Assistance, Mod=Moderate Assistance, Max=Maximal Assistance, Total=Total Assistance, NT=Not Tested    BALANCE: Good Fair+ Fair Fair- Poor NT Comments   Sitting Static [] [x] [] [] [] []    Sitting Dynamic [] [x] [] [] [] []              Standing Static [] [] [x] [x] [] []    Standing Dynamic [] [] [] [x] [] []        PLAN:     FREQUENCY/DURATION   OT Plan of Care: 3 times/week for duration of hospital stay or until stated goals are met, whichever comes first.    TREATMENT:     TREATMENT:   Self Care (39 minutes): Patient participated in toileting, upper body dressing, lower body dressing, and grooming ADLs in unsupported sitting and standing with moderate verbal cueing to increase independence, decrease assistance required, increase activity tolerance, and increase

## 2024-09-17 NOTE — PROGRESS NOTES
Hospitalist Progress Note   Admit Date:  2024  4:30 PM   Name:  Brendan Saleh   Age:  49 y.o.  Sex:  male  :  1975   MRN:  [id number]   Room:  [facility]    Presenting/Chief Complaint: Altered Mental Status     Reason(s) for Admission: Acute hypernatremia [E87.0]  Volume depletion [E86.9]  Acute kidney injury (HCC) [N17.9]  Acute encephalopathy [G93.40]  Septic shock (HCC) [A41.9, R65.21]     Hospital Course:   Brendan Saleh is a 49 y.o. male with medical history of dural hematoma status post bur hole, , hydrocephalus s/p  shunt, sacral decubitus stage IV ulcer who presented 2024 with altered mental status.    Found to initially have urinary tract infection with hematuria was started on vancomycin and Zosyn.  Urine cultures are finalized and found to be negative.  Infectious disease was consulted. Echocardiography was done which showed heart failure with preserved ejection fraction without vegetation.  On 2024 patient underwent sacral wound debridement with general surgery.     Additionally, CT head demonstrated ongoing hydrocephalus with  shunt.  Neurosurgery consulted and patient underwent right ventriculoperitoneal shunt removal on 2024.  Neurosurgery followed up on 24 and said patient would not be a candidate for  shunt replacement given high risk of infection.  Infectious disease later consulted for CSF infection as  shunt tip grew ESBL (Klebsiella and Serratia).  Antibiotics were then changed to IV ciprofloxacin and then oral ciprofloxacin following shunt removal.      Hospital course further complicated by small bowel obstruction on May 19. Patient underwent hemicolectomy and primary anastomosis with general surgery. 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 Oncology consulted recommend CEA which was negative. On 2024, patient noted to be tachycardic and distended with emesis.  Imaging revealed free air

## 2024-09-17 NOTE — PROGRESS NOTES
Hourly rounds performed this shift. Patient resting with no complaints at this time. Bed locked in lowest position, call light, and all personal belongings in pt reach. VS stable, IV patent. Pt ambulated from chair to bed very well using walker. will give report to oncoming nurse.

## 2024-09-17 NOTE — PROGRESS NOTES
Pt resting in bed. Completed hourly rounds. Pt's bed low and locked. Call light and personal items within pt's reach. Pt denies needs at this time. End of shift report given to night shift RN

## 2024-09-18 PROCEDURE — 97168 OT RE-EVAL EST PLAN CARE: CPT

## 2024-09-18 PROCEDURE — 6370000000 HC RX 637 (ALT 250 FOR IP): Performed by: INTERNAL MEDICINE

## 2024-09-18 PROCEDURE — 6370000000 HC RX 637 (ALT 250 FOR IP): Performed by: PHYSICIAN ASSISTANT

## 2024-09-18 PROCEDURE — 97116 GAIT TRAINING THERAPY: CPT

## 2024-09-18 PROCEDURE — 97530 THERAPEUTIC ACTIVITIES: CPT

## 2024-09-18 PROCEDURE — 97535 SELF CARE MNGMENT TRAINING: CPT

## 2024-09-18 PROCEDURE — 6370000000 HC RX 637 (ALT 250 FOR IP): Performed by: NURSE PRACTITIONER

## 2024-09-18 PROCEDURE — 97112 NEUROMUSCULAR REEDUCATION: CPT

## 2024-09-18 PROCEDURE — 1100000000 HC RM PRIVATE

## 2024-09-18 PROCEDURE — 6360000002 HC RX W HCPCS: Performed by: SURGERY

## 2024-09-18 RX ADMIN — TERBINAFINE HYDROCHLORIDE: 1 CREAM TOPICAL at 22:54

## 2024-09-18 RX ADMIN — ENOXAPARIN SODIUM 40 MG: 100 INJECTION SUBCUTANEOUS at 08:49

## 2024-09-18 RX ADMIN — METOPROLOL TARTRATE 25 MG: 25 TABLET, FILM COATED ORAL at 22:46

## 2024-09-18 RX ADMIN — METOPROLOL TARTRATE 25 MG: 25 TABLET, FILM COATED ORAL at 08:49

## 2024-09-18 RX ADMIN — PANTOPRAZOLE SODIUM 40 MG: 40 TABLET, DELAYED RELEASE ORAL at 22:45

## 2024-09-18 RX ADMIN — Medication 1 CAPSULE: at 08:49

## 2024-09-18 RX ADMIN — TERBINAFINE HYDROCHLORIDE: 1 CREAM TOPICAL at 08:52

## 2024-09-18 RX ADMIN — OXYCODONE 5 MG: 5 TABLET ORAL at 22:46

## 2024-09-18 RX ADMIN — TRAZODONE HYDROCHLORIDE 50 MG: 50 TABLET ORAL at 22:46

## 2024-09-18 ASSESSMENT — PAIN SCALES - GENERAL: PAINLEVEL_OUTOF10: 8

## 2024-09-18 ASSESSMENT — PAIN DESCRIPTION - LOCATION: LOCATION: BACK

## 2024-09-18 ASSESSMENT — PAIN DESCRIPTION - DESCRIPTORS: DESCRIPTORS: ACHING;DISCOMFORT

## 2024-09-18 ASSESSMENT — PAIN DESCRIPTION - ORIENTATION: ORIENTATION: RIGHT;LEFT;POSTERIOR

## 2024-09-18 ASSESSMENT — PAIN - FUNCTIONAL ASSESSMENT: PAIN_FUNCTIONAL_ASSESSMENT: ACTIVITIES ARE NOT PREVENTED

## 2024-09-18 NOTE — PROGRESS NOTES
ACUTE OCCUPATIONAL THERAPY GOALS: GOALS REVIEWED AND UPDATED AT RE-EVALUATION ON 9/18/24  (Developed with and agreed upon by patient and/or caregiver.)  1. Patient will complete lower body bathing and dressing with CONTACT GUARD ASSIST and adaptive equipment as needed.   2. Patient will complete upper body bathing and dressing with SUPERVISION and adaptive equipment as needed.  3. Patient will complete grooming ADL standing edge of sink with CONTACT GUARD ASSIST.   4. Patient will complete functional transfers with STAND BY ASSIST and adaptive equipment as needed.   5. Patient will complete functional mobility with STAND BY ASSIST using least restrictive ambulatory device.   6. Patient will tolerate 20 minutes BUE exercises to increase strength for safe, functional transfers.    7. Patient will perform visual scanning task during functional mobility with minimal verbal cues.   8. Patient will attend to L visual field during functional task with minimal verbal cues.    Timeframe: 7 visits    OCCUPATIONAL THERAPY Daily Note, Re-evaluation, and PM       OT Visit Days: 1  Acknowledge Orders  Time  OT Charge Capture  Rehab Caseload Tracker      Brendan Saleh is a 49 y.o. male   PRIMARY DIAGNOSIS: Infection of  (ventriculoperitoneal) shunt (HCC)  Acute hypernatremia [E87.0]  Volume depletion [E86.9]  Acute kidney injury (HCC) [N17.9]  Acute encephalopathy [G93.40]  Septic shock (HCC) [A41.9, R65.21]  Procedure(s) (LRB):  LAPAROTOMY EXPLORATORY, PRIMARY CLOSURE SMALL BOWEL PERFORATION (N/A)  109 Days Post-Op  Reason for Referral: Generalized Muscle Weakness (M62.81)  Difficulty in walking, Not elsewhere classified (R26.2)  Other abnormalities of gait and mobility (R26.89)  Inpatient: Payor: AMBETTER / Plan: AMBETTER / Product Type: *No Product type* /     ASSESSMENT:     REHAB RECOMMENDATIONS:   Recommendation to date pending progress:  Setting:  Pt continues to be medically stable for discharge, however

## 2024-09-18 NOTE — PROGRESS NOTES
Hospitalist Progress Note   Admit Date:  2024  4:30 PM   Name:  Brendan Saleh   Age:  49 y.o.  Sex:  male  :  1975   MRN:  [id number]   Room:  [facility]    Presenting/Chief Complaint: Altered Mental Status     Reason(s) for Admission: Acute hypernatremia [E87.0]  Volume depletion [E86.9]  Acute kidney injury (HCC) [N17.9]  Acute encephalopathy [G93.40]  Septic shock (HCC) [A41.9, R65.21]     Hospital Course:   Brendan Saleh is a 49 y.o. male with medical history of dural hematoma status post bur hole, , hydrocephalus s/p  shunt, sacral decubitus stage IV ulcer who presented 2024 with altered mental status.    Found to initially have urinary tract infection with hematuria was started on vancomycin and Zosyn.  Urine cultures are finalized and found to be negative.  Infectious disease was consulted. Echocardiography was done which showed heart failure with preserved ejection fraction without vegetation.  On 2024 patient underwent sacral wound debridement with general surgery.     Additionally, CT head demonstrated ongoing hydrocephalus with  shunt.  Neurosurgery consulted and patient underwent right ventriculoperitoneal shunt removal on 2024.  Neurosurgery followed up on 24 and said patient would not be a candidate for  shunt replacement given high risk of infection.  Infectious disease later consulted for CSF infection as  shunt tip grew ESBL (Klebsiella and Serratia).  Antibiotics were then changed to IV ciprofloxacin and then oral ciprofloxacin following shunt removal.      Hospital course further complicated by small bowel obstruction on May 19. Patient underwent hemicolectomy and primary anastomosis with general surgery. 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 Oncology consulted recommend CEA which was negative. On 2024, patient noted to be tachycardic and distended with emesis.  Imaging revealed free air

## 2024-09-18 NOTE — PROGRESS NOTES
POST FALL MANAGEMENT    Brendan Saleh  MEDICAL RECORD NUMBER:  [id number]  AGE: 49 y.o.   GENDER: male  : 1975  TODAYS DATE:  2024    Details     Fall Occurred: Yes    Was the Fall Witnessed:  Yes       Brief Review of Event: Pt was getting in the shower but dropped shampoo. This RN went to  the shampoo but in that time frame the pt fell on the floor slowly.         Who found the patient: RN and tech      Where was the patient at the time of the fall: bathroom floor      Patient Comments: States that his hip hurt for a minute.        Date Fall Occurred:  2024 .       Time Fall Occurred: 2:08p.m.     Assessment     Post Fall Head to Toe Assessment Completed: Yes    Post Fall Predictive Analytic Score Reviewed: No:      Post Fall Vitals Completed: Yes    Post Fall Neuro Checks Completed: Yes    Injury Occurred(if yes, describe injury):  yes - small scrap on left side of lower back.            Did the Patient Experience:(Check Nic all that apply)    [] Patient hit head  [] Loss of consciousness  [] Change in mental status following the fall  [] Patient is on an anticoagulant medication      CT Performed:  no    Follow-up     Persons Notified of Fall:  (Provide names of persons notified)   [x] Physician:   [] LEIGHA:  [] Nursing Supervisior:  [x] Manager:  [] Pharmacist:  [] Family:  [] Other:      Electronically signed by TAM FARIAS RN 2024 at 2:13 PM

## 2024-09-18 NOTE — PROGRESS NOTES
ACUTE PHYSICAL THERAPY GOALS:   (Developed with and agreed upon by patient and/or caregiver.)  (1.) Brendan Saleh  will move from supine to sit and sit to supine and scoot up and down with CONTACT GUARD ASSIST within 7 treatment day(s).   SLOW PROGRESS 9/10   (2.) Brendan Saleh will transfer from bed to chair and chair to bed with MINIMAL ASSISTx1 using the least restrictive device within 7 treatment day(s).  MEDIUM PROGRESS 9/10  (3.) Brendan Saleh will perform standing static and dynamic balance activities x 5 minutes with CONTACT GUARD ASSIST to improve safety within 7 treatment day(s).   MEDIUM PROGRESS 9/10  (4.) Brendan Saleh will ambulate with CONTACT GUARD ASSIST for 100 feet with the least restrictive device within 7 treatment day(s). MEDIUM PROGRESS 9/10    PHYSICAL THERAPY: Daily Note AM   (Link to Caseload Tracking: PT Visit Days : 4  Time In/Out PT Charge Capture  Rehab Caseload Tracker  Orders    Brendan Saleh is a 49 y.o. male   PRIMARY DIAGNOSIS: Infection of  (ventriculoperitoneal) shunt (HCC)  Acute hypernatremia [E87.0]  Volume depletion [E86.9]  Acute kidney injury (HCC) [N17.9]  Acute encephalopathy [G93.40]  Septic shock (HCC) [A41.9, R65.21]  Procedure(s) (LRB):  LAPAROTOMY EXPLORATORY, PRIMARY CLOSURE SMALL BOWEL PERFORATION (N/A)  109 Days Post-Op  Inpatient: Payor: AMBETTER / Plan: AMBETTER / Product Type: *No Product type* /     ASSESSMENT:     REHAB RECOMMENDATIONS:   Recommendation to date pending progress:  Setting:  Short-term Rehab    Equipment:    To Be Determined     ASSESSMENT:  Mr. Saleh supine in bed upon arrival, agreeable to session.  During today's session, pt completed supine to sit transition with SBA, STS transfers with min A/ RW, and ambulated 2 x 50' and 1 x 150' with mod A/ RW/ and chair follow for safety. Pt demonstrates unsteady/ ataxic gait pattern. Pt demonstrated impulsivity and increased becki during gait. Required max cues to  decrease becki for safety. High fall risk. Pt making continued progress towards goals as pt required less assistance with bed mobility and ambulated maximal distance. Pt will continue to benefit from skilled acute PT services to progress mobility. Recommend rehab at discharge.       SUBJECTIVE:   Mr. Saleh states, \"Okay,\" agreeable to therapy     Social/Functional Lives With: Alone  Type of Home: Trailer  Home Layout: One level  Home Access: Stairs to enter with rails  Entrance Stairs - Number of Steps: 5  Entrance Stairs - Rails: Both  Home Equipment: Cane  Additional Comments: reports that he was living on [place] acres and was able to take care of the property  OBJECTIVE:     PAIN: VITALS / O2: PRECAUTION / LINES / DRAINS:   Pre Treatment: 0         Post Treatment: 0 Vitals        Oxygen    External Catheter    RESTRICTIONS/PRECAUTIONS:  Restrictions/Precautions  Restrictions/Precautions: Fall Risk, Bed Alarm, Isolation  Restrictions/Precautions: Fall Risk, Bed Alarm, Isolation     MOBILITY: I Mod I S SBA CGA Min Mod Max Total  NT x2 Comments:   Bed Mobility    Rolling [] [] [] [] [] [] [] [] [] [x] []    Supine to Sit [] [] [] [x] [] [] [] [] [] [] []    Scooting [] [] [] [x] [] [] [] [] [] [] []    Sit to Supine [] [] [] [] [] [] [] [] [] [x] []    Transfers    Sit to Stand [] [] [] [] [] [x] [] [] [] [] [x]    Bed to Chair [] [] [] [] [] [x] [] [] [] [] [x]    Stand to Sit [] [] [] [] [] [x] [] [] [] [] [x]     [] [] [] [] [] [] [] [] [] [] []    I=Independent, Mod I=Modified Independent, S=Supervision, SBA=Standby Assistance, CGA=Contact Guard Assistance,   Min=Minimal Assistance, Mod=Moderate Assistance, Max=Maximal Assistance, Total=Total Assistance, NT=Not Tested    BALANCE: Good Fair+ Fair Fair- Poor NT Comments   Sitting Static [] [x] [] [] [] []    Sitting Dynamic [] [x] [] [] [] []              Standing Static [] [] [x] [] [] []    Standing Dynamic [] [] [] [x] [] [] Therapist instructed pt in reaching

## 2024-09-18 NOTE — PROGRESS NOTES
Pt is in bed without complaints. Hourly rounds completed and all needs met. Pt had a fall this afternoon, MD notified. Bed is low, locked, and call light is in reach. Pt encouraged to call for assistance.

## 2024-09-18 NOTE — PROGRESS NOTES
Hourly rounds performed this shift. Patient resting with no complaints at this time. Bed locked in lowest position, call light, and all personal belongings in pt reach. VS stable, IV patent. Pt has external urinal intact, output yellow and clear. will give report to oncoming nurse.

## 2024-09-19 PROCEDURE — 1100000000 HC RM PRIVATE

## 2024-09-19 PROCEDURE — 6370000000 HC RX 637 (ALT 250 FOR IP): Performed by: INTERNAL MEDICINE

## 2024-09-19 PROCEDURE — 97530 THERAPEUTIC ACTIVITIES: CPT

## 2024-09-19 PROCEDURE — 6370000000 HC RX 637 (ALT 250 FOR IP): Performed by: NURSE PRACTITIONER

## 2024-09-19 PROCEDURE — 6370000000 HC RX 637 (ALT 250 FOR IP): Performed by: PHYSICIAN ASSISTANT

## 2024-09-19 PROCEDURE — 6360000002 HC RX W HCPCS: Performed by: SURGERY

## 2024-09-19 PROCEDURE — 6370000000 HC RX 637 (ALT 250 FOR IP)

## 2024-09-19 RX ADMIN — TERBINAFINE HYDROCHLORIDE: 1 CREAM TOPICAL at 21:34

## 2024-09-19 RX ADMIN — ENOXAPARIN SODIUM 40 MG: 100 INJECTION SUBCUTANEOUS at 08:29

## 2024-09-19 RX ADMIN — METOPROLOL TARTRATE 25 MG: 25 TABLET, FILM COATED ORAL at 08:29

## 2024-09-19 RX ADMIN — Medication 1 CAPSULE: at 08:29

## 2024-09-19 RX ADMIN — TERBINAFINE HYDROCHLORIDE: 1 CREAM TOPICAL at 08:32

## 2024-09-19 RX ADMIN — PANTOPRAZOLE SODIUM 40 MG: 40 TABLET, DELAYED RELEASE ORAL at 20:20

## 2024-09-19 RX ADMIN — METOPROLOL TARTRATE 25 MG: 25 TABLET, FILM COATED ORAL at 20:20

## 2024-09-19 ASSESSMENT — PAIN SCALES - GENERAL
PAINLEVEL_OUTOF10: 0
PAINLEVEL_OUTOF10: 0

## 2024-09-19 ASSESSMENT — PAIN SCALES - WONG BAKER: WONGBAKER_NUMERICALRESPONSE: NO HURT

## 2024-09-19 NOTE — PROGRESS NOTES
ACUTE OCCUPATIONAL THERAPY GOALS:   (Developed with and agreed upon by patient and/or caregiver.)    1. Patient will complete lower body bathing and dressing with CONTACT GUARD ASSIST and adaptive equipment as needed.   2. Patient will complete upper body bathing and dressing with SUPERVISION and adaptive equipment as needed.  3. Patient will complete grooming ADL standing edge of sink with CONTACT GUARD ASSIST.   4. Patient will complete functional transfers with STAND BY ASSIST and adaptive equipment as needed.   5. Patient will complete functional mobility with STAND BY ASSIST using least restrictive ambulatory device.   6. Patient will tolerate 20 minutes BUE exercises to increase strength for safe, functional transfers.    7. Patient will perform visual scanning task during functional mobility with minimal verbal cues.   8. Patient will attend to L visual field during functional task with minimal verbal cues.     Timeframe: 7 visits  OCCUPATIONAL THERAPY: Daily Note PM   OT Visit Days: 2   Time In/Out  OT Charge Capture  Rehab Caseload Tracker  OT Orders    Brendan Saleh is a 49 y.o. male   PRIMARY DIAGNOSIS: Infection of  (ventriculoperitoneal) shunt (HCC)  Acute hypernatremia [E87.0]  Volume depletion [E86.9]  Acute kidney injury (HCC) [N17.9]  Acute encephalopathy [G93.40]  Septic shock (HCC) [A41.9, R65.21]  Procedure(s) (LRB):  LAPAROTOMY EXPLORATORY, PRIMARY CLOSURE SMALL BOWEL PERFORATION (N/A)  110 Days Post-Op  Inpatient: Payor: AMBETTER / Plan: AMBETTER / Product Type: *No Product type* /     ASSESSMENT:     REHAB RECOMMENDATIONS:   Recommendation to date pending progress:  Setting:  Short-term Rehab    Equipment:    To Be Determined     ASSESSMENT:  Mr. Saleh continues to present with decreased activity tolerance this session, decreased independence with LB dressing, and decreased independence and safety with functional mobility for HH distances. Pt continues to require max/total assist

## 2024-09-19 NOTE — WOUND CARE
Follow up for wounds.   Sacral area assessed minor erythema that blanches over  the scar and open area at coccyx is granular 2x0.8x0.6cm, continue opticel ag and foam.     Left hip is smaller, healing and turning purple with non blanchable erythema in the scar and surrounding tissue, continue foam dressing and do not allow patient to lay on left hip greater than 2 hours.

## 2024-09-19 NOTE — PROGRESS NOTES
Hospitalist Progress Note   Admit Date:  2024  4:30 PM   Name:  Brendan Saleh   Age:  49 y.o.  Sex:  male  :  1975   MRN:  [id number]   Room:  [facility]    Presenting/Chief Complaint: Altered Mental Status     Reason(s) for Admission: Acute hypernatremia [E87.0]  Volume depletion [E86.9]  Acute kidney injury (HCC) [N17.9]  Acute encephalopathy [G93.40]  Septic shock (HCC) [A41.9, R65.21]     Hospital Course:   Brendan Saleh is a 49 y.o. male with medical history of dural hematoma status post bur hole, , hydrocephalus s/p  shunt, sacral decubitus stage IV ulcer who presented 2024 with altered mental status.    Found to initially have urinary tract infection with hematuria was started on vancomycin and Zosyn.  Urine cultures are finalized and found to be negative.  Infectious disease was consulted. Echocardiography was done which showed heart failure with preserved ejection fraction without vegetation.  On 2024 patient underwent sacral wound debridement with general surgery.     Additionally, CT head demonstrated ongoing hydrocephalus with  shunt.  Neurosurgery consulted and patient underwent right ventriculoperitoneal shunt removal on 2024.  Neurosurgery followed up on 24 and said patient would not be a candidate for  shunt replacement given high risk of infection.  Infectious disease later consulted for CSF infection as  shunt tip grew ESBL (Klebsiella and Serratia).  Antibiotics were then changed to IV ciprofloxacin and then oral ciprofloxacin following shunt removal.      Hospital course further complicated by small bowel obstruction on May 19. Patient underwent hemicolectomy and primary anastomosis with general surgery. 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 Oncology consulted recommend CEA which was negative. On 2024, patient noted to be tachycardic and distended with emesis.  Imaging revealed free air

## 2024-09-19 NOTE — PROGRESS NOTES
Patient has enjoyed being in the chair most of the evening.  Now ambulating to restroom with assistance.  Did have intervention for pain, as pt had slight fall in shower earlier--AM shift and was complaining of some back pain 7/10.  Responded well to Norco.    Pt is resting comfortably in bed without complaints. Hourly rounds completed and all needs are met. Bed is locked, low and call light is within reach and patient is encouraged to call for any need(s).

## 2024-09-20 PROCEDURE — 6370000000 HC RX 637 (ALT 250 FOR IP): Performed by: PHYSICIAN ASSISTANT

## 2024-09-20 PROCEDURE — 97116 GAIT TRAINING THERAPY: CPT

## 2024-09-20 PROCEDURE — 6370000000 HC RX 637 (ALT 250 FOR IP): Performed by: INTERNAL MEDICINE

## 2024-09-20 PROCEDURE — 6360000002 HC RX W HCPCS: Performed by: SURGERY

## 2024-09-20 PROCEDURE — 1100000000 HC RM PRIVATE

## 2024-09-20 PROCEDURE — 6370000000 HC RX 637 (ALT 250 FOR IP): Performed by: NURSE PRACTITIONER

## 2024-09-20 PROCEDURE — 97530 THERAPEUTIC ACTIVITIES: CPT

## 2024-09-20 RX ADMIN — TERBINAFINE HYDROCHLORIDE: 1 CREAM TOPICAL at 08:28

## 2024-09-20 RX ADMIN — METOPROLOL TARTRATE 25 MG: 25 TABLET, FILM COATED ORAL at 08:28

## 2024-09-20 RX ADMIN — TERBINAFINE HYDROCHLORIDE: 1 CREAM TOPICAL at 20:46

## 2024-09-20 RX ADMIN — ENOXAPARIN SODIUM 40 MG: 100 INJECTION SUBCUTANEOUS at 08:28

## 2024-09-20 RX ADMIN — METOPROLOL TARTRATE 25 MG: 25 TABLET, FILM COATED ORAL at 20:46

## 2024-09-20 RX ADMIN — PANTOPRAZOLE SODIUM 40 MG: 40 TABLET, DELAYED RELEASE ORAL at 20:46

## 2024-09-20 RX ADMIN — Medication 1 CAPSULE: at 08:28

## 2024-09-20 NOTE — PROGRESS NOTES
Hourly rounding completed. Bed locked/low position. VSS. IAll safety measures in place per protocol. [facility].VITALS:  /71   Pulse 83   Temp 97.7 °F (36.5 °C)   Resp 18   Ht 1.905 m (6' 3\")   Wt 68.9 kg (152 lb)   SpO2 97%   BMI 19.00 kg/m²

## 2024-09-20 NOTE — PLAN OF CARE
Problem: Discharge Planning  Goal: Discharge to home or other facility with appropriate resources  Outcome: Progressing     Problem: Safety - Adult  Goal: Free from fall injury  Outcome: Progressing  Flowsheets (Taken 9/20/2024 0800 by Dania Gong, RN)  Free From Fall Injury:   Instruct family/caregiver on patient safety   Based on caregiver fall risk screen, instruct family/caregiver to ask for assistance with transferring infant if caregiver noted to have fall risk factors     Problem: Neurosensory - Adult  Goal: Achieves stable or improved neurological status  Outcome: Progressing     Problem: Musculoskeletal - Adult  Goal: Return mobility to safest level of function  Outcome: Progressing     Problem: Skin/Tissue Integrity  Goal: Absence of new skin breakdown  Description: 1.  Monitor for areas of redness and/or skin breakdown  2.  Assess vascular access sites hourly  3.  Every 4-6 hours minimum:  Change oxygen saturation probe site  4.  Every 4-6 hours:  If on nasal continuous positive airway pressure, respiratory therapy assess nares and determine need for appliance change or resting period.  Outcome: Progressing     Problem: Pain  Goal: Verbalizes/displays adequate comfort level or baseline comfort level  Outcome: Progressing     Problem: Skin/Tissue Integrity - Adult  Goal: Skin integrity remains intact  Outcome: Progressing  Flowsheets (Taken 9/20/2024 0744 by Dania Gong, RN)  Skin Integrity Remains Intact:   Monitor for areas of redness and/or skin breakdown   Assess vascular access sites hourly  Goal: Incisions, wounds, or drain sites healing without S/S of infection  Outcome: Progressing  Flowsheets (Taken 9/20/2024 0744 by Dania Gong, RN)  Incisions, Wounds, or Drain Sites Healing Without Sign and Symptoms of Infection: TWICE DAILY: Assess and document skin integrity     Problem: Gastrointestinal - Adult  Goal: Maintains adequate nutritional intake  Outcome:

## 2024-09-20 NOTE — PROGRESS NOTES
ACUTE PHYSICAL THERAPY GOALS:   (Developed with and agreed upon by patient and/or caregiver.)  (1.) Brendan Saleh  will move from supine to sit and sit to supine and scoot up and down with CONTACT GUARD ASSIST within 7 treatment day(s).   SLOW PROGRESS 9/10   (2.) Brendan Saleh will transfer from bed to chair and chair to bed with MINIMAL ASSISTx1 using the least restrictive device within 7 treatment day(s).  MEDIUM PROGRESS 9/10  (3.) Brendan Saleh will perform standing static and dynamic balance activities x 5 minutes with CONTACT GUARD ASSIST to improve safety within 7 treatment day(s).   MEDIUM PROGRESS 9/10  (4.) Brendan Saleh will ambulate with CONTACT GUARD ASSIST for 100 feet with the least restrictive device within 7 treatment day(s). MEDIUM PROGRESS 9/10    PHYSICAL THERAPY: Daily Note AM   (Link to Caseload Tracking: PT Visit Days : 5  Time In/Out PT Charge Capture  Rehab Caseload Tracker  Orders    Brendan Saleh is a 49 y.o. male   PRIMARY DIAGNOSIS: Infection of  (ventriculoperitoneal) shunt (HCC)  Acute hypernatremia [E87.0]  Volume depletion [E86.9]  Acute kidney injury (HCC) [N17.9]  Acute encephalopathy [G93.40]  Septic shock (HCC) [A41.9, R65.21]  Procedure(s) (LRB):  LAPAROTOMY EXPLORATORY, PRIMARY CLOSURE SMALL BOWEL PERFORATION (N/A)  111 Days Post-Op  Inpatient: Payor: [employer] / Plan: [employer]TER / Product Type: *No Product type* /     ASSESSMENT:     REHAB RECOMMENDATIONS:   Recommendation to date pending progress:  Setting:  Short-term Rehab    Equipment:    To Be Determined     ASSESSMENT:  Mr. Saleh supine in bed upon arrival, requesting to go to bathroom. Performed bed mobility with SBA and transferred sit <> stand/commode transfer with RW and min/CGA. He ambulated x 200 ft with RW and min/CGA with chair follow. He demonstrates ataxic gait pattern and with fatigue gets forward momentum and increased pace requiring cues to slow. He transferred into  with  min/CGA and propelled  x 50 ft before c/o fatigue. Took pt outside for brief time. Upon return to room, pt transferred WC to recliner using RW with min/CGA and all needs left in reach. RN aware. Pt making good progress towards functional mobility goals.  Pt will continue to benefit from skilled acute PT services to progress mobility. Recommend rehab at discharge.       SUBJECTIVE:   Mr. Saleh states, \"I need to go to the bathroom.\"     Social/Functional Lives With: Alone  Type of Home: Trailer  Home Layout: One level  Home Access: Stairs to enter with rails  Entrance Stairs - Number of Steps: 5  Entrance Stairs - Rails: Both  Home Equipment: Cane  Additional Comments: reports that he was living on 5 acres and was able to take care of the property  OBJECTIVE:     PAIN: VITALS / O2: PRECAUTION / LINES / DRAINS:   Pre Treatment: 0         Post Treatment: 0 Vitals        Oxygen  RA IV    RESTRICTIONS/PRECAUTIONS:  Restrictions/Precautions  Restrictions/Precautions: Fall Risk, Bed Alarm, Isolation  Restrictions/Precautions: Fall Risk, Bed Alarm, Isolation     MOBILITY: I Mod I S SBA CGA Min Mod Max Total  NT x2 Comments:   Bed Mobility    Rolling [] [] [] [x] [] [] [] [] [] [] []    Supine to Sit [] [] [] [x] [] [] [] [] [] [] []    Scooting [] [] [] [x] [] [] [] [] [] [] []    Sit to Supine [] [] [] [] [] [] [] [] [] [x] []    Transfers    Sit to Stand [] [] [] [] [x] [x] [] [] [] [] [] With RW, multiple reps   Bed to Chair [] [] [] [] [x] [x] [] [] [] [] []    Stand to Sit [] [] [] [] [x] [x] [] [] [] [] []    Toilet transfer [] [] [] [] [x] [x] [] [] [] [] []    I=Independent, Mod I=Modified Independent, S=Supervision, SBA=Standby Assistance, CGA=Contact Guard Assistance,   Min=Minimal Assistance, Mod=Moderate Assistance, Max=Maximal Assistance, Total=Total Assistance, NT=Not Tested    BALANCE: Good Fair+ Fair Fair- Poor NT Comments   Sitting Static [] [x] [] [] [] []    Sitting Dynamic [] [x] [] [] [] []

## 2024-09-20 NOTE — PROGRESS NOTES
Adult failure to thrive    Encounter for palliative care    Hypomagnesemia    Diarrhea  Resolved Problems:    Hypernatremia    MIRNA (acute kidney injury) ([facility])    UTI (urinary tract infection)    Septic shock ([facility])      Objective:   Patient Vitals for the past 24 hrs:   Temp Pulse Resp BP SpO2   09/20/24 0321 97.7 °F (36.5 °C) 83 18 103/71 97 %   09/19/24 2002 97.9 °F (36.6 °C) (!) 105 18 109/74 --   09/19/24 1936 97.9 °F (36.6 °C) (!) 105 18 109/74 91 %   09/19/24 1503 97.9 °F (36.6 °C) 94 16 101/69 100 %   09/19/24 0829 -- 93 -- 98/64 --   09/19/24 0744 97.5 °F (36.4 °C) 93 18 98/64 97 %       Oxygen Therapy  SpO2: 97 %  Pulse Oximetry Type: Intermittent  Pulse via Oximetry: 128 beats per minute  Pulse Oximeter Device Mode: Intermittent  Pulse Oximeter Device Location: Finger  O2 Device: None (Room air)  Skin Assessment: Clean, dry, & intact  O2 Flow Rate (L/min): 1 L/min  Oxygen Therapy: None (Room air)    Estimated body mass index is 19 kg/m² as calculated from the following:    Height as of this encounter: 1.905 m (6' 3\").    Weight as of this encounter: 68.9 kg (152 lb).    Intake/Output Summary (Last 24 hours) at 9/20/2024 0729  Last data filed at 9/19/2024 0829  Gross per 24 hour   Intake 300 ml   Output --   Net 300 ml       Physical Exam  Patient seen and evaluated on 9/20/24  GENERAL: Patient lying comfortably in room. Yellowing of fingernail and toenail tips.   CV: Regular rate and rhythm.   PULM: Lungs clear to auscultation bilaterally. On room air.   GI: Abdomen soft, non-tender and non-distended.   PSYCH: Appropriate mood and affect.            I have personally reviewed labs and tests:  Recent Labs:  No results found for this or any previous visit (from the past 48 hour(s)).            No results for input(s): \"COVID19\" in the last 72 hours.    Current Meds:  Current Facility-Administered Medications   Medication Dose Route Frequency    terbinafine (LAMISIL) 1 % cream   Topical BID    lactobacillus

## 2024-09-21 PROCEDURE — 6370000000 HC RX 637 (ALT 250 FOR IP): Performed by: INTERNAL MEDICINE

## 2024-09-21 PROCEDURE — 6370000000 HC RX 637 (ALT 250 FOR IP): Performed by: PHYSICIAN ASSISTANT

## 2024-09-21 PROCEDURE — 1100000000 HC RM PRIVATE

## 2024-09-21 PROCEDURE — 6370000000 HC RX 637 (ALT 250 FOR IP): Performed by: NURSE PRACTITIONER

## 2024-09-21 PROCEDURE — 6360000002 HC RX W HCPCS: Performed by: SURGERY

## 2024-09-21 RX ADMIN — TERBINAFINE HYDROCHLORIDE: 1 CREAM TOPICAL at 20:22

## 2024-09-21 RX ADMIN — ENOXAPARIN SODIUM 40 MG: 100 INJECTION SUBCUTANEOUS at 08:23

## 2024-09-21 RX ADMIN — Medication 1 CAPSULE: at 08:23

## 2024-09-21 RX ADMIN — METOPROLOL TARTRATE 25 MG: 25 TABLET, FILM COATED ORAL at 20:23

## 2024-09-21 RX ADMIN — PANTOPRAZOLE SODIUM 40 MG: 40 TABLET, DELAYED RELEASE ORAL at 20:23

## 2024-09-21 RX ADMIN — TERBINAFINE HYDROCHLORIDE: 1 CREAM TOPICAL at 08:27

## 2024-09-21 RX ADMIN — METOPROLOL TARTRATE 25 MG: 25 TABLET, FILM COATED ORAL at 08:22

## 2024-09-21 ASSESSMENT — PAIN SCALES - GENERAL: PAINLEVEL_OUTOF10: 0

## 2024-09-21 NOTE — PROGRESS NOTES
11:22:  Pt sitting up in chair after am bath, denies pain.     1215: Pt taken to [facility] via wheelchair.     1815:  All known needs met, NAD. Hourly and PRN rounds completed. Bed in lowest and locked position, call light and personal items within reach.     BSSR given to night nurse.

## 2024-09-21 NOTE — PROGRESS NOTES
outpatient setting with oncology to discuss treatment options for adenocarcinoma  - S/p ex lap and closure of small bowel perforation on 06/01/2024, secondary to right hemicolectomy and primary anastomosis achieved on 05/19/2024    Severe protein-calorie malnutrition    -Encourage p.o. intake    ESBL  -Previous history of ESBL now resolved  -Not currently on antibiotics     Critical illness myopathy  -Due to prolonged hospitalization and critical illness  -PT and OT following; recommend short term rehab    Anticipated Discharge Arrangements:   Skilled nursing facility, requires Medicaid and placement, awaiting Medicaid letter of disability    PT/OT evals ordered?  Therapy evals ordered  Diet:  ADULT ORAL NUTRITION SUPPLEMENT; Breakfast, Lunch, Dinner; Standard High Calorie/High Protein Oral Supplement  ADULT DIET; Easy to Chew; Low Fiber; Double Protein Portions; double portions of everything, please  ADULT ORAL NUTRITION SUPPLEMENT; Breakfast, Lunch, Dinner; Standard High Calorie/High Protein Oral Supplement  VTE prophylaxis: Lovenox 40 mg daily  Code status: DNR    Non-peripheral Lines and Tubes (if present):              Telemetry (if present):  Cardiac/Telemetry Monitor On: No      Hospital Problems:  Principal Problem:    Infection of  (ventriculoperitoneal) shunt (HCC)  Active Problems:    Encephalitis    S/P  shunt    Communicating hydrocephalus (HCC)    Anemia    Sepsis following intra-abdominal surgery (HCC)    Wound disruption    Sinus tachycardia    NPH (normal pressure hydrocephalus) (HCC)    Shunt malfunction    Colon obstruction (HCC)    Colonic mass    Adenocarcinoma of colon (HCC)    Severe protein-calorie malnutrition (HCC)    Sacral decubitus ulcer, stage IV (HCC)    ESBL (extended spectrum beta-lactamase) producing bacteria infection    Acute blood loss as cause of postoperative anemia    Bowel perforation (HCC)    Critical illness myopathy    Cachexia (HCC)    Unintentional weight loss     Adult failure to thrive    Encounter for palliative care    Hypomagnesemia    Diarrhea  Resolved Problems:    Hypernatremia    MIRNA (acute kidney injury) ([facility])    UTI (urinary tract infection)    Septic shock ([facility])      Objective:   Patient Vitals for the past 24 hrs:   Temp Pulse Resp BP SpO2   09/21/24 0730 97.6 °F (36.4 °C) 64 18 110/82 96 %   09/20/24 1905 98.1 °F (36.7 °C) 94 18 119/82 100 %   09/20/24 0744 97.5 °F (36.4 °C) 89 16 99/75 98 %       Oxygen Therapy  SpO2: 96 %  Pulse Oximetry Type: Intermittent  Pulse via Oximetry: 128 beats per minute  Pulse Oximeter Device Mode: Intermittent  Pulse Oximeter Device Location: Finger  O2 Device: None (Room air)  Skin Assessment: Clean, dry, & intact  O2 Flow Rate (L/min): 1 L/min  Oxygen Therapy: None (Room air)    Estimated body mass index is 19 kg/m² as calculated from the following:    Height as of this encounter: 1.905 m (6' 3\").    Weight as of this encounter: 68.9 kg (152 lb).    Intake/Output Summary (Last 24 hours) at 9/21/2024 0742  Last data filed at 9/20/2024 0815  Gross per 24 hour   Intake 300 ml   Output --   Net 300 ml       Physical Exam  Patient seen and evaluated on 9/21/24  GENERAL: Patient lying comfortably in room. Yellowing of fingernail and toenail tips.   CV: Regular rate and rhythm.   PULM: Lungs clear to auscultation bilaterally. On room air.   GI: Abdomen soft, non-tender and non-distended.   PSYCH: Appropriate mood and affect.            I have personally reviewed labs and tests:  Recent Labs:  No results found for this or any previous visit (from the past 48 hour(s)).            No results for input(s): \"COVID19\" in the last 72 hours.    Current Meds:  Current Facility-Administered Medications   Medication Dose Route Frequency    terbinafine (LAMISIL) 1 % cream   Topical BID    lactobacillus (CULTURELLE) capsule 1 capsule  1 capsule Oral Daily with breakfast    LORazepam (ATIVAN) tablet 0.5 mg  0.5 mg Oral Q6H PRN    diphenhydrAMINE

## 2024-09-22 PROCEDURE — 6370000000 HC RX 637 (ALT 250 FOR IP): Performed by: INTERNAL MEDICINE

## 2024-09-22 PROCEDURE — 1100000000 HC RM PRIVATE

## 2024-09-22 PROCEDURE — 6370000000 HC RX 637 (ALT 250 FOR IP): Performed by: NURSE PRACTITIONER

## 2024-09-22 PROCEDURE — 6360000002 HC RX W HCPCS: Performed by: SURGERY

## 2024-09-22 PROCEDURE — 6370000000 HC RX 637 (ALT 250 FOR IP): Performed by: PHYSICIAN ASSISTANT

## 2024-09-22 RX ADMIN — PANTOPRAZOLE SODIUM 40 MG: 40 TABLET, DELAYED RELEASE ORAL at 21:42

## 2024-09-22 RX ADMIN — ENOXAPARIN SODIUM 40 MG: 100 INJECTION SUBCUTANEOUS at 08:37

## 2024-09-22 RX ADMIN — TERBINAFINE HYDROCHLORIDE: 1 CREAM TOPICAL at 21:43

## 2024-09-22 RX ADMIN — TERBINAFINE HYDROCHLORIDE: 1 CREAM TOPICAL at 08:43

## 2024-09-22 RX ADMIN — METOPROLOL TARTRATE 25 MG: 25 TABLET, FILM COATED ORAL at 21:42

## 2024-09-22 RX ADMIN — Medication 1 CAPSULE: at 08:37

## 2024-09-22 ASSESSMENT — PAIN SCALES - GENERAL: PAINLEVEL_OUTOF10: 0

## 2024-09-22 NOTE — PROGRESS NOTES
Hospitalist Progress Note   Admit Date:  2024  4:30 PM   Name:  Brendan Saleh   Age:  49 y.o.  Sex:  male  :  1975   MRN:  [id number]   Room:  [facility]    Presenting/Chief Complaint: Altered Mental Status     Reason(s) for Admission: Acute hypernatremia [E87.0]  Volume depletion [E86.9]  Acute kidney injury (HCC) [N17.9]  Acute encephalopathy [G93.40]  Septic shock (HCC) [A41.9, R65.21]     Hospital Course:   Brnedan Saleh is a 49 y.o. male with medical history of dural hematoma status post bur hole, , hydrocephalus s/p  shunt, sacral decubitus stage IV ulcer who presented 2024 with altered mental status.    Found to initially have urinary tract infection with hematuria was started on vancomycin and Zosyn.  Urine cultures are finalized and found to be negative.  Infectious disease was consulted. Echocardiography was done which showed heart failure with preserved ejection fraction without vegetation.  On 2024 patient underwent sacral wound debridement with general surgery.     Additionally, CT head demonstrated ongoing hydrocephalus with  shunt.  Neurosurgery consulted and patient underwent right ventriculoperitoneal shunt removal on 2024.  Neurosurgery followed up on 24 and said patient would not be a candidate for  shunt replacement given high risk of infection.  Infectious disease later consulted for CSF infection as  shunt tip grew ESBL (Klebsiella and Serratia).  Antibiotics were then changed to IV ciprofloxacin and then oral ciprofloxacin following shunt removal.      Hospital course further complicated by small bowel obstruction on May 19. Patient underwent hemicolectomy and primary anastomosis with general surgery. 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 Oncology consulted recommend CEA which was negative. On 2024, patient noted to be tachycardic and distended with emesis.  Imaging revealed free air

## 2024-09-22 NOTE — PLAN OF CARE
Problem: Discharge Planning  Goal: Discharge to home or other facility with appropriate resources  9/22/2024 0038 by John Oglesby RN  Outcome: Progressing  9/21/2024 1113 by Cira Jackson RN  Outcome: Progressing     Problem: Safety - Adult  Goal: Free from fall injury  9/22/2024 0038 by John Oglesby RN  Outcome: Progressing  9/21/2024 1113 by Cira Jackson RN  Outcome: Progressing     Problem: Neurosensory - Adult  Goal: Achieves stable or improved neurological status  9/22/2024 0038 by John Oglesby RN  Outcome: Progressing  9/21/2024 1113 by Cira Jackson RN  Outcome: Progressing     Problem: Musculoskeletal - Adult  Goal: Return mobility to safest level of function  9/22/2024 0038 by John Oglesby RN  Outcome: Progressing  9/21/2024 1113 by Cira Jackson RN  Outcome: Progressing     Problem: Skin/Tissue Integrity  Goal: Absence of new skin breakdown  Description: 1.  Monitor for areas of redness and/or skin breakdown  2.  Assess vascular access sites hourly  3.  Every 4-6 hours minimum:  Change oxygen saturation probe site  4.  Every 4-6 hours:  If on nasal continuous positive airway pressure, respiratory therapy assess nares and determine need for appliance change or resting period.  9/22/2024 0038 by John Oglesby RN  Outcome: Progressing  9/21/2024 1113 by Cira Jackson RN  Outcome: Progressing     Problem: Pain  Goal: Verbalizes/displays adequate comfort level or baseline comfort level  9/22/2024 0038 by John Oglesby RN  Outcome: Progressing  9/21/2024 1113 by Cira Jackson RN  Outcome: Progressing     Problem: Skin/Tissue Integrity - Adult  Goal: Skin integrity remains intact  9/22/2024 0038 by John Oglesby RN  Outcome: Progressing  9/21/2024 1113 by Cira Jackson RN  Outcome: Progressing  Goal: Incisions, wounds, or drain sites healing without S/S of infection  9/22/2024 0038 by John Oglesby RN  Outcome: Progressing  9/21/2024 1113 by Cira Jackson

## 2024-09-22 NOTE — PROGRESS NOTES
Pt denies needs at this time, NAD. Hourly and PRN rounds completed. Went to [facility] today. Bed in lowest and locked position, call light and personal items within reach, bed and chair alarm on, pt is fall risk.     BSSR to night shift nurse.    Show Applicator Variable?: Yes Render Note In Bullet Format When Appropriate: No Duration Of Freeze Thaw-Cycle (Seconds): 10 Post-Care Instructions: I reviewed with the patient in detail post-care instructions. Patient is to wear sunprotection, and avoid picking at any of the treated lesions. Pt may apply Vaseline to crusted or scabbing areas. Consent: The patient's consent was obtained including but not limited to risks of crusting, scabbing, blistering, scarring, darker or lighter pigmentary change, recurrence, incomplete removal and infection. Number Of Freeze-Thaw Cycles: 1 freeze-thaw cycle Detail Level: Detailed

## 2024-09-23 LAB
ANION GAP SERPL CALC-SCNC: 8 MMOL/L (ref 9–18)
BASOPHILS # BLD: 0 K/UL (ref 0–0.2)
BASOPHILS NFR BLD: 0 % (ref 0–2)
BUN SERPL-MCNC: 24 MG/DL (ref 6–23)
CALCIUM SERPL-MCNC: 9.3 MG/DL (ref 8.8–10.2)
CHLORIDE SERPL-SCNC: 103 MMOL/L (ref 98–107)
CO2 SERPL-SCNC: 30 MMOL/L (ref 20–28)
CREAT SERPL-MCNC: 0.71 MG/DL (ref 0.8–1.3)
DIFFERENTIAL METHOD BLD: ABNORMAL
EOSINOPHIL # BLD: 0.2 K/UL (ref 0–0.8)
EOSINOPHIL NFR BLD: 5 % (ref 0.5–7.8)
ERYTHROCYTE [DISTWIDTH] IN BLOOD BY AUTOMATED COUNT: 14.3 % (ref 11.9–14.6)
GLUCOSE SERPL-MCNC: 97 MG/DL (ref 70–99)
HCT VFR BLD AUTO: 36.2 % (ref 41.1–50.3)
HGB BLD-MCNC: 10.8 G/DL (ref 13.6–17.2)
IMM GRANULOCYTES # BLD AUTO: 0 K/UL (ref 0–0.5)
IMM GRANULOCYTES NFR BLD AUTO: 0 % (ref 0–5)
LYMPHOCYTES # BLD: 1.6 K/UL (ref 0.5–4.6)
LYMPHOCYTES NFR BLD: 34 % (ref 13–44)
MCH RBC QN AUTO: 28.2 PG (ref 26.1–32.9)
MCHC RBC AUTO-ENTMCNC: 29.8 G/DL (ref 31.4–35)
MCV RBC AUTO: 94.5 FL (ref 82–102)
MONOCYTES # BLD: 0.5 K/UL (ref 0.1–1.3)
MONOCYTES NFR BLD: 12 % (ref 4–12)
NEUTS SEG # BLD: 2.3 K/UL (ref 1.7–8.2)
NEUTS SEG NFR BLD: 49 % (ref 43–78)
NRBC # BLD: 0 K/UL (ref 0–0.2)
PLATELET # BLD AUTO: 193 K/UL (ref 150–450)
PMV BLD AUTO: 10.7 FL (ref 9.4–12.3)
POTASSIUM SERPL-SCNC: 4.3 MMOL/L (ref 3.5–5.1)
RBC # BLD AUTO: 3.83 M/UL (ref 4.23–5.6)
SODIUM SERPL-SCNC: 141 MMOL/L (ref 136–145)
WBC # BLD AUTO: 4.6 K/UL (ref 4.3–11.1)

## 2024-09-23 PROCEDURE — 97530 THERAPEUTIC ACTIVITIES: CPT

## 2024-09-23 PROCEDURE — 6370000000 HC RX 637 (ALT 250 FOR IP): Performed by: PHYSICIAN ASSISTANT

## 2024-09-23 PROCEDURE — 97112 NEUROMUSCULAR REEDUCATION: CPT

## 2024-09-23 PROCEDURE — 80048 BASIC METABOLIC PNL TOTAL CA: CPT

## 2024-09-23 PROCEDURE — 6370000000 HC RX 637 (ALT 250 FOR IP): Performed by: NURSE PRACTITIONER

## 2024-09-23 PROCEDURE — 97535 SELF CARE MNGMENT TRAINING: CPT

## 2024-09-23 PROCEDURE — 6360000002 HC RX W HCPCS: Performed by: SURGERY

## 2024-09-23 PROCEDURE — 6370000000 HC RX 637 (ALT 250 FOR IP): Performed by: INTERNAL MEDICINE

## 2024-09-23 PROCEDURE — 85025 COMPLETE CBC W/AUTO DIFF WBC: CPT

## 2024-09-23 PROCEDURE — 36415 COLL VENOUS BLD VENIPUNCTURE: CPT

## 2024-09-23 PROCEDURE — 6370000000 HC RX 637 (ALT 250 FOR IP)

## 2024-09-23 PROCEDURE — 1100000000 HC RM PRIVATE

## 2024-09-23 RX ADMIN — PANTOPRAZOLE SODIUM 40 MG: 40 TABLET, DELAYED RELEASE ORAL at 20:01

## 2024-09-23 RX ADMIN — Medication 1 CAPSULE: at 08:56

## 2024-09-23 RX ADMIN — TERBINAFINE HYDROCHLORIDE: 1 CREAM TOPICAL at 09:00

## 2024-09-23 RX ADMIN — METOPROLOL TARTRATE 25 MG: 25 TABLET, FILM COATED ORAL at 08:55

## 2024-09-23 RX ADMIN — METOPROLOL TARTRATE 25 MG: 25 TABLET, FILM COATED ORAL at 20:01

## 2024-09-23 RX ADMIN — ENOXAPARIN SODIUM 40 MG: 100 INJECTION SUBCUTANEOUS at 08:56

## 2024-09-23 RX ADMIN — TERBINAFINE HYDROCHLORIDE: 1 CREAM TOPICAL at 20:01

## 2024-09-23 ASSESSMENT — PAIN SCALES - WONG BAKER
WONGBAKER_NUMERICALRESPONSE: NO HURT
WONGBAKER_NUMERICALRESPONSE: NO HURT

## 2024-09-23 ASSESSMENT — PAIN SCALES - GENERAL: PAINLEVEL_OUTOF10: 0

## 2024-09-23 NOTE — PROGRESS NOTES
Pt ambulating in room and to bathroom with walker, tolerating well.  Rested well this shift.  Hourly rounds performed this shift.  VS stable.  Pt medicated per MAR.  All known needs met at this time.  Bed low and locked, call light in reach.  Bedside shift report given to oncoming nurse.

## 2024-09-23 NOTE — PROGRESS NOTES
Hospitalist Progress Note   Admit Date:  2024  4:30 PM   Name:  Brendan Saleh   Age:  49 y.o.  Sex:  male  :  1975   MRN:  [id number]   Room:  [facility]    Presenting/Chief Complaint: Altered Mental Status     Reason(s) for Admission: Acute hypernatremia [E87.0]  Volume depletion [E86.9]  Acute kidney injury (HCC) [N17.9]  Acute encephalopathy [G93.40]  Septic shock (HCC) [A41.9, R65.21]     Hospital Course:   Brendan Saleh is a 49 y.o. male with medical history of dural hematoma status post bur hole, , hydrocephalus s/p  shunt, sacral decubitus stage IV ulcer who presented 2024 with altered mental status.    Found to initially have urinary tract infection with hematuria was started on vancomycin and Zosyn.  Urine cultures are finalized and found to be negative.  Infectious disease was consulted. Echocardiography was done which showed heart failure with preserved ejection fraction without vegetation.  On 2024 patient underwent sacral wound debridement with general surgery.     Additionally, CT head demonstrated ongoing hydrocephalus with  shunt.  Neurosurgery consulted and patient underwent right ventriculoperitoneal shunt removal on 2024.  Neurosurgery followed up on 24 and said patient would not be a candidate for  shunt replacement given high risk of infection.  Infectious disease later consulted for CSF infection as  shunt tip grew ESBL (Klebsiella and Serratia).  Antibiotics were then changed to IV ciprofloxacin and then oral ciprofloxacin following shunt removal.      Hospital course further complicated by small bowel obstruction on May 19. Patient underwent hemicolectomy and primary anastomosis with general surgery. 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 Oncology consulted recommend CEA which was negative. On 2024, patient noted to be tachycardic and distended with emesis.  Imaging revealed free air  tablet 16 g  4 tablet Oral PRN    dextrose bolus 10% 125 mL  125 mL IntraVENous PRN    Or    dextrose bolus 10% 250 mL  250 mL IntraVENous PRN    Glucagon Emergency KIT 1 mg  1 mg SubCUTAneous PRN    dextrose 10 % infusion   IntraVENous Continuous PRN    medicated lip ointment (BLISTEX)   Topical PRN    ondansetron (ZOFRAN-ODT) disintegrating tablet 4 mg  4 mg Oral Q8H PRN    Or    ondansetron (ZOFRAN) injection 4 mg  4 mg IntraVENous Q6H PRN    enoxaparin (LOVENOX) injection 40 mg  40 mg SubCUTAneous Daily    calcium carbonate (TUMS) chewable tablet 500 mg  500 mg Oral TID PRN       Signed:    Giovanni Dodd DO   Internal Medicine      Part of this note may have been written by using a voice dictation software.  The note has been proof read but may still contain some grammatical/other typographical errors.

## 2024-09-23 NOTE — PROGRESS NOTES
ACUTE PHYSICAL THERAPY GOALS:   (Developed with and agreed upon by patient and/or caregiver.)  (1.) Brendan Saleh  will move from supine to sit and sit to supine and scoot up and down with CONTACT GUARD ASSIST within 7 treatment day(s).   SLOW PROGRESS 9/10   (2.) Brendan Saleh will transfer from bed to chair and chair to bed with MINIMAL ASSISTx1 using the least restrictive device within 7 treatment day(s).  MEDIUM PROGRESS 9/10  (3.) Brendan Saleh will perform standing static and dynamic balance activities x 5 minutes with CONTACT GUARD ASSIST to improve safety within 7 treatment day(s).   MEDIUM PROGRESS 9/10  (4.) Brendan Saleh will ambulate with CONTACT GUARD ASSIST for 100 feet with the least restrictive device within 7 treatment day(s). MEDIUM PROGRESS 9/10    PHYSICAL THERAPY: Daily Note AM   (Link to Caseload Tracking: PT Visit Days : 6  Time In/Out PT Charge Capture  Rehab Caseload Tracker  Orders    Brendan Saleh is a 49 y.o. male   PRIMARY DIAGNOSIS: Infection of  (ventriculoperitoneal) shunt (HCC)  Acute hypernatremia [E87.0]  Volume depletion [E86.9]  Acute kidney injury (HCC) [N17.9]  Acute encephalopathy [G93.40]  Septic shock (HCC) [A41.9, R65.21]  Procedure(s) (LRB):  LAPAROTOMY EXPLORATORY, PRIMARY CLOSURE SMALL BOWEL PERFORATION (N/A)  114 Days Post-Op  Inpatient: Payor: [employer] / Plan: [employer]BETTER / Product Type: *No Product type* /     ASSESSMENT:     REHAB RECOMMENDATIONS:   Recommendation to date pending progress:  Setting:  Short-term Rehab    Equipment:    To Be Determined     ASSESSMENT:  Mr. Saleh was received sitting in chair with OT present, agreeable to mobility. He was able to ambulate 100 ft x 2 with RW and CGA-Jina. He continues to be impulsive requiring frequent cueing to slow pace and take breaks as needed. He required some assistance with steering RW. He was then able to participate in prolonged standing balance activities while performing ADL's  at sink with OT. He required CGA to maintain standing balance. One posterior loss of balance noted, he required Jina to correct. Pt making good progress towards functional mobility goals.  Pt will continue to benefit from skilled acute PT services to progress mobility. Recommend rehab at discharge.       SUBJECTIVE:   Mr. Saleh states, \"I'd like to shave\"     Social/Functional Lives With: Alone  Type of Home: Trailer  Home Layout: One level  Home Access: Stairs to enter with rails  Entrance Stairs - Number of Steps: 5  Entrance Stairs - Rails: Both  Home Equipment: Cane  Additional Comments: reports that he was living on 5 acres and was able to take care of the property  OBJECTIVE:     PAIN: VITALS / O2: PRECAUTION / LINES / DRAINS:   Pre Treatment: 0         Post Treatment: 0 Vitals        Oxygen  RA IV    RESTRICTIONS/PRECAUTIONS:  Restrictions/Precautions  Restrictions/Precautions: Fall Risk, Bed Alarm, Isolation  Restrictions/Precautions: Fall Risk, Bed Alarm, Isolation     MOBILITY: I Mod I S SBA CGA Min Mod Max Total  NT x2 Comments:   Bed Mobility    Rolling [] [] [] [x] [] [] [] [] [] [x] []    Supine to Sit [] [] [] [] [] [] [] [] [] [x] []    Scooting [] [] [] [] [] [] [] [] [] [x] []    Sit to Supine [] [] [] [] [] [] [] [] [] [x] []    Transfers    Sit to Stand [] [] [] [] [x] [x] [] [] [] [] [] With RW, multiple reps   Bed to Chair [] [] [] [] [x] [x] [] [] [] [] []    Stand to Sit [] [] [] [] [x] [x] [] [] [] [] []    Toilet transfer [] [] [] [] [] [] [] [] [] [x] []    I=Independent, Mod I=Modified Independent, S=Supervision, SBA=Standby Assistance, CGA=Contact Guard Assistance,   Min=Minimal Assistance, Mod=Moderate Assistance, Max=Maximal Assistance, Total=Total Assistance, NT=Not Tested    BALANCE: Good Fair+ Fair Fair- Poor NT Comments   Sitting Static [] [x] [] [] [] []    Sitting Dynamic [] [x] [] [] [] []              Standing Static [] [] [x] [] [] []    Standing Dynamic [] [] [] [x] [] []

## 2024-09-23 NOTE — PROGRESS NOTES
Up in chair at present time without complaints. Has been up to bathroom and chair, participated with therapy.  Drsgs changed by Pablo DAWKINS this shift as ordered. Hourly rounds completed, all needs met this shift. Call light in reach instructed to call before getting up. Verbalizes understanding.

## 2024-09-23 NOTE — PROGRESS NOTES
ACUTE OCCUPATIONAL THERAPY GOALS:   (Developed with and agreed upon by patient and/or caregiver.)    1. Patient will complete lower body bathing and dressing with CONTACT GUARD ASSIST and adaptive equipment as needed.   2. Patient will complete upper body bathing and dressing with SUPERVISION and adaptive equipment as needed.  3. Patient will complete grooming ADL standing edge of sink with CONTACT GUARD ASSIST.   4. Patient will complete functional transfers with STAND BY ASSIST and adaptive equipment as needed.   5. Patient will complete functional mobility with STAND BY ASSIST using least restrictive ambulatory device.   6. Patient will tolerate 20 minutes BUE exercises to increase strength for safe, functional transfers.    7. Patient will perform visual scanning task during functional mobility with minimal verbal cues.   8. Patient will attend to L visual field during functional task with minimal verbal cues.     Timeframe: 7 visits  OCCUPATIONAL THERAPY: Daily Note PM   OT Visit Days: 3   Time In/Out  OT Charge Capture  Rehab Caseload Tracker  OT Orders    Brendan Saleh is a 49 y.o. male   PRIMARY DIAGNOSIS: Infection of  (ventriculoperitoneal) shunt (HCC)  Acute hypernatremia [E87.0]  Volume depletion [E86.9]  Acute kidney injury (HCC) [N17.9]  Acute encephalopathy [G93.40]  Septic shock (HCC) [A41.9, R65.21]  Procedure(s) (LRB):  LAPAROTOMY EXPLORATORY, PRIMARY CLOSURE SMALL BOWEL PERFORATION (N/A)  114 Days Post-Op  Inpatient: Payor: AMBETTER / Plan: AMBETTER / Product Type: *No Product type* /     ASSESSMENT:     REHAB RECOMMENDATIONS:   Recommendation to date pending progress:  Setting:  Rehab    Equipment:    To Be Determined     ASSESSMENT:  Mr. Saleh demonstrates good progress today, however continues to be limited by deficits in activity tolerance, balance as well as increased impulsivity. While sitting in chair, he doffs/dons socks with supervision, then dons shoes with min assist, assist

## 2024-09-23 NOTE — PLAN OF CARE
Problem: Discharge Planning  Goal: Discharge to home or other facility with appropriate resources  9/23/2024 0809 by Kim Evans RN  Outcome: Progressing  9/23/2024 0035 by Emma Hu RN  Outcome: Progressing     Problem: Safety - Adult  Goal: Free from fall injury  9/23/2024 0035 by Emma Hu RN  Outcome: Progressing     Problem: Neurosensory - Adult  Goal: Achieves stable or improved neurological status  9/23/2024 0035 by Emma Hu RN  Outcome: Progressing     Problem: Musculoskeletal - Adult  Goal: Return mobility to safest level of function  9/23/2024 0809 by Kim Evans RN  Outcome: Progressing  9/23/2024 0035 by Emma Hu RN  Outcome: Progressing     Problem: Skin/Tissue Integrity  Goal: Absence of new skin breakdown  Description: 1.  Monitor for areas of redness and/or skin breakdown  2.  Assess vascular access sites hourly  3.  Every 4-6 hours minimum:  Change oxygen saturation probe site  4.  Every 4-6 hours:  If on nasal continuous positive airway pressure, respiratory therapy assess nares and determine need for appliance change or resting period.  9/23/2024 0035 by Emma Hu RN  Outcome: Progressing     Problem: Pain  Goal: Verbalizes/displays adequate comfort level or baseline comfort level  9/23/2024 0035 by Emma Hu RN  Outcome: Progressing     Problem: Skin/Tissue Integrity - Adult  Goal: Skin integrity remains intact  9/23/2024 0035 by Emma Hu RN  Outcome: Progressing  Goal: Incisions, wounds, or drain sites healing without S/S of infection  9/23/2024 0809 by Kim Evans RN  Outcome: Progressing  9/23/2024 0035 by Emma Hu RN  Outcome: Progressing     Problem: Gastrointestinal - Adult  Goal: Maintains adequate nutritional intake  9/23/2024 0809 by Kim Evans RN  Outcome: Progressing  9/23/2024 0035 by Emma Hu RN  Outcome: Progressing     Problem: Genitourinary - Adult  Goal: Absence of urinary retention  9/23/2024 0035  by Mayte, Emma, RN  Outcome: Progressing     Problem: Infection - Adult  Goal: Absence of infection at discharge  9/23/2024 0035 by Emma Hu RN  Outcome: Progressing  Goal: Absence of infection during hospitalization  9/23/2024 0035 by Emma Hu RN  Outcome: Progressing     Problem: Metabolic/Fluid and Electrolytes - Adult  Goal: Electrolytes maintained within normal limits  9/23/2024 0035 by Emma Hu RN  Outcome: Progressing  Goal: Hemodynamic stability and optimal renal function maintained  9/23/2024 0035 by Emma Hu RN  Outcome: Progressing     Problem: Cardiovascular - Adult  Goal: Maintains optimal cardiac output and hemodynamic stability  9/23/2024 0035 by Emma Hu RN  Outcome: Progressing     Problem: Hematologic - Adult  Goal: Maintains hematologic stability  9/23/2024 0035 by Emma Hu RN  Outcome: Progressing

## 2024-09-24 PROCEDURE — 1100000000 HC RM PRIVATE

## 2024-09-24 PROCEDURE — 6370000000 HC RX 637 (ALT 250 FOR IP): Performed by: INTERNAL MEDICINE

## 2024-09-24 PROCEDURE — 6370000000 HC RX 637 (ALT 250 FOR IP): Performed by: NURSE PRACTITIONER

## 2024-09-24 PROCEDURE — 6370000000 HC RX 637 (ALT 250 FOR IP): Performed by: PHYSICIAN ASSISTANT

## 2024-09-24 PROCEDURE — 6360000002 HC RX W HCPCS: Performed by: SURGERY

## 2024-09-24 RX ADMIN — PANTOPRAZOLE SODIUM 40 MG: 40 TABLET, DELAYED RELEASE ORAL at 20:17

## 2024-09-24 RX ADMIN — METOPROLOL TARTRATE 25 MG: 25 TABLET, FILM COATED ORAL at 20:17

## 2024-09-24 RX ADMIN — TRAZODONE HYDROCHLORIDE 50 MG: 50 TABLET ORAL at 20:22

## 2024-09-24 RX ADMIN — TERBINAFINE HYDROCHLORIDE: 1 CREAM TOPICAL at 09:45

## 2024-09-24 RX ADMIN — ENOXAPARIN SODIUM 40 MG: 100 INJECTION SUBCUTANEOUS at 09:43

## 2024-09-24 RX ADMIN — TERBINAFINE HYDROCHLORIDE: 1 CREAM TOPICAL at 20:17

## 2024-09-24 RX ADMIN — Medication 1 CAPSULE: at 09:43

## 2024-09-24 ASSESSMENT — PAIN SCALES - WONG BAKER: WONGBAKER_NUMERICALRESPONSE: NO HURT

## 2024-09-24 ASSESSMENT — PAIN SCALES - GENERAL
PAINLEVEL_OUTOF10: 0
PAINLEVEL_OUTOF10: 0

## 2024-09-24 NOTE — PROGRESS NOTES
Hospitalist Progress Note   Admit Date:  2024  4:30 PM   Name:  Brendan Saleh   Age:  49 y.o.  Sex:  male  :  1975   MRN:  [id number]   Room:  [facility]    Presenting/Chief Complaint: Altered Mental Status     Reason(s) for Admission: Acute hypernatremia [E87.0]  Volume depletion [E86.9]  Acute kidney injury (HCC) [N17.9]  Acute encephalopathy [G93.40]  Septic shock (HCC) [A41.9, R65.21]     Hospital Course:   Brendan Saleh is a 49 y.o. male with medical history of dural hematoma status post bur hole, , hydrocephalus s/p  shunt, sacral decubitus stage IV ulcer who presented 2024 with altered mental status.    Found to initially have urinary tract infection with hematuria was started on vancomycin and Zosyn.  Urine cultures are finalized and found to be negative.  Infectious disease was consulted. Echocardiography was done which showed heart failure with preserved ejection fraction without vegetation.  On 2024 patient underwent sacral wound debridement with general surgery.     Additionally, CT head demonstrated ongoing hydrocephalus with  shunt.  Neurosurgery consulted and patient underwent right ventriculoperitoneal shunt removal on 2024.  Neurosurgery followed up on 24 and said patient would not be a candidate for  shunt replacement given high risk of infection.  Infectious disease later consulted for CSF infection as  shunt tip grew ESBL (Klebsiella and Serratia).  Antibiotics were then changed to IV ciprofloxacin and then oral ciprofloxacin following shunt removal.      Hospital course further complicated by small bowel obstruction on May 19. Patient underwent hemicolectomy and primary anastomosis with general surgery. 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 Oncology consulted recommend CEA which was negative. On 2024, patient noted to be tachycardic and distended with emesis.  Imaging revealed free air

## 2024-09-24 NOTE — PROGRESS NOTES
OOB with walker and stand by by assist.  Rested, no c/o.  Dressing intact.  Call light in reach.  Aware to call to be OOB.

## 2024-09-24 NOTE — PROGRESS NOTES
Up in chair without complaints. Drsgs intact. Instructed to call when ready to get up. Call light in reach. Hourly rounds completed, all needs met this shift.

## 2024-09-24 NOTE — PLAN OF CARE
Problem: Discharge Planning  Goal: Discharge to home or other facility with appropriate resources  9/24/2024 0819 by Kim Evans RN  Outcome: Progressing  9/23/2024 1912 by Leslie Brunner RN  Outcome: Progressing     Problem: Safety - Adult  Goal: Free from fall injury  9/23/2024 1912 by Leslie Brunner RN  Outcome: Progressing     Problem: Neurosensory - Adult  Goal: Achieves stable or improved neurological status  9/24/2024 0819 by Kim Evans RN  Outcome: Progressing  9/23/2024 1912 by Leslie Brunner RN  Outcome: Progressing     Problem: Musculoskeletal - Adult  Goal: Return mobility to safest level of function  9/24/2024 0819 by Kim Evans RN  Outcome: Progressing  9/23/2024 1912 by Leslie Brunner RN  Outcome: Progressing     Problem: Skin/Tissue Integrity  Goal: Absence of new skin breakdown  Description: 1.  Monitor for areas of redness and/or skin breakdown  2.  Assess vascular access sites hourly  3.  Every 4-6 hours minimum:  Change oxygen saturation probe site  4.  Every 4-6 hours:  If on nasal continuous positive airway pressure, respiratory therapy assess nares and determine need for appliance change or resting period.  9/23/2024 1912 by Leslie Brunner RN  Outcome: Progressing     Problem: Pain  Goal: Verbalizes/displays adequate comfort level or baseline comfort level  9/24/2024 0819 by Kim Evans RN  Outcome: Progressing  9/23/2024 1912 by Leslie Brunner RN  Outcome: Progressing     Problem: Skin/Tissue Integrity - Adult  Goal: Skin integrity remains intact  Recent Flowsheet Documentation  Taken 9/24/2024 0752 by Kim Evans RN  Skin Integrity Remains Intact: Monitor for areas of redness and/or skin breakdown  9/23/2024 1912 by Leslie Brunner RN  Outcome: Progressing  Flowsheets (Taken 9/23/2024 1911)  Skin Integrity Remains Intact: Monitor for areas of redness and/or skin breakdown  Goal: Incisions, wounds, or drain sites healing without S/S of

## 2024-09-25 LAB — 25(OH)D3 SERPL-MCNC: 35.4 NG/ML (ref 30–100)

## 2024-09-25 PROCEDURE — 82306 VITAMIN D 25 HYDROXY: CPT

## 2024-09-25 PROCEDURE — 6360000002 HC RX W HCPCS: Performed by: SURGERY

## 2024-09-25 PROCEDURE — 97164 PT RE-EVAL EST PLAN CARE: CPT

## 2024-09-25 PROCEDURE — 97530 THERAPEUTIC ACTIVITIES: CPT

## 2024-09-25 PROCEDURE — 36415 COLL VENOUS BLD VENIPUNCTURE: CPT

## 2024-09-25 PROCEDURE — 97535 SELF CARE MNGMENT TRAINING: CPT

## 2024-09-25 PROCEDURE — 97112 NEUROMUSCULAR REEDUCATION: CPT

## 2024-09-25 PROCEDURE — 1100000000 HC RM PRIVATE

## 2024-09-25 PROCEDURE — 6370000000 HC RX 637 (ALT 250 FOR IP): Performed by: PHYSICIAN ASSISTANT

## 2024-09-25 PROCEDURE — 6370000000 HC RX 637 (ALT 250 FOR IP): Performed by: NURSE PRACTITIONER

## 2024-09-25 PROCEDURE — 6370000000 HC RX 637 (ALT 250 FOR IP): Performed by: INTERNAL MEDICINE

## 2024-09-25 RX ADMIN — TERBINAFINE HYDROCHLORIDE: 1 CREAM TOPICAL at 20:08

## 2024-09-25 RX ADMIN — TRAZODONE HYDROCHLORIDE 50 MG: 50 TABLET ORAL at 20:06

## 2024-09-25 RX ADMIN — PANTOPRAZOLE SODIUM 40 MG: 40 TABLET, DELAYED RELEASE ORAL at 20:06

## 2024-09-25 RX ADMIN — TERBINAFINE HYDROCHLORIDE: 1 CREAM TOPICAL at 09:30

## 2024-09-25 RX ADMIN — METOPROLOL TARTRATE 25 MG: 25 TABLET, FILM COATED ORAL at 09:25

## 2024-09-25 RX ADMIN — Medication 1 CAPSULE: at 09:25

## 2024-09-25 RX ADMIN — ENOXAPARIN SODIUM 40 MG: 100 INJECTION SUBCUTANEOUS at 09:25

## 2024-09-25 NOTE — CARE COORDINATION
Pt continues to wait for SS disability letter to help with discharge plan. CM will continue to follow.

## 2024-09-25 NOTE — PROGRESS NOTES
Hospitalist Progress Note   Admit Date:  2024  4:30 PM   Name:  Brendan Saleh   Age:  49 y.o.  Sex:  male  :  1975   MRN:  [id number]   Room:  [facility]    Presenting/Chief Complaint: Altered Mental Status     Reason(s) for Admission: Acute hypernatremia [E87.0]  Volume depletion [E86.9]  Acute kidney injury (HCC) [N17.9]  Acute encephalopathy [G93.40]  Septic shock (HCC) [A41.9, R65.21]     Hospital Course:   Brendan Saleh is a 49 y.o. male with medical history of dural hematoma status post bur hole, , hydrocephalus s/p  shunt, sacral decubitus stage IV ulcer who presented 2024 with altered mental status.    Found to initially have urinary tract infection with hematuria was started on vancomycin and Zosyn.  Urine cultures are finalized and found to be negative.  Infectious disease was consulted. Echocardiography was done which showed heart failure with preserved ejection fraction without vegetation.  On 2024 patient underwent sacral wound debridement with general surgery.     Additionally, CT head demonstrated ongoing hydrocephalus with  shunt.  Neurosurgery consulted and patient underwent right ventriculoperitoneal shunt removal on 2024.  Neurosurgery followed up on 24 and said patient would not be a candidate for  shunt replacement given high risk of infection.  Infectious disease later consulted for CSF infection as  shunt tip grew ESBL (Klebsiella and Serratia).  Antibiotics were then changed to IV ciprofloxacin and then oral ciprofloxacin following shunt removal.      Hospital course further complicated by small bowel obstruction on May 19. Patient underwent hemicolectomy and primary anastomosis with general surgery. 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 Oncology consulted recommend CEA which was negative. On 2024, patient noted to be tachycardic and distended with emesis.  Imaging revealed free air

## 2024-09-25 NOTE — PROGRESS NOTES
ACUTE PHYSICAL THERAPY GOALS:   (Developed with and agreed upon by patient and/or caregiver.)  Goals reviewed 9/25/24 and remain appropriate at this time  .   MET 9/25/24  (1.) Brendan Saleh  will move from supine to sit and sit to supine and scoot up and down with INDEPENDENCE within 7 treatment day(s). UPDATED 9/25/24   MET 9/25/24  (2.) Brendan Saleh will transfer from bed to chair and chair to bed with MODIFIED INDEPENDENCE using the least restrictive device within 7 treatment day(s).  UPDATED 9/25/24  (3.) Brendan Saleh will perform standing static and dynamic balance activities x 15 minutes with SUPERVISION to improve safety within 7 treatment day(s).   UPDATED 9/25/24   MET 9/25/24   (4.) Brendan Saleh will ambulate with MODIFIED INDEPENDENCE for 500 feet with the least restrictive device within 7 treatment day(s). UPDATED 9/25/24    PHYSICAL THERAPY Daily Note, Re-evaluation, and AM  (Link to Caseload Tracking: PT Visit Days : 1  Acknowledge Orders  Time In/Out  PT Charge Capture  Rehab Caseload Tracker    Brendan Saleh is a 49 y.o. male   PRIMARY DIAGNOSIS: Infection of  (ventriculoperitoneal) shunt (HCC)  Acute hypernatremia [E87.0]  Volume depletion [E86.9]  Acute kidney injury (HCC) [N17.9]  Acute encephalopathy [G93.40]  Septic shock (HCC) [A41.9, R65.21]  Procedure(s) (LRB):  LAPAROTOMY EXPLORATORY, PRIMARY CLOSURE SMALL BOWEL PERFORATION (N/A)  116 Days Post-Op  Reason for Referral: Generalized Muscle Weakness (M62.81)  Difficulty in walking, Not elsewhere classified (R26.2)  Inpatient: Payor: AMBETTER / Plan: AMBETTER / Product Type: *No Product type* /     ASSESSMENT:     REHAB RECOMMENDATIONS:   Recommendation to date pending progress:  Setting:  Short-term Rehab    Equipment:    To Be Determined     ASSESSMENT:  Mr. Saleh was re-evaluated today and has met many of his previous goals.  He continues to present with some weakness in B Les and standing balance  deficits.  Pt also remains very impulsive today.  He was able to come to sitting on EOB with supervision and good-fair sitting balance.  He performed STS transfer with SBA-CGA/RW and good-fair standing balance.  Pt ambulated in the lindsay today with SBA-CGA/RW and accelerated gait speed.  He was given cueing for activity pacing and safety.  Pt transferred to recliner at end of session and performed several exercises.  His goals have been updated to reflect re-evaluation and pt could continue to benefit from skilled PT to address above deficits.       [facility]-PAC™ “6 Clicks” Basic Mobility Inpatient Short Form  -PAC Basic Mobility - Inpatient   How much help is needed turning from your back to your side while in a flat bed without using bedrails?: None  How much help is needed moving from lying on your back to sitting on the side of a flat bed without using bedrails?: None  How much help is needed moving to and from a bed to a chair?: A Little  How much help is needed standing up from a chair using your arms?: None  How much help is needed walking in hospital room?: A Little  How much help is needed climbing 3-5 steps with a railing?: A Little  -[facility] Inpatient Mobility Raw Score : 21  AM-PAC Inpatient T-Scale Score : 50.25  Mobility Inpatient CMS 0-100% Score: 28.97  Mobility Inpatient CMS G-Code Modifier : CJ    SUBJECTIVE:   Mr. Saleh states, \"I need to put my shoes on\"     Social/Functional Lives With: Alone  Type of Home: Trailer  Home Layout: One level  Home Access: Stairs to enter with rails  Entrance Stairs - Number of Steps: 5  Entrance Stairs - Rails: Both  Home Equipment: Cane  Additional Comments: reports that he was living on [place] acres and was able to take care of the property    OBJECTIVE:     PAIN: VITALS / O2: PRECAUTION / LINES / DRAINS:   Pre Treatment: 0         Post Treatment: none stated Vitals        Oxygen      None    RESTRICTIONS/PRECAUTIONS:  Restrictions/Precautions: Fall Risk,

## 2024-09-26 PROCEDURE — 6370000000 HC RX 637 (ALT 250 FOR IP): Performed by: INTERNAL MEDICINE

## 2024-09-26 PROCEDURE — 6370000000 HC RX 637 (ALT 250 FOR IP): Performed by: NURSE PRACTITIONER

## 2024-09-26 PROCEDURE — 6360000002 HC RX W HCPCS: Performed by: SURGERY

## 2024-09-26 PROCEDURE — 1100000000 HC RM PRIVATE

## 2024-09-26 RX ORDER — METOPROLOL TARTRATE 25 MG/1
12.5 TABLET, FILM COATED ORAL 2 TIMES DAILY
Status: DISCONTINUED | OUTPATIENT
Start: 2024-09-26 | End: 2024-10-18 | Stop reason: HOSPADM

## 2024-09-26 RX ADMIN — Medication 1 CAPSULE: at 09:24

## 2024-09-26 RX ADMIN — TRAZODONE HYDROCHLORIDE 50 MG: 50 TABLET ORAL at 20:35

## 2024-09-26 RX ADMIN — PANTOPRAZOLE SODIUM 40 MG: 40 TABLET, DELAYED RELEASE ORAL at 20:35

## 2024-09-26 RX ADMIN — TERBINAFINE HYDROCHLORIDE: 1 CREAM TOPICAL at 20:37

## 2024-09-26 RX ADMIN — ENOXAPARIN SODIUM 40 MG: 100 INJECTION SUBCUTANEOUS at 09:26

## 2024-09-26 RX ADMIN — METOPROLOL TARTRATE 12.5 MG: 25 TABLET, FILM COATED ORAL at 20:35

## 2024-09-26 RX ADMIN — METOPROLOL TARTRATE 12.5 MG: 25 TABLET, FILM COATED ORAL at 09:24

## 2024-09-26 NOTE — CARE COORDINATION
Dispo update:  Awaiting Social Security disability, which will allow application for [place] Medicaid, and also a disability payout of around $940 per month.  Charge nurse and 2 CM's discussed this, including his continued improvements with OT and PT.  Instead of long-term placement at a SNF, he may be more appropriate for a group home [facility].  CM will continue to follow and assist.

## 2024-09-26 NOTE — PROGRESS NOTES
Hospitalist Progress Note   Admit Date:  2024  4:30 PM   Name:  Brendan Saleh   Age:  49 y.o.  Sex:  male  :  1975   MRN:  [id number]   Room:  [facility]    Presenting/Chief Complaint: Altered Mental Status     Reason(s) for Admission: Acute hypernatremia [E87.0]  Volume depletion [E86.9]  Acute kidney injury (HCC) [N17.9]  Acute encephalopathy [G93.40]  Septic shock (HCC) [A41.9, R65.21]     Hospital Course:   Brendan Saleh is a 49 y.o. male with medical history of dural hematoma status post bur hole, , hydrocephalus s/p  shunt, sacral decubitus stage IV ulcer who presented 2024 with altered mental status.    Found to initially have urinary tract infection with hematuria was started on vancomycin and Zosyn.  Urine cultures are finalized and found to be negative.  Infectious disease was consulted. Echocardiography was done which showed heart failure with preserved ejection fraction without vegetation.  On 2024 patient underwent sacral wound debridement with general surgery.     Additionally, CT head demonstrated ongoing hydrocephalus with  shunt.  Neurosurgery consulted and patient underwent right ventriculoperitoneal shunt removal on 2024.  Neurosurgery followed up on 24 and said patient would not be a candidate for  shunt replacement given high risk of infection.  Infectious disease later consulted for CSF infection as  shunt tip grew ESBL (Klebsiella and Serratia).  Antibiotics were then changed to IV ciprofloxacin and then oral ciprofloxacin following shunt removal.      Hospital course further complicated by small bowel obstruction on May 19. Patient underwent hemicolectomy and primary anastomosis with general surgery. 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 Oncology consulted recommend CEA which was negative. On 2024, patient noted to be tachycardic and distended with emesis.  Imaging revealed free air

## 2024-09-27 PROCEDURE — 6370000000 HC RX 637 (ALT 250 FOR IP): Performed by: NURSE PRACTITIONER

## 2024-09-27 PROCEDURE — 1100000000 HC RM PRIVATE

## 2024-09-27 PROCEDURE — 6360000002 HC RX W HCPCS: Performed by: SURGERY

## 2024-09-27 RX ADMIN — TERBINAFINE HYDROCHLORIDE: 1 CREAM TOPICAL at 21:42

## 2024-09-27 RX ADMIN — ENOXAPARIN SODIUM 40 MG: 100 INJECTION SUBCUTANEOUS at 09:16

## 2024-09-27 RX ADMIN — PANTOPRAZOLE SODIUM 40 MG: 40 TABLET, DELAYED RELEASE ORAL at 21:40

## 2024-09-28 PROCEDURE — 1100000000 HC RM PRIVATE

## 2024-09-28 PROCEDURE — 6370000000 HC RX 637 (ALT 250 FOR IP): Performed by: INTERNAL MEDICINE

## 2024-09-28 PROCEDURE — 6370000000 HC RX 637 (ALT 250 FOR IP): Performed by: NURSE PRACTITIONER

## 2024-09-28 RX ADMIN — PANTOPRAZOLE SODIUM 40 MG: 40 TABLET, DELAYED RELEASE ORAL at 20:23

## 2024-09-28 RX ADMIN — METOPROLOL TARTRATE 12.5 MG: 25 TABLET, FILM COATED ORAL at 20:23

## 2024-09-28 ASSESSMENT — PAIN SCALES - GENERAL: PAINLEVEL_OUTOF10: 0

## 2024-09-28 NOTE — PROGRESS NOTES
Pt sat up in recliner most of the day, NAD. Hourly and PRN rounds completed. Bed in lowest and locked position, call light and personal items within reach.     BSSR to oncoming nurse.

## 2024-09-28 NOTE — PROGRESS NOTES
Hospitalist Progress Note   Admit Date:  2024  4:30 PM   Name:  Brendan Saleh   Age:  49 y.o.  Sex:  male  :  1975   MRN:  [id number]   Room:  [facility]    Presenting/Chief Complaint: Altered Mental Status     Reason(s) for Admission: Acute hypernatremia [E87.0]  Volume depletion [E86.9]  Acute kidney injury (HCC) [N17.9]  Acute encephalopathy [G93.40]  Septic shock (HCC) [A41.9, R65.21]     Hospital Course:   Brendan Saleh is a 49 y.o. male with medical history of dural hematoma status post bur hole, , hydrocephalus s/p  shunt, sacral decubitus stage IV ulcer who presented 2024 with altered mental status.    Found to initially have urinary tract infection with hematuria was started on vancomycin and Zosyn.  Urine cultures are finalized and found to be negative.  Infectious disease was consulted. Echocardiography was done which showed heart failure with preserved ejection fraction without vegetation.  On 2024 patient underwent sacral wound debridement with general surgery.     Additionally, CT head demonstrated ongoing hydrocephalus with  shunt.  Neurosurgery consulted and patient underwent right ventriculoperitoneal shunt removal on 2024.  Neurosurgery followed up on 24 and said patient would not be a candidate for  shunt replacement given high risk of infection.  Infectious disease later consulted for CSF infection as  shunt tip grew ESBL (Klebsiella and Serratia).  Antibiotics were then changed to IV ciprofloxacin and then oral ciprofloxacin following shunt removal.      Hospital course further complicated by small bowel obstruction on May 19. Patient underwent hemicolectomy and primary anastomosis with general surgery. 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 Oncology consulted recommend CEA which was negative. On 2024, patient noted to be tachycardic and distended with emesis.  Imaging revealed free air

## 2024-09-29 PROCEDURE — 6360000002 HC RX W HCPCS: Performed by: SURGERY

## 2024-09-29 PROCEDURE — 6370000000 HC RX 637 (ALT 250 FOR IP): Performed by: NURSE PRACTITIONER

## 2024-09-29 PROCEDURE — 6370000000 HC RX 637 (ALT 250 FOR IP): Performed by: INTERNAL MEDICINE

## 2024-09-29 PROCEDURE — 1100000000 HC RM PRIVATE

## 2024-09-29 RX ADMIN — ENOXAPARIN SODIUM 40 MG: 100 INJECTION SUBCUTANEOUS at 09:15

## 2024-09-29 RX ADMIN — PANTOPRAZOLE SODIUM 40 MG: 40 TABLET, DELAYED RELEASE ORAL at 20:55

## 2024-09-29 RX ADMIN — TERBINAFINE HYDROCHLORIDE: 1 CREAM TOPICAL at 20:56

## 2024-09-29 RX ADMIN — METOPROLOL TARTRATE 12.5 MG: 25 TABLET, FILM COATED ORAL at 09:01

## 2024-09-29 RX ADMIN — TERBINAFINE HYDROCHLORIDE: 1 CREAM TOPICAL at 16:45

## 2024-09-29 ASSESSMENT — PAIN SCALES - GENERAL: PAINLEVEL_OUTOF10: 0

## 2024-09-29 NOTE — PROGRESS NOTES
Hospitalist Progress Note   Admit Date:  2024  4:30 PM   Name:  Brendan Saleh   Age:  49 y.o.  Sex:  male  :  1975   MRN:  [id number]   Room:  [facility]    Presenting/Chief Complaint: Altered Mental Status     Reason(s) for Admission: Acute hypernatremia [E87.0]  Volume depletion [E86.9]  Acute kidney injury (HCC) [N17.9]  Acute encephalopathy [G93.40]  Septic shock (HCC) [A41.9, R65.21]     Hospital Course:   Brendan Saleh is a 49 y.o. male with medical history of dural hematoma status post bur hole, , hydrocephalus s/p  shunt, sacral decubitus stage IV ulcer who presented 2024 with altered mental status.    Found to initially have urinary tract infection with hematuria was started on vancomycin and Zosyn.  Urine cultures are finalized and found to be negative.  Infectious disease was consulted. Echocardiography was done which showed heart failure with preserved ejection fraction without vegetation.  On 2024 patient underwent sacral wound debridement with general surgery.     Additionally, CT head demonstrated ongoing hydrocephalus with  shunt.  Neurosurgery consulted and patient underwent right ventriculoperitoneal shunt removal on 2024.  Neurosurgery followed up on 24 and said patient would not be a candidate for  shunt replacement given high risk of infection.  Infectious disease later consulted for CSF infection as  shunt tip grew ESBL (Klebsiella and Serratia).  Antibiotics were then changed to IV ciprofloxacin and then oral ciprofloxacin following shunt removal.      Hospital course further complicated by small bowel obstruction on May 19. Patient underwent hemicolectomy and primary anastomosis with general surgery. 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 Oncology consulted recommend CEA which was negative. On 2024, patient noted to be tachycardic and distended with emesis.  Imaging revealed free air  Hypomagnesemia    Diarrhea  Resolved Problems:    Hypernatremia    MIRNA (acute kidney injury) ([facility])    UTI (urinary tract infection)    Septic shock ([facility])      Objective:   Patient Vitals for the past 24 hrs:   Temp Pulse Resp BP SpO2   09/28/24 1924 97.5 °F (36.4 °C) 83 16 112/81 100 %   09/28/24 1537 97.7 °F (36.5 °C) 80 16 105/80 100 %       Oxygen Therapy  SpO2: 100 %  Pulse Oximetry Type: Intermittent  Pulse via Oximetry: 128 beats per minute  Pulse Oximeter Device Mode: Intermittent  Pulse Oximeter Device Location: Finger  O2 Device: None (Room air)  Skin Assessment: Clean, dry, & intact  O2 Flow Rate (L/min): 1 L/min  Oxygen Therapy: None (Room air)    Estimated body mass index is 19 kg/m² as calculated from the following:    Height as of this encounter: 1.905 m (6' 3\").    Weight as of this encounter: 68.9 kg (152 lb).  No intake or output data in the 24 hours ending 09/29/24 1141        Physical Exam  General:    Well nourished.    Head:  Normocephalic, atraumatic  Eyes:  Sclerae appear normal.  Pupils equally round.  Lungs:   ND    Symmetric expansion.  Abdomen:   nondistended.  Extremities:  No edema  Skin:     No rashes and normal coloration.   Warm and dry.    Neuro:  CN II-XII grossly intact.    Psych:  Normal mood and affect.        I have personally reviewed labs and tests:  Recent Labs:  No results found for this or any previous visit (from the past 48 hour(s)).              No results for input(s): \"COVID19\" in the last 72 hours.    Current Meds:  Current Facility-Administered Medications   Medication Dose Route Frequency    metoprolol tartrate (LOPRESSOR) tablet 12.5 mg  12.5 mg Oral BID    terbinafine (LAMISIL) 1 % cream   Topical BID    LORazepam (ATIVAN) tablet 0.5 mg  0.5 mg Oral Q6H PRN    diphenhydrAMINE (BENADRYL) injection 25 mg  25 mg IntraVENous Q6H PRN    pantoprazole (PROTONIX) tablet 40 mg  40 mg Oral Nightly    oxyCODONE (ROXICODONE) immediate release tablet 5 mg  5 mg Oral Q4H PRN

## 2024-09-29 NOTE — PLAN OF CARE
Problem: Discharge Planning  Goal: Discharge to home or other facility with appropriate resources  9/28/2024 2219 by John Oglesby RN  Outcome: Progressing  9/28/2024 1421 by Cira Jackson RN  Outcome: Progressing     Problem: Safety - Adult  Goal: Free from fall injury  9/28/2024 2219 by John Oglesby RN  Outcome: Progressing  9/28/2024 1421 by Cira Jackson RN  Outcome: Progressing     Problem: Neurosensory - Adult  Goal: Achieves stable or improved neurological status  9/28/2024 2219 by John Oglesby RN  Outcome: Progressing  9/28/2024 1421 by Cira Jackson RN  Outcome: Progressing     Problem: Musculoskeletal - Adult  Goal: Return mobility to safest level of function  9/28/2024 2219 by John Oglesby RN  Outcome: Progressing  9/28/2024 1421 by Cira Jackson RN  Outcome: Progressing     Problem: Skin/Tissue Integrity  Goal: Absence of new skin breakdown  Description: 1.  Monitor for areas of redness and/or skin breakdown  2.  Assess vascular access sites hourly  3.  Every 4-6 hours minimum:  Change oxygen saturation probe site  4.  Every 4-6 hours:  If on nasal continuous positive airway pressure, respiratory therapy assess nares and determine need for appliance change or resting period.  9/28/2024 2219 by John Oglesby RN  Outcome: Progressing  9/28/2024 1421 by Cira Jackson RN  Outcome: Progressing     Problem: Pain  Goal: Verbalizes/displays adequate comfort level or baseline comfort level  9/28/2024 2219 by John Oglesby RN  Outcome: Progressing  9/28/2024 1421 by Cira Jackson RN  Outcome: Progressing     Problem: Skin/Tissue Integrity - Adult  Goal: Skin integrity remains intact  9/28/2024 2219 by John Oglesby RN  Outcome: Progressing  9/28/2024 1421 by Cira Jackson RN  Outcome: Progressing  Goal: Incisions, wounds, or drain sites healing without S/S of infection  9/28/2024 2219 by John Oglesby RN  Outcome: Progressing  9/28/2024 1421 by Cira Jackson

## 2024-09-29 NOTE — PROGRESS NOTES
Hospitalist Progress Note   Admit Date:  2024  4:30 PM   Name:  Brendan Saleh   Age:  49 y.o.  Sex:  male  :  1975   MRN:  [id number]   Room:  [facility]    Presenting/Chief Complaint: Altered Mental Status     Reason(s) for Admission: Acute hypernatremia [E87.0]  Volume depletion [E86.9]  Acute kidney injury (HCC) [N17.9]  Acute encephalopathy [G93.40]  Septic shock (HCC) [A41.9, R65.21]     Hospital Course:   Brendan Saleh is a 49 y.o. male with medical history of dural hematoma status post bur hole, , hydrocephalus s/p  shunt, sacral decubitus stage IV ulcer who presented 2024 with altered mental status.    Found to initially have urinary tract infection with hematuria was started on vancomycin and Zosyn.  Urine cultures are finalized and found to be negative.  Infectious disease was consulted. Echocardiography was done which showed heart failure with preserved ejection fraction without vegetation.  On 2024 patient underwent sacral wound debridement with general surgery.     Additionally, CT head demonstrated ongoing hydrocephalus with  shunt.  Neurosurgery consulted and patient underwent right ventriculoperitoneal shunt removal on 2024.  Neurosurgery followed up on 24 and said patient would not be a candidate for  shunt replacement given high risk of infection.  Infectious disease later consulted for CSF infection as  shunt tip grew ESBL (Klebsiella and Serratia).  Antibiotics were then changed to IV ciprofloxacin and then oral ciprofloxacin following shunt removal.      Hospital course further complicated by small bowel obstruction on May 19. Patient underwent hemicolectomy and primary anastomosis with general surgery. 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 Oncology consulted recommend CEA which was negative. On 2024, patient noted to be tachycardic and distended with emesis.  Imaging revealed free air

## 2024-09-30 PROCEDURE — 6370000000 HC RX 637 (ALT 250 FOR IP): Performed by: INTERNAL MEDICINE

## 2024-09-30 PROCEDURE — 1100000000 HC RM PRIVATE

## 2024-09-30 PROCEDURE — 6360000002 HC RX W HCPCS: Performed by: SURGERY

## 2024-09-30 PROCEDURE — 6370000000 HC RX 637 (ALT 250 FOR IP): Performed by: NURSE PRACTITIONER

## 2024-09-30 RX ADMIN — TERBINAFINE HYDROCHLORIDE: 1 CREAM TOPICAL at 20:29

## 2024-09-30 RX ADMIN — PANTOPRAZOLE SODIUM 40 MG: 40 TABLET, DELAYED RELEASE ORAL at 20:29

## 2024-09-30 RX ADMIN — TRAZODONE HYDROCHLORIDE 50 MG: 50 TABLET ORAL at 20:29

## 2024-09-30 RX ADMIN — ENOXAPARIN SODIUM 40 MG: 100 INJECTION SUBCUTANEOUS at 09:49

## 2024-09-30 RX ADMIN — TERBINAFINE HYDROCHLORIDE: 1 CREAM TOPICAL at 09:49

## 2024-09-30 ASSESSMENT — PAIN SCALES - WONG BAKER
WONGBAKER_NUMERICALRESPONSE: NO HURT
WONGBAKER_NUMERICALRESPONSE: NO HURT

## 2024-09-30 ASSESSMENT — PAIN SCALES - GENERAL
PAINLEVEL_OUTOF10: 0
PAINLEVEL_OUTOF10: 0

## 2024-09-30 NOTE — PLAN OF CARE
Problem: Discharge Planning  Goal: Discharge to home or other facility with appropriate resources  9/29/2024 2119 by Lyly Davidson RN  Outcome: Progressing     Problem: Safety - Adult  Goal: Free from fall injury  9/30/2024 0812 by Kim Evans RN  Outcome: Progressing  9/29/2024 2119 by Lyly Davidson RN  Outcome: Progressing     Problem: Neurosensory - Adult  Goal: Achieves stable or improved neurological status  9/29/2024 2119 by Llyy Davidson RN  Outcome: Progressing     Problem: Musculoskeletal - Adult  Goal: Return mobility to safest level of function  9/30/2024 0812 by Kim Evans RN  Outcome: Progressing  Flowsheets (Taken 9/30/2024 0753)  Return Mobility to Safest Level of Function: Assess patient stability and activity tolerance for standing, transferring and ambulating with or without assistive devices  9/29/2024 2119 by Lyly Davidson RN  Outcome: Progressing     Problem: Skin/Tissue Integrity  Goal: Absence of new skin breakdown  Description: 1.  Monitor for areas of redness and/or skin breakdown  2.  Assess vascular access sites hourly  3.  Every 4-6 hours minimum:  Change oxygen saturation probe site  4.  Every 4-6 hours:  If on nasal continuous positive airway pressure, respiratory therapy assess nares and determine need for appliance change or resting period.  9/30/2024 0812 by Kim Evans RN  Outcome: Progressing  9/29/2024 2119 by Lyly Davidson RN  Outcome: Progressing     Problem: Pain  Goal: Verbalizes/displays adequate comfort level or baseline comfort level  9/30/2024 0812 by Kim Evans RN  Outcome: Progressing  9/29/2024 2119 by Lyly Davidson RN  Outcome: Progressing     Problem: Skin/Tissue Integrity - Adult  Goal: Skin integrity remains intact  9/30/2024 0812 by Kim Evans RN  Outcome: Progressing  Flowsheets (Taken 9/30/2024 0753)  Skin Integrity Remains Intact: Monitor for areas of redness and/or skin breakdown  9/29/2024 2119 by Lyly Davidson

## 2024-09-30 NOTE — PROGRESS NOTES
Hospitalist Progress Note   Admit Date:  2024  4:30 PM   Name:  Brendan Saleh   Age:  49 y.o.  Sex:  male  :  1975   MRN:  [id number]   Room:  [facility]    Presenting/Chief Complaint: Altered Mental Status     Reason(s) for Admission: Acute hypernatremia [E87.0]  Volume depletion [E86.9]  Acute kidney injury (HCC) [N17.9]  Acute encephalopathy [G93.40]  Septic shock (HCC) [A41.9, R65.21]     Hospital Course:   Brendan Saleh is a 49 y.o. male with medical history of dural hematoma status post bur hole, , hydrocephalus s/p  shunt, sacral decubitus stage IV ulcer who presented 2024 with altered mental status.    Found to initially have urinary tract infection with hematuria was started on vancomycin and Zosyn.  Urine cultures are finalized and found to be negative.  Infectious disease was consulted. Echocardiography was done which showed heart failure with preserved ejection fraction without vegetation.  On 2024 patient underwent sacral wound debridement with general surgery.     Additionally, CT head demonstrated ongoing hydrocephalus with  shunt.  Neurosurgery consulted and patient underwent right ventriculoperitoneal shunt removal on 2024.  Neurosurgery followed up on 24 and said patient would not be a candidate for  shunt replacement given high risk of infection.  Infectious disease later consulted for CSF infection as  shunt tip grew ESBL (Klebsiella and Serratia).  Antibiotics were then changed to IV ciprofloxacin and then oral ciprofloxacin following shunt removal.      Hospital course further complicated by small bowel obstruction on May 19. Patient underwent hemicolectomy and primary anastomosis with general surgery. 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 Oncology consulted recommend CEA which was negative. On 2024, patient noted to be tachycardic and distended with emesis.  Imaging revealed free air  protein-calorie malnutrition    -Encourage p.o. intake  -Vit D level (prolonged inpatient stay) was normal.    ESBL  -Previous history of ESBL now resolved  -Not currently on antibiotics     Critical illness myopathy  -Due to prolonged hospitalization and critical illness  -PT and OT following; recommend short term rehab    Anticipated Discharge Arrangements:   Skilled nursing facility, requires Medicaid and placement, awaiting Medicaid letter of disability    PT/OT evals ordered?  Therapy evals ordered  Diet:  ADULT ORAL NUTRITION SUPPLEMENT; Breakfast, Lunch, Dinner; Standard High Calorie/High Protein Oral Supplement  ADULT DIET; Easy to Chew; Low Fiber; Double Protein Portions; double portions of everything, please  ADULT ORAL NUTRITION SUPPLEMENT; Breakfast, Lunch, Dinner; Standard High Calorie/High Protein Oral Supplement  VTE prophylaxis: Lovenox 40 mg daily  Code status: DNR    Non-peripheral Lines and Tubes (if present):              Telemetry (if present):  Cardiac/Telemetry Monitor On: No      Hospital Problems:  Principal Problem:    Infection of  (ventriculoperitoneal) shunt (HCC)  Active Problems:    Encephalitis    S/P  shunt    Communicating hydrocephalus (HCC)    Anemia    Sepsis following intra-abdominal surgery (HCC)    Wound disruption    Sinus tachycardia    NPH (normal pressure hydrocephalus) (HCC)    Shunt malfunction    Colon obstruction (HCC)    Colonic mass    Adenocarcinoma of colon (HCC)    Severe protein-calorie malnutrition (HCC)    Sacral decubitus ulcer, stage IV (HCC)    ESBL (extended spectrum beta-lactamase) producing bacteria infection    Acute blood loss as cause of postoperative anemia    Bowel perforation (HCC)    Critical illness myopathy    Cachexia (HCC)    Unintentional weight loss    Adult failure to thrive    Encounter for palliative care    Hypomagnesemia    Diarrhea  Resolved Problems:    Hypernatremia    MIRNA (acute kidney injury) (HCC)    UTI (urinary tract

## 2024-09-30 NOTE — PROGRESS NOTES
Up in chair at present. Has been in chair all shift without complaints. Hourly rounds completed, all needs met this shift. Instructed to call if needed anything. Call light with bedside table and personal items in reach.

## 2024-10-01 PROCEDURE — 1100000000 HC RM PRIVATE

## 2024-10-01 PROCEDURE — 6370000000 HC RX 637 (ALT 250 FOR IP): Performed by: NURSE PRACTITIONER

## 2024-10-01 PROCEDURE — 6370000000 HC RX 637 (ALT 250 FOR IP): Performed by: INTERNAL MEDICINE

## 2024-10-01 PROCEDURE — 97530 THERAPEUTIC ACTIVITIES: CPT

## 2024-10-01 PROCEDURE — 6360000002 HC RX W HCPCS: Performed by: SURGERY

## 2024-10-01 RX ADMIN — METOPROLOL TARTRATE 12.5 MG: 25 TABLET, FILM COATED ORAL at 08:58

## 2024-10-01 RX ADMIN — TERBINAFINE HYDROCHLORIDE: 1 CREAM TOPICAL at 20:49

## 2024-10-01 RX ADMIN — ENOXAPARIN SODIUM 40 MG: 100 INJECTION SUBCUTANEOUS at 08:59

## 2024-10-01 RX ADMIN — PANTOPRAZOLE SODIUM 40 MG: 40 TABLET, DELAYED RELEASE ORAL at 20:48

## 2024-10-01 RX ADMIN — TERBINAFINE HYDROCHLORIDE: 1 CREAM TOPICAL at 09:00

## 2024-10-01 RX ADMIN — TRAZODONE HYDROCHLORIDE 50 MG: 50 TABLET ORAL at 20:48

## 2024-10-01 ASSESSMENT — PAIN SCALES - WONG BAKER: WONGBAKER_NUMERICALRESPONSE: NO HURT

## 2024-10-01 ASSESSMENT — PAIN SCALES - GENERAL: PAINLEVEL_OUTOF10: 0

## 2024-10-01 NOTE — PROGRESS NOTES
Hospitalist Progress Note   Admit Date:  2024  4:30 PM   Name:  Brendan Saleh   Age:  49 y.o.  Sex:  male  :  1975   MRN:  [id number]   Room:  [facility]    Presenting/Chief Complaint: Altered Mental Status     Reason(s) for Admission: Acute hypernatremia [E87.0]  Volume depletion [E86.9]  Acute kidney injury (HCC) [N17.9]  Acute encephalopathy [G93.40]  Septic shock (HCC) [A41.9, R65.21]     Hospital Course:   Brendan Saleh is a 49 y.o. male with medical history of dural hematoma status post bur hole, , hydrocephalus s/p  shunt, sacral decubitus stage IV ulcer who presented 2024 with altered mental status.    Found to initially have urinary tract infection with hematuria was started on vancomycin and Zosyn.  Urine cultures are finalized and found to be negative.  Infectious disease was consulted. Echocardiography was done which showed heart failure with preserved ejection fraction without vegetation.  On 2024 patient underwent sacral wound debridement with general surgery.     Additionally, CT head demonstrated ongoing hydrocephalus with  shunt.  Neurosurgery consulted and patient underwent right ventriculoperitoneal shunt removal on 2024.  Neurosurgery followed up on 24 and said patient would not be a candidate for  shunt replacement given high risk of infection.  Infectious disease later consulted for CSF infection as  shunt tip grew ESBL (Klebsiella and Serratia).  Antibiotics were then changed to IV ciprofloxacin and then oral ciprofloxacin following shunt removal.      Hospital course further complicated by small bowel obstruction on May 19. Patient underwent hemicolectomy and primary anastomosis with general surgery. 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 Oncology consulted recommend CEA which was negative. On 2024, patient noted to be tachycardic and distended with emesis.  Imaging revealed free air  tablet 50 mg  50 mg Oral Nightly PRN    acetaminophen (TYLENOL) tablet 650 mg  650 mg Oral Q4H PRN    0.9 % sodium chloride infusion  25 mL IntraVENous PRN    sodium chloride flush 0.9 % injection 5-40 mL  5-40 mL IntraVENous PRN    0.9 % sodium chloride infusion   IntraVENous PRN    glucose chewable tablet 16 g  4 tablet Oral PRN    dextrose bolus 10% 125 mL  125 mL IntraVENous PRN    Or    dextrose bolus 10% 250 mL  250 mL IntraVENous PRN    Glucagon Emergency KIT 1 mg  1 mg SubCUTAneous PRN    dextrose 10 % infusion   IntraVENous Continuous PRN    medicated lip ointment (BLISTEX)   Topical PRN    ondansetron (ZOFRAN-ODT) disintegrating tablet 4 mg  4 mg Oral Q8H PRN    Or    ondansetron (ZOFRAN) injection 4 mg  4 mg IntraVENous Q6H PRN    enoxaparin (LOVENOX) injection 40 mg  40 mg SubCUTAneous Daily    calcium carbonate (TUMS) chewable tablet 500 mg  500 mg Oral TID PRN       Signed:    Giovanni Dodd, DO   Internal Medicine      Part of this note may have been written by using a voice dictation software.  The note has been proof read but may still contain some grammatical/other typographical errors.

## 2024-10-01 NOTE — PLAN OF CARE
Problem: Safety - Adult  Goal: Free from fall injury  10/1/2024 0758 by Kim Evans, RN  Outcome: Progressing  10/1/2024 0758 by Kim Evans, RN  Outcome: Progressing     Problem: Musculoskeletal - Adult  Goal: Return mobility to safest level of function  Outcome: Progressing     Problem: Skin/Tissue Integrity  Goal: Absence of new skin breakdown  Description: 1.  Monitor for areas of redness and/or skin breakdown  2.  Assess vascular access sites hourly  3.  Every 4-6 hours minimum:  Change oxygen saturation probe site  4.  Every 4-6 hours:  If on nasal continuous positive airway pressure, respiratory therapy assess nares and determine need for appliance change or resting period.  Outcome: Progressing     Problem: Pain  Goal: Verbalizes/displays adequate comfort level or baseline comfort level  Outcome: Progressing     Problem: Skin/Tissue Integrity - Adult  Goal: Incisions, wounds, or drain sites healing without S/S of infection  Outcome: Progressing     Problem: Gastrointestinal - Adult  Goal: Maintains adequate nutritional intake  Outcome: Progressing

## 2024-10-01 NOTE — PROGRESS NOTES
ACUTE PHYSICAL THERAPY GOALS:   (Developed with and agreed upon by patient and/or caregiver.)  Goals reviewed 9/25/24 and remain appropriate at this time  .   MET 9/25/24  (1.) Brendan Saleh  will move from supine to sit and sit to supine and scoot up and down with INDEPENDENCE within 7 treatment day(s). UPDATED 9/25/24   MET 9/25/24  (2.) Brendan Saleh will transfer from bed to chair and chair to bed with MODIFIED INDEPENDENCE using the least restrictive device within 7 treatment day(s).  UPDATED 9/25/24  (3.) Brendan Saleh will perform standing static and dynamic balance activities x 15 minutes with SUPERVISION to improve safety within 7 treatment day(s).   UPDATED 9/25/24   MET 9/25/24   (4.) Brendan Saleh will ambulate with MODIFIED INDEPENDENCE for 500 feet with the least restrictive device within 7 treatment day(s). UPDATED 9/25/24    PHYSICAL THERAPY: Daily Note AM   (Link to Caseload Tracking: PT Visit Days : 2  Time In/Out PT Charge Capture  Rehab Caseload Tracker  Orders    Brendan Saleh is a 49 y.o. male   PRIMARY DIAGNOSIS: Infection of  (ventriculoperitoneal) shunt (HCC)  Acute hypernatremia [E87.0]  Volume depletion [E86.9]  Acute kidney injury (HCC) [N17.9]  Acute encephalopathy [G93.40]  Septic shock (HCC) [A41.9, R65.21]  Procedure(s) (LRB):  LAPAROTOMY EXPLORATORY, PRIMARY CLOSURE SMALL BOWEL PERFORATION (N/A)  122 Days Post-Op  Inpatient: Payor: [employer] / Plan: [employer] / Product Type: *No Product type* /     ASSESSMENT:     REHAB RECOMMENDATIONS:   Recommendation to date pending progress:  Setting:  Continued physical therapy recommended at discharge.    Equipment:    To Be Determined     ASSESSMENT:  Mr. Saleh was sitting inn recliner and agreeable to PT.  He performed seated activities with supervision and transfers with SBA/RW.  Pt ambulated in lindsay today with SBA-CGA/RW and cueing to decrease his becki.  Pt also ambulated in his room with CLOSE SBA and

## 2024-10-02 PROCEDURE — 1100000000 HC RM PRIVATE

## 2024-10-03 PROCEDURE — 97530 THERAPEUTIC ACTIVITIES: CPT

## 2024-10-03 PROCEDURE — 1100000000 HC RM PRIVATE

## 2024-10-04 PROCEDURE — 6370000000 HC RX 637 (ALT 250 FOR IP): Performed by: NURSE PRACTITIONER

## 2024-10-04 PROCEDURE — 6360000002 HC RX W HCPCS: Performed by: SURGERY

## 2024-10-04 PROCEDURE — 6370000000 HC RX 637 (ALT 250 FOR IP): Performed by: INTERNAL MEDICINE

## 2024-10-04 PROCEDURE — 1100000000 HC RM PRIVATE

## 2024-10-04 RX ADMIN — TRAZODONE HYDROCHLORIDE 50 MG: 50 TABLET ORAL at 21:20

## 2024-10-04 RX ADMIN — TERBINAFINE HYDROCHLORIDE: 1 CREAM TOPICAL at 21:20

## 2024-10-04 RX ADMIN — PANTOPRAZOLE SODIUM 40 MG: 40 TABLET, DELAYED RELEASE ORAL at 21:20

## 2024-10-04 RX ADMIN — ENOXAPARIN SODIUM 40 MG: 100 INJECTION SUBCUTANEOUS at 08:51

## 2024-10-04 RX ADMIN — TERBINAFINE HYDROCHLORIDE: 1 CREAM TOPICAL at 08:51

## 2024-10-04 ASSESSMENT — PAIN SCALES - GENERAL: PAINLEVEL_OUTOF10: 0

## 2024-10-04 NOTE — CARE COORDINATION
Patient has been progressing well with therapy. Patient discussed in interdisciplinary rounds this am. Once wound has healed, patient could potentially return to his trailer home.  to coordinate with patient family on how patient would obtain groceries and whom would be able to assist with transportation needs. Home pop to be arranged at discharge and any equipment needs to be arranged.    Bettina BENAVIDEZ, ACM  [facility]

## 2024-10-05 PROCEDURE — 6370000000 HC RX 637 (ALT 250 FOR IP): Performed by: INTERNAL MEDICINE

## 2024-10-05 PROCEDURE — 6360000002 HC RX W HCPCS: Performed by: SURGERY

## 2024-10-05 PROCEDURE — 1100000000 HC RM PRIVATE

## 2024-10-05 PROCEDURE — 6370000000 HC RX 637 (ALT 250 FOR IP): Performed by: NURSE PRACTITIONER

## 2024-10-05 RX ADMIN — PANTOPRAZOLE SODIUM 40 MG: 40 TABLET, DELAYED RELEASE ORAL at 21:18

## 2024-10-05 RX ADMIN — TRAZODONE HYDROCHLORIDE 50 MG: 50 TABLET ORAL at 21:18

## 2024-10-05 RX ADMIN — TERBINAFINE HYDROCHLORIDE: 1 CREAM TOPICAL at 21:18

## 2024-10-05 RX ADMIN — TERBINAFINE HYDROCHLORIDE: 1 CREAM TOPICAL at 09:30

## 2024-10-05 RX ADMIN — METOPROLOL TARTRATE 12.5 MG: 25 TABLET, FILM COATED ORAL at 09:30

## 2024-10-05 RX ADMIN — ENOXAPARIN SODIUM 40 MG: 100 INJECTION SUBCUTANEOUS at 09:30

## 2024-10-05 ASSESSMENT — PAIN SCALES - GENERAL: PAINLEVEL_OUTOF10: 0

## 2024-10-05 NOTE — PROGRESS NOTES
Hospitalist Progress Note   Admit Date:  2024  4:30 PM   Name:  Brendan Saleh   Age:  49 y.o.  Sex:  male  :  1975   MRN:  [id number]   Room:  [facility]    Presenting/Chief Complaint: Altered Mental Status     Reason(s) for Admission: Acute hypernatremia [E87.0]  Volume depletion [E86.9]  Acute kidney injury (HCC) [N17.9]  Acute encephalopathy [G93.40]  Septic shock (HCC) [A41.9, R65.21]     Hospital Course:     Copied from prior provider HPI/summary:  Brendan Saleh is a 49 y.o. male with medical history of dural hematoma status post bur hole, , hydrocephalus s/p  shunt, sacral decubitus stage IV ulcer who presented 2024 with altered mental status.     Found to initially have urinary tract infection with hematuria was started on vancomycin and Zosyn.  Urine cultures are finalized and found to be negative.  Infectious disease was consulted. Echocardiography was done which showed heart failure with preserved ejection fraction without vegetation.  On 2024 patient underwent sacral wound debridement with general surgery.      Additionally, CT head demonstrated ongoing hydrocephalus with  shunt.  Neurosurgery consulted and patient underwent right ventriculoperitoneal shunt removal on 2024.  Neurosurgery followed up on 24 and said patient would not be a candidate for  shunt replacement given high risk of infection.  Infectious disease later consulted for CSF infection as  shunt tip grew ESBL (Klebsiella and Serratia).  Antibiotics were then changed to IV ciprofloxacin and then oral ciprofloxacin following shunt removal.       Hospital course further complicated by small bowel obstruction on May 19. Patient underwent hemicolectomy and primary anastomosis with general surgery. 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 Oncology consulted recommend CEA which was negative. On 2024, patient noted to be tachycardic and distended  (HCC)  Active Problems:    Encephalitis    S/P  shunt    Communicating hydrocephalus (HCC)    Anemia    Sepsis following intra-abdominal surgery (HCC)    Wound disruption    Sinus tachycardia    NPH (normal pressure hydrocephalus) (HCC)    Shunt malfunction    Colon obstruction (HCC)    Colonic mass    Adenocarcinoma of colon (HCC)    Severe protein-calorie malnutrition (HCC)    Sacral decubitus ulcer, stage IV (HCC)    ESBL (extended spectrum beta-lactamase) producing bacteria infection    Acute blood loss as cause of postoperative anemia    Bowel perforation (HCC)    Critical illness myopathy    Cachexia (HCC)    Unintentional weight loss    Adult failure to thrive    Encounter for palliative care    Hypomagnesemia    Diarrhea  Resolved Problems:    Hypernatremia    MRINA (acute kidney injury) (HCC)    UTI (urinary tract infection)    Septic shock (HCC)      Objective:   Patient Vitals for the past 24 hrs:   Temp Pulse Resp BP SpO2   10/05/24 0733 97.5 °F (36.4 °C) 83 18 108/79 100 %   10/04/24 1946 97.9 °F (36.6 °C) 98 16 115/82 100 %       Oxygen Therapy  SpO2: 100 %  Pulse Oximetry Type: Intermittent  Pulse via Oximetry: 128 beats per minute  Pulse Oximeter Device Mode: Intermittent  Pulse Oximeter Device Location: Finger  O2 Device: None (Room air)  Skin Assessment: Clean, dry, & intact  O2 Flow Rate (L/min): 1 L/min  Oxygen Therapy: None (Room air)    Estimated body mass index is 19 kg/m² as calculated from the following:    Height as of this encounter: 1.905 m (6' 3\").    Weight as of this encounter: 68.9 kg (152 lb).    Intake/Output Summary (Last 24 hours) at 10/5/2024 1338  Last data filed at 10/5/2024 0946  Gross per 24 hour   Intake 480 ml   Output --   Net 480 ml         Physical Exam:     General-alert and oriented person and place.  Eyes-conjunctive are clear pupils equal round react to light extraocular muscles intact    Ears-no deformity-no drainage  Nares-no nasal deformity-no discharge-turbinates

## 2024-10-06 LAB
ANION GAP SERPL CALC-SCNC: 8 MMOL/L (ref 9–18)
BASOPHILS # BLD: 0 K/UL (ref 0–0.2)
BASOPHILS NFR BLD: 0 % (ref 0–2)
BUN SERPL-MCNC: 23 MG/DL (ref 6–23)
CALCIUM SERPL-MCNC: 9.1 MG/DL (ref 8.8–10.2)
CHLORIDE SERPL-SCNC: 101 MMOL/L (ref 98–107)
CO2 SERPL-SCNC: 30 MMOL/L (ref 20–28)
CREAT SERPL-MCNC: 0.89 MG/DL (ref 0.8–1.3)
DIFFERENTIAL METHOD BLD: ABNORMAL
EOSINOPHIL # BLD: 0.2 K/UL (ref 0–0.8)
EOSINOPHIL NFR BLD: 5 % (ref 0.5–7.8)
ERYTHROCYTE [DISTWIDTH] IN BLOOD BY AUTOMATED COUNT: 14.3 % (ref 11.9–14.6)
GLUCOSE SERPL-MCNC: 109 MG/DL (ref 70–99)
HCT VFR BLD AUTO: 33 % (ref 41.1–50.3)
HGB BLD-MCNC: 10.3 G/DL (ref 13.6–17.2)
IMM GRANULOCYTES # BLD AUTO: 0 K/UL (ref 0–0.5)
IMM GRANULOCYTES NFR BLD AUTO: 0 % (ref 0–5)
LYMPHOCYTES # BLD: 1.5 K/UL (ref 0.5–4.6)
LYMPHOCYTES NFR BLD: 42 % (ref 13–44)
MCH RBC QN AUTO: 28.7 PG (ref 26.1–32.9)
MCHC RBC AUTO-ENTMCNC: 31.2 G/DL (ref 31.4–35)
MCV RBC AUTO: 91.9 FL (ref 82–102)
MONOCYTES # BLD: 0.5 K/UL (ref 0.1–1.3)
MONOCYTES NFR BLD: 14 % (ref 4–12)
NEUTS SEG # BLD: 1.3 K/UL (ref 1.7–8.2)
NEUTS SEG NFR BLD: 38 % (ref 43–78)
NRBC # BLD: 0 K/UL (ref 0–0.2)
PLATELET # BLD AUTO: 179 K/UL (ref 150–450)
PMV BLD AUTO: 11 FL (ref 9.4–12.3)
POTASSIUM SERPL-SCNC: 3.9 MMOL/L (ref 3.5–5.1)
RBC # BLD AUTO: 3.59 M/UL (ref 4.23–5.6)
SODIUM SERPL-SCNC: 138 MMOL/L (ref 136–145)
WBC # BLD AUTO: 3.4 K/UL (ref 4.3–11.1)

## 2024-10-06 PROCEDURE — 36415 COLL VENOUS BLD VENIPUNCTURE: CPT

## 2024-10-06 PROCEDURE — 1100000000 HC RM PRIVATE

## 2024-10-06 PROCEDURE — 6370000000 HC RX 637 (ALT 250 FOR IP): Performed by: INTERNAL MEDICINE

## 2024-10-06 PROCEDURE — 80048 BASIC METABOLIC PNL TOTAL CA: CPT

## 2024-10-06 PROCEDURE — 6370000000 HC RX 637 (ALT 250 FOR IP): Performed by: NURSE PRACTITIONER

## 2024-10-06 PROCEDURE — 85025 COMPLETE CBC W/AUTO DIFF WBC: CPT

## 2024-10-06 PROCEDURE — 6360000002 HC RX W HCPCS: Performed by: SURGERY

## 2024-10-06 RX ADMIN — TERBINAFINE HYDROCHLORIDE: 1 CREAM TOPICAL at 19:47

## 2024-10-06 RX ADMIN — ENOXAPARIN SODIUM 40 MG: 100 INJECTION SUBCUTANEOUS at 07:10

## 2024-10-06 RX ADMIN — TRAZODONE HYDROCHLORIDE 50 MG: 50 TABLET ORAL at 19:49

## 2024-10-06 RX ADMIN — ACETAMINOPHEN 650 MG: 325 TABLET ORAL at 19:45

## 2024-10-06 RX ADMIN — PANTOPRAZOLE SODIUM 40 MG: 40 TABLET, DELAYED RELEASE ORAL at 19:46

## 2024-10-06 RX ADMIN — TERBINAFINE HYDROCHLORIDE: 1 CREAM TOPICAL at 07:09

## 2024-10-06 RX ADMIN — METOPROLOL TARTRATE 12.5 MG: 25 TABLET, FILM COATED ORAL at 07:07

## 2024-10-06 ASSESSMENT — PAIN SCALES - GENERAL
PAINLEVEL_OUTOF10: 0
PAINLEVEL_OUTOF10: 3
PAINLEVEL_OUTOF10: 0

## 2024-10-06 ASSESSMENT — PAIN - FUNCTIONAL ASSESSMENT: PAIN_FUNCTIONAL_ASSESSMENT: PREVENTS OR INTERFERES SOME ACTIVE ACTIVITIES AND ADLS

## 2024-10-06 ASSESSMENT — PAIN DESCRIPTION - LOCATION: LOCATION: HEAD

## 2024-10-06 NOTE — PLAN OF CARE
Problem: Discharge Planning  Goal: Discharge to home or other facility with appropriate resources  Outcome: Progressing  Flowsheets (Taken 10/5/2024 1955 by Suzanne Kumari RN)  Discharge to home or other facility with appropriate resources: Identify barriers to discharge with patient and caregiver     Problem: Safety - Adult  Goal: Free from fall injury  Outcome: Progressing     Problem: Neurosensory - Adult  Goal: Achieves stable or improved neurological status  Outcome: Progressing     Problem: Musculoskeletal - Adult  Goal: Return mobility to safest level of function  Outcome: Progressing

## 2024-10-06 NOTE — PROGRESS NOTES
Hospitalist Progress Note   Admit Date:  2024  4:30 PM   Name:  Brendan Saleh   Age:  49 y.o.  Sex:  male  :  1975   MRN:  [id number]   Room:  [facility]    Presenting/Chief Complaint: Altered Mental Status     Reason(s) for Admission: Acute hypernatremia [E87.0]  Volume depletion [E86.9]  Acute kidney injury (HCC) [N17.9]  Acute encephalopathy [G93.40]  Septic shock (HCC) [A41.9, R65.21]     Hospital Course:     Copied from prior provider HPI/summary:  Brendan Saleh is a 49 y.o. male with medical history of dural hematoma status post bur hole, , hydrocephalus s/p  shunt, sacral decubitus stage IV ulcer who presented 2024 with altered mental status.     Found to initially have urinary tract infection with hematuria was started on vancomycin and Zosyn.  Urine cultures are finalized and found to be negative.  Infectious disease was consulted. Echocardiography was done which showed heart failure with preserved ejection fraction without vegetation.  On 2024 patient underwent sacral wound debridement with general surgery.      Additionally, CT head demonstrated ongoing hydrocephalus with  shunt.  Neurosurgery consulted and patient underwent right ventriculoperitoneal shunt removal on 2024.  Neurosurgery followed up on 24 and said patient would not be a candidate for  shunt replacement given high risk of infection.  Infectious disease later consulted for CSF infection as  shunt tip grew ESBL (Klebsiella and Serratia).  Antibiotics were then changed to IV ciprofloxacin and then oral ciprofloxacin following shunt removal.       Hospital course further complicated by small bowel obstruction on May 19. Patient underwent hemicolectomy and primary anastomosis with general surgery. 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 Oncology consulted recommend CEA which was negative. On 2024, patient noted to be tachycardic and distended

## 2024-10-06 NOTE — PROGRESS NOTES
OT Note:    OT attempted to see patient for therapy today. Pt was being taken off the floor with other staff. OT will re-attempt to see patient at a later date/time.    Thanks,  ISRAEL Rdz

## 2024-10-07 PROCEDURE — 1100000000 HC RM PRIVATE

## 2024-10-07 PROCEDURE — 6370000000 HC RX 637 (ALT 250 FOR IP): Performed by: INTERNAL MEDICINE

## 2024-10-07 PROCEDURE — 6360000002 HC RX W HCPCS: Performed by: SURGERY

## 2024-10-07 PROCEDURE — 6370000000 HC RX 637 (ALT 250 FOR IP): Performed by: NURSE PRACTITIONER

## 2024-10-07 PROCEDURE — 97530 THERAPEUTIC ACTIVITIES: CPT

## 2024-10-07 RX ADMIN — TRAZODONE HYDROCHLORIDE 50 MG: 50 TABLET ORAL at 20:46

## 2024-10-07 RX ADMIN — METOPROLOL TARTRATE 12.5 MG: 25 TABLET, FILM COATED ORAL at 20:43

## 2024-10-07 RX ADMIN — TERBINAFINE HYDROCHLORIDE: 1 CREAM TOPICAL at 09:38

## 2024-10-07 RX ADMIN — ENOXAPARIN SODIUM 40 MG: 100 INJECTION SUBCUTANEOUS at 09:36

## 2024-10-07 RX ADMIN — PANTOPRAZOLE SODIUM 40 MG: 40 TABLET, DELAYED RELEASE ORAL at 20:43

## 2024-10-07 RX ADMIN — METOPROLOL TARTRATE 12.5 MG: 25 TABLET, FILM COATED ORAL at 09:36

## 2024-10-07 RX ADMIN — TERBINAFINE HYDROCHLORIDE: 1 CREAM TOPICAL at 21:00

## 2024-10-07 ASSESSMENT — PAIN SCALES - WONG BAKER: WONGBAKER_NUMERICALRESPONSE: NO HURT

## 2024-10-07 ASSESSMENT — PAIN SCALES - GENERAL: PAINLEVEL_OUTOF10: 0

## 2024-10-07 NOTE — PROGRESS NOTES
Up in chair at present time. Voices no complaints. Has participated with therapy this shift. Drsg to sacrum and L hip changed as ordered. Hourly rounds completed, all needs met this shift. Instructed to call if needed anything Call light and bedside table with personal belongings in reach.

## 2024-10-07 NOTE — PROGRESS NOTES
ACUTE PHYSICAL THERAPY GOALS:   (Developed with and agreed upon by patient and/or caregiver.)  Goals reviewed 9/25/24 and remain appropriate at this time  .   MET 9/25/24  (1.) Brendan Saleh  will move from supine to sit and sit to supine and scoot up and down with INDEPENDENCE within 7 treatment day(s). UPDATED 9/25/24   MET 9/25/24  (2.) Brendan Saleh will transfer from bed to chair and chair to bed with MODIFIED INDEPENDENCE using the least restrictive device within 7 treatment day(s).  UPDATED 9/25/24  (3.) Brendan Saleh will perform standing static and dynamic balance activities x 15 minutes with SUPERVISION to improve safety within 7 treatment day(s).   UPDATED 9/25/24   MET 9/25/24   (4.) Brendan Saleh will ambulate with MODIFIED INDEPENDENCE for 500 feet with the least restrictive device within 7 treatment day(s). UPDATED 9/25/24    PHYSICAL THERAPY: Daily Note AM   (Link to Caseload Tracking: PT Visit Days : 3  Time In/Out PT Charge Capture  Rehab Caseload Tracker  Orders    Brendan Saleh is a 49 y.o. male   PRIMARY DIAGNOSIS: Infection of  (ventriculoperitoneal) shunt (HCC)  Acute hypernatremia [E87.0]  Volume depletion [E86.9]  Acute kidney injury (HCC) [N17.9]  Acute encephalopathy [G93.40]  Septic shock (HCC) [A41.9, R65.21]  Procedure(s) (LRB):  LAPAROTOMY EXPLORATORY, PRIMARY CLOSURE SMALL BOWEL PERFORATION (N/A)  128 Days Post-Op  Inpatient: Payor: [employer] / Plan: [employer]TER / Product Type: *No Product type* /     ASSESSMENT:     REHAB RECOMMENDATIONS:   Recommendation to date pending progress:  Setting:  Continued physical therapy recommended at discharge.    Equipment:    To Be Determined     ASSESSMENT:  Mr. Saleh was sitting in recliner and agreeable to PT.  He performed seated activities with supervision and transfers with SBA/RW.  Pt ambulated in lindsay today with SBA-CGA/RW and cueing to decrease his becki.  Pt also ambulated outside today with CGA given

## 2024-10-07 NOTE — ACP (ADVANCE CARE PLANNING)
CM continues to follow for discharge plan, possibly will be able to discharge home, pt sacral wound still requires treatment. CM will continue to follow for discharge plan/needs.

## 2024-10-07 NOTE — PROGRESS NOTES
Hospitalist Progress Note   Admit Date:  2024  4:30 PM   Name:  Brendan Saleh   Age:  49 y.o.  Sex:  male  :  1975   MRN:  [id number]   Room:  [facility]    Presenting/Chief Complaint: Altered Mental Status     Reason(s) for Admission: Acute hypernatremia [E87.0]  Volume depletion [E86.9]  Acute kidney injury (HCC) [N17.9]  Acute encephalopathy [G93.40]  Septic shock (HCC) [A41.9, R65.21]     Hospital Course:     Copied from prior provider HPI/summary:  Brendan Saleh is a 49 y.o. male with medical history of dural hematoma status post bur hole, , hydrocephalus s/p  shunt, sacral decubitus stage IV ulcer who presented 2024 with altered mental status.     Found to initially have urinary tract infection with hematuria was started on vancomycin and Zosyn.  Urine cultures are finalized and found to be negative.  Infectious disease was consulted. Echocardiography was done which showed heart failure with preserved ejection fraction without vegetation.  On 2024 patient underwent sacral wound debridement with general surgery.      Additionally, CT head demonstrated ongoing hydrocephalus with  shunt.  Neurosurgery consulted and patient underwent right ventriculoperitoneal shunt removal on 2024.  Neurosurgery followed up on 24 and said patient would not be a candidate for  shunt replacement given high risk of infection.  Infectious disease later consulted for CSF infection as  shunt tip grew ESBL (Klebsiella and Serratia).  Antibiotics were then changed to IV ciprofloxacin and then oral ciprofloxacin following shunt removal.       Hospital course further complicated by small bowel obstruction on May 19. Patient underwent hemicolectomy and primary anastomosis with general surgery. 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 Oncology consulted recommend CEA which was negative. On 2024, patient noted to be tachycardic and distended  Continuous PRN    medicated lip ointment (BLISTEX)   Topical PRN    ondansetron (ZOFRAN-ODT) disintegrating tablet 4 mg  4 mg Oral Q8H PRN    Or    ondansetron (ZOFRAN) injection 4 mg  4 mg IntraVENous Q6H PRN    enoxaparin (LOVENOX) injection 40 mg  40 mg SubCUTAneous Daily    calcium carbonate (TUMS) chewable tablet 500 mg  500 mg Oral TID PRN       Signed:  Billy Chong DO    Part of this note may have been written by using a voice dictation software.  The note has been proof read but may still contain some grammatical/other typographical errors.

## 2024-10-08 LAB — MAGNESIUM SERPL-MCNC: 2.1 MG/DL (ref 1.8–2.4)

## 2024-10-08 PROCEDURE — 1100000000 HC RM PRIVATE

## 2024-10-08 PROCEDURE — 6370000000 HC RX 637 (ALT 250 FOR IP): Performed by: INTERNAL MEDICINE

## 2024-10-08 PROCEDURE — 36415 COLL VENOUS BLD VENIPUNCTURE: CPT

## 2024-10-08 PROCEDURE — 97530 THERAPEUTIC ACTIVITIES: CPT

## 2024-10-08 PROCEDURE — 6360000002 HC RX W HCPCS: Performed by: SURGERY

## 2024-10-08 PROCEDURE — 6370000000 HC RX 637 (ALT 250 FOR IP): Performed by: NURSE PRACTITIONER

## 2024-10-08 PROCEDURE — 83735 ASSAY OF MAGNESIUM: CPT

## 2024-10-08 PROCEDURE — 97535 SELF CARE MNGMENT TRAINING: CPT

## 2024-10-08 RX ORDER — FLUCONAZOLE 100 MG/1
200 TABLET ORAL
Status: DISCONTINUED | OUTPATIENT
Start: 2024-10-09 | End: 2024-10-18 | Stop reason: HOSPADM

## 2024-10-08 RX ADMIN — ENOXAPARIN SODIUM 40 MG: 100 INJECTION SUBCUTANEOUS at 09:00

## 2024-10-08 RX ADMIN — METOPROLOL TARTRATE 12.5 MG: 25 TABLET, FILM COATED ORAL at 09:00

## 2024-10-08 RX ADMIN — PANTOPRAZOLE SODIUM 40 MG: 40 TABLET, DELAYED RELEASE ORAL at 20:43

## 2024-10-08 RX ADMIN — METOPROLOL TARTRATE 12.5 MG: 25 TABLET, FILM COATED ORAL at 20:43

## 2024-10-08 RX ADMIN — TERBINAFINE HYDROCHLORIDE: 1 CREAM TOPICAL at 09:03

## 2024-10-08 RX ADMIN — TRAZODONE HYDROCHLORIDE 50 MG: 50 TABLET ORAL at 20:43

## 2024-10-08 RX ADMIN — TERBINAFINE HYDROCHLORIDE: 1 CREAM TOPICAL at 20:44

## 2024-10-08 ASSESSMENT — PAIN SCALES - WONG BAKER
WONGBAKER_NUMERICALRESPONSE: NO HURT
WONGBAKER_NUMERICALRESPONSE: NO HURT

## 2024-10-08 ASSESSMENT — PAIN SCALES - GENERAL
PAINLEVEL_OUTOF10: 0
PAINLEVEL_OUTOF10: 0

## 2024-10-08 NOTE — PROGRESS NOTES
Hospitalist Progress Note   Admit Date:  2024  4:30 PM   Name:  Brendan Saleh   Age:  49 y.o.  Sex:  male  :  1975   MRN:  [id number]   Room:  [facility]    Presenting/Chief Complaint: Altered Mental Status     Reason(s) for Admission: Acute hypernatremia [E87.0]  Volume depletion [E86.9]  Acute kidney injury (HCC) [N17.9]  Acute encephalopathy [G93.40]  Septic shock (HCC) [A41.9, R65.21]     Hospital Course:     Copied from prior provider HPI/summary:  Brendan Saleh is a 49 y.o. male with medical history of dural hematoma status post bur hole, , hydrocephalus s/p  shunt, sacral decubitus stage IV ulcer who presented 2024 with altered mental status.     Found to initially have urinary tract infection with hematuria was started on vancomycin and Zosyn.  Urine cultures are finalized and found to be negative.  Infectious disease was consulted. Echocardiography was done which showed heart failure with preserved ejection fraction without vegetation.  On 2024 patient underwent sacral wound debridement with general surgery.      Additionally, CT head demonstrated ongoing hydrocephalus with  shunt.  Neurosurgery consulted and patient underwent right ventriculoperitoneal shunt removal on 2024.  Neurosurgery followed up on 24 and said patient would not be a candidate for  shunt replacement given high risk of infection.  Infectious disease later consulted for CSF infection as  shunt tip grew ESBL (Klebsiella and Serratia).  Antibiotics were then changed to IV ciprofloxacin and then oral ciprofloxacin following shunt removal.       Hospital course further complicated by small bowel obstruction on May 19. Patient underwent hemicolectomy and primary anastomosis with general surgery. 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 Oncology consulted recommend CEA which was negative. On 2024, patient noted to be tachycardic and distended

## 2024-10-08 NOTE — PLAN OF CARE
Problem: Discharge Planning  Goal: Discharge to home or other facility with appropriate resources  Outcome: Progressing     Problem: Safety - Adult  Goal: Free from fall injury  Outcome: Progressing     Problem: Neurosensory - Adult  Goal: Achieves stable or improved neurological status  Outcome: Progressing     Problem: Musculoskeletal - Adult  Goal: Return mobility to safest level of function  Outcome: Progressing     Problem: Pain  Goal: Verbalizes/displays adequate comfort level or baseline comfort level  Outcome: Progressing     Problem: Skin/Tissue Integrity - Adult  Goal: Skin integrity remains intact  Outcome: Progressing  Goal: Incisions, wounds, or drain sites healing without S/S of infection  Outcome: Progressing     Problem: Gastrointestinal - Adult  Goal: Maintains adequate nutritional intake  Outcome: Progressing     Problem: Genitourinary - Adult  Goal: Absence of urinary retention  Outcome: Progressing     Problem: Infection - Adult  Goal: Absence of infection at discharge  Outcome: Progressing     Problem: Metabolic/Fluid and Electrolytes - Adult  Goal: Electrolytes maintained within normal limits  Outcome: Progressing  Goal: Hemodynamic stability and optimal renal function maintained  Outcome: Progressing     Problem: Cardiovascular - Adult  Goal: Maintains optimal cardiac output and hemodynamic stability  Outcome: Progressing     Problem: Hematologic - Adult  Goal: Maintains hematologic stability  Outcome: Progressing

## 2024-10-08 NOTE — PROGRESS NOTES
Pt resting, denies needs at this time.  Up in chair currently. Ambulated in lindsay with therapy this shift.  Dressing changed on sacrum and left hip.  Hourly rounds completed.  Bed locked and lowered into lowest position.  Call light and personal items within reach.  Will give report to oncoming nurse.

## 2024-10-08 NOTE — PROGRESS NOTES
ACUTE OCCUPATIONAL THERAPY GOALS:   (Developed with and agreed upon by patient and/or caregiver.)    1. Patient will complete lower body bathing and dressing with CONTACT GUARD ASSIST and adaptive equipment as needed. PARTIALLY MET - DRESSING- 10/8/24  2. Patient will complete upper body bathing and dressing with SUPERVISION and adaptive equipment as needed. PARTIALLY MET (SEATED)- 10/8/24  3. Patient will complete grooming ADL standing edge of sink with CONTACT GUARD ASSIST. MET 10/8/24  4. Patient will complete functional transfers with STAND BY ASSIST and adaptive equipment as needed.   5. Patient will complete functional mobility with STAND BY ASSIST using least restrictive ambulatory device.   6. Patient will tolerate 20 minutes BUE exercises to increase strength for safe, functional transfers.    7. Patient will perform visual scanning task during functional mobility with minimal verbal cues. MET 10/8/24  8. Patient will attend to L visual field during functional task with minimal verbal cues. MET 10/8/24     Timeframe: 7 visits  OCCUPATIONAL THERAPY: Daily Note PM   OT Visit Days: 6   Time In/Out  OT Charge Capture  Rehab Caseload Tracker  OT Orders    Brendan Saleh is a 49 y.o. male   PRIMARY DIAGNOSIS: Infection of  (ventriculoperitoneal) shunt (HCC)  Acute hypernatremia [E87.0]  Volume depletion [E86.9]  Acute kidney injury (HCC) [N17.9]  Acute encephalopathy [G93.40]  Septic shock (HCC) [A41.9, R65.21]  Procedure(s) (LRB):  LAPAROTOMY EXPLORATORY, PRIMARY CLOSURE SMALL BOWEL PERFORATION (N/A)  129 Days Post-Op  Inpatient: Payor: [employer] / Plan: AMBETTER / Product Type: *No Product type* /     ASSESSMENT:     REHAB RECOMMENDATIONS:   Recommendation to date pending progress:  Setting:  Continued occupational therapy recommended at discharge    Equipment:    To Be Determined     ASSESSMENT:  Mr. Saleh continues to progress toward acute OT goals, however remains limited by cognition/ executive

## 2024-10-08 NOTE — PROGRESS NOTES
Hourly rounds complete this shift, no new complaints at this time, bed in low, locked position, call light and bedside table within reach,  all needs met. Report to day shift nurse.

## 2024-10-09 LAB
ALBUMIN SERPL-MCNC: 3 G/DL (ref 3.5–5)
ALBUMIN/GLOB SERPL: 0.8 (ref 1–1.9)
ALP SERPL-CCNC: 62 U/L (ref 40–129)
ALT SERPL-CCNC: 10 U/L (ref 8–55)
ANION GAP SERPL CALC-SCNC: 10 MMOL/L (ref 9–18)
AST SERPL-CCNC: 21 U/L (ref 15–37)
BILIRUB DIRECT SERPL-MCNC: <0.2 MG/DL (ref 0–0.4)
BILIRUB SERPL-MCNC: 0.2 MG/DL (ref 0–1.2)
BUN SERPL-MCNC: 21 MG/DL (ref 6–23)
CALCIUM SERPL-MCNC: 9.1 MG/DL (ref 8.8–10.2)
CHLORIDE SERPL-SCNC: 103 MMOL/L (ref 98–107)
CO2 SERPL-SCNC: 27 MMOL/L (ref 20–28)
CREAT SERPL-MCNC: 0.91 MG/DL (ref 0.8–1.3)
GLOBULIN SER CALC-MCNC: 3.7 G/DL (ref 2.3–3.5)
GLUCOSE SERPL-MCNC: 98 MG/DL (ref 70–99)
POTASSIUM SERPL-SCNC: 4.1 MMOL/L (ref 3.5–5.1)
PROT SERPL-MCNC: 6.7 G/DL (ref 6.3–8.2)
SODIUM SERPL-SCNC: 140 MMOL/L (ref 136–145)

## 2024-10-09 PROCEDURE — 6360000002 HC RX W HCPCS: Performed by: SURGERY

## 2024-10-09 PROCEDURE — 6370000000 HC RX 637 (ALT 250 FOR IP): Performed by: INTERNAL MEDICINE

## 2024-10-09 PROCEDURE — 80048 BASIC METABOLIC PNL TOTAL CA: CPT

## 2024-10-09 PROCEDURE — 6370000000 HC RX 637 (ALT 250 FOR IP): Performed by: NURSE PRACTITIONER

## 2024-10-09 PROCEDURE — 97112 NEUROMUSCULAR REEDUCATION: CPT

## 2024-10-09 PROCEDURE — 80076 HEPATIC FUNCTION PANEL: CPT

## 2024-10-09 PROCEDURE — 36415 COLL VENOUS BLD VENIPUNCTURE: CPT

## 2024-10-09 PROCEDURE — 97535 SELF CARE MNGMENT TRAINING: CPT

## 2024-10-09 PROCEDURE — 97168 OT RE-EVAL EST PLAN CARE: CPT

## 2024-10-09 PROCEDURE — 1100000000 HC RM PRIVATE

## 2024-10-09 RX ADMIN — PANTOPRAZOLE SODIUM 40 MG: 40 TABLET, DELAYED RELEASE ORAL at 20:48

## 2024-10-09 RX ADMIN — TERBINAFINE HYDROCHLORIDE: 1 CREAM TOPICAL at 20:49

## 2024-10-09 RX ADMIN — TERBINAFINE HYDROCHLORIDE: 1 CREAM TOPICAL at 10:44

## 2024-10-09 RX ADMIN — ENOXAPARIN SODIUM 40 MG: 100 INJECTION SUBCUTANEOUS at 10:43

## 2024-10-09 RX ADMIN — FLUCONAZOLE 200 MG: 100 TABLET ORAL at 10:43

## 2024-10-09 RX ADMIN — LORAZEPAM 0.5 MG: 0.5 TABLET ORAL at 20:52

## 2024-10-09 RX ADMIN — METOPROLOL TARTRATE 12.5 MG: 25 TABLET, FILM COATED ORAL at 20:48

## 2024-10-09 RX ADMIN — TRAZODONE HYDROCHLORIDE 50 MG: 50 TABLET ORAL at 20:48

## 2024-10-09 ASSESSMENT — PAIN SCALES - GENERAL
PAINLEVEL_OUTOF10: 0
PAINLEVEL_OUTOF10: 0

## 2024-10-09 NOTE — PROGRESS NOTES
Hospitalist Progress Note   Admit Date:  2024  4:30 PM   Name:  Brendan Saleh   Age:  49 y.o.  Sex:  male  :  1975   MRN:  [id number]   Room:  [facility]    Presenting/Chief Complaint: Altered Mental Status     Reason(s) for Admission: Acute hypernatremia [E87.0]  Volume depletion [E86.9]  Acute kidney injury (HCC) [N17.9]  Acute encephalopathy [G93.40]  Septic shock (HCC) [A41.9, R65.21]     Hospital Course:     Copied from prior provider HPI/summary:  Brendan Saleh is a 49 y.o. male with medical history of dural hematoma status post bur hole, , hydrocephalus s/p  shunt, sacral decubitus stage IV ulcer who presented 2024 with altered mental status.     Found to initially have urinary tract infection with hematuria was started on vancomycin and Zosyn.  Urine cultures are finalized and found to be negative.  Infectious disease was consulted. Echocardiography was done which showed heart failure with preserved ejection fraction without vegetation.  On 2024 patient underwent sacral wound debridement with general surgery.      Additionally, CT head demonstrated ongoing hydrocephalus with  shunt.  Neurosurgery consulted and patient underwent right ventriculoperitoneal shunt removal on 2024.  Neurosurgery followed up on 24 and said patient would not be a candidate for  shunt replacement given high risk of infection.  Infectious disease later consulted for CSF infection as  shunt tip grew ESBL (Klebsiella and Serratia).  Antibiotics were then changed to IV ciprofloxacin and then oral ciprofloxacin following shunt removal.       Hospital course further complicated by small bowel obstruction on May 19. Patient underwent hemicolectomy and primary anastomosis with general surgery. 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 Oncology consulted recommend CEA which was negative. On 2024, patient noted to be tachycardic and distended

## 2024-10-09 NOTE — PROGRESS NOTES
ACUTE OCCUPATIONAL THERAPY GOALS: GOALS REVIEWED AND UPDATED AT RE-EVALUATION ON 10/9/24  (Developed with and agreed upon by patient and/or caregiver.)    1. Patient will complete total body bathing and dressing with INDEPENDENCE and adaptive equipment as needed.  2. Patient will complete grooming ADL in standing with INDEPENDENCE.   3. Patient will perform BUE fine motor exercises to increase  strength and coordination for ADLs.   4. Patient will complete functional transfers and mobility of household distances with MODIFIED INDEPENDENCE using least restrictive ambulatory device.     GOALS TO ADDRESS EXECUTIVE FUNCTIONING AND IADLS  5. Patient will sequence simple meal preparation task with SUPERVISION and no verbal cues from therapist.  6. Patient will self-initiate ADL task with no verbal cues from therapist.   7. Patient will gather necessary supplies for ADL with SUPERVISION.   8. Patient will identify and self-correct errors made during functional task with no cues from therapist.   9. Patient will anticipate and develop plan for addressing safety risks at home.     Timeframe: 7 visits    OCCUPATIONAL THERAPY Daily Note, Re-evaluation, and PM       OT Visit Days: 1  Acknowledge Orders  Time  OT Charge Capture  Rehab Caseload Tracker      Brendan Saleh is a 49 y.o. male   PRIMARY DIAGNOSIS: Infection of  (ventriculoperitoneal) shunt (HCC)  Acute hypernatremia [E87.0]  Volume depletion [E86.9]  Acute kidney injury (HCC) [N17.9]  Acute encephalopathy [G93.40]  Septic shock (HCC) [A41.9, R65.21]  Procedure(s) (LRB):  LAPAROTOMY EXPLORATORY, PRIMARY CLOSURE SMALL BOWEL PERFORATION (N/A)  130 Days Post-Op  Reason for Referral: Generalized Muscle Weakness (M62.81)  Difficulty in walking, Not elsewhere classified (R26.2)  Other abnormalities of gait and mobility (R26.89)  Inpatient: Payor: AMBETTER / Plan: AMBETTER / Product Type: *No Product type* /     ASSESSMENT:     REHAB RECOMMENDATIONS:  training to increase independence, decrease assistance required, increase activity tolerance, and increase safety awareness. The patient was educated on compensatory technique during ADL , strategies to improve safety, proper use of assistive device, and transfer training and safety and patient verbalized understanding and demonstrated understanding.     TREATMENT GRID:  N/A    AFTER TREATMENT PRECAUTIONS: Alarm Activated, Bed/Chair Locked, Call light within reach, Chair, Needs within reach, and RN notified    INTERDISCIPLINARY COLLABORATION:  RN/ PCT, PT/ PTA, and OT/ POND    EDUCATION:       TOTAL TREATMENT DURATION AND TIME:  Time In: 1540  Time Out: 1620  Minutes: 40    Marine Cueva, OT

## 2024-10-09 NOTE — CARE COORDINATION
Dispo update:  Mr. Saleh's case discussed in IDT rounds today.  One of the previous discharge plans for him was to try to get [place] Medicaid and then Level of Care for long-term SNF placement.  However, based on his remarkable improvement, it is very unlikely [facility] would approve Level of Care for SNF placement.  Today we discussed the possibility of sending him home to his trailer, and arrange for home health (e.g., [facility] since he has [employer] insurance) in [facility].      RICARDO attempted to call his aunt, Ms. Lesley Doyle at [phone].  However, the call would not go through on the desk phone.  RICARDO then tried cell phone, and there was a message that the recipient has \"calling restrictions\" and will not allow a call to be received.  RICARDO also texted his aunt, as done previously on August 25, 2024, when she had replied via text.  The purpose of the call would be to discuss possible discharge options.  RICARDO will continue to try to get in contact with his aunt, or other family/friends.

## 2024-10-10 PROCEDURE — 1100000000 HC RM PRIVATE

## 2024-10-10 PROCEDURE — 92523 SPEECH SOUND LANG COMPREHEN: CPT

## 2024-10-10 PROCEDURE — 6370000000 HC RX 637 (ALT 250 FOR IP): Performed by: INTERNAL MEDICINE

## 2024-10-10 PROCEDURE — 6370000000 HC RX 637 (ALT 250 FOR IP): Performed by: NURSE PRACTITIONER

## 2024-10-10 PROCEDURE — 6360000002 HC RX W HCPCS: Performed by: SURGERY

## 2024-10-10 PROCEDURE — 97530 THERAPEUTIC ACTIVITIES: CPT

## 2024-10-10 RX ADMIN — TERBINAFINE HYDROCHLORIDE: 1 CREAM TOPICAL at 09:06

## 2024-10-10 RX ADMIN — TERBINAFINE HYDROCHLORIDE: 1 CREAM TOPICAL at 20:30

## 2024-10-10 RX ADMIN — TRAZODONE HYDROCHLORIDE 50 MG: 50 TABLET ORAL at 20:29

## 2024-10-10 RX ADMIN — PANTOPRAZOLE SODIUM 40 MG: 40 TABLET, DELAYED RELEASE ORAL at 20:29

## 2024-10-10 RX ADMIN — ENOXAPARIN SODIUM 40 MG: 100 INJECTION SUBCUTANEOUS at 09:06

## 2024-10-10 RX ADMIN — METOPROLOL TARTRATE 12.5 MG: 25 TABLET, FILM COATED ORAL at 20:29

## 2024-10-10 ASSESSMENT — PAIN SCALES - GENERAL: PAINLEVEL_OUTOF10: 0

## 2024-10-10 NOTE — WOUND CARE
Coccyx wound nearly closed now 0.8x0.8x0.4cm recommend foam dressing every other day, scar is light purple, blanches     Left hip/ thigh wound has healed, scar is atrophic with possible keloid and a light purple appearance, blanches. Recommend to protect with foam.    SS spoke with Hanna at [facility]. Updated notes faxed to [facility]. SS following.

## 2024-10-10 NOTE — CARE COORDINATION
Dispo update:  CM spoke to Mr. Saleh again in room 619.  He is ok with possibly going home to his mobile home next week.  He has not spoken to his relatives about practical considerations such as is the power and water still on, and could they help him with groceries and food.  Thus, CM attempted to reach out to his relatives.      RICARDO called the number on his whiteboard for Ms. Layla Navarrete at [phone].  She said that she is his cousin, and lives in the [facility], which is about 20 miles from where he was living.  She said that their Aunt Lesley Doyle changes phone numbers frequently, and that the latest number she has is [phone] (CM called, not in service).  She also suggested another Aunt, Aunt Anaid Rivera at [phone] (land line; CM called, no answer), Aunkatalina Worrell cell at [phone] (CM called, mailbox full, cannot leave a message), or her daughter (their cousin) Nancy (she is disabled) at [phone] (cell; CM called, and got message \"call cannot be completed\").      CM sent text message to Aunkatalina Worrell at [phone] with request for call-back.  CM will continue to get in contact with his family to give them heads up about pending discharge.

## 2024-10-10 NOTE — PROGRESS NOTES
Hospitalist Progress Note   Admit Date:  2024  4:30 PM   Name:  Brendan Saleh   Age:  49 y.o.  Sex:  male  :  1975   MRN:  [id number]   Room:  [facility]    Presenting/Chief Complaint: Altered Mental Status     Reason(s) for Admission: Acute hypernatremia [E87.0]  Volume depletion [E86.9]  Acute kidney injury (HCC) [N17.9]  Acute encephalopathy [G93.40]  Septic shock (HCC) [A41.9, R65.21]     Hospital Course:     Copied from prior provider HPI/summary:  Brendan Saleh is a 49 y.o. male with medical history of dural hematoma status post bur hole, , hydrocephalus s/p  shunt, sacral decubitus stage IV ulcer who presented 2024 with altered mental status.     Found to initially have urinary tract infection with hematuria was started on vancomycin and Zosyn.  Urine cultures are finalized and found to be negative.  Infectious disease was consulted. Echocardiography was done which showed heart failure with preserved ejection fraction without vegetation.  On 2024 patient underwent sacral wound debridement with general surgery.      Additionally, CT head demonstrated ongoing hydrocephalus with  shunt.  Neurosurgery consulted and patient underwent right ventriculoperitoneal shunt removal on 2024.  Neurosurgery followed up on 24 and said patient would not be a candidate for  shunt replacement given high risk of infection.  Infectious disease later consulted for CSF infection as  shunt tip grew ESBL (Klebsiella and Serratia).  Antibiotics were then changed to IV ciprofloxacin and then oral ciprofloxacin following shunt removal.       Hospital course further complicated by small bowel obstruction on May 19. Patient underwent hemicolectomy and primary anastomosis with general surgery. 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 Oncology consulted recommend CEA which was negative. On 2024, patient noted to be tachycardic and distended

## 2024-10-10 NOTE — PROGRESS NOTES
ACUTE PHYSICAL THERAPY GOALS:   (Developed with and agreed upon by patient and/or caregiver.)  Goals reviewed 9/25/24 and remain appropriate at this time  .   MET 9/25/24  (1.) Brendan Saleh  will move from supine to sit and sit to supine and scoot up and down with INDEPENDENCE within 7 treatment day(s). UPDATED 9/25/24   MET 9/25/24  (2.) Brendan Saleh will transfer from bed to chair and chair to bed with MODIFIED INDEPENDENCE using the least restrictive device within 7 treatment day(s).  UPDATED 9/25/24  (3.) Brendan Saleh will perform standing static and dynamic balance activities x 15 minutes with SUPERVISION to improve safety within 7 treatment day(s).   UPDATED 9/25/24   MET 9/25/24   (4.) Brendan Saleh will ambulate with MODIFIED INDEPENDENCE for 500 feet with the least restrictive device within 7 treatment day(s). UPDATED 9/25/24    PHYSICAL THERAPY: Daily Note AM   (Link to Caseload Tracking: PT Visit Days : 4  Time In/Out PT Charge Capture  Rehab Caseload Tracker  Orders    Brendan aSleh is a 49 y.o. male   PRIMARY DIAGNOSIS: Infection of  (ventriculoperitoneal) shunt (HCC)  Acute hypernatremia [E87.0]  Volume depletion [E86.9]  Acute kidney injury (HCC) [N17.9]  Acute encephalopathy [G93.40]  Septic shock (HCC) [A41.9, R65.21]  Procedure(s) (LRB):  LAPAROTOMY EXPLORATORY, PRIMARY CLOSURE SMALL BOWEL PERFORATION (N/A)  131 Days Post-Op  Inpatient: Payor: RICKYR / Plan: [employer]TER / Product Type: *No Product type* /     ASSESSMENT:     REHAB RECOMMENDATIONS:   Recommendation to date pending progress:  Setting:  Continued physical therapy recommended at discharge.    Equipment:    To Be Determined     ASSESSMENT:  Mr. Saleh was sitting in recliner and agreeable to PT.   Pt continues to remain impulsive with cueing for safe use of RW throughout session today.  Today he ambulates w/ SBA-CGA for 3 x 500' in total w/ paired dual-cognitive task. Pt exhibits either gait freezing

## 2024-10-10 NOTE — PROGRESS NOTES
GOALS:  LTG: Patient will improve neuro-linguistic abilities to increase safety and awareness in functional living environment.   STG:  Patient will complete reasoning tasks to improve problem solving and safety awareness with 80% accuracy given minimal cueing.  Patient will utilize memory compensatory strategies to improve recall of functional information with 80% accuracy given minimal assistance.    SPEECH LANGUAGE PATHOLOGY: COGNITIVE COMMUNICATION   Re-evaluation    Acknowledge Order  I  Therapy Time  I   Charges     I  Rehab Caseload Tracker    NAME: Brendan Saleh  : 1975  MRN: [id number]    ADMISSION DATE: 2024  PRIMARY DIAGNOSIS: Infection of  (ventriculoperitoneal) shunt (HCC)    ICD-10: Treatment Diagnosis:  R41.841 Cognitive-Communication Deficit    RECOMMENDATIONS:   Recommendations:   Assist patient with medication/financial management post discharge  Use of compensatory memory strategies  Continue speech therapy post discharge    Therapeutic Interventions: Patient/family education  Cognitive-linguistic treatment   Patient continues to require skilled intervention: Yes. Recommend ongoing speech therapy services during this hospitalization.   Anticipated Discharge Needs: Ongoing speech therapy is recommended at next level of care.      ASSESSMENT   Patient presents with moderate cognitive-linguistic deficits impacting memory and executive functioning, notable improvement in presentation from previous cognitive assessment.     [facility] Mental Status Examination has been completed periodically throughout current admission:   24: 3/30  6/14/24: 5/30  10/10/24: 15/30    Patient reports his goal is to return home and begin working again. Speech therapy is indicated to provide compensatory strategies to aid patient in achieving this goal. Recommend he continues to receive assistance with tasks requiring high level cognitive skills such as medication and financial management.  contains all numbers, spacing is mildly off but patient independently acknowledged the error. Appropriate hour and minute hand though not centered in clock. Patient with good insight into current deficits, independently reporting difficulty with memory throughout assessment. Discussed desire to return to work. Reviewed course of hospitalization (in regards to speech therapy) and improvement in mentation. Patient is appropriate for continued cognitive-linguistic therapy focused on compensatory strategies. Patient wishes to continue with speech therapy.     MODIFIED JANUSZ SCALE (mRS) SCORE:   Score: 3 Description   [] 0  No Symptoms   [] 1 No significant disability despite symptoms; Able to carry out all usual duties and activities.   [] 2 Slight Disability: Unable to carry out all previous activities, but able to look after own affairs without assistance.    [x] 3 Moderate Disability Requires some help, but is able to walk without assistance.   [] 4 Moderately Severe Disability Unable to walk without assistance and unable to attend to own bodily needs without assistance.    [] 5 Severe disability Bedridden, incontinent, and requiring constant nursing care and attention.      PLAN    Duration/Frequency: Continue to follow patient 1x/week for duration of hospitalization and/or until goals met    Rehabilitation Potential For Stated Goals: Fair    Interdisciplinary Collaboration: RN/ PCT    Medical Necessity    Patient is expected to demonstrate progress in cognitive ability to return to work.    Education:   Patient educated on Results of evaluation, Speech therapy recommendations, Role of speech therapy, and SLP plan  Education provided to Patient and RN  Education response: Verbalizes understanding    Safety:   Call light within reach  In chair  RN notified     Therapy Time:  Time In: 1159  Time Out: 1220  Minutes: 21    SHAWNA LOCO  10/10/2024 12:27 PM

## 2024-10-11 PROCEDURE — 6360000002 HC RX W HCPCS: Performed by: SURGERY

## 2024-10-11 PROCEDURE — 1100000000 HC RM PRIVATE

## 2024-10-11 PROCEDURE — 6370000000 HC RX 637 (ALT 250 FOR IP): Performed by: INTERNAL MEDICINE

## 2024-10-11 PROCEDURE — 6370000000 HC RX 637 (ALT 250 FOR IP): Performed by: NURSE PRACTITIONER

## 2024-10-11 RX ADMIN — METOPROLOL TARTRATE 12.5 MG: 25 TABLET, FILM COATED ORAL at 09:43

## 2024-10-11 RX ADMIN — PANTOPRAZOLE SODIUM 40 MG: 40 TABLET, DELAYED RELEASE ORAL at 20:29

## 2024-10-11 RX ADMIN — METOPROLOL TARTRATE 12.5 MG: 25 TABLET, FILM COATED ORAL at 20:33

## 2024-10-11 RX ADMIN — TRAZODONE HYDROCHLORIDE 50 MG: 50 TABLET ORAL at 20:29

## 2024-10-11 RX ADMIN — TERBINAFINE HYDROCHLORIDE: 1 CREAM TOPICAL at 20:31

## 2024-10-11 RX ADMIN — ENOXAPARIN SODIUM 40 MG: 100 INJECTION SUBCUTANEOUS at 09:30

## 2024-10-11 NOTE — CARE COORDINATION
Referral sent to [facility] Respite program, pending review.     Bettina BENAVIDEZ, ACM  [place]

## 2024-10-11 NOTE — PROGRESS NOTES
Hospitalist Progress Note   Admit Date:  2024  4:30 PM   Name:  Brendan Saleh   Age:  49 y.o.  Sex:  male  :  1975   MRN:  [id number]   Room:  [facility]/    Presenting/Chief Complaint: Altered Mental Status     Reason(s) for Admission: Acute hypernatremia [E87.0]  Volume depletion [E86.9]  Acute kidney injury (HCC) [N17.9]  Acute encephalopathy [G93.40]  Septic shock (HCC) [A41.9, R65.21]     Hospital Course:     Copied from prior provider HPI/summary:  Brendan Saleh is a 49 y.o. male with medical history of dural hematoma status post bur hole, , hydrocephalus s/p  shunt, sacral decubitus stage IV ulcer who presented 2024 with altered mental status.     Found to initially have urinary tract infection with hematuria was started on vancomycin and Zosyn.  Urine cultures are finalized and found to be negative.  Infectious disease was consulted. Echocardiography was done which showed heart failure with preserved ejection fraction without vegetation.  On 2024 patient underwent sacral wound debridement with general surgery.      Additionally, CT head demonstrated ongoing hydrocephalus with  shunt.  Neurosurgery consulted and patient underwent right ventriculoperitoneal shunt removal on 2024.  Neurosurgery followed up on 24 and said patient would not be a candidate for  shunt replacement given high risk of infection.  Infectious disease later consulted for CSF infection as  shunt tip grew ESBL (Klebsiella and Serratia).  Antibiotics were then changed to IV ciprofloxacin and then oral ciprofloxacin following shunt removal.       Hospital course further complicated by small bowel obstruction on May 19. Patient underwent hemicolectomy and primary anastomosis with general surgery. 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 Oncology consulted recommend CEA which was negative. On 2024, patient noted to be tachycardic and distended  click, rub or gallop.  Abdomen:                       Soft, non-tender. Bowel sounds normal. No masses,  No organomegaly.  Extremities:                     Extremities normal, atraumatic, no cyanosis or unilateral lower extremity edema  Neurologic:                     CNII-XII intact.  No focal motor weakness.       I have personally reviewed labs and tests:  Recent Labs:  No results found for this or any previous visit (from the past 48 hour(s)).        No results for input(s): \"COVID19\" in the last 72 hours.    Current Meds:  Current Facility-Administered Medications   Medication Dose Route Frequency    fluconazole (DIFLUCAN) tablet 200 mg  200 mg Oral Q7 Days    metoprolol tartrate (LOPRESSOR) tablet 12.5 mg  12.5 mg Oral BID    terbinafine (LAMISIL) 1 % cream   Topical BID    LORazepam (ATIVAN) tablet 0.5 mg  0.5 mg Oral Q6H PRN    diphenhydrAMINE (BENADRYL) injection 25 mg  25 mg IntraVENous Q6H PRN    pantoprazole (PROTONIX) tablet 40 mg  40 mg Oral Nightly    oxyCODONE (ROXICODONE) immediate release tablet 5 mg  5 mg Oral Q4H PRN    cyclobenzaprine (FLEXERIL) tablet 10 mg  10 mg Oral TID PRN    traZODone (DESYREL) tablet 50 mg  50 mg Oral Nightly PRN    acetaminophen (TYLENOL) tablet 650 mg  650 mg Oral Q4H PRN    0.9 % sodium chloride infusion  25 mL IntraVENous PRN    sodium chloride flush 0.9 % injection 5-40 mL  5-40 mL IntraVENous PRN    0.9 % sodium chloride infusion   IntraVENous PRN    glucose chewable tablet 16 g  4 tablet Oral PRN    dextrose bolus 10% 125 mL  125 mL IntraVENous PRN    Or    dextrose bolus 10% 250 mL  250 mL IntraVENous PRN    Glucagon Emergency KIT 1 mg  1 mg SubCUTAneous PRN    dextrose 10 % infusion   IntraVENous Continuous PRN    medicated lip ointment (BLISTEX)   Topical PRN    ondansetron (ZOFRAN-ODT) disintegrating tablet 4 mg  4 mg Oral Q8H PRN    Or    ondansetron (ZOFRAN) injection 4 mg  4 mg IntraVENous Q6H PRN    enoxaparin (LOVENOX) injection 40 mg  40 mg SubCUTAneous

## 2024-10-11 NOTE — PROGRESS NOTES
Occupational Therapy Note:    Attempted to see patient this AM for occupational therapy treatment  session. Patient off the floor with staff at time of attempt. Will follow and re-attempt as schedule permits/patient available. Thank you,    Marine Cueva, OT    Rehab Caseload Tracker

## 2024-10-12 PROCEDURE — 6370000000 HC RX 637 (ALT 250 FOR IP): Performed by: INTERNAL MEDICINE

## 2024-10-12 PROCEDURE — 6370000000 HC RX 637 (ALT 250 FOR IP): Performed by: NURSE PRACTITIONER

## 2024-10-12 PROCEDURE — 1100000000 HC RM PRIVATE

## 2024-10-12 PROCEDURE — 6360000002 HC RX W HCPCS: Performed by: SURGERY

## 2024-10-12 RX ADMIN — ENOXAPARIN SODIUM 40 MG: 100 INJECTION SUBCUTANEOUS at 09:17

## 2024-10-12 RX ADMIN — METOPROLOL TARTRATE 12.5 MG: 25 TABLET, FILM COATED ORAL at 09:18

## 2024-10-12 RX ADMIN — TERBINAFINE HYDROCHLORIDE: 1 CREAM TOPICAL at 09:25

## 2024-10-12 RX ADMIN — PANTOPRAZOLE SODIUM 40 MG: 40 TABLET, DELAYED RELEASE ORAL at 20:13

## 2024-10-12 RX ADMIN — TRAZODONE HYDROCHLORIDE 50 MG: 50 TABLET ORAL at 20:13

## 2024-10-12 RX ADMIN — TERBINAFINE HYDROCHLORIDE: 1 CREAM TOPICAL at 20:13

## 2024-10-12 RX ADMIN — METOPROLOL TARTRATE 12.5 MG: 25 TABLET, FILM COATED ORAL at 20:13

## 2024-10-12 ASSESSMENT — PAIN SCALES - GENERAL
PAINLEVEL_OUTOF10: 0
PAINLEVEL_OUTOF10: 0

## 2024-10-12 NOTE — PROGRESS NOTES
shunt and CNS infection May 2024,  shunt removed and not replaced due to high infectious risk.  NSG signed off 7/22.  October 4--continue monitoring-clinically stable-151-day hospital stay thus far.  October 5-no change 10/6--same  October 7-await PT OT recommendations regarding balance-subjective difficulties  10/12/2024  Continue therapy services     HTN  -metoprolol 12.5mg BID  October 4-BP controlled.  October 5-BP controlled  October 7-control continue same  10/12/2024  Continue to monitor blood pressure and titrate meds as needed     Sacral decubitus ulcer, stage IV   - Wound care consulted managing wound VAC, last changed on 09/12  - S/p Augmentin/Zyvox which finished 09/01  - Offload weight as appropriate, frequent turning  10/4--discharge home when wound closed better to care for self and consider APS eval to document safe situation  October 5-awaiting more complete wound closure  October 7-this plan remains unchanged await further closure possible discharge home with APS follow-up after wound closes further  10/12/2024  Continue wound care    Fingernail and toenail onychomycosis  - Continue terbinafine topical twice daily.  No oral terbinafine or itraconazole on formulary.  October 5-no significant change  October 7-noted-May continue topical therapy but very unlikely to be effective  10/12/2024  Continue trial of fluconazole q. weekly which is on formulary and can be used as an alternative therapy     Normocytic anemia  -has been stable when checked. No active signs of bleeding.  October 5-follow-up CBC.  October 7-stable 10.3-prior 10.8 within laboratory variance.  10/12/2024  H&H stable continue to monitor       Moderately differentiated adenocarcinoma  - Will follow-up in the outpatient setting with oncology to discuss treatment options for adenocarcinoma  - S/p ex lap and closure of small bowel perforation on 06/01/2024, secondary to right hemicolectomy and primary anastomosis achieved on  No wheeze  Heart:                     Regular rate and rhythm, S1, S2 normal, no murmur, click, rub or gallop.  Abdomen:                       Soft, non-tender. Bowel sounds normal. No masses,  No organomegaly.  Extremities:                     Extremities normal, atraumatic, no cyanosis or unilateral lower extremity edema  Neurologic:                     CNII-XII intact.  No focal motor weakness.       I have personally reviewed labs and tests:  Recent Labs:  No results found for this or any previous visit (from the past 48 hour(s)).        No results for input(s): \"COVID19\" in the last 72 hours.    Current Meds:  Current Facility-Administered Medications   Medication Dose Route Frequency    fluconazole (DIFLUCAN) tablet 200 mg  200 mg Oral Q7 Days    metoprolol tartrate (LOPRESSOR) tablet 12.5 mg  12.5 mg Oral BID    terbinafine (LAMISIL) 1 % cream   Topical BID    LORazepam (ATIVAN) tablet 0.5 mg  0.5 mg Oral Q6H PRN    diphenhydrAMINE (BENADRYL) injection 25 mg  25 mg IntraVENous Q6H PRN    pantoprazole (PROTONIX) tablet 40 mg  40 mg Oral Nightly    oxyCODONE (ROXICODONE) immediate release tablet 5 mg  5 mg Oral Q4H PRN    cyclobenzaprine (FLEXERIL) tablet 10 mg  10 mg Oral TID PRN    traZODone (DESYREL) tablet 50 mg  50 mg Oral Nightly PRN    acetaminophen (TYLENOL) tablet 650 mg  650 mg Oral Q4H PRN    0.9 % sodium chloride infusion  25 mL IntraVENous PRN    sodium chloride flush 0.9 % injection 5-40 mL  5-40 mL IntraVENous PRN    0.9 % sodium chloride infusion   IntraVENous PRN    glucose chewable tablet 16 g  4 tablet Oral PRN    dextrose bolus 10% 125 mL  125 mL IntraVENous PRN    Or    dextrose bolus 10% 250 mL  250 mL IntraVENous PRN    Glucagon Emergency KIT 1 mg  1 mg SubCUTAneous PRN    dextrose 10 % infusion   IntraVENous Continuous PRN    medicated lip ointment (BLISTEX)   Topical PRN    ondansetron (ZOFRAN-ODT) disintegrating tablet 4 mg  4 mg Oral Q8H PRN    Or    ondansetron (ZOFRAN) injection 4

## 2024-10-13 ENCOUNTER — APPOINTMENT (OUTPATIENT)
Dept: ULTRASOUND IMAGING | Age: 49
End: 2024-10-13
Payer: COMMERCIAL

## 2024-10-13 LAB
ANION GAP SERPL CALC-SCNC: 10 MMOL/L (ref 9–18)
BASOPHILS # BLD: 0 K/UL (ref 0–0.2)
BASOPHILS NFR BLD: 1 % (ref 0–2)
BUN SERPL-MCNC: 20 MG/DL (ref 6–23)
CALCIUM SERPL-MCNC: 9 MG/DL (ref 8.8–10.2)
CHLORIDE SERPL-SCNC: 102 MMOL/L (ref 98–107)
CO2 SERPL-SCNC: 27 MMOL/L (ref 20–28)
CREAT SERPL-MCNC: 0.89 MG/DL (ref 0.8–1.3)
DIFFERENTIAL METHOD BLD: ABNORMAL
EOSINOPHIL # BLD: 0.2 K/UL (ref 0–0.8)
EOSINOPHIL NFR BLD: 5 % (ref 0.5–7.8)
ERYTHROCYTE [DISTWIDTH] IN BLOOD BY AUTOMATED COUNT: 14.3 % (ref 11.9–14.6)
GLUCOSE SERPL-MCNC: 95 MG/DL (ref 70–99)
HCT VFR BLD AUTO: 34.2 % (ref 41.1–50.3)
HGB BLD-MCNC: 10.6 G/DL (ref 13.6–17.2)
IMM GRANULOCYTES # BLD AUTO: 0 K/UL (ref 0–0.5)
IMM GRANULOCYTES NFR BLD AUTO: 0 % (ref 0–5)
LYMPHOCYTES # BLD: 1.4 K/UL (ref 0.5–4.6)
LYMPHOCYTES NFR BLD: 39 % (ref 13–44)
MCH RBC QN AUTO: 28.4 PG (ref 26.1–32.9)
MCHC RBC AUTO-ENTMCNC: 31 G/DL (ref 31.4–35)
MCV RBC AUTO: 91.7 FL (ref 82–102)
MONOCYTES # BLD: 0.6 K/UL (ref 0.1–1.3)
MONOCYTES NFR BLD: 16 % (ref 4–12)
NEUTS SEG # BLD: 1.4 K/UL (ref 1.7–8.2)
NEUTS SEG NFR BLD: 39 % (ref 43–78)
NRBC # BLD: 0 K/UL (ref 0–0.2)
NT PRO BNP: <36 PG/ML (ref 0–125)
PLATELET # BLD AUTO: 177 K/UL (ref 150–450)
PLATELET COMMENT: ADEQUATE
PMV BLD AUTO: 10.8 FL (ref 9.4–12.3)
POTASSIUM SERPL-SCNC: 3.7 MMOL/L (ref 3.5–5.1)
RBC # BLD AUTO: 3.73 M/UL (ref 4.23–5.6)
RBC MORPH BLD: ABNORMAL
SODIUM SERPL-SCNC: 139 MMOL/L (ref 136–145)
WBC # BLD AUTO: 3.6 K/UL (ref 4.3–11.1)
WBC MORPH BLD: ABNORMAL

## 2024-10-13 PROCEDURE — 80048 BASIC METABOLIC PNL TOTAL CA: CPT

## 2024-10-13 PROCEDURE — 85025 COMPLETE CBC W/AUTO DIFF WBC: CPT

## 2024-10-13 PROCEDURE — 36415 COLL VENOUS BLD VENIPUNCTURE: CPT

## 2024-10-13 PROCEDURE — 6370000000 HC RX 637 (ALT 250 FOR IP): Performed by: INTERNAL MEDICINE

## 2024-10-13 PROCEDURE — 93971 EXTREMITY STUDY: CPT

## 2024-10-13 PROCEDURE — 6360000002 HC RX W HCPCS: Performed by: SURGERY

## 2024-10-13 PROCEDURE — 83880 ASSAY OF NATRIURETIC PEPTIDE: CPT

## 2024-10-13 PROCEDURE — 6370000000 HC RX 637 (ALT 250 FOR IP): Performed by: NURSE PRACTITIONER

## 2024-10-13 PROCEDURE — 1100000000 HC RM PRIVATE

## 2024-10-13 RX ADMIN — TERBINAFINE HYDROCHLORIDE: 1 CREAM TOPICAL at 20:29

## 2024-10-13 RX ADMIN — TRAZODONE HYDROCHLORIDE 50 MG: 50 TABLET ORAL at 20:29

## 2024-10-13 RX ADMIN — METOPROLOL TARTRATE 12.5 MG: 25 TABLET, FILM COATED ORAL at 20:29

## 2024-10-13 RX ADMIN — METOPROLOL TARTRATE 12.5 MG: 25 TABLET, FILM COATED ORAL at 08:48

## 2024-10-13 RX ADMIN — ENOXAPARIN SODIUM 40 MG: 100 INJECTION SUBCUTANEOUS at 08:48

## 2024-10-13 RX ADMIN — TERBINAFINE HYDROCHLORIDE: 1 CREAM TOPICAL at 08:49

## 2024-10-13 RX ADMIN — PANTOPRAZOLE SODIUM 40 MG: 40 TABLET, DELAYED RELEASE ORAL at 20:29

## 2024-10-13 ASSESSMENT — PAIN SCALES - GENERAL: PAINLEVEL_OUTOF10: 0

## 2024-10-13 NOTE — PROGRESS NOTES
Hourly rounds completed.  Uneventful shift, pt rested well.  No c/o pain.  Bed in low position, wheels locked, call light within reach.

## 2024-10-13 NOTE — PROGRESS NOTES
Up in chair. Had quiet shift without complaints. Hourly rounds completed, all needs met this shift. Instructed to call if needed anything. Call light in reach

## 2024-10-13 NOTE — PLAN OF CARE
Documentation  Taken 10/13/2024 0810 by Kim Evans RN  Absence of infection at discharge: Assess and monitor for signs and symptoms of infection  10/12/2024 1956 by Anni Cole RN  Outcome: Progressing  10/12/2024 1955 by Anni Cole RN  Outcome: Not Progressing  Goal: Absence of infection during hospitalization  10/13/2024 0822 by Kim Evans RN  Outcome: Progressing  Flowsheets (Taken 10/13/2024 0810)  Absence of infection during hospitalization: Assess and monitor for signs and symptoms of infection  10/12/2024 1956 by Anni Cole RN  Outcome: Progressing  10/12/2024 1955 by Anni Cole RN  Outcome: Not Progressing     Problem: Metabolic/Fluid and Electrolytes - Adult  Goal: Electrolytes maintained within normal limits  10/12/2024 1956 by Anni Cole RN  Outcome: Progressing  10/12/2024 1955 by Anni Cole RN  Outcome: Not Progressing  Goal: Hemodynamic stability and optimal renal function maintained  10/12/2024 1956 by Anni Cole RN  Outcome: Progressing  10/12/2024 1955 by Anni Cole RN  Outcome: Not Progressing     Problem: Cardiovascular - Adult  Goal: Maintains optimal cardiac output and hemodynamic stability  Recent Flowsheet Documentation  Taken 10/13/2024 0810 by Kim Evans RN  Maintains optimal cardiac output and hemodynamic stability: Monitor blood pressure and heart rate  10/12/2024 1956 by Anni Cole RN  Outcome: Progressing  10/12/2024 1955 by Anni Cole RN  Outcome: Not Progressing  Flowsheets (Taken 10/12/2024 1906)  Maintains optimal cardiac output and hemodynamic stability: Monitor blood pressure and heart rate     Problem: Hematologic - Adult  Goal: Maintains hematologic stability  10/12/2024 1956 by Anni Cole RN  Outcome: Progressing  10/12/2024 1955 by Anni Cole RN  Outcome: Not Progressing

## 2024-10-13 NOTE — PROGRESS NOTES
Hospitalist Progress Note   Admit Date:  2024  4:30 PM   Name:  Brendan Saleh   Age:  49 y.o.  Sex:  male  :  1975   MRN:  [id number]   Room:  [facility]/    Presenting/Chief Complaint: Altered Mental Status     Reason(s) for Admission: Acute hypernatremia [E87.0]  Volume depletion [E86.9]  Acute kidney injury (HCC) [N17.9]  Acute encephalopathy [G93.40]  Septic shock (HCC) [A41.9, R65.21]     Hospital Course:     Copied from prior provider HPI/summary:  Brendan Saleh is a 49 y.o. male with medical history of dural hematoma status post bur hole, , hydrocephalus s/p  shunt, sacral decubitus stage IV ulcer who presented 2024 with altered mental status.     Found to initially have urinary tract infection with hematuria was started on vancomycin and Zosyn.  Urine cultures are finalized and found to be negative.  Infectious disease was consulted. Echocardiography was done which showed heart failure with preserved ejection fraction without vegetation.  On 2024 patient underwent sacral wound debridement with general surgery.      Additionally, CT head demonstrated ongoing hydrocephalus with  shunt.  Neurosurgery consulted and patient underwent right ventriculoperitoneal shunt removal on 2024.  Neurosurgery followed up on 24 and said patient would not be a candidate for  shunt replacement given high risk of infection.  Infectious disease later consulted for CSF infection as  shunt tip grew ESBL (Klebsiella and Serratia).  Antibiotics were then changed to IV ciprofloxacin and then oral ciprofloxacin following shunt removal.       Hospital course further complicated by small bowel obstruction on May 19. Patient underwent hemicolectomy and primary anastomosis with general surgery. 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 Oncology consulted recommend CEA which was negative. On 2024, patient noted to be tachycardic and distended                   Clear to auscultation bilaterally.  No wheeze  Heart:                     Regular rate and rhythm, S1, S2 normal, no murmur, click, rub or gallop.  Abdomen:                       Soft, non-tender. Bowel sounds normal. No masses,  No organomegaly.  Extremities:                     Extremities normal, atraumatic, no cyanosis or swelling left lower leg with edema  Neurologic:                     CNII-XII intact.  No focal motor weakness.       I have personally reviewed labs and tests:  Recent Labs:  Recent Results (from the past 48 hour(s))   CBC with Auto Differential    Collection Time: 10/13/24  5:40 AM   Result Value Ref Range    WBC 3.6 (L) 4.3 - 11.1 K/uL    RBC 3.73 (L) 4.23 - 5.6 M/uL    Hemoglobin 10.6 (L) 13.6 - 17.2 g/dL    Hematocrit 34.2 (L) 41.1 - 50.3 %    MCV 91.7 82 - 102 FL    MCH 28.4 26.1 - 32.9 PG    MCHC 31.0 (L) 31.4 - 35.0 g/dL    RDW 14.3 11.9 - 14.6 %    Platelets 177 150 - 450 K/uL    MPV 10.8 9.4 - 12.3 FL    nRBC 0.00 0.0 - 0.2 K/uL    Differential Type AUTOMATED      Neutrophils % 40 (L) 43 - 78 %    Lymphocytes % 39 13 - 44 %    Monocytes % 16 (H) 4.0 - 12.0 %    Eosinophils % 5 0.5 - 7.8 %    Basophils % 1 0.0 - 2.0 %    Immature Granulocytes % 0 0.0 - 5.0 %    Neutrophils Absolute 1.4 (L) 1.7 - 8.2 K/UL    Lymphocytes Absolute 1.4 0.5 - 4.6 K/UL    Monocytes Absolute 0.6 0.1 - 1.3 K/UL    Eosinophils Absolute 0.2 0.0 - 0.8 K/UL    Basophils Absolute 0.0 0.0 - 0.2 K/UL    Immature Granulocytes Absolute 0.0 0.0 - 0.5 K/UL   Basic Metabolic Panel w/ Reflex to MG    Collection Time: 10/13/24  5:40 AM   Result Value Ref Range    Sodium 139 136 - 145 mmol/L    Potassium 3.7 3.5 - 5.1 mmol/L    Chloride 102 98 - 107 mmol/L    CO2 27 20 - 28 mmol/L    Anion Gap 10 9 - 18 mmol/L    Glucose 95 70 - 99 mg/dL    BUN 20 6 - 23 MG/DL    Creatinine 0.89 0.80 - 1.30 MG/DL    Est, Glom Filt Rate >90 >60 ml/min/1.73m2    Calcium 9.0 8.8 - 10.2 MG/DL           No results for input(s):

## 2024-10-14 PROCEDURE — 6370000000 HC RX 637 (ALT 250 FOR IP): Performed by: INTERNAL MEDICINE

## 2024-10-14 PROCEDURE — 6360000002 HC RX W HCPCS: Performed by: SURGERY

## 2024-10-14 PROCEDURE — 6370000000 HC RX 637 (ALT 250 FOR IP): Performed by: NURSE PRACTITIONER

## 2024-10-14 PROCEDURE — 97530 THERAPEUTIC ACTIVITIES: CPT

## 2024-10-14 PROCEDURE — 1100000000 HC RM PRIVATE

## 2024-10-14 RX ADMIN — ENOXAPARIN SODIUM 40 MG: 100 INJECTION SUBCUTANEOUS at 08:36

## 2024-10-14 RX ADMIN — TERBINAFINE HYDROCHLORIDE: 1 CREAM TOPICAL at 20:21

## 2024-10-14 RX ADMIN — TRAZODONE HYDROCHLORIDE 50 MG: 50 TABLET ORAL at 20:20

## 2024-10-14 RX ADMIN — PANTOPRAZOLE SODIUM 40 MG: 40 TABLET, DELAYED RELEASE ORAL at 20:20

## 2024-10-14 RX ADMIN — METOPROLOL TARTRATE 12.5 MG: 25 TABLET, FILM COATED ORAL at 20:20

## 2024-10-14 RX ADMIN — TERBINAFINE HYDROCHLORIDE: 1 CREAM TOPICAL at 08:37

## 2024-10-14 ASSESSMENT — PAIN SCALES - GENERAL
PAINLEVEL_OUTOF10: 0
PAINLEVEL_OUTOF10: 0

## 2024-10-14 NOTE — PROGRESS NOTES
ACUTE PHYSICAL THERAPY GOALS:   (Developed with and agreed upon by patient and/or caregiver.)  Goals reviewed 9/25/24 and remain appropriate at this time  .   MET 9/25/24  (1.) Brendan Saleh  will move from supine to sit and sit to supine and scoot up and down with INDEPENDENCE within 7 treatment day(s). UPDATED 9/25/24   MET 9/25/24  (2.) Brendan Saleh will transfer from bed to chair and chair to bed with MODIFIED INDEPENDENCE using the least restrictive device within 7 treatment day(s).  UPDATED 9/25/24  (3.) Brendan Saleh will perform standing static and dynamic balance activities x 15 minutes with SUPERVISION to improve safety within 7 treatment day(s).   UPDATED 9/25/24   MET 9/25/24   (4.) Brendan Saleh will ambulate with MODIFIED INDEPENDENCE for 500 feet with the least restrictive device within 7 treatment day(s). UPDATED 9/25/24    PHYSICAL THERAPY: Daily Note PM   (Link to Caseload Tracking: PT Visit Days : 5  Time In/Out PT Charge Capture  Rehab Caseload Tracker  Orders    Brendan Saleh is a 49 y.o. male   PRIMARY DIAGNOSIS: Infection of  (ventriculoperitoneal) shunt (HCC)  Acute hypernatremia [E87.0]  Volume depletion [E86.9]  Acute kidney injury (HCC) [N17.9]  Acute encephalopathy [G93.40]  Septic shock (HCC) [A41.9, R65.21]  Procedure(s) (LRB):  LAPAROTOMY EXPLORATORY, PRIMARY CLOSURE SMALL BOWEL PERFORATION (N/A)  135 Days Post-Op  Inpatient: Payor: RICKYR / Plan: AMBETTER / Product Type: *No Product type* /     ASSESSMENT:     REHAB RECOMMENDATIONS:   Recommendation to date pending progress:  Setting:  Continued physical therapy recommended at discharge.    Equipment:    To Be Determined     ASSESSMENT:  Mr. Saleh was sitting in recliner and agreeable to PT.   Pt continues to remain impulsive with cueing for safe use of RW throughout session today.  Today he ambulates w/ SBA-CGA for 5 x 500' in total with continued work on paired dual-cognitive task. Pt still

## 2024-10-14 NOTE — PLAN OF CARE
Problem: Discharge Planning  Goal: Discharge to home or other facility with appropriate resources  10/14/2024 0924 by Kim Evans RN  Outcome: Progressing  10/13/2024 2156 by Anni Cole RN  Outcome: Progressing  Flowsheets (Taken 10/13/2024 1906)  Discharge to home or other facility with appropriate resources:   Identify barriers to discharge with patient and caregiver   Arrange for needed discharge resources and transportation as appropriate   Identify discharge learning needs (meds, wound care, etc)   Refer to discharge planning if patient needs post-hospital services based on physician order or complex needs related to functional status, cognitive ability or social support system     Problem: Safety - Adult  Goal: Free from fall injury  10/14/2024 0924 by Kim Evans RN  Outcome: Progressing  10/13/2024 2156 by Anni Cole RN  Outcome: Progressing     Problem: Neurosensory - Adult  Goal: Achieves stable or improved neurological status  Recent Flowsheet Documentation  Taken 10/14/2024 0816 by Kim Evans RN  Achieves stable or improved neurological status: Assess for and report changes in neurological status  10/13/2024 2156 by Anni Cole RN  Outcome: Progressing  Flowsheets  Taken 10/13/2024 1906 by Anni Cole RN  Achieves stable or improved neurological status: Assess for and report changes in neurological status  Taken 10/13/2024 0810 by Kim Evans RN  Achieves stable or improved neurological status: Assess for and report changes in neurological status     Problem: Musculoskeletal - Adult  Goal: Return mobility to safest level of function  10/14/2024 0924 by Kim Evans RN  Outcome: Progressing  Flowsheets (Taken 10/14/2024 0816)  Return Mobility to Safest Level of Function: Assess patient stability and activity tolerance for standing, transferring and ambulating with or without assistive devices  10/13/2024 2156 by Anni Cole RN  Outcome:  RN  Outcome: Progressing

## 2024-10-14 NOTE — PROGRESS NOTES
Up in chair all shift. Has participated with therapy No complaints voiced this shift. Hourly rounds completed, all needs met. Instructed pt to call if needed anything. Bedside table with call light and personal items in reach.

## 2024-10-14 NOTE — CARE COORDINATION
[facility] respite has reviewed clinicals on patient. CM spoke with Luis A with the respite program. The respite program currently is unable to offer a bed for patient, due to not having a discharge plan once leaving the respite program. Keren and Vicki made CM aware the [facility] shelters do have fees for patent to stay there, patient does not have any current income. Patient previously lives in a trailer located on family property, this has since been removed. Per charge nurse, an aunt will be coming to the hospital Tuesday, 10/15. CM will try to speak with aunt regarding discharge disposition.     [employer] filed for disability and Medicaid earlier in patient stay, Due to inactivity  these application may possibly need to be reapplied for. Liza Rivera with [employer] has restarted this process. CM sent an email to her today checking progress. Patient is currently not a canidate for a homeless shelter/streets due to cognition. Unless family is able to assist with shelter, transportation and food at discharge, current discharge plan is to awaiting disability income for a possible boarding/ group home. Attending and case  updated.     Bettina BENAVIDEZ, ACM  [place]

## 2024-10-14 NOTE — PLAN OF CARE
Problem: Discharge Planning  Goal: Discharge to home or other facility with appropriate resources  10/13/2024 2156 by Anni Cole RN  Outcome: Progressing  Flowsheets (Taken 10/13/2024 1906)  Discharge to home or other facility with appropriate resources:   Identify barriers to discharge with patient and caregiver   Arrange for needed discharge resources and transportation as appropriate   Identify discharge learning needs (meds, wound care, etc)   Refer to discharge planning if patient needs post-hospital services based on physician order or complex needs related to functional status, cognitive ability or social support system  10/13/2024 0822 by Kim Evans RN  Outcome: Progressing  10/13/2024 0822 by Kim Evans RN  Outcome: Progressing  10/13/2024 0821 by Kim Evans RN  Outcome: Progressing  Flowsheets (Taken 10/13/2024 0810)  Discharge to home or other facility with appropriate resources: Identify barriers to discharge with patient and caregiver     Problem: Safety - Adult  Goal: Free from fall injury  10/13/2024 2156 by Anni Cole RN  Outcome: Progressing  10/13/2024 0822 by Kim Evans RN  Outcome: Progressing  10/13/2024 0821 by Kim Evans RN  Outcome: Progressing     Problem: Neurosensory - Adult  Goal: Achieves stable or improved neurological status  Outcome: Progressing  Flowsheets  Taken 10/13/2024 1906 by Anni Cole RN  Achieves stable or improved neurological status: Assess for and report changes in neurological status  Taken 10/13/2024 0810 by Kim Evans RN  Achieves stable or improved neurological status: Assess for and report changes in neurological status     Problem: Musculoskeletal - Adult  Goal: Return mobility to safest level of function  10/13/2024 2156 by Anni Cole RN  Outcome: Progressing  Flowsheets (Taken 10/13/2024 1906)  Return Mobility to Safest Level of Function: Assess patient stability and activity tolerance for

## 2024-10-14 NOTE — PROGRESS NOTES
(HCC)    Anemia    Sepsis following intra-abdominal surgery (HCC)    Wound disruption    Sinus tachycardia    NPH (normal pressure hydrocephalus) (HCC)    Shunt malfunction    Colon obstruction (HCC)    Colonic mass    Adenocarcinoma of colon (HCC)    Severe protein-calorie malnutrition (HCC)    Sacral decubitus ulcer, stage IV (HCC)    ESBL (extended spectrum beta-lactamase) producing bacteria infection    Acute blood loss as cause of postoperative anemia    Bowel perforation (HCC)    Critical illness myopathy    Cachexia (HCC)    Unintentional weight loss    Adult failure to thrive    Encounter for palliative care    Hypomagnesemia    Diarrhea  Resolved Problems:    Hypernatremia    MIRNA (acute kidney injury) (HCC)    UTI (urinary tract infection)    Septic shock (HCC)      Objective:   Patient Vitals for the past 24 hrs:   Temp Pulse Resp BP SpO2   10/13/24 1959 97.9 °F (36.6 °C) 91 18 106/74 99 %   10/13/24 0848 -- 87 -- 100/69 --   10/13/24 0742 98.1 °F (36.7 °C) 87 18 100/69 93 %       Oxygen Therapy  SpO2: 99 %  Pulse Oximetry Type: Intermittent  Pulse via Oximetry: 128 beats per minute  Pulse Oximeter Device Mode: Intermittent  Pulse Oximeter Device Location: Finger  O2 Device: None (Room air)  Skin Assessment: Clean, dry, & intact  O2 Flow Rate (L/min): 1 L/min  Oxygen Therapy: None (Room air)    Estimated body mass index is 19 kg/m² as calculated from the following:    Height as of this encounter: 1.905 m (6' 3\").    Weight as of this encounter: 68.9 kg (152 lb).  No intake or output data in the 24 hours ending 10/14/24 0712          Physical Exam:     Physical Exam:   General:                       awake alert and oriented x 3 with no obvious cardiopulmonary distress  Eyes:                                           Conjunctivae/corneas clear. Pupils equally round and reactive to light, extraocular movements intact.   Lungs:                       Clear to auscultation bilaterally.  No wheeze  Heart:          MG/DL   Brain Natriuretic Peptide    Collection Time: 10/13/24  5:40 AM   Result Value Ref Range    NT Pro-BNP <36 0 - 125 PG/ML           No results for input(s): \"COVID19\" in the last 72 hours.    Current Meds:  Current Facility-Administered Medications   Medication Dose Route Frequency    fluconazole (DIFLUCAN) tablet 200 mg  200 mg Oral Q7 Days    metoprolol tartrate (LOPRESSOR) tablet 12.5 mg  12.5 mg Oral BID    terbinafine (LAMISIL) 1 % cream   Topical BID    LORazepam (ATIVAN) tablet 0.5 mg  0.5 mg Oral Q6H PRN    diphenhydrAMINE (BENADRYL) injection 25 mg  25 mg IntraVENous Q6H PRN    pantoprazole (PROTONIX) tablet 40 mg  40 mg Oral Nightly    oxyCODONE (ROXICODONE) immediate release tablet 5 mg  5 mg Oral Q4H PRN    cyclobenzaprine (FLEXERIL) tablet 10 mg  10 mg Oral TID PRN    traZODone (DESYREL) tablet 50 mg  50 mg Oral Nightly PRN    acetaminophen (TYLENOL) tablet 650 mg  650 mg Oral Q4H PRN    0.9 % sodium chloride infusion  25 mL IntraVENous PRN    sodium chloride flush 0.9 % injection 5-40 mL  5-40 mL IntraVENous PRN    0.9 % sodium chloride infusion   IntraVENous PRN    glucose chewable tablet 16 g  4 tablet Oral PRN    dextrose bolus 10% 125 mL  125 mL IntraVENous PRN    Or    dextrose bolus 10% 250 mL  250 mL IntraVENous PRN    Glucagon Emergency KIT 1 mg  1 mg SubCUTAneous PRN    dextrose 10 % infusion   IntraVENous Continuous PRN    medicated lip ointment (BLISTEX)   Topical PRN    ondansetron (ZOFRAN-ODT) disintegrating tablet 4 mg  4 mg Oral Q8H PRN    Or    ondansetron (ZOFRAN) injection 4 mg  4 mg IntraVENous Q6H PRN    enoxaparin (LOVENOX) injection 40 mg  40 mg SubCUTAneous Daily    calcium carbonate (TUMS) chewable tablet 500 mg  500 mg Oral TID PRN       Signed:  Yokasta Sam MD    Part of this note may have been written by using a voice dictation software.  The note has been proof read but may still contain some grammatical/other typographical errors.

## 2024-10-15 PROCEDURE — 6370000000 HC RX 637 (ALT 250 FOR IP)

## 2024-10-15 PROCEDURE — 1100000000 HC RM PRIVATE

## 2024-10-15 PROCEDURE — 97530 THERAPEUTIC ACTIVITIES: CPT

## 2024-10-15 PROCEDURE — 6360000002 HC RX W HCPCS: Performed by: SURGERY

## 2024-10-15 PROCEDURE — 6370000000 HC RX 637 (ALT 250 FOR IP): Performed by: NURSE PRACTITIONER

## 2024-10-15 PROCEDURE — 6370000000 HC RX 637 (ALT 250 FOR IP): Performed by: INTERNAL MEDICINE

## 2024-10-15 PROCEDURE — 97110 THERAPEUTIC EXERCISES: CPT

## 2024-10-15 RX ADMIN — METOPROLOL TARTRATE 12.5 MG: 25 TABLET, FILM COATED ORAL at 20:53

## 2024-10-15 RX ADMIN — TERBINAFINE HYDROCHLORIDE: 1 CREAM TOPICAL at 08:37

## 2024-10-15 RX ADMIN — PANTOPRAZOLE SODIUM 40 MG: 40 TABLET, DELAYED RELEASE ORAL at 20:53

## 2024-10-15 RX ADMIN — ENOXAPARIN SODIUM 40 MG: 100 INJECTION SUBCUTANEOUS at 08:36

## 2024-10-15 RX ADMIN — TERBINAFINE HYDROCHLORIDE: 1 CREAM TOPICAL at 20:54

## 2024-10-15 RX ADMIN — TRAZODONE HYDROCHLORIDE 50 MG: 50 TABLET ORAL at 20:53

## 2024-10-15 ASSESSMENT — PAIN SCALES - GENERAL
PAINLEVEL_OUTOF10: 0

## 2024-10-15 NOTE — PROGRESS NOTES
ACUTE OCCUPATIONAL THERAPY GOALS:   (Developed with and agreed upon by patient and/or caregiver.)  1. Patient will complete total body bathing and dressing with INDEPENDENCE and adaptive equipment as needed.10/15/24- pt and RN report pt independently bathed prior to OT session  2. Patient will complete grooming ADL in standing with INDEPENDENCE.   3. Patient will perform BUE fine motor exercises to increase  strength and coordination for ADLs.   4. Patient will complete functional transfers and mobility of household distances with MODIFIED INDEPENDENCE using least restrictive ambulatory device.  5. Patient will sequence simple meal preparation task with SUPERVISION and no verbal cues from therapist.  6. Patient will self-initiate ADL task with no verbal cues from therapist.   7. Patient will gather necessary supplies for ADL with SUPERVISION.   8. Patient will identify and self-correct errors made during functional task with no cues from therapist.   9. Patient will anticipate and develop plan for addressing safety risks at home.      Timeframe: 7 visits    OCCUPATIONAL THERAPY: Daily Note PM   OT Visit Days: 2   Time In/Out  OT Charge Capture  Rehab Caseload Tracker  OT Orders    Brendan Saleh is a 49 y.o. male   PRIMARY DIAGNOSIS: Infection of  (ventriculoperitoneal) shunt (HCC)  Acute hypernatremia [E87.0]  Volume depletion [E86.9]  Acute kidney injury (HCC) [N17.9]  Acute encephalopathy [G93.40]  Septic shock (HCC) [A41.9, R65.21]  Procedure(s) (LRB):  LAPAROTOMY EXPLORATORY, PRIMARY CLOSURE SMALL BOWEL PERFORATION (N/A)  136 Days Post-Op  Inpatient: Payor: [employer] / Plan: [employer] / Product Type: *No Product type* /     ASSESSMENT:     REHAB RECOMMENDATIONS:   Recommendation to date pending progress:  Setting:  Home Health Therapy    Equipment:    Rolling Walker  To Be Determined     ASSESSMENT:  Mr. Saleh continues to demonstrate progress toward acute OT goals. He reports he independently

## 2024-10-15 NOTE — PROGRESS NOTES
Up in chair all shift without complaints. Hourly rounds completed, all needs met this shift. Instructed to call if needed anything. Bedside table with call light and personal items in reach.

## 2024-10-15 NOTE — PLAN OF CARE
Problem: Discharge Planning  Goal: Discharge to home or other facility with appropriate resources  Outcome: Progressing     Problem: Safety - Adult  Goal: Free from fall injury  Outcome: Progressing     Problem: Neurosensory - Adult  Goal: Achieves stable or improved neurological status  Outcome: Progressing     Problem: Musculoskeletal - Adult  Goal: Return mobility to safest level of function  Outcome: Progressing  Flowsheets (Taken 10/14/2024 1933)  Return Mobility to Safest Level of Function:   Assess patient stability and activity tolerance for standing, transferring and ambulating with or without assistive devices   Assist with transfers and ambulation using safe patient handling equipment as needed     Problem: Skin/Tissue Integrity  Goal: Absence of new skin breakdown  Description: 1.  Monitor for areas of redness and/or skin breakdown  2.  Assess vascular access sites hourly  3.  Every 4-6 hours minimum:  Change oxygen saturation probe site  4.  Every 4-6 hours:  If on nasal continuous positive airway pressure, respiratory therapy assess nares and determine need for appliance change or resting period.  Outcome: Progressing     Problem: Pain  Goal: Verbalizes/displays adequate comfort level or baseline comfort level  Outcome: Progressing     Problem: Skin/Tissue Integrity - Adult  Goal: Skin integrity remains intact  Outcome: Progressing  Flowsheets (Taken 10/14/2024 1933)  Skin Integrity Remains Intact: Monitor for areas of redness and/or skin breakdown  Goal: Incisions, wounds, or drain sites healing without S/S of infection  Outcome: Progressing  Flowsheets (Taken 10/14/2024 1933)  Incisions, Wounds, or Drain Sites Healing Without Sign and Symptoms of Infection:   TWICE DAILY: Assess and document skin integrity   ADMISSION and DAILY: Assess and document risk factors for pressure ulcer development     Problem: Gastrointestinal - Adult  Goal: Maintains adequate nutritional intake  Outcome: Progressing

## 2024-10-15 NOTE — PROGRESS NOTES
Hospitalist Progress Note   Admit Date:  2024  4:30 PM   Name:  Brendan Saleh   Age:  49 y.o.  Sex:  male  :  1975   MRN:  [id number]   Room:  [facility]/    Presenting/Chief Complaint: Altered Mental Status     Reason(s) for Admission: Acute hypernatremia [E87.0]  Volume depletion [E86.9]  Acute kidney injury (HCC) [N17.9]  Acute encephalopathy [G93.40]  Septic shock (HCC) [A41.9, R65.21]     Hospital Course:     Copied from prior provider HPI/summary:  Brendan Saleh is a 49 y.o. male with medical history of dural hematoma status post bur hole, , hydrocephalus s/p  shunt, sacral decubitus stage IV ulcer who presented 2024 with altered mental status.     Found to initially have urinary tract infection with hematuria was started on vancomycin and Zosyn.  Urine cultures are finalized and found to be negative.  Infectious disease was consulted. Echocardiography was done which showed heart failure with preserved ejection fraction without vegetation.  On 2024 patient underwent sacral wound debridement with general surgery.      Additionally, CT head demonstrated ongoing hydrocephalus with  shunt.  Neurosurgery consulted and patient underwent right ventriculoperitoneal shunt removal on 2024.  Neurosurgery followed up on 24 and said patient would not be a candidate for  shunt replacement given high risk of infection.  Infectious disease later consulted for CSF infection as  shunt tip grew ESBL (Klebsiella and Serratia).  Antibiotics were then changed to IV ciprofloxacin and then oral ciprofloxacin following shunt removal.       Hospital course further complicated by small bowel obstruction on May 19. Patient underwent hemicolectomy and primary anastomosis with general surgery. 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 Oncology consulted recommend CEA which was negative. On 2024, patient noted to be tachycardic and distended  present):  Cardiac/Telemetry Monitor On: No        Hospital Problems:  Principal Problem:    Infection of  (ventriculoperitoneal) shunt (HCC)  Active Problems:    Encephalitis    S/P  shunt    Communicating hydrocephalus (HCC)    Anemia    Sepsis following intra-abdominal surgery (HCC)    Wound disruption    Sinus tachycardia    NPH (normal pressure hydrocephalus) (HCC)    Shunt malfunction    Colon obstruction (HCC)    Colonic mass    Adenocarcinoma of colon (HCC)    Severe protein-calorie malnutrition (HCC)    Sacral decubitus ulcer, stage IV (HCC)    ESBL (extended spectrum beta-lactamase) producing bacteria infection    Acute blood loss as cause of postoperative anemia    Bowel perforation (HCC)    Critical illness myopathy    Cachexia (HCC)    Unintentional weight loss    Adult failure to thrive    Encounter for palliative care    Hypomagnesemia    Diarrhea  Resolved Problems:    Hypernatremia    MIRNA (acute kidney injury) (HCC)    UTI (urinary tract infection)    Septic shock (HCC)      Objective:   Patient Vitals for the past 24 hrs:   Temp Pulse Resp BP SpO2   10/15/24 0733 97.3 °F (36.3 °C) 87 -- 96/69 100 %   10/14/24 1915 97.7 °F (36.5 °C) (!) 102 20 117/67 95 %   10/14/24 1526 98.4 °F (36.9 °C) 100 16 101/73 97 %   10/14/24 0816 98.1 °F (36.7 °C) 94 16 95/71 96 %       Oxygen Therapy  SpO2: 100 %  Pulse Oximetry Type: Intermittent  Pulse via Oximetry: 128 beats per minute  Pulse Oximeter Device Mode: Intermittent  Pulse Oximeter Device Location: Finger  O2 Device: None (Room air)  Skin Assessment: Clean, dry, & intact  O2 Flow Rate (L/min): 1 L/min  Oxygen Therapy: None (Room air)    Estimated body mass index is 19 kg/m² as calculated from the following:    Height as of this encounter: 1.905 m (6' 3\").    Weight as of this encounter: 68.9 kg (152 lb).  No intake or output data in the 24 hours ending 10/15/24 0735          Physical Exam:     Physical Exam:   General:                       awake alert

## 2024-10-15 NOTE — CARE COORDINATION
RICARDO met with patient's aunt, Lesley. Lesley confirms patient's trailer is still on the property but has no water or power. Patient is on well water so when power is restored he will have water. Lesley reports she is not able to pay the power bill to restore power at this time.     Lesley also lives on said property. RICARDO asked if patient would be able to stay with her, until his trailer is livable. Lesley reports no, but she says there is another small building on the property she uses for laundry and showers/ bathroom. CM asked if there would be a way for patient to stay there until his trailer is available. Lesley reports yes. CM, lesley, and patient discussed this plan and patient was agreeable. Lesley is coordinating getting a heater, and a couch for patient in the building. RICARDO advised we are discharging patient this week. Lesley reports that will work for her. Lesley assisted with groceries before and has agreed to assist with groceries at discharge. CM made referral for meals on wheels. CM informed Lesley CM will call her tomorrow and Thursday to check on things.  Lesley reports she can only take phone calls before 1300.  CM will call Lesley tomorrow prior to 1300 to ensure progress is being made and to offer assistance for expected discharge on Friday. Attending and case  and director aware.     Bettina BARRAZA, ACM  [facility]

## 2024-10-16 PROCEDURE — 6370000000 HC RX 637 (ALT 250 FOR IP): Performed by: NURSE PRACTITIONER

## 2024-10-16 PROCEDURE — 1100000000 HC RM PRIVATE

## 2024-10-16 PROCEDURE — 6370000000 HC RX 637 (ALT 250 FOR IP): Performed by: INTERNAL MEDICINE

## 2024-10-16 PROCEDURE — 6360000002 HC RX W HCPCS: Performed by: SURGERY

## 2024-10-16 RX ADMIN — PANTOPRAZOLE SODIUM 40 MG: 40 TABLET, DELAYED RELEASE ORAL at 20:51

## 2024-10-16 RX ADMIN — FLUCONAZOLE 200 MG: 100 TABLET ORAL at 09:30

## 2024-10-16 RX ADMIN — TERBINAFINE HYDROCHLORIDE: 1 CREAM TOPICAL at 20:52

## 2024-10-16 RX ADMIN — METOPROLOL TARTRATE 12.5 MG: 25 TABLET, FILM COATED ORAL at 20:51

## 2024-10-16 RX ADMIN — ENOXAPARIN SODIUM 40 MG: 100 INJECTION SUBCUTANEOUS at 09:30

## 2024-10-16 RX ADMIN — TRAZODONE HYDROCHLORIDE 50 MG: 50 TABLET ORAL at 20:51

## 2024-10-16 NOTE — PROGRESS NOTES
Physical therapy note: Attempted PT this PM. Pt declined stating he had just walked with PCT. Pt sitting up in the chair. Will continue to treat as pt is able and time/schedule permit.    Polly Turpin PTA    Rehab Caseload Tracker

## 2024-10-16 NOTE — PLAN OF CARE
Problem: Discharge Planning  Goal: Discharge to home or other facility with appropriate resources  Outcome: Progressing     Problem: Safety - Adult  Goal: Free from fall injury  Outcome: Progressing     Problem: Neurosensory - Adult  Goal: Achieves stable or improved neurological status  Outcome: Progressing     Problem: Musculoskeletal - Adult  Goal: Return mobility to safest level of function  Outcome: Progressing     Problem: Skin/Tissue Integrity  Goal: Absence of new skin breakdown  Description: 1.  Monitor for areas of redness and/or skin breakdown  2.  Assess vascular access sites hourly  3.  Every 4-6 hours minimum:  Change oxygen saturation probe site  4.  Every 4-6 hours:  If on nasal continuous positive airway pressure, respiratory therapy assess nares and determine need for appliance change or resting period.  Outcome: Progressing     Problem: Pain  Goal: Verbalizes/displays adequate comfort level or baseline comfort level  Outcome: Progressing  Flowsheets (Taken 10/15/2024 1925)  Verbalizes/displays adequate comfort level or baseline comfort level:   Assess pain using appropriate pain scale   Encourage patient to monitor pain and request assistance   Administer analgesics based on type and severity of pain and evaluate response     Problem: Skin/Tissue Integrity - Adult  Goal: Skin integrity remains intact  Outcome: Progressing  Goal: Incisions, wounds, or drain sites healing without S/S of infection  Outcome: Progressing     Problem: Gastrointestinal - Adult  Goal: Maintains adequate nutritional intake  Outcome: Progressing     Problem: Genitourinary - Adult  Goal: Absence of urinary retention  Outcome: Progressing     Problem: Infection - Adult  Goal: Absence of infection at discharge  Outcome: Progressing  Goal: Absence of infection during hospitalization  Outcome: Progressing     Problem: Metabolic/Fluid and Electrolytes - Adult  Goal: Electrolytes maintained within normal limits  Outcome:  Progressing  Goal: Hemodynamic stability and optimal renal function maintained  Outcome: Progressing     Problem: Cardiovascular - Adult  Goal: Maintains optimal cardiac output and hemodynamic stability  Outcome: Progressing     Problem: Hematologic - Adult  Goal: Maintains hematologic stability  Outcome: Progressing

## 2024-10-16 NOTE — PROGRESS NOTES
Hospitalist Progress Note   Admit Date:  2024  4:30 PM   Name:  Brendan Saleh   Age:  49 y.o.  Sex:  male  :  1975   MRN:  [id number]   Room:  [facility]    Presenting/Chief Complaint: Altered Mental Status     Reason(s) for Admission: Acute hypernatremia [E87.0]  Volume depletion [E86.9]  Acute kidney injury (HCC) [N17.9]  Acute encephalopathy [G93.40]  Septic shock (HCC) [A41.9, R65.21]     Hospital Course:   Brendan Saleh is a 49 y.o. male with medical history of dural hematoma status post bur hole, , hydrocephalus s/p  shunt, sacral decubitus stage IV ulcer who presented 2024 with altered mental status.     Found to initially have urinary tract infection with hematuria was started on vancomycin and Zosyn.  Urine cultures are finalized and found to be negative.  Infectious disease was consulted. Echocardiography was done which showed heart failure with preserved ejection fraction without vegetation.  On 2024 patient underwent sacral wound debridement with general surgery.      Additionally, CT head demonstrated ongoing hydrocephalus with  shunt.  Neurosurgery consulted and patient underwent right ventriculoperitoneal shunt removal on 2024.  Neurosurgery followed up on 24 and said patient would not be a candidate for  shunt replacement given high risk of infection.  Infectious disease later consulted for CSF infection as  shunt tip grew ESBL (Klebsiella and Serratia).  Antibiotics were then changed to IV ciprofloxacin and then oral ciprofloxacin following shunt removal.       Hospital course further complicated by small bowel obstruction on May 19. Patient underwent hemicolectomy and primary anastomosis with general surgery. 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 Oncology consulted recommend CEA which was negative. On 2024, patient noted to be tachycardic and distended with emesis.  Imaging revealed free air  discharged Friday as per case management.    Non-peripheral Lines and Tubes (if present):          Telemetry (if present):  Cardiac/Telemetry Monitor On: No        Hospital Problems:  Principal Problem:    Infection of  (ventriculoperitoneal) shunt (HCC)  Active Problems:    Encephalitis    S/P  shunt    Communicating hydrocephalus (HCC)    Anemia    Sepsis following intra-abdominal surgery (HCC)    Wound disruption    Sinus tachycardia    NPH (normal pressure hydrocephalus) (HCC)    Shunt malfunction    Colon obstruction (HCC)    Colonic mass    Adenocarcinoma of colon (HCC)    Severe protein-calorie malnutrition (HCC)    Sacral decubitus ulcer, stage IV (HCC)    ESBL (extended spectrum beta-lactamase) producing bacteria infection    Acute blood loss as cause of postoperative anemia    Bowel perforation (HCC)    Critical illness myopathy    Cachexia (HCC)    Unintentional weight loss    Adult failure to thrive    Encounter for palliative care    Hypomagnesemia    Diarrhea  Resolved Problems:    Hypernatremia    MIRNA (acute kidney injury) (HCC)    UTI (urinary tract infection)    Septic shock (HCC)      Objective:   Patient Vitals for the past 24 hrs:   Temp Pulse Resp BP SpO2   10/16/24 0722 97.5 °F (36.4 °C) 99 17 108/71 98 %   10/15/24 2020 97.9 °F (36.6 °C) (!) 105 18 117/86 99 %       Oxygen Therapy  SpO2: 98 %  Pulse Oximetry Type: Intermittent  Pulse via Oximetry: 128 beats per minute  Pulse Oximeter Device Mode: Intermittent  Pulse Oximeter Device Location: Finger  O2 Device: None (Room air)  Skin Assessment: Clean, dry, & intact  O2 Flow Rate (L/min): 1 L/min  Oxygen Therapy: None (Room air)    Estimated body mass index is 19 kg/m² as calculated from the following:    Height as of this encounter: 1.905 m (6' 3\").    Weight as of this encounter: 68.9 kg (152 lb).  No intake or output data in the 24 hours ending 10/16/24 0919      Physical Exam:   General:    Well nourished.  No acute distress.  Sitting in

## 2024-10-16 NOTE — CARE COORDINATION
CM attempted to call Lesley via phone, no answer unable to leave a voicemail. RICARDO will try again tomorrow morning.    Bettina BENAVIDEZ, ACM  [place]

## 2024-10-17 PROCEDURE — 92507 TX SP LANG VOICE COMM INDIV: CPT

## 2024-10-17 PROCEDURE — 97530 THERAPEUTIC ACTIVITIES: CPT

## 2024-10-17 PROCEDURE — 6370000000 HC RX 637 (ALT 250 FOR IP): Performed by: INTERNAL MEDICINE

## 2024-10-17 PROCEDURE — 6360000002 HC RX W HCPCS: Performed by: SURGERY

## 2024-10-17 PROCEDURE — 6370000000 HC RX 637 (ALT 250 FOR IP): Performed by: NURSE PRACTITIONER

## 2024-10-17 PROCEDURE — 1100000000 HC RM PRIVATE

## 2024-10-17 RX ADMIN — TRAZODONE HYDROCHLORIDE 50 MG: 50 TABLET ORAL at 20:48

## 2024-10-17 RX ADMIN — TERBINAFINE HYDROCHLORIDE: 1 CREAM TOPICAL at 20:49

## 2024-10-17 RX ADMIN — METOPROLOL TARTRATE 12.5 MG: 25 TABLET, FILM COATED ORAL at 20:48

## 2024-10-17 RX ADMIN — PANTOPRAZOLE SODIUM 40 MG: 40 TABLET, DELAYED RELEASE ORAL at 20:48

## 2024-10-17 RX ADMIN — ENOXAPARIN SODIUM 40 MG: 100 INJECTION SUBCUTANEOUS at 08:28

## 2024-10-17 RX ADMIN — METOPROLOL TARTRATE 12.5 MG: 25 TABLET, FILM COATED ORAL at 08:28

## 2024-10-17 ASSESSMENT — PAIN SCALES - GENERAL: PAINLEVEL_OUTOF10: 0

## 2024-10-17 NOTE — WOUND CARE
Called by nursing, wounds have healed on sacrum and hip. NO need for dressing at this time. Noted patient is likely to go home today. Will need to use cushion in chair to offload and protect scar. OK to shower ok to get wet. No creams or lotions needed.

## 2024-10-17 NOTE — CARE COORDINATION
Previous referral sent to [facility], [facility] stated patient's insurance requires a 50/50 co pay which patient is unable to afford. CM tried to get [facility] and was unsuccessful. CM coordinated with primary nurse, Katarina on teaching patient how to perform his own wound care. Katarina was reaching out to wound care to confirm wound care needs.     CM attempted to call aunt Lesley, no answer and unable to leave voicemail. CM called Layla. Layla confirms family is aware of discharge tomorrow and the space is prepared for patient. Layla does not have an updated phone number for Lesley but reports she is planning on picking up patient tomorrow. CM will attempt to reach Lesley again tomorrow morning.    Rolling walker ordered through [employer] and delivered to room.     Bettina BENAVIDEZ, ACM  [place]

## 2024-10-17 NOTE — PROGRESS NOTES
Hospitalist Progress Note   Admit Date:  2024  4:30 PM   Name:  Brendan Saleh   Age:  49 y.o.  Sex:  male  :  1975   MRN:  [id number]   Room:  [facility]/    Presenting/Chief Complaint: Altered Mental Status     Reason(s) for Admission: Acute hypernatremia [E87.0]  Volume depletion [E86.9]  Acute kidney injury (HCC) [N17.9]  Acute encephalopathy [G93.40]  Septic shock (HCC) [A41.9, R65.21]     Hospital Course:   Brendan Saleh is a 49 y.o. male with medical history of   Dural hematoma s/p Bur hole   Hydrocephalus s/p  shunt   Sacral decubitus stage IV ulcer     who presented with altered mental status on 24.     I have reviewed medical record from previous provider.  It was revealed in chronological order.  And the detailed history of his hospital stay for this admission is the following.     \" Found to initially have urinary tract infection with hematuria was started on vancomycin and Zosyn.  Urine cultures are finalized and found to be negative.  Infectious disease was consulted. Echocardiography was done which showed heart failure with preserved ejection fraction without vegetation.  On 2024 patient underwent sacral wound debridement with general surgery.      Additionally, CT head demonstrated ongoing hydrocephalus with  shunt.  Neurosurgery consulted and patient underwent right ventriculoperitoneal shunt removal on 2024.  Neurosurgery followed up on 24 and said patient would not be a candidate for  shunt replacement given high risk of infection.  Infectious disease later consulted for CSF infection as  shunt tip grew ESBL (Klebsiella and Serratia).  Antibiotics were then changed to IV ciprofloxacin and then oral ciprofloxacin following shunt removal.       Hospital course further complicated by small bowel obstruction on May 19. Patient underwent hemicolectomy and primary anastomosis with general surgery. Pathology determined patient to have moderately  obstruction (HCC)    Colonic mass    Adenocarcinoma of colon (HCC)    Severe protein-calorie malnutrition (HCC)    Sacral decubitus ulcer, stage IV (HCC)    ESBL (extended spectrum beta-lactamase) producing bacteria infection    Acute blood loss as cause of postoperative anemia    Bowel perforation (HCC)    Critical illness myopathy    Cachexia (HCC)    Unintentional weight loss    Adult failure to thrive    Encounter for palliative care    Hypomagnesemia    Diarrhea  Resolved Problems:    Hypernatremia    MIRNA (acute kidney injury) (HCC)    UTI (urinary tract infection)    Septic shock (HCC)      Objective:   Patient Vitals for the past 24 hrs:   Temp Pulse Resp BP SpO2   10/17/24 0757 98.1 °F (36.7 °C) 95 18 106/67 95 %   10/16/24 2017 97.9 °F (36.6 °C) (!) 107 18 114/86 96 %       Oxygen Therapy  SpO2: 95 %  Pulse Oximetry Type: Intermittent  Pulse via Oximetry: 128 beats per minute  Pulse Oximeter Device Mode: Intermittent  Pulse Oximeter Device Location: Finger  O2 Device: None (Room air)  Skin Assessment: Clean, dry, & intact  O2 Flow Rate (L/min): 1 L/min  Oxygen Therapy: None (Room air)    Estimated body mass index is 19 kg/m² as calculated from the following:    Height as of this encounter: 1.905 m (6' 3\").    Weight as of this encounter: 68.9 kg (152 lb).  No intake or output data in the 24 hours ending 10/17/24 1255      Physical Exam:   /67   Pulse 95   Temp 98.1 °F (36.7 °C) (Oral)   Resp 18   Ht 1.905 m (6' 3\")   Wt 68.9 kg (152 lb)   SpO2 95%   BMI 19.00 kg/m²      General:    Well nourished.    Patient is alert and oriented to place, time and person   Afebrile.   No respiratory distress.   Head:  Normocephalic, atraumatic  Eyes:  Sclerae appear normal.  Pupils equally round.  ENT:  Nares appear normal.  Moist oral mucosa  Neck:  No restricted ROM.  Trachea midline   CV:   RRR.  No m/r/g.  No jugular venous distension.  Lungs:   CTAB.  No wheezing, rhonchi, or rales.  Symmetric

## 2024-10-17 NOTE — PROGRESS NOTES
ACUTE OCCUPATIONAL THERAPY GOALS:   (Developed with and agreed upon by patient and/or caregiver.)  1. Patient will complete total body bathing and dressing with INDEPENDENCE and adaptive equipment as needed.10/15/24- pt and RN report pt independently bathed prior to OT session  2. Patient will complete grooming ADL in standing with INDEPENDENCE.   3. Patient will perform BUE fine motor exercises to increase  strength and coordination for ADLs.   4. Patient will complete functional transfers and mobility of household distances with MODIFIED INDEPENDENCE using least restrictive ambulatory device.  5. Patient will sequence simple meal preparation task with SUPERVISION and no verbal cues from therapist.  6. Patient will self-initiate ADL task with no verbal cues from therapist.   7. Patient will gather necessary supplies for ADL with SUPERVISION.   8. Patient will identify and self-correct errors made during functional task with no cues from therapist.   9. Patient will anticipate and develop plan for addressing safety risks at home.      Timeframe: 7 visits    OCCUPATIONAL THERAPY: Daily Note PM   OT Visit Days: 3   Time In/Out  OT Charge Capture  Rehab Caseload Tracker  OT Orders    Brendan Saleh is a 49 y.o. male   PRIMARY DIAGNOSIS: Infection of  (ventriculoperitoneal) shunt (HCC)  Acute hypernatremia [E87.0]  Volume depletion [E86.9]  Acute kidney injury (HCC) [N17.9]  Acute encephalopathy [G93.40]  Septic shock (HCC) [A41.9, R65.21]  Procedure(s) (LRB):  LAPAROTOMY EXPLORATORY, PRIMARY CLOSURE SMALL BOWEL PERFORATION (N/A)  138 Days Post-Op  Inpatient: Payor: [employer] / Plan: [employer]TER / Product Type: *No Product type* /     ASSESSMENT:     REHAB RECOMMENDATIONS:   Recommendation to date pending progress:  Setting:  Home Health Therapy    Equipment:    Rolling Walker  To Be Determined     ASSESSMENT:  Mr. Saleh continues to demonstrate progress toward acute OT goals. He reports he independently

## 2024-10-18 VITALS
HEIGHT: 75 IN | OXYGEN SATURATION: 100 % | BODY MASS INDEX: 18.9 KG/M2 | SYSTOLIC BLOOD PRESSURE: 101 MMHG | WEIGHT: 152 LBS | HEART RATE: 98 BPM | RESPIRATION RATE: 18 BRPM | TEMPERATURE: 97.9 F | DIASTOLIC BLOOD PRESSURE: 76 MMHG

## 2024-10-18 PROCEDURE — 6370000000 HC RX 637 (ALT 250 FOR IP): Performed by: INTERNAL MEDICINE

## 2024-10-18 RX ORDER — PANTOPRAZOLE SODIUM 40 MG/1
40 TABLET, DELAYED RELEASE ORAL NIGHTLY
Qty: 30 TABLET | Refills: 0 | Status: SHIPPED | OUTPATIENT
Start: 2024-10-18 | End: 2024-11-17

## 2024-10-18 RX ORDER — FLUCONAZOLE 200 MG/1
200 TABLET ORAL
Qty: 4 TABLET | Refills: 0 | Status: SHIPPED | OUTPATIENT
Start: 2024-10-23 | End: 2024-11-14

## 2024-10-18 RX ORDER — METOPROLOL TARTRATE 25 MG/1
12.5 TABLET, FILM COATED ORAL 2 TIMES DAILY
Qty: 30 TABLET | Refills: 0 | Status: SHIPPED | OUTPATIENT
Start: 2024-10-18 | End: 2024-11-17

## 2024-10-18 RX ADMIN — METOPROLOL TARTRATE 12.5 MG: 25 TABLET, FILM COATED ORAL at 08:47

## 2024-10-18 NOTE — CARE COORDINATION
CM Spoke with patient at chairside, patient reports his Aunt Lesley works at [employer] on [address]. Ricardo called the store and spoke with Lesley. She reports she is working until  1930 tonight. RICARDO informed her transportation will be arranged. Lesley reports Patient  can go to Anaid's house at [address] [place] [address], ( which is located on the property) until Lesley gets off work. A text message was sent to Lesley, Anaid, and Layla updating them on transportation time. Transportation arranged via [employer]. [employer] was given anaid and Lesley phone number should any issues arise. Patient discharging home with family assistance.     Bettina BENAVIDEZ, ACM  [place]

## 2024-10-18 NOTE — PROGRESS NOTES
Hospitalist Progress Note   Admit Date:  2024  4:30 PM   Name:  Brendan Saleh   Age:  49 y.o.  Sex:  male  :  1975   MRN:  [id number]   Room:  [facility]/    Presenting/Chief Complaint: Altered Mental Status     Reason(s) for Admission: Acute hypernatremia [E87.0]  Volume depletion [E86.9]  Acute kidney injury (HCC) [N17.9]  Acute encephalopathy [G93.40]  Septic shock (HCC) [A41.9, R65.21]     Hospital Course:   Brendan Saleh is a 49 y.o. male with medical history of   Dural hematoma s/p Bur hole   Hydrocephalus s/p  shunt   Sacral decubitus stage IV ulcer     who presented with altered mental status on 24.     I have reviewed medical record from previous provider.  It was revealed in chronological order.  And the detailed history of his hospital stay for this admission is the following.     \" Found to initially have urinary tract infection with hematuria was started on vancomycin and Zosyn.  Urine cultures are finalized and found to be negative.  Infectious disease was consulted. Echocardiography was done which showed heart failure with preserved ejection fraction without vegetation.  On 2024 patient underwent sacral wound debridement with general surgery.      Additionally, CT head demonstrated ongoing hydrocephalus with  shunt.  Neurosurgery consulted and patient underwent right ventriculoperitoneal shunt removal on 2024.  Neurosurgery followed up on 24 and said patient would not be a candidate for  shunt replacement given high risk of infection.  Infectious disease later consulted for CSF infection as  shunt tip grew ESBL (Klebsiella and Serratia).  Antibiotics were then changed to IV ciprofloxacin and then oral ciprofloxacin following shunt removal.       Hospital course further complicated by small bowel obstruction on May 19. Patient underwent hemicolectomy and primary anastomosis with general surgery. Pathology determined patient to have moderately  Or    ondansetron (ZOFRAN) injection 4 mg  4 mg IntraVENous Q6H PRN    enoxaparin (LOVENOX) injection 40 mg  40 mg SubCUTAneous Daily    calcium carbonate (TUMS) chewable tablet 500 mg  500 mg Oral TID PRN       Signed:  Cb Burgos MD    Part of this note may have been written by using a voice dictation software.  The note has been proof read but may still contain some grammatical/other typographical errors.

## 2024-10-18 NOTE — CARE COORDINATION
Patient is scheduled for discharge today. RICARDO has made several attempts to contact Lesley and julio, with no answer. Message sent to Layla to see if she knows when a family member will be picking Patient up today. RICARDO is able to schedule transportation if needed. CM awaiting a return call/ message.    Bettina BENAVIDEZ, ACM  [facility]

## 2024-10-18 NOTE — PROGRESS NOTES
Outpatient Pharmacy Progress Note for Meds-to-Beds    Total number of Prescriptions Filled: 3    List of Medications (name,strength):  Fluconazole 200 mg  Metoprolol Tartrate 25 mg  Pantoprazole 40 mg    Delivered to: Delfino Cardona      Co-pay: 0      Payment Type: 0      Additional Documentation:  Medication(s) were delivered to the patient's room prior to discharge      Thank you for letting us serve your patients.  [id number] [address]  Phone: [phone]  Fax: [phone]

## 2024-10-18 NOTE — CARE COORDINATION
CM placed referral to [facility] for differentiated adenocarcinoma, CM called and confirmed referral received. New PCP appointment scheduled.     Bettina BENAVIDEZ, ACM  [place]

## 2024-10-18 NOTE — DISCHARGE SUMMARY
Hospitalist Discharge Summary   Admit Date:  2024  4:30 PM   DC Note date: 10/18/2024  Name:  Brendan Saleh   Age:  49 y.o.  Sex:  male  :  1975   MRN:  [id number]   Room:  [facility]  PCP:  No primary care provider on file.    Presenting Complaint: Altered Mental Status     Initial Admission Diagnosis: Acute hypernatremia [E87.0]  Volume depletion [E86.9]  Acute kidney injury (HCC) [N17.9]  Acute encephalopathy [G93.40]  Septic shock (HCC) [A41.9, R65.21]     Problem List for this Hospitalization (present on admission):    Principal Problem:    Infection of  (ventriculoperitoneal) shunt (HCC)  Active Problems:    Encephalitis    S/P  shunt    Communicating hydrocephalus (HCC)    Anemia    Sepsis following intra-abdominal surgery (HCC)    Wound disruption    Sinus tachycardia    NPH (normal pressure hydrocephalus) (HCC)    Shunt malfunction    Colon obstruction (HCC)    Colonic mass    Adenocarcinoma of colon (HCC)    Severe protein-calorie malnutrition (HCC)    Sacral decubitus ulcer, stage IV (HCC)    ESBL (extended spectrum beta-lactamase) producing bacteria infection    Acute blood loss as cause of postoperative anemia    Bowel perforation (HCC)    Critical illness myopathy    Cachexia (HCC)    Unintentional weight loss    Adult failure to thrive    Encounter for palliative care    Hypomagnesemia    Diarrhea  Resolved Problems:    Hypernatremia    MIRNA (acute kidney injury) (HCC)    UTI (urinary tract infection)    Septic shock (HCC)      Hospital Course:    Brendan Saleh is a 49 y.o. male with medical history of   Dural hematoma s/p Bur hole   Hydrocephalus s/p  shunt   Sacral decubitus stage IV ulcer      who presented with altered mental status on 24.      I have reviewed medical record from previous provider.  It was revealed in chronological order.  And the detailed history of his hospital stay for this admission is the following.      \" Found to initially have urinary  equally round.  ENT:                Nares appear normal.  Moist oral mucosa  Neck:               No restricted ROM.  Trachea midline   CV:                  RRR.  No m/r/g.  No jugular venous distension.  Lungs:             CTAB.  No wheezing, rhonchi, or rales.  Symmetric expansion.  Abdomen:        Soft, nontender, nondistended.  Extremities:     No cyanosis or clubbing.  No edema  Skin:                No rashes.  Normal coloration.   Warm and dry.    Neuro:             CN II-XII grossly intact.    Psych:             Normal mood and affect.        Signed:  Cb Burgos MD    Part of this note may have been written by using a voice dictation software.  The note has been proof read but may still contain some grammatical/other typographical errors.

## (undated) DEVICE — ELECTRODE PT RET INF L9FT HI MOIST COND ADH HYDRGEL CORDED

## (undated) DEVICE — STAPLER INT L60MM REG TISS BLU B FRM 8 FIRING 2 ROW AUTO

## (undated) DEVICE — PREMIUM WET SKIN PREP TRAY: Brand: MEDLINE INDUSTRIES, INC.

## (undated) DEVICE — GLOVE SURG SZ 65 L12IN FNGR THK79MIL GRN LTX FREE

## (undated) DEVICE — CANISTER, RIGID, 2000CC: Brand: MEDLINE INDUSTRIES, INC.

## (undated) DEVICE — GAUZE,SPONGE,4"X4",16PLY,STRL,LF,10/TRAY: Brand: MEDLINE

## (undated) DEVICE — SUTURE PERMAHAND SZ 3-0 L18IN NONABSORBABLE BLK L26MM SH C013D

## (undated) DEVICE — SUTURE VICRYL + SZ 2-0 L18IN ABSRB UD CP-2 L26MM 1/2 CIR REV VCP762D

## (undated) DEVICE — BLADE CLIPPER GEN PURP NS

## (undated) DEVICE — CARBIDE MATCH HEAD

## (undated) DEVICE — SUTURE CHROMIC GUT SZ 2-0 L27IN ABSRB BRN L26MM SH 1/2 CIR G123H

## (undated) DEVICE — SMARTGOWN SURGICAL GOWN, LARGE: Brand: CONVERTORS

## (undated) DEVICE — SMARTGOWN BREATHABLE SURGICAL GOWN: Brand: CONVERTORS

## (undated) DEVICE — SYRINGE MED 5ML STD CLR PLAS LUERLOCK TIP N CTRL DISP

## (undated) DEVICE — GLOVE ORANGE PI 7 1/2   MSG9075

## (undated) DEVICE — SUTURE PDS II SZ 1 L96IN ABSRB VLT TP-1 L65MM 1/2 CIR Z880G

## (undated) DEVICE — DRAIN SURG 19FR 100% SIL RADPQ RND CHN FULL FLUT

## (undated) DEVICE — SUTURE ABSORBABLE BRAIDED 3-0 SHB 18 IN UD VICRYL + VCPB864D

## (undated) DEVICE — RESERVOIR,SUCTION,100CC,SILICONE: Brand: MEDLINE

## (undated) DEVICE — STERILE LATEX POWDER FREE SURGICAL GLOVES WITH HYDROGEL COATING: Brand: PROTEXIS

## (undated) DEVICE — LIQUIBAND RAPID ADHESIVE 36/CS 0.8ML: Brand: MEDLINE

## (undated) DEVICE — SUTURE PERMAHAND SZ 2-0 L12X18IN NONABSORBABLE BLK SILK A185H

## (undated) DEVICE — SEALER ENDOSCP L37CM NANO COAT BLNT TIP LAP DIV

## (undated) DEVICE — SET BLD COLL 25GA TB L12IN NDL L0.75IN WNG ROY BLU SIMP EZ

## (undated) DEVICE — RELOAD STPL H1.5X3.6XL60MM REG TISS BLU B FRM AUTO RET PIN

## (undated) DEVICE — INTENDED TO BE USED TO OCCLUDE, RETRACT AND IDENTIFY ARTERIES, VEINS, TENDONS AND NERVES IN SURGICAL PROCEDURES: Brand: STERION®  VESSEL LOOP

## (undated) DEVICE — LITHOTOMY: Brand: MEDLINE INDUSTRIES, INC.

## (undated) DEVICE — PVC FEEDING TUBE WITH RADIOPAQUE LINE: Brand: ARGYLE

## (undated) DEVICE — INTENDED FOR TISSUE SEPARATION, AND OTHER PROCEDURES THAT REQUIRE A SHARP SURGICAL BLADE TO PUNCTURE OR CUT.: Brand: BARD-PARKER ® STAINLESS STEEL BLADES

## (undated) DEVICE — KIT,ANTI FOG,W/SPONGE & FLUID,SOFT PACK: Brand: MEDLINE

## (undated) DEVICE — STAPLER EXT SKIN 35 WIDE S STL STPL SQUEEZE HNDL VISISTAT

## (undated) DEVICE — SPONGE,NEURO,0.5"X0.5",XR,STRL,10/PK: Brand: MEDLINE

## (undated) DEVICE — PREVENA INCISION MANAGEMENT SYSTEM- PEEL & PLACE DRESSING: Brand: PREVENA™ PEEL & PLACE™

## (undated) DEVICE — ELECTRODE PT RET AD L9FT HI MOIST COND ADH HYDRGEL CORDED

## (undated) DEVICE — SPONGE GZ W6XL6IN COT 6 PLY SUP FLUF EXTRA ABSRB FOR PRE OP

## (undated) DEVICE — DRAPE TOWEL: Brand: CONVERTORS

## (undated) DEVICE — SUTURE PDS II SZ 1 L36IN ABSRB VLT L48MM CTX 1/2 CIR Z371T

## (undated) DEVICE — SMARTGOWN SURGICAL GOWN, XL: Brand: CONVERTORS

## (undated) DEVICE — SUTURE VICRYL SZ 2-0 L27IN ABSRB UD L26MM SH 1/2 CIR J417H

## (undated) DEVICE — Device

## (undated) DEVICE — MAJOR GENERAL: Brand: MEDLINE INDUSTRIES, INC.

## (undated) DEVICE — SEALER ENDOSCP NANO COAT OPN DIV CRV L JAW LIGASURE IMPACT

## (undated) DEVICE — SUTURE PERMAHAND SZ 2-0 L18IN NONABSORBABLE BLK L26MM SH C012D

## (undated) DEVICE — MINOR: Brand: MEDLINE INDUSTRIES, INC.

## (undated) DEVICE — GARMENT,MEDLINE,DVT,INT,CALF,FOAM,MED: Brand: MEDLINE

## (undated) DEVICE — PAD ELECTRD NEO HYDRGEL CORDED PT RET DISP VALLEYLAB REM

## (undated) DEVICE — BANDAGE,GAUZE,BULKEE II,4.5"X4.1YD,STRL: Brand: MEDLINE

## (undated) DEVICE — BANDAGE,GAUZE,CONFORMING,3"X75",STRL,LF: Brand: MEDLINE

## (undated) DEVICE — DRAPE TWL SURG 16X26IN BLU ORB04] ALLCARE INC]

## (undated) DEVICE — STAPLER INT CUT LN 51MM STPL 51MM BLU CRV HD B FRM

## (undated) DEVICE — YANKAUER,POOLE TIP,STERILE,50/CS: Brand: MEDLINE

## (undated) DEVICE — PAD,ABDOMINAL,8"X7.5",STERILE,LF,1/PK: Brand: MEDLINE

## (undated) DEVICE — SUTURE PDS II SZ 1 L54IN ABSRB VLT L65MM TP-1 1/2 CIR Z879G

## (undated) DEVICE — ACHIEVE NEEDLE BIOP SOFT TISSUE 18GX6CM

## (undated) DEVICE — SUTURE PDS II SZ 0 L27IN ABSRB VLT L26MM CT-2 1/2 CIR Z334H

## (undated) DEVICE — SUTURE PERMA-HAND SZ 2-0 L30IN NONABSORBABLE BLK L26MM SH K833H

## (undated) DEVICE — SPONGE LAP W18XL18IN WHT COT 4 PLY FLD STRUNG RADPQ DISP ST 2 PER PACK

## (undated) DEVICE — SOLUTION IRRIG 1000ML 0.9% SOD CHL USP POUR PLAS BTL

## (undated) DEVICE — DISPOSABLE STANDARD BIPOLAR FORCEPS, NON-STICK,: Brand: SPETZLER-MALIS

## (undated) DEVICE — PREVENA PLUS INCISION MANAGEMENT SYSTEM: Brand: PREVENA PLUS™

## (undated) DEVICE — TOTAL TRAY, DB, 100% SILI FOLEY, 16FR 10: Brand: MEDLINE

## (undated) DEVICE — SHEET,DRAPE,53X77,STERILE: Brand: MEDLINE

## (undated) DEVICE — GLOVE SURG SZ 6.5 L11.2IN THK8.6MIL LT BRN LTX FREE

## (undated) DEVICE — STAPLER INT DIA29MM CLS STPL H1.5-2.2MM OPN LEG L5.2MM 26

## (undated) DEVICE — RELOAD STPL L75MM OPN H3.8MM CLS 1.5MM WIRE DIA0.2MM REG

## (undated) DEVICE — STAPLER INT L75MM CUT LN L73MM STPL LN L77MM BLU B FRM 8

## (undated) DEVICE — GOWN,SIRUS,NON REINFRCD,LARGE,SET IN SL: Brand: MEDLINE

## (undated) DEVICE — WET SKIN PREP TRAY: Brand: MEDLINE INDUSTRIES, INC.

## (undated) DEVICE — GLOVE SURG SZ 75 CRM LTX FREE POLYISOPRENE POLYMER BEAD ANTI

## (undated) DEVICE — TUBING, SUCTION, 1/4" X 10', STRAIGHT: Brand: MEDLINE